# Patient Record
Sex: MALE | Race: WHITE | Employment: OTHER | URBAN - METROPOLITAN AREA
[De-identification: names, ages, dates, MRNs, and addresses within clinical notes are randomized per-mention and may not be internally consistent; named-entity substitution may affect disease eponyms.]

---

## 2017-01-03 DIAGNOSIS — Z79.4 TYPE 2 DIABETES MELLITUS WITH DIABETIC POLYNEUROPATHY, WITH LONG-TERM CURRENT USE OF INSULIN (H): Primary | ICD-10-CM

## 2017-01-03 DIAGNOSIS — E11.42 TYPE 2 DIABETES MELLITUS WITH DIABETIC POLYNEUROPATHY, WITH LONG-TERM CURRENT USE OF INSULIN (H): Primary | ICD-10-CM

## 2017-01-03 NOTE — TELEPHONE ENCOUNTER
Fax from pharmacy asking for clarification on the Glucose tablets.     Please clarify strength and how often daily/max.     Pended first option and also another option with 5 mg tablet. Pended Daily PRN.

## 2017-01-03 NOTE — TELEPHONE ENCOUNTER
Called pharmacy and clarified that per LS it is the 4 gram glucose tabs. Shari Schoenberger, CMA

## 2017-01-03 NOTE — TELEPHONE ENCOUNTER
There is no daily max on the glucose tabs, its just 4 tabs q 10-15 minutes as needed for low blood sugar.  Does that work?  Martha Sánchez NP  Endocrinology

## 2017-01-05 ENCOUNTER — CARE COORDINATION (OUTPATIENT)
Dept: CASE MANAGEMENT | Facility: CLINIC | Age: 77
End: 2017-01-05

## 2017-01-05 NOTE — PROGRESS NOTES
Clinic Care Coordination Contact  OUTREACH    Referral Information:  Referral Source: CTS  Reason for Contact: follow-up  Care Conference: No     Universal Utilization:   ED Visits in last year: 1  Hospital visits in last year: 1  Last PCP appointment: 16  Missed Appointments:  (none known)  Concerns: no utilization concerns  Multiple Providers or Specialists: endocrinologist    Clinical Concerns:  Current Medical Concerns: Received call back from patient's wife who verified patient's name and .  Wife reported that patient's FBS have been running 65-85 over the last week and patient has needed to eat a whole bagel at night in order to have those FBS in that range.  Diabetic monitor is reporting that average blood sugar is about 146 at this time.  Patient is getting glucose tabs at pharmacy to have option to use if blood sugars are too low.  Patient will have follow-up with endocrinologist on 17.     Current Behavioral Concerns: No acute concerns    Education Provided to patient: Encouraged patient and wife to continue to check blood sugars and report any highs or lows to endocrinologist.     Clinical Pathway Name: Diabetes  Clinical Pathway: Clinic Care Coordination - Diabetes Pathway    Patient's diabetes management related comments/concerns: Problem Solving.    Patient's emotional response to diabetes: expresses readiness to learn    ASSESSMENT:  Patient Problem List and Family Medical History reviewed for relevant medical history, current medical status, and diabetes risk factors.    Current Diabetes Management per Patient:  Taking diabetes medications? yes    Past Diabetes Education: Yes    Patient glucose self monitoring as follows: four times daily.     BG results: fasting glucose- 65-85     BG values are: In goal  Patient's most recent A1C      8.9   12/3/2016 is not meeting goal     Nutrition:  Patient eats 3 meals per day      Cultural/Taoist diet restrictions: No     Physical Activity:     Walking around the house, errands    Medication Management:  Patient taking medications as ordered     Functional Status:  Mobility Status: Independent  Equipment Currently Used at Home: glucometer  Transportation: Wife drives           Psychosocial:  Current living arrangement:: I live in a private home with spouse  Financial/Insurance: [facility], patient is a [place], but not currently using any VA benefits.   Adult son and adult dtr, but [place]    Supportive neighbors who will assist with transportation/errands and snow removal.      Resources and Interventions:  Current Resources: Other (Comments) (none); Other (see comment) (grocery delivery options, diabetes resources)     Informed wife of [facility] phone number to make eye appointments.       Advanced Care Plans/Directives on file:: No  Referrals Placed: Other  (none at this time)     Goals:   Goal 1 Statement: Patient will have stable diabetes status with blood sugars WNL through 4/1/17.   Goal 1 Progression Percent: 50%  Goal 1 Progression Date: 01/05/17     Barriers: Elderly, difficulty changing habits.   Strengths: supportive wife  Patient/Caregiver understanding: Patient and wife have good understanding of condition and are monitoring blood sugars consistently.    Frequency of Care Coordination: as needed  Upcoming appointment: 02/23/17 (with endocrinologist Dr. Damian)     Plan:   1. Patient will continue to monitor blood sugars and take insulin as ordered.    2. Patient will follow-up with endocrinologist on 2/23/17.   3. Will pend to check status in 4-5 weeks.     Esha Zimmerman RN   FPA [facility]   [phone]  January 5, 2017

## 2017-01-12 DIAGNOSIS — N20.0 CALCULUS OF KIDNEY: ICD-10-CM

## 2017-01-12 DIAGNOSIS — E78.5 HYPERLIPIDEMIA LDL GOAL <100: Primary | ICD-10-CM

## 2017-01-12 DIAGNOSIS — I10 HYPERTENSION GOAL BP (BLOOD PRESSURE) < 140/90: ICD-10-CM

## 2017-01-12 NOTE — TELEPHONE ENCOUNTER
Tamsulosin         Last Written Prescription Date: 10/11/16  Last Fill Quantity: 90, # refills: 0    Last Office Visit with G, P or [facility] prescribing provider:  12/29/16 Nati   Future Office Visit:    Next 5 appointments (look out 90 days)     Feb 23, 2017  2:00 PM   [facility] Visit with Lori Damian MD   [facility]    [address] [facility] [address]  [facility] [address] [phone]                  BP Readings from Last 3 Encounters:   12/29/16 122/62   12/14/16 122/64   12/05/16 138/74

## 2017-01-16 RX ORDER — TAMSULOSIN HYDROCHLORIDE 0.4 MG/1
0.4 CAPSULE ORAL DAILY
Qty: 90 CAPSULE | Refills: 1 | Status: SHIPPED | OUTPATIENT
Start: 2017-01-16 | End: 2017-07-10

## 2017-01-16 RX ORDER — AMLODIPINE BESYLATE 5 MG/1
5 TABLET ORAL DAILY
Qty: 90 TABLET | Refills: 1 | Status: SHIPPED | OUTPATIENT
Start: 2017-01-16 | End: 2017-07-14

## 2017-01-20 DIAGNOSIS — E78.5 HYPERLIPIDEMIA LDL GOAL <100: Primary | ICD-10-CM

## 2017-01-20 NOTE — TELEPHONE ENCOUNTER
Mevacor     Last Written Prescription Date: 1/22/16  Last Fill Quantity:, # refills: 90  Last Office Visit with G, P or [facility] prescribing provider: 12/14/16   Next 5 appointments (look out 90 days)     Feb 23, 2017  2:00 PM   Return Visit with Lori Damian MD   [facility] ([facility]    [address] [facility] [address]  [facility] [address]   [phone]                   CHOL      127   7/22/2016  HDL       31   7/22/2016  LDL       79   7/22/2016  TRIG       86   7/22/2016  CHOLHDLRATIO      4.3   9/25/2015    Labs showing if normal/abnormal  Lab Results   Component Value Date    CHOL 127 07/22/2016    TRIG 86 07/22/2016    HDL 31* 07/22/2016    LDL 79 07/22/2016    VLDL 23 09/25/2015    CHOLHDLRATIO 4.3 09/25/2015

## 2017-01-24 RX ORDER — LOVASTATIN 40 MG
40 TABLET ORAL AT BEDTIME
Qty: 90 TABLET | Refills: 0 | Status: SHIPPED | OUTPATIENT
Start: 2017-01-24 | End: 2017-04-14

## 2017-02-03 ENCOUNTER — CARE COORDINATION (OUTPATIENT)
Dept: CASE MANAGEMENT | Facility: CLINIC | Age: 77
End: 2017-02-03

## 2017-02-03 NOTE — PROGRESS NOTES
Clinic Care Coordination Contact  OUTREACH    Referral Information:  Referral Source: CTS  Reason for Contact: follow-up  Care Conference: No     Universal Utilization:   ED Visits in last year: 1  Hospital visits in last year: 1  Last PCP appointment: 16  Missed Appointments:  (none known)  Concerns: no utilization concerns  Multiple Providers or Specialists: endocrinologist    Clinical Concerns:  Current Medical Concerns: Contacted patient's home and spoke with wife who verified patient's name and  and consent to communicate form exists to speak with wife.  Wife reported that FBS was 64 and then blood sugars throughout day are ranging from 140's-170's.  Wife and patient will meet with endocrinologist on 17.      Current Behavioral Concerns: No acute concerns      Education Provided to patient: Discussed importance of continuing to check blood sugars and bring results to endocrinologist for review.  Discussed inquiring when next A1C should be obtained.     Clinical Pathway Name: Diabetes  Clinical Pathway: Clinic Care Coordination - Diabetes Pathway    Patient's diabetes management related comments/concerns: Monitoring    ASSESSMENT:  Patient Problem List and Family Medical History reviewed for relevant medical history, current medical status, and diabetes risk factors.    Current Diabetes Management per Patient:  Taking diabetes medications?   yes:     Diabetes Medication(s)     Biguanides Sig    metFORMIN (GLUCOPHAGE) 500 MG tablet Take 2 tablets (1,000 mg) by mouth 2 times daily (with meals)    Insulin Sig    insulin lispro (HUMALOG KWIKPEN) 100 UNIT/ML injection Inject 2 Units Subcutaneous 3 times daily (before meals) Take 2 unit before breakfast, 2 units before lunch and 2 units before dinner PLUS sliding scale    insulin glargine (LANTUS) 100 UNIT/ML injection Inject 35 Units Subcutaneous every morning (before breakfast)    insulin aspart (NOVOLOG PEN) 100 UNIT/ML injection Inject 1-7 Units  Subcutaneous 3 times daily (before meals) Correction Scale - HIGH INSULIN RESISTANCE DOSING     Do Not give Correction Insulin if BG < 140  For  - 164 give 1 unit.  For  - 189 give 2 units.  For  - 214 give 3 units.  For  - 239 give 4 units.  For  - 264 give 5 units.  For  - 289 give 6 units.  For  - 314 give 7 units.  For  - 339 give 8 units.  For BG = or > 340 give 9 units.  Check blood glucose before meals/bolus feedings (or q4h if NPO) and administer based on blood glucose.  Notify MD if glucose > or = 350 mg/dL after administration of correction dose.          Past Diabetes Education: Yes    Patient glucose self monitoring as follows: four times daily.     BG results: fasting glucose- 64 and bedtime- 169     Patient's most recent A1C      8.9   12/3/2016 is not meeting goal     Nutrition:  Patient eats 3 meals per day    Cultural/Worship diet restrictions: No     Physical Activity:    ADL's, grocery shopping, snow removal      Medication Management:  Wife reports that patient is taking medications as prescribed.      Functional Status:  Mobility Status: Independent  Equipment Currently Used at Home: glucometer  Transportation: wife drives           Psychosocial:  Current living arrangement:: I live in a private home with spouse  Financial/Insurance: [facility], patient is a [place], but not currently using any VA benefits.   Adult son and adult dtr, but [place] area.     Supportive neighbors who will assist with transportation/errands and snow removal.         Resources and Interventions:  Current Resources: Other (Comments) (none); Other (see comment) (grocery delivery options, diabetes resources)        Advanced Care Plans/Directives on file:: No  Referrals Placed: Other  (none at this time)     Goals:   Goal 1 Statement: Patient will have stable diabetes status with blood sugars WNL through 4/1/17.   Goal 1 Progression Percent: 60%  Goal 1  Progression Date: 02/03/17     Patient/Caregiver understanding: Patient/wife have good understanding of condition and are consistent in checking blood sugars and taking medications/insulin.  Reminded patient/wife regarding making eye physician appt.      Frequency of Care Coordination: as needed  Upcoming appointment: 02/23/17 (with endocrinologist Dr. Damian)     Plan:   1. Patient will see endocrinologist on 2/23/17  2. Patient will bring blood sugar results for review.   3. Will pend to check status in 4-5 weeks.     Esha Zimmerman RN   [facility]   [phone]  February 3, 2017

## 2017-02-10 DIAGNOSIS — Z79.4 TYPE 2 DIABETES MELLITUS WITH DIABETIC POLYNEUROPATHY, WITH LONG-TERM CURRENT USE OF INSULIN (H): ICD-10-CM

## 2017-02-10 DIAGNOSIS — E78.5 HYPERLIPIDEMIA LDL GOAL <100: ICD-10-CM

## 2017-02-10 DIAGNOSIS — E11.42 TYPE 2 DIABETES MELLITUS WITH DIABETIC POLYNEUROPATHY, WITH LONG-TERM CURRENT USE OF INSULIN (H): ICD-10-CM

## 2017-02-10 LAB
ALBUMIN SERPL-MCNC: 3.6 G/DL (ref 3.4–5)
ALP SERPL-CCNC: 76 U/L (ref 40–150)
ALT SERPL W P-5'-P-CCNC: 25 U/L (ref 0–70)
ANION GAP SERPL CALCULATED.3IONS-SCNC: 7 MMOL/L (ref 3–14)
AST SERPL W P-5'-P-CCNC: 20 U/L (ref 0–45)
BILIRUB SERPL-MCNC: 0.4 MG/DL (ref 0.2–1.3)
BUN SERPL-MCNC: 24 MG/DL (ref 7–30)
CALCIUM SERPL-MCNC: 9.5 MG/DL (ref 8.5–10.1)
CHLORIDE SERPL-SCNC: 102 MMOL/L (ref 94–109)
CHOLEST SERPL-MCNC: 136 MG/DL
CO2 SERPL-SCNC: 32 MMOL/L (ref 20–32)
CREAT SERPL-MCNC: 1.19 MG/DL (ref 0.66–1.25)
GFR SERPL CREATININE-BSD FRML MDRD: 59 ML/MIN/1.7M2
GLUCOSE SERPL-MCNC: 72 MG/DL (ref 70–99)
HBA1C MFR BLD: 7.9 % (ref 4.3–6)
HDLC SERPL-MCNC: 37 MG/DL
LDLC SERPL CALC-MCNC: 83 MG/DL
NONHDLC SERPL-MCNC: 99 MG/DL
POTASSIUM SERPL-SCNC: 4.4 MMOL/L (ref 3.4–5.3)
PROT SERPL-MCNC: 8.7 G/DL (ref 6.8–8.8)
SODIUM SERPL-SCNC: 141 MMOL/L (ref 133–144)
TRIGL SERPL-MCNC: 82 MG/DL

## 2017-02-10 PROCEDURE — 36415 COLL VENOUS BLD VENIPUNCTURE: CPT | Performed by: INTERNAL MEDICINE

## 2017-02-10 PROCEDURE — 83036 HEMOGLOBIN GLYCOSYLATED A1C: CPT | Performed by: INTERNAL MEDICINE

## 2017-02-10 PROCEDURE — 80061 LIPID PANEL: CPT | Performed by: INTERNAL MEDICINE

## 2017-02-10 PROCEDURE — 80053 COMPREHEN METABOLIC PANEL: CPT | Performed by: INTERNAL MEDICINE

## 2017-02-17 ENCOUNTER — OFFICE VISIT (OUTPATIENT)
Dept: INTERNAL MEDICINE | Facility: CLINIC | Age: 77
End: 2017-02-17
Payer: COMMERCIAL

## 2017-02-17 VITALS
DIASTOLIC BLOOD PRESSURE: 70 MMHG | SYSTOLIC BLOOD PRESSURE: 136 MMHG | WEIGHT: 158 LBS | TEMPERATURE: 98.3 F | OXYGEN SATURATION: 97 % | HEART RATE: 83 BPM | BODY MASS INDEX: 24.8 KG/M2 | HEIGHT: 67 IN

## 2017-02-17 DIAGNOSIS — R73.9 HYPERGLYCEMIA: ICD-10-CM

## 2017-02-17 DIAGNOSIS — E13.10 DIABETIC KETOACIDOSIS WITHOUT COMA ASSOCIATED WITH OTHER SPECIFIED DIABETES MELLITUS (H): ICD-10-CM

## 2017-02-17 DIAGNOSIS — I10 HYPERTENSION GOAL BP (BLOOD PRESSURE) < 140/90: ICD-10-CM

## 2017-02-17 DIAGNOSIS — E11.42 TYPE 2 DIABETES MELLITUS WITH DIABETIC POLYNEUROPATHY, WITH LONG-TERM CURRENT USE OF INSULIN (H): Primary | ICD-10-CM

## 2017-02-17 DIAGNOSIS — Z79.4 TYPE 2 DIABETES MELLITUS WITH DIABETIC POLYNEUROPATHY, WITH LONG-TERM CURRENT USE OF INSULIN (H): Primary | ICD-10-CM

## 2017-02-17 DIAGNOSIS — E78.5 HYPERLIPIDEMIA LDL GOAL <100: ICD-10-CM

## 2017-02-17 PROCEDURE — 99214 OFFICE O/P EST MOD 30 MIN: CPT | Performed by: INTERNAL MEDICINE

## 2017-02-17 NOTE — NURSING NOTE
"Chief Complaint   Patient presents with     Diabetes     Discuss lab results, insulin refills       Initial /70  Pulse 83  Temp 98.3  F (36.8  C) (Oral)  Ht 5' 7\" (1.702 m)  Wt 158 lb (71.7 kg)  SpO2 97%  BMI 24.75 kg/m2 Estimated body mass index is 24.75 kg/(m^2) as calculated from the following:    Height as of this encounter: 5' 7\" (1.702 m).    Weight as of this encounter: 158 lb (71.7 kg).  Medication Reconciliation: complete   Richelle Kelly MA      "

## 2017-02-17 NOTE — PROGRESS NOTES
SUBJECTIVE:                                                    Pete Sheldon is a 76 year old male who presents to clinic today for the following health issues:        Diabetes Follow-up    Patient is checking blood sugars: four times daily.    Blood sugar testing frequency justification: Uncontrolled diabetes  Results are as follows:        Numbers varies constantly in a range of 's through the day    Diabetic concerns: None     Symptoms of hypoglycemia (low blood sugar): shaky, sweaty at times     Paresthesias (numbness or burning in feet) or sores: Yes, Occasional Lt foot tingling     Date of last diabetic eye exam: Overdue   Has H/O DM. On diet , exercise and Insulin. Blood sugars are better controlled , but still fluctuating.  Has LE  parestesias. No hypoglycemias.    Hyperlipidemia Follow-Up      Rate your low fat/cholesterol diet?: good    Taking statin?  Yes, no muscle aches from statin    Other lipid medications/supplements?:  none   Has H/O hyperlipidemia. On medical treatment and diet. No side effects. No muscle weakness, myalgias or upset stomach.     Hypertension Follow-up      Outpatient blood pressures are not being checked.    Low Salt Diet: no added salt   Has h/o HTN. on medical treatment. BP has been controlled. No side effects from medications. No CP, HA, dizziness. good compliance with medications and low salt diet.    Discussed immunizations. Needs Prevnar 13      Amount of exercise or physical activity: None    Problems taking medications regularly: No    Medication side effects: none  Diet: low fat/cholesterol and tries diabetic diet        Problem list and histories reviewed & adjusted, as indicated.  Additional history: as documented    Problem list, Medication list, Allergies, and Medical/Social/Surgical histories reviewed in [facility] and updated as appropriate.    ROS:  Constitutional, HEENT, cardiovascular, pulmonary, GI, , musculoskeletal, neuro, skin, endocrine and psych  "systems are negative, except as otherwise noted.    OBJECTIVE:                                                    /70  Pulse 83  Temp 98.3  F (36.8  C) (Oral)  Ht 5' 7\" (1.702 m)  Wt 158 lb (71.7 kg)  SpO2 97%  BMI 24.75 kg/m2  Body mass index is 24.75 kg/(m^2).  GENERAL: healthy, alert and no distress  NECK: no adenopathy, no asymmetry, masses, or scars and thyroid normal to palpation  RESP: lungs clear to auscultation - no rales, rhonchi or wheezes  CV: regular rate and rhythm, normal S1 S2, no S3 or S4, no murmur, click or rub, no peripheral edema and peripheral pulses strong  ABDOMEN: soft, nontender, no hepatosplenomegaly, no masses and bowel sounds normal  MS: no gross musculoskeletal defects noted, no edema    Diagnostic Test Results:  Results for orders placed or performed in visit on 02/10/17   Comprehensive metabolic panel   Result Value Ref Range    Sodium 141 133 - 144 mmol/L    Potassium 4.4 3.4 - 5.3 mmol/L    Chloride 102 94 - 109 mmol/L    Carbon Dioxide 32 20 - 32 mmol/L    Anion Gap 7 3 - 14 mmol/L    Glucose 72 70 - 99 mg/dL    Urea Nitrogen 24 7 - 30 mg/dL    Creatinine 1.19 0.66 - 1.25 mg/dL    GFR Estimate 59 (L) >60 mL/min/1.7m2    GFR Estimate If Black 71 >60 mL/min/1.7m2    Calcium 9.5 8.5 - 10.1 mg/dL    Bilirubin Total 0.4 0.2 - 1.3 mg/dL    Albumin 3.6 3.4 - 5.0 g/dL    Protein Total 8.7 6.8 - 8.8 g/dL    Alkaline Phosphatase 76 40 - 150 U/L    ALT 25 0 - 70 U/L    AST 20 0 - 45 U/L   Lipid panel reflex to direct LDL   Result Value Ref Range    Cholesterol 136 <200 mg/dL    Triglycerides 82 <150 mg/dL    HDL Cholesterol 37 (L) >39 mg/dL    LDL Cholesterol Calculated 83 <100 mg/dL    Non HDL Cholesterol 99 <130 mg/dL   Hemoglobin A1c   Result Value Ref Range    Hemoglobin A1C 7.9 (H) 4.3 - 6.0 %        ASSESSMENT/PLAN:                                                      Problem List Items Addressed This Visit     HYPERLIPIDEMIA LDL GOAL <100    Hypertension goal BP (blood pressure) " < 140/90    Type 2 diabetes mellitus with diabetic polyneuropathy, with long-term current use of insulin (H) - Primary    Relevant Medications    insulin lispro (HUMALOG KWIKPEN) 100 UNIT/ML injection    insulin glargine (LANTUS) 100 UNIT/ML injection    DKA (diabetic ketoacidoses) (H)    Relevant Medications    insulin lispro (HUMALOG KWIKPEN) 100 UNIT/ML injection    insulin glargine (LANTUS) 100 UNIT/ML injection      Other Visit Diagnoses     Hyperglycemia        Relevant Medications    insulin lispro (HUMALOG KWIKPEN) 100 UNIT/ML injection           Cont treatment   Start exercise   Consider ACE  Prevnar 13 advised, he will check with his pharmacy     Follow-Up:in 3 months     Evgeny Dubon MD  [facility]

## 2017-02-17 NOTE — MR AVS SNAPSHOT
"              After Visit Summary   2/17/2017    Pete Sheldon    MRN: [id number]           Patient Information     Date Of Birth          1940        Visit Information        Provider Department      2/17/2017 10:00 AM Evgeny Dubon MD [facility]        Today's Diagnoses     Hyperglycemia        Diabetic ketoacidosis without coma associated with other specified diabetes mellitus (H)           Follow-ups after your visit        Follow-up notes from your care team     Return in about 3 months (around 5/17/2017).      Your next 10 appointments already scheduled     Feb 23, 2017  2:00 PM CST   Return Visit with Lori Damian MD   [facility] ([facility]    [address] [facility] [address]  [facility] [address] [phone]              Who to contact     If you have questions or need follow up information about today's clinic visit or your schedule please contact [facility] directly at [phone].  Normal or non-critical lab and imaging results will be communicated to you by Planbushart, letter or phone within 4 business days after the clinic has received the results. If you do not hear from us within 7 days, please contact the clinic through Planbushart or phone. If you have a critical or abnormal lab result, we will notify you by phone as soon as possible.  Submit refill requests through [employer] or call your pharmacy and they will forward the refill request to us. Please allow 3 business days for your refill to be completed.          Additional Information About Your Visit        MyChart Information     [employer] lets you send messages to your doctor, view your test results, renew your prescriptions, schedule appointments and more. To sign up, go to [email or web address] . Click on \"Log in\" on the left side of the screen, which will take you to the Welcome page. Then click on \"Sign up Now\" on the right side of the page.     You will " "be asked to enter the access code listed below, as well as some personal information. Please follow the directions to create your username and password.     Your access code is: [email or web address]  Expires: 2017 10:27 AM     Your access code will  in 90 days. If you need help or a new code, please call your [facility] clinic or [phone].        Care EveryWhere ID     This is your Care EveryWhere ID. This could be used by other organizations to access your [facility] medical records  [id number]        Your Vitals Were     Pulse Temperature Height Pulse Oximetry BMI (Body Mass Index)       83 98.3  F (36.8  C) (Oral) 5' 7\" (1.702 m) 97% 24.75 kg/m2        Blood Pressure from Last 3 Encounters:   17 136/70   16 122/62   16 122/64    Weight from Last 3 Encounters:   17 158 lb (71.7 kg)   16 152 lb 9.6 oz (69.2 kg)   16 152 lb (68.9 kg)              Today, you had the following     No orders found for display         Where to get your medicines      These medications were sent to [facility] [address] [place] [address]     Phone:  [phone]     insulin glargine 100 UNIT/ML injection    insulin lispro 100 UNIT/ML injection          Primary Care Provider Office Phone # Fax #    Rom Helm [phone]       [facility] [address]  [facility] [address]        Goals        Diet    Increase water intake     Notes - Note edited  2014 11:42 AM by Patricia Khan RN    Have 5-6 glasses (8oz) of fluid a day  Per f/u with spouse today, pt not doing well. Not eating and drinking. Called clinic and advised to take pt to clinic.         Increase water intake        General    Medication 1 (pt-stated)     Notes - Note created  5/15/2014  1:57 PM by Patricia Khan, JULIA    Pt will have an MTM consult         Thank you!     Thank you for choosing [facility] " [facility]  for your care. Our goal is always to provide you with excellent care. Hearing back from our patients is one way we can continue to improve our services. Please take a few minutes to complete the written survey that you may receive in the mail after your visit with us. Thank you!             Your Updated Medication List - Protect others around you: Learn how to safely use, store and throw away your medicines at www.disposemymeds.org.          This list is accurate as of: 2/17/17 10:27 AM.  Always use your most recent med list.                   Brand Name Dispense Instructions for use    ACE/ARB NOT PRESCRIBED (INTENTIONAL)      by Other route continuous prn (Hyperkalemia)       amLODIPine 5 MG tablet    NORVASC    90 tablet    Take 1 tablet (5 mg) by mouth daily       blood glucose monitoring test strip    STEFFI CONTOUR NEXT    400 each    Use to test blood sugar 4 times daily or as directed.       Glucosamine HCl 750 MG Tabs      Take 750 mg by mouth 2 times daily       * GLUCOSE MANAGEMENT Tabs     60 tablet    Take 2 tab in case on low blood glucose       * GLUCOSE MANAGEMENT Tabs     100 tablet    4 tabs po for low blood sugar below 70, may repeat q 10-15 minutes as needed       hydrochlorothiazide 12.5 MG capsule    MICROZIDE    90 capsule    Take 1 capsule (12.5 mg) by mouth every morning       insulin aspart 100 UNIT/ML injection    NovoLOG PEN     Inject 1-7 Units Subcutaneous 3 times daily (before meals) Correction Scale - HIGH INSULIN RESISTANCE DOSING    Do Not give Correction Insulin if BG < 140 For  - 164 give 1 unit. For  - 189 give 2 units. For  - 214 give 3 units. For  - 239 give 4 units. For  - 264 give 5 units. For  - 289 give 6 units. For  - 314 give 7 units. For  - 339 give 8 units. For BG = or > 340 give 9 units. Check blood glucose before meals/bolus feedings (or q4h if NPO) and administer based on blood glucose. Notify MD if glucose >  "or = 350 mg/dL after administration of correction dose.       insulin glargine 100 UNIT/ML injection    LANTUS    3 mL    Inject 35 Units Subcutaneous every morning (before breakfast)       insulin lispro 100 UNIT/ML injection    HumaLOG KWIKpen    3 mL    Inject 2 Units Subcutaneous 3 times daily (before meals) Take 2 unit before breakfast, 2 units before lunch and 2 units before dinner PLUS sliding scale       insulin pen needle 31G X 8 MM     300 each    B-D UF III short pen needles, use 4 times a day to inject insulin.       insulin syringes (disposable) U-100 0.3 ML Misc     100 each    1 each 2 times daily       ZapMe LANCETS Misc     200 strip    Use to test blood sugars 4 times daily or as directed.       loratadine 10 MG tablet    AF LORATADINE    14 tablet    Take 1 tablet (10 mg) by mouth daily       lovastatin 40 MG tablet    MEVACOR    90 tablet    Take 1 tablet (40 mg) by mouth At Bedtime       metFORMIN 500 MG tablet    GLUCOPHAGE    360 tablet    Take 2 tablets (1,000 mg) by mouth 2 times daily (with meals)       multivitamin Tabs tablet      Take 1 tablet by mouth daily       order for DME     3 Month    All DM testing supplies including test strips, lancets, solution, syringes, needles and/or pen needles for testing 4 times per day.  Brand \"Contour Next\"       tamsulosin 0.4 MG capsule    FLOMAX    90 capsule    Take 1 capsule (0.4 mg) by mouth daily       VITAMIN D PO      Take 400 Units by mouth daily       * Notice:  This list has 2 medication(s) that are the same as other medications prescribed for you. Read the directions carefully, and ask your doctor or other care provider to review them with you.      "

## 2017-02-23 ENCOUNTER — OFFICE VISIT (OUTPATIENT)
Dept: ENDOCRINOLOGY | Facility: CLINIC | Age: 77
End: 2017-02-23
Payer: COMMERCIAL

## 2017-02-23 VITALS
TEMPERATURE: 98.2 F | WEIGHT: 159.6 LBS | OXYGEN SATURATION: 97 % | HEIGHT: 67 IN | HEART RATE: 97 BPM | SYSTOLIC BLOOD PRESSURE: 124 MMHG | BODY MASS INDEX: 25.05 KG/M2 | DIASTOLIC BLOOD PRESSURE: 70 MMHG

## 2017-02-23 DIAGNOSIS — E78.5 HYPERLIPIDEMIA LDL GOAL <100: ICD-10-CM

## 2017-02-23 DIAGNOSIS — E11.42 TYPE 2 DIABETES MELLITUS WITH DIABETIC POLYNEUROPATHY, WITH LONG-TERM CURRENT USE OF INSULIN (H): Primary | ICD-10-CM

## 2017-02-23 DIAGNOSIS — Z79.4 TYPE 2 DIABETES MELLITUS WITH DIABETIC POLYNEUROPATHY, WITH LONG-TERM CURRENT USE OF INSULIN (H): Primary | ICD-10-CM

## 2017-02-23 DIAGNOSIS — E11.10 DIABETIC KETOACIDOSIS WITHOUT COMA ASSOCIATED WITH TYPE 2 DIABETES MELLITUS (H): ICD-10-CM

## 2017-02-23 DIAGNOSIS — I10 HYPERTENSION GOAL BP (BLOOD PRESSURE) < 140/90: ICD-10-CM

## 2017-02-23 DIAGNOSIS — E13.10 DIABETIC KETOACIDOSIS WITHOUT COMA ASSOCIATED WITH OTHER SPECIFIED DIABETES MELLITUS (H): ICD-10-CM

## 2017-02-23 DIAGNOSIS — E11.42 DIABETIC POLYNEUROPATHY ASSOCIATED WITH TYPE 2 DIABETES MELLITUS (H): ICD-10-CM

## 2017-02-23 PROCEDURE — 99207 C FOOT EXAM  NO CHARGE: CPT | Performed by: INTERNAL MEDICINE

## 2017-02-23 PROCEDURE — 99214 OFFICE O/P EST MOD 30 MIN: CPT | Performed by: INTERNAL MEDICINE

## 2017-02-23 NOTE — PROGRESS NOTES
ENDOCRINOLOGY CLINIC NOTE:  Name: Pete Sheldon  Seen for f/u of Diabetes.  He is accompanied by his wife.  HPI:  Pete Sheldon is a 75 year old male who presents for the evaluation/management of:  Was in the hospital 12/2/16-12/5/2016 for diabetic ketoacidosis and acute kidney injury.  Office note he is having bedtime snack almost every days.  Typical bedtime snack is whole bagel.  He reports that he feels does not eat a bagel blood sugar drops too at night.      1. Type 2 DM:  Orginally diagnosed in 1995.  Current Regimen: Metformin 1000 mg twice a day, Lantus 35 units in morning and Humalog 3/3/3 with breakfast/lunch/dinner respectively.  In addition to that he is on correctional scale 1 unit- 25 >140.  He and his wife are not sure about Humalog doses.  I asked him if they're taking sliding-scale she is not sure.  But reports that typically takes about 2-3 units with each meal based on blood sugar.  Does not feel comfortable with carbohydrate counting.  BS checks: Three times a day    Average Meter Download: Blood sugars are variable.  There are some episodes when the blood sugar is low in the morning for example 57, 48, 75.  On other days for #on the higher side for example 194-149, 107.  During the day the #again variable but mostly on the higher side.  Exercise: Minimal  Episodes of hypoglycemia (low blood sugar):  Especially in morning as noted above.  Fixed meal pattern: NO. Carb content varies  Patient counting carbs: Trying to count carbs but does not feel comfortable with it.    DM Complications:   Nephropathy: Yes: Microalbuminuria present  Retinopathy: Yes: History of laser treatment in past  Neuropathy: Yes.  Microalbuminuria: Yes: Microalbuminuria present.  Not on ACE secondary to history of hyperkalemia.  MICROL      208   7/22/2016  MICROALBUMIN   390.98   7/22/2016    CAD/PAD: No  Gastroparesis: No  Hypoglycemia unawareness: Yes: Previous notes mentioning history of hypoglycemia  unawareness.    2. Hypertension: Blood Pressure today:   BP Readings from Last 3 Encounters:   17 124/70   17 136/70   16 122/62     Blood pressure medications include hydrochlorothiazide 12.5 mg, amlodipine 5 mg.      3. Hyperlipidemia: Takes lovastatin 40 mg for lipid control.  Denies muscle aches of pains.        PMH/PSH:  Past Medical History   Diagnosis Date     Calculus of ureter      Hypertension      Microalbuminuria 2/15/2016     Other and unspecified hyperlipidemia      Type 2 diabetes, HbA1C goal < 8% (H) 1995     Past Surgical History   Procedure Laterality Date     Hc repair incisional hernia,reducible  2001,      Hernia Repair, Incisional, Unilateral (right)     Hc repair incisional hernia,reducible       Hernia Repair, Incisional, Unilateral (left, with mesh placement)     C nonspecific procedure       Nasal septoplasty     Hc flex sigmoidoscopy w/wo dontae spec by brush/wash  1999     Normal to 60 cm     Hc colonoscopy thru stoma, diagnostic  2007     Normal     Family Hx:  Family History   Problem Relation Age of Onset     CEREBROVASCULAR DISEASE Mother       age 95     HEART DISEASE Mother      age 89,  age 95     CEREBROVASCULAR DISEASE Father       age 91, stroke two years previous     Cancer - colorectal Brother      Half-brother     CANCER Sister      Half-sister (?type)     CANCER Sister      Half-sister (?type)     HEART DISEASE Brother      Neurologic Disorder Daughter      Multiple Sclerosis     Psychotic Disorder Son      Schizophrenia       Social Hx:  Social History     Social History     Marital status:      Spouse name: N/A     Number of children: 2     Years of education: N/A     Occupational History     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          MEDICATIONS:  has a current medication list which includes the following prescription(s): insulin glargine, insulin lispro, lovastatin, tamsulosin, amlodipine, glucose management, glucose management, insulin aspart, multivitamin, metformin, hydrochlorothiazide, blood glucose monitoring, insulin pen needle, order for dme, lifescan finepoint lancets, glucosamine hcl, loratadine, insulin syringes (disposable), cholecalciferol, and ACE/ARB NOT PRESCRIBED, INTENTIONAL,.    ROS     ROS: 10 point ROS neg other than the symptoms noted above in the HPI.    Physical Exam   VS: /70 (BP Location: Left arm, Patient Position: Chair, Cuff Size: Adult Large)  Pulse 97  Temp 98.2  F (36.8  C) (Oral)  Ht 5' 7\" (1.702 m)  Wt 159 lb 9.6 oz (72.4 kg)  SpO2 97%  BMI 25 kg/m2  GENERAL: AXOX3, NAD, well dressed, answering questions appropriately, appears stated age.  HEENT: OP clear, no LAD, no TM, non-tender, no exopthalmous, no proptosis, EOMI, no lig lag, no retraction  NECK: Thyroid normal in size, nontender. No nodules were palpated.  CV: RRR, no rubs, gallops, no murmurs  LUNGS: CTAB, no wheezes, rales, or ronchi  ABDOMEN: +BS  EXTREMITIES: no edema, +pulses, no rashes, no lesions  NEUROLOGY: CN grossly intact, + DTR upper and lower extremity, no tremors  MSK: grossly intact  SKIN: no rashes, no lesions    LABS:  Component      Latest Ref Rng 4/15/2016   C-Peptide      0.9 - 6.9 ng/mL <0.1 (L)   Glutamic Acid Decarboxylase Antibody       <5.0 . . .     A1c:  Lab Results   Component Value Date    A1C 7.9 02/10/2017    A1C 8.9 12/03/2016    A1C 8.3 10/28/2016    A1C 8.6 07/22/2016    A1C 8.4 04/15/2016         Creatinine:  Creatinine   Date Value Ref Range Status   02/10/2017 1.19 0.66 - 1.25 mg/dL Final     Urine Micro:  MICROL      208   " 7/22/2016  MICROALBUMIN   390.98   7/22/2016    LFTs/Lipids:  CHOL      139   1/15/2016  HDL       34   1/15/2016  LDL       91   1/15/2016  TRIG       70   1/15/2016  CHOLHDLRATIO      4.3   9/25/2015    TFTs:  TSH   Date Value Ref Range Status   07/22/2016 5.58 (H) 0.40 - 4.00 mU/L Final       All pertinent notes, labs, and images personally reviewed by me.     A/P  Mr.Victor ANURAG Sheldon is a 75 year old here for the evaluation/management of diabetes:    1. DM2 - (low C-peptide, negative FAVIOLA): Under Poor control.  A1c 7.9%.  Diabetes complicated by microalbuminuria, neuropathy and retinopathy.  Has been admitted for DKA two times 2016.  Does not feel comfortable with carbohydrate counting.  Blood sugars are variable. Eating and carbs are variable.  I am also concerned about his understanding of insulin regimen. When asked he was not able to answer the dosages correctly. I asked his wife if she could help with setting up insulin dosage and bedtime snack.  Few low BG in AM.   Continue Metformin 1000 mg twice a day  Lantus: decrease to 32 units in a.m. only   HUmalog: continue same scale  Get CGM- referral to CDE. He will check with insurance  Need to have consistent bedtime snack and consistent carbs with each meal.  Discussed hypoglycemia signs and symptoms as well as management in detail.    2. Hypertension - Under Fair control.  Continue hydrochlorothiazide 12.5 mg, amlodipine 5 mg.    3. Hyperlipidemia - Under Good control.  Continue lovastatin 40 mg. LDL 91, HDL 34.    4. Prevention  Flu Shot-September 2015  Pneumovax- March 2012  Opthalmology-Yes.  March 2015.  Due for eye examination.  ASA-Yes.  81 mg.  Smoking- No    5. Microalbuminuria:  MICROL      208   7/22/2016  MICROALBUMIN   390.98   7/22/2016  Not on ACE 2/2 to h/o hyperkalemia    All questions were answered.  The patient indicates understanding of the above issues and agrees with the plan set forth.     More than 50% of face to face time spent with  Mr. Sheldon on counseling / coordinating his care.  Total appointment times was 30 minutes.      Follow-up:  Follow up 3 months    Lori Damian M.D  [facility]    Disclaimer: This note consists of symbols derived from keyboarding, dictation and/or voice recognition software. As a result, there may be errors in the script that have gone undetected. Please consider this when interpreting information found in this chart.

## 2017-02-23 NOTE — PATIENT INSTRUCTIONS
[facility] & [place] locations   Dr Damian, Endocrinology Department      [facility]  [address] [address]  Appointment [phone]  Fax: [phone]  [facility]: Tuesday and Wednesday         [facility]   [address] [place] [address]  Appointment [phone]  Fax: [phone]  [place]: Monday and Thursday         To provide the best diabetic care, please bring your blood glucose meter to each and every visit with your Endocrinologist.  Your blood glucose meter/insulin pump will be downloaded at every appointment.      Please arrive 15 minutes before your scheduled appointment.  This will allow for your blood glucose meter/insulin pump to be downloaded and glycosylated hemoglobin (A1c) to be obtained prior to your appointment.    Decrease lantus to 32 units in AM only  Continue same dose of novolog  Your provider has referred you to Diabetes Education: For all [facility]:  Phone [phone]; Fax [phone]  Please call and make the appointment.-->continous glucose monitor  Please check cost with insurance before that    Labs in 3 months  Please make a lab appointment for blood work and follow up clinic appointment in 1 week after that to discuss results.

## 2017-02-23 NOTE — NURSING NOTE
"Chief Complaint   Patient presents with     Consult     dm2       Initial /70 (BP Location: Left arm, Patient Position: Chair, Cuff Size: Adult Large)  Pulse 97  Temp 98.2  F (36.8  C) (Oral)  Ht 1.702 m (5' 7\")  Wt 72.4 kg (159 lb 9.6 oz)  SpO2 97%  BMI 25 kg/m2 Estimated body mass index is 25 kg/(m^2) as calculated from the following:    Height as of this encounter: 1.702 m (5' 7\").    Weight as of this encounter: 72.4 kg (159 lb 9.6 oz).  Medication Reconciliation: complete     ENDOCRINOLOGY INTAKE FORM    Patient Name:  Pete Sheldon  :  1940    Is patient Diabetic?   Yes: dm2-   Does patient have non-diabetic or other endocrine issues?  No    Vitals: /70 (BP Location: Left arm, Patient Position: Chair, Cuff Size: Adult Large)  Pulse 97  Temp 98.2  F (36.8  C) (Oral)  Ht 1.702 m (5' 7\")  Wt 72.4 kg (159 lb 9.6 oz)  SpO2 97%  BMI 25 kg/m2  BMI= Body mass index is 25 kg/(m^2).    Flu vaccine:  completed  Pneumonia vaccine:  Due for Prevnar 13    Smoking and Alcohol use:  Social History   Substance Use Topics     Smoking status: Former Smoker     Quit date: 3/11/1953     Smokeless tobacco: Never Used     Alcohol use No       Foot Exam: Due   Eye Exam:  Due   Dental Exam:  Completed  Aspirin Use:  No    Lab Results   Component Value Date    A1C 7.9 02/10/2017    A1C 8.9 2016    A1C 8.3 10/28/2016    A1C 8.6 2016    A1C 8.4 04/15/2016       Lab Results   Component Value Date    MICROL 208 2016     No results found for: MICROALBUMIN    Staff Signature:  Annette Spence CMA        "

## 2017-02-23 NOTE — MR AVS SNAPSHOT
After Visit Summary   2017    Pete Sheldon    MRN: [id number]           Patient Information     Date Of Birth          1940        Visit Information        Provider Department      2017 2:00 PM Lori Damian MD [facility]        Today's Diagnoses     Type 2 diabetes mellitus with diabetic polyneuropathy, with long-term current use of insulin (H)    -  1    Diabetic ketoacidosis without coma associated with type 2 diabetes mellitus (H)        Hyperlipidemia LDL goal <100        Diabetic polyneuropathy associated with type 2 diabetes mellitus (H)        Hypertension goal BP (blood pressure) < 140/90        Diabetic ketoacidosis without coma associated with other specified diabetes mellitus (H)          Care Instructions    [facility] & [place] locations   Dr Damian, Endocrinology Department      [address] [address]  Appointment [phone]  Fax: [phone]  [facility]: Tuesday and Wednesday         [facility] [place] [facility] [address] [place] [address]  Appointment [phone]  Fax: [phone]  [place]: Monday and Thursday         To provide the best diabetic care, please bring your blood glucose meter to each and every visit with your Endocrinologist.  Your blood glucose meter/insulin pump will be downloaded at every appointment.      Please arrive 15 minutes before your scheduled appointment.  This will allow for your blood glucose meter/insulin pump to be downloaded and glycosylated hemoglobin (A1c) to be obtained prior to your appointment.    Decrease lantus to 32 units in AM only  Continue same dose of novolog  Your provider has referred you to Diabetes Education: For all [facility]:  Phone [phone]; Fax [phone]  Please call and make the appointment.-->continous glucose monitor  Please check cost with  "insurance before that    Labs in 3 months  Please make a lab appointment for blood work and follow up clinic appointment in 1 week after that to discuss results.                  Follow-ups after your visit        Who to contact     If you have questions or need follow up information about today's clinic visit or your schedule please contact [facility] directly at [phone].  Normal or non-critical lab and imaging results will be communicated to you by MyChart, letter or phone within 4 business days after the clinic has received the results. If you do not hear from us within 7 days, please contact the clinic through [employer] or phone. If you have a critical or abnormal lab result, we will notify you by phone as soon as possible.  Submit refill requests through [employer] or call your pharmacy and they will forward the refill request to us. Please allow 3 business days for your refill to be completed.          Additional Information About Your Visit        [employer] Information     [employer] lets you send messages to your doctor, view your test results, renew your prescriptions, schedule appointments and more. To sign up, go to [email or web address] . Click on \"Log in\" on the left side of the screen, which will take you to the Welcome page. Then click on \"Sign up Now\" on the right side of the page.     You will be asked to enter the access code listed below, as well as some personal information. Please follow the directions to create your username and password.     Your access code is: [email or web address]  Expires: 2017 10:27 AM     Your access code will  in 90 days. If you need help or a new code, please call your [facility] clinic or [phone].        Care EveryWhere ID     This is your Care EveryWhere ID. This could be used by other organizations to access your [facility] medical records  [id number]        Your Vitals Were     Pulse Temperature Height Pulse Oximetry BMI (Body Mass Index)       " "97 98.2  F (36.8  C) (Oral) 1.702 m (5' 7\") 97% 25 kg/m2        Blood Pressure from Last 3 Encounters:   02/23/17 124/70   02/17/17 136/70   12/29/16 122/62    Weight from Last 3 Encounters:   02/23/17 72.4 kg (159 lb 9.6 oz)   02/17/17 71.7 kg (158 lb)   12/29/16 69.2 kg (152 lb 9.6 oz)              We Performed the Following     FOOT EXAM          Today's Medication Changes          These changes are accurate as of: 2/23/17  2:32 PM.  If you have any questions, ask your nurse or doctor.               These medicines have changed or have updated prescriptions.        Dose/Directions    insulin glargine 100 UNIT/ML injection   Commonly known as:  LANTUS   This may have changed:  how much to take   Used for:  Diabetic ketoacidosis without coma associated with other specified diabetes mellitus (H)   Changed by:  Lori Damian MD        Dose:  32 Units   Inject 32 Units Subcutaneous every morning (before breakfast)   Quantity:  3 mL   Refills:  5            Where to get your medicines      Some of these will need a paper prescription and others can be bought over the counter.  Ask your nurse if you have questions.     You don't need a prescription for these medications     insulin glargine 100 UNIT/ML injection                Primary Care Provider Office Phone # Fax #    Rom Helm [phone]       [facility] [address] [place] [address]        Goals        Diet    Increase water intake     Notes - Note edited  5/14/2014 11:42 AM by Patricia Khan RN    Have 5-6 glasses (8oz) of fluid a day  Per f/u with spouse today, pt not doing well. Not eating and drinking. Called clinic and advised to take pt to clinic.         Increase water intake        General    Medication 1 (pt-stated)     Notes - Note created  5/15/2014  1:57 PM by Patricia Khan, JULIA    Pt will have an MTM consult         Thank you!     Thank you for choosing [facility] " [facility]  for your care. Our goal is always to provide you with excellent care. Hearing back from our patients is one way we can continue to improve our services. Please take a few minutes to complete the written survey that you may receive in the mail after your visit with us. Thank you!             Your Updated Medication List - Protect others around you: Learn how to safely use, store and throw away your medicines at www.disposemymeds.org.          This list is accurate as of: 2/23/17  2:32 PM.  Always use your most recent med list.                   Brand Name Dispense Instructions for use    ACE/ARB NOT PRESCRIBED (INTENTIONAL)      by Other route continuous prn (Hyperkalemia)       amLODIPine 5 MG tablet    NORVASC    90 tablet    Take 1 tablet (5 mg) by mouth daily       blood glucose monitoring test strip    STEFFI CONTOUR NEXT    400 each    Use to test blood sugar 4 times daily or as directed.       Glucosamine HCl 750 MG Tabs      Take 750 mg by mouth 2 times daily       * GLUCOSE MANAGEMENT Tabs     60 tablet    Take 2 tab in case on low blood glucose       * GLUCOSE MANAGEMENT Tabs     100 tablet    4 tabs po for low blood sugar below 70, may repeat q 10-15 minutes as needed       hydrochlorothiazide 12.5 MG capsule    MICROZIDE    90 capsule    Take 1 capsule (12.5 mg) by mouth every morning       insulin aspart 100 UNIT/ML injection    NovoLOG PEN     Inject 1-7 Units Subcutaneous 3 times daily (before meals) Correction Scale - HIGH INSULIN RESISTANCE DOSING    Do Not give Correction Insulin if BG < 140 For  - 164 give 1 unit. For  - 189 give 2 units. For  - 214 give 3 units. For  - 239 give 4 units. For  - 264 give 5 units. For  - 289 give 6 units. For  - 314 give 7 units. For  - 339 give 8 units. For BG = or > 340 give 9 units. Check blood glucose before meals/bolus feedings (or q4h if NPO) and administer based on blood glucose. Notify MD if glucose >  "or = 350 mg/dL after administration of correction dose.       insulin glargine 100 UNIT/ML injection    LANTUS    3 mL    Inject 32 Units Subcutaneous every morning (before breakfast)       insulin lispro 100 UNIT/ML injection    HumaLOG KWIKpen    3 mL    Inject 2 Units Subcutaneous 3 times daily (before meals) Take 2 unit before breakfast, 2 units before lunch and 2 units before dinner PLUS sliding scale       insulin pen needle 31G X 8 MM     300 each    B-D UF III short pen needles, use 4 times a day to inject insulin.       insulin syringes (disposable) U-100 0.3 ML Misc     100 each    1 each 2 times daily       6Sense LANCETS Misc     200 strip    Use to test blood sugars 4 times daily or as directed.       loratadine 10 MG tablet    AF LORATADINE    14 tablet    Take 1 tablet (10 mg) by mouth daily       lovastatin 40 MG tablet    MEVACOR    90 tablet    Take 1 tablet (40 mg) by mouth At Bedtime       metFORMIN 500 MG tablet    GLUCOPHAGE    360 tablet    Take 2 tablets (1,000 mg) by mouth 2 times daily (with meals)       multivitamin Tabs tablet      Take 1 tablet by mouth daily       order for DME     3 Month    All DM testing supplies including test strips, lancets, solution, syringes, needles and/or pen needles for testing 4 times per day.  Brand \"Contour Next\"       tamsulosin 0.4 MG capsule    FLOMAX    90 capsule    Take 1 capsule (0.4 mg) by mouth daily       VITAMIN D PO      Take 400 Units by mouth daily       * Notice:  This list has 2 medication(s) that are the same as other medications prescribed for you. Read the directions carefully, and ask your doctor or other care provider to review them with you.      "

## 2017-02-27 ENCOUNTER — TELEPHONE (OUTPATIENT)
Dept: INTERNAL MEDICINE | Facility: CLINIC | Age: 77
End: 2017-02-27

## 2017-02-27 DIAGNOSIS — R93.5 ABNORMAL FINDINGS ON DIAGNOSTIC IMAGING OF ABDOMEN: Primary | ICD-10-CM

## 2017-02-27 NOTE — TELEPHONE ENCOUNTER
Dr. Aceves calling from [facility] ([phone]) to remind PCP that pt should have a follow up US to evaluate bladder calcification seen on CT in December 2016. See excerpt from report below:      IMPRESSION:  1. 1.0 x 0.3 cm calcification within the urinary bladder near the  right ureterovesical junction. This is of uncertain etiology or  significance. Possibilities include a recently passed ureteral stone.  However, there is no hydronephrosis. Calcification within a urinary  bladder lesion cannot be excluded.  DENITA Gallardo R.N.

## 2017-02-28 ENCOUNTER — CARE COORDINATION (OUTPATIENT)
Dept: CASE MANAGEMENT | Facility: CLINIC | Age: 77
End: 2017-02-28

## 2017-02-28 NOTE — TELEPHONE ENCOUNTER
Pt's wife calls back (consent to communicate on file). Informed her that u/s was ordered, call transferred to scheduling to make an appt.

## 2017-02-28 NOTE — PROGRESS NOTES
Clinic Care Coordination Contact  [facility]    Referral Source: [facility]  Clinical Data: Care Coordinator Outreach. Reviewed endocrinology notes from clinic visit on 2/23/17 and lantus insulin was decreased to 32 u in am only, humalog was continuing on same scale.  Patient was not able to answer regarding insulin dosages correctly, patient encouraged to work with diabetic educator.  Wife was asked if she could help with setting up insulin dosage and bedtime snacks to be consistent and consistent carbs with each meal.  Per review of telephone encounter on 2/27/17, order for bladder U/S was placed and patient should be receiving call from hospital to schedule for follow-up regarding CT from Dec. 2016.   Outreach attempted x 1.  Left message on voicemail with call back information and requested return call.  Plan: Care Coordinator will await return call and pend for next week if no call back.   Esha Zimmerman, RN   FPA [facility]   [phone]  February 28, 2017

## 2017-03-02 ENCOUNTER — APPOINTMENT (OUTPATIENT)
Dept: CT IMAGING | Facility: CLINIC | Age: 77
End: 2017-03-02
Attending: EMERGENCY MEDICINE
Payer: COMMERCIAL

## 2017-03-02 ENCOUNTER — HOSPITAL ENCOUNTER (EMERGENCY)
Facility: CLINIC | Age: 77
Discharge: HOME | End: 2017-03-02
Attending: EMERGENCY MEDICINE | Admitting: EMERGENCY MEDICINE
Payer: COMMERCIAL

## 2017-03-02 VITALS
DIASTOLIC BLOOD PRESSURE: 93 MMHG | TEMPERATURE: 98.1 F | OXYGEN SATURATION: 97 % | HEART RATE: 75 BPM | RESPIRATION RATE: 16 BRPM | SYSTOLIC BLOOD PRESSURE: 151 MMHG

## 2017-03-02 DIAGNOSIS — N10 ACUTE PYELONEPHRITIS: ICD-10-CM

## 2017-03-02 LAB
ALBUMIN SERPL-MCNC: 3.7 G/DL (ref 3.4–5)
ALBUMIN UR-MCNC: 100 MG/DL
ALP SERPL-CCNC: 83 U/L (ref 40–150)
ALT SERPL W P-5'-P-CCNC: 35 U/L (ref 0–70)
ANION GAP SERPL CALCULATED.3IONS-SCNC: 9 MMOL/L (ref 3–14)
APPEARANCE UR: ABNORMAL
AST SERPL W P-5'-P-CCNC: 27 U/L (ref 0–45)
BACTERIA #/AREA URNS HPF: ABNORMAL /HPF
BASOPHILS # BLD AUTO: 0.1 10E9/L (ref 0–0.2)
BASOPHILS NFR BLD AUTO: 0.6 %
BILIRUB SERPL-MCNC: 0.5 MG/DL (ref 0.2–1.3)
BILIRUB UR QL STRIP: NEGATIVE
BUN SERPL-MCNC: 27 MG/DL (ref 7–30)
CALCIUM SERPL-MCNC: 9.5 MG/DL (ref 8.5–10.1)
CHLORIDE SERPL-SCNC: 101 MMOL/L (ref 94–109)
CO2 SERPL-SCNC: 30 MMOL/L (ref 20–32)
COLOR UR AUTO: ABNORMAL
CREAT SERPL-MCNC: 1.12 MG/DL (ref 0.66–1.25)
DIFFERENTIAL METHOD BLD: NORMAL
EOSINOPHIL # BLD AUTO: 0.6 10E9/L (ref 0–0.7)
EOSINOPHIL NFR BLD AUTO: 6.6 %
ERYTHROCYTE [DISTWIDTH] IN BLOOD BY AUTOMATED COUNT: 12.7 % (ref 10–15)
GFR SERPL CREATININE-BSD FRML MDRD: 63 ML/MIN/1.7M2
GLUCOSE SERPL-MCNC: 55 MG/DL (ref 70–99)
GLUCOSE UR STRIP-MCNC: >499 MG/DL
HCT VFR BLD AUTO: 44.3 % (ref 40–53)
HGB BLD-MCNC: 14.5 G/DL (ref 13.3–17.7)
HGB UR QL STRIP: NEGATIVE
IMM GRANULOCYTES # BLD: 0 10E9/L (ref 0–0.4)
IMM GRANULOCYTES NFR BLD: 0.5 %
KETONES UR STRIP-MCNC: 5 MG/DL
LEUKOCYTE ESTERASE UR QL STRIP: ABNORMAL
LIPASE SERPL-CCNC: 118 U/L (ref 73–393)
LYMPHOCYTES # BLD AUTO: 2.3 10E9/L (ref 0.8–5.3)
LYMPHOCYTES NFR BLD AUTO: 26.3 %
MCH RBC QN AUTO: 30.1 PG (ref 26.5–33)
MCHC RBC AUTO-ENTMCNC: 32.7 G/DL (ref 31.5–36.5)
MCV RBC AUTO: 92 FL (ref 78–100)
MONOCYTES # BLD AUTO: 1.1 10E9/L (ref 0–1.3)
MONOCYTES NFR BLD AUTO: 12.2 %
MUCOUS THREADS #/AREA URNS LPF: PRESENT /LPF
NEUTROPHILS # BLD AUTO: 4.7 10E9/L (ref 1.6–8.3)
NEUTROPHILS NFR BLD AUTO: 53.8 %
NITRATE UR QL: NEGATIVE
NRBC # BLD AUTO: 0 10*3/UL
NRBC BLD AUTO-RTO: 0 /100
PH UR STRIP: 5 PH (ref 5–7)
PLATELET # BLD AUTO: 318 10E9/L (ref 150–450)
POTASSIUM SERPL-SCNC: 4 MMOL/L (ref 3.4–5.3)
PROT SERPL-MCNC: 8.8 G/DL (ref 6.8–8.8)
RBC # BLD AUTO: 4.81 10E12/L (ref 4.4–5.9)
RBC #/AREA URNS AUTO: 28 /HPF (ref 0–2)
SODIUM SERPL-SCNC: 140 MMOL/L (ref 133–144)
SP GR UR STRIP: 1.02 (ref 1–1.03)
URN SPEC COLLECT METH UR: ABNORMAL
UROBILINOGEN UR STRIP-MCNC: 0 MG/DL (ref 0–2)
WBC # BLD AUTO: 8.8 10E9/L (ref 4–11)
WBC #/AREA URNS AUTO: >182 /HPF (ref 0–2)
WBC CLUMPS #/AREA URNS HPF: PRESENT /HPF

## 2017-03-02 PROCEDURE — 80053 COMPREHEN METABOLIC PANEL: CPT | Performed by: EMERGENCY MEDICINE

## 2017-03-02 PROCEDURE — 25000128 H RX IP 250 OP 636: Performed by: EMERGENCY MEDICINE

## 2017-03-02 PROCEDURE — 96365 THER/PROPH/DIAG IV INF INIT: CPT

## 2017-03-02 PROCEDURE — 96361 HYDRATE IV INFUSION ADD-ON: CPT

## 2017-03-02 PROCEDURE — 87186 SC STD MICRODIL/AGAR DIL: CPT | Performed by: EMERGENCY MEDICINE

## 2017-03-02 PROCEDURE — 83690 ASSAY OF LIPASE: CPT | Performed by: EMERGENCY MEDICINE

## 2017-03-02 PROCEDURE — 96375 TX/PRO/DX INJ NEW DRUG ADDON: CPT

## 2017-03-02 PROCEDURE — 87088 URINE BACTERIA CULTURE: CPT | Performed by: EMERGENCY MEDICINE

## 2017-03-02 PROCEDURE — 74176 CT ABD & PELVIS W/O CONTRAST: CPT

## 2017-03-02 PROCEDURE — 99285 EMERGENCY DEPT VISIT HI MDM: CPT | Mod: 25

## 2017-03-02 PROCEDURE — 87086 URINE CULTURE/COLONY COUNT: CPT | Performed by: EMERGENCY MEDICINE

## 2017-03-02 PROCEDURE — 85025 COMPLETE CBC W/AUTO DIFF WBC: CPT | Performed by: EMERGENCY MEDICINE

## 2017-03-02 PROCEDURE — 81001 URINALYSIS AUTO W/SCOPE: CPT | Performed by: EMERGENCY MEDICINE

## 2017-03-02 RX ORDER — CEFDINIR 300 MG/1
300 CAPSULE ORAL 2 TIMES DAILY
Qty: 20 CAPSULE | Refills: 0 | Status: SHIPPED | OUTPATIENT
Start: 2017-03-02 | End: 2017-03-16

## 2017-03-02 RX ORDER — LIDOCAINE 40 MG/G
CREAM TOPICAL
Status: DISCONTINUED | OUTPATIENT
Start: 2017-03-02 | End: 2017-03-02 | Stop reason: HOSPADM

## 2017-03-02 RX ORDER — ONDANSETRON 4 MG/1
4 TABLET, ORALLY DISINTEGRATING ORAL EVERY 8 HOURS PRN
Qty: 10 TABLET | Refills: 0 | Status: SHIPPED | OUTPATIENT
Start: 2017-03-02 | End: 2017-03-05

## 2017-03-02 RX ORDER — SODIUM CHLORIDE 9 MG/ML
1000 INJECTION, SOLUTION INTRAVENOUS CONTINUOUS
Status: DISCONTINUED | OUTPATIENT
Start: 2017-03-02 | End: 2017-03-02 | Stop reason: HOSPADM

## 2017-03-02 RX ORDER — HYDROCODONE BITARTRATE AND ACETAMINOPHEN 5; 325 MG/1; MG/1
1-2 TABLET ORAL EVERY 4 HOURS PRN
Qty: 15 TABLET | Refills: 0 | Status: SHIPPED | OUTPATIENT
Start: 2017-03-02 | End: 2017-03-16

## 2017-03-02 RX ORDER — ONDANSETRON 2 MG/ML
4 INJECTION INTRAMUSCULAR; INTRAVENOUS EVERY 30 MIN PRN
Status: DISCONTINUED | OUTPATIENT
Start: 2017-03-02 | End: 2017-03-02 | Stop reason: HOSPADM

## 2017-03-02 RX ORDER — CEFTRIAXONE 1 G/1
1 INJECTION, POWDER, FOR SOLUTION INTRAMUSCULAR; INTRAVENOUS ONCE
Status: COMPLETED | OUTPATIENT
Start: 2017-03-02 | End: 2017-03-02

## 2017-03-02 RX ORDER — HYDROMORPHONE HYDROCHLORIDE 1 MG/ML
0.5 INJECTION, SOLUTION INTRAMUSCULAR; INTRAVENOUS; SUBCUTANEOUS
Status: DISCONTINUED | OUTPATIENT
Start: 2017-03-02 | End: 2017-03-02 | Stop reason: HOSPADM

## 2017-03-02 RX ORDER — KETOROLAC TROMETHAMINE 15 MG/ML
15 INJECTION, SOLUTION INTRAMUSCULAR; INTRAVENOUS ONCE
Status: COMPLETED | OUTPATIENT
Start: 2017-03-02 | End: 2017-03-02

## 2017-03-02 RX ADMIN — CEFTRIAXONE 1 G: 1 INJECTION, POWDER, FOR SOLUTION INTRAMUSCULAR; INTRAVENOUS at 15:48

## 2017-03-02 RX ADMIN — KETOROLAC TROMETHAMINE 15 MG: 15 INJECTION, SOLUTION INTRAMUSCULAR; INTRAVENOUS at 13:38

## 2017-03-02 RX ADMIN — HYDROMORPHONE HYDROCHLORIDE 0.5 MG: 10 INJECTION, SOLUTION INTRAMUSCULAR; INTRAVENOUS; SUBCUTANEOUS at 13:38

## 2017-03-02 RX ADMIN — SODIUM CHLORIDE 1000 ML: 9 INJECTION, SOLUTION INTRAVENOUS at 13:38

## 2017-03-02 RX ADMIN — ONDANSETRON 4 MG: 2 INJECTION INTRAMUSCULAR; INTRAVENOUS at 13:39

## 2017-03-02 ASSESSMENT — ENCOUNTER SYMPTOMS
VOMITING: 0
FREQUENCY: 0
HEMATURIA: 0
FEVER: 0
NAUSEA: 0
DYSURIA: 0
FLANK PAIN: 1
APPETITE CHANGE: 0
ABDOMINAL PAIN: 1
DIARRHEA: 0
ARTHRALGIAS: 1

## 2017-03-02 NOTE — ED NOTES
In Triage: ABC's intact. Alert and oriented x 3.  Pt c/o right flank/right hip pain for last week. Denies known injury. Denies n/v/d. Able to walk ok.

## 2017-03-02 NOTE — ED AVS SNAPSHOT
[facility] Emergency Department    [address]    [facility] [address]    Phone:  [phone]    Fax:  [phone]                                       Pete Sheldon   MRN: [id number]    Department:  [facility] Emergency Department   Date of Visit:  3/2/2017           After Visit Summary Signature Page     I have received my discharge instructions, and my questions have been answered. I have discussed any challenges I see with this plan with the nurse or doctor.    ..........................................................................................................................................  Patient/Patient Representative Signature      ..........................................................................................................................................  Patient Representative Print Name and Relationship to Patient    ..................................................               ................................................  Date                                            Time    ..........................................................................................................................................  Reviewed by Signature/Title    ...................................................              ..............................................  Date                                                            Time

## 2017-03-02 NOTE — ED PROVIDER NOTES
History     Chief Complaint:  Hip Pain    HPI:    Pete Sheldon is a 76 year old male with a history of type 2 diabetes mellitus, diabetic polyneuropathy, amongst others who presents with right-sided hip, flank, and lower abdominal pain pain. The patient reports that he began experiencing moderate right hip, flank, and lower abdominal pain for the past five days; he presents to the ED for persistent pain. Additionally, he notes that his use of Acetaminophen has not reduced his pain, and he cant sleep secondary to his persistent pain. Of note, he states his pain is similar to a kidney stone he had in the past. He denies trauma to the area, fever, radiating pain, groin pain, urinary symptoms, nausea, vomiting, or diarrhea.     Allergies:  Losartan     Medications:    Lantus insulin  Humalog insulin  Lovastatin  Flomax  Amlodipine  Glucose Tablets  Multivitamin  Metformin  Hydrochlorothiazide  Glucosamine HCl  Loratidine     Past Medical History:    Type 2 diabetes mellitus  Hypertension  Hyperlipidemia  Erectile dysfunction  Kidney stones  Diabetic polyneuropathy    Past Surgical History:    Hernia repair, right  Hernia repair, left  Septoplasty    Family History:    Cerebrovascular Disease- Mother  Heart Disease- Mother, Brother  Cerebrovascular Disease- Father  Cancer- Brother, Sister x2  Multiple Sclerosis- Daughter  Schizophrenia- Son    Social History:  Smoking status: Former Smoker  Alcohol use: No   Marital Status:      Presents with wife at bedside.    Review of Systems   Constitutional: Negative for appetite change and fever.   Gastrointestinal: Positive for abdominal pain. Negative for diarrhea, nausea and vomiting.   Genitourinary: Positive for flank pain. Negative for dysuria, frequency, hematuria and urgency.   Musculoskeletal: Positive for arthralgias.        Right hip   All other systems reviewed and are negative.      Physical Exam     Patient Vitals for the past 24 hrs:   BP Temp Temp src  Pulse Resp SpO2   03/02/17 1530 141/82 - - - - 95 %   03/02/17 1500 155/85 - - - - 95 %   03/02/17 1445 154/85 - - - - 95 %   03/02/17 1415 141/79 - - - - -   03/02/17 1400 144/86 - - - - -   03/02/17 1345 (!) 153/95 - - - - 94 %   03/02/17 1330 - - - - - 98 %   03/02/17 1313 - - - - - 98 %   03/02/17 1312 - - - - - 99 %   03/02/17 1311 (!) 171/101 - - - - -   03/02/17 1049 (!) 136/101 98.1  F (36.7  C) Temporal 75 18 95 %      Physical Exam:  Constitutional:  Appears well-developed and well-nourished. Alert. Conversant. Non toxic.  HENT:   Head: Atraumatic.   Nose: Nose normal.  Mouth/Throat: Oral mucosa is clear and moist. no trismus. Pharynx normal. Tonsils symmetric. No tonsillar enlargement, erythema, or exudate.  Eyes: Conjunctivae normal. EOM normal. Pupils equal, round, and reactive to light. No scleral icterus.   Neck: Normal range of motion. Neck supple. No tracheal deviation present.   Cardiovascular: Normal rate, regular rhythm. No gallop. No friction rub. No murmur heard. Symmetric radial artery pulses   Pulmonary/Chest: Effort normal. No stridor. No respiratory distress. No wheezes. No rales. No rhonchi . No tenderness.   Abdominal: Soft. Bowel sounds normal. No distension. No mass. RLQ > suprapubic and RUQ tenderness. MIld right CVA tenderness. No rebound. No guarding.   Musculoskeletal: Normal range of motion. No edema. No tenderness. No deformity .  Right hip, greater trochanter, iliac crest, bony pelvis are nontender.  No tenderness below the inguinal ligament.  No inguinal masses.  Both work 70s are normal.  Thighs normal.  Knees normal.  Calves and normal .  And feet are normal.    Lymph: No cervical adenopathy.   Neurological: Alert and oriented to person, place, and time. Normal strength. CN II-VII intact. No sensory deficit. GCS eye subscore is 4. GCS verbal subscore is 5. GCS motor subscore is 6. Normal coordination   Skin: Skin is warm and dry. No rash noted. No pallor. Normal capillary  refill.  Psychiatric:  Normal mood. Normal affect.       Emergency Department Course     Imaging:  Radiographic findings were communicated with the patient and family who voiced understanding of the findings.    CT-scan Abdomen/Pelvis w/o contrast:  IMPRESSION:  1. Negative for ureter stone.  2. Renal and bladder calculi.  3. Suspected left femoral or inguinal hernia.  Preliminary result per radiology.      Laboratory:  CBC: WNL (WBC 8.8, HGB 14.5, )    CMP: Glucose 55 (L), o/w WNL (Creatinine 1.12)   Lipase: 118  UA Microscopic: GLC >449, Ketone 5, Protein Albumin 100, WBC >182, RBC 28, WBC Clumps Present, Bacteria Few, Mucous Present, o/w Negative    Interventions:  Medications   lidocaine 1 % 1 mL (not administered)   lidocaine (LMX4) kit (not administered)   sodium chloride (PF) 0.9% PF flush 3 mL (not administered)   sodium chloride (PF) 0.9% PF flush 3 mL (not administered)   0.9% sodium chloride BOLUS (1,000 mLs Intravenous New Bag 3/2/17 1338)     Followed by   0.9% sodium chloride infusion (not administered)   ondansetron (ZOFRAN) injection 4 mg (4 mg Intravenous Given 3/2/17 1339)   HYDROmorphone (PF) (DILAUDID) injection 0.5 mg (0.5 mg Intravenous Given 3/2/17 1338)   ketorolac (TORADOL) injection 15 mg (15 mg Intravenous Given 3/2/17 1338)      1338- NS 1L IV Bolus   1338- Toradol 15 mg IV  1338- Dilaudid 0.5 mg IV  1339- Zofran 4 mg IV  Recheck: Resting comfortably in bed.  He rapidly stable.  Recheck: Just finished Rocephin.  Still comfortable.  Drinking juice.    Emergency Department Course:  Past medical records, nursing notes, and vitals reviewed.  1249: I performed an exam of the patient and obtained history, as documented above.    IV inserted and blood drawn.     The above interventions were administered.    The patient was sent for a CT of the abdomen and pelvis while in the emergency department, findings above.     I discussed the CT and lab findings with the patient and his wife.  They  verbalized their understanding although I think she has more comprehension than he does.  They understand that were detecting an infection in his urine and will treat with antibiotics.  We discussed possible admission but they would like to go home.  They ask why no one has diagnosed this urinary infection since his hospitalization in December.  Unclear to me whether or not he had any symptoms in this area in December.  There is no evidence for UTI and this infection on his urinalysis from 12/04/2016.      Impression & Plan      Medical Decision Making:  Pete Sheldon is a 76 year old male with a history of type II diabetes now on insulin who came to the ER today with pain initially reported to be in his right hip, but actually on exam he is indicating pain in his right lower quadrant abdomen radiating to his right flank.  He doesn't have any hip or pelvic tenderness on exam.  No evidence for inguinal mass or hernia on that right side.    Evaluation for his right lower quadrant pain was undertaken. laboratory workup is reassuring.  Lipase LFTs are normal.  The left lites and kidney function are normal.  Glucose is actually mildly low but no evidence of recurrent DKA.  We supplemented with oral juice here in the ER.  CT scan is negative for any ureteral stones but he does have intrarenal and bladder stones.  No evidence for appendicitis, colitis, diverticulitis, abscess, obstruction or other acute surgical emergency.  Urinalysis shows evidence for infection and with the symptoms I suspect pyelonephritis.  Although the patient is diabetic and therefore immunocompromised and may benefit from admission, he adamantly wants to go home with his wife.  He is hemodynamic with stable. Urine culture was sent.  He was started on Rocephin 1st dose given here in the ER.  We'll send him home on a course of Omnicef.  Norco and Zofran provided for symptomatic relief.  Sedation precautions reviewed.  He will follow up tomorrow in  his urology clinic.  Return to ER if increasing fever, pain, vomiting, fatigue, weakness, or any concerns.      Diagnosis:    ICD-10-CM    1. Acute pyelonephritis N10    2. Hypoglycemia -asymptomatic    Disposition:  discharged to home    Discharge Medications:  New Prescriptions    CEFDINIR (OMNICEF) 300 MG CAPSULE    Take 1 capsule (300 mg) by mouth 2 times daily    HYDROCODONE-ACETAMINOPHEN (NORCO) 5-325 MG PER TABLET    Take 1-2 tablets by mouth every 4 hours as needed for moderate to severe pain    ONDANSETRON (ZOFRAN ODT) 4 MG ODT TAB    Take 1 tablet (4 mg) by mouth every 8 hours as needed for nausea         April Cortez  3/2/2017   [facility] EMERGENCY DEPARTMENT    IApril, ryland serving as a scribe at 12:49 PM on 3/2/2017 to document services personally performed by Amadou Joyce MD based on my observations and the provider's statements to me.       Amadou Joyce MD  03/02/17 3107

## 2017-03-02 NOTE — ED AVS SNAPSHOT
[facility] Emergency Department    [address]    [facility] [address]    Phone:  [phone]    Fax:  [phone]                                       Pete Sheldon   MRN: [id number]    Department:  [facility] Emergency Department   Date of Visit:  3/2/2017           Patient Information     Date Of Birth          1940        Your diagnoses for this visit were:     Acute pyelonephritis        You were seen by Amadou Joyce MD.      Follow-up Information     Please follow up.    Why:  Please see your urologist in the office tomorrow as planned.  If you have increasing pain, fever, vomiting, weakness, or any concerns please return to the ER right away      Future Appointments        Provider Department Dept Phone Center    3/3/2017 8:40 AM [facility] ULTRASOUND ROOM 2 [facility] [phone] [facility]    5/12/2017 9:15 AM [facility] [phone] [place]    5/19/2017 2:00 PM Rom Helm MD, MD [facility] [phone] [place]    5/24/2017 2:00 PM Lori Damian MD [facility] [phone] [place]      24 Hour Appointment Hotline       To make an appointment at any [facility], call [phone] ([phone]). If you don't have a family doctor or clinic, we will help you find one. [place] clinics are conveniently located to serve the needs of you and your family.             Review of your medicines      START taking        Dose / Directions Last dose taken    cefdinir 300 MG capsule   Commonly known as:  OMNICEF   Dose:  300 mg   Quantity:  20 capsule        Take 1 capsule (300 mg) by mouth 2 times daily   Refills:  0        HYDROcodone-acetaminophen 5-325 MG per tablet   Commonly known as:  NORCO   Dose:  1-2 tablet   Quantity:  15 tablet        Take 1-2 tablets by mouth every 4 hours as needed for moderate to severe pain   Refills:  0        ondansetron  4 MG ODT tab   Commonly known as:  ZOFRAN ODT   Dose:  4 mg   Quantity:  10 tablet        Take 1 tablet (4 mg) by mouth every 8 hours as needed for nausea   Refills:  0          Our records show that you are taking the medicines listed below. If these are incorrect, please call your family doctor or clinic.        Dose / Directions Last dose taken    ACE/ARB NOT PRESCRIBED (INTENTIONAL)        by Other route continuous prn (Hyperkalemia)   Refills:  0        amLODIPine 5 MG tablet   Commonly known as:  NORVASC   Dose:  5 mg   Quantity:  90 tablet        Take 1 tablet (5 mg) by mouth daily   Refills:  1        blood glucose monitoring test strip   Commonly known as:  [employer] CONTOUR NEXT   Quantity:  400 each        Use to test blood sugar 4 times daily or as directed.   Refills:  1        Glucosamine HCl 750 MG Tabs   Dose:  750 mg        Take 750 mg by mouth 2 times daily   Refills:  0        * GLUCOSE MANAGEMENT Tabs   Quantity:  60 tablet        Take 2 tab in case on low blood glucose   Refills:  0        * GLUCOSE MANAGEMENT Tabs   Quantity:  100 tablet        4 tabs po for low blood sugar below 70, may repeat q 10-15 minutes as needed   Refills:  prn        hydrochlorothiazide 12.5 MG capsule   Commonly known as:  MICROZIDE   Dose:  12.5 mg   Quantity:  90 capsule        Take 1 capsule (12.5 mg) by mouth every morning   Refills:  3        insulin aspart 100 UNIT/ML injection   Commonly known as:  NovoLOG PEN   Dose:  1-7 Units        Inject 1-7 Units Subcutaneous 3 times daily (before meals) Correction Scale - HIGH INSULIN RESISTANCE DOSING    Do Not give Correction Insulin if BG < 140 For  - 164 give 1 unit. For  - 189 give 2 units. For  - 214 give 3 units. For  - 239 give 4 units. For  - 264 give 5 units. For  - 289 give 6 units. For  - 314 give 7 units. For  - 339 give 8 units. For BG = or > 340 give 9 units. Check blood glucose before meals/bolus feedings (or  "q4h if NPO) and administer based on blood glucose. Notify MD if glucose > or = 350 mg/dL after administration of correction dose.   Refills:  0        insulin glargine 100 UNIT/ML injection   Commonly known as:  LANTUS   Dose:  32 Units   Quantity:  3 mL        Inject 32 Units Subcutaneous every morning (before breakfast)   Refills:  5        insulin lispro 100 UNIT/ML injection   Commonly known as:  Huma[employer] KWIKpen   Dose:  2 Units   Quantity:  3 mL        Inject 2 Units Subcutaneous 3 times daily (before meals) Take 2 unit before breakfast, 2 units before lunch and 2 units before dinner PLUS sliding scale   Refills:  5        insulin pen needle 31G X 8 MM   Quantity:  300 each        B-D UF III short pen needles, use 4 times a day to inject insulin.   Refills:  3        insulin syringes (disposable) U-100 0.3 ML Misc   Dose:  1 each   Quantity:  100 each        1 each 2 times daily   Refills:  6        [employer] FINEPOINT LANCETS Misc   Quantity:  200 strip        Use to test blood sugars 4 times daily or as directed.   Refills:  3        loratadine 10 MG tablet   Commonly known as:  AF LORATADINE   Dose:  10 mg   Quantity:  14 tablet        Take 1 tablet (10 mg) by mouth daily   Refills:  0        lovastatin 40 MG tablet   Commonly known as:  MEVACOR   Dose:  40 mg   Quantity:  90 tablet        Take 1 tablet (40 mg) by mouth At Bedtime   Refills:  0        metFORMIN 500 MG tablet   Commonly known as:  GLUCOPHAGE   Dose:  1000 mg   Quantity:  360 tablet        Take 2 tablets (1,000 mg) by mouth 2 times daily (with meals)   Refills:  1        multivitamin Tabs tablet   Dose:  1 tablet        Take 1 tablet by mouth daily   Refills:  0        order for DME   Quantity:  3 Month        All DM testing supplies including test strips, lancets, solution, syringes, needles and/or pen needles for testing 4 times per day.  Brand \"Contour Next\"   Refills:  1        tamsulosin 0.4 MG capsule   Commonly known as:  FLOMAX   Dose:  " 0.4 mg   Quantity:  90 capsule        Take 1 capsule (0.4 mg) by mouth daily   Refills:  1        VITAMIN D PO   Dose:  400 Units        Take 400 Units by mouth daily   Refills:  0        * Notice:  This list has 2 medication(s) that are the same as other medications prescribed for you. Read the directions carefully, and ask your doctor or other care provider to review them with you.            Prescriptions were sent or printed at these locations (3 Prescriptions)                   Other Prescriptions                Printed at Department/Unit printer (3 of 3)         cefdinir (OMNICEF) 300 MG capsule               ondansetron (ZOFRAN ODT) 4 MG ODT tab               HYDROcodone-acetaminophen (NORCO) 5-325 MG per tablet                Procedures and tests performed during your visit     CBC with platelets differential    CT Abdomen Pelvis w/o Contrast (stone protocol)    Comprehensive metabolic panel    Lipase    UA with Microscopic      Orders Needing Specimen Collection     None      Pending Results     Date and Time Order Name Status Description    3/2/2017 1331 CT Abdomen Pelvis w/o Contrast (stone protocol) Preliminary             Pending Culture Results     No orders found from 2/28/2017 to 3/3/2017.             Test Results from your hospital stay     3/2/2017  2:03 PM - Interface, Sinobpo Results      Component Results     Component Value Ref Range & Units Status    Color Urine Dhara  Final    Appearance Urine Cloudy  Final    Glucose Urine >499 (A) NEG mg/dL Final    Bilirubin Urine Negative NEG Final    Ketones Urine 5 (A) NEG mg/dL Final    Specific Gravity Urine 1.017 1.003 - 1.035 Final    Blood Urine Negative NEG Final    pH Urine 5.0 5.0 - 7.0 pH Final    Protein Albumin Urine 100 (A) NEG mg/dL Final    Urobilinogen mg/dL 0.0 0.0 - 2.0 mg/dL Final    Nitrite Urine Negative NEG Final    Leukocyte Esterase Urine Moderate (A) NEG Final    Source Midstream Urine  Final    WBC Urine >182 (H) 0 - 2 /HPF  Final    RBC Urine 28 (H) 0 - 2 /HPF Final    WBC Clumps Present (A) NEG /HPF Final    Bacteria Urine Few (A) NEG /HPF Final    Mucous Urine Present (A) NEG /LPF Final         3/2/2017  1:49 PM - Interface, Flexilab Results      Component Results     Component Value Ref Range & Units Status    WBC 8.8 4.0 - 11.0 10e9/L Final    RBC Count 4.81 4.4 - 5.9 10e12/L Final    Hemoglobin 14.5 13.3 - 17.7 g/dL Final    Hematocrit 44.3 40.0 - 53.0 % Final    MCV 92 78 - 100 fl Final    MCH 30.1 26.5 - 33.0 pg Final    MCHC 32.7 31.5 - 36.5 g/dL Final    RDW 12.7 10.0 - 15.0 % Final    Platelet Count 318 150 - 450 10e9/L Final    Diff Method Automated Method  Final    % Neutrophils 53.8 % Final    % Lymphocytes 26.3 % Final    % Monocytes 12.2 % Final    % Eosinophils 6.6 % Final    % Basophils 0.6 % Final    % Immature Granulocytes 0.5 % Final    Nucleated RBCs 0 0 /100 Final    Absolute Neutrophil 4.7 1.6 - 8.3 10e9/L Final    Absolute Lymphocytes 2.3 0.8 - 5.3 10e9/L Final    Absolute Monocytes 1.1 0.0 - 1.3 10e9/L Final    Absolute Eosinophils 0.6 0.0 - 0.7 10e9/L Final    Absolute Basophils 0.1 0.0 - 0.2 10e9/L Final    Abs Immature Granulocytes 0.0 0 - 0.4 10e9/L Final    Absolute Nucleated RBC 0.0  Final         3/2/2017  2:07 PM - Interface, Flexilab Results      Component Results     Component Value Ref Range & Units Status    Sodium 140 133 - 144 mmol/L Final    Potassium 4.0 3.4 - 5.3 mmol/L Final    Chloride 101 94 - 109 mmol/L Final    Carbon Dioxide 30 20 - 32 mmol/L Final    Anion Gap 9 3 - 14 mmol/L Final    Glucose 55 (L) 70 - 99 mg/dL Final    Urea Nitrogen 27 7 - 30 mg/dL Final    Creatinine 1.12 0.66 - 1.25 mg/dL Final    GFR Estimate 63 >60 mL/min/1.7m2 Final    Non  GFR Calc    GFR Estimate If Black 77 >60 mL/min/1.7m2 Final    African American GFR Calc    Calcium 9.5 8.5 - 10.1 mg/dL Final    Bilirubin Total 0.5 0.2 - 1.3 mg/dL Final    Albumin 3.7 3.4 - 5.0 g/dL Final    Protein Total  8.8 6.8 - 8.8 g/dL Final    Alkaline Phosphatase 83 40 - 150 U/L Final    ALT 35 0 - 70 U/L Final    AST 27 0 - 45 U/L Final         3/2/2017  2:07 PM - Interface, Flexilab Results      Component Results     Component Value Ref Range & Units Status    Lipase 118 73 - 393 U/L Final         3/2/2017  3:06 PM - Interface, Radiant Ib      Narrative     CT ABDOMEN/PELVIS WITHOUT CONTRAST March 2, 2017 2:33 PM     HISTORY: Abdominal or flank pain, possible stone.    TECHNIQUE: Axial images with reconstructions. No IV contrast.  Radiation dose for this scan was reduced using automated exposure  control, adjustment of the mA and/or kV according to patient size, or  iterative reconstruction technique.    COMPARISON: 12/2/2016.    FINDINGS: There are some scattered dependent small bladder calculi.  There is prostate enlargement. There is some right renal scarring.  There are a few small nonobstructing bilateral renal calculi. No  ureter stone or hydronephrosis identified. There appears to be a left  femoral or inguinal hernia containing a small amount of colon without  obstruction. Clinical correlation recommended.        Impression     IMPRESSION:  1. Negative for ureter stone.  2. Renal and bladder calculi.  3. Suspected left femoral or inguinal hernia.                  Clinical Quality Measure: Blood Pressure Screening     Your blood pressure was checked while you were in the emergency department today. The last reading we obtained was  BP: 155/89 . Please read the guidelines below about what these numbers mean and what you should do about them.  If your systolic blood pressure (the top number) is less than 120 and your diastolic blood pressure (the bottom number) is less than 80, then your blood pressure is normal. There is nothing more that you need to do about it.  If your systolic blood pressure (the top number) is 120-139 or your diastolic blood pressure (the bottom number) is 80-89, your blood pressure may be higher  "than it should be. You should have your blood pressure rechecked within a year by a primary care provider.  If your systolic blood pressure (the top number) is 140 or greater or your diastolic blood pressure (the bottom number) is 90 or greater, you may have high blood pressure. High blood pressure is treatable, but if left untreated over time it can put you at risk for heart attack, stroke, or kidney failure. You should have your blood pressure rechecked by a primary care provider within the next 4 weeks.  If your provider in the emergency department today gave you specific instructions to follow-up with your doctor or provider even sooner than that, you should follow that instruction and not wait for up to 4 weeks for your follow-up visit.        Thank you for choosing [facility]       Thank you for choosing [facility] for your care. Our goal is always to provide you with excellent care. Hearing back from our patients is one way we can continue to improve our services. Please take a few minutes to complete the written survey that you may receive in the mail after you visit with us. Thank you!        PushToTest Information     PushToTest lets you send messages to your doctor, view your test results, renew your prescriptions, schedule appointments and more. To sign up, go to [email or web address] . Click on \"Log in\" on the left side of the screen, which will take you to the Welcome page. Then click on \"Sign up Now\" on the right side of the page.     You will be asked to enter the access code listed below, as well as some personal information. Please follow the directions to create your username and password.     Your access code is: [email or web address]  Expires: 2017 10:27 AM     Your access code will  in 90 days. If you need help or a new code, please call your [facility] clinic or [phone].        Care EveryWhere ID     This is your Care EveryWhere ID. This could be used by other organizations to access your " [facility] medical records  [id number]        After Visit Summary       This is your record. Keep this with you and show to your community pharmacist(s) and doctor(s) at your next visit.

## 2017-03-03 ENCOUNTER — HOSPITAL ENCOUNTER (OUTPATIENT)
Dept: ULTRASOUND IMAGING | Facility: CLINIC | Age: 77
Discharge: HOME | End: 2017-03-03
Attending: INTERNAL MEDICINE | Admitting: INTERNAL MEDICINE
Payer: COMMERCIAL

## 2017-03-03 ENCOUNTER — CARE COORDINATION (OUTPATIENT)
Dept: CASE MANAGEMENT | Facility: CLINIC | Age: 77
End: 2017-03-03

## 2017-03-03 DIAGNOSIS — R93.5 ABNORMAL FINDINGS ON DIAGNOSTIC IMAGING OF ABDOMEN: ICD-10-CM

## 2017-03-03 PROCEDURE — 76705 ECHO EXAM OF ABDOMEN: CPT

## 2017-03-03 NOTE — PROGRESS NOTES
Clinic Care Coordination Contact  [facility]/Voicemail    Referral Source: ED discharge  Clinical Data: Care Coordinator Outreach. Patient diagnosed with UTI in ED on 3/2/17 and sent home with antibiotic.   Outreach attempted x 1.  Left message on voicemail with call back information and requested return call.  Plan: Care Coordinator will try to reach patient again in 1-2 business days.  Esha Zimmerman, RN   FPA [facility]   [phone]  March 3, 2017

## 2017-03-06 NOTE — PROGRESS NOTES
Clinic Care Coordination Contact  Care Team Conversations    Received call back from patient's wife (DEMOND on file) who reported that patient is taking his antibiotics for bladder infection, but patient is having abdominal discomfort due to no regular bowel movements.  Discussed with wife interventions to assist with constipation.  Wife reported that blood sugars are up and down, but very fixated regarding bowels at this time.  Encouraged follow-up at PCP office.  Will pend to check status in 1-2 weeks.     Esha Zimmerman RN   [facility]   [phone]  March 6, 2017

## 2017-03-06 NOTE — PROGRESS NOTES
Clinic Care Coordination Contact  [facility]/Voicemail    Referral Source: CTS  Clinical Data: Care Coordinator Outreach  Outreach attempted x 2.  Left message on voicemail with call back information and requested return call.  Plan: Care Coordinator will await return call and pend to attempt to check status next week if no call back.  Esha Zimmerman, RN   FPA [facility]   [phone]  March 6, 2017

## 2017-03-07 ENCOUNTER — TELEPHONE (OUTPATIENT)
Dept: EMERGENCY MEDICINE | Facility: CLINIC | Age: 77
End: 2017-03-07

## 2017-03-07 LAB
BACTERIA SPEC CULT: ABNORMAL
Lab: ABNORMAL
MICRO REPORT STATUS: ABNORMAL
MICROORGANISM SPEC CULT: ABNORMAL
SPECIMEN SOURCE: ABNORMAL

## 2017-03-07 NOTE — TELEPHONE ENCOUNTER
At 1627 no pain noted, he is improving.     Pt notified about results.     Mirtha Lozano, RN  [facility] RN  Lung Nodule and ED Lab Result F/u RN  Epic pool (ED late result f/u RN): [id number]  FV INCIDENTAL RADIOLOGY F/U NURSES: P [id number]  # [phone]

## 2017-03-07 NOTE — TELEPHONE ENCOUNTER
[facility] Emergency Department Lab result notification:    [place] ED lab result protocol used  Urine Culture Protcol    Reason for call  Notify of lab results, assess symptoms,  review ED providers recommendations/discharge instructions (if necessary) and advise per ED lab result f/u protocol    Lab Result  Final Urine Culture Report on 03/07/2017  [place] ED discharge antibiotic: Cefdinir (Omnicef) 300 mg capsule, Take 1 capsule (300 mg) by mouth 2 times daily for 10 days  #1. Bacteria, 50,000 to 100,000 colonies/mL Aerococcus urinae, is SUSCEPTIBLE to ED discharge antibiotic.    As per [place] ED Lab Result protocol, no change in antibiotic therapy.  Information table from ED Provider visit on 03/02/2017  Symptoms reported at ED visit (Chief complaint, HPI) a 76 year old male with a history of type 2 diabetes mellitus, diabetic polyneuropathy, amongst others who presents with right-sided hip, flank, and lower abdominal pain pain. The patient reports that he began experiencing moderate right hip, flank, and lower abdominal pain for the past five days; he presents to the ED for persistent pain. Additionally, he notes that his use of Acetaminophen has not reduced his pain, and he cant sleep secondary to his persistent pain. Of note, he states his pain is similar to a kidney stone he had in the past. He denies trauma to the area, fever, radiating pain, groin pain, urinary symptoms, nausea, vomiting, or diarrhea.   ED providers Impression and Plan (applicable information) Urinalysis shows evidence for infection and with the symptoms I suspect pyelonephritis. Although the patient is diabetic and therefore immunocompromised and may benefit from admission, he adamantly wants to go home with his wife. He is hemodynamic with stable. Urine culture was sent.  He was started on Rocephin 1st dose given here in the ER. We'll send him home on a course of Omnicef. Norco and Zofran provided for symptomatic relief.  Sedation precautions reviewed. He will follow up tomorrow in his urology clinic. Return to ER if increasing fever, pain, vomiting, fatigue, weakness, or any concerns     RN Assessment (Patient s current Symptoms), include time called.  [Insert Left message here if message left]  At 1624 Left voicemail message requesting a call back to [phone] between 10 a.m. and 6:30 p.m. for patient's ED/UC lab results.      Mirtha Lozano, RN  [employer] [facility] RN  Lung Nodule and ED Lab Result F/u RN  Epic pool (ED late result f/u RN): [id number]  FV INCIDENTAL RADIOLOGY F/U NURSES: P [id number]  Ph# [phone]      Copy of Lab result   Exam Information   Exam Date Exam Time Accession # Results    3/2/17  1:08 PM [id number]    Component Results   Component Collected Lab   Specimen Description 03/02/2017  1:08 PM [facility]   Midstream Urine   Special Requests 03/02/2017  1:08 PM 75   Specimen received in preservative   Culture Micro (Abnormal) 03/02/2017  1:08    50,000 to 100,000 colonies/mL Aerococcus urinae Identification obtained by    MALDI-TOF mass spectrometry research use only database. Test characteristics    determined and verified by the Infectious Diseases Diagnostic Laboratory ([facility])    [place], [place].   >100,000 colonies/mL urogenital yan Susceptibility testing not routinely done      Micro Report Status 03/02/2017  1:08    FINAL 03/07/2017   Organism: 03/02/2017  1:08    50,000 to 100,000 colonies/mL Aerococcus urinae Identification obtained by    MALDI-TOF mass spectrometry research use only database. Test characteristics    determined and verified by the Infectious Diseases Diagnostic Laboratory ([facility])    [place], [place].      Culture & Susceptibility   50,000 ,000 COLONIES/ML AEROCOCCUS URINAE IDENTIFICATION OBTAINED BY  MALDI-TOF MASS SPECTROMETRY RESEARCH USE ONLY DATABASE. TEST CHARACTERISTICS  DETERMINED AND VERIFIED BY THE INFECTIOUS DISE   Antibiotic Sensitivity Unit  Status   CEFOTAXIME 0.5 Susceptible ug/mL Final   CEFTRIAXONE <=0.25 Susceptible ug/mL Final   PENICILLIN <=0.03 Susceptible ug/mL Final   TETRACYCLINE <=0.5 Susceptible ug/mL Final   VANCOMYCIN 0.25 Susceptible ug/mL Final

## 2017-03-09 ENCOUNTER — TELEPHONE (OUTPATIENT)
Dept: INTERNAL MEDICINE | Facility: CLINIC | Age: 77
End: 2017-03-09

## 2017-03-09 ENCOUNTER — APPOINTMENT (OUTPATIENT)
Dept: GENERAL RADIOLOGY | Facility: CLINIC | Age: 77
End: 2017-03-09
Attending: EMERGENCY MEDICINE
Payer: COMMERCIAL

## 2017-03-09 ENCOUNTER — HOSPITAL ENCOUNTER (EMERGENCY)
Facility: CLINIC | Age: 77
Discharge: HOME | End: 2017-03-09
Attending: EMERGENCY MEDICINE | Admitting: EMERGENCY MEDICINE
Payer: COMMERCIAL

## 2017-03-09 VITALS
TEMPERATURE: 97.7 F | SYSTOLIC BLOOD PRESSURE: 154 MMHG | HEART RATE: 94 BPM | RESPIRATION RATE: 16 BRPM | OXYGEN SATURATION: 95 % | DIASTOLIC BLOOD PRESSURE: 123 MMHG

## 2017-03-09 DIAGNOSIS — K59.03 DRUG-INDUCED CONSTIPATION: ICD-10-CM

## 2017-03-09 PROCEDURE — 74020 XR ABDOMEN 2 VW: CPT

## 2017-03-09 PROCEDURE — 99283 EMERGENCY DEPT VISIT LOW MDM: CPT

## 2017-03-09 RX ORDER — ASPIRIN 81 MG
100 TABLET, DELAYED RELEASE (ENTERIC COATED) ORAL DAILY
Qty: 14 TABLET | Refills: 0 | Status: SHIPPED | OUTPATIENT
Start: 2017-03-09 | End: 2017-03-16

## 2017-03-09 ASSESSMENT — ENCOUNTER SYMPTOMS
BACK PAIN: 1
VOMITING: 0
CONSTIPATION: 1
NAUSEA: 0

## 2017-03-09 NOTE — ED AVS SNAPSHOT
[facility] Emergency Department    [address]    [facility] [address]    Phone:  [phone]    Fax:  [phone]                                       Pete Sheldon   MRN: [id number]    Department:  [facility] Emergency Department   Date of Visit:  3/9/2017           Patient Information     Date Of Birth          1940        Your diagnoses for this visit were:     Drug-induced constipation        You were seen by Lalito Damon MD.      Follow-up Information     Follow up with Rom Helm MD. Schedule an appointment as soon as possible for a visit in 4 days.    Specialty:  Internal Medicine    Contact information:    [facility]  [address] [facility] [address]  [facility] [address] [phone]          Follow up with [facility] Emergency Department.    Specialty:  EMERGENCY MEDICINE    Why:  As needed, If symptoms worsen    Contact information:    [address]  [facility] [address] [phone]      Discharge References/Attachments     CONSTIPATION (ADULT) (ENGLISH)      Future Appointments        Provider Department Dept Phone Center    5/12/2017 9:15 AM [facility] [phone] [place]    5/19/2017 2:00 PM Rom Helm MD, MD [facility] [phone] [place]    5/24/2017 2:00 PM Lori Damian MD [facility] [phone] [place]      24 Hour Appointment Hotline       To make an appointment at any [facility], call [phone] ([phone]). If you don't have a family doctor or clinic, we will help you find one. [facility] are conveniently located to serve the needs of you and your family.             Review of your medicines      START taking        Dose / Directions Last dose taken    docusate sodium 100 MG tablet   Commonly known as:  COLACE   Dose:  100 mg   Quantity:  14 tablet        Take 100 mg by mouth daily for 7 days   Refills:   0          Our records show that you are taking the medicines listed below. If these are incorrect, please call your family doctor or clinic.        Dose / Directions Last dose taken    ACE/ARB NOT PRESCRIBED (INTENTIONAL)        by Other route continuous prn (Hyperkalemia)   Refills:  0        amLODIPine 5 MG tablet   Commonly known as:  NORVASC   Dose:  5 mg   Quantity:  90 tablet        Take 1 tablet (5 mg) by mouth daily   Refills:  1        blood glucose monitoring test strip   Commonly known as:  Devicescape CONTOUR NEXT   Quantity:  400 each        Use to test blood sugar 4 times daily or as directed.   Refills:  1        cefdinir 300 MG capsule   Commonly known as:  OMNICEF   Dose:  300 mg   Quantity:  20 capsule        Take 1 capsule (300 mg) by mouth 2 times daily   Refills:  0        Glucosamine HCl 750 MG Tabs   Dose:  750 mg        Take 750 mg by mouth 2 times daily   Refills:  0        * GLUCOSE MANAGEMENT Tabs   Quantity:  60 tablet        Take 2 tab in case on low blood glucose   Refills:  0        * GLUCOSE MANAGEMENT Tabs   Quantity:  100 tablet        4 tabs po for low blood sugar below 70, may repeat q 10-15 minutes as needed   Refills:  prn        hydrochlorothiazide 12.5 MG capsule   Commonly known as:  MICROZIDE   Dose:  12.5 mg   Quantity:  90 capsule        Take 1 capsule (12.5 mg) by mouth every morning   Refills:  3        HYDROcodone-acetaminophen 5-325 MG per tablet   Commonly known as:  NORCO   Dose:  1-2 tablet   Quantity:  15 tablet        Take 1-2 tablets by mouth every 4 hours as needed for moderate to severe pain   Refills:  0        insulin aspart 100 UNIT/ML injection   Commonly known as:  NovoLOG PEN   Dose:  1-7 Units        Inject 1-7 Units Subcutaneous 3 times daily (before meals) Correction Scale - HIGH INSULIN RESISTANCE DOSING    Do Not give Correction Insulin if BG < 140 For  - 164 give 1 unit. For  - 189 give 2 units. For  - 214 give 3 units. For  -  239 give 4 units. For  - 264 give 5 units. For  - 289 give 6 units. For  - 314 give 7 units. For  - 339 give 8 units. For BG = or > 340 give 9 units. Check blood glucose before meals/bolus feedings (or q4h if NPO) and administer based on blood glucose. Notify MD if glucose > or = 350 mg/dL after administration of correction dose.   Refills:  0        insulin glargine 100 UNIT/ML injection   Commonly known as:  LANTUS   Dose:  32 Units   Quantity:  3 mL        Inject 32 Units Subcutaneous every morning (before breakfast)   Refills:  5        insulin lispro 100 UNIT/ML injection   Commonly known as:  HumaLOG KWIKpen   Dose:  2 Units   Quantity:  3 mL        Inject 2 Units Subcutaneous 3 times daily (before meals) Take 2 unit before breakfast, 2 units before lunch and 2 units before dinner PLUS sliding scale   Refills:  5        insulin pen needle 31G X 8 MM   Quantity:  300 each        B-D UF III short pen needles, use 4 times a day to inject insulin.   Refills:  3        insulin syringes (disposable) U-100 0.3 ML Misc   Dose:  1 each   Quantity:  100 each        1 each 2 times daily   Refills:  6        LIFESCAN FINEPOINT LANCETS Misc   Quantity:  200 strip        Use to test blood sugars 4 times daily or as directed.   Refills:  3        loratadine 10 MG tablet   Commonly known as:  AF LORATADINE   Dose:  10 mg   Quantity:  14 tablet        Take 1 tablet (10 mg) by mouth daily   Refills:  0        lovastatin 40 MG tablet   Commonly known as:  MEVACOR   Dose:  40 mg   Quantity:  90 tablet        Take 1 tablet (40 mg) by mouth At Bedtime   Refills:  0        metFORMIN 500 MG tablet   Commonly known as:  GLUCOPHAGE   Dose:  1000 mg   Quantity:  360 tablet        Take 2 tablets (1,000 mg) by mouth 2 times daily (with meals)   Refills:  1        multivitamin Tabs tablet   Dose:  1 tablet        Take 1 tablet by mouth daily   Refills:  0        order for DME   Quantity:  3 Month        All DM  "testing supplies including test strips, lancets, solution, syringes, needles and/or pen needles for testing 4 times per day.  Brand \"Contour Next\"   Refills:  1        tamsulosin 0.4 MG capsule   Commonly known as:  FLOMAX   Dose:  0.4 mg   Quantity:  90 capsule        Take 1 capsule (0.4 mg) by mouth daily   Refills:  1        VITAMIN D PO   Dose:  400 Units        Take 400 Units by mouth daily   Refills:  0        * Notice:  This list has 2 medication(s) that are the same as other medications prescribed for you. Read the directions carefully, and ask your doctor or other care provider to review them with you.            Prescriptions were sent or printed at these locations (1 Prescription)                   Other Prescriptions                Printed at Department/Unit printer (1 of 1)         docusate sodium (COLACE) 100 MG tablet                Procedures and tests performed during your visit     Abdomen XR, 2 vw, flat and upright      Orders Needing Specimen Collection     None      Pending Results     No orders found from 3/7/2017 to 3/10/2017.            Pending Culture Results     No orders found from 3/7/2017 to 3/10/2017.             Test Results from your hospital stay     3/9/2017  3:15 PM - Interface, Radiant Ib      Narrative     XR ABDOMEN 2 VW 3/9/2017 3:12 PM    COMPARISON: CT dated 3/2/2017    HISTORY: Abdominal cramping and constipation.        Impression     IMPRESSION: Large amount stool throughout the colon. No gas-filled  loops of distended large or small bowel. No free air. Lung bases are  clear.    KIARA HENAO                Clinical Quality Measure: Blood Pressure Screening     Your blood pressure was checked while you were in the emergency department today. The last reading we obtained was  BP: (!) 154/123 . Please read the guidelines below about what these numbers mean and what you should do about them.  If your systolic blood pressure (the top number) is less than 120 and your diastolic " "blood pressure (the bottom number) is less than 80, then your blood pressure is normal. There is nothing more that you need to do about it.  If your systolic blood pressure (the top number) is 120-139 or your diastolic blood pressure (the bottom number) is 80-89, your blood pressure may be higher than it should be. You should have your blood pressure rechecked within a year by a primary care provider.  If your systolic blood pressure (the top number) is 140 or greater or your diastolic blood pressure (the bottom number) is 90 or greater, you may have high blood pressure. High blood pressure is treatable, but if left untreated over time it can put you at risk for heart attack, stroke, or kidney failure. You should have your blood pressure rechecked by a primary care provider within the next 4 weeks.  If your provider in the emergency department today gave you specific instructions to follow-up with your doctor or provider even sooner than that, you should follow that instruction and not wait for up to 4 weeks for your follow-up visit.        Thank you for choosing [facility]       Thank you for choosing [facility] for your care. Our goal is always to provide you with excellent care. Hearing back from our patients is one way we can continue to improve our services. Please take a few minutes to complete the written survey that you may receive in the mail after you visit with us. Thank you!        [employer] Information     [employer] lets you send messages to your doctor, view your test results, renew your prescriptions, schedule appointments and more. To sign up, go to [email or web address] . Click on \"Log in\" on the left side of the screen, which will take you to the Welcome page. Then click on \"Sign up Now\" on the right side of the page.     You will be asked to enter the access code listed below, as well as some personal information. Please follow the directions to create your username and password.     Your access code " is: [email or web address]  Expires: 2017 10:27 AM     Your access code will  in 90 days. If you need help or a new code, please call your [facility] clinic or [phone].        Care EveryWhere ID     This is your Care EveryWhere ID. This could be used by other organizations to access your [facility] medical records  [id number]        After Visit Summary       This is your record. Keep this with you and show to your community pharmacist(s) and doctor(s) at your next visit.

## 2017-03-09 NOTE — ED NOTES
Pt c/o constipation, last BM was on Monday. Pt recently seen for urinary tract infection and given narcotics for pain. Pt alert, oriented x3. ABCs intact

## 2017-03-09 NOTE — TELEPHONE ENCOUNTER
Reason for Call:  Request for results:    Name of test or procedure: ultrasound    Date of test of procedure: 3/3    Location of the test or procedure: [facility]    OK to leave the result message on voice mail or with a family member? YES    Phone number Patient can be reached at:  Home number on file [phone] (home)    Additional comments: any    Call taken on 3/9/2017 at 9:31 AM by Thelma Saleem

## 2017-03-09 NOTE — LETTER
[facility]  [address] [place] [address]  [phone]    3/9/2017    Pete OSBORN Monalisa  [address] [place] [address]           Dear Mr. Sheldon,    The results of your bladder ultrasound are enclosed. Everything looks fine.     The test showed either debris or non-shadowing tiny stones in the bladder. Nothing to worry about.     If you have any further questions or problems, please contact our office.    Sincerely,        Rom Helm MD  Attachment: Bladder ultrasound results

## 2017-03-09 NOTE — ED AVS SNAPSHOT
[facility] Emergency Department    [address]    [facility] [address]    Phone:  [phone]    Fax:  [phone]                                       Pete Sheldon   MRN: [id number]    Department:  [facility] Emergency Department   Date of Visit:  3/9/2017           After Visit Summary Signature Page     I have received my discharge instructions, and my questions have been answered. I have discussed any challenges I see with this plan with the nurse or doctor.    ..........................................................................................................................................  Patient/Patient Representative Signature      ..........................................................................................................................................  Patient Representative Print Name and Relationship to Patient    ..................................................               ................................................  Date                                            Time    ..........................................................................................................................................  Reviewed by Signature/Title    ...................................................              ..............................................  Date                                                            Time

## 2017-03-09 NOTE — ED PROVIDER NOTES
History     Chief Complaint:  Constipation      HPI   Pete Sheldon is a 76 year old male who presents with constipation and abdominal cramps. The patient states that he has not had a bowel movement within the past three days, yielding constipation that the patient's wife believes could be secondary to his use of pain medications. The patient denies any nausea or vomiting, but confirms that he is still having back pain, possibly due to a Kidney infection he was seen for last week. He has not yet finished his antibiotics for this infection.     Allergies:  Losartan      Medications:    Cefdinir (omnicef) 300 mg capsule  Hydrocodone-acetaminophen (norco) 5-325 mg per tablet  Insulin glargine (lantus) 100 unit/ml injection  Insulin lispro (humalog kwikpen) 100 unit/ml injection  Lovastatin (mevacor) 40 mg tablet  Tamsulosin (flomax) 0.4 mg capsule  Amlodipine (norvasc) 5 mg tablet  Nutritional supplements (glucose management) tabs  Insulin aspart (novolog pen) 100 unit/ml injection  Multivitamin (ocuvite) tabs  Metformin (glucophage) 500 mg tablet  Hydrochlorothiazide (microzide) 12.5 mg capsule  Satispay finepoint lancets misc  Glucosamine hcl 750 mg tabs  Loratadine (af loratadine) 10 mg tablet  Cholecalciferol (vitamin d po)     Past Medical History:    Type 2 diabetes mellitus  Hypertension  Hyperlipidemia  Erectile dysfunction  Kidney stones  Diabetic polyneuropathy  DKA     Past Surgical History:   Hernia repair, right  Hernia repair, left  Septoplasty     Family History:   Cerebrovascular Disease- Mother  Heart Disease- Mother, Brother  Cerebrovascular Disease- Father  Cancer- Brother, Sister x2  Multiple Sclerosis- Daughter  Schizophrenia- Son     Social History:  Smoking status: Former Smoker  Alcohol use: No   Marital Status:    Presents with wife at bedside.     Review of Systems   Gastrointestinal: Positive for constipation. Negative for nausea and vomiting.        Positive for abdominal cramps.    Musculoskeletal: Positive for back pain.   All other systems reviewed and are negative.    Physical Exam   First Vitals:  BP: 154/123  Temp: 97.7  F (36.5  C)  Temp src: Oral  Pulse: 94  Resp: 16  SpO2: 95 % (03/09 1428)    Physical Exam  Nursing note and vitals reviewed.  Constitutional: Cooperative.   HENT:   Mouth/Throat: Moist mucous membranes.   Eyes: EOMI, nonicteric sclera  Cardiovascular: Normal rate, regular rhythm, no murmurs, rubs, or gallops  Pulmonary/Chest: Effort normal and breath sounds normal. No respiratory distress. No wheezes. No rales.   Abdominal: Soft. Nontender, nondistended, no guarding or rigidity. BS present.   Musculoskeletal: Normal range of motion.   Neurological: Alert. Moves all extremities spontaneously.   Skin: Skin is warm and dry. No rash noted.   Psychiatric: Normal mood and affect.       Emergency Department Course   Imaging:  Abdomen XR, per radiology:   Large amount stool throughout the colon. No gas-filled loops of distended large or small bowel. No free air. Lung bases are clear.    Radiographic findings were communicated with the patient who voiced understanding of the findings.    Emergency Department Course:  Nursing notes and vitals reviewed.  I performed an exam of the patient as documented above.  The above workup was undertaken.  1526: I rechecked the patient and discussed results.    Findings and plan explained to the Patient. Patient discharged home, status improved, with instructions regarding supportive care, medications, and reasons to return as well as the importance of close follow-up was reviewed.    Impression & Plan    Medical Decision Making:  Pete Sheldon is a 76 year old male who presents for evaluation for decreased stool output without signs of serious intraabdominal problems. Signs and symptoms are consistent with constipation. There are no signs at this point for a need for rectal disimpaction.  There are no signs of obstruction nor other  intraabdominal catastrophe. XR reassuring.  There are no indications for advanced imaging or admission. Enema here in ED was successful and reduced pt's pain. Will start pt on daily stool softener for next week, though expect problem will resolve when pt no longer taking narcotics. Patient is agreeable with this and questions are answered.       Diagnosis:    ICD-10-CM   1. Drug-induced constipation K59.03       Disposition:  discharged to home    Discharge Medication List as of 3/9/2017  3:56 PM      START taking these medications    Details   docusate sodium (COLACE) 100 MG tablet Take 100 mg by mouth daily for 7 days, Disp-14 tablet, R-0, Local Print             I, Gideon Lazar, am serving as a scribe on 3/9/2017 at 2:29 PM to personally document services performed by Lalito Damon MD, based on my observations and the provider's statements to me.    Gideon Lazar  3/9/2017   [facility] EMERGENCY DEPARTMENT       Lalito Damon MD  03/10/17 0446

## 2017-03-10 ENCOUNTER — CARE COORDINATION (OUTPATIENT)
Dept: CASE MANAGEMENT | Facility: CLINIC | Age: 77
End: 2017-03-10

## 2017-03-10 NOTE — PROGRESS NOTES
Clinic Care Coordination Contact  [facility]/Voicemail    Referral Source: [facility]  Clinical Data: Care Coordinator Outreach. ED visit on 3/9/17 for constipation and received enema with relief per review of notes.    Outreach attempted x 1.  Left message on voicemail with call back information and requested return call.  Plan: Patient has PCP follow-up on 3/16/17.  Care Coordinator will try to reach patient again in 1-2 business days.  Esha Zimmerman RN   [facility]   [phone]  March 10, 2017

## 2017-03-13 NOTE — PROGRESS NOTES
Clinic Care Coordination Contact  [facility]/Voicemail    Referral Source: CTS  Clinical Data: Care Coordinator Outreach  Outreach attempted x 2.  Left message on voicemail with call back information and requested return call.  Plan: Patient has PCP visit on 3/16/17. Care Coordinator will await return call and pend to check status after review of PCP visit notes.  Esha Zimmerman, RN   FPA [facility]   [phone]  March 13, 2017

## 2017-03-16 ENCOUNTER — OFFICE VISIT (OUTPATIENT)
Dept: INTERNAL MEDICINE | Facility: CLINIC | Age: 77
End: 2017-03-16
Payer: COMMERCIAL

## 2017-03-16 VITALS
HEIGHT: 67 IN | RESPIRATION RATE: 12 BRPM | SYSTOLIC BLOOD PRESSURE: 120 MMHG | WEIGHT: 157 LBS | BODY MASS INDEX: 24.64 KG/M2 | DIASTOLIC BLOOD PRESSURE: 62 MMHG | HEART RATE: 89 BPM | TEMPERATURE: 97.3 F | OXYGEN SATURATION: 97 %

## 2017-03-16 DIAGNOSIS — M89.8X8 ILIAC CREST BONE PAIN: ICD-10-CM

## 2017-03-16 DIAGNOSIS — N30.00 ACUTE CYSTITIS WITHOUT HEMATURIA: Primary | ICD-10-CM

## 2017-03-16 DIAGNOSIS — R14.0 ABDOMINAL BLOATING: ICD-10-CM

## 2017-03-16 DIAGNOSIS — K59.00 CONSTIPATION, UNSPECIFIED CONSTIPATION TYPE: ICD-10-CM

## 2017-03-16 LAB
ALBUMIN UR-MCNC: 100 MG/DL
APPEARANCE UR: CLEAR
BILIRUB UR QL STRIP: NEGATIVE
COLOR UR AUTO: YELLOW
GLUCOSE UR STRIP-MCNC: NEGATIVE MG/DL
HGB UR QL STRIP: NEGATIVE
KETONES UR STRIP-MCNC: NEGATIVE MG/DL
LEUKOCYTE ESTERASE UR QL STRIP: NEGATIVE
MUCOUS THREADS #/AREA URNS LPF: PRESENT /LPF
NITRATE UR QL: NEGATIVE
PH UR STRIP: 5.5 PH (ref 5–7)
RBC #/AREA URNS AUTO: ABNORMAL /HPF (ref 0–2)
SP GR UR STRIP: 1.02 (ref 1–1.03)
URN SPEC COLLECT METH UR: ABNORMAL
UROBILINOGEN UR STRIP-ACNC: 0.2 EU/DL (ref 0.2–1)
WBC #/AREA URNS AUTO: ABNORMAL /HPF (ref 0–2)

## 2017-03-16 PROCEDURE — 81001 URINALYSIS AUTO W/SCOPE: CPT | Performed by: INTERNAL MEDICINE

## 2017-03-16 PROCEDURE — 99214 OFFICE O/P EST MOD 30 MIN: CPT | Performed by: INTERNAL MEDICINE

## 2017-03-16 RX ORDER — SENNOSIDES 8.6 MG
2 TABLET ORAL 2 TIMES DAILY
Qty: 120 TABLET | Refills: 1 | Status: SHIPPED | OUTPATIENT
Start: 2017-03-16 | End: 2017-08-23

## 2017-03-16 RX ORDER — POLYETHYLENE GLYCOL 3350 17 G/17G
1 POWDER, FOR SOLUTION ORAL 2 TIMES DAILY PRN
Qty: 510 G | Refills: 1 | Status: SHIPPED | OUTPATIENT
Start: 2017-03-16 | End: 2017-08-23

## 2017-03-16 NOTE — MR AVS SNAPSHOT
"              After Visit Summary   3/16/2017    Pete Sheldon    MRN: [id number]           Patient Information     Date Of Birth          1940        Visit Information        Provider Department      3/16/2017 1:20 PM Rom Helm MD [facility]        Today's Diagnoses     Acute cystitis without hematuria    -  1    Constipation, unspecified constipation type        Abdominal bloating        Iliac crest bone pain          Care Instructions    One new pill sent to your pharmacy for constipation, called Senokot-S (contains docusate and senna). Take two pills twice a day.     Also, a new powder sent to your pharmacy called \"Miralax\" or \"polyethylene glycol\". Until your bowel habits normalize, recommend taking one capful in 8 oz of liquid twice a day. After bowels normalize, may use this once daily as needed.     Let's recheck your urine before you leave today.     Take Tylenol instead of the hydrocodone pain pills, due to the constipation and dizziness side effects.     See me back in May as scheduled, sooner if not getting better.         Follow-ups after your visit        Your next 10 appointments already scheduled     May 12, 2017  9:15 AM CDT   LAB with [facility]   [facility]    [address]  [facility] [id number] [phone]           Patient must bring picture ID.  Patient should be prepared to give a urine specimen  Please do not eat 10-12 hours before your appointment if you are coming in fasting for labs on lipids, cholesterol, or glucose (sugar).  Pregnant women should follow their Care Team instructions. Water with medications is okay. Do not drink coffee or other fluids.   If you have concerns about taking  your medications, please ask at office or if scheduling via [employer], send a message by clicking on Secure Messaging, Message Your Care Team.            May 19, 2017  2:00 PM CDT   [facility] with [facility]m MD " "  [facility]    [address]  [facility] [id number] [phone]            May 24, 2017  2:00 PM CDT   Return Visit with Lori Damian MD   [facility]    [address]  [facility] [id number] [phone]              Who to contact     If you have questions or need follow up information about today's clinic visit or your schedule please contact [facility] directly at [phone].  Normal or non-critical lab and imaging results will be communicated to you by DeliveryCheetahhart, letter or phone within 4 business days after the clinic has received the results. If you do not hear from us within 7 days, please contact the clinic through DeliveryCheetahhart or phone. If you have a critical or abnormal lab result, we will notify you by phone as soon as possible.  Submit refill requests through [employer]ggiFinogi or call your pharmacy and they will forward the refill request to us. Please allow 3 business days for your refill to be completed.          Additional Information About Your Visit        [employer] Information     [employer]ogi lets you send messages to your doctor, view your test results, renew your prescriptions, schedule appointments and more. To sign up, go to [email or web address] . Click on \"Log in\" on the left side of the screen, which will take you to the Welcome page. Then click on \"Sign up Now\" on the right side of the page.     You will be asked to enter the access code listed below, as well as some personal information. Please follow the directions to create your username and password.     Your access code is: [email or web address]  Expires: 2017 11:27 AM     Your access code will  in 90 days. If you need help or a new code, please call your [facility] or [phone].        Care EveryWhere ID     This is your Care EveryWhere ID. This could be used by other organizations to access " "your [place] medical records  [id number]        Your Vitals Were     Pulse Temperature Respirations Height Pulse Oximetry BMI (Body Mass Index)    89 97.3  F (36.3  C) (Oral) 12 5' 7\" (1.702 m) 97% 24.59 kg/m2       Blood Pressure from Last 3 Encounters:   03/16/17 120/62   03/09/17 (!) 154/123   03/02/17 (!) 151/93    Weight from Last 3 Encounters:   03/16/17 157 lb (71.2 kg)   02/23/17 159 lb 9.6 oz (72.4 kg)   02/17/17 158 lb (71.7 kg)              We Performed the Following     UA with Microscopic reflex to Culture          Today's Medication Changes          These changes are accurate as of: 3/16/17  2:04 PM.  If you have any questions, ask your nurse or doctor.               Start taking these medicines.        Dose/Directions    polyethylene glycol powder   Commonly known as:  MIRALAX   Used for:  Constipation, unspecified constipation type   Started by:  Rom Helm MD        Dose:  1 capful   Take 17 g (1 capful) by mouth 2 times daily as needed for constipation   Quantity:  510 g   Refills:  1       sennosides 8.6 MG tablet   Commonly known as:  SENOKOT   Used for:  Constipation, unspecified constipation type   Started by:  Rom Helm MD        Dose:  2 tablet   Take 2 tablets by mouth 2 times daily May reduce to one tablet once or twice a day once stools normalize.   Quantity:  120 tablet   Refills:  1            Where to get your medicines      These medications were sent to [facility] [address] [place] [id number]     Phone:  [phone]     polyethylene glycol powder    sennosides 8.6 MG tablet                Primary Care Provider Office Phone # Fax #    Rom Helm [phone]       [facility] [address]  [facility] [address]        Goals        Diet    Increase water intake     Notes - Note edited  5/14/2014 11:42 AM by Patricia Khan RN    Have 5-6 glasses (8oz) of " fluid a day  Per f/u with spouse today, pt not doing well. Not eating and drinking. Called clinic and advised to take pt to clinic.         Increase water intake        General    Medication 1 (pt-stated)     Notes - Note created  5/15/2014  1:57 PM by Patricia Khan, JULIA    Pt will have an MTM consult         Thank you!     Thank you for choosing [facility]  for your care. Our goal is always to provide you with excellent care. Hearing back from our patients is one way we can continue to improve our services. Please take a few minutes to complete the written survey that you may receive in the mail after your visit with us. Thank you!             Your Updated Medication List - Protect others around you: Learn how to safely use, store and throw away your medicines at www.disposemymeds.org.          This list is accurate as of: 3/16/17  2:04 PM.  Always use your most recent med list.                   Brand Name Dispense Instructions for use    ACE/ARB NOT PRESCRIBED (INTENTIONAL)      by Other route continuous prn (Hyperkalemia) Reported on 3/16/2017       amLODIPine 5 MG tablet    NORVASC    90 tablet    Take 1 tablet (5 mg) by mouth daily       blood glucose monitoring test strip    STEFFI CONTOUR NEXT    400 each    Use to test blood sugar 4 times daily or as directed.       docusate sodium 100 MG tablet    COLACE    14 tablet    Take 100 mg by mouth daily for 7 days       Glucosamine HCl 750 MG Tabs      Take 750 mg by mouth 2 times daily       * GLUCOSE MANAGEMENT Tabs     60 tablet    Take 2 tab in case on low blood glucose       * GLUCOSE MANAGEMENT Tabs     100 tablet    4 tabs po for low blood sugar below 70, may repeat q 10-15 minutes as needed       hydrochlorothiazide 12.5 MG capsule    MICROZIDE    90 capsule    Take 1 capsule (12.5 mg) by mouth every morning       insulin aspart 100 UNIT/ML injection    NovoLOG PEN     Inject 1-7 Units Subcutaneous 3 times daily (before meals)  "Correction Scale - HIGH INSULIN RESISTANCE DOSING    Do Not give Correction Insulin if BG < 140 For  - 164 give 1 unit. For  - 189 give 2 units. For  - 214 give 3 units. For  - 239 give 4 units. For  - 264 give 5 units. For  - 289 give 6 units. For  - 314 give 7 units. For  - 339 give 8 units. For BG = or > 340 give 9 units. Check blood glucose before meals/bolus feedings (or q4h if NPO) and administer based on blood glucose. Notify MD if glucose > or = 350 mg/dL after administration of correction dose.       insulin glargine 100 UNIT/ML injection    LANTUS    3 mL    Inject 32 Units Subcutaneous every morning (before breakfast)       insulin lispro 100 UNIT/ML injection    HumaLOG KWIKpen    3 mL    Inject 2 Units Subcutaneous 3 times daily (before meals) Take 2 unit before breakfast, 2 units before lunch and 2 units before dinner PLUS sliding scale       insulin pen needle 31G X 8 MM     300 each    B-D UF III short pen needles, use 4 times a day to inject insulin.       insulin syringes (disposable) U-100 0.3 ML Misc     100 each    1 each 2 times daily       Ludi FINEPOINT LANCETS Misc     200 strip    Use to test blood sugars 4 times daily or as directed.       loratadine 10 MG tablet    AF LORATADINE    14 tablet    Take 1 tablet (10 mg) by mouth daily       lovastatin 40 MG tablet    MEVACOR    90 tablet    Take 1 tablet (40 mg) by mouth At Bedtime       metFORMIN 500 MG tablet    GLUCOPHAGE    360 tablet    Take 2 tablets (1,000 mg) by mouth 2 times daily (with meals)       multivitamin Tabs tablet      Take 1 tablet by mouth daily       order for DME     3 Month    All DM testing supplies including test strips, lancets, solution, syringes, needles and/or pen needles for testing 4 times per day.  Brand \"Contour Next\"       polyethylene glycol powder    MIRALAX    510 g    Take 17 g (1 capful) by mouth 2 times daily as needed for constipation       " sennosides 8.6 MG tablet    SENOKOT    120 tablet    Take 2 tablets by mouth 2 times daily May reduce to one tablet once or twice a day once stools normalize.       tamsulosin 0.4 MG capsule    FLOMAX    90 capsule    Take 1 capsule (0.4 mg) by mouth daily       VITAMIN D PO      Take 400 Units by mouth daily       * Notice:  This list has 2 medication(s) that are the same as other medications prescribed for you. Read the directions carefully, and ask your doctor or other care provider to review them with you.

## 2017-03-16 NOTE — NURSING NOTE
"Chief Complaint   Patient presents with     ER F/U       Initial /62 (BP Location: Right arm, Patient Position: Chair, Cuff Size: Adult Large)  Pulse 89  Temp 97.3  F (36.3  C) (Oral)  Resp 12  Ht 5' 7\" (1.702 m)  Wt 157 lb (71.2 kg)  SpO2 97%  BMI 24.59 kg/m2 Estimated body mass index is 24.59 kg/(m^2) as calculated from the following:    Height as of this encounter: 5' 7\" (1.702 m).    Weight as of this encounter: 157 lb (71.2 kg).  Medication Reconciliation: complete   Leann BAKER      "

## 2017-03-16 NOTE — PROGRESS NOTES
SUBJECTIVE:                                                    Pete Sheldon is a 76 year old male who presents to clinic today with his wife for the following health issues:  Follow up for two recent ED visits, on 3/2/2017 for right lateral flank/hip pain, and on 3/9/2017 for constipation.     Patient has been seen in [facility] ED twice within past 2 weeks. The first ED visit on 3/2 was for right hip pain. A bladder infection was diagnosed at this visit. He was given cefdinir for the bladder infection and Norco for the pain. He never experienced any dysuria at this time. Wife notes patient was very confused and foggy while taking the Norco. He also developed significant constipation.     The second visit was on 3/9 for drug induced constipation. He was given an enema in the ED and his symptoms were relieved.   He has had two B.M.'s within the past week (one on Monday and one today). Patient still complains of right lateral iliac crest pain/tenderness.    He is no longer taking hydrocodone due to the adverse effects.     No fever or chills. No dysuria or hematuria. No CVA region pain. He notes abdominal bloating and constipation, some nausea. No melena, hematochezia or diarrhea.     ED/UC Followup:    Facility:  [facility] ED  Date of visit: 03/09/2017  Reason for visit: constipation  Current Status: patient describes pain on his right side/hip area       Problem list and histories reviewed & adjusted, as indicated.  Additional history: as documented    Reviewed and updated as needed this visit by clinical staff  Tobacco  Allergies  Med Hx  Surg Hx  Fam Hx  Soc Hx      Reviewed and updated as needed this visit by Provider    ROS:  C: NEGATIVE for fever, chills   R: NEGATIVE for significant cough or SOB   CV: NEGATIVE for chest pain, palpitations or peripheral edema   GI: POSITIVE for emesis(once on 3/9), bloating and nausea NEGATIVE for hematochezia, abdominal pain, heartburn, or change in bowel  "habits  : NEGATIVE for frequency, dysuria, or hematuria   M: POSITIVE for right iliac crest pain/tenderness     OBJECTIVE:                                                    /62 (BP Location: Right arm, Patient Position: Chair, Cuff Size: Adult Large)  Pulse 89  Temp 97.3  F (36.3  C) (Oral)  Resp 12  Ht 1.702 m (5' 7\")  Wt 71.2 kg (157 lb)  SpO2 97%  BMI 24.59 kg/m2  Body mass index is 24.59 kg/(m^2).  GENERAL: healthy, alert and no distress  EYES: Eyes grossly normal to inspection, PERRL and conjunctivae and sclerae normal  RESP: lungs clear to auscultation - no rales, rhonchi or wheezes  CV: regular rate and rhythm, normal S1 S2, no S3 or S4, no murmur, click or rub, no peripheral edema and peripheral pulses strong  ABDOMEN: soft, nontender, no hepatosplenomegaly, no masses and bowel sounds normal  Tenderness with palpation over the right lateral iliac crest region.     NEURO: Normal strength and tone, mentation intact and speech normal  PSYCH: mentation appears normal, affect normal/bright       ASSESSMENT/PLAN:                                                      (N30.00) Acute cystitis without hematuria  (primary encounter diagnosis)  Repeat UA to assure resolution.   Plan: UA with Microscopic reflex to Culture            (K59.00) Constipation, unspecified constipation type  Comment: See pt instructions and epic orders. Take medications as directed  Plan: sennosides (SENOKOT) 8.6 MG tablet,         polyethylene glycol (MIRALAX) powder          (R14.0) Abdominal bloating  Comment:  due to constipation. See pt instructions and epic orders.     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 No pathology noted on recent abdominal CT. Treat symptomatically, avoiding hydrocodone.   Plan: Tylenol Extra Strength     Patient Instructions   One new pill sent to your pharmacy for constipation, called Senokot-S (contains docusate and senna). Take two pills twice a day.     Also, a new powder " "sent to your pharmacy called \"Miralax\" or \"polyethylene glycol\". Until your bowel habits normalize, recommend taking one capful in 8 oz of liquid twice a day. After bowels normalize, may use this once daily as needed.     Let's recheck your urine before you leave today.     Take Tylenol instead of the hydrocodone pain pills, due to the constipation and dizziness side effects.     See me back in May as scheduled, sooner if not getting better.       Rom Helm MD  [facility]    This document serves as a record of the services and decisions personally performed and made by Rom Helm MD. It was created on their behalf by Alba Junior, a trained medical scribe. The creation of this document is based the provider's statements to the medical scribe.  Alba Junior March 16, 2017 1:48 PM          "

## 2017-03-16 NOTE — PATIENT INSTRUCTIONS
"One new pill sent to your pharmacy for constipation, called Senokot-S (contains docusate and senna). Take two pills twice a day.     Also, a new powder sent to your pharmacy called \"Miralax\" or \"polyethylene glycol\". Until your bowel habits normalize, recommend taking one capful in 8 oz of liquid twice a day. After bowels normalize, may use this once daily as needed.     Let's recheck your urine before you leave today.     Take Tylenol instead of the hydrocodone pain pills, due to the constipation and dizziness side effects.     See me back in May as scheduled, sooner if not getting better.   "

## 2017-03-20 ENCOUNTER — TELEPHONE (OUTPATIENT)
Dept: INTERNAL MEDICINE | Facility: CLINIC | Age: 77
End: 2017-03-20

## 2017-03-21 ENCOUNTER — CARE COORDINATION (OUTPATIENT)
Dept: CASE MANAGEMENT | Facility: CLINIC | Age: 77
End: 2017-03-21

## 2017-03-21 NOTE — TELEPHONE ENCOUNTER
Pt wife mohinder calling again for results of the UA from 3/16/17.     Provider please review and advise.

## 2017-03-21 NOTE — PROGRESS NOTES
Clinic Care Coordination Contact  [facility]/Voicemail    Referral Source: CTS  Clinical Data: Care Coordinator Outreach. Patient was seen by PCP on 3/16/17 and senna and Miralax ordered for bowel regime.    Outreach attempted x 1.  Left message on voicemail with call back information and requested return call.  Plan: Care Coordinator will await return call and pend to attempt to check status in 3-4 weeks.  Esha Zimmerman, RN   A [facility]   [phone]  March 21, 2017

## 2017-04-11 ENCOUNTER — CARE COORDINATION (OUTPATIENT)
Dept: CASE MANAGEMENT | Facility: CLINIC | Age: 77
End: 2017-04-11

## 2017-04-11 NOTE — PROGRESS NOTES
Clinic Care Coordination Contact  [facility]/Voicemail    Referral Source: CTS  Clinical Data: Care Coordinator Outreach  Outreach attempted x 3.  Left message on voicemail with call back information and requested return call.  Plan: Care Coordinator will do no further outreaches at this time.  Esha Zimmerman, RN   FPA [facility]   [phone]  April 11, 2017

## 2017-04-13 ENCOUNTER — TRANSFERRED RECORDS (OUTPATIENT)
Dept: HEALTH INFORMATION MANAGEMENT | Facility: CLINIC | Age: 77
End: 2017-04-13

## 2017-04-14 DIAGNOSIS — E78.5 HYPERLIPIDEMIA LDL GOAL <100: ICD-10-CM

## 2017-04-14 RX ORDER — LOVASTATIN 40 MG
40 TABLET ORAL AT BEDTIME
Qty: 90 TABLET | Refills: 3 | Status: ON HOLD | OUTPATIENT
Start: 2017-04-14 | End: 2018-01-11

## 2017-04-14 NOTE — TELEPHONE ENCOUNTER
Lovastatin     Last Written Prescription Date: 1/24/17  Last Fill Quantity: 90, # refills: 0  Last Office Visit with [facility] or [facility] prescribing provider: 3/16/17  Next 5 appointments (look out 90 days)     May 19, 2017  2:00 PM CDT   SHORT with Rom Helm MD   [facility]    [address]  [facility] [id number] [phone]            May 24, 2017  2:00 PM CDT   Return Visit with Lori Damian MD   [facility] ([facility]    [address]  [facility] [id number] [phone]                   Lab Results   Component Value Date    CHOL 136 02/10/2017     Lab Results   Component Value Date    HDL 37 02/10/2017     Lab Results   Component Value Date    LDL 83 02/10/2017     Lab Results   Component Value Date    TRIG 82 02/10/2017     Lab Results   Component Value Date    CHOLHDLRATIO 4.3 09/25/2015       Labs showing if normal/abnormal  Lab Results   Component Value Date    CHOL 136 02/10/2017    TRIG 82 02/10/2017    HDL 37 (L) 02/10/2017    LDL 83 02/10/2017    VLDL 23 09/25/2015    CHOLHDLRATIO 4.3 09/25/2015     Prescription approved per [facility] Refill Protocol.

## 2017-04-18 ENCOUNTER — TRANSFERRED RECORDS (OUTPATIENT)
Dept: HEALTH INFORMATION MANAGEMENT | Facility: CLINIC | Age: 77
End: 2017-04-18

## 2017-05-08 DIAGNOSIS — E11.42 TYPE 2 DIABETES MELLITUS WITH DIABETIC POLYNEUROPATHY (H): ICD-10-CM

## 2017-05-09 NOTE — TELEPHONE ENCOUNTER
Last OV-3/16/17      Lab Results   Component Value Date    A1C 7.9 02/10/2017    A1C 8.9 12/03/2016    A1C 8.3 10/28/2016    A1C 8.6 07/22/2016    A1C 8.4 04/15/2016

## 2017-05-12 DIAGNOSIS — E78.5 HYPERLIPIDEMIA LDL GOAL <100: ICD-10-CM

## 2017-05-12 DIAGNOSIS — E11.42 TYPE 2 DIABETES MELLITUS WITH DIABETIC POLYNEUROPATHY, WITH LONG-TERM CURRENT USE OF INSULIN (H): ICD-10-CM

## 2017-05-12 DIAGNOSIS — Z79.4 TYPE 2 DIABETES MELLITUS WITH DIABETIC POLYNEUROPATHY, WITH LONG-TERM CURRENT USE OF INSULIN (H): ICD-10-CM

## 2017-05-12 LAB — HBA1C MFR BLD: 8.3 % (ref 4.3–6)

## 2017-05-12 PROCEDURE — 80061 LIPID PANEL: CPT | Performed by: INTERNAL MEDICINE

## 2017-05-12 PROCEDURE — 83036 HEMOGLOBIN GLYCOSYLATED A1C: CPT | Performed by: INTERNAL MEDICINE

## 2017-05-12 PROCEDURE — 80053 COMPREHEN METABOLIC PANEL: CPT | Performed by: INTERNAL MEDICINE

## 2017-05-12 PROCEDURE — 36415 COLL VENOUS BLD VENIPUNCTURE: CPT | Performed by: INTERNAL MEDICINE

## 2017-05-13 LAB
ALBUMIN SERPL-MCNC: 3.5 G/DL (ref 3.4–5)
ALP SERPL-CCNC: 84 U/L (ref 40–150)
ALT SERPL W P-5'-P-CCNC: 25 U/L (ref 0–70)
ANION GAP SERPL CALCULATED.3IONS-SCNC: 10 MMOL/L (ref 3–14)
AST SERPL W P-5'-P-CCNC: 21 U/L (ref 0–45)
BILIRUB SERPL-MCNC: 0.5 MG/DL (ref 0.2–1.3)
BUN SERPL-MCNC: 28 MG/DL (ref 7–30)
CALCIUM SERPL-MCNC: 9.4 MG/DL (ref 8.5–10.1)
CHLORIDE SERPL-SCNC: 102 MMOL/L (ref 94–109)
CHOLEST SERPL-MCNC: 129 MG/DL
CO2 SERPL-SCNC: 27 MMOL/L (ref 20–32)
CREAT SERPL-MCNC: 1.38 MG/DL (ref 0.66–1.25)
GFR SERPL CREATININE-BSD FRML MDRD: 50 ML/MIN/1.7M2
GLUCOSE SERPL-MCNC: 183 MG/DL (ref 70–99)
HDLC SERPL-MCNC: 33 MG/DL
LDLC SERPL CALC-MCNC: 79 MG/DL
NONHDLC SERPL-MCNC: 96 MG/DL
POTASSIUM SERPL-SCNC: 4.9 MMOL/L (ref 3.4–5.3)
PROT SERPL-MCNC: 8 G/DL (ref 6.8–8.8)
SODIUM SERPL-SCNC: 139 MMOL/L (ref 133–144)
TRIGL SERPL-MCNC: 83 MG/DL

## 2017-05-19 ENCOUNTER — OFFICE VISIT (OUTPATIENT)
Dept: INTERNAL MEDICINE | Facility: CLINIC | Age: 77
End: 2017-05-19
Payer: COMMERCIAL

## 2017-05-19 VITALS
HEART RATE: 83 BPM | BODY MASS INDEX: 25.27 KG/M2 | TEMPERATURE: 97.7 F | RESPIRATION RATE: 12 BRPM | WEIGHT: 161 LBS | SYSTOLIC BLOOD PRESSURE: 104 MMHG | DIASTOLIC BLOOD PRESSURE: 60 MMHG | HEIGHT: 67 IN | OXYGEN SATURATION: 95 %

## 2017-05-19 DIAGNOSIS — Z79.4 TYPE 2 DIABETES MELLITUS WITH DIABETIC POLYNEUROPATHY, WITH LONG-TERM CURRENT USE OF INSULIN (H): Primary | ICD-10-CM

## 2017-05-19 DIAGNOSIS — Z12.11 SPECIAL SCREENING FOR MALIGNANT NEOPLASMS, COLON: ICD-10-CM

## 2017-05-19 DIAGNOSIS — E11.42 TYPE 2 DIABETES MELLITUS WITH DIABETIC POLYNEUROPATHY, WITH LONG-TERM CURRENT USE OF INSULIN (H): Primary | ICD-10-CM

## 2017-05-19 DIAGNOSIS — I10 HYPERTENSION GOAL BP (BLOOD PRESSURE) < 140/90: ICD-10-CM

## 2017-05-19 DIAGNOSIS — E78.5 HYPERLIPIDEMIA LDL GOAL <100: ICD-10-CM

## 2017-05-19 PROCEDURE — 99214 OFFICE O/P EST MOD 30 MIN: CPT | Performed by: INTERNAL MEDICINE

## 2017-05-19 NOTE — PATIENT INSTRUCTIONS
Keep working with Dr Damian to get your diabetes at the best control that we are able to do.     Blood pressure and LDL-Cholesterol look fine.     See me in 3 months, with labs in advance.

## 2017-05-19 NOTE — NURSING NOTE
"Chief Complaint   Patient presents with     Diabetes       Initial /64  Pulse 83  Temp 97.7  F (36.5  C) (Oral)  Resp 12  Ht 5' 7\" (1.702 m)  Wt 161 lb (73 kg)  SpO2 95%  BMI 25.22 kg/m2 Estimated body mass index is 25.22 kg/(m^2) as calculated from the following:    Height as of this encounter: 5' 7\" (1.702 m).    Weight as of this encounter: 161 lb (73 kg).  Medication Reconciliation: complete   Leann BAKER      "

## 2017-05-19 NOTE — PROGRESS NOTES
SUBJECTIVE:                                                    Pete Sheldon is a 76 year old male who presents to clinic today with his wife for the following health issues:  diabetes mellitus, hyperlipidemia, hypertension.     Diabetes Management: Patient has been following recently with Dr. Damian for Diabetes. Wife notes Dr. Damian would like Pete to have a sensor put on him to monitor glucoses overnight. She does not believe that he could sleep while wearing this. He takes 32 units of Lantus in the A.M. And 2+ sliding scale of humalog for breakfast, lunch and dinner. He has appt scheduled with Dr. Damian for 5/24/17.     Other: Patient notes having some recent falls. He feels weak and then he feels lightheaded before falling.     Diabetes Follow-up    Patient is checking blood sugars: four times daily.    Results are as follows:         am -          lunchtime -          suppertime -          bedtime -     Diabetic concerns: None     Symptoms of hypoglycemia (low blood sugar): dizzy, weak, blurred vision      Paresthesias (numbness or burning in feet) or sores: Yes      Date of last diabetic eye exam: April 2017       Amount of exercise or physical activity: daily activities    Problems taking medications regularly: No    Medication side effects: none    Diet: regular (no restrictions)      Problem list and histories reviewed & adjusted, as indicated.  Additional history: as documented    BP Readings from Last 3 Encounters:   05/19/17 104/60   03/16/17 120/62   03/09/17 (!) 154/123    Wt Readings from Last 3 Encounters:   05/19/17 73 kg (161 lb)   03/16/17 71.2 kg (157 lb)   02/23/17 72.4 kg (159 lb 9.6 oz)            Lab Results   Component Value Date    A1C 8.3 05/12/2017    A1C 7.9 02/10/2017    A1C 8.9 12/03/2016    A1C 8.3 10/28/2016    A1C 8.6 07/22/2016           Labs reviewed in EPIC    Reviewed and updated as needed this visit by clinical staff  Tobacco   "Allergies  Meds  Med Hx  Surg Hx  Fam Hx  Soc Hx      Reviewed and updated as needed this visit by Provider         ROS:  E: NEGATIVE for vision changes or irritation   R: NEGATIVE for significant cough or SOB   CV: NEGATIVE for chest pain, palpitations or peripheral edema   GI: NEGATIVE for nausea, abdominal pain, heartburn, or change in bowel habits  N: NEGATIVE for weakness, dizziness or paresthesias, abrupt changes in speech     OBJECTIVE:                                                    /60  Pulse 83  Temp 97.7  F (36.5  C) (Oral)  Resp 12  Ht 1.702 m (5' 7\")  Wt 73 kg (161 lb)  SpO2 95%  BMI 25.22 kg/m2  Body mass index is 25.22 kg/(m^2).  GENERAL: healthy, alert and no distress  EYES: Eyes grossly normal to inspection, PERRL and conjunctivae and sclerae normal  RESP: lungs clear to auscultation - no rales, rhonchi or wheezes  CV: regular rate and rhythm, normal S1 S2, no S3 or S4, no murmur, click or rub, no peripheral edema and peripheral pulses strong  NEURO: Normal strength and tone, mentation intact and speech normal  PSYCH: mentation appears normal, affect normal/bright         ASSESSMENT/PLAN:                                                      (E11.42,  Z79.4) Type 2 diabetes mellitus with diabetic polyneuropathy, with long-term current use of insulin (H)  (primary encounter diagnosis)  Comment: Recent A1C 8.3 See Dr. Damian as scheduled on 5/24. Continue current meds    (I10) Hypertension goal BP (blood pressure) < 140/90  Comment: BP at target. Continue current meds.  Plan: amlodipine (NORVASC) 5 MG tablet, hydrochlorothiazide (MICROZIDE) 12.5 MG capsule     (E78.5) Hyperlipidemia LDL goal <100  Comment: LDL at target. Continue current meds.  Plan: lovastatin (MEVACOR) 40 MG tablet     (Z12.11) Special screening for malignant neoplasms, colon  Plan: Fecal colorectal cancer screen (FIT)          Patient Instructions   Keep working with Dr Damian to get your diabetes at the " best control that we are able to do.     Blood pressure and LDL-Cholesterol look fine.     See me in 3 months, with labs in advance.     Rom Helm MD  [facility]    This document serves as a record of the services and decisions personally performed and made by Rom Helm MD. It was created on their behalf by Alba Junior, a trained medical scribe. The creation of this document is based the provider's statements to the medical scribe.  Alba Jnuior May 19, 2017 2:59 PM

## 2017-05-19 NOTE — MR AVS SNAPSHOT
After Visit Summary   5/19/2017    Pete Sheldon    MRN: [id number]           Patient Information     Date Of Birth          1940        Visit Information        Provider Department      5/19/2017 2:00 PM Rom Helm MD [facility]        Today's Diagnoses     Type 2 diabetes mellitus with diabetic polyneuropathy, with long-term current use of insulin (H)    -  1    Hypertension goal BP (blood pressure) < 140/90        Hyperlipidemia LDL goal <100        Special screening for malignant neoplasms, colon          Care Instructions    Keep working with Dr Damian to get your diabetes at the best control that we are able to do.     Blood pressure and LDL-Cholesterol look fine.     See me in 3 months, with labs in advance.         Follow-ups after your visit        Your next 10 appointments already scheduled     May 24, 2017  2:00 PM CDT   [facility] with L[facility]ian [facility]   [facility]    [address]  [facility] [id number] [phone]              Future tests that were ordered for you today     Open Future Orders        Priority Expected Expires Ordered    Fecal colorectal cancer screen (FIT) Routine 6/9/2017 8/11/2017 5/19/2017            Who to contact     If you have questions or need follow up information about today's clinic visit or your schedule please contact [facility] directly at [phone].  Normal or non-critical lab and imaging results will be communicated to you by MyChart, letter or phone within 4 business days after the clinic has received the results. If you do not hear from us within 7 days, please contact the clinic through MyChart or phone. If you have a critical or abnormal lab result, we will notify you by phone as soon as possible.  Submit refill requests through [employer] or call your pharmacy and they will forward the refill request to us. Please allow 3  "business days for your refill to be completed.          Additional Information About Your Visit        MyChart Information     [employer] lets you send messages to your doctor, view your test results, renew your prescriptions, schedule appointments and more. To sign up, go to [email or web address] . Click on \"Log in\" on the left side of the screen, which will take you to the Welcome page. Then click on \"Sign up Now\" on the right side of the page.     You will be asked to enter the access code listed below, as well as some personal information. Please follow the directions to create your username and password.     Your access code is: [email or web address]  Expires: 2017  3:01 PM     Your access code will  in 90 days. If you need help or a new code, please call your [facility] clinic or [phone].        Care EveryWhere ID     This is your Care EveryWhere ID. This could be used by other organizations to access your [facility] medical records  [id number]        Your Vitals Were     Pulse Temperature Respirations Height Pulse Oximetry BMI (Body Mass Index)    83 97.7  F (36.5  C) (Oral) 12 5' 7\" (1.702 m) 95% 25.22 kg/m2       Blood Pressure from Last 3 Encounters:   17 104/60   17 120/62   17 (!) 154/123    Weight from Last 3 Encounters:   17 161 lb (73 kg)   17 157 lb (71.2 kg)   17 159 lb 9.6 oz (72.4 kg)               Primary Care Provider Office Phone # Fax #    Rom Helm [phone]       [facility] [address] [place] [address]        Goals        Diet    Increase water intake     Notes - Note edited  2014 11:42 AM by Patricia Khan, RN    Have 5-6 glasses (8oz) of fluid a day  Per f/u with spouse today, pt not doing well. Not eating and drinking. Called clinic and advised to take pt to clinic.         Increase water intake        General    Medication 1 (pt-stated)     Notes - Note created  5/15/2014  1:57 PM by " Patricia Khan, JULIA    Pt will have an MTM consult         Thank you!     Thank you for choosing [facility]  for your care. Our goal is always to provide you with excellent care. Hearing back from our patients is one way we can continue to improve our services. Please take a few minutes to complete the written survey that you may receive in the mail after your visit with us. Thank you!             Your Updated Medication List - Protect others around you: Learn how to safely use, store and throw away your medicines at www.disposemymeds.org.          This list is accurate as of: 5/19/17  3:01 PM.  Always use your most recent med list.                   Brand Name Dispense Instructions for use    ACE/ARB NOT PRESCRIBED (INTENTIONAL)      by Other route continuous prn (Hyperkalemia) Reported on 5/19/2017       amLODIPine 5 MG tablet    NORVASC    90 tablet    Take 1 tablet (5 mg) by mouth daily       blood glucose monitoring test strip    STEFFI CONTOUR NEXT    400 each    Use to test blood sugar 4 times daily or as directed.       Glucosamine HCl 750 MG Tabs      Take 750 mg by mouth 2 times daily       * GLUCOSE MANAGEMENT Tabs     60 tablet    Take 2 tab in case on low blood glucose       * GLUCOSE MANAGEMENT Tabs     100 tablet    4 tabs po for low blood sugar below 70, may repeat q 10-15 minutes as needed       hydrochlorothiazide 12.5 MG capsule    MICROZIDE    90 capsule    Take 1 capsule (12.5 mg) by mouth every morning       insulin aspart 100 UNIT/ML injection    NovoLOG PEN     Inject 1-7 Units Subcutaneous 3 times daily (before meals) Correction Scale - HIGH INSULIN RESISTANCE DOSING    Do Not give Correction Insulin if BG < 140 For  - 164 give 1 unit. For  - 189 give 2 units. For  - 214 give 3 units. For  - 239 give 4 units. For  - 264 give 5 units. For  - 289 give 6 units. For  - 314 give 7 units. For  - 339 give 8 units. For BG = or >  "340 give 9 units. Check blood glucose before meals/bolus feedings (or q4h if NPO) and administer based on blood glucose. Notify MD if glucose > or = 350 mg/dL after administration of correction dose.       insulin glargine 100 UNIT/ML injection    LANTUS    3 mL    Inject 32 Units Subcutaneous every morning (before breakfast)       insulin lispro 100 UNIT/ML injection    HumaLOG KWIKpen    3 mL    Inject 2 Units Subcutaneous 3 times daily (before meals) Take 2 unit before breakfast, 2 units before lunch and 2 units before dinner PLUS sliding scale       insulin pen needle 31G X 8 MM     300 each    B-D UF III short pen needles, use 4 times a day to inject insulin.       insulin syringes (disposable) U-100 0.3 ML Misc     100 each    1 each 2 times daily       [email or web address] LANCETS Misc     200 strip    Use to test blood sugars 4 times daily or as directed.       loratadine 10 MG tablet    AF LORATADINE    14 tablet    Take 1 tablet (10 mg) by mouth daily       lovastatin 40 MG tablet    MEVACOR    90 tablet    Take 1 tablet (40 mg) by mouth At Bedtime       metFORMIN 500 MG tablet    GLUCOPHAGE    360 tablet    TAKE TWO TABLETS BY MOUTH TWICE DAILY WITH MEALS       multivitamin Tabs tablet      Take 1 tablet by mouth daily Reported on 5/19/2017       order for DME     3 Month    All DM testing supplies including test strips, lancets, solution, syringes, needles and/or pen needles for testing 4 times per day.  Brand \"Contour Next\"       polyethylene glycol powder    MIRALAX    510 g    Take 17 g (1 capful) by mouth 2 times daily as needed for constipation       sennosides 8.6 MG tablet    SENOKOT    120 tablet    Take 2 tablets by mouth 2 times daily May reduce to one tablet once or twice a day once stools normalize.       tamsulosin 0.4 MG capsule    FLOMAX    90 capsule    Take 1 capsule (0.4 mg) by mouth daily       VITAMIN D PO      Take 400 Units by mouth daily       * Notice:  This list has 2 medication(s) " that are the same as other medications prescribed for you. Read the directions carefully, and ask your doctor or other care provider to review them with you.

## 2017-05-19 NOTE — LETTER
"[facility]  [address]  [place] [address]  [phone]    5/19/2017    Pete OSBORN [address]  [address] [place] [address]           Dear Mr. Sheldon,    The results of your lab tests are enclosed. Everything looks fine. Unless noted otherwise below, any results that are outside the \"normal\" range are within acceptable limits and are of no concern.    Hemoglobin A1C measures control of diabetes. Your Hemoglobin A1C is shown. The ideal target is under 7.0, although below 8.0 may be acceptable for some patients.    LDL= Bad Cholesterol-- the target is below 100.     HDL= Good Cholesterol-- although this is determined mostly by heredity, exercise and/or medications may sometimes raise this number.    Triglycerides are another type of fat in the blood, and can sometimes be lowered by reducing intake of sweets or excess carbohydrates, alcohol, and by weight reduction if needed.  Sometimes medications are also used.    AST and ALT are liver tests, as are the bilirubin (total and direct), albumin, total protein, and alkaline phosphatase. Yours are all normal.     Urea Nitrogen and Creatinine are kidney tests--yours are normal. GFR stands for Glomerular Filtration Rate, a more complicated estimate of kidney function.    Sodium, Potassium, Chloride, Carbon Dioxide, and Calcium are all normal salts in the bloodstream. Yours all look normal. Your glucose (blood sugar) also looks fine. (You can ignore the anion gap result).     If you have any further questions or problems, please contact our office.    Sincerely,        Rom Helm MD  Attachment: Lab results     "

## 2017-05-24 ENCOUNTER — OFFICE VISIT (OUTPATIENT)
Dept: ENDOCRINOLOGY | Facility: CLINIC | Age: 77
End: 2017-05-24
Payer: COMMERCIAL

## 2017-05-24 VITALS
SYSTOLIC BLOOD PRESSURE: 98 MMHG | WEIGHT: 163 LBS | TEMPERATURE: 97.6 F | RESPIRATION RATE: 16 BRPM | BODY MASS INDEX: 25.58 KG/M2 | DIASTOLIC BLOOD PRESSURE: 62 MMHG | HEART RATE: 91 BPM | HEIGHT: 67 IN | OXYGEN SATURATION: 98 %

## 2017-05-24 DIAGNOSIS — Z79.4 TYPE 2 DIABETES MELLITUS WITH DIABETIC POLYNEUROPATHY, WITH LONG-TERM CURRENT USE OF INSULIN (H): Primary | ICD-10-CM

## 2017-05-24 DIAGNOSIS — E13.10 DIABETIC KETOACIDOSIS WITHOUT COMA ASSOCIATED WITH OTHER SPECIFIED DIABETES MELLITUS (H): ICD-10-CM

## 2017-05-24 DIAGNOSIS — E11.42 TYPE 2 DIABETES MELLITUS WITH DIABETIC POLYNEUROPATHY, WITH LONG-TERM CURRENT USE OF INSULIN (H): Primary | ICD-10-CM

## 2017-05-24 PROCEDURE — 99214 OFFICE O/P EST MOD 30 MIN: CPT | Performed by: INTERNAL MEDICINE

## 2017-05-24 NOTE — PROGRESS NOTES
ENDOCRINOLOGY CLINIC NOTE:  Name: Pete Sheldon  Seen for f/u of Diabetes.  He is accompanied by his wife.  HPI:  Pete Sheldon is a 75 year old male who presents for the evaluation/management of:  Was in the hospital 12/2/16-12/5/2016 for diabetic ketoacidosis and acute kidney injury.  Office note he is having bedtime snack almost every days.  Typical bedtime snack is whole bagel.  He reports that he feels does not eat a bagel blood sugar drops too at night.  Has variable blood sugar pattern.  I doubt if he has clear understanding of insulin dosing and the carbohydrates.  Also trouble with memory.  I discussed continuous glucose monitor with him in past but he does not want sensors.    1. Type 2 DM:  Orginally diagnosed in 1995.  Current Regimen: Metformin 1000 mg twice a day, Lantus 35 units in morning and Humalog 2/2/2 with breakfast/lunch/dinner respectively.  In addition to that he is on correctional scale 1 unit- 25 >140.  Insulin dosing varies widely placed on variable blood sugar numbers  Does not feel comfortable with carbohydrate counting.  BS checks: Three times a day    Average Meter Download: 174.  Again wide variation in blood sugars are noted.  Blood sugars are ranging from .  Sometimes he is checking sugar after meals.  Exercise: Minimal  Episodes of hypoglycemia (low blood sugar):  Yes. Multiple episodes, random times.  Fixed meal pattern: NO. Carb content varies. In general diet is high in carbs.  Patient counting carbs: No    DM Complications:   Nephropathy: Yes: Microalbuminuria present  Retinopathy: Yes: History of laser treatment in past  Neuropathy: Yes.  Microalbuminuria: Yes: Microalbuminuria present.  Not on ACE secondary to history of hyperkalemia.  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    2. Hypertension: Blood Pressure  today:   BP Readings from Last 3 Encounters:   17 98/62   17 104/60   17 120/62     Blood pressure medications include hydrochlorothiazide 12.5 mg, amlodipine 5 mg.      3. Hyperlipidemia: Takes lovastatin 40 mg for lipid control.  Denies muscle aches of pains.        PMH/PSH:  Past Medical History:   Diagnosis Date     Calculus of ureter      Hypertension      Microalbuminuria 2/15/2016     Other and unspecified hyperlipidemia      Type 2 diabetes, HbA1C goal < 8% (H) 1995     Past Surgical History:   Procedure Laterality Date     C NONSPECIFIC PROCEDURE      Nasal septoplasty     HC COLONOSCOPY THRU STOMA, DIAGNOSTIC  2007    Normal     HC FLEX SIGMOIDOSCOPY W/WO MIGUEL SPEC BY BRUSH/WASH  1999    Normal to 60 cm     HC REPAIR INCISIONAL HERNIA,REDUCIBLE  1999    Hernia Repair, Incisional, Unilateral (right)     HC REPAIR INCISIONAL HERNIA,REDUCIBLE      Hernia Repair, Incisional, Unilateral (left, with mesh placement)     Family Hx:  Family History   Problem Relation Age of Onset     CEREBROVASCULAR DISEASE Mother       age 95     HEART DISEASE Mother      age 89,  age 95     CEREBROVASCULAR DISEASE Father       age 91, stroke two years previous     Cancer - colorectal Brother      Half-brother     CANCER Sister      Half-sister (?type)     CANCER Sister      Half-sister (?type)     HEART DISEASE Brother      Neurologic Disorder Daughter      Multiple Sclerosis     Psychotic Disorder Son      Schizophrenia       Social Hx:  Social History     Social History     Marital status:      Spouse name: N/A     Number of children: 2     Years of education: N/A     Occupational History     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          MEDICATIONS:  has a current medication list which includes the following prescription(s): insulin lispro, metformin, lovastatin, sennosides, polyethylene glycol, insulin glargine, insulin lispro, tamsulosin, amlodipine, glucose management, glucose management, multivitamin, hydrochlorothiazide, blood glucose monitoring, insulin pen needle, order for dme, lifescan finepoint lancets, glucosamine hcl, loratadine, insulin syringes (disposable), cholecalciferol, and ACE/ARB NOT PRESCRIBED, INTENTIONAL,.    ROS     ROS: 10 point ROS neg other than the symptoms noted above in the HPI.    Physical Exam   VS: BP 98/62  Pulse 91  Temp 97.6  F (36.4  C) (Oral)  Resp 16  Ht 1.702 m (5' 7\")  Wt 73.9 kg (163 lb)  SpO2 98%  BMI 25.53 kg/m2  GENERAL: AXOX3, NAD, well dressed,appears stated age.  HEENT: No exopthalmous, no proptosis, EOMI, no lig lag, no retraction  CV: RRR  LUNGS: CTAB  ABDOMEN: +BS  NEUROLOGY: CN grossly intact, no tremors  PSYCH: normal affect and mood      LABS:  Component      Latest Ref Rng 4/15/2016   C-Peptide      0.9 - 6.9 ng/mL <0.1 (L)   Glutamic Acid Decarboxylase Antibody       <5.0 . . .     A1c:  Lab Results   Component Value Date    A1C 8.3 05/12/2017    A1C 7.9 02/10/2017    A1C 8.9 12/03/2016    A1C 8.3 10/28/2016    A1C 8.6 07/22/2016       Creatinine:  Creatinine   Date Value Ref Range Status   05/12/2017 1.38 (H) 0.66 - 1.25 mg/dL Final     Urine Micro:  MICROL      208   7/22/2016  MICROALBUMIN   390.98   7/22/2016    LFTs/Lipids:  CHOL      139   1/15/2016  HDL       34   1/15/2016  LDL       91   1/15/2016  TRIG       70   1/15/2016  CHOLHDLRATIO      4.3   9/25/2015    TFTs:  TSH   Date Value Ref Range Status   07/22/2016 5.58 (H) 0.40 - 4.00 mU/L Final       All pertinent notes, labs, and images personally reviewed by me.     A/P  Mr.Victor ANURAG Sheldon is a 75 " year old here for the evaluation/management of diabetes:    1. DM2 - (low C-peptide, negative FAVIOLA): Under Poor control.  A1c 8.3%.  Diabetes complicated by microalbuminuria, neuropathy and retinopathy.  Has been admitted for DKA two times 2016.  Concerns for hypoglycemia unawareness.  Does not feel comfortable with carbohydrate counting.  Blood sugars are widely variable.   Carbohydrate content is with meals and days.  In general diet is high in carbohydrates.  I am also concerned about his understanding of insulin regimen. When asked he was not able to answer the dosages correctly. I asked his wife if she could help with setting up insulin dosage and bedtime snack.  Continue Metformin 1000 mg twice a day  Lantus: decrease to 32 units in a.m. only   HUmalog: Continue 2 units as baseline with each meal plus correction scale.  Will decrease correction scale as below:  < 200 : no additional insulin  200-225: + 1  226-250: + 2  251-275: + 3  276-300: + 4  301-325: + 5  326- 350: +6 units  > 350: + 7 units  Notify MD if glucose > or = 350 mg/dL after administration of correction dose.    In last clinic visit preference was made to diabetes educator for continuous glucose monitor.  He will benefit from personal continuous glucose monitor but he does not want since that and is resistant to that idea.   Need to have consistent bedtime snack and consistent carbs with each meal.  Discussed hypoglycemia signs and symptoms as well as management in detail.    2. Hypertension - Under Fair control.  Continue hydrochlorothiazide 12.5 mg, amlodipine 5 mg.    3. Hyperlipidemia - Under Good control.  Continue lovastatin 40 mg. LDL 91, HDL 34.    4. Prevention  Flu Shot-September 2015  Pneumovax- March 2012  Opthalmology-Yes.  March 2015.  Due for eye examination.  ASA-Yes.  81 mg.  Smoking- No    5. Microalbuminuria:  MICROL      208   7/22/2016  MICROALBUMIN   390.98   7/22/2016  Not on ACE 2/2 to h/o hyperkalemia    All questions  were answered.  The patient indicates understanding of the above issues and agrees with the plan set forth.     More than 50% of face to face time spent with Mr. Sheldon on counseling / coordinating his care.  Total appointment times was 30 minutes.      Follow-up:  Follow up 3 months    Lori Damian M.D  Endocrinology  [facility]/[place]    Disclaimer: This note consists of symbols derived from keyboarding, dictation and/or voice recognition software. As a result, there may be errors in the script that have gone undetected. Please consider this when interpreting information found in this chart.

## 2017-05-24 NOTE — MR AVS SNAPSHOT
After Visit Summary   2017    Pete Sheldon    MRN: [id number]           Patient Information     Date Of Birth          1940        Visit Information        Provider Department      2017 2:00 PM Lori Damian MD [facility]        Today's Diagnoses     Type 2 diabetes mellitus with diabetic polyneuropathy, with long-term current use of insulin (H)    -  1    Diabetic ketoacidosis without coma associated with other specified diabetes mellitus (H)          Care Instructions    [facility] & [place] locations   Dr Damian, Endocrinology Department      [facility]   [address] [address]  Appointment [phone]  Fax: [phone]   Monday and Tuesday         [address] [place] [address]  Appointment [phone]  Fax: [phone]  Wednesday and Thursday           To provide the best diabetic care, please bring your blood glucose meter to each and every visit with your Endocrinologist.  Your blood glucose meter/insulin pump will be downloaded at every appointment.      Please arrive 15 minutes before your scheduled appointment.  This will allow for your blood glucose meter/insulin pump to be downloaded and glycosylated hemoglobin (A1c) to be obtained prior to your appointment.    Continue Lantus 32 units in AM  Continue Humalog 2 units at baseline  Decrease correctional scale as below:      < 200 : no additional insulin  200-225: + 1  226-250: + 2  251-275: + 3  276-300: + 4  301-325: + 5  326- 350: +6 units  > 350: + 7 units  Notify MD if glucose > or = 350 mg/dL after administration of correction dose.              Follow-ups after your visit        Who to contact     If you have questions or need follow up information about today's clinic visit or your schedule please contact [facility] directly at  "[phone].  Normal or non-critical lab and imaging results will be communicated to you by [employer], letter or phone within 4 business days after the clinic has received the results. If you do not hear from us within 7 days, please contact the clinic through [employer] or phone. If you have a critical or abnormal lab result, we will notify you by phone as soon as possible.  Submit refill requests through [employer] or call your pharmacy and they will forward the refill request to us. Please allow 3 business days for your refill to be completed.          Additional Information About Your Visit        [employer] Information     [employer] lets you send messages to your doctor, view your test results, renew your prescriptions, schedule appointments and more. To sign up, go to [email or web address] . Click on \"Log in\" on the left side of the screen, which will take you to the Welcome page. Then click on \"Sign up Now\" on the right side of the page.     You will be asked to enter the access code listed below, as well as some personal information. Please follow the directions to create your username and password.     Your access code is: [email or web address]  Expires: 2017  3:01 PM     Your access code will  in 90 days. If you need help or a new code, please call your [place] clinic or [phone].        Care EveryWhere ID     This is your Care EveryWhere ID. This could be used by other organizations to access your [place] medical records  [id number]        Your Vitals Were     Pulse Temperature Respirations Height Pulse Oximetry BMI (Body Mass Index)    91 97.6  F (36.4  C) (Oral) 16 5' 7\" (1.702 m) 98% 25.53 kg/m2       Blood Pressure from Last 3 Encounters:   17 98/62   17 104/60   17 120/62    Weight from Last 3 Encounters:   17 163 lb (73.9 kg)   17 161 lb (73 kg)   17 157 lb (71.2 kg)              Today, you had the following     No orders found for display         Today's " Medication Changes          These changes are accurate as of: 5/24/17  2:41 PM.  If you have any questions, ask your nurse or doctor.               Start taking these medicines.        Dose/Directions    insulin aspart 100 UNIT/ML injection   Commonly known as:  NovoLOG PEN   Used for:  Diabetic ketoacidosis without coma associated with other specified diabetes mellitus (H)   Started by:  Lori Damian MD        Dose:  1-7 Units   Inject 1-7 Units Subcutaneous 3 times daily (before meals) < 200 : no additional insulin 200-225: + 1 226-250: + 2 251-275: + 3 276-300: + 4 301-325: + 5 326- 350: +6 units > 350: + 7 units Notify MD if glucose > or = 350 mg/dL after administration of correction dose.   Quantity:  30 mL   Refills:  1            Where to get your medicines      Some of these will need a paper prescription and others can be bought over the counter.  Ask your nurse if you have questions.     Bring a paper prescription for each of these medications     insulin aspart 100 UNIT/ML injection                Primary Care Provider Office Phone # Fax #    Rom Helm [phone]       [facility] [address] [facility] [address]  [facility] [address]        Goals        Diet    Increase water intake     Notes - Note edited  5/14/2014 11:42 AM by Patricia Khan RN    Have 5-6 glasses (8oz) of fluid a day  Per f/u with spouse today, pt not doing well. Not eating and drinking. Called clinic and advised to take pt to clinic.         Increase water intake        General    Medication 1 (pt-stated)     Notes - Note created  5/15/2014  1:57 PM by Patricia Khan RN    Pt will have an MTM consult         Thank you!     Thank you for choosing [facility]  for your care. Our goal is always to provide you with excellent care. Hearing back from our patients is one way we can continue to improve our services. Please take a few minutes to complete the written survey that  you may receive in the mail after your visit with us. Thank you!             Your Updated Medication List - Protect others around you: Learn how to safely use, store and throw away your medicines at www.disposemymeds.org.          This list is accurate as of: 5/24/17  2:41 PM.  Always use your most recent med list.                   Brand Name Dispense Instructions for use    ACE/ARB NOT PRESCRIBED (INTENTIONAL)      by Other route continuous prn (Hyperkalemia) Reported on 5/19/2017       amLODIPine 5 MG tablet    NORVASC    90 tablet    Take 1 tablet (5 mg) by mouth daily       blood glucose monitoring test strip    STEFFI CONTOUR NEXT    400 each    Use to test blood sugar 4 times daily or as directed.       Glucosamine HCl 750 MG Tabs      Take 750 mg by mouth 2 times daily       * GLUCOSE MANAGEMENT Tabs     60 tablet    Take 2 tab in case on low blood glucose       * GLUCOSE MANAGEMENT Tabs     100 tablet    4 tabs po for low blood sugar below 70, may repeat q 10-15 minutes as needed       hydrochlorothiazide 12.5 MG capsule    MICROZIDE    90 capsule    Take 1 capsule (12.5 mg) by mouth every morning       insulin aspart 100 UNIT/ML injection    NovoLOG PEN    30 mL    Inject 1-7 Units Subcutaneous 3 times daily (before meals) < 200 : no additional insulin 200-225: + 1 226-250: + 2 251-275: + 3 276-300: + 4 301-325: + 5 326- 350: +6 units > 350: + 7 units Notify MD if glucose > or = 350 mg/dL after administration of correction dose.       insulin glargine 100 UNIT/ML injection    LANTUS    3 mL    Inject 32 Units Subcutaneous every morning (before breakfast)       insulin lispro 100 UNIT/ML injection    HumaLOG KWIKpen    3 mL    Inject 2 Units Subcutaneous 3 times daily (before meals) Take 2 unit before breakfast, 2 units before lunch and 2 units before dinner PLUS sliding scale       insulin pen needle 31G X 8 MM     300 each    B-D UF III short pen needles, use 4 times a day to inject insulin.       insulin  "syringes (disposable) U-100 0.3 ML Misc     100 each    1 each 2 times daily       [employer] FINEPOINT LANCETS Misc     200 strip    Use to test blood sugars 4 times daily or as directed.       loratadine 10 MG tablet    AF LORATADINE    14 tablet    Take 1 tablet (10 mg) by mouth daily       lovastatin 40 MG tablet    MEVACOR    90 tablet    Take 1 tablet (40 mg) by mouth At Bedtime       metFORMIN 500 MG tablet    GLUCOPHAGE    360 tablet    TAKE TWO TABLETS BY MOUTH TWICE DAILY WITH MEALS       multivitamin Tabs tablet      Take 1 tablet by mouth daily FOCUS SELECT PREMIUM WITH LUTEIN per eye doctor Reported on 5/19/2017       order for DME     3 Month    All DM testing supplies including test strips, lancets, solution, syringes, needles and/or pen needles for testing 4 times per day.  Brand \"Contour Next\"       polyethylene glycol powder    MIRALAX    510 g    Take 17 g (1 capful) by mouth 2 times daily as needed for constipation       sennosides 8.6 MG tablet    SENOKOT    120 tablet    Take 2 tablets by mouth 2 times daily May reduce to one tablet once or twice a day once stools normalize.       tamsulosin 0.4 MG capsule    FLOMAX    90 capsule    Take 1 capsule (0.4 mg) by mouth daily       VITAMIN D PO      Take 400 Units by mouth daily       * Notice:  This list has 2 medication(s) that are the same as other medications prescribed for you. Read the directions carefully, and ask your doctor or other care provider to review them with you.      "

## 2017-05-24 NOTE — Clinical Note
I saw Pete in clinic today. Very labile BG and wide fluctuations. I have doubt about his understanding of insulin regimen and carb content. Discussed again importance of fixed carbs and meals. May benefit from CGM.

## 2017-05-24 NOTE — PATIENT INSTRUCTIONS
[facility]   Dr Damian, Endocrinology Department      [facility]   [address] [address]  Appointment [phone]  Fax: [phone]   Monday and Tuesday         [facility]   [address] [place] [address]  Appointment [phone]  Fax: [phone]  Wednesday and Thursday           To provide the best diabetic care, please bring your blood glucose meter to each and every visit with your Endocrinologist.  Your blood glucose meter/insulin pump will be downloaded at every appointment.      Please arrive 15 minutes before your scheduled appointment.  This will allow for your blood glucose meter/insulin pump to be downloaded and glycosylated hemoglobin (A1c) to be obtained prior to your appointment.    Continue Lantus 32 units in AM  Continue Humalog 2 units at baseline  Decrease correctional scale as below:      < 200 : no additional insulin  200-225: + 1  226-250: + 2  251-275: + 3  276-300: + 4  301-325: + 5  326- 350: +6 units  > 350: + 7 units  Notify MD if glucose > or = 350 mg/dL after administration of correction dose.

## 2017-05-24 NOTE — NURSING NOTE
"Chief Complaint   Patient presents with     Diabetes       Initial BP 98/62  Pulse 91  Temp 97.6  F (36.4  C) (Oral)  Resp 16  Ht 5' 7\" (1.702 m)  Wt 163 lb (73.9 kg)  SpO2 98%  BMI 25.53 kg/m2 Estimated body mass index is 25.53 kg/(m^2) as calculated from the following:    Height as of this encounter: 5' 7\" (1.702 m).    Weight as of this encounter: 163 lb (73.9 kg).  Medication Reconciliation: complete      Lilibeth Holloway, OLIVIA      "

## 2017-06-13 ENCOUNTER — OFFICE VISIT (OUTPATIENT)
Dept: INTERNAL MEDICINE | Facility: CLINIC | Age: 77
End: 2017-06-13
Payer: COMMERCIAL

## 2017-06-13 VITALS
WEIGHT: 161 LBS | OXYGEN SATURATION: 94 % | HEART RATE: 80 BPM | SYSTOLIC BLOOD PRESSURE: 100 MMHG | TEMPERATURE: 97.2 F | DIASTOLIC BLOOD PRESSURE: 58 MMHG | BODY MASS INDEX: 25.22 KG/M2

## 2017-06-13 DIAGNOSIS — E11.42 TYPE 2 DIABETES MELLITUS WITH DIABETIC POLYNEUROPATHY, WITH LONG-TERM CURRENT USE OF INSULIN (H): ICD-10-CM

## 2017-06-13 DIAGNOSIS — Z79.4 TYPE 2 DIABETES MELLITUS WITH DIABETIC POLYNEUROPATHY, WITH LONG-TERM CURRENT USE OF INSULIN (H): ICD-10-CM

## 2017-06-13 DIAGNOSIS — H26.9 CATARACT: ICD-10-CM

## 2017-06-13 DIAGNOSIS — I10 HYPERTENSION GOAL BP (BLOOD PRESSURE) < 140/90: ICD-10-CM

## 2017-06-13 DIAGNOSIS — E78.5 HYPERLIPIDEMIA LDL GOAL <100: ICD-10-CM

## 2017-06-13 DIAGNOSIS — Z01.818 PREOP GENERAL PHYSICAL EXAM: Primary | ICD-10-CM

## 2017-06-13 PROCEDURE — 36415 COLL VENOUS BLD VENIPUNCTURE: CPT | Performed by: INTERNAL MEDICINE

## 2017-06-13 PROCEDURE — 80053 COMPREHEN METABOLIC PANEL: CPT | Performed by: INTERNAL MEDICINE

## 2017-06-13 PROCEDURE — 99214 OFFICE O/P EST MOD 30 MIN: CPT | Performed by: INTERNAL MEDICINE

## 2017-06-13 PROCEDURE — 93000 ELECTROCARDIOGRAM COMPLETE: CPT | Performed by: INTERNAL MEDICINE

## 2017-06-13 NOTE — NURSING NOTE
"Chief Complaint   Patient presents with     Pre-Op Exam     Lt cataract, Dr. Martell Jara, 6/20/17       Initial /58 (BP Location: Left arm, Patient Position: Chair, Cuff Size: Adult Regular)  Pulse 80  Temp 97.2  F (36.2  C) (Oral)  Wt 161 lb (73 kg)  SpO2 94%  BMI 25.22 kg/m2 Estimated body mass index is 25.22 kg/(m^2) as calculated from the following:    Height as of 5/24/17: 5' 7\" (1.702 m).    Weight as of this encounter: 161 lb (73 kg).  Medication Reconciliation: complete     Radha Leal CMA      "

## 2017-06-13 NOTE — PROGRESS NOTES
[place] [address]  [facility] [address]  [phone]  Dept: [phone]    PRE-OP EVALUATION:  Today's date: 2017    Pete Sheldon (: 1940) presents for pre-operative evaluation assessment as requested by Dr. Moura.  He requires evaluation and anesthesia risk assessment prior to undergoing surgery/procedure for treatment of Lt cataract .  Proposed procedure: Lt cataract    Date of Surgery/ Procedure: 2017  Time of Surgery/ Procedure: 9:30am  Hospital/Surgical Facility: [facility]  [phone]  Primary Physician: Rom Helm  Type of Anesthesia Anticipated: General    Patient has a Health Care Directive or Living Will:  YES     1. NO - Do you have a history of heart attack, stroke, stent, bypass or surgery on an artery in the head, neck, heart or legs?  2. NO - Do you ever have any pain or discomfort in your chest?  3. NO - Do you have a history of  Heart Failure?  4. NO - Are you troubled by shortness of breath when: walking on the level, up a slight hill or at night?  5. NO - Do you currently have a cold, bronchitis or other respiratory infection?  6. NO - Do you have a cough, shortness of breath or wheezing?  7. YES - Do you sometimes get pains in the calves of your legs when you walk?  8. NO - Do you or anyone in your family have previous history of blood clots?  9. NO - Do you or does anyone in your family have a serious bleeding problem such as prolonged bleeding following surgeries or cuts?  10. NO - Have you ever had problems with anemia or been told to take iron pills?  11. NO - Have you had any abnormal blood loss such as black, tarry or bloody stools, or abnormal vaginal bleeding?  12. NO - Have you ever had a blood transfusion?  13. NO - Have you or any of your relatives ever had problems with anesthesia?  14. YES - Do you have sleep apnea, excessive snoring or daytime drowsiness?  15. NO - Do you have any prosthetic heart valves?  16.  NO - Do you have prosthetic joints?  17. NO - Is there any chance that you may be pregnant?        HPI:                                                      Brief HPI related to upcoming procedure: scheduled for left eye cataract surgery.   No acute complaints, no medication change or new medical conditions.  Has h/o HTN. on medical treatment. BP has been controlled. No side effects from medications. No CP, HA, dizziness. good compliance with medications and low salt diet.  Has H/O DM. On diet , exercise and PO Metformin with Insulin sliding scale and Lantus. Blood sugars are fluctuating. Has h/o  parestesias. No hypoglycemias.  Has H/O hyperlipidemia. On medical treatment and diet. No side effects. No muscle weakness, myalgias or upset stomach.         See problem list for active medical problems.  Problems all longstanding and stable, except as noted/documented.  See ROS for pertinent symptoms related to these conditions.                                                                                                  .    MEDICAL HISTORY:                                                      Patient Active Problem List    Diagnosis Date Noted     DKA (diabetic ketoacidoses) (H) 12/02/2016     Priority: Medium     Type 2 diabetes mellitus with diabetic polyneuropathy, with long-term current use of insulin (H) 11/17/2016     Priority: Medium     Microalbuminuria 02/15/2016     Priority: Medium     ACP (advance care planning) 08/24/2015     Priority: Medium     Advance Care Planning 8/24/2015: ACP Review and Resources Provided:  Reviewed chart for advance care plan.  Pete Sheldon has no plan or FULL code status on file. Provided with information and resources by mail. Code status reflects current choices pending further ACP discussions. Documented legally designated decision maker(s). YUE Rivera  FPA Advance Care Planning   [phone]  August 24, 2015    Advance Care Planning  6/3/2011: Pt already has HCD. 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  FPA / [facility]      DX V65.8 REPLACED WITH 63352 HEALTH CARE HOME (04/08/2013)       Syncope 11/30/2012     Priority: Medium     HYPERLIPIDEMIA LDL GOAL <100 10/31/2010     Priority: Medium     ERECTILE DYSFUNCTION 10/06/2006     Priority: Medium     Calculus of kidney 06/30/2006     Priority: Medium     Diabetic polyneuropathy (H) 06/25/2003     Priority: Medium     Problem list name updated by automated process. Provider to review        Past Medical History:   Diagnosis Date     Calculus of ureter 1980's     Hypertension      Microalbuminuria 2/15/2016     Other and unspecified hyperlipidemia      Type 2 diabetes, HbA1C goal < 8% (H) 7/1995     Past Surgical History:   Procedure Laterality Date     C NONSPECIFIC PROCEDURE  1980's    Nasal septoplasty     HC COLONOSCOPY THRU STOMA, DIAGNOSTIC  6/12/2007    Normal     HC FLEX SIGMOIDOSCOPY W/WO MIGUEL SPEC BY BRUSH/WASH  12/30/1999    Normal to 60 cm     HC REPAIR INCISIONAL HERNIA,REDUCIBLE  1/2001, 1999    Hernia Repair, Incisional, Unilateral (right)     HC REPAIR INCISIONAL HERNIA,REDUCIBLE  1996    Hernia Repair, Incisional, Unilateral (left, with mesh placement)     Current Outpatient Prescriptions   Medication Sig Dispense Refill     insulin lispro (HUMALOG KWIKPEN) 100 UNIT/ML injection 2 Units + correctional scale  Do Not give Correction Insulin if BG < 200   < 200 : no additional insulin   200-225: + 1   226-250: +  2   251-275: + 3   276-300: + 4   301-325: + 5   326- 350: +6 units   > 350: + 7 units   Notify MD if glucose > or = 350 mg/dL after administration of correction dose 30 mL 1     metFORMIN (GLUCOPHAGE) 500 MG tablet TAKE TWO TABLETS BY MOUTH TWICE DAILY WITH MEALS 360 tablet 0     lovastatin (MEVACOR) 40 MG tablet Take 1 tablet (40 mg) by mouth At Bedtime 90 tablet 3     sennosides (SENOKOT) 8.6 MG tablet Take 2 tablets by mouth 2 times daily May reduce to one tablet once or twice a day once stools normalize. 120 tablet 1     polyethylene glycol (MIRALAX) powder Take 17 g (1 capful) by mouth 2 times daily as needed for constipation 510 g 1     insulin glargine (LANTUS) 100 UNIT/ML injection Inject 32 Units Subcutaneous every morning (before breakfast) 3 mL 5     insulin lispro (HUMALOG KWIKPEN) 100 UNIT/ML injection Inject 2 Units Subcutaneous 3 times daily (before meals) Take 2 unit before breakfast, 2 units before lunch and 2 units before dinner PLUS sliding scale 3 mL 5     tamsulosin (FLOMAX) 0.4 MG capsule Take 1 capsule (0.4 mg) by mouth daily 90 capsule 1     amLODIPine (NORVASC) 5 MG tablet Take 1 tablet (5 mg) by mouth daily 90 tablet 1     Nutritional Supplements (GLUCOSE MANAGEMENT) TABS 4 tabs po for low blood sugar below 70, may repeat q 10-15 minutes as needed 100 tablet prn     Nutritional Supplements (GLUCOSE MANAGEMENT) TABS Take 2 tab in case on low blood glucose 60 tablet 0     multivitamin (OCUVITE) TABS Take 1 tablet by mouth daily FOCUS SELECT PREMIUM WITH LUTEIN per eye doctor  Reported on 5/19/2017       hydrochlorothiazide (MICROZIDE) 12.5 MG capsule Take 1 capsule (12.5 mg) by mouth every morning 90 capsule 3     blood glucose monitoring (STEFFI CONTOUR NEXT) test strip Use to test blood sugar 4 times daily or as directed. 400 each 1     insulin pen needle 31G X 8 MM B-D UF III short pen needles, use 4 times a day to inject insulin. 300 each 3     order for DME All DM testing supplies  "including test strips, lancets, solution, syringes, needles and/or pen needles for testing 4 times per day.    Brand \"Contour Next\" 3 Month 1     LendMeYourLiteracyCAN FINEPOINT LANCETS MISC Use to test blood sugars 4 times daily or as directed. 200 strip 3     Glucosamine HCl 750 MG TABS Take 750 mg by mouth 2 times daily       loratadine (AF LORATADINE) 10 MG tablet Take 1 tablet (10 mg) by mouth daily 14 tablet 0     insulin syringes, disposable, U-100 0.3 ML MISC 1 each 2 times daily 100 each 6     Cholecalciferol (VITAMIN D PO) Take 400 Units by mouth daily        ACE/ARB NOT PRESCRIBED, INTENTIONAL, by Other route continuous prn (Hyperkalemia) Reported on 5/19/2017       OTC products: None, except as noted above    Allergies   Allergen Reactions     Losartan Other (See Comments)     Dizziness      Latex Allergy: NO    Social History   Substance Use Topics     Smoking status: Former Smoker     Quit date: 3/11/1953     Smokeless tobacco: Never Used     Alcohol use No     History   Drug Use No       REVIEW OF SYSTEMS:                                                    C: NEGATIVE for fever, chills, change in weight  I: NEGATIVE for worrisome rashes, moles or lesions  E: NEGATIVE for vision changes or irritation  E/M: NEGATIVE for ear, mouth and throat problems  R: NEGATIVE for significant cough or SOB  B: NEGATIVE for masses, tenderness or discharge  CV: NEGATIVE for chest pain, palpitations or peripheral edema  GI: NEGATIVE for nausea, abdominal pain, heartburn, or change in bowel habits  : NEGATIVE for frequency, dysuria, or hematuria  M: NEGATIVE for significant arthralgias or myalgia  N: NEGATIVE for weakness, dizziness or paresthesias  E: NEGATIVE for temperature intolerance, skin/hair changes  H: NEGATIVE for bleeding problems  P: NEGATIVE for changes in mood or affect    EXAM:                                                    /58 (BP Location: Left arm, Patient Position: Chair, Cuff Size: Adult Regular)  " Pulse 80  Temp 97.2  F (36.2  C) (Oral)  Wt 161 lb (73 kg)  SpO2 94%  BMI 25.22 kg/m2    GENERAL APPEARANCE: weak, alert and no distress     EYES: EOMI     HENT: ear canals and TM's normal and nose and mouth without ulcers or lesions     NECK: no adenopathy, no asymmetry, masses, or scars and thyroid normal to palpation     RESP: lungs clear to auscultation - no rales, rhonchi or wheezes     CV: regular rates and rhythm, normal S1 S2, no S3 or S4 and no murmur, click or rub     ABDOMEN:  soft, nontender, no HSM or masses and bowel sounds normal     MS: extremities normal- no gross deformities noted, no evidence of inflammation in joints, FROM in all extremities.     SKIN: no suspicious lesions or rashes     NEURO: Normal strength and tone, sensory exam grossly normal, mentation intact and speech normal     PSYCH: mentation appears normal. and affect normal/bright     LYMPHATICS: No axillary, cervical, or supraclavicular nodes    DIAGNOSTICS:                                                      EKG: appears normal, NSR, normal axis, normal intervals, no acute ST/T changes c/w ischemia, no LVH by voltage criteria, unchanged from previous tracings  Labs Drawn and in Process:   Unresulted Labs Ordered in the Past 30 Days of this Admission     No orders found from 4/14/2017 to 6/14/2017.          Recent Labs   Lab Test  05/12/17   0906  03/02/17   1303  02/10/17   0849  12/05/16   0705   HGB   --   14.5   --   12.9*   PLT   --   318   --   228   NA  139  140  141  137   POTASSIUM  4.9  4.0  4.4  3.9   CR  1.38*  1.12  1.19  0.96   A1C  8.3*   --   7.9*   --         IMPRESSION:                                                    Reason for surgery/procedure: left eye cataract   Diagnosis/reason for consult: preoperative evaluation/ clearance      The proposed surgical procedure is considered LOW risk.    REVISED CARDIAC RISK INDEX  The patient has the following serious cardiovascular risks for perioperative  complications such as (MI, PE, VFib and 3  AV Block):  No serious cardiac risks  INTERPRETATION: 0 risks: Class I (very low risk - 0.4% complication rate)    The patient has the following additional risks for perioperative complications:  No identified additional risks    No diagnosis found.    RECOMMENDATIONS:                                                          --Patient is to take all scheduled medications on the day of surgery EXCEPT for modifications listed below.  Hold medications on day of surgery, except Lantus as instructed by endocrinology     APPROVAL GIVEN to proceed with proposed procedure, without further diagnostic evaluation       Signed Electronically by: Evgeny Dubon MD    Copy of this evaluation report is provided to requesting physician.    [facility] Preop Guidelines

## 2017-06-13 NOTE — MR AVS SNAPSHOT
After Visit Summary   6/13/2017    Pete Sheldon    MRN: [id number]           Patient Information     Date Of Birth          1940        Visit Information        Provider Department      6/13/2017 2:20 PM Evgeny Dubon MD [facility]        Today's Diagnoses     Preop general physical exam    -  1      Care Instructions      Before Your Surgery      Call your surgeon if there is any change in your health. This includes signs of a cold or flu (such as a sore throat, runny nose, cough, rash or fever).    Do not smoke, drink alcohol or take over the counter medicine (unless your surgeon or primary care doctor tells you to) for the 24 hours before and after surgery.    If you take prescribed drugs: Follow your doctor s orders about which medicines to take and which to stop until after surgery.    Eating and drinking prior to surgery: follow the instructions from your surgeon    Take a shower or bath the night before surgery. Use the soap your surgeon gave you to gently clean your skin. If you do not have soap from your surgeon, use your regular soap. Do not shave or scrub the surgery site.  Wear clean pajamas and have clean sheets on your bed.           Follow-ups after your visit        Your next 10 appointments already scheduled     Aug 16, 2017  8:15 AM CDT   LAB with [facility]   [facility]    [address] [place]  [facility] [address] [phone]           Patient must bring picture ID.  Patient should be prepared to give a urine specimen  Please do not eat 10-12 hours before your appointment if you are coming in fasting for labs on lipids, cholesterol, or glucose (sugar).  Pregnant women should follow their Care Team instructions. Water with medications is okay. Do not drink coffee or other fluids.   If you have concerns about taking  your medications, please ask at office or if scheduling via [employer], send a message by  "clicking on Secure Messaging, Message Your Care Team.            Aug 23, 2017  2:00 PM CDT   [facility] with [facility]m [facility]   [facility]    [address]  [facility] [address] [phone]            Aug 24, 2017  2:00 PM CDT   Return Visit with Lori Damian MD   [facility]    [address]  [facility] [address] [phone]              Who to contact     If you have questions or need follow up information about today's clinic visit or your schedule please contact [facility] directly at [phone].  Normal or non-critical lab and imaging results will be communicated to you by MyChart, letter or phone within 4 business days after the clinic has received the results. If you do not hear from us within 7 days, please contact the clinic through MyChart or phone. If you have a critical or abnormal lab result, we will notify you by phone as soon as possible.  Submit refill requests through [employer]You[employer] or call your pharmacy and they will forward the refill request to us. Please allow 3 business days for your refill to be completed.          Additional Information About Your Visit        DoYouBuzz Information     [employer]You[employer] lets you send messages to your doctor, view your test results, renew your prescriptions, schedule appointments and more. To sign up, go to [email or web address] . Click on \"Log in\" on the left side of the screen, which will take you to the Welcome page. Then click on \"Sign up Now\" on the right side of the page.     You will be asked to enter the access code listed below, as well as some personal information. Please follow the directions to create your username and password.     Your access code is: [email or web address]  Expires: 2017  3:01 PM     Your access code will  in 90 days. If you need help or a new code, please call your [place] clinic or " [phone].        Care EveryWhere ID     This is your Care EveryWhere ID. This could be used by other organizations to access your [place] medical records  [id number]        Your Vitals Were     Pulse Temperature Pulse Oximetry BMI (Body Mass Index)          80 97.2  F (36.2  C) (Oral) 94% 25.22 kg/m2         Blood Pressure from Last 3 Encounters:   06/13/17 100/58   05/24/17 98/62   05/19/17 104/60    Weight from Last 3 Encounters:   06/13/17 161 lb (73 kg)   05/24/17 163 lb (73.9 kg)   05/19/17 161 lb (73 kg)              We Performed the Following     Comprehensive metabolic panel     EKG 12-lead complete w/read - Clinics        Primary Care Provider Office Phone # Fax #    Rom Helm [phone]       [facility] [address]  [facility] [address]        Goals        Diet    Increase water intake     Notes - Note edited  5/14/2014 11:42 AM by Patricia Khan RN    Have 5-6 glasses (8oz) of fluid a day  Per f/u with spouse today, pt not doing well. Not eating and drinking. Called clinic and advised to take pt to clinic.         Increase water intake        General    Medication 1 (pt-stated)     Notes - Note created  5/15/2014  1:57 PM by Patricia Khan RN    Pt will have an MTM consult         Thank you!     Thank you for choosing [facility]  for your care. Our goal is always to provide you with excellent care. Hearing back from our patients is one way we can continue to improve our services. Please take a few minutes to complete the written survey that you may receive in the mail after your visit with us. Thank you!             Your Updated Medication List - Protect others around you: Learn how to safely use, store and throw away your medicines at www.disposemymeds.org.          This list is accurate as of: 6/13/17  2:48 PM.  Always use your most recent med list.                   Brand Name Dispense Instructions for use    ACE/ARB  NOT PRESCRIBED (INTENTIONAL)      by Other route continuous prn (Hyperkalemia) Reported on 5/19/2017       amLODIPine 5 MG tablet    NORVASC    90 tablet    Take 1 tablet (5 mg) by mouth daily       blood glucose monitoring test strip    STEFFI CONTOUR NEXT    400 each    Use to test blood sugar 4 times daily or as directed.       Glucosamine HCl 750 MG Tabs      Take 750 mg by mouth 2 times daily       * GLUCOSE MANAGEMENT Tabs     60 tablet    Take 2 tab in case on low blood glucose       * GLUCOSE MANAGEMENT Tabs     100 tablet    4 tabs po for low blood sugar below 70, may repeat q 10-15 minutes as needed       hydrochlorothiazide 12.5 MG capsule    MICROZIDE    90 capsule    Take 1 capsule (12.5 mg) by mouth every morning       insulin glargine 100 UNIT/ML injection    LANTUS    3 mL    Inject 32 Units Subcutaneous every morning (before breakfast)       * insulin lispro 100 UNIT/ML injection    HumaLOG KWIKpen    3 mL    Inject 2 Units Subcutaneous 3 times daily (before meals) Take 2 unit before breakfast, 2 units before lunch and 2 units before dinner PLUS sliding scale       * insulin lispro 100 UNIT/ML injection    HumaLOG KWIKpen    30 mL    2 Units + correctional scale Do Not give Correction Insulin if BG < 200  < 200 : no additional insulin  200-225: + 1  226-250: + 2  251-275: + 3  276-300: + 4  301-325: + 5  326- 350: +6 units  > 350: + 7 units  Notify MD if glucose > or = 350 mg/dL after administration of correction dose       insulin pen needle 31G X 8 MM     300 each    B-D UF III short pen needles, use 4 times a day to inject insulin.       insulin syringes (disposable) U-100 0.3 ML Misc     100 each    1 each 2 times daily       BeamExpressCAN FINEPOINT LANCETS Misc     200 strip    Use to test blood sugars 4 times daily or as directed.       loratadine 10 MG tablet    AF LORATADINE    14 tablet    Take 1 tablet (10 mg) by mouth daily       lovastatin 40 MG tablet    MEVACOR    90 tablet    Take 1 tablet  "(40 mg) by mouth At Bedtime       metFORMIN 500 MG tablet    GLUCOPHAGE    360 tablet    TAKE TWO TABLETS BY MOUTH TWICE DAILY WITH MEALS       multivitamin Tabs tablet      Take 1 tablet by mouth daily FOCUS SELECT PREMIUM WITH LUTEIN per eye doctor Reported on 5/19/2017       order for DME     3 Month    All DM testing supplies including test strips, lancets, solution, syringes, needles and/or pen needles for testing 4 times per day.  Brand \"Contour Next\"       polyethylene glycol powder    MIRALAX    510 g    Take 17 g (1 capful) by mouth 2 times daily as needed for constipation       sennosides 8.6 MG tablet    SENOKOT    120 tablet    Take 2 tablets by mouth 2 times daily May reduce to one tablet once or twice a day once stools normalize.       tamsulosin 0.4 MG capsule    FLOMAX    90 capsule    Take 1 capsule (0.4 mg) by mouth daily       VITAMIN D PO      Take 400 Units by mouth daily       * Notice:  This list has 4 medication(s) that are the same as other medications prescribed for you. Read the directions carefully, and ask your doctor or other care provider to review them with you.      "

## 2017-06-14 LAB
ALBUMIN SERPL-MCNC: 3.4 G/DL (ref 3.4–5)
ALP SERPL-CCNC: 71 U/L (ref 40–150)
ALT SERPL W P-5'-P-CCNC: 21 U/L (ref 0–70)
ANION GAP SERPL CALCULATED.3IONS-SCNC: 9 MMOL/L (ref 3–14)
AST SERPL W P-5'-P-CCNC: 15 U/L (ref 0–45)
BILIRUB SERPL-MCNC: 0.3 MG/DL (ref 0.2–1.3)
BUN SERPL-MCNC: 32 MG/DL (ref 7–30)
CALCIUM SERPL-MCNC: 9.3 MG/DL (ref 8.5–10.1)
CHLORIDE SERPL-SCNC: 100 MMOL/L (ref 94–109)
CO2 SERPL-SCNC: 27 MMOL/L (ref 20–32)
CREAT SERPL-MCNC: 1.4 MG/DL (ref 0.66–1.25)
GFR SERPL CREATININE-BSD FRML MDRD: 49 ML/MIN/1.7M2
GLUCOSE SERPL-MCNC: 321 MG/DL (ref 70–99)
POTASSIUM SERPL-SCNC: 4.9 MMOL/L (ref 3.4–5.3)
PROT SERPL-MCNC: 7.8 G/DL (ref 6.8–8.8)
SODIUM SERPL-SCNC: 136 MMOL/L (ref 133–144)

## 2017-07-10 DIAGNOSIS — N20.0 CALCULUS OF KIDNEY: ICD-10-CM

## 2017-07-10 DIAGNOSIS — E78.5 HYPERLIPIDEMIA LDL GOAL <100: ICD-10-CM

## 2017-07-10 NOTE — TELEPHONE ENCOUNTER
Tamsulosin         Last Written Prescription Date: 1/16/17  Last Fill Quantity: 90, # refills: 1    Last Office Visit with [facility], [facility] or [facility] prescribing provider:  6/13/17   Future Office Visit:    Next 5 appointments (look out 90 days)     Aug 23, 2017  2:00 PM CDT   SHORT with Rom Helm MD   [facility]    [address]  [facility] [id number] [phone]            Aug 24, 2017  2:00 PM CDT   Return Visit with Lori Damian MD   [facility]    [address]  [facility] [id number] [phone]                  BP Readings from Last 3 Encounters:   06/13/17 100/58   05/24/17 98/62   05/19/17 104/60

## 2017-07-11 RX ORDER — TAMSULOSIN HYDROCHLORIDE 0.4 MG/1
CAPSULE ORAL
Qty: 90 CAPSULE | Refills: 1 | Status: ON HOLD | OUTPATIENT
Start: 2017-07-11 | End: 2018-01-11

## 2017-07-14 DIAGNOSIS — I10 HYPERTENSION GOAL BP (BLOOD PRESSURE) < 140/90: ICD-10-CM

## 2017-07-16 DIAGNOSIS — E11.42 TYPE 2 DIABETES MELLITUS WITH DIABETIC POLYNEUROPATHY (H): ICD-10-CM

## 2017-07-17 RX ORDER — AMLODIPINE BESYLATE 5 MG/1
TABLET ORAL
Qty: 90 TABLET | Refills: 0 | Status: SHIPPED | OUTPATIENT
Start: 2017-07-17 | End: 2017-10-16

## 2017-07-17 NOTE — TELEPHONE ENCOUNTER
Test strips       Last Written Prescription Date: 9/29/16  Last Fill Quantity: 400,  # refills: 0   Last Office Visit with G, P or [facility] prescribing provider: 6/13/17                                         Next 5 appointments (look out 90 days)     Aug 23, 2017  2:00 PM CDT   SHORT with Rom Helm MD   [facility]    [address]  [facility] [address] [phone]            Aug 24, 2017  2:00 PM CDT   Return Visit with Lori Damian MD   [facility] ([facility])    [address] [facility] [address]  [facility] [address] [phone]

## 2017-07-17 NOTE — TELEPHONE ENCOUNTER
Amlodipine     Last Written Prescription Date: 1/16/17  Last Fill Quantity: 90, # refills: 1    Last Office Visit with G, P or [facility] prescribing provider:  6/13/17   Future Office Visit:    Next 5 appointments (look out 90 days)     Aug 23, 2017  2:00 PM CDT   SHORT with Rom Helm MD   [facility] ([facility])    [address]  [facility] [id number] [phone]            Aug 24, 2017  2:00 PM CDT   Return Visit with Lori Dmaian MD   [facility] ([facility])    [address]  [facility] [id number] [phone]                    BP Readings from Last 3 Encounters:   06/13/17 100/58   05/24/17 98/62   05/19/17 104/60

## 2017-08-08 DIAGNOSIS — E11.42 TYPE 2 DIABETES MELLITUS WITH DIABETIC POLYNEUROPATHY (H): ICD-10-CM

## 2017-08-08 NOTE — TELEPHONE ENCOUNTER
Metformin         Last Written Prescription Date: 5/9/17  Last Fill Quantity: 360, # refills: 0  Last Office Visit with [facility], P or  [facility] prescribing provider:  6/13/17   Next 5 appointments (look out 90 days)     Aug 23, 2017  2:00 PM CDT   SHORT with Rom Helm MD   [facility] ([facility])    [address] [place]  [facility] [id number] [phone]            Aug 24, 2017  2:00 PM CDT   Return Visit with Lori Damian MD   [facility] ([facility])    [address]  [facility] [address] [phone]                   BP Readings from Last 3 Encounters:   06/13/17 100/58   05/24/17 98/62   05/19/17 104/60     Lab Results   Component Value Date    MICROL 208 07/22/2016     Lab Results   Component Value Date    UMALCR 390.98 07/22/2016     Creatinine   Date Value Ref Range Status   06/13/2017 1.40 (H) 0.66 - 1.25 mg/dL Final   ]  GFR Estimate   Date Value Ref Range Status   06/13/2017 49 (L) >60 mL/min/1.7m2 Final     Comment:     Non  GFR Calc   05/12/2017 50 (L) >60 mL/min/1.7m2 Final     Comment:     Non  GFR Calc   03/02/2017 63 >60 mL/min/1.7m2 Final     Comment:     Non  GFR Calc     GFR Estimate If Black   Date Value Ref Range Status   06/13/2017 59 (L) >60 mL/min/1.7m2 Final     Comment:      GFR Calc   05/12/2017 60 (L) >60 mL/min/1.7m2 Final     Comment:      GFR Calc   03/02/2017 77 >60 mL/min/1.7m2 Final     Comment:      GFR Calc     Lab Results   Component Value Date    CHOL 129 05/12/2017     Lab Results   Component Value Date    HDL 33 05/12/2017     Lab Results   Component Value Date    LDL 79 05/12/2017     Lab Results   Component Value Date    TRIG 83 05/12/2017     Lab Results   Component Value Date    CHOLHDLRATIO 4.3 09/25/2015     Lab Results   Component Value Date    AST 15 06/13/2017     Lab  Results   Component Value Date    ALT 21 06/13/2017     Lab Results   Component Value Date    A1C 8.3 05/12/2017    A1C 7.9 02/10/2017    A1C 8.9 12/03/2016    A1C 8.3 10/28/2016    A1C 8.6 07/22/2016     Potassium   Date Value Ref Range Status   06/13/2017 4.9 3.4 - 5.3 mmol/L Final       Labs showing if normal/abnormal  Lab Results   Component Value Date    UCRR 53 07/22/2016    MICROL 208 07/22/2016     Lab Results   Component Value Date    CHOL 129 05/12/2017    TRIG 83 05/12/2017    HDL 33 (L) 05/12/2017    LDL 79 05/12/2017    VLDL 23 09/25/2015    CHOLHDLRATIO 4.3 09/25/2015     Lab Results   Component Value Date    A1C 8.3 (H) 05/12/2017    A1C 7.9 (H) 02/10/2017    A1C 8.9 (H) 12/03/2016

## 2017-08-12 ENCOUNTER — HEALTH MAINTENANCE LETTER (OUTPATIENT)
Age: 77
End: 2017-08-12

## 2017-08-16 DIAGNOSIS — E11.42 TYPE 2 DIABETES MELLITUS WITH DIABETIC POLYNEUROPATHY, WITH LONG-TERM CURRENT USE OF INSULIN (H): ICD-10-CM

## 2017-08-16 DIAGNOSIS — Z79.4 TYPE 2 DIABETES MELLITUS WITH DIABETIC POLYNEUROPATHY, WITH LONG-TERM CURRENT USE OF INSULIN (H): ICD-10-CM

## 2017-08-16 DIAGNOSIS — E78.5 HYPERLIPIDEMIA LDL GOAL <100: ICD-10-CM

## 2017-08-16 LAB
ALBUMIN SERPL-MCNC: 3.4 G/DL (ref 3.4–5)
ALP SERPL-CCNC: 80 U/L (ref 40–150)
ALT SERPL W P-5'-P-CCNC: 27 U/L (ref 0–70)
ANION GAP SERPL CALCULATED.3IONS-SCNC: 9 MMOL/L (ref 3–14)
AST SERPL W P-5'-P-CCNC: 24 U/L (ref 0–45)
BILIRUB SERPL-MCNC: 0.4 MG/DL (ref 0.2–1.3)
BUN SERPL-MCNC: 30 MG/DL (ref 7–30)
CALCIUM SERPL-MCNC: 9.5 MG/DL (ref 8.5–10.1)
CHLORIDE SERPL-SCNC: 99 MMOL/L (ref 94–109)
CHOLEST SERPL-MCNC: 141 MG/DL
CO2 SERPL-SCNC: 30 MMOL/L (ref 20–32)
CREAT SERPL-MCNC: 1.27 MG/DL (ref 0.66–1.25)
GFR SERPL CREATININE-BSD FRML MDRD: 55 ML/MIN/1.7M2
GLUCOSE SERPL-MCNC: 225 MG/DL (ref 70–99)
HBA1C MFR BLD: 7.8 % (ref 4.3–6)
HDLC SERPL-MCNC: 37 MG/DL
LDLC SERPL CALC-MCNC: 85 MG/DL
NONHDLC SERPL-MCNC: 104 MG/DL
POTASSIUM SERPL-SCNC: 4 MMOL/L (ref 3.4–5.3)
PROT SERPL-MCNC: 8.1 G/DL (ref 6.8–8.8)
SODIUM SERPL-SCNC: 138 MMOL/L (ref 133–144)
TRIGL SERPL-MCNC: 97 MG/DL

## 2017-08-16 PROCEDURE — 36415 COLL VENOUS BLD VENIPUNCTURE: CPT | Performed by: INTERNAL MEDICINE

## 2017-08-16 PROCEDURE — 83036 HEMOGLOBIN GLYCOSYLATED A1C: CPT | Performed by: INTERNAL MEDICINE

## 2017-08-16 PROCEDURE — 80053 COMPREHEN METABOLIC PANEL: CPT | Performed by: INTERNAL MEDICINE

## 2017-08-16 PROCEDURE — 80061 LIPID PANEL: CPT | Performed by: INTERNAL MEDICINE

## 2017-08-23 ENCOUNTER — MEDICAL CORRESPONDENCE (OUTPATIENT)
Dept: HEALTH INFORMATION MANAGEMENT | Facility: CLINIC | Age: 77
End: 2017-08-23

## 2017-08-23 ENCOUNTER — OFFICE VISIT (OUTPATIENT)
Dept: INTERNAL MEDICINE | Facility: CLINIC | Age: 77
End: 2017-08-23
Payer: COMMERCIAL

## 2017-08-23 VITALS
TEMPERATURE: 98.3 F | HEART RATE: 74 BPM | OXYGEN SATURATION: 98 % | WEIGHT: 155.3 LBS | SYSTOLIC BLOOD PRESSURE: 108 MMHG | DIASTOLIC BLOOD PRESSURE: 62 MMHG | BODY MASS INDEX: 24.32 KG/M2

## 2017-08-23 DIAGNOSIS — Z79.4 TYPE 2 DIABETES MELLITUS WITH DIABETIC POLYNEUROPATHY, WITH LONG-TERM CURRENT USE OF INSULIN (H): Primary | ICD-10-CM

## 2017-08-23 DIAGNOSIS — E78.5 HYPERLIPIDEMIA LDL GOAL <100: ICD-10-CM

## 2017-08-23 DIAGNOSIS — I10 HYPERTENSION GOAL BP (BLOOD PRESSURE) < 140/90: ICD-10-CM

## 2017-08-23 DIAGNOSIS — E11.42 TYPE 2 DIABETES MELLITUS WITH DIABETIC POLYNEUROPATHY, WITH LONG-TERM CURRENT USE OF INSULIN (H): Primary | ICD-10-CM

## 2017-08-23 PROCEDURE — 99214 OFFICE O/P EST MOD 30 MIN: CPT | Performed by: INTERNAL MEDICINE

## 2017-08-23 PROCEDURE — 99207 C PAF COMPLETED  NO CHARGE: CPT | Performed by: INTERNAL MEDICINE

## 2017-08-23 NOTE — NURSING NOTE
"Chief Complaint   Patient presents with     Hypertension     Diabetes     Lipids       Initial /62  Pulse 74  Temp 98.3  F (36.8  C) (Oral)  Wt 155 lb 4.8 oz (70.4 kg)  SpO2 98%  BMI 24.32 kg/m2 Estimated body mass index is 24.32 kg/(m^2) as calculated from the following:    Height as of 5/24/17: 5' 7\" (1.702 m).    Weight as of this encounter: 155 lb 4.8 oz (70.4 kg).  Medication Reconciliation: complete     Radha Leal CMA      "

## 2017-08-23 NOTE — PATIENT INSTRUCTIONS
A1c looks good! Recommend no changes in insulin or other medications at this time.     Blood pressure and LDL-Cholesterol also look fine.     See me back in January 2018, with fasting labs a few days in advance.

## 2017-08-23 NOTE — PROGRESS NOTES
SUBJECTIVE:   Pete Sheldon is a 77 year old male who presents to clinic today for the following health issues:  diabetes mellitus, hyperlipidemia, hypertension.     Diabetes Follow-up    Patient is checking blood sugars: four times daily.    Blood sugar testing frequency justification: Hypoglycemic episodes.  Results are as follows:       am - 141       lunchtime - 111       suppertime - 93       bedtime - 223    Diabetic concerns: None     Symptoms of hypoglycemia (low blood sugar): none     Paresthesias (numbness or burning in feet) or sores: Yes      Date of last diabetic eye exam: 2017    Hyperlipidemia Follow-Up      Rate your low fat/cholesterol diet?: good    Taking statin?  No    Other lipid medications/supplements?:  none    Hypertension Follow-up      Outpatient blood pressures are not being checked.  Low Salt Diet: no added salt    Amount of exercise or physical activity: None    Problems taking medications regularly: No    Medication side effects: none  Diet: low salt    Diabetes  Patient has been taking and recording his at home blood glucose levels. He is no longer having frequent high blood glucose levels and are usually 250 mg/dL or lower. He now only occasionally has low blood glucose levels. Overall, glucose levels have been fine.  He is congratulated on the improvement in his recent A1c, to 7.8.   Patient had an eye exam performed 4/13/2017 with normal results.     Patient has an appointment with Dr. Damian tomorrow.     Past/recent records reviewed and discussed for:  -He had cataract surgery in 6/2017  -He will see Dr. Holly Macias from Ophthalmology 8/31/2017    Problem list and histories reviewed & adjusted, as indicated.  Additional history: as documented    Reviewed and updated as needed this visit by clinical staffTobacco  Allergies  Meds  Problems  Med Hx  Surg Hx  Fam Hx  Soc Hx        Reviewed and updated as needed this visit by Provider         ROS:  No dyspnea or cough.  No chest discomfort, dizziness or palpitations. No diarrhea, abdominal pain or rectal bleeding.   No acute problems with vision or speech, lateralizing weakness or paresthesias.    ROS: as above or negative for Respiratory, CV, GI, endocrine, neuro systems.    This document serves as a record of the services and decisions personally performed and made by Rom Helm MD. It was created on his behalf by Doris Barajas, a trained medical scribe. The creation of this document is based on the provider's statements to the medical scribe.  Doris Barajas August 23, 2017 2:21 PM     OBJECTIVE:     /62  Pulse 74  Temp 98.3  F (36.8  C) (Oral)  Wt 70.4 kg (155 lb 4.8 oz)  SpO2 98%  BMI 24.32 kg/m2  Body mass index is 24.32 kg/(m^2).    GENERAL: healthy, alert and no distress  RESP: lungs clear to auscultation - no rales, rhonchi or wheezes  CV: regular rate and rhythm, normal S1 S2, no S3 or S4, no murmur, click or rub, no peripheral edema and peripheral pulses strong  MS: no gross musculoskeletal defects noted, no edema  SKIN: no suspicious lesions or rashes  NEURO: Normal strength and tone, mentation intact and speech normal  PSYCH: mentation appears normal, affect normal/bright    ASSESSMENT/PLAN:   (E11.42,  Z79.4) Type 2 diabetes mellitus with diabetic polyneuropathy, with long-term current use of insulin (H)  (primary encounter diagnosis)  Comment: A1c at target. Continue current measures.    (E78.5) Hyperlipidemia LDL goal <100  Comment: LDL at target. Continue current meds.    (I10) Hypertension goal BP (blood pressure) < 140/90  Comment: BP at target. Continue current meds.    FUTURE APPOINTMENTS:       - Follow-up visit in January 2018    Patient Instructions   A1c looks good! Recommend no changes in insulin or other medications at this time.     Blood pressure and LDL-Cholesterol also look fine.     See me back in January 2018, with fasting labs a few days in advance.      The information in this  document, created by the medical scribe for me, accurately reflects the services I personally performed and the decisions made by me. I have reviewed and approved this document for accuracy prior to leaving the patient care area.  August 23, 2017 2:21 PM    Rom Helm MD  [facility]

## 2017-08-23 NOTE — MR AVS SNAPSHOT
After Visit Summary   8/23/2017    Pete Sheldon    MRN: [id number]           Patient Information     Date Of Birth          1940        Visit Information        Provider Department      8/23/2017 2:00 PM Rom Helm MD [facility]        Today's Diagnoses     Type 2 diabetes mellitus with diabetic polyneuropathy, with long-term current use of insulin (H)    -  1    Hyperlipidemia LDL goal <100        Hypertension goal BP (blood pressure) < 140/90          Care Instructions    A1c looks good! Recommend no changes in insulin or other medications at this time.     Blood pressure and LDL-Cholesterol also look fine.     See me back in January 2018, with fasting labs a few days in advance.           Follow-ups after your visit        Your next 10 appointments already scheduled     Aug 24, 2017  2:00 PM CDT   [facility] Visit with Lori Damian MD   [facility]    [address] [facility] [address]  [facility] [address] [phone]              Who to contact     If you have questions or need follow up information about today's clinic visit or your schedule please contact [facility] directly at [phone].  Normal or non-critical lab and imaging results will be communicated to you by MyChart, letter or phone within 4 business days after the clinic has received the results. If you do not hear from us within 7 days, please contact the clinic through [employer] or phone. If you have a critical or abnormal lab result, we will notify you by phone as soon as possible.  Submit refill requests through [employer] or call your pharmacy and they will forward the refill request to us. Please allow 3 business days for your refill to be completed.          Additional Information About Your Visit        MyChart Information     [employer] lets you send messages to your doctor, view your test results, renew your prescriptions, schedule  "appointments and more. To sign up, go to [email or web address] . Click on \"Log in\" on the left side of the screen, which will take you to the Welcome page. Then click on \"Sign up Now\" on the right side of the page.     You will be asked to enter the access code listed below, as well as some personal information. Please follow the directions to create your username and password.     Your access code is: [email or web address]  Expires: 2017  2:35 PM     Your access code will  in 90 days. If you need help or a new code, please call your [place] clinic or [phone].        Care EveryWhere ID     This is your Care EveryWhere ID. This could be used by other organizations to access your [place] medical records  [id number]        Your Vitals Were     Pulse Temperature Pulse Oximetry BMI (Body Mass Index)          74 98.3  F (36.8  C) (Oral) 98% 24.32 kg/m2         Blood Pressure from Last 3 Encounters:   17 108/62   17 100/58   17 98/62    Weight from Last 3 Encounters:   17 155 lb 4.8 oz (70.4 kg)   17 161 lb (73 kg)   17 163 lb (73.9 kg)              We Performed the Following     PAF COMPLETED          Today's Medication Changes          These changes are accurate as of: 17  2:35 PM.  If you have any questions, ask your nurse or doctor.               Stop taking these medicines if you haven't already. Please contact your care team if you have questions.     polyethylene glycol powder   Commonly known as:  MIRALAX   Stopped by:  Rom Helm MD           sennosides 8.6 MG tablet   Commonly known as:  SENOKOT   Stopped by:  Rom Helm MD                    Primary Care Provider Office Phone # Fax #    Rom Helm [phone]       [address]  [facility] [address]        Goals        Diet    Increase water intake     Notes - Note edited  2014 11:42 AM by Patricia Khan RN    Have 5-6 glasses (8oz) of fluid a day  Per " f/u with spouse today, pt not doing well. Not eating and drinking. Called clinic and advised to take pt to clinic.         Increase water intake        General    Medication 1 (pt-stated)     Notes - Note created  5/15/2014  1:57 PM by Patricia Khan, JULIA    Pt will have an MTM consult         Equal Access to Services     CAR SOLO : Hadii aad ku hadasho Soomaali, waaxda luqadaha, qaybta kaalmada adeegyada, waxcatalina laniermarlenimercedes medrano. So [facility] [phone].    ATENCIÓN: Si habla español, tiene a moise disposición servicios gratuitos de asistencia lingüística. Llame al [phone].    We comply with applicable federal civil rights laws and [place] laws. We do not discriminate on the basis of race, color, national origin, age, disability sex, sexual orientation or gender identity.            Thank you!     Thank you for choosing [facility]  for your care. Our goal is always to provide you with excellent care. Hearing back from our patients is one way we can continue to improve our services. Please take a few minutes to complete the written survey that you may receive in the mail after your visit with us. Thank you!             Your Updated Medication List - Protect others around you: Learn how to safely use, store and throw away your medicines at www.disposemymeds.org.          This list is accurate as of: 8/23/17  2:35 PM.  Always use your most recent med list.                   Brand Name Dispense Instructions for use Diagnosis    ACE/ARB NOT PRESCRIBED (INTENTIONAL)      by Other route continuous prn (Hyperkalemia) Reported on 5/19/2017    Type 2 diabetes, HbA1C goal < 8% (H)       amLODIPine 5 MG tablet    NORVASC    90 tablet    TAKE ONE TABLET BY MOUTH ONCE DAILY    Hypertension goal BP (blood pressure) < 140/90       * blood glucose monitoring test strip    STEFFI CONTOUR NEXT    400 each    Use to test blood sugar 4 times daily or as directed.    Type 2 diabetes mellitus  with diabetic polyneuropathy (H)       * STEFFI CONTOUR NEXT test strip   Generic drug:  blood glucose monitoring     200 strip    USE TO TEST BLOOD SUGAR 4 TIMES DAILY OR AS DIRECTED (E11.42)    Type 2 diabetes mellitus with diabetic polyneuropathy (H)       Glucosamine HCl 750 MG Tabs      Take 750 mg by mouth 2 times daily    Arthritis       * GLUCOSE MANAGEMENT Tabs     60 tablet    Take 2 tab in case on low blood glucose    Hyperglycemia       * GLUCOSE MANAGEMENT Tabs     100 tablet    4 tabs po for low blood sugar below 70, may repeat q 10-15 minutes as needed    Type 2 diabetes mellitus with diabetic polyneuropathy, with long-term current use of insulin (H)       hydrochlorothiazide 12.5 MG capsule    MICROZIDE    90 capsule    Take 1 capsule (12.5 mg) by mouth every morning    Essential hypertension with goal blood pressure less than 140/90       insulin glargine 100 UNIT/ML injection    LANTUS    3 mL    Inject 32 Units Subcutaneous every morning (before breakfast)    Diabetic ketoacidosis without coma associated with other specified diabetes mellitus (H)       * insulin lispro 100 UNIT/ML injection    HumaLOG KWIKpen    3 mL    Inject 2 Units Subcutaneous 3 times daily (before meals) Take 2 unit before breakfast, 2 units before lunch and 2 units before dinner PLUS sliding scale    Hyperglycemia       * insulin lispro 100 UNIT/ML injection    HumaLOG KWIKpen    30 mL    2 Units + correctional scale Do Not give Correction Insulin if BG < 200  < 200 : no additional insulin  200-225: + 1  226-250: + 2  251-275: + 3  276-300: + 4  301-325: + 5  326- 350: +6 units  > 350: + 7 units  Notify MD if glucose > or = 350 mg/dL after administration of correction dose    Type 2 diabetes mellitus with diabetic polyneuropathy, with long-term current use of insulin (H)       insulin pen needle 31G X 8 MM     300 each    B-D UF III short pen needles, use 4 times a day to inject insulin.    Type 2 diabetes, HbA1C goal < 8%  "(H)       insulin syringes (disposable) U-100 0.3 ML Misc     100 each    1 each 2 times daily    Type II or unspecified type diabetes mellitus without mention of complication, not stated as uncontrolled       LIFESCAN FINEPOINT LANCETS Misc     200 strip    Use to test blood sugars 4 times daily or as directed.    Type 2 diabetes, HbA1C goal < 8% (H)       loratadine 10 MG tablet    AF LORATADINE    14 tablet    Take 1 tablet (10 mg) by mouth daily    Syncope       lovastatin 40 MG tablet    MEVACOR    90 tablet    Take 1 tablet (40 mg) by mouth At Bedtime    Hyperlipidemia LDL goal <100       metFORMIN 500 MG tablet    GLUCOPHAGE    360 tablet    TAKE TWO TABLETS BY MOUTH TWICE DAILY WITH  MEALS    Type 2 diabetes mellitus with diabetic polyneuropathy (H)       multivitamin Tabs tablet      Take 1 tablet by mouth daily FOCUS SELECT PREMIUM WITH LUTEIN per eye doctor Reported on 5/19/2017        order for DME     3 Month    All DM testing supplies including test strips, lancets, solution, syringes, needles and/or pen needles for testing 4 times per day.  Brand \"Contour Next\"    Type 2 diabetes mellitus with diabetic polyneuropathy (H)       tamsulosin 0.4 MG capsule    FLOMAX    90 capsule    TAKE ONE CAPSULE BY MOUTH ONCE DAILY    Hyperlipidemia LDL goal <100, Calculus of kidney       VITAMIN D PO      Take 400 Units by mouth daily        * Notice:  This list has 6 medication(s) that are the same as other medications prescribed for you. Read the directions carefully, and ask your doctor or other care provider to review them with you.      "

## 2017-08-24 ENCOUNTER — OFFICE VISIT (OUTPATIENT)
Dept: ENDOCRINOLOGY | Facility: CLINIC | Age: 77
End: 2017-08-24
Payer: COMMERCIAL

## 2017-08-24 VITALS
HEART RATE: 92 BPM | TEMPERATURE: 97.9 F | OXYGEN SATURATION: 95 % | BODY MASS INDEX: 24.56 KG/M2 | WEIGHT: 156.5 LBS | SYSTOLIC BLOOD PRESSURE: 98 MMHG | DIASTOLIC BLOOD PRESSURE: 64 MMHG | HEIGHT: 67 IN

## 2017-08-24 DIAGNOSIS — Z79.4 TYPE 2 DIABETES MELLITUS WITH DIABETIC POLYNEUROPATHY, WITH LONG-TERM CURRENT USE OF INSULIN (H): Primary | ICD-10-CM

## 2017-08-24 DIAGNOSIS — E78.5 HYPERLIPIDEMIA LDL GOAL <100: ICD-10-CM

## 2017-08-24 DIAGNOSIS — E11.42 DIABETIC POLYNEUROPATHY ASSOCIATED WITH TYPE 2 DIABETES MELLITUS (H): ICD-10-CM

## 2017-08-24 DIAGNOSIS — I10 HYPERTENSION GOAL BP (BLOOD PRESSURE) < 140/90: ICD-10-CM

## 2017-08-24 DIAGNOSIS — E11.42 TYPE 2 DIABETES MELLITUS WITH DIABETIC POLYNEUROPATHY, WITH LONG-TERM CURRENT USE OF INSULIN (H): Primary | ICD-10-CM

## 2017-08-24 DIAGNOSIS — N52.9 IMPOTENCE OF ORGANIC ORIGIN: ICD-10-CM

## 2017-08-24 PROCEDURE — 99214 OFFICE O/P EST MOD 30 MIN: CPT | Performed by: INTERNAL MEDICINE

## 2017-08-24 NOTE — PROGRESS NOTES
ENDOCRINOLOGY CLINIC NOTE:  Name: Pete Sheldon  Seen for f/u of Diabetes.  He is accompanied by his wife.  HPI:  Pete Sheldon is a 75 year old male who presents for the evaluation/management of:  Was in the hospital 12/2/16-12/5/2016 for diabetic ketoacidosis and acute kidney injury.  Office note he is having bedtime snack almost every days.  Typical bedtime snack is whole bagel.  He reports that he feels does not eat a bagel blood sugar drops too at night.  Has variable blood sugar pattern.  I doubt if he has clear understanding of insulin dosing and the carbohydrates.  Also trouble with memory.  I discussed continuous glucose monitor with him in past but he does not want sensors.  Low C-peptide.    BG appears improved and stable as compared to last visit.    1. Type 2 DM:  Orginally diagnosed in 1995.  Current Regimen: Metformin 1000 mg twice a day, Lantus 32 units in morning and Humalog 2/2/2 with breakfast/lunch/dinner respectively.  In addition to that he is on correctional scale 1 unit- 25 >140 but he is not using it.    Does not feel comfortable with carbohydrate counting.    BS checks: Three times a day    Average Meter Download: 143.  In general bloods sugar R appearing improved and stable as compared to last clinic visit.  Still there is some variati in blood sugar but most of the time blood sugars are in acceptable range.     Exercise: Minimal  Episodes of hypoglycemia (low blood sugar):  Yes. improving  Fixed meal pattern: NO. Carb content varies. In general diet is high in carbs.  Patient counting carbs: No    DM Complications:   Nephropathy: Yes: Microalbuminuria present  Retinopathy: Yes: History of laser treatment in past  Neuropathy: Yes.  Microalbuminuria: Yes: Microalbuminuria present.  Not on ACE secondary to history of hyperkalemia.  MICROL      208   7/22/2016  MICROALBUMIN   390.98   7/22/2016    CAD/PAD: No  Gastroparesis: No  Hypoglycemia unawareness: Yes: Previous notes mentioning  history of hypoglycemia unawareness.    2. Hypertension: Blood Pressure today:   BP Readings from Last 3 Encounters:   17 98/64   17 108/62   17 100/58     Blood pressure medications include hydrochlorothiazide 12.5 mg, amlodipine 5 mg.      3. Hyperlipidemia: Takes lovastatin 40 mg for lipid control.  Denies muscle aches of pains.        PMH/PSH:  Past Medical History:   Diagnosis Date     Calculus of ureter      Hypertension      Microalbuminuria 2/15/2016     Other and unspecified hyperlipidemia      Type 2 diabetes, HbA1C goal < 8% (H) 1995     Past Surgical History:   Procedure Laterality Date     C NONSPECIFIC PROCEDURE      Nasal septoplasty     HC COLONOSCOPY THRU STOMA, DIAGNOSTIC  2007    Normal     HC FLEX SIGMOIDOSCOPY W/WO MIGUEL SPEC BY BRUSH/WASH  1999    Normal to 60 cm     HC REPAIR INCISIONAL HERNIA,REDUCIBLE  1999    Hernia Repair, Incisional, Unilateral (right)     HC REPAIR INCISIONAL HERNIA,REDUCIBLE      Hernia Repair, Incisional, Unilateral (left, with mesh placement)     Family Hx:  Family History   Problem Relation Age of Onset     CEREBROVASCULAR DISEASE Mother       age 95     HEART DISEASE Mother      age 89,  age 95     CEREBROVASCULAR DISEASE Father       age 91, stroke two years previous     Cancer - colorectal Brother      Half-brother     CANCER Sister      Half-sister (?type)     CANCER Sister      Half-sister (?type)     HEART DISEASE Brother      Neurologic Disorder Daughter      Multiple Sclerosis     Psychotic Disorder Son      Schizophrenia       Social Hx:  Social History     Social History     Marital status:      Spouse name: N/A     Number of children: 2     Years of education: N/A     Occupational History     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          MEDICATIONS:  has a current medication list which includes the following prescription(s): metformin, amlodipine, diogo contour next, tamsulosin, insulin lispro, lovastatin, insulin glargine, insulin lispro, glucose management, glucose management, multivitamin, hydrochlorothiazide, blood glucose monitoring, insulin pen needle, order for dme, lifescan finepoint lancets, glucosamine hcl, loratadine, insulin syringes (disposable), cholecalciferol, and ACE/ARB NOT PRESCRIBED, INTENTIONAL,.    ROS     ROS: 10 point ROS neg other than the symptoms noted above in the HPI.    Physical Exam   VS: BP 98/64 (BP Location: Right arm, Patient Position: Chair, Cuff Size: Adult Large)  Pulse 92  Temp 97.9  F (36.6  C) (Oral)  Ht 1.702 m (5' 7\")  Wt 71 kg (156 lb 8 oz)  SpO2 95%  BMI 24.51 kg/m2  GENERAL: AXOX3, NAD, well dressed,appears stated age.  HEENT: No exopthalmous, no proptosis, EOMI, no lig lag, no retraction  CV: RRR  LUNGS: CTAB  ABDOMEN: +BS  NEUROLOGY: CN grossly intact, no tremors  PSYCH: normal affect and mood      LABS:  Component      Latest Ref Rng 4/15/2016   C-Peptide      0.9 - 6.9 ng/mL <0.1 (L)   Glutamic Acid Decarboxylase Antibody       <5.0 . . .     A1c:  Lab Results   Component Value Date    A1C 7.8 08/16/2017    A1C 8.3 05/12/2017    A1C 7.9 02/10/2017    A1C 8.9 12/03/2016    A1C 8.3 10/28/2016       Creatinine:  Creatinine   Date Value Ref Range Status   08/16/2017 1.27 (H) 0.66 - 1.25 mg/dL Final     Urine Micro:  Lab Results   Component Value Date    UMALCR 390.98 07/22/2016        LFTs/Lipids:  Recent Labs   Lab Test  08/16/17   0800  05/12/17   0906   09/25/15   0844  06/19/15   0815   CHOL  141  129   < >  134  135   HDL  37*  33*   < >  31*  36*   LDL  85  79   < >  80  83   TRIG  97  83   < >  117  " 78   CHOLHDLRATIO   --    --    --   4.3  3.8    < > = values in this interval not displayed.     TFTs:  TSH   Date Value Ref Range Status   07/22/2016 5.58 (H) 0.40 - 4.00 mU/L Final       All pertinent notes, labs, and images personally reviewed by me.     A/P  Mr.Victor ANURAG Sheldon is a 75 year old here for the evaluation/management of diabetes:    1. DM2 - (low C-peptide, negative FAVIOLA): Under Poor control.  A1c 8.3%.  Diabetes complicated by microalbuminuria, neuropathy and retinopathy.   Concerns for hypoglycemia unawareness.  Does not feel comfortable with carbohydrate counting.  Blood sugars are improving and mostly in acceptable range. Hypoglycemic episodes are also less now and improving.   Carbohydrate content is with meals and days.  In general diet is high in carbohydrates.  Continue Metformin 1000 mg twice a day  Lantus: continue 32 units in a.m. only   HUmalog: Continue 2 units with each meal.  Do not use correctional scale ( he is not using it)  He will benefit from personal continuous glucose monitor but he does not want since that and is resistant to that idea.   Need to have consistent bedtime snack and consistent carbs with each meal.    Recommend checking blood sugars before meals and at bedtime.    If Blood glucose are low more often-> 2-3 times/week- give us a call.  The patient is advised to Make better food choices: reduce carbs, Reduce portion size, weight loss and exercise 3-4 times a week.  Discussed hypoglycemia signs and symptoms as well as management in detail.    2. Hypertension - Under Fair control.  Continue hydrochlorothiazide 12.5 mg, amlodipine 5 mg.    3. Hyperlipidemia - Under Good control.  Continue lovastatin 40 mg. LDL 91, HDL 34.    4. Prevention  Flu Shot-September 2015  Pneumovax- March 2012  Opthalmology-Yes. June 2017    ASA-Yes.  81 mg.  Smoking- No    5. Microalbuminuria:  MICROL      208   7/22/2016  MICROALBUMIN   390.98   7/22/2016  Not on ACE 2/2 to h/o  hyperkalemia    All questions were answered.  The patient indicates understanding of the above issues and agrees with the plan set forth.     More than 50% of face to face time spent with Mr. Sheldon on counseling / coordinating his care.  Total appointment times was 30 minutes.      Follow-up:  Follow up 3 months    Lori Damian M.D  Endocrinology  [facility]    Disclaimer: This note consists of symbols derived from keyboarding, dictation and/or voice recognition software. As a result, there may be errors in the script that have gone undetected. Please consider this when interpreting information found in this chart.

## 2017-08-24 NOTE — PATIENT INSTRUCTIONS
[facility] & [place] locations   Dr Damian, Endocrinology Department      [facility]   [address]  [place] [address]  Appointment [phone]  Fax: [phone]  [place]: Monday and Tuesday         [facility]   [address]  [place] [address]  Appointment [phone]  Fax: [phone]  [place]: Wednesday and Thursday            Please arrive 30 minutes before your scheduled appointment.   First go to the main floor lab suit  for previsit A1c lab appointment and then come upstairs and get checked in with  for clinic visit.  This will allow for your blood glucose meter/insulin pump to be downloaded and glycosylated hemoglobin (A1c) to be obtained prior to your appointment.    Continue current regimen  Labs and follow up in 3-4 months    Recommend checking blood sugars before meals and at bedtime.    If Blood glucose are low more often-> 2-3 times/week- give us a call.  The patient is advised to Make better food choices: reduce carbs, Reduce portion size, weight loss and exercise 3-4 times a week.  Discussed hypoglycemia signs and symptoms as well as management in detail.

## 2017-08-24 NOTE — NURSING NOTE
"Chief Complaint   Patient presents with     RECHECK     dm2 LOV 17       Initial BP 98/64 (BP Location: Right arm, Patient Position: Chair, Cuff Size: Adult Large)  Pulse 92  Temp 97.9  F (36.6  C) (Oral)  Ht 1.702 m (5' 7\")  Wt 71 kg (156 lb 8 oz)  SpO2 95%  BMI 24.51 kg/m2 Estimated body mass index is 24.51 kg/(m^2) as calculated from the following:    Height as of this encounter: 1.702 m (5' 7\").    Weight as of this encounter: 71 kg (156 lb 8 oz).  Medication Reconciliation: complete     ENDOCRINOLOGY INTAKE FORM    Patient Name:  Pete Sheldon  :  1940    Is patient Diabetic?   Yes: type 2   Does patient have non-diabetic or other endocrine issues?  No    Vitals: BP 98/64 (BP Location: Right arm, Patient Position: Chair, Cuff Size: Adult Large)  Pulse 92  Temp 97.9  F (36.6  C) (Oral)  Ht 1.702 m (5' 7\")  Wt 71 kg (156 lb 8 oz)  SpO2 95%  BMI 24.51 kg/m2  BMI= Body mass index is 24.51 kg/(m^2).    Flu vaccine:  No  Pneumonia vaccine:  Yes: 3/16/12    Smoking and Alcohol use:  Social History   Substance Use Topics     Smoking status: Former Smoker     Quit date: 3/11/1953     Smokeless tobacco: Never Used     Alcohol use No       Foot Exam: Yes: 17  Eye Exam:  Yes: 17  Dental Exam:  Completed   Aspirin Use:  No    Lab Results   Component Value Date    A1C 7.8 2017    A1C 8.3 2017    A1C 7.9 02/10/2017    A1C 8.9 2016    A1C 8.3 10/28/2016       Lab Results   Component Value Date    MICROL 208 2016     No results found for: MICROALBUMIN    Staff Signature:  Annette Spence CMA ([facility])        "

## 2017-08-24 NOTE — MR AVS SNAPSHOT
After Visit Summary   2017    Pete Sheldon    MRN: [id number]           Patient Information     Date Of Birth          1940        Visit Information        Provider Department      2017 2:00 PM Lori Damian MD [facility]        Today's Diagnoses     Type 2 diabetes mellitus with diabetic polyneuropathy, with long-term current use of insulin (H)    -  1    Diabetic ketoacidosis without coma associated with type 2 diabetes mellitus (H)          Care Instructions    [facility] & [place] locations   Dr Damian, Endocrinology Department      [facility]   [address]  [place] [address]  Appointment [phone]  Fax: [phone]  [place]: Monday and Tuesday         [facility]   [address]  [place] [address]  Appointment [phone]  Fax: [phone]  [place]: Wednesday and Thursday            Please arrive 30 minutes before your scheduled appointment.   First go to the main floor lab suit  for previsit A1c lab appointment and then come upstairs and get checked in with  for clinic visit.  This will allow for your blood glucose meter/insulin pump to be downloaded and glycosylated hemoglobin (A1c) to be obtained prior to your appointment.    Continue current regimen  Labs and follow up in 3-4 months    Recommend checking blood sugars before meals and at bedtime.    If Blood glucose are low more often-> 2-3 times/week- give us a call.  The patient is advised to Make better food choices: reduce carbs, Reduce portion size, weight loss and exercise 3-4 times a week.  Discussed hypoglycemia signs and symptoms as well as management in detail.                Follow-ups after your visit        Who to contact     If you have questions or need follow up information about today's clinic visit or your schedule please contact [facility]  "[facility] directly at [phone].  Normal or non-critical lab and imaging results will be communicated to you by [employer], letter or phone within 4 business days after the clinic has received the results. If you do not hear from us within 7 days, please contact the clinic through [employer] or phone. If you have a critical or abnormal lab result, we will notify you by phone as soon as possible.  Submit refill requests through [employer] or call your pharmacy and they will forward the refill request to us. Please allow 3 business days for your refill to be completed.          Additional Information About Your Visit        [employer] Information     PlayLab lets you send messages to your doctor, view your test results, renew your prescriptions, schedule appointments and more. To sign up, go to [email or web address] . Click on \"Log in\" on the left side of the screen, which will take you to the Welcome page. Then click on \"Sign up Now\" on the right side of the page.     You will be asked to enter the access code listed below, as well as some personal information. Please follow the directions to create your username and password.     Your access code is: [email or web address]  Expires: 2017  2:35 PM     Your access code will  in 90 days. If you need help or a new code, please call your [facility] clinic or [phone].        Care EveryWhere ID     This is your Care EveryWhere ID. This could be used by other organizations to access your [facility] medical records  [id number]        Your Vitals Were     Pulse Temperature Height Pulse Oximetry BMI (Body Mass Index)       92 97.9  F (36.6  C) (Oral) 1.702 m (5' 7\") 95% 24.51 kg/m2        Blood Pressure from Last 3 Encounters:   17 98/64   17 108/62   17 100/58    Weight from Last 3 Encounters:   17 71 kg (156 lb 8 oz)   17 70.4 kg (155 lb 4.8 oz)   17 73 kg (161 lb)              Today, you had the following     No orders found for display    "      Today's Medication Changes          These changes are accurate as of: 8/24/17  2:41 PM.  If you have any questions, ask your nurse or doctor.               These medicines have changed or have updated prescriptions.        Dose/Directions    * insulin lispro 100 UNIT/ML injection   Commonly known as:  HumaLOG KWIKpen   This may have changed:  additional instructions   Used for:  Hyperglycemia   Changed by:  Evgeny Dubon MD        Dose:  2 Units   Inject 2 Units Subcutaneous 3 times daily (before meals) Take 2 unit before breakfast, 2 units before lunch and 2 units before dinner PLUS sliding scale   Quantity:  3 mL   Refills:  5       * insulin lispro 100 UNIT/ML injection   Commonly known as:  HumaLOG KWIKpen   This may have changed:  Another medication with the same name was changed. Make sure you understand how and when to take each.   Used for:  Type 2 diabetes mellitus with diabetic polyneuropathy, with long-term current use of insulin (H)   Changed by:  Lori Damian MD        2 Units + correctional scale Do Not give Correction Insulin if BG < 200  < 200 : no additional insulin  200-225: + 1  226-250: + 2  251-275: + 3  276-300: + 4  301-325: + 5  326- 350: +6 units  > 350: + 7 units  Notify MD if glucose > or = 350 mg/dL after administration of correction dose   Quantity:  30 mL   Refills:  1       * Notice:  This list has 2 medication(s) that are the same as other medications prescribed for you. Read the directions carefully, and ask your doctor or other care provider to review them with you.             Primary Care Provider Office Phone # Fax #    Rom Helm [phone]       [facility] [address]  [facility] [address]        Goals        Diet    Increase water intake     Notes - Note edited  5/14/2014 11:42 AM by Patricia Khan RN    Have 5-6 glasses (8oz) of fluid a day  Per f/u with spouse today, pt not doing well. Not eating and drinking. Called clinic  and advised to take pt to clinic.         Increase water intake        General    Medication 1 (pt-stated)     Notes - Note created  5/15/2014  1:57 PM by Patricia Khan RN    Pt will have an MTM consult         Equal Access to Services     CAR BANDA : Irvin tasha jones joseo Sonohemiali, waaxda luqadaha, qaybta kaalmada adeegyada, kadeem rfaiin hayaatiffany worleymandeep chavarria criss medrano. So [facility] [phone].    ATENCIÓN: Si habla español, tiene a moise disposición servicios gratuitos de asistencia lingüística. Llame al [phone].    We comply with applicable federal civil rights laws and [place] laws. We do not discriminate on the basis of race, color, national origin, age, disability sex, sexual orientation or gender identity.            Thank you!     Thank you for choosing [facility]  for your care. Our goal is always to provide you with excellent care. Hearing back from our patients is one way we can continue to improve our services. Please take a few minutes to complete the written survey that you may receive in the mail after your visit with us. Thank you!             Your Updated Medication List - Protect others around you: Learn how to safely use, store and throw away your medicines at www.disposemymeds.org.          This list is accurate as of: 8/24/17  2:41 PM.  Always use your most recent med list.                   Brand Name Dispense Instructions for use Diagnosis    ACE/ARB NOT PRESCRIBED (INTENTIONAL)      by Other route continuous prn (Hyperkalemia) Reported on 5/19/2017    Type 2 diabetes, HbA1C goal < 8% (H)       amLODIPine 5 MG tablet    NORVASC    90 tablet    TAKE ONE TABLET BY MOUTH ONCE DAILY    Hypertension goal BP (blood pressure) < 140/90       * blood glucose monitoring test strip    STEFFI CONTOUR NEXT    400 each    Use to test blood sugar 4 times daily or as directed.    Type 2 diabetes mellitus with diabetic polyneuropathy (H)       * STEFFI CONTOUR NEXT test strip   Generic  drug:  blood glucose monitoring     200 strip    USE TO TEST BLOOD SUGAR 4 TIMES DAILY OR AS DIRECTED (E11.42)    Type 2 diabetes mellitus with diabetic polyneuropathy (H)       Glucosamine HCl 750 MG Tabs      Take 750 mg by mouth 2 times daily    Arthritis       * GLUCOSE MANAGEMENT Tabs     60 tablet    Take 2 tab in case on low blood glucose    Hyperglycemia       * GLUCOSE MANAGEMENT Tabs     100 tablet    4 tabs po for low blood sugar below 70, may repeat q 10-15 minutes as needed    Type 2 diabetes mellitus with diabetic polyneuropathy, with long-term current use of insulin (H)       hydrochlorothiazide 12.5 MG capsule    MICROZIDE    90 capsule    Take 1 capsule (12.5 mg) by mouth every morning    Essential hypertension with goal blood pressure less than 140/90       insulin glargine 100 UNIT/ML injection    LANTUS    3 mL    Inject 32 Units Subcutaneous every morning (before breakfast)    Diabetic ketoacidosis without coma associated with other specified diabetes mellitus (H)       * insulin lispro 100 UNIT/ML injection    HumaLOG KWIKpen    3 mL    Inject 2 Units Subcutaneous 3 times daily (before meals) Take 2 unit before breakfast, 2 units before lunch and 2 units before dinner PLUS sliding scale    Hyperglycemia       * insulin lispro 100 UNIT/ML injection    HumaLOG KWIKpen    30 mL    2 Units + correctional scale Do Not give Correction Insulin if BG < 200  < 200 : no additional insulin  200-225: + 1  226-250: + 2  251-275: + 3  276-300: + 4  301-325: + 5  326- 350: +6 units  > 350: + 7 units  Notify MD if glucose > or = 350 mg/dL after administration of correction dose    Type 2 diabetes mellitus with diabetic polyneuropathy, with long-term current use of insulin (H)       insulin pen needle 31G X 8 MM     300 each    B-D UF III short pen needles, use 4 times a day to inject insulin.    Type 2 diabetes, HbA1C goal < 8% (H)       insulin syringes (disposable) U-100 0.3 ML Misc     100 each    1 each 2  "times daily    Type II or unspecified type diabetes mellitus without mention of complication, not stated as uncontrolled       LIFESCAN FINEPOINT LANCETS Misc     200 strip    Use to test blood sugars 4 times daily or as directed.    Type 2 diabetes, HbA1C goal < 8% (H)       loratadine 10 MG tablet    AF LORATADINE    14 tablet    Take 1 tablet (10 mg) by mouth daily    Syncope       lovastatin 40 MG tablet    MEVACOR    90 tablet    Take 1 tablet (40 mg) by mouth At Bedtime    Hyperlipidemia LDL goal <100       metFORMIN 500 MG tablet    GLUCOPHAGE    360 tablet    TAKE TWO TABLETS BY MOUTH TWICE DAILY WITH  MEALS    Type 2 diabetes mellitus with diabetic polyneuropathy (H)       multivitamin Tabs tablet      Take 1 tablet by mouth daily FOCUS SELECT PREMIUM WITH LUTEIN per eye doctor Reported on 5/19/2017        order for DME     3 Month    All DM testing supplies including test strips, lancets, solution, syringes, needles and/or pen needles for testing 4 times per day.  Brand \"Contour Next\"    Type 2 diabetes mellitus with diabetic polyneuropathy (H)       tamsulosin 0.4 MG capsule    FLOMAX    90 capsule    TAKE ONE CAPSULE BY MOUTH ONCE DAILY    Hyperlipidemia LDL goal <100, Calculus of kidney       VITAMIN D PO      Take 400 Units by mouth daily        * Notice:  This list has 6 medication(s) that are the same as other medications prescribed for you. Read the directions carefully, and ask your doctor or other care provider to review them with you.      "

## 2017-08-31 ENCOUNTER — TRANSFERRED RECORDS (OUTPATIENT)
Dept: HEALTH INFORMATION MANAGEMENT | Facility: CLINIC | Age: 77
End: 2017-08-31

## 2017-09-02 DIAGNOSIS — E11.42 TYPE 2 DIABETES MELLITUS WITH DIABETIC POLYNEUROPATHY (H): ICD-10-CM

## 2017-09-05 NOTE — TELEPHONE ENCOUNTER
Test strips       Last Written Prescription Date: 7/17/17  Last Fill Quantity: 200,  # refills: 0   Last Office Visit with G, [facility] or [facility] prescribing provider: 8/23/17                                         Next 5 appointments (look out 90 days)     Nov 29, 2017  2:30 PM CST   Return Visit with Lori Damian MD   [facility]    [address]  [facility] [address] [phone]

## 2017-10-16 DIAGNOSIS — I10 HYPERTENSION GOAL BP (BLOOD PRESSURE) < 140/90: ICD-10-CM

## 2017-10-16 NOTE — TELEPHONE ENCOUNTER
Amlodipine      Last Written Prescription Date: 07/17/17  Last Fill Quantity: 90, # refills: 0    Last Office Visit with G, P or [facility] prescribing provider:  08/23/17   Future Office Visit:    Next 5 appointments (look out 90 days)     Nov 29, 2017  2:30 PM CST   Return Visit with Lori Damian MD   [facility] ([facility]    [address] [facility] [address]  [facility] [address] [phone]                    BP Readings from Last 3 Encounters:   08/24/17 98/64   08/23/17 108/62   06/13/17 100/58

## 2017-10-17 RX ORDER — AMLODIPINE BESYLATE 5 MG/1
TABLET ORAL
Qty: 90 TABLET | Refills: 0 | Status: ON HOLD | OUTPATIENT
Start: 2017-10-17 | End: 2018-01-11

## 2017-11-01 DIAGNOSIS — E11.42 TYPE 2 DIABETES MELLITUS WITH DIABETIC POLYNEUROPATHY (H): ICD-10-CM

## 2017-11-01 DIAGNOSIS — I10 ESSENTIAL HYPERTENSION WITH GOAL BLOOD PRESSURE LESS THAN 140/90: ICD-10-CM

## 2017-11-01 RX ORDER — HYDROCHLOROTHIAZIDE 12.5 MG/1
CAPSULE ORAL
Qty: 90 CAPSULE | Refills: 0 | Status: ON HOLD | OUTPATIENT
Start: 2017-11-01 | End: 2018-01-11

## 2017-11-29 ENCOUNTER — OFFICE VISIT (OUTPATIENT)
Dept: ENDOCRINOLOGY | Facility: CLINIC | Age: 77
End: 2017-11-29
Payer: COMMERCIAL

## 2017-11-29 VITALS
BODY MASS INDEX: 24.8 KG/M2 | HEART RATE: 76 BPM | TEMPERATURE: 98.1 F | DIASTOLIC BLOOD PRESSURE: 64 MMHG | OXYGEN SATURATION: 97 % | HEIGHT: 67 IN | WEIGHT: 158 LBS | SYSTOLIC BLOOD PRESSURE: 128 MMHG

## 2017-11-29 DIAGNOSIS — Z79.4 TYPE 2 DIABETES MELLITUS WITH DIABETIC POLYNEUROPATHY, WITH LONG-TERM CURRENT USE OF INSULIN (H): ICD-10-CM

## 2017-11-29 DIAGNOSIS — E11.10 DIABETIC KETOACIDOSIS WITHOUT COMA ASSOCIATED WITH TYPE 2 DIABETES MELLITUS (H): ICD-10-CM

## 2017-11-29 DIAGNOSIS — Z79.4 TYPE 2 DIABETES MELLITUS WITH DIABETIC POLYNEUROPATHY, WITH LONG-TERM CURRENT USE OF INSULIN (H): Primary | ICD-10-CM

## 2017-11-29 DIAGNOSIS — E11.42 TYPE 2 DIABETES MELLITUS WITH DIABETIC POLYNEUROPATHY, WITH LONG-TERM CURRENT USE OF INSULIN (H): ICD-10-CM

## 2017-11-29 DIAGNOSIS — E11.42 TYPE 2 DIABETES MELLITUS WITH DIABETIC POLYNEUROPATHY, WITH LONG-TERM CURRENT USE OF INSULIN (H): Primary | ICD-10-CM

## 2017-11-29 LAB — HBA1C MFR BLD: 7.6 % (ref 4.3–6)

## 2017-11-29 PROCEDURE — 36415 COLL VENOUS BLD VENIPUNCTURE: CPT | Performed by: INTERNAL MEDICINE

## 2017-11-29 PROCEDURE — 83036 HEMOGLOBIN GLYCOSYLATED A1C: CPT | Performed by: INTERNAL MEDICINE

## 2017-11-29 PROCEDURE — 99213 OFFICE O/P EST LOW 20 MIN: CPT | Performed by: INTERNAL MEDICINE

## 2017-11-29 NOTE — NURSING NOTE
"Chief Complaint   Patient presents with     RECHECK     follow up DM 2       Initial /64 (BP Location: Left arm, Patient Position: Chair, Cuff Size: Adult Large)  Pulse 76  Temp 98.1  F (36.7  C) (Oral)  Ht 1.702 m (5' 7\")  Wt 71.7 kg (158 lb)  SpO2 97%  BMI 24.75 kg/m2 Estimated body mass index is 24.75 kg/(m^2) as calculated from the following:    Height as of this encounter: 1.702 m (5' 7\").    Weight as of this encounter: 71.7 kg (158 lb).  Medication Reconciliation: complete     ENDOCRINOLOGY INTAKE FORM    Patient Name:  Pete Sheldon  :  1940    Is patient Diabetic?   Yes: type 2   Does patient have non-diabetic or other endocrine issues?  No    Vitals: /64 (BP Location: Left arm, Patient Position: Chair, Cuff Size: Adult Large)  Pulse 76  Temp 98.1  F (36.7  C) (Oral)  Ht 1.702 m (5' 7\")  Wt 71.7 kg (158 lb)  SpO2 97%  BMI 24.75 kg/m2  BMI= Body mass index is 24.75 kg/(m^2).    Flu vaccine:  Yes: 17  Pneumonia vaccine:  Yes: 3/16/12    Smoking and Alcohol use:  Social History   Substance Use Topics     Smoking status: Former Smoker     Quit date: 3/11/1953     Smokeless tobacco: Never Used     Alcohol use No       Foot Exam: Yes: 17  Eye Exam:  Yes: 17  Dental Exam:    Aspirin Use:  No    Lab Results   Component Value Date    A1C 7.6 2017    A1C 7.8 2017    A1C 8.3 2017    A1C 7.9 02/10/2017    A1C 8.9 2016       Lab Results   Component Value Date    MICROL 208 2016     No results found for: MICROALBUMIN      Staff Signature:  Annette Spence CMA (AAMA)        "

## 2017-11-29 NOTE — PATIENT INSTRUCTIONS
[facility]   Dr Damian, Endocrinology Department      [facility]   [address]  [place] [address]  Appointment [phone]  Fax: [phone]  [place]: Monday and Tuesday         [facility]   [address]  [place] [address]  Appointment [phone]  Fax: [phone]  [place]: Wednesday and Thursday          Please arrive 30 minutes before your scheduled appointment.   First go to the main floor lab suit  for previsit A1c lab appointment and then come upstairs and get checked in with  for clinic visit.  This will allow for your blood glucose meter/insulin pump to be downloaded and glycosylated hemoglobin (A1c) to be obtained prior to your appointment.    Continue current regimen for now  Check with insurance if tresiba (long acting insulin) is covered.  If yes- let me know  Follow up in 3 months.    Recommend checking blood sugars before meals and at bedtime.    If Blood glucose are low more often-> 2-3 times/week- give us a call.  The patient is advised to Make better food choices: reduce carbs, Reduce portion size, weight loss and exercise 3-4 times a week.  Discussed hypoglycemia signs and symptoms as well as management in detail.

## 2017-11-29 NOTE — MR AVS SNAPSHOT
After Visit Summary   2017    Pete Sheldon    MRN: [id number]           Patient Information     Date Of Birth          1940        Visit Information        Provider Department      2017 2:30 PM Lori Damian MD [facility]        Today's Diagnoses     Type 2 diabetes mellitus with diabetic polyneuropathy, with long-term current use of insulin (H)    -  1    Diabetic ketoacidosis without coma associated with type 2 diabetes mellitus (H)          Care Instructions    [facility] & [place] locations   Dr Damian, Endocrinology Department      [facility]   [address]  [place] [address]  Appointment [phone]  Fax: [phone]  [place]: Monday and Tuesday         [facility]   [address]  [place] [address]  Appointment [phone]  Fax: [phone]  [place]: Wednesday and Thursday          Please arrive 30 minutes before your scheduled appointment.   First go to the main floor lab suit  for previsit A1c lab appointment and then come upstairs and get checked in with  for clinic visit.  This will allow for your blood glucose meter/insulin pump to be downloaded and glycosylated hemoglobin (A1c) to be obtained prior to your appointment.    Continue current regimen for now  Check with insurance if tresiba (long acting insulin) is covered.  If yes- let me know  Follow up in 3 months.    Recommend checking blood sugars before meals and at bedtime.    If Blood glucose are low more often-> 2-3 times/week- give us a call.  The patient is advised to Make better food choices: reduce carbs, Reduce portion size, weight loss and exercise 3-4 times a week.  Discussed hypoglycemia signs and symptoms as well as management in detail.                Follow-ups after your visit        Who to contact     If you have questions or need follow up  "information about today's clinic visit or your schedule please contact [facility] directly at [phone].  Normal or non-critical lab and imaging results will be communicated to you by MyChart, letter or phone within 4 business days after the clinic has received the results. If you do not hear from us within 7 days, please contact the clinic through [employer] or phone. If you have a critical or abnormal lab result, we will notify you by phone as soon as possible.  Submit refill requests through [employer] or call your pharmacy and they will forward the refill request to us. Please allow 3 business days for your refill to be completed.          Additional Information About Your Visit        MyChart Information     [employer] lets you send messages to your doctor, view your test results, renew your prescriptions, schedule appointments and more. To sign up, go to [email or web address] . Click on \"Log in\" on the left side of the screen, which will take you to the Welcome page. Then click on \"Sign up Now\" on the right side of the page.     You will be asked to enter the access code listed below, as well as some personal information. Please follow the directions to create your username and password.     Your access code is: [email or web address]  Expires: 2018  2:58 PM     Your access code will  in 90 days. If you need help or a new code, please call your [facility] clinic or [phone].        Care EveryWhere ID     This is your Care EveryWhere ID. This could be used by other organizations to access your [facility] medical records  [id number]        Your Vitals Were     Pulse Temperature Height Pulse Oximetry BMI (Body Mass Index)       76 98.1  F (36.7  C) (Oral) 1.702 m (5' 7\") 97% 24.75 kg/m2        Blood Pressure from Last 3 Encounters:   17 128/64   17 98/64   17 108/62    Weight from Last 3 Encounters:   17 71.7 kg (158 lb)   17 71 kg (156 lb 8 oz)   17 70.4 kg " (155 lb 4.8 oz)              Today, you had the following     No orders found for display         Today's Medication Changes          These changes are accurate as of: 11/29/17  2:58 PM.  If you have any questions, ask your nurse or doctor.               These medicines have changed or have updated prescriptions.        Dose/Directions    * insulin lispro 100 UNIT/ML injection   Commonly known as:  HumaLOG KWIKpen   This may have changed:  additional instructions   Used for:  Hyperglycemia   Changed by:  Evgeny Dubon MD        Dose:  2 Units   Inject 2 Units Subcutaneous 3 times daily (before meals) Take 2 unit before breakfast, 2 units before lunch and 2 units before dinner PLUS sliding scale   Quantity:  3 mL   Refills:  5       * insulin lispro 100 UNIT/ML injection   Commonly known as:  HumaLOG KWIKpen   This may have changed:  Another medication with the same name was changed. Make sure you understand how and when to take each.   Used for:  Type 2 diabetes mellitus with diabetic polyneuropathy, with long-term current use of insulin (H)   Changed by:  Lori Damian MD        2 Units + correctional scale Do Not give Correction Insulin if BG < 200  < 200 : no additional insulin  200-225: + 1  226-250: + 2  251-275: + 3  276-300: + 4  301-325: + 5  326- 350: +6 units  > 350: + 7 units  Notify MD if glucose > or = 350 mg/dL after administration of correction dose   Quantity:  30 mL   Refills:  1       * Notice:  This list has 2 medication(s) that are the same as other medications prescribed for you. Read the directions carefully, and ask your doctor or other care provider to review them with you.             Primary Care Provider Office Phone # Fax #    Rom Helm [phone]       [address] [facility] [address]  [facility] [address]        Goals        Diet    Increase water intake     Notes - Note edited  5/14/2014 11:42 AM by Patricia Khan RN    Have 5-6 glasses (8oz) of  fluid a day  Per f/u with spouse today, pt not doing well. Not eating and drinking. Called clinic and advised to take pt to clinic.         Increase water intake        General    Medication 1 (pt-stated)     Notes - Note created  5/15/2014  1:57 PM by Patricia Khan, JULIA    Pt will have an MTM consult         Equal Access to Services     CAR BANDA : Hadii aad ku hadasho Soomaali, waaxda luqadaha, qaybta kaalmada adeegyada, kadeem roman hayelma laniermarlenimercedes kahn . So [facility] [phone].    ATENCIÓN: Si habla español, tiene a moise disposición servicios gratuitos de asistencia lingüística. Llame al [phone].    We comply with applicable federal civil rights laws and [place] laws. We do not discriminate on the basis of race, color, national origin, age, disability, sex, sexual orientation, or gender identity.            Thank you!     Thank you for choosing [facility]  for your care. Our goal is always to provide you with excellent care. Hearing back from our patients is one way we can continue to improve our services. Please take a few minutes to complete the written survey that you may receive in the mail after your visit with us. Thank you!             Your Updated Medication List - Protect others around you: Learn how to safely use, store and throw away your medicines at www.disposemymeds.org.          This list is accurate as of: 11/29/17  2:58 PM.  Always use your most recent med list.                   Brand Name Dispense Instructions for use Diagnosis    ACE/ARB/ARNI NOT PRESCRIBED (INTENTIONAL)      by Other route continuous prn (Hyperkalemia) Reported on 5/19/2017    Type 2 diabetes, HbA1C goal < 8% (H)       amLODIPine 5 MG tablet    NORVASC    90 tablet    TAKE ONE TABLET BY MOUTH ONCE DAILY    Hypertension goal BP (blood pressure) < 140/90       * blood glucose monitoring test strip    STEFFI CONTOUR NEXT    400 each    Use to test blood sugar 4 times daily or as directed.     Type 2 diabetes mellitus with diabetic polyneuropathy (H)       * STEFFI CONTOUR NEXT test strip   Generic drug:  blood glucose monitoring     400 strip    USE TO TEST BLOOD SUGAR 4 TIMES DAILY OR AS DIRECTED (E11.42)    Type 2 diabetes mellitus with diabetic polyneuropathy (H)       Glucosamine HCl 750 MG Tabs      Take 750 mg by mouth 2 times daily    Arthritis       * GLUCOSE MANAGEMENT Tabs     60 tablet    Take 2 tab in case on low blood glucose    Hyperglycemia       * GLUCOSE MANAGEMENT Tabs     100 tablet    4 tabs po for low blood sugar below 70, may repeat q 10-15 minutes as needed    Type 2 diabetes mellitus with diabetic polyneuropathy, with long-term current use of insulin (H)       hydrochlorothiazide 12.5 MG capsule    MICROZIDE    90 capsule    TAKE ONE CAPSULE BY MOUTH IN THE MORNING    Essential hypertension with goal blood pressure less than 140/90       insulin glargine 100 UNIT/ML injection    LANTUS    3 mL    Inject 32 Units Subcutaneous every morning (before breakfast)    Diabetic ketoacidosis without coma associated with other specified diabetes mellitus (H)       * insulin lispro 100 UNIT/ML injection    HumaLOG KWIKpen    3 mL    Inject 2 Units Subcutaneous 3 times daily (before meals) Take 2 unit before breakfast, 2 units before lunch and 2 units before dinner PLUS sliding scale    Hyperglycemia       * insulin lispro 100 UNIT/ML injection    HumaLOG KWIKpen    30 mL    2 Units + correctional scale Do Not give Correction Insulin if BG < 200  < 200 : no additional insulin  200-225: + 1  226-250: + 2  251-275: + 3  276-300: + 4  301-325: + 5  326- 350: +6 units  > 350: + 7 units  Notify MD if glucose > or = 350 mg/dL after administration of correction dose    Type 2 diabetes mellitus with diabetic polyneuropathy, with long-term current use of insulin (H)       insulin pen needle 31G X 8 MM     300 each    B-D UF III short pen needles, use 4 times a day to inject insulin.    Type 2 diabetes,  "HbA1C goal < 8% (H)       insulin syringes (disposable) U-100 0.3 ML Misc     100 each    1 each 2 times daily    Type II or unspecified type diabetes mellitus without mention of complication, not stated as uncontrolled       LIFESCAN FINEPOINT LANCETS Misc     200 strip    Use to test blood sugars 4 times daily or as directed.    Type 2 diabetes, HbA1C goal < 8% (H)       loratadine 10 MG tablet    AF LORATADINE    14 tablet    Take 1 tablet (10 mg) by mouth daily    Syncope       lovastatin 40 MG tablet    MEVACOR    90 tablet    Take 1 tablet (40 mg) by mouth At Bedtime    Hyperlipidemia LDL goal <100       metFORMIN 500 MG tablet    GLUCOPHAGE    360 tablet    TAKE TWO TABLETS BY MOUTH TWICE DAILY WITH  MEAL    Type 2 diabetes mellitus with diabetic polyneuropathy (H)       multivitamin Tabs tablet      Take 1 tablet by mouth daily FOCUS SELECT PREMIUM WITH LUTEIN per eye doctor Reported on 5/19/2017        order for DME     3 Month    All DM testing supplies including test strips, lancets, solution, syringes, needles and/or pen needles for testing 4 times per day.  Brand \"Contour Next\"    Type 2 diabetes mellitus with diabetic polyneuropathy (H)       tamsulosin 0.4 MG capsule    FLOMAX    90 capsule    TAKE ONE CAPSULE BY MOUTH ONCE DAILY    Hyperlipidemia LDL goal <100, Calculus of kidney       VITAMIN D PO      Take 400 Units by mouth daily        * Notice:  This list has 6 medication(s) that are the same as other medications prescribed for you. Read the directions carefully, and ask your doctor or other care provider to review them with you.      "

## 2017-11-29 NOTE — PROGRESS NOTES
ENDOCRINOLOGY CLINIC NOTE:  Name: Pete Sheldon  Seen for f/u of Diabetes.  He is accompanied by his wife.  HPI:  Pete Sheldon is a 77 year old male who presents for the evaluation/management of:  Was in the hospital 12/2/16-12/5/2016 for diabetic ketoacidosis and acute kidney injury.  Of note he is having bedtime snack almost every days.  Typical bedtime snack is whole bagel.  He reports that he feels does not eat a bagel blood sugar drops too low at night.  Has variable blood sugar pattern.  I doubt if he has clear understanding of insulin dosing and the carbohydrates.  Also trouble with memory.  I discussed continuous glucose monitor with him in past but he does not want sensors.  Low C-peptide.     1. Type 2 DM:  Orginally diagnosed in 1995.  Current Regimen: Metformin 1000 mg twice a day, Lantus 32 units in morning and Humalog 2/2/2 with breakfast/lunch/dinner respectively.  In addition to that he is on correctional scale 1 unit- 25 >140 but he is not using it.    Does not feel comfortable with carbohydrate counting.    BS checks: Three times a day    Average Meter Download: 164.  In general bloods sugar are variable especially morning BG. During day tend to run on higher side.    Exercise: Minimal  Episodes of hypoglycemia (low blood sugar):  Yes. improving  Fixed meal pattern: NO. Carb content varies. In general diet is high in carbs.  Patient counting carbs: No    DM Complications:   Nephropathy: Yes: Microalbuminuria present  Retinopathy: Yes: History of laser treatment in past  Neuropathy: Yes.  Microalbuminuria: Yes: Microalbuminuria present.  Not on ACE secondary to history of hyperkalemia.  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    2. Hypertension: Blood Pressure today:   BP Readings from Last 3 Encounters:   11/29/17 128/64   08/24/17 98/64    17 108/62     Blood pressure medications include hydrochlorothiazide 12.5 mg, amlodipine 5 mg.      3. Hyperlipidemia: Takes lovastatin 40 mg for lipid control.  Denies muscle aches of pains.        PMH/PSH:  Past Medical History:   Diagnosis Date     Calculus of ureter      Hypertension      Microalbuminuria 2/15/2016     Other and unspecified hyperlipidemia      Type 2 diabetes, HbA1C goal < 8% (H) 1995     Past Surgical History:   Procedure Laterality Date     C NONSPECIFIC PROCEDURE      Nasal septoplasty     HC COLONOSCOPY THRU STOMA, DIAGNOSTIC  2007    Normal     HC FLEX SIGMOIDOSCOPY W/WO MIGUEL SPEC BY BRUSH/WASH  1999    Normal to 60 cm     HC REPAIR INCISIONAL HERNIA,REDUCIBLE  1999    Hernia Repair, Incisional, Unilateral (right)     HC REPAIR INCISIONAL HERNIA,REDUCIBLE      Hernia Repair, Incisional, Unilateral (left, with mesh placement)     Family Hx:  Family History   Problem Relation Age of Onset     CEREBROVASCULAR DISEASE Mother       age 95     HEART DISEASE Mother      age 89,  age 95     CEREBROVASCULAR DISEASE Father       age 91, stroke two years previous     Cancer - colorectal Brother      Half-brother     CANCER Sister      Half-sister (?type)     CANCER Sister      Half-sister (?type)     HEART DISEASE Brother      Neurologic Disorder Daughter      Multiple Sclerosis     Psychotic Disorder Son      Schizophrenia       Social Hx:  Social History     Social History     Marital status:      Spouse name: N/A     Number of children: 2     Years of education: N/A     Occupational History     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          MEDICATIONS:  has a current medication list which includes the following prescription(s): hydrochlorothiazide, metformin, amlodipine, diogo contour next, tamsulosin, insulin lispro, lovastatin, insulin glargine, insulin lispro, glucose management, glucose management, multivitamin, blood glucose monitoring, insulin pen needle, order for dme, lifescan finepoint lancets, glucosamine hcl, loratadine, insulin syringes (disposable), cholecalciferol, and ACE/ARB NOT PRESCRIBED, INTENTIONAL,.    ROS     ROS: 10 point ROS neg other than the symptoms noted above in the HPI.    Physical Exam   VS: /64 (BP Location: Left arm, Patient Position: Chair, Cuff Size: Adult Large)  Pulse 76  Temp 98.1  F (36.7  C) (Oral)  Ht 1.702 m (5' 7\")  Wt 71.7 kg (158 lb)  SpO2 97%  BMI 24.75 kg/m2  GENERAL: AXOX3, NAD, well dressed,appears stated age.  HEENT: No exopthalmous, no proptosis, EOMI, no lig lag, no retraction  CV: RRR  LUNGS: CTAB  ABDOMEN: +BS  NEUROLOGY: CN grossly intact, no tremors  PSYCH: normal affect and mood      LABS:  Component      Latest Ref Rng 4/15/2016   C-Peptide      0.9 - 6.9 ng/mL <0.1 (L)   Glutamic Acid Decarboxylase Antibody       <5.0 . . .     A1c:  Lab Results   Component Value Date    A1C 7.6 11/29/2017    A1C 7.8 08/16/2017    A1C 8.3 05/12/2017    A1C 7.9 02/10/2017    A1C 8.9 12/03/2016       Creatinine:  Creatinine   Date Value Ref Range Status   08/16/2017 1.27 (H) 0.66 - 1.25 mg/dL Final     Urine Micro:  Lab Results   Component Value Date    UMALCR 390.98 07/22/2016        LFTs/Lipids:  Recent Labs   Lab Test  08/16/17   0800  05/12/17   0906   09/25/15   0844  06/19/15   0815   CHOL  141  129   < >  134  135   HDL  37*  33*   < >  31*  36*   LDL  85  79   < >  80  83   TRIG  97  83   < >  117  78   CHOLHDLRATIO   --    --    --   4.3  3.8    < > = values in this interval not displayed.     TFTs:  TSH   Date Value Ref Range Status   07/22/2016 " 5.58 (H) 0.40 - 4.00 mU/L Final       All pertinent notes, labs, and images personally reviewed by me.     A/P  Mr.Victor ANURAG Sheldon is a 75 year old here for the evaluation/management of diabetes:    1. DM2 - (low C-peptide, negative FAVIOLA): Under Poor control.  A1c 7.6%.  Diabetes complicated by microalbuminuria, neuropathy and retinopathy.   Concerns for hypoglycemia unawareness. Variable BG.  Does not feel comfortable with carbohydrate counting.  Blood sugars are improving and mostly in acceptable range. Hypoglycemic episodes are also less now and improving.   Carbohydrate content is with meals and days.  In general diet is high in carbohydrates.  Plan to continue current regimen for now.  Continue Metformin 1000 mg twice a day  Lantus: continue 32 units in a.m. only   HUmalog: Continue 2 units with each meal.  Do not use correctional scale ( he is not using it)    He is eating a bagel every night at bedtime and is consistent with that. He does not take insulin with it.  He will benefit from personal continuous glucose monitor but he does not want since that and is resistant to that idea.   Need to have consistent bedtime snack and consistent carbs with each meal.    Recommend checking blood sugars before meals and at bedtime.    If Blood glucose are low more often-> 2-3 times/week- give us a call.  The patient is advised to Make better food choices: reduce carbs, Reduce portion size, weight loss and exercise 3-4 times a week.  Discussed hypoglycemia signs and symptoms as well as management in detail.    2. Hypertension - Under Fair control.  Continue hydrochlorothiazide 12.5 mg, amlodipine 5 mg.    3. Hyperlipidemia - Under Good control.  Continue lovastatin 40 mg. LDL 91, HDL 34.    4. Prevention  Flu Shot-September 2015  Pneumovax- March 2012  Opthalmology-Yes. June 2017    ASA-no. 2/2 to h/o nosebleeds  Smoking- No    5. Microalbuminuria:  MICROL      208   7/22/2016  MICROALBUMIN   390.98   7/22/2016  Not on  ACE 2/2 to h/o hyperkalemia    All questions were answered.  The patient indicates understanding of the above issues and agrees with the plan set forth.     More than 50% of face to face time spent with Mr. Sheldon on counseling / coordinating his care.  Total appointment times was 30 minutes.      Follow-up:  Follow up 3 months    Lori Damian M.D  Endocrinology  [facility]/[place]    Disclaimer: This note consists of symbols derived from keyboarding, dictation and/or voice recognition software. As a result, there may be errors in the script that have gone undetected. Please consider this when interpreting information found in this chart.

## 2017-12-06 DIAGNOSIS — E11.42 TYPE 2 DIABETES MELLITUS WITH DIABETIC POLYNEUROPATHY (H): ICD-10-CM

## 2017-12-06 NOTE — TELEPHONE ENCOUNTER
Pt's called-stated pt needs refill on test strips. Pt's insurance will be changing next year, so just needs 1 bottle of strips      Lab Results   Component Value Date    A1C 7.6 11/29/2017    A1C 7.8 08/16/2017    A1C 8.3 05/12/2017    A1C 7.9 02/10/2017    A1C 8.9 12/03/2016

## 2017-12-26 DIAGNOSIS — E13.10 DIABETIC KETOACIDOSIS WITHOUT COMA ASSOCIATED WITH OTHER SPECIFIED DIABETES MELLITUS (H): ICD-10-CM

## 2017-12-29 DIAGNOSIS — E11.42 TYPE 2 DIABETES MELLITUS WITH DIABETIC POLYNEUROPATHY (H): ICD-10-CM

## 2017-12-29 RX ORDER — INSULIN GLARGINE 100 [IU]/ML
INJECTION, SOLUTION SUBCUTANEOUS
Qty: 30 ML | Refills: 0 | Status: SHIPPED | OUTPATIENT
Start: 2017-12-29 | End: 2018-01-08

## 2018-01-02 DIAGNOSIS — Z79.4 TYPE 2 DIABETES MELLITUS WITH DIABETIC POLYNEUROPATHY, WITH LONG-TERM CURRENT USE OF INSULIN (H): Primary | ICD-10-CM

## 2018-01-02 DIAGNOSIS — E11.42 TYPE 2 DIABETES MELLITUS WITH DIABETIC POLYNEUROPATHY, WITH LONG-TERM CURRENT USE OF INSULIN (H): Primary | ICD-10-CM

## 2018-01-02 NOTE — TELEPHONE ENCOUNTER
Requested Prescriptions   Signed Prescriptions Disp Refills     blood glucose monitoring (ONETOUCH VERIO IQ) test strip 200 strip 3     Sig: One Touch Verio Flex--Use to test blood sugars 4 times daily or as directed.    There is no refill protocol information for this order        Prescription approved per [facility] Refill Protocol. DENITA Gallardo R.N.

## 2018-01-02 NOTE — TELEPHONE ENCOUNTER
Requested Prescriptions   Pending Prescriptions Disp Refills     metFORMIN (GLUCOPHAGE) 500 MG tablet [Pharmacy Med Name: METFORMIN 500MG TAB] 360 tablet 0     Sig: TAKE TWO TABLETS BY MOUTH TWICE DAILY WITH MEALS    Biguanide Agents Failed    12/29/2017 10:41 AM       Failed - Patient has had a Microalbumin in the past 12 mos.    Recent Labs   Lab Test  07/22/16   0835   MICROL  208   UMALCR  390.98*            Passed - Patient's BP is less than 140/90    BP Readings from Last 3 Encounters:   11/29/17 128/64   08/24/17 98/64   08/23/17 108/62                Passed - Patient has documented LDL within the past 12 mos.    Recent Labs   Lab Test  08/16/17   0800   LDL  85            Passed - Patient is age 10 or older       Passed - Patient has documented A1c within the specified period of time.    Recent Labs   Lab Test  11/29/17   1346   A1C  7.6*            Passed - Patient's CR is NOT>1.4 OR Patient's EGFR is NOT<45 within past 12 mos.    Recent Labs   Lab Test  08/16/17   0800   GFRESTIMATED  55*   GFRESTBLACK  66       Recent Labs   Lab Test  08/16/17   0800   CR  1.27*            Passed - Patient does NOT have a diagnosis of CHF.       Passed - Recent (6 mos) or future visit with authorizing provider's specialty    Patient had office visit in the last 6 months or has a visit in the next 30 days with authorizing provider.  See chart review.             Prescription approved per [facility]. DENITA Gallardo R.N.

## 2018-01-07 DIAGNOSIS — N20.0 CALCULUS OF KIDNEY: ICD-10-CM

## 2018-01-07 DIAGNOSIS — E78.5 HYPERLIPIDEMIA LDL GOAL <100: ICD-10-CM

## 2018-01-07 RX ORDER — TAMSULOSIN HYDROCHLORIDE 0.4 MG/1
CAPSULE ORAL
Qty: 90 CAPSULE | Refills: 1 | Status: CANCELLED | OUTPATIENT
Start: 2018-01-07

## 2018-01-08 ENCOUNTER — APPOINTMENT (OUTPATIENT)
Dept: MRI IMAGING | Facility: CLINIC | Age: 78
End: 2018-01-08
Attending: EMERGENCY MEDICINE
Payer: COMMERCIAL

## 2018-01-08 ENCOUNTER — TELEPHONE (OUTPATIENT)
Dept: INTERNAL MEDICINE | Facility: CLINIC | Age: 78
End: 2018-01-08

## 2018-01-08 ENCOUNTER — HOSPITAL ENCOUNTER (EMERGENCY)
Facility: CLINIC | Age: 78
Discharge: HOME | End: 2018-01-08
Attending: EMERGENCY MEDICINE | Admitting: EMERGENCY MEDICINE
Payer: COMMERCIAL

## 2018-01-08 ENCOUNTER — APPOINTMENT (OUTPATIENT)
Dept: CT IMAGING | Facility: CLINIC | Age: 78
End: 2018-01-08
Attending: EMERGENCY MEDICINE
Payer: COMMERCIAL

## 2018-01-08 ENCOUNTER — HOSPITAL ENCOUNTER (EMERGENCY)
Facility: CLINIC | Age: 78
Discharge: OTHER INSTITUTION | End: 2018-01-08
Attending: EMERGENCY MEDICINE | Admitting: EMERGENCY MEDICINE
Payer: COMMERCIAL

## 2018-01-08 VITALS
OXYGEN SATURATION: 94 % | WEIGHT: 158.07 LBS | SYSTOLIC BLOOD PRESSURE: 121 MMHG | TEMPERATURE: 97.7 F | RESPIRATION RATE: 13 BRPM | BODY MASS INDEX: 24.76 KG/M2 | DIASTOLIC BLOOD PRESSURE: 83 MMHG

## 2018-01-08 VITALS
TEMPERATURE: 98 F | OXYGEN SATURATION: 97 % | RESPIRATION RATE: 18 BRPM | HEART RATE: 66 BPM | DIASTOLIC BLOOD PRESSURE: 84 MMHG | SYSTOLIC BLOOD PRESSURE: 116 MMHG

## 2018-01-08 DIAGNOSIS — M62.81 GENERALIZED MUSCLE WEAKNESS: ICD-10-CM

## 2018-01-08 DIAGNOSIS — R20.2 NUMBNESS AND TINGLING OF RIGHT ARM AND LEG: ICD-10-CM

## 2018-01-08 DIAGNOSIS — I77.74 VERTEBRAL ARTERY DISSECTION (H): ICD-10-CM

## 2018-01-08 DIAGNOSIS — I63.9 CEREBROVASCULAR ACCIDENT (CVA), UNSPECIFIED MECHANISM (H): ICD-10-CM

## 2018-01-08 DIAGNOSIS — R20.0 NUMBNESS AND TINGLING OF RIGHT ARM AND LEG: ICD-10-CM

## 2018-01-08 DIAGNOSIS — N39.0 URINARY TRACT INFECTION WITHOUT HEMATURIA, SITE UNSPECIFIED: ICD-10-CM

## 2018-01-08 DIAGNOSIS — N30.00 ACUTE CYSTITIS WITHOUT HEMATURIA: ICD-10-CM

## 2018-01-08 LAB
ALBUMIN UR-MCNC: 100 MG/DL
AMORPH CRY #/AREA URNS HPF: ABNORMAL /HPF
ANION GAP SERPL CALCULATED.3IONS-SCNC: 6 MMOL/L (ref 3–14)
ANION GAP SERPL CALCULATED.3IONS-SCNC: 6 MMOL/L (ref 3–14)
APPEARANCE UR: ABNORMAL
APTT PPP: 36 SEC (ref 22–37)
BACTERIA #/AREA URNS HPF: ABNORMAL /HPF
BASOPHILS # BLD AUTO: 0 10E9/L (ref 0–0.2)
BASOPHILS NFR BLD AUTO: 0.4 %
BILIRUB UR QL STRIP: NEGATIVE
BUN SERPL-MCNC: 21 MG/DL (ref 7–30)
BUN SERPL-MCNC: 22 MG/DL (ref 7–30)
CALCIUM SERPL-MCNC: 9.3 MG/DL (ref 8.5–10.1)
CALCIUM SERPL-MCNC: 9.4 MG/DL (ref 8.5–10.1)
CHLORIDE SERPL-SCNC: 101 MMOL/L (ref 94–109)
CHLORIDE SERPL-SCNC: 98 MMOL/L (ref 94–109)
CO2 BLDCOV-SCNC: 30 MMOL/L (ref 21–28)
CO2 SERPL-SCNC: 29 MMOL/L (ref 20–32)
CO2 SERPL-SCNC: 30 MMOL/L (ref 20–32)
COLOR UR AUTO: YELLOW
CREAT BLD-MCNC: 1.2 MG/DL (ref 0.66–1.25)
CREAT SERPL-MCNC: 1.1 MG/DL (ref 0.66–1.25)
CREAT SERPL-MCNC: 1.15 MG/DL (ref 0.66–1.25)
DIFFERENTIAL METHOD BLD: ABNORMAL
EOSINOPHIL # BLD AUTO: 0.3 10E9/L (ref 0–0.7)
EOSINOPHIL NFR BLD AUTO: 2.6 %
ERYTHROCYTE [DISTWIDTH] IN BLOOD BY AUTOMATED COUNT: 12.5 % (ref 10–15)
ERYTHROCYTE [DISTWIDTH] IN BLOOD BY AUTOMATED COUNT: 12.6 % (ref 10–15)
GFR SERPL CREATININE-BSD FRML MDRD: 58 ML/MIN/1.7M2
GFR SERPL CREATININE-BSD FRML MDRD: 61 ML/MIN/1.7M2
GFR SERPL CREATININE-BSD FRML MDRD: 64 ML/MIN/1.7M2
GLUCOSE BLDC GLUCOMTR-MCNC: 182 MG/DL (ref 70–99)
GLUCOSE BLDC GLUCOMTR-MCNC: 197 MG/DL (ref 70–99)
GLUCOSE BLDC GLUCOMTR-MCNC: 96 MG/DL (ref 70–99)
GLUCOSE SERPL-MCNC: 188 MG/DL (ref 70–99)
GLUCOSE SERPL-MCNC: 204 MG/DL (ref 70–99)
GLUCOSE UR STRIP-MCNC: >499 MG/DL
HCT VFR BLD AUTO: 44.5 % (ref 40–53)
HCT VFR BLD AUTO: 46.8 % (ref 40–53)
HGB BLD-MCNC: 14.9 G/DL (ref 13.3–17.7)
HGB BLD-MCNC: 15.6 G/DL (ref 13.3–17.7)
HGB UR QL STRIP: NEGATIVE
IMM GRANULOCYTES # BLD: 0 10E9/L (ref 0–0.4)
IMM GRANULOCYTES NFR BLD: 0.4 %
INR PPP: 1.05 (ref 0.86–1.14)
INTERPRETATION ECG - MUSE: NORMAL
KETONES UR STRIP-MCNC: NEGATIVE MG/DL
LACTATE BLD-SCNC: 1.8 MMOL/L (ref 0.7–2.1)
LEUKOCYTE ESTERASE UR QL STRIP: ABNORMAL
LYMPHOCYTES # BLD AUTO: 2.1 10E9/L (ref 0.8–5.3)
LYMPHOCYTES NFR BLD AUTO: 18.9 %
MCH RBC QN AUTO: 30.8 PG (ref 26.5–33)
MCH RBC QN AUTO: 31 PG (ref 26.5–33)
MCHC RBC AUTO-ENTMCNC: 33.3 G/DL (ref 31.5–36.5)
MCHC RBC AUTO-ENTMCNC: 33.5 G/DL (ref 31.5–36.5)
MCV RBC AUTO: 92 FL (ref 78–100)
MCV RBC AUTO: 93 FL (ref 78–100)
MONOCYTES # BLD AUTO: 1 10E9/L (ref 0–1.3)
MONOCYTES NFR BLD AUTO: 9.4 %
NEUTROPHILS # BLD AUTO: 7.6 10E9/L (ref 1.6–8.3)
NEUTROPHILS NFR BLD AUTO: 68.3 %
NITRATE UR QL: NEGATIVE
NRBC # BLD AUTO: 0 10*3/UL
NRBC BLD AUTO-RTO: 0 /100
PCO2 BLDV: 52 MM HG (ref 40–50)
PH BLDV: 7.37 PH (ref 7.32–7.43)
PH UR STRIP: 5 PH (ref 5–7)
PLATELET # BLD AUTO: 269 10E9/L (ref 150–450)
PLATELET # BLD AUTO: 313 10E9/L (ref 150–450)
PO2 BLDV: 24 MM HG (ref 25–47)
POTASSIUM SERPL-SCNC: 3.6 MMOL/L (ref 3.4–5.3)
POTASSIUM SERPL-SCNC: 3.8 MMOL/L (ref 3.4–5.3)
RBC # BLD AUTO: 4.8 10E12/L (ref 4.4–5.9)
RBC # BLD AUTO: 5.07 10E12/L (ref 4.4–5.9)
RBC #/AREA URNS AUTO: 1 /HPF (ref 0–2)
SAO2 % BLDV FROM PO2: 39 %
SODIUM SERPL-SCNC: 134 MMOL/L (ref 133–144)
SODIUM SERPL-SCNC: 136 MMOL/L (ref 133–144)
SOURCE: ABNORMAL
SP GR UR STRIP: 1.01 (ref 1–1.03)
TROPONIN I SERPL-MCNC: <0.015 UG/L (ref 0–0.04)
UROBILINOGEN UR STRIP-MCNC: 0 MG/DL (ref 0–2)
WBC # BLD AUTO: 11.1 10E9/L (ref 4–11)
WBC # BLD AUTO: 9.6 10E9/L (ref 4–11)
WBC #/AREA URNS AUTO: 12 /HPF (ref 0–2)

## 2018-01-08 PROCEDURE — 80048 BASIC METABOLIC PNL TOTAL CA: CPT | Performed by: EMERGENCY MEDICINE

## 2018-01-08 PROCEDURE — 85730 THROMBOPLASTIN TIME PARTIAL: CPT | Performed by: EMERGENCY MEDICINE

## 2018-01-08 PROCEDURE — 70553 MRI BRAIN STEM W/O & W/DYE: CPT

## 2018-01-08 PROCEDURE — 85610 PROTHROMBIN TIME: CPT | Performed by: EMERGENCY MEDICINE

## 2018-01-08 PROCEDURE — 82565 ASSAY OF CREATININE: CPT | Mod: 91

## 2018-01-08 PROCEDURE — 93005 ELECTROCARDIOGRAM TRACING: CPT

## 2018-01-08 PROCEDURE — 25000132 ZZH RX MED GY IP 250 OP 250 PS 637: Performed by: EMERGENCY MEDICINE

## 2018-01-08 PROCEDURE — 85025 COMPLETE CBC W/AUTO DIFF WBC: CPT | Performed by: EMERGENCY MEDICINE

## 2018-01-08 PROCEDURE — 25000128 H RX IP 250 OP 636: Performed by: RADIOLOGY

## 2018-01-08 PROCEDURE — 70496 CT ANGIOGRAPHY HEAD: CPT

## 2018-01-08 PROCEDURE — 25000128 H RX IP 250 OP 636: Performed by: EMERGENCY MEDICINE

## 2018-01-08 PROCEDURE — 00000146 ZZHCL STATISTIC GLUCOSE BY METER IP

## 2018-01-08 PROCEDURE — 70460 CT HEAD/BRAIN W/DYE: CPT

## 2018-01-08 PROCEDURE — 70544 MR ANGIOGRAPHY HEAD W/O DYE: CPT

## 2018-01-08 PROCEDURE — 70450 CT HEAD/BRAIN W/O DYE: CPT

## 2018-01-08 PROCEDURE — 84484 ASSAY OF TROPONIN QUANT: CPT | Performed by: EMERGENCY MEDICINE

## 2018-01-08 PROCEDURE — 99285 EMERGENCY DEPT VISIT HI MDM: CPT | Mod: 25

## 2018-01-08 PROCEDURE — A9585 GADOBUTROL INJECTION: HCPCS | Performed by: RADIOLOGY

## 2018-01-08 RX ORDER — ASPIRIN 325 MG
325 TABLET ORAL ONCE
Status: COMPLETED | OUTPATIENT
Start: 2018-01-08 | End: 2018-01-08

## 2018-01-08 RX ORDER — GADOBUTROL 604.72 MG/ML
7.5 INJECTION INTRAVENOUS ONCE
Status: COMPLETED | OUTPATIENT
Start: 2018-01-08 | End: 2018-01-08

## 2018-01-08 RX ORDER — CEPHALEXIN 500 MG/1
500 CAPSULE ORAL 3 TIMES DAILY
Qty: 21 CAPSULE | Refills: 0 | Status: ON HOLD | OUTPATIENT
Start: 2018-01-08 | End: 2018-01-12

## 2018-01-08 RX ORDER — IOPAMIDOL 755 MG/ML
500 INJECTION, SOLUTION INTRAVASCULAR ONCE
Status: COMPLETED | OUTPATIENT
Start: 2018-01-08 | End: 2018-01-08

## 2018-01-08 RX ADMIN — SODIUM CHLORIDE 80 ML: 9 INJECTION, SOLUTION INTRAVENOUS at 14:41

## 2018-01-08 RX ADMIN — SODIUM CHLORIDE 80 ML: 9 INJECTION, SOLUTION INTRAVENOUS at 14:38

## 2018-01-08 RX ADMIN — IOPAMIDOL 50 ML: 755 INJECTION, SOLUTION INTRAVENOUS at 14:41

## 2018-01-08 RX ADMIN — GADOBUTROL 7.5 ML: 604.72 INJECTION INTRAVENOUS at 17:31

## 2018-01-08 RX ADMIN — ASPIRIN 325 MG ORAL TABLET 325 MG: 325 PILL ORAL at 16:12

## 2018-01-08 RX ADMIN — IOPAMIDOL 70 ML: 755 INJECTION, SOLUTION INTRAVENOUS at 14:38

## 2018-01-08 ASSESSMENT — ENCOUNTER SYMPTOMS
WEAKNESS: 1
FEVER: 0
FEVER: 0
NECK PAIN: 0
DIARRHEA: 0
VOMITING: 0
NAUSEA: 0
COUGH: 0
BACK PAIN: 0
WEAKNESS: 1
NUMBNESS: 1
COUGH: 0

## 2018-01-08 NOTE — ED NOTES
Patient arrives here for evaluation of right hand and numbness of right arm that started around 11am this morning. Patient was evaluated in the ER prior to this for fall and was found to have UTI and started keflex. Reports feeling dizzy and lightheaded. Appears unsteady on his feet. AOX4, ABC's intact

## 2018-01-08 NOTE — ED AVS SNAPSHOT
[facility]    [address]    [facility] [address]    Phone:  [phone]    Fax:  [phone]                                       Pete Sheldon   MRN: [id number]    Department:  [facility]   Date of Visit:  1/8/2018           After Visit Summary Signature Page     I have received my discharge instructions, and my questions have been answered. I have discussed any challenges I see with this plan with the nurse or doctor.    ..........................................................................................................................................  Patient/Patient Representative Signature      ..........................................................................................................................................  Patient Representative Print Name and Relationship to Patient    ..................................................               ................................................  Date                                            Time    ..........................................................................................................................................  Reviewed by Signature/Title    ...................................................              ..............................................  Date                                                            Time

## 2018-01-08 NOTE — DISCHARGE INSTRUCTIONS
Discharge Instructions  Urinary Tract Infection  You or your child have been diagnosed with a urinary tract infection, or UTI. The urinary tract includes the kidneys (which make urine/pee), ureters (the tubes that carry urine/pee from the kidneys to the bladder), the bladder (which stores urine/pee), and urethra (the tube that carries urine/pee out of the bladder). Urinary tract infections occur when bacteria travel up the urethra into the bladder (bladder infection) and, in some cases, from there into the kidneys (kidney infection).  Generally, every Emergency Department visit should have a follow-up clinic visit with either a primary or a specialty clinic/provider. Please follow-up as instructed by your emergency provider today.  Return to the Emergency Department if:    You or your child have severe back pain.    You or your child are vomiting (throwing up) so that you cannot take your medicine.    You or your child have a new fever (had not previously had a fever) over 101 F.    You or your child have confusion or are very weak, or feel very ill.    Your child seems much more ill, will not wake up, will not respond right, or is crying for a long time and will not calm down.    You or your child are showing signs of dehydration. These signs may include decreased urination (pee), dry mouth/gums/tongue, or decreased activity.    Follow-up with your provider:     Children under 24 months need to be seen by their regular provider within one week after a diagnosis of a UTI. It may be necessary to do some more tests to look at the child s kidney or bladder.    You should begin to feel better within 24 - 48 hours of starting your antibiotic; follow-up with your regular clinic/doctor/provider if this is not the case.    Treatment:     You will be treated with an antibiotic to kill the bacteria. We have to make an educated guess, based on what we know about common bacteria and antibiotics, as to which antibiotic will work  "for your infection. We will be correct most times but there will be some cases where the antibiotic chosen is not correct (see urine cultures below).    Take a pain medication such as acetaminophen (Tylenol ) or ibuprofen (Advil , Motrin , Nuprin ).    Phenazopyridine (Pyridium , Uristat ) is a prescription medication that numbs the bladder to reduce the burning pain of some UTIs.  The same medication is available in a non-prescription version (Azo-Standard , Urodol ). This medication will change the color of the urine and tears (usually blue or orange). If you wear contacts, do not wear them while taking this medication as they may be stained by the medication.    Urine Cultures:    If indicated, a urine culture may have been performed today. This test generally takes 24-48 hours to complete so the results are not known at this time. The results can confirm that an infection is present but also determine which antibiotic is effective for the specific bacteria that is causing the infection. If your urine culture shows that the antibiotic you were given today will not work to treat your infection, we will attempt to contact you to make arrangements to change the antibiotic. If the culture confirms that the antibiotic is effective for your infection, you will not be contacted. We often recommend follow-up with your regular physician/provider on the culture results regardless of this process.    Antibiotic Warning:     If you have been placed on antibiotics - watch for signs of allergic reaction.  These include rash, lip swelling, difficulty breathing, wheezing, and dizziness.  If you develop any of these symptoms, stop the antibiotic immediately and go to an emergency room or urgent care for evaluation.    Probiotics: If you have been given an antibiotic, you may want to also take a probiotic pill or eat yogurt with live cultures. Probiotics have \"good bacteria\" to help your intestines stay healthy. Studies have shown " that probiotics help prevent diarrhea and other intestine problems (including C. diff infection) when you take antibiotics. You can buy these without a prescription in the pharmacy section of the store.   If you were given a prescription for medicine here today, be sure to read all of the information (including the package insert) that comes with your prescription.  This will include important information about the medicine, its side effects, and any warnings that you need to know about.  The pharmacist who fills the prescription can provide more information and answer questions you may have about the medicine.  If you have questions or concerns that the pharmacist cannot address, please call or return to the Emergency Department.   Remember that you can always come back to the Emergency Department if you are not able to see your regular provider in the amount of time listed above, if you get any new symptoms, or if there is anything that worries you.

## 2018-01-08 NOTE — ED PROVIDER NOTES
History     Chief Complaint:  Fall      HPI   Pete Sheldon is a 77 year old male with a history of insulin dependent type II diabetes mellitus, hypertension, and hyperlipidemia who presents via EMS accompanied by his wife for evaluation following a fall. This morning shortly prior to arrival, the patient got out of bed to use the bathroom when he lost his balance and fell to the ground landing on his buttocks. He did not strike his head or lose consciousness in the fall, and he does not believe that he was injured in the fall. Following the fall, the patient was unable to get up due to generalized weakness, so EMS was called to bring him into the ED for evaluation. He had been incontinent of urine while on the floor, and EMS noted that his urine was particularly malodorous and was concerned that it may be infected. On EMS' evaluation they also felt that he was somewhat slow to answer questions. Currently in the ED, the patient reports that he is feeling well. He denies any neck pain or back pain following the fall. Otherwise, he has not had any fever, cough, congestion, nausea, vomiting, or diarrhea recently. He has not had any recent medication changes.     Allergies:  Losartan       Medications:    metFORMIN (GLUCOPHAGE) 500 MG tablet  LANTUS SOLOSTAR 100 UNIT/ML soln  hydrochlorothiazide (MICROZIDE) 12.5 MG capsule  amLODIPine (NORVASC) 5 MG tablet  tamsulosin (FLOMAX) 0.4 MG capsule  insulin lispro (HUMALOG KWIKPEN) 100 UNIT/ML injection  lovastatin (MEVACOR) 40 MG tablet  insulin glargine (LANTUS) 100 UNIT/ML injection  insulin lispro (HUMALOG KWIKPEN) 100 UNIT/ML injection  Nutritional Supplements (GLUCOSE MANAGEMENT) TABS  multivitamin (OCUVITE) TABS  Glucosamine HCl 750 MG TABS  loratadine (AF LORATADINE) 10 MG tablet  Cholecalciferol (VITAMIN D PO)    Past Medical History:    Diabetes mellitus, type II   Hypertension  Hyperlipidemia  Microalbuminuria  Calculus of ureter     Past Surgical History:     Nasal septoplasty  Colonoscopy   Flex sigmoidoscopy w/wo dontae spec by brush/wash   Repair incisional hernia, right  Repair incisional hernia, left     Family History:    Cerebrovascular disease - Mother and father   Heart disease - Mother and brother  Cancer, colorectal - Brother  Cancer - Sister   Multiple sclerosis - Daughter  Schizophrenia - Son     Social History:  Tobacco use:    Former smoker, quit 1953  Alcohol use:    Negative  Marital status:       Accompanied to ED by:  EMS and wife      Review of Systems   Constitutional: Negative for fever.   HENT: Negative for congestion.    Respiratory: Negative for cough.    Gastrointestinal: Negative for diarrhea, nausea and vomiting.   Musculoskeletal: Negative for back pain and neck pain.   Neurological: Positive for weakness. Negative for syncope.   All other systems reviewed and are negative.    Physical Exam   First Vitals:  BP: (!) 165/96  Pulse: 66  Temp: 98  F (36.7  C)  Resp: 18  SpO2: 97 %      Physical Exam    GENERAL:  Pleasant, age appropriate male.    Mild disheveled appearance with odor or urine.   HEENT:   No scalp hematoma or defect to the bony calvarium.      Clay's and Racoon's sign negative.      Oropharynx is moist, without lesions or trismus.  EYES:  Conjunctiva normal, PERRL    EOMs intact  NECK:   C-spine non-tender.      No bony step-off to cervical spine.   CV:    Regular rate and rhythm.     No murmurs, rubs or gallops.    PULM:  Clear to auscultation bilateral.      No respiratory distress.      No subcutaneous emphysema or crepitus.  ABD:   Soft, non-tender, non-distended.      No pulsatile masses.  No rebound or guarding.  MSK:    No focal bony tenderness to the extremities.      Upper and lower extremities taken through full ROM without significant pain or limited ROM.  LYMPH:  No cervical lymphadenopathy.  NEURO:  Alert and oriented x 3. GCS 15.      CN II-XII intact, speech is clear with no aphasia.      Strength is 5/5 in  all 4 extremities.  Sensation is intact.      Normal muscular tone, no tremor.  SKIN:   Warm, dry and intact.    PSYCH:   Mood is good and affect is appropriate.    Emergency Department Course   ECG (7:22:05):  Indication: Screening for cardiovascular disease.   Rate 73 bpm. NM interval 160 ms. QRS duration 100 ms. QT/QTc 410/451 ms. P-R-T axes 59 -10 78.   Interpretation: Sinus rhythm with PACs, Otherwise normal ECG  No significant change compared to EKG dated 2017.   Interpreted at 0726 by Dr. Mina.      Laboratory:  CBC: WNL (WBC 9.6, HGB 14.9, )    BMP: Glucose 188 high, o/w WNL (Creatinine 1.10)  ISTAT gases lactate sarah POCT: pH 7.37, pCO2 52 high, pO2 24 low, Bicarbonate 30 high, O2 sat 39, Lactic acid 1.8   Glucose by meter: 182 high   UA with Microscopic: GLC >499, Protein albumin 100, Trace leukocyte esterase, WBC 12 high, Few bacteria, Many amorphous crystals, o/w Negative   Urine Culture: Pending     Emergency Department Course:  Patient was brought to the ED via EMS.     Nursing notes and vitals reviewed.  0729: I performed an exam of the patient as documented above.     0911: I updated and reassessed the patient.     I personally reviewed the laboratory results with the patient and wife and answered all related questions prior to discharge.     Findings and plan explained to the Patient and spouse. Patient discharged home with instructions regarding supportive care, medications, and reasons to return. The importance of close follow-up was reviewed. The patient was prescribed Keflex.      Impression & Plan      Medical Decision Makin-year-old male presented to the ED with a fall and subsequent generalized weakness.  Regarding the fall, there was no loss of consciousness or syncope.  She was able to recall all the events surrounding the fall. He denies any traumatic head injury and has no external signs of trauma.  No indication for advanced imaging the brain at this time.  There is  no reproducible musculoskeletal pain to require advanced imaging.  Workup primarily focused on the cause of the generalized weakness.  He has no evidence of dysrhythmia, electrolyte disturbance, anemia or ACS.  Urinalysis is equivocal with concerning features for infection although not definitive.  I will favor treatment with cephalexin and add on urine culture.  Patient clinically feels well and was able to ambulate in the emergency department without issue.  Patient safe for discharge home and close follow-up with primary care physician.  Return to the ED for any worsening symptoms.    Diagnosis:    ICD-10-CM   1. Generalized muscle weakness M62.81   2. Acute cystitis without hematuria N30.00       Disposition:  Discharged to home with Keflex.     Discharge Medications:  New Prescriptions    CEPHALEXIN (KEFLEX) 500 MG CAPSULE    Take 1 capsule (500 mg) by mouth 3 times daily for 7 days       Temo CALIXTO, am serving as a scribe at 7:28 AM on 1/8/2018 to document services personally performed by Dr. Mina, based on my observations and the provider's statements to me.     [facility] EMERGENCY DEPARTMENT       Rigoberto Mina MD  01/08/18 [id number]

## 2018-01-08 NOTE — TELEPHONE ENCOUNTER
Pete Sheldon is a 77 year old male who's wife calls with numbness in his right arm below the elbow.    NURSING ASSESSMENT:  Description:  numbness in his right arm below the elbow  Onset/duration:  Within past 45 minutes  Precip. factors:  Pt fell this morning and she had to call 911 as he could not get off the floor. Pt was taken to the ER for further evaluation and released home about 45 minute ago; they are treating him for a UTI. He fell on his butt, did not hit his head or lose consciousness.  Associated symptoms:  Weakness  Denies: swelling, skin color changes or speech difficulty  Improves/worsens symptoms:  nothing  Pain scale (0-10)   0/10  Allergies:   Allergies   Allergen Reactions     Losartan Other (See Comments)     Dizziness       NURSING PLAN: Nursing advice to patient monitor and go to ER if continues past 30 minutes    RECOMMENDED DISPOSITION:  Home care advice - monitor numbness and if it doesn't go away in 30 minutes, take pt back to the ER.  Will comply with recommendation: Yes  If further questions/concerns or if symptoms do not improve, worsen or new symptoms develop, call your PCP or [facility] as soon as possible.      Guideline used:  Telephone Triage Protocols for Nurses, Fourth Edition, Destiny Weiss RN

## 2018-01-08 NOTE — ED NOTES
Patient presents to the ED following a fall this morning. Patient reports he lost his balance and fell over. Denies dizziness or injury from fall. Was unable to get up so EMS was called. Patient was incontinent of urine, EMS noted urine had a strong odor.

## 2018-01-08 NOTE — ED AVS SNAPSHOT
[facility] Emergency Department    [address]    [facility] [address]    Phone:  [phone]    Fax:  [phone]                                       Pete Sheldon   MRN: [id number]    Department:  [facility] Emergency Department   Date of Visit:  1/8/2018           Patient Information     Date Of Birth          1940        Your diagnoses for this visit were:     Generalized muscle weakness     Acute cystitis without hematuria        You were seen by Rigoberto Mina MD.      Follow-up Information     Schedule an appointment as soon as possible for a visit with Rom Helm MD.    Specialty:  Internal Medicine    Contact information:    [address]  [facility] [address]  [phone]          Discharge Instructions       Discharge Instructions  Urinary Tract Infection  You or your child have been diagnosed with a urinary tract infection, or UTI. The urinary tract includes the kidneys (which make urine/pee), ureters (the tubes that carry urine/pee from the kidneys to the bladder), the bladder (which stores urine/pee), and urethra (the tube that carries urine/pee out of the bladder). Urinary tract infections occur when bacteria travel up the urethra into the bladder (bladder infection) and, in some cases, from there into the kidneys (kidney infection).  Generally, every Emergency Department visit should have a follow-up clinic visit with either a primary or a specialty clinic/provider. Please follow-up as instructed by your emergency provider today.  Return to the Emergency Department if:    You or your child have severe back pain.    You or your child are vomiting (throwing up) so that you cannot take your medicine.    You or your child have a new fever (had not previously had a fever) over 101 F.    You or your child have confusion or are very weak, or feel very ill.    Your child seems much more ill, will not wake up, will not respond right,  or is crying for a long time and will not calm down.    You or your child are showing signs of dehydration. These signs may include decreased urination (pee), dry mouth/gums/tongue, or decreased activity.    Follow-up with your provider:     Children under 24 months need to be seen by their regular provider within one week after a diagnosis of a UTI. It may be necessary to do some more tests to look at the child s kidney or bladder.    You should begin to feel better within 24 - 48 hours of starting your antibiotic; follow-up with your regular clinic/doctor/provider if this is not the case.    Treatment:     You will be treated with an antibiotic to kill the bacteria. We have to make an educated guess, based on what we know about common bacteria and antibiotics, as to which antibiotic will work for your infection. We will be correct most times but there will be some cases where the antibiotic chosen is not correct (see urine cultures below).    Take a pain medication such as acetaminophen (Tylenol ) or ibuprofen (Advil , Motrin , Nuprin ).    Phenazopyridine (Pyridium , Uristat ) is a prescription medication that numbs the bladder to reduce the burning pain of some UTIs.  The same medication is available in a non-prescription version (Azo-Standard , Urodol ). This medication will change the color of the urine and tears (usually blue or orange). If you wear contacts, do not wear them while taking this medication as they may be stained by the medication.    Urine Cultures:    If indicated, a urine culture may have been performed today. This test generally takes 24-48 hours to complete so the results are not known at this time. The results can confirm that an infection is present but also determine which antibiotic is effective for the specific bacteria that is causing the infection. If your urine culture shows that the antibiotic you were given today will not work to treat your infection, we will attempt to contact  "you to make arrangements to change the antibiotic. If the culture confirms that the antibiotic is effective for your infection, you will not be contacted. We often recommend follow-up with your regular physician/provider on the culture results regardless of this process.    Antibiotic Warning:     If you have been placed on antibiotics - watch for signs of allergic reaction.  These include rash, lip swelling, difficulty breathing, wheezing, and dizziness.  If you develop any of these symptoms, stop the antibiotic immediately and go to an emergency room or urgent care for evaluation.    Probiotics: If you have been given an antibiotic, you may want to also take a probiotic pill or eat yogurt with live cultures. Probiotics have \"good bacteria\" to help your intestines stay healthy. Studies have shown that probiotics help prevent diarrhea and other intestine problems (including C. diff infection) when you take antibiotics. You can buy these without a prescription in the pharmacy section of the store.   If you were given a prescription for medicine here today, be sure to read all of the information (including the package insert) that comes with your prescription.  This will include important information about the medicine, its side effects, and any warnings that you need to know about.  The pharmacist who fills the prescription can provide more information and answer questions you may have about the medicine.  If you have questions or concerns that the pharmacist cannot address, please call or return to the Emergency Department.   Remember that you can always come back to the Emergency Department if you are not able to see your regular provider in the amount of time listed above, if you get any new symptoms, or if there is anything that worries you.        Discharge References/Attachments     WEAKNESS (UNCERTAIN CAUSE) (ENGLISH)      Future Appointments        Provider Department Dept Phone Center    3/7/2018 2:00 PM " [facility] [phone] [place]    3/7/2018 2:30 PM Lori Damian MD [facility] [phone] [place]      24 Hour Appointment Hotline       To make an appointment at any [facility], call [phone] ([phone]). If you don't have a family doctor or clinic, we will help you find one. [facility] are conveniently located to serve the needs of you and your family.             Review of your medicines      START taking        Dose / Directions Last dose taken    cephALEXin 500 MG capsule   Commonly known as:  KEFLEX   Dose:  500 mg   Quantity:  21 capsule        Take 1 capsule (500 mg) by mouth 3 times daily for 7 days   Refills:  0          Our records show that you are taking the medicines listed below. If these are incorrect, please call your family doctor or clinic.        Dose / Directions Last dose taken    ACE/ARB/ARNI NOT PRESCRIBED (INTENTIONAL)        by Other route continuous prn (Hyperkalemia) Reported on 5/19/2017   Refills:  0        amLODIPine 5 MG tablet   Commonly known as:  NORVASC   Quantity:  90 tablet        TAKE ONE TABLET BY MOUTH ONCE DAILY   Refills:  0        * STEFFI CONTOUR NEXT test strip   Quantity:  400 strip   Generic drug:  blood glucose monitoring        USE TO TEST BLOOD SUGAR 4 TIMES DAILY OR AS DIRECTED (E11.42)   Refills:  0        * blood glucose monitoring test strip   Commonly known as:  STEFFI CONTOUR NEXT   Quantity:  50 each        Use to test blood sugar 4 times daily or as directed.   Refills:  0        * blood glucose monitoring test strip   Commonly known as:  ONETOUCH VERIO IQ   Quantity:  200 strip        One Touch Verio Flex--Use to test blood sugars 4 times daily or as directed.   Refills:  3        Glucosamine HCl 750 MG Tabs   Dose:  750 mg        Take 750 mg by mouth 2 times daily   Refills:  0        * GLUCOSE MANAGEMENT Tabs   Quantity:  60 tablet        Take 2 tab in case on low blood glucose    Refills:  0        * GLUCOSE MANAGEMENT Tabs   Quantity:  100 tablet        4 tabs po for low blood sugar below 70, may repeat q 10-15 minutes as needed   Refills:  prn        hydrochlorothiazide 12.5 MG capsule   Commonly known as:  MICROZIDE   Quantity:  90 capsule        TAKE ONE CAPSULE BY MOUTH IN THE MORNING   Refills:  0        * insulin glargine 100 UNIT/ML injection   Commonly known as:  LANTUS   Dose:  32 Units   Quantity:  3 mL        Inject 32 Units Subcutaneous every morning (before breakfast)   Refills:  5        * LANTUS SOLOSTAR 100 UNIT/ML injection   Quantity:  30 mL   Generic drug:  insulin glargine        INJECT 35 UNITS SUBCUTANEOUSLY IN THE MORNING BEFORE BREAKFAST   Refills:  0        * insulin lispro 100 UNIT/ML injection   Commonly known as:  HumaLOG KWIKpen   Dose:  2 Units   Quantity:  3 mL        Inject 2 Units Subcutaneous 3 times daily (before meals) Take 2 unit before breakfast, 2 units before lunch and 2 units before dinner PLUS sliding scale   Refills:  5        * insulin lispro 100 UNIT/ML injection   Commonly known as:  HumaLOG KWIKpen   Quantity:  30 mL        2 Units + correctional scale Do Not give Correction Insulin if BG < 200  < 200 : no additional insulin  200-225: + 1  226-250: + 2  251-275: + 3  276-300: + 4  301-325: + 5  326- 350: +6 units  > 350: + 7 units  Notify MD if glucose > or = 350 mg/dL after administration of correction dose   Refills:  1        insulin pen needle 31G X 8 MM   Quantity:  300 each        B-D UF III short pen needles, use 4 times a day to inject insulin.   Refills:  3        insulin syringes (disposable) U-100 0.3 ML Misc   Dose:  1 each   Quantity:  100 each        1 each 2 times daily   Refills:  6        LIFESCAN FINEPOINT LANCETS Misc   Quantity:  200 strip        Use to test blood sugars 4 times daily or as directed.   Refills:  3        loratadine 10 MG tablet   Commonly known as:  AF LORATADINE   Dose:  10 mg   Quantity:  14 tablet      "   Take 1 tablet (10 mg) by mouth daily   Refills:  0        lovastatin 40 MG tablet   Commonly known as:  MEVACOR   Dose:  40 mg   Quantity:  90 tablet        Take 1 tablet (40 mg) by mouth At Bedtime   Refills:  3        metFORMIN 500 MG tablet   Commonly known as:  GLUCOPHAGE   Quantity:  360 tablet        TAKE TWO TABLETS BY MOUTH TWICE DAILY WITH MEALS   Refills:  0        multivitamin Tabs tablet   Dose:  1 tablet        Take 1 tablet by mouth daily FOCUS SELECT PREMIUM WITH LUTEIN per eye doctor Reported on 5/19/2017   Refills:  0        order for DME   Quantity:  3 Month        All DM testing supplies including test strips, lancets, solution, syringes, needles and/or pen needles for testing 4 times per day.  Brand \"Contour Next\"   Refills:  1        tamsulosin 0.4 MG capsule   Commonly known as:  FLOMAX   Quantity:  90 capsule        TAKE ONE CAPSULE BY MOUTH ONCE DAILY   Refills:  1        VITAMIN D PO   Dose:  400 Units        Take 400 Units by mouth daily   Refills:  0        * Notice:  This list has 9 medication(s) that are the same as other medications prescribed for you. Read the directions carefully, and ask your doctor or other care provider to review them with you.            Prescriptions were sent or printed at these locations (1 Prescription)                   Other Prescriptions                Printed at Department/Unit printer (1 of 1)         cephALEXin (KEFLEX) 500 MG capsule                Procedures and tests performed during your visit     Basic metabolic panel (BMP)    CBC (platelets, no diff)    EKG 12 lead    Glucose by meter    ISTAT gases lactate sarah POCT    UA with Microscopic      Orders Needing Specimen Collection     None      Pending Results     No orders found from 1/6/2018 to 1/9/2018.            Pending Culture Results     No orders found from 1/6/2018 to 1/9/2018.            Pending Results Instructions     If you had any lab results that were not finalized at the time of " your Discharge, you can call the ED Lab Result RN at [phone]. You will be contacted by this team for any positive Lab results or changes in treatment. The nurses are available 7 days a week from 10A to 6:30P.  You can leave a message 24 hours per day and they will return your call.        Test Results From Your Hospital Stay        1/8/2018  7:31 AM      Component Results     Component Value Ref Range & Units Status    Glucose 182 (H) 70 - 99 mg/dL Final               1/8/2018  7:36 AM      Component Results     Component Value Ref Range & Units Status    Ph Venous 7.37 7.32 - 7.43 pH Final    PCO2 Venous 52 (H) 40 - 50 mm Hg Final    PO2 Venous 24 (L) 25 - 47 mm Hg Final    Bicarbonate Venous 30 (H) 21 - 28 mmol/L Final    O2 Sat Venous 39 % Final    Lactic Acid 1.8 0.7 - 2.1 mmol/L Final         1/8/2018  8:56 AM      Component Results     Component Value Ref Range & Units Status    Color Urine Yellow  Final    Appearance Urine Slightly Cloudy  Final    Glucose Urine >499 (A) NEG^Negative mg/dL Final    Bilirubin Urine Negative NEG^Negative Final    Ketones Urine Negative NEG^Negative mg/dL Final    Specific Gravity Urine 1.013 1.003 - 1.035 Final    Blood Urine Negative NEG^Negative Final    pH Urine 5.0 5.0 - 7.0 pH Final    Protein Albumin Urine 100 (A) NEG^Negative mg/dL Final    Urobilinogen mg/dL 0.0 0.0 - 2.0 mg/dL Final    Nitrite Urine Negative NEG^Negative Final    Leukocyte Esterase Urine Trace (A) NEG^Negative Final    Source Midstream Urine  Final    WBC Urine 12 (H) 0 - 2 /HPF Final    RBC Urine 1 0 - 2 /HPF Final    Bacteria Urine Few (A) NEG^Negative /HPF Final    Amorphous Crystals Many (A) NEG^Negative /HPF Final               1/8/2018  8:16 AM      Component Results     Component Value Ref Range & Units Status    Sodium 136 133 - 144 mmol/L Final    Potassium 3.6 3.4 - 5.3 mmol/L Final    Chloride 101 94 - 109 mmol/L Final    Carbon Dioxide 29 20 - 32 mmol/L Final    Anion Gap 6 3 - 14  mmol/L Final    Glucose 188 (H) 70 - 99 mg/dL Final    Urea Nitrogen 22 7 - 30 mg/dL Final    Creatinine 1.10 0.66 - 1.25 mg/dL Final    GFR Estimate 64 >60 mL/min/1.7m2 Final    Non  GFR Calc    GFR Estimate If Black 78 >60 mL/min/1.7m2 Final    African American GFR Calc    Calcium 9.3 8.5 - 10.1 mg/dL Final         1/8/2018  7:59 AM      Component Results     Component Value Ref Range & Units Status    WBC 9.6 4.0 - 11.0 10e9/L Final    RBC Count 4.80 4.4 - 5.9 10e12/L Final    Hemoglobin 14.9 13.3 - 17.7 g/dL Final    Hematocrit 44.5 40.0 - 53.0 % Final    MCV 93 78 - 100 fl Final    MCH 31.0 26.5 - 33.0 pg Final    MCHC 33.5 31.5 - 36.5 g/dL Final    RDW 12.5 10.0 - 15.0 % Final    Platelet Count 269 150 - 450 10e9/L Final                Clinical Quality Measure: Blood Pressure Screening     Your blood pressure was checked while you were in the emergency department today. The last reading we obtained was  BP: 131/83 . Please read the guidelines below about what these numbers mean and what you should do about them.  If your systolic blood pressure (the top number) is less than 120 and your diastolic blood pressure (the bottom number) is less than 80, then your blood pressure is normal. There is nothing more that you need to do about it.  If your systolic blood pressure (the top number) is 120-139 or your diastolic blood pressure (the bottom number) is 80-89, your blood pressure may be higher than it should be. You should have your blood pressure rechecked within a year by a primary care provider.  If your systolic blood pressure (the top number) is 140 or greater or your diastolic blood pressure (the bottom number) is 90 or greater, you may have high blood pressure. High blood pressure is treatable, but if left untreated over time it can put you at risk for heart attack, stroke, or kidney failure. You should have your blood pressure rechecked by a primary care provider within the next 4 weeks.  If  "your provider in the emergency department today gave you specific instructions to follow-up with your doctor or provider even sooner than that, you should follow that instruction and not wait for up to 4 weeks for your follow-up visit.        Thank you for choosing [facility]       Thank you for choosing [facility] for your care. Our goal is always to provide you with excellent care. Hearing back from our patients is one way we can continue to improve our services. Please take a few minutes to complete the written survey that you may receive in the mail after you visit with us. Thank you!        [employer] Information     [employer] lets you send messages to your doctor, view your test results, renew your prescriptions, schedule appointments and more. To sign up, go to [email or web address] . Click on \"Log in\" on the left side of the screen, which will take you to the Welcome page. Then click on \"Sign up Now\" on the right side of the page.     You will be asked to enter the access code listed below, as well as some personal information. Please follow the directions to create your username and password.     Your access code is: [email or web address]  Expires: 2018  2:58 PM     Your access code will  in 90 days. If you need help or a new code, please call your [facility] clinic or [phone].        Care EveryWhere ID     This is your Care EveryWhere ID. This could be used by other organizations to access your [facility] medical records  [id number]        Equal Access to Services     CAR BANDA : Hadii tasha garciao Sojoy, waaxda luqadaha, qaybta kaalmada ademandeepyada, kadeem kahn . So [facility] [phone].    ATENCIÓN: Si habla español, tiene a moise disposición servicios gratuitos de asistencia lingüística. Ryne al [phone].    We comply with applicable federal civil rights laws and [place] laws. We do not discriminate on the basis of race, color, national origin, age, disability, sex, " sexual orientation, or gender identity.            After Visit Summary       This is your record. Keep this with you and show to your community pharmacist(s) and doctor(s) at your next visit.

## 2018-01-08 NOTE — ED PROVIDER NOTES
History     Chief Complaint:  Numbness    HPI   Pete Sheldon is a 77 year old male, with a history of diabetes, hypertension, and hyperlipidemia, who presents with numbness. The patient states that he was seen this morning for evaluation of injuries following a fall. He had fallen after he had gotten out of bed to use the bathroom when he had lost his balance and landed on his buttocks. At the time, he did not hit his head or loss of consciousness. He was noted to have an episode of incontinence. His work up previously this morning in the ED by Dr. Mina demonstrated a UTI, but no brain imaging since the patient denied a head injury from his fall. He was prescribed Keflex for the UTI. When he had arrived at home, the patient had felt right sided numbness beginning at 1100 and his wife had noticed altered ambulation. The wife notes that the patient had normally ambulation in the ED prior to his discharge this morning. The patient denies fever, cough, chest pain, palpitations as well as a history of cancer, blood clots, or atrial fibrillation. Prior to arrival, the patient's blood sugar was 128.      Allergies:  Losartan      Medications:    Keflex  Glucophage  Lantus   Microzide  Norvasc  Flomax  Humalog  Mevacor  Loratadine    Past Medical History:    Diabetes mellitus, type II   Hypertension  Hyperlipidemia  Microalbuminuria  Calculus of ureter      Past Surgical History:    Nasal septoplasty  Colonoscopy   Flex sigmoidoscopy w/wo dontae spec by brush/wash   Repair incisional hernia, right  Repair incisional hernia, left      Family History:    Cerebrovascular disease - Mother and father   Heart disease - Mother and brother  Cancer, colorectal - Brother  Cancer - Sister   Multiple sclerosis - Daughter  Schizophrenia - Son     Social History:  Presents with his wife.   Former Smoker - quit 1953  Negative for alcohol use.   Marital Status:   [2]     Review of Systems   Constitutional: Negative for  fever.   Respiratory: Negative for cough.    Cardiovascular: Negative for chest pain.   Neurological: Positive for weakness and numbness.   All other systems reviewed and are negative.      Physical Exam   First Vitals:  BP: 150/90  Heart Rate: 82  Temp: 97.7  F (36.5  C)  Resp: 18  SpO2: 93 %    Physical Exam  Constitutional:  Appears well-developed and well-nourished. Alert. Conversant. Non toxic.  HENT:   Head: Atraumatic.   Nose: Nose normal.  Mouth/Throat: Oral mucosa is clear and moist. no trismus. Pharynx normal. Tonsils symmetric. No tonsillar enlargement, erythema, or exudate.  Eyes: Conjunctivae normal. EOM normal. Pupils equal, round, and reactive to light. No scleral icterus.   Neck: Normal range of motion. Neck supple. No tracheal deviation present.   Cardiovascular: Normal rate, regular rhythm. No gallop. No friction rub. No murmur heard. Symmetric radial artery pulses   Pulmonary/Chest: Effort normal. No stridor. No respiratory distress. No wheezes. No rales. No rhonchi . No tenderness.   Abdominal: Soft. Bowel sounds normal. No distension. No mass. No tenderness. No rebound. No guarding.   Musculoskeletal:   RUE: Normal range of motion. No tenderness. No deformity  LUE: Normal range of motion. No tenderness. No deformity  RLE: Normal range of motion. No edema. No tenderness. No deformity  LLE: Normal range of motion. No edema. No tenderness. No deformity  Lymph: No cervical adenopathy.   Neurological: Alert and oriented to person, place, and time. Cranial Nerves intact II-XII except I did not formally test gag or visual acuity.  Perhaps a subtle right facial droop, but his wife thinks this may be his normal smile.  To me the right corner of his mouth looks a little lower than the left.  Tongue protrudes midline and palate elevates symmetrically.  EOMI. patient has poor vision at baseline due to cataracts.  Visual field testing though appears full bilaterally .  strength 5/5 bilaterally in the  trapezius, deltoid, biceps, triceps, , thumb opposition, finger abduction, psoas, quadriceps, hamstring, gastrocnemius, tibialis anterior. Sensation intact to light touch\on the left (C4-T1 and L4-S1), but subjective decreased palpation on the right C6, C7, C8, as well as the right L5 and S1 dermatomes. Finger to nose and coordination normal.  Very mild pronator drift on the right with curling of his pinky.  Mental status normal. Attention normal. GCS 15. Memory normal. Speech fluent. Cognition normal.  It is unsteady.  He is able to stand at the bedside with assist, Bebout continuous support by me he falls backwards onto the bed.  Skin: Skin is warm and dry. No rash noted. No pallor. Normal capillary refill.  Psychiatric:  Normal mood. Normal affect.     Emergency Department Course   ECG:  Indication: 1348  Time: 1348  Vent. Rate 84 bpm. GA interval 156. QRS duration 90. QT/QTc 384/453. P-R-T axis 36 -2 71. Sinus rhythm with premature supraventricular complexes. Otherwise normal ECG. Read time: 1351     Imaging:  Radiographic findings were communicated with the patient who voiced understanding of the findings.  CT Head without contrast:   1. No evidence of acute intracranial hemorrhage, mass, or herniation.  2. Diffuse parenchymal volume loss and white matter changes likely due  to chronic microvascular ischemic disease. Superimposed area of  ischemia would be difficult to exclude. As per radiology.    CT Head Neck Angio with and without contrast:   1. Abrupt nonopacification of the right V4 segment which could be due  to dissection or embolism. This is age indeterminate although the T2  flow void in this region appeared to be present on MR 5/1/2014. The  left vertebral and basilar artery appear patent. The right PICA  appears to have an extradural origin and appears to be patent arising  from the V3 segment of the right vertebral artery.  2. Suggestion of delayed blood flow to the right  posteroinferior  cerebellum and questionable decreased blood flow although CT perfusion  of the posterior fossa is limited. Recommend further evaluation with  brain MRI if the patient is able.  3. Multifocal moderate to severe stenosis of the intracranial arteries  as described above. This is likely due to intracranial atherosclerotic  disease.  4. Patent arteries in the neck. Mild atherosclerotic disease at the  left carotid bifurcation without significant stenosis As per radiology.    MR Brain w/o and w/ contrast:   1. Small acute infarct in the lateral left thalamus.  2. Brain atrophy and white matter changes consistent with sequelae of  small vessel ischemic disease. No change since 2014.  3. Old microhemorrhages inferiorly in the cerebellar hemispheres.  These are new since 2014.  4. Paranasal sinus opacification. This is new since 2014. As per radiology    MRI Angiogram Head w/o contrast angiogram:   1. Distal right vertebral artery is occluded, but age is  indeterminate, new since 2014.  2. Mild intracranial atherosclerosis with no arterial occlusion. As per radiology    Laboratory:  CBC: WBC: 11.1 (H), HGB: 156, PLT: 313  BMP: Glucose 204 (H), o/w WNL (Creatinine: 1.15)    Glucose by meter: 197 (H)  Creatinine POCT: GFR: 58 (L)  INR: 1.05  Partial thromboplastin time: 36  Troponin: <0.015    Interventions:  1439 0.9% Sodium Chloride Bolus, 1L, IV    1612 Aspirin, 325 mg, PO    Emergency Department Course:  Nursing notes and vitals reviewed. EKG was obtained, findings above.      1349  I performed an exam of the patient as documented above.     1352 Code stroke was called.     The patient was sent for a head CT/CTA while in the emergency department, findings above. IV inserted. Blood drawn. This was sent to the lab for further testing, results above.    1410 I consulted with neurology regarding the patient's history and presentation here in the emergency department.     1440 I consulted with radiology  regarding the patient's scans.    1541 I consulted with Dr. Armenta, neurology, regarding the patient's history and presentation here in the emergency department. They requested the patient receive a brain MRI.    1758 I consulted with Dr. Armenta, neurology, regarding the patient's MRI results. He has accepted the patient at the [facility].    Findings and plan explained to the Patient and wife who consents to transfer.    Impression & Plan    National [employer] Stroke Scale  Exam Interval: Baseline   Score    Level of consciousness: (0)   Alert, keenly responsive    LOC questions: (0)   Answers both questions correctly    LOC commands: (0)   Performs both tasks correctly    Best gaze: (0)   Normal    Visual: (0)   No visual loss    Facial palsy: (1)   Minor paralysis (flat nasolabial fold, smile asymmetry)    Motor arm (left): (0)   No drift    Motor arm (right): (1)   Drift    Motor leg (left): (0)   No drift    Motor leg (right): (0)   No drift    Limb ataxia: (1)   Present in one limb    Sensory: (1)   Mild to moderate sensory loss    Best language: (0)   Normal- no aphasia    Dysarthria: (0)   Normal    Extinction and inattention: (0)   No abnormality        Total Score:  4       Medical Decision Making:  Pete Sheldon is a 77 year old male with complex presentation.  He had been seen here earlier today for evaluation after a fall and was found to have a urinary tract infection.  It was thought that it was contributing to some gait instability.  However he was doing better, stable E ambulatory in the ER earlier and was sent home.  His wife said he was again unstable at home later this morning however around 11 AM he developed new symptoms including numbness and tingling on his right arm and right leg.  He may also have a right facial droop, although his wife is not sure if this is his chronic normal smile or may be slightly droopy on the right.  It was a little bit unclear about  exactly when his neurologic symptoms started, and whether or not that unsteady gait at home was a stroke or not.  We did make him an acute stroke activation.  In consultation with the stroke neurologist, we decided that without a clear definitive time of onset the risk of bleeding from TPA would be too high.  Therefore we did not administer it.  Initial noncontrast CT was negative for bleed.  CTA did show a probable right vertebral artery dissection, although unclear initially whether this was acute or chronic.  Also possibly some delayed perfusion filling in the right cerebellum.  I discussed this again with stroke neurology.  They requested an MRI.  MRI shows no sevens for stroke in the cerebellum but does show a small left thalamic infarct which likely explains his right-sided numbness.  Given the vertebral abnormality and the potential for posterior fossa strokes to occur, stroke neurology requested we transfer him to the [place] [facility] service rather than keeping her [facility].  I discussed this plan of care with the patient and his wife who are in agreement.  He will transfer there by [facility].    Other labs are reassuring.  EKG and troponin are nonischemic.  The patient was diagnosed with a UTI this morning and we continue to monitor his Keflex with a second dose of that given here in the ER.    Critical Care time:  none    Diagnosis:    ICD-10-CM   1. Numbness and tingling of right arm and leg R20.0    R20.2   2. Cerebrovascular accident (CVA), unspecified mechanism (H) I63.9   3. Urinary tract infection without hematuria, site unspecified N39.0   4.  Right vertebral artery dissection    Disposition:  Admitted to Dr. Armenta at [facility]    I, Sushila Denney, am serving as a scribe on 1/8/2018 at 2:56 PM to personally document services performed by Amadou Joyce MD based on my observations and the provider's statements to me.      Sushila Denney  1/8/2018   [facility]  EMERGENCY DEPARTMENT       Amadou Joyce MD  01/08/18 [id number]

## 2018-01-08 NOTE — ED NOTES
"Patient ambulated with a fairly steady gait, but did stick close to the siding/wall. States \"I feel comfortable walking on my own at home.\"  "

## 2018-01-08 NOTE — PHARMACY-ADMISSION MEDICATION HISTORY
Admission medication history interview status for this patient is complete. See [facility] admission navigator for allergy information, prior to admission medications and immunization status.     Medication history interview source(s):Patient and Family  Medication history resources (including written lists, pill bottles, clinic record):None  Primary pharmacy:[facility] [place]    Changes made to PTA medication list:  Added: None  Deleted: None  Changed: None    Actions taken by pharmacist (provider contacted, etc):None     Additional medication history information:None    Medication reconciliation/reorder completed by provider prior to medication history? No    Do you take OTC medications (eg tylenol, ibuprofen, fish oil, eye/ear drops, etc)? Y(Y/N)    For patients on insulin therapy: Y (Y/N)  Lantus/levemir/NPH/Mix 70/30 dose:  Lantus 32 units qam  (see Med list for doses)   Sliding scale Novolog Y  If Yes, do you have a baseline novolog pre-meal dose:  units with meals  Patients eat three meals a day:   Y  How many episodes of hypoglycemia do you have per week: __1___  How many missed doses do you have per week: __0____  How many times do you check your blood glucose per day: ___4____   Any Barriers to therapy - Be specific :  cost of medications, comfortable with giving injections (if applicable), comfortable and confident with current diabetes regimen: N    Prior to Admission medications    Medication Sig Last Dose Taking? Auth Provider   cephALEXin (KEFLEX) 500 MG capsule Take 1 capsule (500 mg) by mouth 3 times daily for 7 days 1/8/2018 at Unknown time Yes Rigoberto Mina MD   metFORMIN (GLUCOPHAGE) 500 MG tablet TAKE TWO TABLETS BY MOUTH TWICE DAILY WITH MEALS 1/8/2018 at Unknown time Yes Rom Helm MD   hydrochlorothiazide (MICROZIDE) 12.5 MG capsule TAKE ONE CAPSULE BY MOUTH IN THE MORNING 1/8/2018 at Unknown time Yes Rom Helm MD   amLODIPine (NORVASC) 5 MG tablet TAKE ONE TABLET BY MOUTH ONCE DAILY  1/8/2018 at Unknown time Yes Rom Helm MD   tamsulosin (FLOMAX) 0.4 MG capsule TAKE ONE CAPSULE BY MOUTH ONCE DAILY 1/8/2018 at Unknown time Yes Rom Helm MD   lovastatin (MEVACOR) 40 MG tablet Take 1 tablet (40 mg) by mouth At Bedtime 1/7/2018 at Unknown time Yes Rom Helm MD   insulin glargine (LANTUS) 100 UNIT/ML injection Inject 32 Units Subcutaneous every morning (before breakfast) 1/8/2018 at am Yes Lori Damian MD   multivitamin (OCUVITE) TABS Take 1 tablet by mouth daily FOCUS SELECT PREMIUM WITH LUTEIN per eye doctor  Reported on 5/19/2017 1/8/2018 at Unknown time Yes Reported, Patient   Glucosamine HCl 750 MG TABS Take 750 mg by mouth 2 times daily 1/7/2018 at Unknown time Yes Rom Helm MD   loratadine (AF LORATADINE) 10 MG tablet Take 1 tablet (10 mg) by mouth daily 1/7/2018 at Unknown time Yes Daly Workman PA-C   Cholecalciferol (VITAMIN D PO) Take 400 Units by mouth daily  1/7/2018 at Unknown time Yes Reported, Patient   blood glucose monitoring (ONETOUCH VERIO IQ) test strip One Touch Verio Flex--Use to test blood sugars 4 times daily or as directed.   Lori Damian MD   blood glucose monitoring (STEFFI CONTOUR NEXT) test strip Use to test blood sugar 4 times daily or as directed.   Rom Helm MD   STEFFI CONTOUR NEXT test strip USE TO TEST BLOOD SUGAR 4 TIMES DAILY OR AS DIRECTED (E11.42)   Rom Helm MD   insulin lispro (HUMALOG KWIKPEN) 100 UNIT/ML injection 2 Units + correctional scale  Do Not give Correction Insulin if BG < 200   < 200 : no additional insulin   200-225: + 1   226-250: + 2   251-275: + 3   276-300: + 4   301-325: + 5   326- 350: +6 units   > 350: + 7 units   Notify MD if glucose > or = 350 mg/dL after administration of correction dose   Lori Damian MD   insulin lispro (HUMALOG KWIKPEN) 100 UNIT/ML injection Inject 2 Units Subcutaneous 3 times daily (before meals) Take 2 unit before breakfast, 2 units before  "lunch and 2 units before dinner PLUS sliding scale  Patient taking differently: Inject 2 Units Subcutaneous 3 times daily (before meals) Take 2 unit before breakfast, 2 units before lunch and 2 units before dinner PLUS sliding scale  TDD around 12 units   Evgeny Dubon MD   Nutritional Supplements (GLUCOSE MANAGEMENT) TABS 4 tabs po for low blood sugar below 70, may repeat q 10-15 minutes as needed   Martha Sánchez APRN CNP   Nutritional Supplements (GLUCOSE MANAGEMENT) TABS Take 2 tab in case on low blood glucose   Lori Damian MD   insulin pen needle 31G X 8 MM B-D UF III short pen needles, use 4 times a day to inject insulin.   Rom Helm MD   order for DME All DM testing supplies including test strips, lancets, solution, syringes, needles and/or pen needles for testing 4 times per day.    Brand \"Contour Next\"   Lori Damian MD   LIFESCAN FINEPOINT LANCETS MISC Use to test blood sugars 4 times daily or as directed.   Rom Helm MD   insulin syringes, disposable, U-100 0.3 ML MISC 1 each 2 times daily   Rom Helm MD   ACE/ARB NOT PRESCRIBED, INTENTIONAL, by Other route continuous prn (Hyperkalemia) Reported on 5/19/2017   Rom Helm MD           "

## 2018-01-09 ENCOUNTER — APPOINTMENT (OUTPATIENT)
Dept: PHYSICAL THERAPY | Facility: CLINIC | Age: 78
DRG: 064 | End: 2018-01-09
Attending: STUDENT IN AN ORGANIZED HEALTH CARE EDUCATION/TRAINING PROGRAM
Payer: COMMERCIAL

## 2018-01-09 ENCOUNTER — HOSPITAL ENCOUNTER (INPATIENT)
Facility: CLINIC | Age: 78
LOS: 3 days | Discharge: NURSING, REHAB OR LONG-TERM CARE | DRG: 064 | End: 2018-01-12
Attending: PSYCHIATRY & NEUROLOGY | Admitting: PSYCHIATRY & NEUROLOGY
Payer: COMMERCIAL

## 2018-01-09 ENCOUNTER — APPOINTMENT (OUTPATIENT)
Dept: CARDIOLOGY | Facility: CLINIC | Age: 78
DRG: 064 | End: 2018-01-09
Attending: STUDENT IN AN ORGANIZED HEALTH CARE EDUCATION/TRAINING PROGRAM
Payer: COMMERCIAL

## 2018-01-09 ENCOUNTER — APPOINTMENT (OUTPATIENT)
Dept: OCCUPATIONAL THERAPY | Facility: CLINIC | Age: 78
DRG: 064 | End: 2018-01-09
Attending: STUDENT IN AN ORGANIZED HEALTH CARE EDUCATION/TRAINING PROGRAM
Payer: COMMERCIAL

## 2018-01-09 ENCOUNTER — APPOINTMENT (OUTPATIENT)
Dept: SPEECH THERAPY | Facility: CLINIC | Age: 78
DRG: 064 | End: 2018-01-09
Attending: STUDENT IN AN ORGANIZED HEALTH CARE EDUCATION/TRAINING PROGRAM
Payer: COMMERCIAL

## 2018-01-09 DIAGNOSIS — I63.212 CEREBROVASCULAR ACCIDENT (CVA) DUE TO OCCLUSION OF LEFT VERTEBRAL ARTERY (H): ICD-10-CM

## 2018-01-09 DIAGNOSIS — E78.5 HYPERLIPIDEMIA LDL GOAL <100: ICD-10-CM

## 2018-01-09 DIAGNOSIS — E11.29 TYPE 2 DIABETES MELLITUS WITH OTHER DIABETIC KIDNEY COMPLICATION, WITH LONG-TERM CURRENT USE OF INSULIN (H): ICD-10-CM

## 2018-01-09 DIAGNOSIS — R55 SYNCOPE: ICD-10-CM

## 2018-01-09 DIAGNOSIS — R30.0 DYSURIA: ICD-10-CM

## 2018-01-09 DIAGNOSIS — I63.212 CEREBROVASCULAR ACCIDENT (CVA) DUE TO STENOSIS OF LEFT VERTEBRAL ARTERY (H): ICD-10-CM

## 2018-01-09 DIAGNOSIS — I10 HYPERTENSION GOAL BP (BLOOD PRESSURE) < 140/90: ICD-10-CM

## 2018-01-09 DIAGNOSIS — E11.9 TYPE 2 DIABETES, HBA1C GOAL < 8% (H): ICD-10-CM

## 2018-01-09 DIAGNOSIS — E11.42 TYPE 2 DIABETES MELLITUS WITH DIABETIC POLYNEUROPATHY, WITH LONG-TERM CURRENT USE OF INSULIN (H): ICD-10-CM

## 2018-01-09 DIAGNOSIS — Z79.4 TYPE 2 DIABETES MELLITUS WITH DIABETIC POLYNEUROPATHY, WITH LONG-TERM CURRENT USE OF INSULIN (H): ICD-10-CM

## 2018-01-09 DIAGNOSIS — Z79.4 TYPE 2 DIABETES MELLITUS WITH OTHER DIABETIC KIDNEY COMPLICATION, WITH LONG-TERM CURRENT USE OF INSULIN (H): ICD-10-CM

## 2018-01-09 DIAGNOSIS — I63.112 CEREBROVASCULAR ACCIDENT (CVA) DUE TO EMBOLISM OF LEFT VERTEBRAL ARTERY (H): Primary | ICD-10-CM

## 2018-01-09 DIAGNOSIS — E13.10 DIABETIC KETOACIDOSIS WITHOUT COMA ASSOCIATED WITH OTHER SPECIFIED DIABETES MELLITUS (H): ICD-10-CM

## 2018-01-09 DIAGNOSIS — E11.42 TYPE 2 DIABETES MELLITUS WITH DIABETIC POLYNEUROPATHY (H): ICD-10-CM

## 2018-01-09 DIAGNOSIS — R73.9 HYPERGLYCEMIA: ICD-10-CM

## 2018-01-09 DIAGNOSIS — N20.0 CALCULUS OF KIDNEY: ICD-10-CM

## 2018-01-09 DIAGNOSIS — M19.90 ARTHRITIS: ICD-10-CM

## 2018-01-09 PROBLEM — I63.9 STROKE (CEREBRUM) (H): Status: ACTIVE | Noted: 2018-01-09

## 2018-01-09 LAB
CREAT SERPL-MCNC: 1.15 MG/DL (ref 0.66–1.25)
GFR SERPL CREATININE-BSD FRML MDRD: 61 ML/MIN/1.7M2
GLUCOSE BLDC GLUCOMTR-MCNC: 110 MG/DL (ref 70–99)
GLUCOSE BLDC GLUCOMTR-MCNC: 125 MG/DL (ref 70–99)
GLUCOSE BLDC GLUCOMTR-MCNC: 141 MG/DL (ref 70–99)
GLUCOSE BLDC GLUCOMTR-MCNC: 144 MG/DL (ref 70–99)
GLUCOSE BLDC GLUCOMTR-MCNC: 151 MG/DL (ref 70–99)
GLUCOSE BLDC GLUCOMTR-MCNC: 225 MG/DL (ref 70–99)
GLUCOSE BLDC GLUCOMTR-MCNC: 47 MG/DL (ref 70–99)
GLUCOSE BLDC GLUCOMTR-MCNC: 475 MG/DL (ref 70–99)
GLUCOSE BLDC GLUCOMTR-MCNC: 48 MG/DL (ref 70–99)
GLUCOSE BLDC GLUCOMTR-MCNC: 521 MG/DL (ref 70–99)
GLUCOSE BLDC GLUCOMTR-MCNC: 526 MG/DL (ref 70–99)
GLUCOSE BLDC GLUCOMTR-MCNC: 53 MG/DL (ref 70–99)
GLUCOSE BLDC GLUCOMTR-MCNC: 582 MG/DL (ref 70–99)
GLUCOSE BLDC GLUCOMTR-MCNC: 88 MG/DL (ref 70–99)
GLUCOSE BLDC GLUCOMTR-MCNC: 97 MG/DL (ref 70–99)
HBA1C MFR BLD: 7.9 % (ref 4.3–6)
INTERPRETATION ECG - MUSE: NORMAL
KETONES BLD-SCNC: 0.2 MMOL/L (ref 0–0.6)
PLATELET # BLD AUTO: 273 10E9/L (ref 150–450)

## 2018-01-09 PROCEDURE — 97530 THERAPEUTIC ACTIVITIES: CPT | Mod: GP

## 2018-01-09 PROCEDURE — 97116 GAIT TRAINING THERAPY: CPT | Mod: GP

## 2018-01-09 PROCEDURE — 97165 OT EVAL LOW COMPLEX 30 MIN: CPT | Mod: GO | Performed by: OCCUPATIONAL THERAPIST

## 2018-01-09 PROCEDURE — 00000146 ZZHCL STATISTIC GLUCOSE BY METER IP

## 2018-01-09 PROCEDURE — 36415 COLL VENOUS BLD VENIPUNCTURE: CPT | Performed by: PSYCHIATRY & NEUROLOGY

## 2018-01-09 PROCEDURE — 25500064 ZZH RX 255 OP 636: Performed by: PSYCHIATRY & NEUROLOGY

## 2018-01-09 PROCEDURE — 25000128 H RX IP 250 OP 636: Performed by: STUDENT IN AN ORGANIZED HEALTH CARE EDUCATION/TRAINING PROGRAM

## 2018-01-09 PROCEDURE — 97535 SELF CARE MNGMENT TRAINING: CPT | Mod: GO | Performed by: OCCUPATIONAL THERAPIST

## 2018-01-09 PROCEDURE — 82565 ASSAY OF CREATININE: CPT | Performed by: PSYCHIATRY & NEUROLOGY

## 2018-01-09 PROCEDURE — 25000131 ZZH RX MED GY IP 250 OP 636 PS 637: Performed by: STUDENT IN AN ORGANIZED HEALTH CARE EDUCATION/TRAINING PROGRAM

## 2018-01-09 PROCEDURE — 36415 COLL VENOUS BLD VENIPUNCTURE: CPT | Performed by: STUDENT IN AN ORGANIZED HEALTH CARE EDUCATION/TRAINING PROGRAM

## 2018-01-09 PROCEDURE — 97161 PT EVAL LOW COMPLEX 20 MIN: CPT | Mod: GP

## 2018-01-09 PROCEDURE — 25000132 ZZH RX MED GY IP 250 OP 250 PS 637

## 2018-01-09 PROCEDURE — 92610 EVALUATE SWALLOWING FUNCTION: CPT | Mod: GN | Performed by: SPEECH-LANGUAGE PATHOLOGIST

## 2018-01-09 PROCEDURE — 93306 TTE W/DOPPLER COMPLETE: CPT | Mod: 26 | Performed by: INTERNAL MEDICINE

## 2018-01-09 PROCEDURE — 12000003 ZZH R&B CRITICAL UMMC

## 2018-01-09 PROCEDURE — 40000193 ZZH STATISTIC PT WARD VISIT

## 2018-01-09 PROCEDURE — 93306 TTE W/DOPPLER COMPLETE: CPT

## 2018-01-09 PROCEDURE — 82010 KETONE BODYS QUAN: CPT | Performed by: STUDENT IN AN ORGANIZED HEALTH CARE EDUCATION/TRAINING PROGRAM

## 2018-01-09 PROCEDURE — 92526 ORAL FUNCTION THERAPY: CPT | Mod: GN | Performed by: SPEECH-LANGUAGE PATHOLOGIST

## 2018-01-09 PROCEDURE — 25000125 ZZHC RX 250: Performed by: STUDENT IN AN ORGANIZED HEALTH CARE EDUCATION/TRAINING PROGRAM

## 2018-01-09 PROCEDURE — 25000132 ZZH RX MED GY IP 250 OP 250 PS 637: Performed by: STUDENT IN AN ORGANIZED HEALTH CARE EDUCATION/TRAINING PROGRAM

## 2018-01-09 PROCEDURE — 25800025 ZZH RX 258

## 2018-01-09 PROCEDURE — 85049 AUTOMATED PLATELET COUNT: CPT | Performed by: PSYCHIATRY & NEUROLOGY

## 2018-01-09 PROCEDURE — 40000225 ZZH STATISTIC SLP WARD VISIT: Performed by: SPEECH-LANGUAGE PATHOLOGIST

## 2018-01-09 PROCEDURE — 83036 HEMOGLOBIN GLYCOSYLATED A1C: CPT | Performed by: PSYCHIATRY & NEUROLOGY

## 2018-01-09 PROCEDURE — 40000133 ZZH STATISTIC OT WARD VISIT: Performed by: OCCUPATIONAL THERAPIST

## 2018-01-09 RX ORDER — ONDANSETRON 2 MG/ML
4 INJECTION INTRAMUSCULAR; INTRAVENOUS EVERY 6 HOURS PRN
Status: DISCONTINUED | OUTPATIENT
Start: 2018-01-09 | End: 2018-01-12 | Stop reason: HOSPADM

## 2018-01-09 RX ORDER — ASPIRIN 81 MG/1
324 TABLET, CHEWABLE ORAL DAILY
Status: DISCONTINUED | OUTPATIENT
Start: 2018-01-10 | End: 2018-01-12 | Stop reason: HOSPADM

## 2018-01-09 RX ORDER — BISACODYL 10 MG
10 SUPPOSITORY, RECTAL RECTAL DAILY PRN
Status: DISCONTINUED | OUTPATIENT
Start: 2018-01-09 | End: 2018-01-12 | Stop reason: HOSPADM

## 2018-01-09 RX ORDER — NICOTINE POLACRILEX 4 MG
15-30 LOZENGE BUCCAL
Status: DISCONTINUED | OUTPATIENT
Start: 2018-01-09 | End: 2018-01-09

## 2018-01-09 RX ORDER — AMOXICILLIN 250 MG
2 CAPSULE ORAL 2 TIMES DAILY PRN
Status: DISCONTINUED | OUTPATIENT
Start: 2018-01-09 | End: 2018-01-09

## 2018-01-09 RX ORDER — ONDANSETRON 4 MG/1
4 TABLET, ORALLY DISINTEGRATING ORAL EVERY 6 HOURS PRN
Status: DISCONTINUED | OUTPATIENT
Start: 2018-01-09 | End: 2018-01-12 | Stop reason: HOSPADM

## 2018-01-09 RX ORDER — NICOTINE POLACRILEX 4 MG
15-30 LOZENGE BUCCAL
Status: DISCONTINUED | OUTPATIENT
Start: 2018-01-09 | End: 2018-01-12 | Stop reason: HOSPADM

## 2018-01-09 RX ORDER — AMOXICILLIN 250 MG
1 CAPSULE ORAL 2 TIMES DAILY PRN
Status: DISCONTINUED | OUTPATIENT
Start: 2018-01-09 | End: 2018-01-09

## 2018-01-09 RX ORDER — PROCHLORPERAZINE 25 MG
12.5 SUPPOSITORY, RECTAL RECTAL EVERY 12 HOURS PRN
Status: DISCONTINUED | OUTPATIENT
Start: 2018-01-09 | End: 2018-01-12 | Stop reason: HOSPADM

## 2018-01-09 RX ORDER — DEXTROSE MONOHYDRATE 25 G/50ML
25-50 INJECTION, SOLUTION INTRAVENOUS
Status: DISCONTINUED | OUTPATIENT
Start: 2018-01-09 | End: 2018-01-09

## 2018-01-09 RX ORDER — AMOXICILLIN 250 MG
1 CAPSULE ORAL 2 TIMES DAILY PRN
Status: DISCONTINUED | OUTPATIENT
Start: 2018-01-09 | End: 2018-01-12 | Stop reason: HOSPADM

## 2018-01-09 RX ORDER — ATORVASTATIN CALCIUM 20 MG/1
20 TABLET, FILM COATED ORAL DAILY
Status: DISCONTINUED | OUTPATIENT
Start: 2018-01-09 | End: 2018-01-12 | Stop reason: HOSPADM

## 2018-01-09 RX ORDER — AMOXICILLIN 250 MG
2 CAPSULE ORAL 2 TIMES DAILY PRN
Status: DISCONTINUED | OUTPATIENT
Start: 2018-01-09 | End: 2018-01-12 | Stop reason: HOSPADM

## 2018-01-09 RX ORDER — LABETALOL HYDROCHLORIDE 5 MG/ML
10-20 INJECTION, SOLUTION INTRAVENOUS EVERY 10 MIN PRN
Status: DISCONTINUED | OUTPATIENT
Start: 2018-01-09 | End: 2018-01-12 | Stop reason: HOSPADM

## 2018-01-09 RX ORDER — ASPIRIN 81 MG/1
TABLET, CHEWABLE ORAL
Status: COMPLETED
Start: 2018-01-09 | End: 2018-01-09

## 2018-01-09 RX ORDER — PROCHLORPERAZINE MALEATE 5 MG
5 TABLET ORAL EVERY 6 HOURS PRN
Status: DISCONTINUED | OUTPATIENT
Start: 2018-01-09 | End: 2018-01-12 | Stop reason: HOSPADM

## 2018-01-09 RX ORDER — ASPIRIN 81 MG/1
81 TABLET, CHEWABLE ORAL DAILY
Status: DISCONTINUED | OUTPATIENT
Start: 2018-01-09 | End: 2018-01-09

## 2018-01-09 RX ORDER — DEXTROSE MONOHYDRATE 25 G/50ML
25-50 INJECTION, SOLUTION INTRAVENOUS
Status: DISCONTINUED | OUTPATIENT
Start: 2018-01-09 | End: 2018-01-12 | Stop reason: HOSPADM

## 2018-01-09 RX ADMIN — ENOXAPARIN SODIUM 40 MG: 40 INJECTION SUBCUTANEOUS at 09:24

## 2018-01-09 RX ADMIN — DEXTROSE MONOHYDRATE 25 ML: 500 INJECTION PARENTERAL at 04:03

## 2018-01-09 RX ADMIN — INSULIN ASPART 7 UNITS: 100 INJECTION, SOLUTION INTRAVENOUS; SUBCUTANEOUS at 17:39

## 2018-01-09 RX ADMIN — CEPHALEXIN 500 MG: 250 CAPSULE ORAL at 20:49

## 2018-01-09 RX ADMIN — ASPIRIN 81 MG CHEWABLE TABLET 81 MG: 81 TABLET CHEWABLE at 17:00

## 2018-01-09 RX ADMIN — CEPHALEXIN 500 MG: 250 CAPSULE ORAL at 09:23

## 2018-01-09 RX ADMIN — HUMAN ALBUMIN MICROSPHERES AND PERFLUTREN 6 ML: 10; .22 INJECTION, SOLUTION INTRAVENOUS at 15:30

## 2018-01-09 RX ADMIN — ATORVASTATIN CALCIUM 20 MG: 20 TABLET, FILM COATED ORAL at 11:52

## 2018-01-09 RX ADMIN — HUMAN INSULIN 5.5 UNITS/HR: 100 INJECTION, SOLUTION SUBCUTANEOUS at 18:54

## 2018-01-09 RX ADMIN — DEXTROSE MONOHYDRATE 25 ML: 500 INJECTION PARENTERAL at 08:12

## 2018-01-09 RX ADMIN — CEPHALEXIN 500 MG: 250 CAPSULE ORAL at 14:34

## 2018-01-09 ASSESSMENT — VISUAL ACUITY
OU: BASELINE;GLASSES

## 2018-01-09 ASSESSMENT — ACTIVITIES OF DAILY LIVING (ADL): PREVIOUS_RESPONSIBILITIES: MEAL PREP;HOUSEKEEPING;LAUNDRY;SHOPPING;YARDWORK;DRIVING

## 2018-01-09 NOTE — LETTER
Transition Communication Hand-off for Care Transitions to Next Level of Care Provider    Name: Pete Sheldon  MRN #: [id number]  Primary Care Provider: Rom Helm MD     [facility] [address]  [facility] [address]     Reason for Hospitalization:  Acute Stroke  Stroke (cerebrum) (H)  Admit Date/Time: 1/9/2018 12:11 AM  Discharge Date: 1/12/18  Payor Source: Payor: UCARE / Plan: [employer] FOR [employer] / Product Type: HMO /       Reason for Communication Hand-off Referral:     Discharge Plan:     Social Work Services Discharge Note     Patient Name:  Pete Sheldon     Anticipated Discharge Date:  1/12/18     Discharge Disposition:   [facility] ([phone])     Following MD:  Facility Assignment     Pre-Admission Screening (PAS) online form has been completed.  The Level of Care (LOC) is:  Determined  Confirmation Code is:  Not required as pt is being admitted to ARU.        Additional Services/Equipment Arranged:  Dr. Paul has confirmed readiness for discharge.  Admissions (Goldie) at [facility] has confirmed acceptance for admit.   has arranged for [facility]  (amber [phone]) to provide w/c transport at 2:30pm.       Patient / Family response to discharge plan:  Pt and wife voice understanding of the discharge plan and agreement with the discharge plan.     Persons notified of above discharge plan:  Pt, wife, 6A nursing and Dr. Paul.     Staff Discharge Instructions:  Please fax discharge orders and signed hard scripts for any controlled substances.  Please print a packet and send with patient.      CTS Handoff completed:  YES     Medicare Notice of Rights provided to the patient/family:  Yes.     YUE Lopez  Social Work, 6A  Phone:  [phone]  Pager:  [phone]  1/12/2018

## 2018-01-09 NOTE — PROGRESS NOTES
" 01/09/18 1500   Quick Adds   Type of Visit Initial Occupational Therapy Evaluation   Living Environment   Lives With spouse   Living Arrangements house   Home Accessibility stairs to enter home;stairs within home;tub/shower is not walk in   Number of Stairs to Enter Home 2   Number of Stairs Within Home 12  (to basement; pt does not have to use)   Stair Railings at Home (yes)   Transportation Available car;family or friend will provide   Living Environment Comment Pt lives with wife in single story home with basement.  PLOF includes IND with all mobility (no AD) and ADL's.  Pt responsible for driving wife and self.  Wife and  \"always together\".  Unlikely wife able to help   Self-Care   Dominant Hand right   Usual Activity Tolerance good   Current Activity Tolerance moderate   Regular Exercise no   Equipment Currently Used at Home none   Activity/Exercise/Self-Care Comment Pt was previously independent with all ADL/IADLs   Functional Level Prior   Ambulation 0-->independent   Transferring 0-->independent   Toileting 0-->independent   Bathing 0-->independent   Dressing 0-->independent   Eating 0-->independent   Communication 0-->understands/communicates without difficulty   Cognition 0 - no cognition issues reported   Fall history within last six months yes   Number of times patient has fallen within last six months 1   Which of the above functional risks had a recent onset or change? ambulation;transferring;toileting;bathing;cognition;fall history       Present no   Language english   General Information   Onset of Illness/Injury or Date of Surgery - Date 01/09/18   Referring Physician Amadou Fritz MD   Patient/Family Goals Statement return to home safely   Additional Occupational Profile Info/Pertinent History of Current Problem Pete Sheldon is a 77 year old M who presents with acute ischemic infarct, etiology likely due to emboli originating from occluded R vertebral a. Initial " head CT/CTA shows no bleed. MRI small acute infact in L lateral thalamus   Precautions/Limitations fall precautions   General Observations Pt pleasant and agreeable   General Info Comments Activity: up with assist   Cognitive Status Examination   Orientation person  (month; not date, year or specific hospital)   Level of Consciousness alert   Able to Follow Commands WNL/WFL   Personal Safety (Cognitive) mild impairment   Memory impaired   Cognitive Comment Pt pleasant, oriented to person, hospital and situation though acknowledges difficulty organizing thoughts.  States he thought he saw his wife and daughter in the hallway earlier this day; however, when he spoke to his wife on the phone learned she did not visit   Visual Perception   Visual Perception Wears glasses   Visual Acuity Pt with R retina came detatched approx 2 years ago - has limited vision in R eye   Visual Perception Comments no acute visual changes noted   Sensory Examination   Sensory Comments Pt with numbness to dorsal aspect of R hand/forearm   Pain Assessment   Patient Currently in Pain No   Integumentary/Edema   Integumentary/Edema no deficits were identifed   Range of Motion (ROM)   ROM Comment BUE WFL   Strength   Strength Comments generalized weakness present though largely symmetrical bilaterally;  demonstrates appropriate functional strength in all extremities   Coordination   Coordination Comments impaired due to changes in sensation   Transfer Skill: Bed to Chair/Chair to Bed   Level of Elizabeth: Bed to Chair contact guard   Assistive Device - Transfer Skill Bed to Chair Chair to Bed Rehab Eval standard walker   Transfer Skill: Sit to Stand   Level of Elizabeth: Sit/Stand contact guard   Assistive Device for Transfer: Sit/Stand standard walker   Transfer Skill: Toilet Transfer   Level of Elizabeth: Toilet contact guard   Assistive Device grab bars   Toilet Transfer Skill Comments min vc for safe transfer techniques   Balance  "  Balance Comments impaired; significant LOB x 2 requiring min-mod A to correct; otherwise mildly unsteady,  Pt reports \"I feel like i'm standing on a ball\"   Toileting   Level of Edgar: Toilet minimum assist (75% patients effort)   Grooming   Level of Edgar: Grooming contact guard   Instrumental Activities of Daily Living (IADL)   Previous Responsibilities meal prep;housekeeping;laundry;shopping;yardwork;driving   Activities of Daily Living Analysis   Impairments Contributing to Impaired Activities of Daily Living balance impaired;cognition impaired;coordination impaired;sensation decreased;strength decreased   General Therapy Interventions   Planned Therapy Interventions ADL retraining;IADL retraining;bed mobility training;cognition;fine motor coordination training;strengthening;transfer training   Clinical Impression   Criteria for Skilled Therapeutic Interventions Met yes, treatment indicated   OT Diagnosis decreased activity tolerance and independence with ADL/IADLs   Influenced by the following impairments impaired cognition, impaired balance, decreased sensation, decreased strength   Assessment of Occupational Performance 5 or more Performance Deficits   Identified Performance Deficits transfers, toileting, bathing, dressing, home management, driving   Clinical Decision Making (Complexity) Low complexity   Therapy Frequency daily   Predicted Duration of Therapy Intervention (days/wks) 1/15/18   Anticipated Discharge Disposition Transitional Care Facility   Risks and Benefits of Treatment have been explained. Yes   Patient, Family & other staff in agreement with plan of care Yes   Total Evaluation Time   Total Evaluation Time (Minutes) 5     "

## 2018-01-09 NOTE — PLAN OF CARE
Problem: Patient Care Overview  Goal: Plan of Care/Patient Progress Review  OT 6A: Evaluation complete and treatment initiated.  Pt CGA-Min A for in room mobility, toileting and g/h while standing at sink.  Pt with significant LOB during dynamic standing activity requiring mod A to correct.  Otherwise ambulating with CGA and FWW.      Discharge Planner OT   Patient plan for discharge: TCU  Current status: see above  Barriers to return to prior living situation: impaired balance, impaired cognition, decreased sensation in R hand  Recommendations for discharge: TCU  Rationale for recommendations: Pt is currently below baseline with regards to mobility and independence with self cares and will benefit from continued skilled therapy intervention to address deficits.         Entered by: Alexandrea Virgen 01/09/2018 4:15 PM

## 2018-01-09 NOTE — PROGRESS NOTES
01/09/18 0905   General Information   Onset Date 01/08/18   Start of Care Date 01/09/18   Referring Physician Amadou Fritz MD   Patient/Family Goals Statement to eat a regular diet   Swallowing Evaluation Bedside swallow evaluation   Behaviorial Observations WFL (within functional limits)   Mode of current nutrition NPO   Respiratory Status Room air   Comments Pt is a 77 y.o with hx of DM 2 and HTN who is s/p fall at home on 1/8/18 and then developed right sided facial droop and right UE weakness/numbness. Pt was transferred from [facility] to this site. Swallow eval ordered to r/o swallow difficulty   Clinical Swallow Evaluation   Oral Musculature anomalies present  (slight right sided facial droop)   Structural Abnormalities none present   Dentition upper dentures   Mucosal Quality dry   Mandibular Strength and Mobility intact   Oral Labial Strength and Mobility impaired retraction   Lingual Strength and Mobility WFL   Velar Elevation intact   Buccal Strength and Mobility intact   Laryngeal Function Cough;Throat clear;Swallow;Voicing initiated;Dry swallow palpated   Additional Documentation Yes   Additional evaluation(s) completed today No   Swallow Eval   Feeding Assistance no assistance needed   Clinical Swallow Eval: Thin Liquid Texture Trial   Mode of Presentation, Thin Liquids cup;spoon;straw;self-fed;fed by clinician   Volume of Liquid or Food Presented 4 oz   Oral Phase of Swallow WFL   Pharyngeal Phase of Swallow intact   Diagnostic Statement No s/s of aspiration on multiple trials of thin liquids by spoon, cup rim, straw- swallow initiation is generally timely   Clinical Swallow Eval: Puree Solid Texture Trial   Mode of Presentation, Puree spoon;fed by clinician   Volume of Puree Presented small bites of puree- less than 2 oz   Oral Phase, Puree WFL   Pharyngeal Phase, Puree intact   Successful Strategies Trialed During Procedure, Puree hard swallow   Diagnostic Statement No s/s of  aspiration with small bites of puree; oral prep  WNL, no oral residue or sensation of pharygneal retention   Clinical Swallow Eval: Solid Food Texture Trial   Mode of Presentation, Solid self-fed;fed by clinician   Volume of Solid Food Presented small bites of DD3 and regular solids   Oral Phase, Solid other (see comments)  (mildly slower oral prep)   Pharyngeal Phase, Solid intact   Successful Strategies Trialed During Procedure, Solid hard swallow;other (see comments)  (alternate solids and liquids)   Diagnostic Statement No s/s of aspiration, no sensation of pharyngeal retention; mildly incraed time for oral prep with regualr solids vs DD3 but swallow sequence is complete; minimal lingual stasis initially but clears with 2nd swallow no sensation of pharygneal retention   VFSS Evaluation   VFSS Additional Documentation No   FEES Evaluation   Additional Documentation No   Swallow Compensations   Swallow Compensations Alternate viscosity of consistencies;Effortful swallow;Pacing;Reduce amounts   Results Oral difficulties only   Esophageal Phase of Swallow   Patient reports or presents with symptoms of esophageal dysphagia No   General Therapy Interventions   Planned Therapy Interventions Dysphagia Treatment   Swallow Eval: Clinical Impressions   Skilled Criteria for Therapy Intervention Skilled criteria met.  Treatment indicated.   Functional Assessment Scale (FAS) 6   Dysphagia Outcome Severity Scale (JESSIE) Level 6 - JESSIE   Treatment Diagnosis Minimal dyaphagia   Diet texture recommendations Regular diet;Thin liquids   Recommended Feeding/Eating Techniques alternate between small bites and sips of food/liquid;hard swallow w/ each bite or sip;maintain upright posture during/after eating for 30 mins;small sips/bites   Demonstrates Need for Referral to Another Service occupational therapy;physical therapy   Therapy Frequency 3 times/wk   Predicted Duration of Therapy Intervention (days/wks) 1-2 days   Anticipated  Discharge Disposition home w/ assist   Risks and Benefits of Treatment have been explained. Yes   Patient, family and/or staff in agreement with Plan of Care Yes   Clinical Impression Comments Completed clinical swallow eval per MD orders. Pt presents with  a functional swallow with only minimal oral difficulties as a result of some very mild right sided oral motor weakness. Swallow initiation is prompt but very mild slower oral prep with reuglar solids vs DD3 solids- however swallow sequence is complete without oral residue ( initially minimal lingual stasis that clears with 2nd swallow) and also no sensation of pharygneal retention. No s/s of aspiration on multiple trials of thin liquids ( by spoon, cup rim, straw) and no s/s of aspiration with trials of puree, DD3 solids or regular solids. Recommending that Pt initiate a regular diet with thin liquids. Pt should follow safe swallow precautions: upright for all po, small bites/sips, alternate solids and liquids, pace self- eat slowly.; double swallow prn. SLP to f/u for diet tolerance; assessment of follow though of safe swallow strategies and education. Anticipate likely just 1-2 f/u sessions for swallowing and that goals will be met at that time. Pt was able to follow commands and converse- there was no obvious difficulty with speech/language ( no dysarthriat) and pt was oriented to time, place, date and is not reporting any changes in memory/cognition- so likely once swallow goals met then not anticipating further sptx needs.    Total Evaluation Time   Total Evaluation Time (Minutes) 10

## 2018-01-09 NOTE — ED NOTES
Pt's wife requesting for pt to have something to eat, MD advised who stated as long as he could swallow liquids appropriately he was allowed to eat. Pt able to tolerate liquids appropriately without choking or dribbling. Boxed lunch provided

## 2018-01-09 NOTE — H&P
[facility], [place]      Neurology Stroke Admission    Patient Name: Pete Sheldon  : 1940 MRN#: [id number]    STROKE DATA    Stroke Code:  Stroke code not activated.  Time patient seen:  2018 0100  Last known normal (pt's baseline):  18 1100       TPA treatment:  Not given due to unclear time of onset.           [employer] Stroke Scale  Patient seen at 0100 2018   Score    Level of consciousness: (0)   Alert, keenly responsive    LOC questions: (1)   Answers one question correctly    LOC commands: (0)   Performs both tasks correctly    Best gaze: (0)   Normal    Visual: (0)   No visual loss    Facial palsy: (1)   Minor paralysis (flat nasolabial fold, smile asymmetry)    Motor arm (left): (0)   No drift    Motor arm (right): (0)   No drift    Motor leg (left): (0)   No drift    Motor leg (right): (0)   No drift    Limb ataxia: (2)   Present in two limbs    Sensory: (1)   Mild to moderate sensory loss    Best language: (0)   Normal- no aphasia    Dysarthria: (0)   Normal    Extinction and inattention: (0)   No abnormality        Total Score:  5           Dysphagia Screen  Time of screenin2018 0230  Screening results: Passed screening, no dysarthria - Regular Diet with thin liquids     ASSESSMENT & PLAN BY PROBLEM     Work-up for Ischemic Stroke (no TPA)     #Acute Ischemic Stroke (without tPA) Plan  #Right Vertebral Artery dissection  NIHSS 5.  Not a tPA candidate.  No longer a shine trial candidate.  He was administered aspirin 325 at OSH.  We will plan for permissive hypertension initially.  No PMR consult for now.  He has completed all his necessary brain and vessel imaging at OSH.  - Admit to Neurology  - Neurochecks  - Permissive HTN; labetalol PRN for SBP > 220  - Euthermia, Euglycemia  - Daily aspirin for secondary stroke prevention  - Statin  - TTE with Bubble Study  - Telemetry, EKG  - Bedside Glucose Monitoring  - A1c, Lipid  Panel, Troponin x 3  - PT/OT/SLP  - Stroke Education  - Depression Screen  - Apnea Screen    During initial physical assessment, the plan of care was discussed and developed with patient.  Plan of care includes: stroke workup..    Patient was admitted via direct admit.    The patient will be admitted to the Neuro Critical Care/Stroke team..     Other Medical Problems:  #Urinary Tract Infection   -Continue Keflex    #HTN  Holding home antihypertensives HCTZ and amlodipine    #DMII  -Reduced home glargine to 20  -Holding metformin  -Sliding scale    #BPH  -Holding flomax    Fluids/Electrolytes/Nutrition  Regular diet  Avoid hypotonic fluids.    Nutrition: None      Prophylaxis            For VTE Prevention:  - enoxaparin    For Acid Suppression:  - GI prophylaxis is not indicated    Code Status  Full Code    HPI  Pete Sheldon is a 77 year old male with h/o DMII and HTN who was transferred from outside hospital for stroke.  He fell at about 7 am 1/8 trying to use the bathroom.  He did not strike his head.  His wife was unable to get him up so she called paramedics.  On his first presentation to the ED he was found to have a UTI and was sent home on Keflex.  At approximately 11 am it was noted that he had right arm numbness and difficulty ambulating and he returned to the emergency department.  His NIH on presentation was 4 for facial palsy, right arm weakness, limb ataxia, and sensory loss.  Stroke code was activated at OSH, and it was decided that since time of onset was unclear, it would not be appropriate to administer TPA.  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  He met criteria for shine trial, but transfer was delayed causing him to be outside the time window on [facility] to [facility].    Pertinent Past Medical/Surgical History  Past Medical History:   Diagnosis Date     Calculus of ureter 1980's     Hypertension      Microalbuminuria 2/15/2016     Other and unspecified  hyperlipidemia      Type 2 diabetes, HbA1C goal < 8% (H) 7/1995       Past Surgical History:   Procedure Laterality Date     C NONSPECIFIC PROCEDURE  1980's    Nasal septoplasty     HC COLONOSCOPY THRU STOMA, DIAGNOSTIC  6/12/2007    Normal     HC FLEX SIGMOIDOSCOPY W/WO MIGUEL SPEC BY BRUSH/WASH  12/30/1999    Normal to 60 cm     HC REPAIR INCISIONAL HERNIA,REDUCIBLE  1/2001, 1999    Hernia Repair, Incisional, Unilateral (right)     HC REPAIR INCISIONAL HERNIA,REDUCIBLE  1996    Hernia Repair, Incisional, Unilateral (left, with mesh placement)       Medications:   Prescriptions Prior to Admission   Medication Sig Dispense Refill Last Dose     cephALEXin (KEFLEX) 500 MG capsule Take 1 capsule (500 mg) by mouth 3 times daily for 7 days 21 capsule 0 1/8/2018 at Unknown time     metFORMIN (GLUCOPHAGE) 500 MG tablet TAKE TWO TABLETS BY MOUTH TWICE DAILY WITH MEALS 360 tablet 0 1/8/2018 at Unknown time     blood glucose monitoring (ONETOUCH VERIO IQ) test strip One Touch Verio Flex--Use to test blood sugars 4 times daily or as directed. 200 strip 3      blood glucose monitoring (STEFFI CONTOUR NEXT) test strip Use to test blood sugar 4 times daily or as directed. 50 each 0      hydrochlorothiazide (MICROZIDE) 12.5 MG capsule TAKE ONE CAPSULE BY MOUTH IN THE MORNING 90 capsule 0 1/8/2018 at Unknown time     amLODIPine (NORVASC) 5 MG tablet TAKE ONE TABLET BY MOUTH ONCE DAILY (Patient taking differently: TAKE ONE TABLET BY MOUTH ONCE DAILY q PM) 90 tablet 0 1/8/2018 at Unknown time     STEFFI CONTOUR NEXT test strip USE TO TEST BLOOD SUGAR 4 TIMES DAILY OR AS DIRECTED (E11.42) 400 strip 0 Taking     tamsulosin (FLOMAX) 0.4 MG capsule TAKE ONE CAPSULE BY MOUTH ONCE DAILY 90 capsule 1 1/8/2018 at Unknown time     insulin lispro (HUMALOG KWIKPEN) 100 UNIT/ML injection 2 Units + correctional scale  Do Not give Correction Insulin if BG < 200   < 200 : no additional insulin   200-225: + 1   226-250: + 2   251-275: + 3   276-300: + 4  "  301-325: + 5   326- 350: +6 units   > 350: + 7 units   Notify MD if glucose > or = 350 mg/dL after administration of correction dose 30 mL 1 Taking     lovastatin (MEVACOR) 40 MG tablet Take 1 tablet (40 mg) by mouth At Bedtime 90 tablet 3 1/7/2018 at Unknown time     insulin glargine (LANTUS) 100 UNIT/ML injection Inject 32 Units Subcutaneous every morning (before breakfast) 3 mL 5 1/8/2018 at am     insulin lispro (HUMALOG KWIKPEN) 100 UNIT/ML injection Inject 2 Units Subcutaneous 3 times daily (before meals) Take 2 unit before breakfast, 2 units before lunch and 2 units before dinner PLUS sliding scale (Patient taking differently: Inject 2 Units Subcutaneous 3 times daily (before meals) Take 2 unit before breakfast, 2 units before lunch and 2 units before dinner PLUS sliding scale  TDD around 12 units) 3 mL 5 Taking     Nutritional Supplements (GLUCOSE MANAGEMENT) TABS 4 tabs po for low blood sugar below 70, may repeat q 10-15 minutes as needed 100 tablet prn Taking     Nutritional Supplements (GLUCOSE MANAGEMENT) TABS Take 2 tab in case on low blood glucose 60 tablet 0 Taking     multivitamin (OCUVITE) TABS Take 1 tablet by mouth daily FOCUS SELECT PREMIUM WITH LUTEIN per eye doctor  Reported on 5/19/2017 1/8/2018 at Unknown time     insulin pen needle 31G X 8 MM B-D UF III short pen needles, use 4 times a day to inject insulin. 300 each 3 Taking     order for DME All DM testing supplies including test strips, lancets, solution, syringes, needles and/or pen needles for testing 4 times per day.    Brand \"Contour Next\" 3 Month 1 Taking     LIFESCAN FINEPOINT LANCETS MISC Use to test blood sugars 4 times daily or as directed. 200 strip 3 Taking     Glucosamine HCl 750 MG TABS Take 750 mg by mouth 2 times daily   1/7/2018 at Unknown time     loratadine (AF LORATADINE) 10 MG tablet Take 1 tablet (10 mg) by mouth daily 14 tablet 0 1/7/2018 at Unknown time     insulin syringes, disposable, U-100 0.3 ML MISC 1 each " 2 times daily 100 each 6 Taking     Cholecalciferol (VITAMIN D PO) Take 400 Units by mouth daily    2018 at Unknown time     ACE/ARB NOT PRESCRIBED, INTENTIONAL, by Other route continuous prn (Hyperkalemia) Reported on 2017   Taking   .    Allergies:   Allergies   Allergen Reactions     Losartan Other (See Comments)     Dizziness   .    Family History:   Family History   Problem Relation Age of Onset     CEREBROVASCULAR DISEASE Mother       age 95     HEART DISEASE Mother      age 89,  age 95     CEREBROVASCULAR DISEASE Father       age 91, stroke two years previous     Cancer - colorectal Brother      Half-brother     CANCER Sister      Half-sister (?type)     CANCER Sister      Half-sister (?type)     HEART DISEASE Brother      Neurologic Disorder Daughter      Multiple Sclerosis     Psychotic Disorder Son      Schizophrenia   .    Social History:  Quit smoking   No alcohol or drugs    ROS:  The 10 point Review of Systems is negative other than noted in the HPI or here.     PHYSICAL EXAMINATION  Vital Signs:  B/P: 163/83,  T: 97.8,  P: 76,  R: 18    General:  patient lying in bed without any acute distress    HEENT:  normocephalic/atraumatic  Cardio:  RRR  Pulmonary:  no respiratory distress  Abdomen:  non-distended  Extremities:  no edema  Skin:  intact     Neurologic  Mental Status:  fully alert, attentive and oriented, follows commands, speech clear and fluent  Cranial Nerves:  visual fields intact, PERRL, EOMI with normal smooth pursuit, facial sensation intact and symmetric, hearing not formally tested but intact to conversation, palate elevation symmetric and uvula midline, no dysarthria, shoulder shrug strong bilaterally, tongue protrusion midline, Slight right sided facial droop.  Motor:  strength 5/5 throughout upper and lower extremities  Reflexes:  1+ symmetric in biceps, brachioradialis, and patellar.  Unable to elicit achilles bilaterally.  Toes upgoing on right, downgoing on  left.  Sensory:  Symmetric to light touch througout.  Some decreased sensation to pinprick over right upper and lower extremity.    Coordination:  FNF intact bilaterally.  Some difficulty with HS bilaterally.  Station/Gait:  Deferred    Labs  Labs and Imaging reviewed and used in developing the plan; pertinent results included.     Lab Results   Component Value Date     (H) 01/08/2018       The patient was discussed with the fellow, Dr. Guerra.  The staff is Dr. Armenta.    Amadou Fritz MD  Pager: [phone]

## 2018-01-09 NOTE — PLAN OF CARE
Problem: Patient Care Overview  Goal: Plan of Care/Patient Progress Review  Outcome: No Change  Pt admitted at midnight via litter from [facility] for stroke workup. Pt went to the ED after having a fall at home, diagnosed with UTI started on Keflex and was d/c'd. Wife called 911 after being discharged, pt started having R sided deficits, gait changes and right facial droop. VSS ex HTN on arrival. A&Ox4. Neuros include numbness to RUE, numbness/tingling to fingertips and palms, BLE 4/5, slight R pronator drift, slight R facial droop and forgetful at times. MRI showed a small acute infarct. NPO until speech evaluation today. BG on arrival 144. BG at 0400 was 48, D50 IV given BG increased to 125. PIV SL. Incontinent at times d/t urgency. Up with assist of 1-2 GB. Continuous cardiac monitoring on. Bed alarm on, pt forgets to use call light at times. Continue to monitor and follow POC.

## 2018-01-09 NOTE — PLAN OF CARE
Problem: Patient Care Overview  Goal: Plan of Care/Patient Progress Review  Outcome: No Change  VSS. Disoriented to date, knows hospital but not which one. Neuros intact ex generalized weakness with R<L and numbness and tingling in RUE from elbow to hand per pt, and slight R pronator drift. On continuous cardiac monitoring, NSR. PIV SL. No complaints of pain. Pt was cleared to be on regular diet by speech, tolerating well, on carb counting. Worked with PT this AM, plans to work with OT this afternoon. Pt is going to have ECHO completed this afternoon as well. MRI checklist sent down. Up with A1,GB, and walker. On SS insulin, BG at 0800 was 54, 25ml of 50% dextrose IV given, BG recheck was 151. Voiding spontaneously via bathroom,on Keflex for UTI. Used call light appropriately. Will continue to monitor and follow POC.

## 2018-01-09 NOTE — PLAN OF CARE
Problem: Patient Care Overview  Goal: Plan of Care/Patient Progress Review  Discharge Planner SLP   Patient plan for discharge: home  Current status:Completed clinical swallow eval per MD orders. Pt presents with  a functional swallow with only minimal oral difficulties as a result of some very mild right sided oral motor weakness. Swallow initiation is prompt but very mild slower oral prep with reuglar solids vs DD3 solids- however swallow sequence is complete without oral residue ( initially minimal lingual stasis that clears with 2nd swallow) and also no sensation of pharygneal retention. No s/s of aspiration on multiple trials of thin liquids ( by spoon, cup rim, straw) and no s/s of aspiration with trials of puree, DD3 solids or regular solids. Recommending that Pt initiate a regular diet with thin liquids. Pt should follow safe swallow precautions: upright for all po, small bites/sips, alternate solids and liquids, pace self- eat slowly.; double swallow prn. SLP to f/u for diet tolerance; assessment of follow though of safe swallow strategies and education. Anticipate likely just 1-2 f/u sessions for swallowing and that goals will be met at that time. Pt was able to follow commands and converse- there was no obvious difficulty with speech/language ( no dysarthriat) and pt was oriented to time, place, date and is not reporting any changes in memory/cognition- so likely once swallow goals met then not anticipating further sptx needs.   Barriers to return to prior living situation: non from a sptx standpoint- defer to OT. PT  Recommendations for discharge: defer to OT, PT  Rationale for recommendations: anticipate pt will meet swallow goals in 1- 2 more sessions- so no further sptx for swallowing following d/c       Entered by: Liana Hernandez 01/09/2018 9:22 AM

## 2018-01-09 NOTE — PLAN OF CARE
Problem: Patient Care Overview  Goal: Plan of Care/Patient Progress Review  Discharge Planner PT   Patient plan for discharge: unknown  Current status: Ambulates 60ft with FWW and CGA-min Ax1 due to occasional LOB with turning. STS with CGA-min Ax1.  During initial stand, pt demonstrates increase LOB.  As mobility continues, LOB decreases.  Supine<> sit with SBAx1.  Demonstrates ~30 mmHG drop in systolic BP from sit to stand.  Reports minor lightheadedness but able to amb.  Standing /40's.    Barriers to return to prior living situation: impaired balance, level of A with mobility, medical status  Recommendations for discharge: TCU   Rationale for recommendations: PT recommends TCU in order to increase functional balance, decrease level of A with mobility.         Entered by: Rodriguez Sanford 01/09/2018 2:32 PM

## 2018-01-09 NOTE — PLAN OF CARE
Problem: PT General Care Plan  Goal: PT Goal 1  PT Goal 1  Demonstrate low falls risk with a balance assessment score of < 13.5 seconds on TUG,  > 19/24 on Dynamic Gait Index, > 9/12 on 4-Item Dynamic Gait Index, or > 45/56 on Ann Balance Assessment.

## 2018-01-09 NOTE — PROGRESS NOTES
[facility]  Neurology Progress Note  01/09/18    Patient Name: Pete Sheldon    Interval events:  No acute events overnight. Decreased sensation of right arm on dorsal aspect extending from distal fingers to elbow, which is unchanged from last night. Some generalized weakness. Eating and drinking. No problems urinating or with bowel movements.      Objective:  B/P: 139/93, T: 98, P: 78, R: 17    GENERAL: NAD, pleasant, cooperative, lying on bed.  HEENT: NC/AT. Sclera anicteric. Mucous membranes moist.  RESPIRATORY: Good effort. No accessory muscle use.  ABDOMINAL: Soft, non tender, non distended.  EXTREMITIES: Warm, dry.     NEUROLOGIC:  MS: Alert. Oriented to place, self, and date. Normal concentration. Speech slightly aphasic with difficulty finding words. Normal naming and repetition. Comprehension normal to multi-step commands.  CN:    II - visual fields intact  III, IV, VI - EOMI, PERRL, no nystagmus noted. Denies diplopia.  V - facial sensation intact and equal   VII - Mild right facial droop when asked to smile.   VIII - hearing intact to conversation  IX, X - uvula midline; swallow mechanism intact; no hoarseness  XI - Sternocleidomastoid and trapezius strength intact  XII - tongue midline with full ROM  MOTOR: normal tone throughout, no abnormal movements, strength 4/5 Left ankle dorsiflexion, slight right arm pronator drift  SENSORY: Minimally decreased touched sensation over C6-C8 distribution on dorsal aspect of right hand  REFLEXES: 1+ and symmetric in patellar and biceps  COORDINATION: Slow and deliberate finger to nose; rapid alternating hand movements appropriate  GAIT: Did not assess.     National Institutes of Health Stroke Scale  Exam Interval: 12 hours following NIH 2 obtained at [place] [id number] on 1/8/18   Score    Level of consciousness: (0)   Alert, keenly responsive    LOC questions: (0)   Answers both questions correctly    LOC commands: (0)   Performs  both tasks correctly    Best gaze: (0)   Normal    Visual: (0)   No visual loss    Facial palsy: (1)   Minor paralysis (flat nasolabial fold, smile asymmetry)    Motor arm (left): (0)   No drift    Motor arm (right): (1)   Drift    Motor leg (left): (0)   No drift    Motor leg (right): (0)   No drift    Limb ataxia: (0)   Absent    Sensory: (1)   Mild to moderate sensory loss    Best language: (1)   Mild to moderate aphasia    Dysarthria: (0)   Normal    Extinction and inattention: (0)   No abnormality        Total Score:  4     Imaging  No new images since admission.    Images obtained at [facility]:  MRA Head w/o contrast:   IMPRESSION:    1. Distal right vertebral artery is occluded, but age is  indeterminate, new since 2014.  2. Mild intracranial atherosclerosis with no arterial occlusion.    MRI Brain w & w/o contrast:  IMPRESSION:  1. Small acute infarct in the lateral left thalamus.  2. Brain atrophy and white matter changes consistent with sequelae of  small vessel ischemic disease. No change since 2014.  3. Old microhemorrhages inferiorly in the cerebellar hemispheres.  These are new since 2014.  4. Paranasal sinus opacification. This is new since 2014.    CT Head w/ contrast:  IMPRESSION:   1. Abrupt nonopacification of the right V4 segment which could be due  to dissection or embolism. This is age indeterminate although the T2  flow void in this region appeared to be present on MR 5/1/2014. The  left vertebral and basilar artery appear patent. The right PICA  appears to have an extradural origin and appears to be patent arising  from the V3 segment of the right vertebral artery.  2. Suggestion of delayed blood flow to the right posteroinferior  cerebellum and questionable decreased blood flow although CT perfusion  of the posterior fossa is limited. Recommend further evaluation with  brain MRI if the patient is able.  3. Multifocal moderate to severe stenosis of the intracranial arteries  as  described above. This is likely due to intracranial atherosclerotic  disease.  4. Patent arteries in the neck. Mild atherosclerotic disease at the  left carotid bifurcation without significant stenosis.    CT Head w/o contrast:  IMPRESSION:  1. No evidence of acute intracranial hemorrhage, mass, or herniation.  2. Diffuse parenchymal volume loss and white matter changes likely due  to chronic microvascular ischemic disease. Superimposed area of  ischemia would be difficult to exclude.     Pertinent Studies  LDL 85 (0800 on 8/16/17)  A1C 7.9   Echocardiogram pending    Assessment & Plan:  Pete Sheldon is a 77 year old M who presents with acute ischemic infarct, etiology likely due to emboli originating from occluded R vertebral a. Initial head CT/CTA shows no bleed. MRI small acute infact in L lateral thalamus.     -Switch to atorvastatin 20 mg PO qD  -Aspirin 324 mg PO qD  -ECHO w/ bubble study  -MRI w/ fat saturation  -PT/OT/SLP    Other Medical Problems:  -Continue Keflex 500 mg PO TID for UTI symptoms  -Insulin aspart and glargine for DM      Activity: Up with assist  Diet: Regular diet  IVF: None  DVT ppx: PCD  CODE STATUS: Full code  Disposition: DC tomorrow pending MRI, ECHO      Pedro Baumann, MS3

## 2018-01-09 NOTE — ED NOTES
Attempted to call report to 6A, was notified that were unable to take report at this time but would contact us as soon as they could.

## 2018-01-09 NOTE — PROGRESS NOTES
" 01/09/18 1300   Quick Adds   Type of Visit Initial PT Evaluation   Living Environment   Lives With spouse   Living Arrangements house   Home Accessibility stairs within home;stairs to enter home   Number of Stairs to Enter Home 2   Number of Stairs Within Home 12   Stair Railings at Home inside, present on right side   Transportation Available family or friend will provide   Living Environment Comment Pt lives with wife in single story home with basement.  PLOF includes IND with all mobility (no AD) and ADL's.  Pt responsible for driving wife and self.  Wife and  \"always together\".  Unlikely wife able to help at baseline.    Self-Care   Usual Activity Tolerance good   Current Activity Tolerance moderate   Regular Exercise no   Equipment Currently Used at Home none   Activity/Exercise/Self-Care Comment perfoms most outdoor chores.  States no hobbies.    Functional Level Prior   Ambulation 0-->independent   Transferring 0-->independent   Toileting 0-->independent   Bathing 0-->independent   Eating 0-->independent   Communication 0-->understands/communicates without difficulty   Swallowing 0-->swallows foods/liquids without difficulty   Cognition 0 - no cognition issues reported   Fall history within last six months no   Which of the above functional risks had a recent onset or change? ambulation;transferring   General Information   Onset of Illness/Injury or Date of Surgery - Date 01/09/18   Referring Physician Amadou Fritz MD   Patient/Family Goals Statement return to PLOF   Pertinent History of Current Problem (include personal factors and/or comorbidities that impact the POC) 77 year old male with h/o DMII and HTN who was transferred from outside hospital for stroke.  He fell at about 7 am 1/8 trying to use the bathroom.  He did not strike his head.  His wife was unable to get him up so she called paramedics.  On his first presentation to the ED he was found to have a UTI and was sent home on " "Keflex.  At approximately 11 am it was noted that he had right arm numbness and difficulty ambulating and he returned to the emergency department.  His NIH on presentation was 4 for facial palsy, right arm weakness, limb ataxia, and sensory loss.  Stroke code was activated at OSH, and it was decided that since time of onset was unclear, it would not be appropriate to administer TPA.  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  He met criteria for shine trial, but transfer was delayed causing him to be outside the time window on arrival to [facility].   Precautions/Limitations fall precautions   Heart Disease Risk Factors Lack of physical activity;Medical history;Gender;Age   General Observations Pleasant and agreeable to PT   Cognitive Status Examination   Orientation person;time  (states year as \"2019\".  Easily reoriented )   Level of Consciousness alert   Follows Commands and Answers Questions 100% of the time   Personal Safety and Judgment impaired   Memory impaired   Pain Assessment   Patient Currently in Pain No   Posture    Posture Kyphosis;Protracted shoulders;Forward head position   Range of Motion (ROM)   ROM Comment WFL   Strength   Strength Comments BLE 5/5 throughout= Hip flex/ext, Knee flex ext, ankle DF/PF, hip ABD/ADD   Bed Mobility   Bed Mobility Comments Supine <>sit with SBAx1   Transfer Skills   Transfer Comments Scoots forward on bed with SBAx1.  STS with minAx1 to correct posterior lean.     Gait   Gait Comments Ambulates 80ft with FWW and min Ax1 to correct balance   Balance   Balance Comments frequent posterior lean in standing, which decreases with activity   Sensory Examination   Sensory Perception Comments questionable reliability.  Demonstrates difficulty with accuratley identifying light touch.  Appear to be diminished throughout RLE.  Lateral LLE 75% intact with decreased sensation distally.     General Therapy Interventions   Planned Therapy Interventions balance " "training;cognition;bed mobility training;gait training;neuromuscular re-education;strengthening;transfer training;risk factor education;home program guidelines;progressive activity/exercise   Clinical Impression   Criteria for Skilled Therapeutic Intervention yes, treatment indicated   PT Diagnosis impaired functional mobility   Influenced by the following impairments impaired balance, lightheadedness,    Functional limitations due to impairments IND mobility and gait   Clinical Presentation Evolving/Changing   Clinical Presentation Rationale new CVA   Clinical Decision Making (Complexity) Moderate complexity   Therapy Frequency` daily   Predicted Duration of Therapy Intervention (days/wks) 1/16/2018   Anticipated Discharge Disposition Transitional Care Facility   Risk & Benefits of therapy have been explained Yes   Patient, Family & other staff in agreement with plan of care Yes   [facility] [employer] TM \"6 Clicks\"   2016, Trustees of [facility], under license to [employer].  All rights reserved.   6 Clicks Short Forms Basic Mobility Inpatient Short Form   [facility] AM-PAC  \"6 Clicks\" V.2 Basic Mobility Inpatient Short Form   1. Turning from your back to your side while in a flat bed without using bedrails? 3 - A Little   2. Moving from lying on your back to sitting on the side of a flat bed without using bedrails? 3 - A Little   3. Moving to and from a bed to a chair (including a wheelchair)? 3 - A Little   4. Standing up from a chair using your arms (e.g., wheelchair, or bedside chair)? 3 - A Little   5. To walk in hospital room? 3 - A Little   6. Climbing 3-5 steps with a railing? 2 - A Lot   Basic Mobility Raw Score (Score out of 24.Lower scores equate to lower levels of function) 17   Total Evaluation Time   Total Evaluation Time (Minutes) 10     "

## 2018-01-10 ENCOUNTER — APPOINTMENT (OUTPATIENT)
Dept: MRI IMAGING | Facility: CLINIC | Age: 78
DRG: 064 | End: 2018-01-10
Attending: STUDENT IN AN ORGANIZED HEALTH CARE EDUCATION/TRAINING PROGRAM
Payer: COMMERCIAL

## 2018-01-10 ENCOUNTER — APPOINTMENT (OUTPATIENT)
Dept: OCCUPATIONAL THERAPY | Facility: CLINIC | Age: 78
DRG: 064 | End: 2018-01-10
Attending: PSYCHIATRY & NEUROLOGY
Payer: COMMERCIAL

## 2018-01-10 ENCOUNTER — APPOINTMENT (OUTPATIENT)
Dept: SPEECH THERAPY | Facility: CLINIC | Age: 78
DRG: 064 | End: 2018-01-10
Attending: PSYCHIATRY & NEUROLOGY
Payer: COMMERCIAL

## 2018-01-10 LAB
ALBUMIN SERPL-MCNC: 3.1 G/DL (ref 3.4–5)
ALP SERPL-CCNC: 83 U/L (ref 40–150)
ALT SERPL W P-5'-P-CCNC: 35 U/L (ref 0–70)
ANION GAP SERPL CALCULATED.3IONS-SCNC: 9 MMOL/L (ref 3–14)
APTT PPP: 34 SEC (ref 22–37)
AST SERPL W P-5'-P-CCNC: 23 U/L (ref 0–45)
BILIRUB SERPL-MCNC: 0.6 MG/DL (ref 0.2–1.3)
BUN SERPL-MCNC: 24 MG/DL (ref 7–30)
CALCIUM SERPL-MCNC: 9.3 MG/DL (ref 8.5–10.1)
CHLORIDE SERPL-SCNC: 102 MMOL/L (ref 94–109)
CHOLEST SERPL-MCNC: 134 MG/DL
CO2 SERPL-SCNC: 28 MMOL/L (ref 20–32)
CREAT SERPL-MCNC: 1.16 MG/DL (ref 0.66–1.25)
ERYTHROCYTE [DISTWIDTH] IN BLOOD BY AUTOMATED COUNT: 13 % (ref 10–15)
GFR SERPL CREATININE-BSD FRML MDRD: 61 ML/MIN/1.7M2
GLUCOSE BLDC GLUCOMTR-MCNC: 108 MG/DL (ref 70–99)
GLUCOSE BLDC GLUCOMTR-MCNC: 117 MG/DL (ref 70–99)
GLUCOSE BLDC GLUCOMTR-MCNC: 125 MG/DL (ref 70–99)
GLUCOSE BLDC GLUCOMTR-MCNC: 128 MG/DL (ref 70–99)
GLUCOSE BLDC GLUCOMTR-MCNC: 142 MG/DL (ref 70–99)
GLUCOSE BLDC GLUCOMTR-MCNC: 151 MG/DL (ref 70–99)
GLUCOSE BLDC GLUCOMTR-MCNC: 164 MG/DL (ref 70–99)
GLUCOSE BLDC GLUCOMTR-MCNC: 180 MG/DL (ref 70–99)
GLUCOSE BLDC GLUCOMTR-MCNC: 186 MG/DL (ref 70–99)
GLUCOSE BLDC GLUCOMTR-MCNC: 241 MG/DL (ref 70–99)
GLUCOSE BLDC GLUCOMTR-MCNC: 269 MG/DL (ref 70–99)
GLUCOSE BLDC GLUCOMTR-MCNC: 300 MG/DL (ref 70–99)
GLUCOSE BLDC GLUCOMTR-MCNC: 310 MG/DL (ref 70–99)
GLUCOSE BLDC GLUCOMTR-MCNC: 331 MG/DL (ref 70–99)
GLUCOSE BLDC GLUCOMTR-MCNC: 338 MG/DL (ref 70–99)
GLUCOSE BLDC GLUCOMTR-MCNC: 386 MG/DL (ref 70–99)
GLUCOSE BLDC GLUCOMTR-MCNC: 410 MG/DL (ref 70–99)
GLUCOSE BLDC GLUCOMTR-MCNC: 89 MG/DL (ref 70–99)
GLUCOSE BLDC GLUCOMTR-MCNC: 93 MG/DL (ref 70–99)
GLUCOSE BLDC GLUCOMTR-MCNC: 94 MG/DL (ref 70–99)
GLUCOSE SERPL-MCNC: 106 MG/DL (ref 70–99)
HCT VFR BLD AUTO: 43.4 % (ref 40–53)
HDLC SERPL-MCNC: 41 MG/DL
HGB BLD-MCNC: 14.3 G/DL (ref 13.3–17.7)
INR PPP: 1.07 (ref 0.86–1.14)
LDLC SERPL CALC-MCNC: 74 MG/DL
MCH RBC QN AUTO: 30.8 PG (ref 26.5–33)
MCHC RBC AUTO-ENTMCNC: 32.9 G/DL (ref 31.5–36.5)
MCV RBC AUTO: 93 FL (ref 78–100)
NONHDLC SERPL-MCNC: 94 MG/DL
PLATELET # BLD AUTO: 292 10E9/L (ref 150–450)
POTASSIUM SERPL-SCNC: 3.9 MMOL/L (ref 3.4–5.3)
PROT SERPL-MCNC: 7.9 G/DL (ref 6.8–8.8)
RBC # BLD AUTO: 4.65 10E12/L (ref 4.4–5.9)
SODIUM SERPL-SCNC: 138 MMOL/L (ref 133–144)
TRIGL SERPL-MCNC: 95 MG/DL
WBC # BLD AUTO: 8.7 10E9/L (ref 4–11)

## 2018-01-10 PROCEDURE — 12000003 ZZH R&B CRITICAL UMMC

## 2018-01-10 PROCEDURE — 92526 ORAL FUNCTION THERAPY: CPT | Mod: GN | Performed by: SPEECH-LANGUAGE PATHOLOGIST

## 2018-01-10 PROCEDURE — 25000132 ZZH RX MED GY IP 250 OP 250 PS 637: Performed by: STUDENT IN AN ORGANIZED HEALTH CARE EDUCATION/TRAINING PROGRAM

## 2018-01-10 PROCEDURE — 25000128 H RX IP 250 OP 636: Performed by: PSYCHIATRY & NEUROLOGY

## 2018-01-10 PROCEDURE — 85730 THROMBOPLASTIN TIME PARTIAL: CPT | Performed by: PSYCHIATRY & NEUROLOGY

## 2018-01-10 PROCEDURE — 40000225 ZZH STATISTIC SLP WARD VISIT: Performed by: SPEECH-LANGUAGE PATHOLOGIST

## 2018-01-10 PROCEDURE — 00000146 ZZHCL STATISTIC GLUCOSE BY METER IP

## 2018-01-10 PROCEDURE — 36415 COLL VENOUS BLD VENIPUNCTURE: CPT | Performed by: PSYCHIATRY & NEUROLOGY

## 2018-01-10 PROCEDURE — 85027 COMPLETE CBC AUTOMATED: CPT | Performed by: PSYCHIATRY & NEUROLOGY

## 2018-01-10 PROCEDURE — 80053 COMPREHEN METABOLIC PANEL: CPT | Performed by: PSYCHIATRY & NEUROLOGY

## 2018-01-10 PROCEDURE — 97535 SELF CARE MNGMENT TRAINING: CPT | Mod: GO | Performed by: OCCUPATIONAL THERAPIST

## 2018-01-10 PROCEDURE — A9585 GADOBUTROL INJECTION: HCPCS | Performed by: PSYCHIATRY & NEUROLOGY

## 2018-01-10 PROCEDURE — 40000133 ZZH STATISTIC OT WARD VISIT: Performed by: OCCUPATIONAL THERAPIST

## 2018-01-10 PROCEDURE — 70544 MR ANGIOGRAPHY HEAD W/O DYE: CPT | Mod: XS

## 2018-01-10 PROCEDURE — 25000125 ZZHC RX 250: Performed by: STUDENT IN AN ORGANIZED HEALTH CARE EDUCATION/TRAINING PROGRAM

## 2018-01-10 PROCEDURE — 80061 LIPID PANEL: CPT | Performed by: PSYCHIATRY & NEUROLOGY

## 2018-01-10 PROCEDURE — 25000128 H RX IP 250 OP 636: Performed by: STUDENT IN AN ORGANIZED HEALTH CARE EDUCATION/TRAINING PROGRAM

## 2018-01-10 PROCEDURE — 85610 PROTHROMBIN TIME: CPT | Performed by: PSYCHIATRY & NEUROLOGY

## 2018-01-10 PROCEDURE — 25000132 ZZH RX MED GY IP 250 OP 250 PS 637: Performed by: PSYCHIATRY & NEUROLOGY

## 2018-01-10 RX ORDER — GADOBUTROL 604.72 MG/ML
7.5 INJECTION INTRAVENOUS ONCE
Status: COMPLETED | OUTPATIENT
Start: 2018-01-10 | End: 2018-01-10

## 2018-01-10 RX ORDER — AMLODIPINE BESYLATE 5 MG/1
5 TABLET ORAL DAILY
Status: DISCONTINUED | OUTPATIENT
Start: 2018-01-10 | End: 2018-01-11

## 2018-01-10 RX ORDER — HYDROCHLOROTHIAZIDE 12.5 MG/1
12.5 CAPSULE ORAL DAILY
Status: DISCONTINUED | OUTPATIENT
Start: 2018-01-10 | End: 2018-01-12 | Stop reason: HOSPADM

## 2018-01-10 RX ADMIN — AMLODIPINE BESYLATE 5 MG: 5 TABLET ORAL at 11:26

## 2018-01-10 RX ADMIN — ENOXAPARIN SODIUM 40 MG: 40 INJECTION SUBCUTANEOUS at 09:07

## 2018-01-10 RX ADMIN — ATORVASTATIN CALCIUM 20 MG: 20 TABLET, FILM COATED ORAL at 09:07

## 2018-01-10 RX ADMIN — CEPHALEXIN 500 MG: 250 CAPSULE ORAL at 08:52

## 2018-01-10 RX ADMIN — GADOBUTROL 7.5 ML: 604.72 INJECTION INTRAVENOUS at 10:38

## 2018-01-10 RX ADMIN — HYDROCHLOROTHIAZIDE 12.5 MG: 12.5 CAPSULE ORAL at 11:26

## 2018-01-10 RX ADMIN — HUMAN INSULIN 5 UNITS/HR: 100 INJECTION, SOLUTION SUBCUTANEOUS at 04:11

## 2018-01-10 RX ADMIN — ASPIRIN 81 MG CHEWABLE TABLET 324 MG: 81 TABLET CHEWABLE at 09:07

## 2018-01-10 RX ADMIN — CEPHALEXIN 500 MG: 250 CAPSULE ORAL at 20:23

## 2018-01-10 RX ADMIN — HUMAN INSULIN 4 UNITS/HR: 100 INJECTION, SOLUTION SUBCUTANEOUS at 19:39

## 2018-01-10 RX ADMIN — CEPHALEXIN 500 MG: 250 CAPSULE ORAL at 14:16

## 2018-01-10 RX ADMIN — HUMAN INSULIN 24 UNITS/HR: 100 INJECTION, SOLUTION SUBCUTANEOUS at 15:56

## 2018-01-10 ASSESSMENT — VISUAL ACUITY
OU: BASELINE;GLASSES

## 2018-01-10 NOTE — CONSULTS
"Diabetes/Hyperglycemia Management Consult    Chief Complaint uncontrolled blood sugars  Consult requested by: Dr. Paul  History of Present Illness Pete Sheldon is a 77 year old male with history of type 2 diabetes, hypertension, dyslipidemia transferred from [facility] for left thalamic stroke 0n (1/9/2018).    Diabetes Team was consulted for uncontrolled blood sugars on IV insulin  Blood sugars have been variable on IV insulin and have ranged from . IV insulin rates at 0-24 units.    Mr. Sheldon reports his PTA medications as listed below. Tests glucose 4 times per day, morning, before meals and at HS. Does not remember the blood sugars values. Reports he likes to have a snack at night for fear of going low. Will have either a plain bagel or peanut butter sandwich. Mr. Sheldon reports his wife \"dials\" the dose of his insulin and he gives himself the injection.    Per review of Medical records, last diabetic clinic appointment ( 11/29/2017). Blood sugars are variable and there is a concern that he does not have a clear understanding of insulin dosing and carbohydrates.Per documentation has a bedtime snack almost every day and a typical snack is whole bagel. Tests glucose 3 times per day and average meter download was 164, with higher blood sugars during the day.    Currently, denies fever, chills, chest pain, shortness of breath, abdominal pain, nausea and or vomiting, bowel or bladder issues. Endorses a good appetite and is having numbness in right had, \" I will drop things\" after the stroke.    Recent Labs  Lab 01/10/18  1143 01/10/18  0944 01/10/18  0926 01/10/18  0903 01/10/18  0821 01/10/18  0700 01/10/18  0602  01/08/18  1345  01/08/18  0720   GLC  --   --  106*  --   --   --   --   --  204*  --  188*   * 108*  --  89 125* 142* 186*  < >  --   < >  --    < > = values in this interval not displayed.      Diabetes Type:   Type 2 Diabetes   LABS:  Component      Latest Ref Rng " 4/15/2016   C-Peptide      0.9 - 6.9 ng/mL <0.1 (L)   Glutamic Acid Decarboxylase Antibody              Diabetes Duration:   Usual Diabetes Regimen:   Metformin 1000 mg twice a day, Lantus 32 units in morning and Humalog  with breakfast/lunch/dinner respectively.  In addition to that he is on correctional scale 1 unit- 25 >140 but he is not using it.  Ability to [place] Prescribed Regimen: yes with help from his wife  Diabetes Control:   Lab Results   Component Value Date    A1C 7.9 2018    A1C 7.6 2017    A1C 7.8 2017    A1C 8.3 2017    A1C 7.9 02/10/2017     Diabetes Complications: diabetic polyneuropathy  No hx of gastroparesis  History of DKA: yes,   Able to Detect Hypoglycemia: hx of hypoglycemia unawareness  Usual Diabetes Care Provider: Dr. Damian  Factors Impacting Glucose Control: fear of hypoglycemia, diet      Review of Systems  10 point ROS completed with pertinent positives and negatives noted in the HPI    Past medical, family and social histories are reviewed and updated.    Past Medical History, reviewed and updated as indicated  Past Medical History:   Diagnosis Date     Calculus of ureter      Hypertension      Microalbuminuria 2/15/2016     Other and unspecified hyperlipidemia      Type 2 diabetes, HbA1C goal < 8% (H) 1995       Family History, reviewed and updated as indicated  Family History   Problem Relation Age of Onset     CEREBROVASCULAR DISEASE Mother       age 95     HEART DISEASE Mother      age 89,  age 95     CEREBROVASCULAR DISEASE Father       age 91, stroke two years previous     Cancer - colorectal Brother      Half-brother     CANCER Sister      Half-sister (?type)     CANCER Sister      Half-sister (?type)     HEART DISEASE Brother      Neurologic Disorder Daughter      Multiple Sclerosis     Psychotic Disorder Son      Schizophrenia       Social History  Social History     Social History     Marital status:       "Spouse name: N/A     Number of children: 2     Years of education: N/A     Occupational History     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         Physical Exam  BP (!) 122/96  Pulse 73  Temp 98.1  F (36.7  C) (Oral)  Resp 16  Ht 1.702 m (5' 7\")  Wt 70.6 kg (155 lb 9.6 oz)  SpO2 94%  BMI 24.37 kg/m2    General:  pleasant  resting in bed, in no distress.   HEENT:  PER and anicteric, non-injected, oral mucous membranes moist.   Lungs: unlabored   ABD: soft  Skin: warm and dry, no obvious lesions to exposed areas  MSK: moving all extremiteis  Mental status: alert, oriented x3, communicating clearly  Psych:  calm, even mood    Laboratory  Recent Labs   Lab Test  01/10/18   0926  01/09/18   0750  01/08/18   1345   NA  138   --   134   POTASSIUM  3.9   --   3.8   CHLORIDE  102   --   98   CO2  28   --   30   ANIONGAP  9   --   6   GLC  106*   --   204*   BUN  24   --   21   CR  1.16  1.15  1.15   MANAV  9.3   --   9.4     CBC RESULTS:   Recent Labs   Lab Test  01/10/18   0926   WBC  8.7   RBC  4.65   HGB  14.3   HCT  43.4   MCV  93   MCH  30.8   MCHC  32.9   RDW  13.0   PLT  292       Liver Function Studies -   Recent Labs   Lab Test  01/10/18   0926   PROTTOTAL  7.9   ALBUMIN  3.1*   BILITOTAL  0.6   ALKPHOS  83   AST  23   ALT  35       Active Medications  Current Facility-Administered Medications   Medication     amLODIPine (NORVASC) tablet 5 mg     hydrochlorothiazide (MICROZIDE) capsule 12.5 mg     cephalexin (KEFLEX) capsule 500 mg     enoxaparin (LOVENOX) injection 40 mg     senna-docusate (SENOKOT-S;PERICOLACE) 8.6-50 MG per tablet 1 tablet    Or     senna-docusate " (SENOKOT-S;PERICOLACE) 8.6-50 MG per tablet 2 tablet     magnesium hydroxide (MILK OF MAGNESIA) suspension 30 mL     bisacodyl (DULCOLAX) Suppository 10 mg     ondansetron (ZOFRAN-ODT) ODT tab 4 mg    Or     ondansetron (ZOFRAN) injection 4 mg     prochlorperazine (COMPAZINE) injection 5 mg    Or     prochlorperazine (COMPAZINE) tablet 5 mg    Or     prochlorperazine (COMPAZINE) Suppository 12.5 mg     labetalol (NORMODYNE/TRANDATE) injection 10-20 mg     glucose 40 % gel 15-30 g    Or     dextrose 50 % injection 25-50 mL    Or     glucagon injection 1 mg     aspirin chewable tablet 324 mg     atorvastatin (LIPITOR) tablet 20 mg     dextrose 10 % 1,000 mL infusion     insulin 1 unit/mL in saline (NovoLIN, HumuLIN Regular) drip - ADULT IV Infusion     No current outpatient prescriptions on file.       Current Diet    Active Diet Order      Consistent Carbohydrate Diet 6586-6648 Calories: Low Consistent CHO (3-5 CHO units/meal)      Assessment: Pete Sheldon is a 77 year old male with history of type 2 diabetes, hypertension, dyslipidemia transferred from [facility] for left thalamic stroke 0n (1/9/2018).      Type 2 diabetic:    Plan  -continue on IV insulin for today ( due to variable and high rates)  -added meal prandial insulin 1 unit per 15 grams of CHO with meals and snacks ( per weight based)  -if IV rates decrease, will plan to transition to sub-q insulin in the morning  - will need to be evaluated by Diabetic educator for use of right hand ( his wife dials the pen to the correct dose, per patient) he tests his glucose and gives injection.    Will continue to nicolas Espinoza, [phone]    Diabetes Management Team job code: [phone]

## 2018-01-10 NOTE — PLAN OF CARE
Problem: Patient Care Overview  Goal: Plan of Care/Patient Progress Review  OT 6A: Discharge Planner OT   Patient plan for discharge: TCU  Current status: Pt CGA-Min A with FWW for mobility and dynamic standing activity due to impaired balance.  Min A for LE dressing 2/2 impaired sensation in R hand and difficulty coordinating RLE.  Pt scored 6/28 on the Short Blessed Cognitive screen indicating mild impairment (0-4/28 = normal) primarily in area of delayed recall  Barriers to return to prior living situation: impaired balance, fall risk, decreased sensation in R hand impacting coordination and functional use, below baseline cognition  Recommendations for discharge: TCU  Rationale for recommendations: Pt is currently below baseline with regards to mobility and independence with self cares and will benefit from continued skilled therapy intervention to address deficits.         Entered by: Alexandrea Virgen 01/10/2018 9:13 AM

## 2018-01-10 NOTE — PROGRESS NOTES
Interval events:  No acute events overnight. Decreased sensation of right arm on dorsal aspect extending from distal fingers to elbow, which is unchanged from last night. Some generalized weakness. Eating and drinking. No problems urinating or with bowel movements.        Objective:  B/P: 139/93, T: 98, P: 78, R: 17     GENERAL: NAD, pleasant, cooperative, lying on bed.  HEENT: NC/AT. Sclera anicteric. Mucous membranes moist.  RESPIRATORY: Good effort. No accessory muscle use.  ABDOMINAL: Soft, non tender, non distended.  EXTREMITIES: Warm, dry.      NEUROLOGIC:  MS: Alert. Oriented to place, self, and date. Normal concentration. Speech slightly aphasic with difficulty finding words. Normal naming and repetition. Comprehension normal to multi-step commands.  CN:    II - visual fields intact  III, IV, VI - EOMI, PERRL, no nystagmus noted. Denies diplopia.  V - facial sensation intact and equal   VII - Mild right facial droop when asked to smile.   VIII - hearing intact to conversation  IX, X - uvula midline; swallow mechanism intact; no hoarseness  XI - Sternocleidomastoid and trapezius strength intact  XII - tongue midline with full ROM  MOTOR: normal tone throughout, no abnormal movements, strength 4/5 Left ankle dorsiflexion, slight right arm pronator drift  SENSORY: Minimally decreased touched sensation over C6-C8 distribution on dorsal aspect of right hand  REFLEXES: 1+ and symmetric in patellar and biceps  COORDINATION: Slow and deliberate finger to nose; rapid alternating hand movements appropriate  GAIT: Did not assess.      National Institutes of Health Stroke Scale  Exam Interval: 12 hours following NIH 2 obtained at [place] [address] on 1/8/18    Score    Level of consciousness: (0)   Alert, keenly responsive    LOC questions: (0)   Answers both questions correctly    LOC commands: (0)   Performs both tasks correctly    Best gaze: (0)   Normal    Visual: (0)   No visual loss    Facial palsy: (1)   Minor  paralysis (flat nasolabial fold, smile asymmetry)    Motor arm (left): (0)   No drift    Motor arm (right): (1)   Drift    Motor leg (left): (0)   No drift    Motor leg (right): (0)   No drift    Limb ataxia: (0)   Absent    Sensory: (1)   Mild to moderate sensory loss    Best language: (1)   Mild to moderate aphasia    Dysarthria: (0)   Normal    Extinction and inattention: (0)   No abnormality          Total Score:  4      Imaging  No new images since admission.     Images obtained at [facility]:  MRA Head w/o contrast:   IMPRESSION:    1. Distal right vertebral artery is occluded, but age is  indeterminate, new since 2014.  2. Mild intracranial atherosclerosis with no arterial occlusion.     MRI Brain w & w/o contrast:  IMPRESSION:  1. Small acute infarct in the lateral left thalamus.  2. Brain atrophy and white matter changes consistent with sequelae of  small vessel ischemic disease. No change since 2014.  3. Old microhemorrhages inferiorly in the cerebellar hemispheres.  These are new since 2014.  4. Paranasal sinus opacification. This is new since 2014.     CT Head w/ contrast:  IMPRESSION:   1. Abrupt nonopacification of the right V4 segment which could be due  to dissection or embolism. This is age indeterminate although the T2  flow void in this region appeared to be present on MR 5/1/2014. The  left vertebral and basilar artery appear patent. The right PICA  appears to have an extradural origin and appears to be patent arising  from the V3 segment of the right vertebral artery.  2. Suggestion of delayed blood flow to the right posteroinferior  cerebellum and questionable decreased blood flow although CT perfusion  of the posterior fossa is limited. Recommend further evaluation with  brain MRI if the patient is able.  3. Multifocal moderate to severe stenosis of the intracranial arteries  as described above. This is likely due to intracranial atherosclerotic  disease.  4. Patent arteries in the neck.  Mild atherosclerotic disease at the  left carotid bifurcation without significant stenosis.     CT Head w/o contrast:  IMPRESSION:  1. No evidence of acute intracranial hemorrhage, mass, or herniation.  2. Diffuse parenchymal volume loss and white matter changes likely due  to chronic microvascular ischemic disease. Superimposed area of  ischemia would be difficult to exclude.     Pertinent Studies  LDL 85 (0800 on 8/16/17)  A1C 7.9   Echocardiogram pending     Assessment & Plan:  Pete Sheldon is a 77 year old M who presents with acute ischemic infarct, etiology likely due to emboli originating from occluded R vertebral a. Initial head CT/CTA shows no bleed. MRI small acute infact in L lateral thalamus.      Left thalamic infarct   MRA showed distal right vert a is occluded and it could be dissection is going on and that could the be the source.  -Neurochecks  -Permissive HTN; labetalol PRN for SBP > 220  -Euthermia, Euglycemia  -Switch to atorvastatin 20 mg PO qD  -Aspirin 324 mg PO qD  -ECHO w/ bubble study  -MRI w/ fat saturation  -PT/OT/SLP  -Stroke Education  -Depression Screen  -Apnea Screen     Other Medical Problems:  -Continue Keflex 500 mg PO TID for UTI symptoms  -Insulin aspart and glargine for DM  HTN:Holding home antihypertensives HCTZ and amlodipine     Activity: Up with assist  Diet: Regular diet  IVF: None  DVT ppx: PCD  CODE STATUS: Full code  Disposition: DC tomorrow pending MRI, ECHO

## 2018-01-10 NOTE — PLAN OF CARE
Problem: Patient Care Overview  Goal: Plan of Care/Patient Progress Review  Outcome: No Change  AVSS. Denied pain. A+O x3. BLE weakness. R arm pronator drift. PERRLA. Bed alarm active. Intermittent call light use. Void spontaneous. Intermittent incontinence. No bm overnight. BG check 0300: 269 mg/dL- MD notified. Insulin drip restarted. alg 1 maintained. Rested btwn cares. Continue POC.

## 2018-01-10 NOTE — PLAN OF CARE
Problem: Patient Care Overview  Goal: Plan of Care/Patient Progress Review  Outcome: No Change  VSS. Disoriented situation . Neuros intact ex generalized weakness with R<L, some word finding difficulties, numbness and tingling in RUE from elbow to hand per pt, and slight R pronator drift. On continuous cardiac monitoring, NSR. PIV currently infusing insulin gtt, on algorithm #3 at 14.5 u/hr, also carb coverage. No complaints of pain. On consistent carb diet, tolerating well. MRI completed this AM. Up with A1,GB, and walker. Voiding spontaneously via bathroom,on Keflex for UTI, incontinent x1. Used call light appropriately. Will continue to monitor and follow POC.      At 1500: Pts , switched to algorithm 4 at 24 units/hr, MDs notified

## 2018-01-10 NOTE — PLAN OF CARE
Problem: Patient Care Overview  Goal: Plan of Care/Patient Progress Review  Patient admitted for left thalamic ischemic stroke. VSS, HTN within parameters. Patient is disoriented to situation, but easily reoriented. Can be forgetful at times. Neuros include generalized weakness (R<L) and right pronator drift. Denied numbness & tingling this shift. On continuous cardiac monitoring, NSR. Regular diet. Patient had two low blood sugars this morning where he received 25mL of D50 twice. Patient s dinner blood sugar check = 582; insulin gtt was initiated shortly after. Patient is on q1hr checks, currently on algorithm 2; turned off right now, last BG=97 @ 2200. PIV @ TKO with insulin gtt. Up with assist of 1, GB, and walker. Voiding spontaneously, although has frequency and urge incontinence   has known UTI, on Keflex. No BM this shift. Denies pain. MRI ordered, checklist has been sent. Will continue to monitor.    Patient had a low blood sugar at 2257. BG=47. Patient was given several apple juices. RN stayed with patient until blood sugar was over 100. Rechecked several times, most recent @ 2337 BG = 110. Patient denied any symptoms of hypoglycemia throughout.

## 2018-01-10 NOTE — PLAN OF CARE
Problem: Patient Care Overview  Goal: Plan of Care/Patient Progress Review  Discharge Planner SLP   Patient plan for discharge: home  Current status: Pt seen to f/u for swallowing. Pt tolerating regular diet w/o outward s/sx of aspiration.  Dysphagia has resolved. No further swallow needs warranted. ST will plan to sign off.  Rec. Continue with regular diet and thin liquids  Barriers to return to prior living situation:none from SLP standpoint  Recommendations for discharge: Per PT and OT recs  Rationale for recommendations: Pt has met swallow goals and does not warrant speech/lang tx.  ST to sign off.           Speech Language Therapy Discharge Summary    Reason for therapy discharge:    All goals and outcomes met, no further needs identified.    Progress towards therapy goal(s). See goals on Care Plan in Epic electronic health record for goal details.  Goals met    Therapy recommendation(s):    No further therapy is recommended.Pt tolerating regular diet at time ST signed off. No speech/language needs identified.            Entered by: Marcia Joyce 01/10/2018 11:46 AM

## 2018-01-11 ENCOUNTER — APPOINTMENT (OUTPATIENT)
Dept: OCCUPATIONAL THERAPY | Facility: CLINIC | Age: 78
DRG: 064 | End: 2018-01-11
Attending: PSYCHIATRY & NEUROLOGY
Payer: COMMERCIAL

## 2018-01-11 ENCOUNTER — APPOINTMENT (OUTPATIENT)
Dept: PHYSICAL THERAPY | Facility: CLINIC | Age: 78
DRG: 064 | End: 2018-01-11
Attending: PSYCHIATRY & NEUROLOGY
Payer: COMMERCIAL

## 2018-01-11 LAB
ANION GAP SERPL CALCULATED.3IONS-SCNC: 9 MMOL/L (ref 3–14)
BUN SERPL-MCNC: 25 MG/DL (ref 7–30)
CALCIUM SERPL-MCNC: 9.1 MG/DL (ref 8.5–10.1)
CHLORIDE SERPL-SCNC: 99 MMOL/L (ref 94–109)
CO2 SERPL-SCNC: 24 MMOL/L (ref 20–32)
CREAT SERPL-MCNC: 1.18 MG/DL (ref 0.66–1.25)
ERYTHROCYTE [DISTWIDTH] IN BLOOD BY AUTOMATED COUNT: 13.3 % (ref 10–15)
GFR SERPL CREATININE-BSD FRML MDRD: 59 ML/MIN/1.7M2
GLUCOSE BLDC GLUCOMTR-MCNC: 106 MG/DL (ref 70–99)
GLUCOSE BLDC GLUCOMTR-MCNC: 128 MG/DL (ref 70–99)
GLUCOSE BLDC GLUCOMTR-MCNC: 138 MG/DL (ref 70–99)
GLUCOSE BLDC GLUCOMTR-MCNC: 176 MG/DL (ref 70–99)
GLUCOSE BLDC GLUCOMTR-MCNC: 197 MG/DL (ref 70–99)
GLUCOSE BLDC GLUCOMTR-MCNC: 204 MG/DL (ref 70–99)
GLUCOSE BLDC GLUCOMTR-MCNC: 214 MG/DL (ref 70–99)
GLUCOSE BLDC GLUCOMTR-MCNC: 235 MG/DL (ref 70–99)
GLUCOSE BLDC GLUCOMTR-MCNC: 266 MG/DL (ref 70–99)
GLUCOSE BLDC GLUCOMTR-MCNC: 275 MG/DL (ref 70–99)
GLUCOSE BLDC GLUCOMTR-MCNC: 298 MG/DL (ref 70–99)
GLUCOSE BLDC GLUCOMTR-MCNC: 326 MG/DL (ref 70–99)
GLUCOSE BLDC GLUCOMTR-MCNC: 340 MG/DL (ref 70–99)
GLUCOSE BLDC GLUCOMTR-MCNC: 71 MG/DL (ref 70–99)
GLUCOSE BLDC GLUCOMTR-MCNC: 90 MG/DL (ref 70–99)
GLUCOSE BLDC GLUCOMTR-MCNC: 97 MG/DL (ref 70–99)
GLUCOSE SERPL-MCNC: 330 MG/DL (ref 70–99)
HCT VFR BLD AUTO: 44.7 % (ref 40–53)
HGB BLD-MCNC: 14.5 G/DL (ref 13.3–17.7)
MCH RBC QN AUTO: 31.1 PG (ref 26.5–33)
MCHC RBC AUTO-ENTMCNC: 32.4 G/DL (ref 31.5–36.5)
MCV RBC AUTO: 96 FL (ref 78–100)
PLATELET # BLD AUTO: 248 10E9/L (ref 150–450)
POTASSIUM SERPL-SCNC: 4 MMOL/L (ref 3.4–5.3)
RBC # BLD AUTO: 4.66 10E12/L (ref 4.4–5.9)
SODIUM SERPL-SCNC: 132 MMOL/L (ref 133–144)
WBC # BLD AUTO: 11.2 10E9/L (ref 4–11)

## 2018-01-11 PROCEDURE — 25000132 ZZH RX MED GY IP 250 OP 250 PS 637: Performed by: STUDENT IN AN ORGANIZED HEALTH CARE EDUCATION/TRAINING PROGRAM

## 2018-01-11 PROCEDURE — 85027 COMPLETE CBC AUTOMATED: CPT | Performed by: PSYCHIATRY & NEUROLOGY

## 2018-01-11 PROCEDURE — 80048 BASIC METABOLIC PNL TOTAL CA: CPT | Performed by: PSYCHIATRY & NEUROLOGY

## 2018-01-11 PROCEDURE — 25000128 H RX IP 250 OP 636: Performed by: STUDENT IN AN ORGANIZED HEALTH CARE EDUCATION/TRAINING PROGRAM

## 2018-01-11 PROCEDURE — 40000556 ZZH STATISTIC PERIPHERAL IV START W US GUIDANCE

## 2018-01-11 PROCEDURE — 25000132 ZZH RX MED GY IP 250 OP 250 PS 637: Performed by: PSYCHIATRY & NEUROLOGY

## 2018-01-11 PROCEDURE — 12000003 ZZH R&B CRITICAL UMMC

## 2018-01-11 PROCEDURE — 97116 GAIT TRAINING THERAPY: CPT | Mod: GP

## 2018-01-11 PROCEDURE — 40000133 ZZH STATISTIC OT WARD VISIT

## 2018-01-11 PROCEDURE — 40000193 ZZH STATISTIC PT WARD VISIT

## 2018-01-11 PROCEDURE — 25000125 ZZHC RX 250: Performed by: STUDENT IN AN ORGANIZED HEALTH CARE EDUCATION/TRAINING PROGRAM

## 2018-01-11 PROCEDURE — 97535 SELF CARE MNGMENT TRAINING: CPT | Mod: GO

## 2018-01-11 PROCEDURE — 97110 THERAPEUTIC EXERCISES: CPT | Mod: GP

## 2018-01-11 PROCEDURE — 25000131 ZZH RX MED GY IP 250 OP 636 PS 637: Performed by: NURSE PRACTITIONER

## 2018-01-11 PROCEDURE — 36415 COLL VENOUS BLD VENIPUNCTURE: CPT | Performed by: PSYCHIATRY & NEUROLOGY

## 2018-01-11 PROCEDURE — 00000146 ZZHCL STATISTIC GLUCOSE BY METER IP

## 2018-01-11 PROCEDURE — 97530 THERAPEUTIC ACTIVITIES: CPT | Mod: GO

## 2018-01-11 PROCEDURE — 97530 THERAPEUTIC ACTIVITIES: CPT | Mod: GP

## 2018-01-11 RX ORDER — LORATADINE 10 MG/1
10 TABLET ORAL DAILY
Qty: 14 TABLET | Refills: 0 | DISCHARGE
Start: 2018-01-11 | End: 2019-09-04

## 2018-01-11 RX ORDER — ASPIRIN 81 MG/1
324 TABLET, CHEWABLE ORAL DAILY
Qty: 36 TABLET | Refills: 0 | Status: ON HOLD | DISCHARGE
Start: 2018-01-12 | End: 2020-01-01

## 2018-01-11 RX ORDER — ATORVASTATIN CALCIUM 20 MG/1
20 TABLET, FILM COATED ORAL DAILY
Qty: 30 TABLET | Refills: 0 | Status: ON HOLD | DISCHARGE
Start: 2018-01-12 | End: 2018-01-25

## 2018-01-11 RX ORDER — HYDROCHLOROTHIAZIDE 12.5 MG/1
12.5 CAPSULE ORAL DAILY
Qty: 30 CAPSULE | Status: ON HOLD | DISCHARGE
Start: 2018-01-12 | End: 2018-01-25

## 2018-01-11 RX ORDER — TAMSULOSIN HYDROCHLORIDE 0.4 MG/1
0.4 CAPSULE ORAL DAILY
Qty: 90 CAPSULE | Refills: 1 | Status: ON HOLD | DISCHARGE
Start: 2018-01-11 | End: 2018-01-25

## 2018-01-11 RX ORDER — AMLODIPINE BESYLATE 5 MG/1
5 TABLET ORAL DAILY
Qty: 30 TABLET | DISCHARGE
Start: 2018-01-12 | End: 2018-01-12

## 2018-01-11 RX ORDER — AMLODIPINE BESYLATE 10 MG/1
10 TABLET ORAL DAILY
Status: DISCONTINUED | OUTPATIENT
Start: 2018-01-12 | End: 2018-01-12 | Stop reason: HOSPADM

## 2018-01-11 RX ORDER — ACETAMINOPHEN 325 MG/1
650 TABLET ORAL EVERY 6 HOURS PRN
Status: DISCONTINUED | OUTPATIENT
Start: 2018-01-11 | End: 2018-01-12 | Stop reason: HOSPADM

## 2018-01-11 RX ADMIN — CEPHALEXIN 500 MG: 250 CAPSULE ORAL at 20:04

## 2018-01-11 RX ADMIN — AMLODIPINE BESYLATE 5 MG: 5 TABLET ORAL at 08:06

## 2018-01-11 RX ADMIN — ATORVASTATIN CALCIUM 20 MG: 20 TABLET, FILM COATED ORAL at 08:06

## 2018-01-11 RX ADMIN — HUMAN INSULIN 5.5 UNITS/HR: 100 INJECTION, SOLUTION SUBCUTANEOUS at 05:01

## 2018-01-11 RX ADMIN — Medication: at 11:08

## 2018-01-11 RX ADMIN — HYDROCHLOROTHIAZIDE 12.5 MG: 12.5 CAPSULE ORAL at 08:06

## 2018-01-11 RX ADMIN — CEPHALEXIN 500 MG: 250 CAPSULE ORAL at 08:06

## 2018-01-11 RX ADMIN — ASPIRIN 81 MG CHEWABLE TABLET 324 MG: 81 TABLET CHEWABLE at 08:06

## 2018-01-11 RX ADMIN — ENOXAPARIN SODIUM 40 MG: 40 INJECTION SUBCUTANEOUS at 08:07

## 2018-01-11 RX ADMIN — HUMAN INSULIN 11.5 UNITS/HR: 100 INJECTION, SOLUTION SUBCUTANEOUS at 11:05

## 2018-01-11 RX ADMIN — INSULIN GLARGINE 50 UNITS: 100 INJECTION, SOLUTION SUBCUTANEOUS at 11:08

## 2018-01-11 RX ADMIN — ACETAMINOPHEN 650 MG: 325 TABLET ORAL at 10:25

## 2018-01-11 ASSESSMENT — VISUAL ACUITY
OU: BASELINE;GLASSES

## 2018-01-11 NOTE — PLAN OF CARE
Problem: Patient Care Overview  Goal: Plan of Care/Patient Progress Review  Discharge Planner OT   Patient plan for discharge: Not stated  Current status: Patient seen for standing ADLs, education provided on visual compensation for decreased sensation in R (dominant) hand. Patient ambulates and stands at sink close SBA, demos lateral sway with both narrow and wide REGINALD. Patient requires increased time for motor planning simple grooming tasks, demos some cognition deficits as well. Upon return to sitting in room, OT providing increased cues for in-hand manipulation of ADL items and encouraging visual compensation.  Barriers to return to prior living situation: R hand sensory deficits, motor planning, cognition, altered balance  Recommendations for discharge: ARU  Rationale for recommendations: Patient would benefit from intensive rehab to address neurologic deficits impacting performance in ADLs and IADLs.       Entered by: Carla Mcginnis 01/11/2018 10:44 AM

## 2018-01-11 NOTE — PROGRESS NOTES
Social Work: Assessment with Discharge Plan    Patient Name:  Pete Sheldon  :  1940  Age:  77 year old  MRN:  [id number]  Risk/Complexity Score:  Filed Complexity Screen Score: 8  Completed assessment with:  Patient    Presenting Information   Reason for Referral:  Assessment and Discharge Planning  Date of Intake:  2018  Referral Source: Case Finding  Decision Maker:  Patient  Alternate Decision Maker:  Wife  Health Care Directive:  Provided education  Living Situation:  Pt lives with his wife in a rambler style home.  There are 2 steps to enter the home (with a handrail).  Pt's bed and bath (tub/shower combo) are on main floor.  Laundry and a step in shower are in the basement of the home. There are 12 steps (with handrail) to the basement.  Previous Functional Status:  Prior to hospitalization, pt was indep with all mobility (no AD, estimated 2 falls in the past year), adl's and with medication administration.  Pt drives.  Pt's wife completes the household financial mgnt, laundry and cooking tasks.  Pt and wife go grocery shopping together.  Pt and wife both participate in completion of housekeeping tasks.  Pt has a w/c, walker, cane, RTS, bath bench, and a hh shower nozzle.  Pt was not utilizing any community resources.    Patient and family understanding of hospitalization:  Pt states that he had a stroke  Cultural/Language/Spiritual Considerations:  Denominational  Adjustment to Illness:  Appropriate for situation    Physical Health  Reason for Admission:  No diagnosis found.  Services Needed/Recommended:  OT treated pt today and recommended ARU.  Phy Therapy plans to treat pt today (their last recommendation made on 18 was TCU) and provide updated recommendation.  Initially, Neurology anticipated readiness for discharge tomorrow.  However, pt's insulin drip was discontinued at 1pm today and Dr. Paul states that pt is medically ready for discharge today.      Mental Health/Chemical  "Dependency  Diagnosis:  None  Support/Services in Place:  Not applicable  Services Needed/Recommended:  Not applicable    Support System  Significant relationship at present time:  Wife, who is retired and per pt, able bodied  Family of origin is available for support:  Wife  Other support available:  Pt has 2 adult children:  1.  [place]ia resides in [place] and [place]  Gaps in support system:  None identified  Patient is caregiver to:  None     Provider Information   Primary Care Physician:  Rom Helm   [phone]   Clinic:  [address] / [facility] [address]      :  None    Financial   Income Source:  Social Security and pension  Financial Concerns:  None identified  Insurance:    Payor/Plan Subscriber Name Rel Member # Group #   [employer] - [employer] FOR S[employer]* HORACIO HUYNH  [id number]       [id number]       Discharge Plan   Patient and family discharge goal:  Rehab placement followed by return to home  Provided education on discharge plan:  YES  Patient agreeable to discharge plan:  YES  A list of Medicare Certified Facilities was provided to the patient and/or family to encourage patient choice. Patient's choices for facility are:  If Physical Therapy supports ARU recommendation, pt would like placement at [facility] stating \"it makes sense, I'm already here.\"    Will NH provide Skilled rehabilitation or complex medical:  YES  General information regarding anticipated insurance coverage and possible out of pocket cost was discussed. Patient and patient's family are aware patient may incur the cost of transportation to the facility, pending insurance payment: YES  Barriers to discharge:  Await 1/11/18 Physical  Therapy recommendation.    Discharge Recommendations   Anticipated Disposition:  Rehab placement      Additional comments   Phoned [facility] Admissions and spoke with Goldie.  Referred pt (informed that pt is medically ready for discharge). "  Will need to await current Physical Therapy note to determine appropriateness for ARU placement.    Updated pt's wife who voices understanding of discharge recommendations.    YUE Lopez  Social Work, 6A  Phone:  [phone]  Pager:  [phone]  1/11/2018

## 2018-01-11 NOTE — PLAN OF CARE
Problem: Patient Care Overview  Goal: Plan of Care/Patient Progress Review  Left thalamic stroke secondary to Rt vert occlusion/dissection. Uncontrolled DM2.  VSS/A. Sats 95% RA.    Neuro: A&Ox4.  Speech slightly aphasic with mild difficulty with word finding. Pauses before replying to questions.  Forgetful at times. Generalized weakness.  RUE numbness present in hand and extending to right elbow.  LLE weakness is better per patient report.  Insulin drip stopped at 1300.  Next FSBG before dinner.  Up with A1, GB, walker.  C/o backache this am.  MD was notified, and Tylenol was ordered and given, with some relief.    Ambulated in huertas and ambulates to bathroom. Voiding spontaneously, frequency.  UTI being treated with Keflex.  BM x1 this shift, passing gas.  Education: Stroke booklet at bedside. [facility] Stroke appointment for 1/12 @ 1100.  Patient's wife, Radha, and his grandson are planning on coming for stroke teaching.  Diet: Les CHO diet well, needs assistance with meal ordering and tray set-up.  Good appetite.  Bed alarm, chair alarm on at all times.  Continuous Cardiac monitoring, NSR with PAC and PVC. MRI done yesterday. Using call light.  Continue with current POC.

## 2018-01-11 NOTE — PLAN OF CARE
Problem: Patient Care Overview  Goal: Plan of Care/Patient Progress Review  Outcome: No Change  Status: On 6A s/p L thalamic ischemic stroke  VS: VSS on RA; slight HTN w/in parameters. CCM, NSR w/PVCs and PACs  Neuros: D/o to situation and specific date. Forgetful. Slight word finding difficulties. Generalized weakness, R<L. Slight R drift. N/t from R hand-elbow, unchaged  GI: Mod CHO diet. No BM this shift. Passing gas.  : Voiding into urinal w/out difficulty, often, in small amts. Intermittently incontinent. Some urge incontinence  IV: PIV TKO w/insulin gtt infusion.  Pts PIV found torn out at 0300. Unknown how long it was out of pt. New PIV inserted. Insulin gtt restarted at 0340. Q1hr BS checks  Activity: Ax1 w/gb/walker. Standing up at bedside to use urinal  Pain: C/o generalized discomfort and mild HA d/t not getting much sleep the last two days. On list to add Tylenol to prns.  Labs/Tests: MRI completed yesterday. PLC ordered, not yet completed. PT planning to evaluate pt today  Plan of care: Pt was unable to sleep most of the night. Continue w/insulin gtt monitoring. Will continue to monitor and follow plan of care

## 2018-01-11 NOTE — PROGRESS NOTES
"                     Diabetes Consult Daily  Progress Note          Assessment/Plan:                          Pete Sheldon is a 77 year old male with history of type 2 diabetes, hypertension, dyslipidemia transferred from [facility] for left thalamic stroke 0n (1/9/2018).       Type 2 diabetic: Metformin 1000 mg twice a day, Lantus 32 units in morning and Humalog 2/2/2 with breakfast/lunch/dinner respectively. He does not use a sliding scale   Plan  -Lantus 50 units  -d/c IV insulin 2 hours after sub-q insulin given  -increase meal prandial insulin from 1 unit per 15 grams of CHO to 1 unit per 5 grams of CHOwith meals and snacks   - Novolog High correction scale before meals and at HS  -Monitor glucose before meals, HS and 0200  - discussed with primary team, will hold metformin while in the hospital and resume once discharging to TCU      - will need to be evaluated by Diabetic educator for use of right hand ( his wife dials the pen to the correct dose, per patient) he tests his glucose and gives injection.          Plan discussed with patient, bedside RN, and primary team.  Per primary team, plan for TCU on discharge. If TCU is on [facility], diabetes team will continue to follow.            Interval History:   The last 24 hours progress and nursing notes reviewed.  Calculated average IV insulin rates to be ~ 2.6 units per hour x 24 hours = 63 units with 20% reduction = 50 units of basal insulin  Estimated GFR by CKD-EPI creatinine is 60 with Cr of 1.16  Appetite is great, denies nausea and or vomiting. Is concerned about the \"numbness\" in his right hand.        Recent Labs  Lab 01/11/18  0703 01/11/18  0558 01/11/18  0458 01/11/18  0326 01/11/18  0201 01/11/18  0059  01/10/18  0926  01/08/18  1345  01/08/18  0720   GLC  --   --   --   --   --   --   --  106*  --  204*  --  188*   * 138* 197* 214* 176* 128*  < >  --   < >  --   < >  --    < > = values in this interval not displayed.           " " Review of Systems:   See interval hx          Medications:       Active Diet Order      Consistent Carbohydrate Diet 3989-5603 Calories: Low Consistent CHO (3-5 CHO units/meal)     Physical Exam:  Gen: Alert, sitting in chair,NAD   HEENT: mucous membranes are moist  Resp: Unlabored  Ext: moving all extremities   Neuro:oriented x3, communicating clearly  /85 (BP Location: Right arm)  Pulse 73  Temp 98  F (36.7  C) (Oral)  Resp 16  Ht 1.702 m (5' 7\")  Wt 70.6 kg (155 lb 9.6 oz)  SpO2 96%  BMI 24.37 kg/m2           Data:     Lab Results   Component Value Date    A1C 7.9 01/09/2018    A1C 7.6 11/29/2017    A1C 7.8 08/16/2017    A1C 8.3 05/12/2017    A1C 7.9 02/10/2017              CBC RESULTS:   Recent Labs   Lab Test  01/10/18   0926   WBC  8.7   RBC  4.65   HGB  14.3   HCT  43.4   MCV  93   MCH  30.8   MCHC  32.9   RDW  13.0   PLT  292     Recent Labs   Lab Test  01/10/18   0926  01/09/18   0750  01/08/18   1345   NA  138   --   134   POTASSIUM  3.9   --   3.8   CHLORIDE  102   --   98   CO2  28   --   30   ANIONGAP  9   --   6   GLC  106*   --   204*   BUN  24   --   21   CR  1.16  1.15  1.15   MANAV  9.3   --   9.4     Liver Function Studies -   Recent Labs   Lab Test  01/10/18   0926   PROTTOTAL  7.9   ALBUMIN  3.1*   BILITOTAL  0.6   ALKPHOS  83   AST  23   ALT  35     Lab Results   Component Value Date    INR 1.07 01/10/2018    INR 1.05 01/08/2018    INR 1.03 12/16/2008    INR 1.03 12/12/2008         I spent a total of 35 minutes bedside and on the inpatient unit managing the glycemic care of Pete Sheldon. Over 50% of my time on the unit was spent counseling the patient  and/or coordinating care      Dipti Espinoza -[phone]  Diabetes Management job code [id number]            "

## 2018-01-11 NOTE — PLAN OF CARE
Problem: Patient Care Overview  Goal: Plan of Care/Patient Progress Review  Patient admitted for left thalamic ischemic stroke. VSS, HTN within parameters. Patient is disoriented to situation, but easily reoriented. Can be forgetful at times. Neuros include generalized weakness (R<L), slight right pronator drift, and numbness & tingling in right hand up to elbow. On continuous cardiac monitoring, NSR. Regular diet. PIV @ TKO with insulin gtt. On algorithm 4 & q1hr checks. Up with assist of 1, GB, and walker. Voiding spontaneously, although has frequency and urge incontinence   has known UTI, on Keflex. No BM this shift. Denies pain. MRI done earlier today. Will continue to monitor.

## 2018-01-11 NOTE — PLAN OF CARE
Problem: Patient Care Overview  Goal: Plan of Care/Patient Progress Review  Discharge Planner PT   Patient plan for discharge: not stated   Current status: Pt performed toilet transfer with grab bars and tactile A at hips. He ambulated 2 x 250' with FWW and SBA-CGA. Some staggering with FWW. PT ambulated ~30' without AD and CGA-min A. He performed 3 x 3 steps with cornelio UE support. To promote progression of activity tolerance, pt completed 8' on Nu-step at level 2, maintaining >60 SPM. AVSS.   Barriers to return to prior living situation: medical needs, level of A, current mobility   Recommendations for discharge: ARU   Rationale for recommendations: Pt would benefit from intensive interdisciplinary therapy to progress indep functional mobility. He is motivated to work with therapies and anticipate he could tolerate extended therapy times, has interdisciplinary needs.        Entered by: Dacia Luna 01/11/2018 4:09 PM

## 2018-01-11 NOTE — PROGRESS NOTES
"CLINICAL NUTRITION SERVICES - ASSESSMENT NOTE     Nutrition Prescription    RECOMMENDATIONS FOR MDs/PROVIDERS TO ORDER:  - Recommend OP diabetes education referral with a DM educator/RD if patient interested in revisiting DM education.   - Consider liberalizing low consistent carb diet to moderate considering pt estimated kcal needs.    Malnutrition Status:    -Patient does not meet two of the above criteria necessary for diagnosing malnutrition    Recommendations already ordered by Registered Dietitian (RD):  - none currently     Future/Additional Recommendations:  - Monitor BG/potential diet liberalization      REASON FOR ASSESSMENT  Pete Sheldon is a/an 77 year old male assessed by the dietitian for Admission Nutrition Risk Screen for new/uncontrolled diabetes    NUTRITION HISTORY  Pt describes intake PTA as good as he eats three meals/day with a nightly snack. He reports eating his full plate. His typical intake is as follows:   B: Oatmeal and an orange   L: Hot dish with bread (largest meal of the day)   D: Hot dish   S: Bagel or sandwich  Pt states he typically does not use sugar-free products at home.  Pt was diagnosed with T2DM in 1995 and reports receiving diabetes diet education at the time of diagnosis but not since. Noted RD note in 12/2016 with an outpatient diabetes diet education referral.    CURRENT NUTRITION ORDERS  Diet: Low Consistent Carbohydrate  Intake/Tolerance:   Pt states he has been able to eat 100% of 3 meals/day since admission.   He reports his breakfast this morning was cream of wheat, Indonesian toast, and regular syrup    LABS  Labs reviewed  A1C 7.9  BGM:      MEDICATIONS  Medications reviewed  Insulin gtt  50 U Lantus  Novolog before meals and bed  Lipitor    ANTHROPOMETRICS  Height: 170.2 cm (5' 7\")  Most Recent Weight: 69.9 kg (154 lb 3.2 oz)    IBW: 67.3 kg  BMI: Normal BMI  Weight History: Pt has lost 7 lbs or 4% in the last 7 months with a 4 lb or 2.5% weight loss in the " last month. His weight has remained stable this admission. Pt states his usual body weight is 150 - 155 lbs.  Wt Readings from Last 10 Encounters:   01/11/18 69.9 kg (154 lb 3.2 oz)   01/08/18 71.7 kg (158 lb 1.1 oz)   11/29/17 71.7 kg (158 lb)   08/24/17 71 kg (156 lb 8 oz)   08/23/17 70.4 kg (155 lb 4.8 oz)   06/13/17 73 kg (161 lb)   05/24/17 73.9 kg (163 lb)   05/19/17 73 kg (161 lb)   03/16/17 71.2 kg (157 lb)   02/23/17 72.4 kg (159 lb 9.6 oz)     Dosing Weight: 70 kg (actual) - based on lowest documented wt so far this adm (69.9 kg on 1/11) and IBW of 67.3    ASSESSED NUTRITION NEEDS  Estimated Energy Needs: 1750 - 2100 kcals/day (25 - 30 kcals/kg)  Justification: Maintenance  Estimated Protein Needs: 84 - 105 grams protein/day (1.2 - 1.5 grams of pro/kg)  Justification: Hypercatabolism with acute illness  Estimated Fluid Needs: 1750 - 2100 mL/day (1 mL/kcal)   Justification: Maintenance and Per provider pending fluid status    PHYSICAL FINDINGS  See malnutrition section below.     MALNUTRITION  % Intake: No decreased intake noted  % Weight Loss: Up to 5% in 1 month (non-severe)  Subcutaneous Fat Loss: None observed  Muscle Loss: None observed  Fluid Accumulation/Edema: None noted  Malnutrition Diagnosis: Patient does not meet two of the above criteria necessary for diagnosing malnutrition    NUTRITION DIAGNOSIS  Food- and nutrition-related knowledge deficit r/t limited previous knowledge of carbohydrate counting/ diabetic diet AEB pt report of no formal diabetic diet education in 22 years.    INTERVENTIONS  Implementation  Nutrition Education  1.  Provided verbal instruction on identifying carbohydrates in the diet with emphasis on CHO counting.  2.  Provided the following handouts: Carbohydrate Counting and label reading.  3.  Made goals to replacing CHO sides (rice/bread) with non-starchy veg and limiting sweets to fewer times/week  4.  Patient to ask any further nutrition-related questions before  discharge.  In addition, pt may request outpatient RD appointment.    Goals  Pt will verbalize at least five high-CHO foods and the importance of avoiding CHO in the diet.  - goal not met at initial visit       Monitoring/Evaluation  Progress toward goals will be monitored and evaluated per protocol.    Carley Mcmahan MNT Dietetic Intern    Beltran Chaves RD, LD, [facility]  Neuro ICU  Pager: [phone]

## 2018-01-11 NOTE — PROGRESS NOTES
[facility]  Neurology Progress Note  01/11/18    Patient Name: Pete Sheldon    Interval events:  No acute events overnight. Blood glucose decreased from (~410 to 106 following insulin drip). Hypertensive at times. Has difficulty falling asleep, which he attributes to noise and frequent awakenings. Has localized right lower back pain. Began yesterday shortly after obtaining MRI. Hurts when he moves his legs. Does not radiate. 8/10 pain. No subjective fevers or difficulty with urination. RUE decreased sensation has improved, and now localized mainly over hand instead of extending to elbow. Still has some LLE weakness with transfers. Eating and drinking with no difficulties. Voiding appropriately. No change in bowel movements.      Objective:  B/P: 148/78, T: 97.9, P: 73, R: 16    GENERAL: NAD, sitting upright in chair following breakfast.  HEENT: NC/AT. Sclera anicteric. Wears glasses. Mucous membranes moist.  CARDIAC: RRR without murmur. No carotid bruits.  RESPIRATORY: Good effort. CTAB. No w/r/r appreciated.  MSK: Tender to palpation over R lower back. No CVA tenderness. Pain reproduced with movement of contralateral lower extremity.  ABDOMINAL: Soft, non tender, non distended.  EXTREMITIES: Warm, dry.     NEUROLOGIC:  MS: Alert. Oriented to place, self, person, and date. Memory intact - can recall recent events and provide detailed history. Normal concentration. Speech remains mild to moderately aphasic. Still has difficulty with naming complex pictures (i.e. [place]) and describing pictures. Still pauses before replying to questions, similar to yesterday. Clear, fluid speech once initiated. Normal repetition. Reads appropriately. Normal comprehension to multi-step  Commands.   CN:    II - visual fields intact. Requires glasses - no changes in eyesight.   III, IV, VI - EOMI, PERRL, no nystagmus noted  V - facial sensation intact and equal   VII - facial movement symmetrical  VIII -  "hearing intact to conversation  IX, X - uvula midline; swallow mechanism intact; no hoarseness  XI - sternocleidomastoid and trapezius strength intact  XII - tongue midline with full ROM  MOTOR: normal tone throughout, no abnormal movements, no fatigability, strength L ankle dorsiflexion 4/5 but improved compared to yesterday.   SENSORY: Decreased sensation of dorsal aspect of R hand over C6-C8 distribution  REFLEXES: 1+ and symmetric in biceps; 2+ and symmetric in patellar and brachioradialis  COORDINATION: Rapid alternating hand movements improved compared to yesterday. Dexterity with finger taps in R digits is improved, but still has slow speed in both hands.  GAIT: Not assessed. Patient still reports LLE weakness during transfers.    [employer] Stroke Scale  Exam Interval: approx. 72 hours since presentation for symptoms   Score    Level of consciousness: (0)   Alert, keenly responsive    LOC questions: (0)   Answers both questions correctly    LOC commands: (0)   Performs both tasks correctly    Best gaze: (0)   Normal    Visual: (0)   No visual loss    Facial palsy: (0)   Normal symmetrical movements    Motor arm (left): (0)   No drift    Motor arm (right): (0)   No drift    Motor leg (left): (0)   No drift    Motor leg (right): (0)   No drift    Limb ataxia: (0)   Absent    Sensory: (1)   Mild to moderate sensory loss    Best language: (1)   Mild to moderate aphasia    Dysarthria: (0)   Normal    Extinction and inattention: (0)   No abnormality        Total Score:  2     Imaging  MR Brain obtained 1/10 - Redemonstration of subacute L thalamic infarct. Stable cerebral volume loss. Atherosclerotic irregularities in L carotid a. Bifurcation w/o sig stenosis or evidence of dissection. \"On head MRA, the right vertebral artery is seen to end in the posterior inferior cerebellar artery, this is a normal variant. No definitie stenosis or aneurysm formation identified. Patent posterior communicating " "artery on the right.\"    Pertinent Studies  LDL 74  A1C 7.9  Echocardiogram (1/9/18) - Difficult study. Global and regional LV function normal (EF 60-65%). No R to L shunt at the atrial septal level. No pericardial effusion.   Glucose 106  Urine Culture obtained at outside hospital +aerococcus urinae    Assessment & Plan:  Pete Sheldon is a 77 year old M who presented with acute ischemic infarct, etiology likely small vessel disease. Initial head CT/CTA shows no bleed. Initial MRI reveals small acute infarct in L lateral thalamus.     #Acute infarct of L lateral thalamus  MR Brain from yesterday (1/10/11) demonstrated a R. Vertebral a., that ended in PICA, which radiologist reports as a normal variant. ECHO normal. No apparent LVO. Less likely cardiac, will not do continuous outpatient monitoring.   -HCTZ 12.5 mg PO qD  -Increase amlodipine to 10 mg PO qD  -Aspirin 324 mg PO qD  -Atorvastatin 20 mg PO qD  -Continue with PT/OT/SLP while inpatient, will perform at TCU as well    Other Medical Problems:  #UTI:   Urine culture +aerococcus urinae. Keflex should cover and will continue course for 7 days.  -Continue Keflex 500 mg TID (Day#4 of 7)    #DM  Endocrine following and appreciate recommendations. Per endocrine note 1/11,  -Lantus 50 units  -D/c IV insulin 2 hr after sub-q insulin given  -Increase prandial insulin from 1 unit per 15 grams of CHO to 1 unit per 5 grams  -Novolog high correction scale before meals and at HS  -Glucose monitoring  -Continue to hold metformin while in hospital and resume upon discharge    #Right lower back pain  Most likely MSK etiology, aggravated with movement. Point tenderness. Less likely pyelonephritis. Currently being treated for UTI with Keflex. UCx +aerococcus urinae. No CVA tenderness. Afebrile. No dysuria. Will prescribe pain med.   -Continue to monitor for fever.  -Acetaminophen 650 mg PO q6hr PRN    Activity: Up with assist  Diet: Regular diet  IVF: None  DVT ppx: PCD, " Lovenox 40 mg SC qD  CODE STATUS: Full  Disposition: Discharge to TCU pending acceptance    Pedro Baumann, MS3    Physical therapy saw the pt and recommended ARU  The note was scribed by Pedro Baumann and I am The resident who reviewed the note with him. The plan was discussed with Dr. Armenta who agreed on the above mentioned plan.      Jasper Paul  Neurology resident  P [phone]

## 2018-01-11 NOTE — PROGRESS NOTES
Care Coordinator Progress Note     Admission Date/Time:  1/9/2018  Attending MD:  Dr Rupert Bean     Data  Received Care Coordinator consult for stroke--discharge planning. Chart reviewed, discussed with interdisciplinary team. In 6A Discharge Rounds Dr Paul reported pt is on an insulin drip; Endocrine consulting. Once off insulin drip pt should be ready for discharge; possibly tomorrow. TCU is recommended.     Patient was admitted for:  Left thalamic stroke likely secondary to right vertebral occlusion.   On 1-08: pt presented to [facility] ER after a fall at home and unable to get up due to generalized weakness; incontinent. Possible UTI. Discharged home on oral antibiotic. Later on that day he had right-sided numbness & gait imbalance. Transferred to [facility] with a stroke.     Past history includes:  Type 2 diabetes; HTN; hyperlipidemia; microalbuminuria.       Concerns with insurance coverage for discharge needs: None. Pt's insurance is [employer].   Current Living Situation: Patient lives with spouse.  Support System: Supportive  Services Involved: TBD  Transportation: TBD  Barriers to Discharge: None    Coordination of Care and Referrals    Chart reviewed. Discussed in 6A Discharge Rounds.      Assessment  Pt s/p stroke. Requiring diabetes management. Will need a rehab stay.      Plan  Anticipated Discharge Date:  1-12 if off insulin drip and medically stable.   Anticipated Discharge Plan:   for TCU placement.           Alma Patel RN Care Coordinator  Unit [address], [facility]

## 2018-01-11 NOTE — TELEPHONE ENCOUNTER
"Requested Prescriptions   Pending Prescriptions Disp Refills     tamsulosin (FLOMAX) 0.4 MG capsule [Pharmacy Med Name: TAMSULOSIN 0.4MG    CAP] 90 capsule 1     Sig: TAKE ONE CAPSULE BY MOUTH ONCE DAILY    Alpha Blockers Passed    1/7/2018 11:45 AM       Passed - BP is less than 140/90    BP Readings from Last 3 Encounters:   01/11/18 148/78   01/08/18 121/83   01/08/18 116/84                Passed - Recent or future visit with authorizing provider's specialty    Patient had office visit in the last year or has a visit in the next 30 days with authorizing provider.  See \"Patient Info\" tab in inbasket, or \"Choose Columns\" in Meds & Orders section of the refill encounter.              Passed - Patient does not have Tadalafil, Vardenafil, or Sildenafil on their medication list       Passed - Patient is 18 years of age or older      Routing refill request to provider for review/approval because:  Pt is currently in the hospital.  Unsure if this medication should be renewed at discharge.  Paty Brooks RN          "

## 2018-01-11 NOTE — DISCHARGE SUMMARY
[facility], [place]    Neurology Stroke Discharge Summary    Date of Admission: 1/9/2018  Date of Discharge: 1/12/2018  Disposition: Discharged to rehabilitation facility  Primary Care Physician: Rom Helm      Admission Diagnosis:   Falling down and right hand numbness.     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  He fell at about 7 am 1/8 trying to use the bathroom.  He did not strike his head.  His wife was unable to get him up so she called paramedics.  On his first presentation to the ED he was found to have a UTI and was sent home on Keflex.  At approximately 11 am it was noted that he had right arm numbness and difficulty ambulating and he returned to the emergency department.  His NIH on presentation was 4 for facial palsy, right arm weakness, limb ataxia, and sensory loss.  Stroke code was activated at OSH, and it was decided that since time of onset was unclear, it would not be appropriate to administer TPA.  He was also found to have a right vertebral artery dissection vs dissecting plaque, MR Brain from yesterday (1/10/11) demonstrated a R. Vertebral a., that ended in PICA, which radiologist reports as a normal variant.     Please see H&P dated 1/9/2018 for further details about presentation.    Brief Hospital Course:   Patient presented  following a fall and subsequent encephalopathy. Head CT and CTA was negative for any acute pathology, but he was noted to have an occluded right vertebral artery. He was not a candidate for TPA due to being out of the time window. On MRI he was found to have an acute infarct in the left lateral thalamus, most likely etiology secondary to right vertebral artery occlusion. Although, he also has numerous small vessel risk  factors such as hypertension and diabetes. Of note, he had very poorly controlled blood sugars while inpatient. He required an insulin drip and endocrinology consulted. He was discharged and 50 units of Lantus as well as an increase in his prandial insulin to 1 unit per 5g CHO with meals and snacks, as well as high correction scale before meals and before bed. His glucose should be checked before meals, before bed, and at 0200. Metformin was resumed on discharge. We need the dnocrinology team to continue following his insuline while he is in the ARU.     Rehab evaluation: OT, PT and SLP.     Smoking Cessation: patient is not a smoker    BP Long-term Goal: 140/90 or less    Antithrombotic/Anticoagulant Agent: aspirin 325 mg    Statins: Prior to admission statin agent changed to atorvastatin 20 mg       Hgb A1C Goal: < 7.0    Complications: UTI.     Other problems addressed during this hospitalization:  UTI: Keflex 500 mg BID x 7 days (stop date 1/16). Kidney us was ordered before d/c in order to rule out obstructive stone. Urology follow up in 2 months for the history of kidney stone.     HTN: continued HCTZ 12.5 mg qd, amlodipine 10 mg po qd     DM2:  -increased Lantus from  50 units to 60 units in the morning  -Novolog prandial insulin 1 unit per 5 grams of CHOwith meals and snacks   -Novolog High correction scale before meals and at HS  -Monitor glucose before meals, HS and 0200  -will hold metformin while in the hospital and resume once discharging to ARU-Have endocrinology during stay in ARU.  -endocrinology will follow upon discharge to ARU.    PERTINENT INVESTIGATIONS    Labs  Lipid Panel:   Recent Labs   Lab Test  01/10/18   0926   09/25/15   0844   CHOL  134   < >  134   HDL  41   < >  31*   LDL  74   < >  80   TRIG  95   < >  117   CHOLHDLRATIO   --    --   4.3    < > = values in this interval not displayed.     A1C:   Lab Results   Component Value Date    A1C 7.9 01/09/2018     INR:   Recent Labs  Lab  01/10/18  0926 01/08/18  1345   INR 1.07 1.05      Coag Panel / Hypercoag Workup: Not indicated  Pending test results: None    Echo:   Technically difficult study. Extremely difficult acoustic windows despite the  use of contrast for endcardial border definition.  Global and regional left ventricular function is normal with an EF of 60-65%.  Global right ventricular function is normal.  There is no right to left shunt at the atrial septal level by agitated saline  contrast study at rest and with Valsalva maneuver.  No pericardial effusion is present.    Imaging:   Head CT/A on arrival  1. Abrupt nonopacification of the right V4 segment which could be due  to dissection or embolism. This is age indeterminate although the T2  flow void in this region appeared to be present on MR 5/1/2014. The  left vertebral and basilar artery appear patent. The right PICA  appears to have an extradural origin and appears to be patent arising  from the V3 segment of the right vertebral artery.  2. Suggestion of delayed blood flow to the right posteroinferior  cerebellum and questionable decreased blood flow although CT perfusion  of the posterior fossa is limited. Recommend further evaluation with  brain MRI if the patient is able.  3. Multifocal moderate to severe stenosis of the intracranial arteries  as described above. This is likely due to intracranial atherosclerotic  disease.  4. Patent arteries in the neck. Mild atherosclerotic disease at the  left carotid bifurcation without significant stenosis.    MRI Brain    1. Small acute infarct in the lateral left thalamus.  2. Brain atrophy and white matter changes consistent with sequelae of  small vessel ischemic disease. No change since 2014.  3. Old microhemorrhages inferiorly in the cerebellar hemispheres.  These are new since 2014.  4. Paranasal sinus opacification. This is new since 2014.    Endovascular procedure: None     Cardiac Monitoring: Patient had > 24 hrs of cardiac  "monitor while in hospital.    Findings: No atrial fibrillation was found.    Sleep Apnea Screen:   Questions/Answers      1. Prior to your stroke, have you been told that you snore? No.    2. Prior to your stroke, have you been told that you struggle to breath while you are sleeping? No.    3. Prior to your stroke, do you feel tired and sleepy even after getting a normal night of sleep? No.      Sleep Apnea Screen Findings:  Sleep Apnea Screen   If at least two \"yes\" responses below, then a sleep consult/referral is recommended.     1. Prior to your stroke, have you been told that you snore? yes  2. Prior to your stroke, have you been told that you struggle to breathe while you are sleeping? no  3. Prior to your stroke, do you feel tired and sleepy even after getting a normal night of sleep?yes       Patient Health Questionnaire-9 [PHQ-9]    Over the last two weeks, how often have you been bothered by any the following symptoms?  Please use the following scale;  0 = Not at all  1 = Several days but less than half  2 = More than half of the days  3 = Nearly every day    1) Little interest or pleasure in doing things [ 1 ]  2) Feeling down, depressed, or hopeless.[  1 ]  3) Trouble falling or staying asleep, or sleeping too much [  1 ]  4) Feeling tired or having little energy.[ 1 ]  5) Poor appetite or overeating [ 0  ]  6) Feeling bad about yourself - or that you are a failure or have let yourself or your family down [  0 ]  7) Trouble concentrating on things, such as reading the newspaper or watching television [  1 ]  8) Moving or speaking so slowly that other people could have noticed. Or the opposite-being fidgety or restless that you have been moving around a lot more than usual [   0 ]  9) Thoughts that you would be better off dead, or of hurting yourself in some way [  0  ]    Total Score: __5_    If rated any questions above >0, how difficult have these problems made it for you to do your work, take care of " things at home, or get along with other people?    very difficult.      Depression score ranges:   5 to 9: mild     10 to 14: moderate   15 to 19: moderately   severe ?20: severe        Education discussed with: patient on medical management.    During daily rounds, the plan of care was discussed and developed with patient.  Plan of care includes: to have him on the ARU. .    PHYSICAL EXAMINATION  Vital Signs:  B/P: 148/78, T: 97.9, P: 73, R: 16    General:  patient lying in bed without any acute distress     HEENT:  normocephalic/atraumatic  Cardio:  RRR  Pulmonary:  no respiratory distress  Abdomen:  soft  Extremities:  no edema  Skin:  intact     Neurologic  Mental Status:  fully alert  Cranial Nerves:  visual fields intact, PERRL, EOMI with normal smooth pursuit, facial sensation intact and symmetric, facial movements symmetric, hearing not formally tested but intact to conversation, palate elevation symmetric and uvula midline  Motor:  no abnormal movements  Reflexes:  2+ and symmetric throughout  Sensory:  intact/symmetric to light touch and pin prick throughout upper and lower extremities  Coordination:  FNF and HS intact without dysmetria  Station/Gait:  unable to test    National Institutes of Health Stroke Scale (on day of discharge)  NIHSS Total Score: 1    [place] Scale (on day of discharge): 1-No significant disability despite symptoms; able to carry out all usual duties and activities    Medications  Current Discharge Medication List      START taking these medications    Details   aspirin 81 MG chewable tablet Take 4 tablets (324 mg) by mouth daily  Qty: 36 tablet, Refills: 0    Associated Diagnoses: 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)      atorvastatin (LIPITOR) 20 MG tablet Take 1 tablet (20 mg) by mouth daily  Qty: 30 tablet, Refills: 0    Associated Diagnoses: 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)          CONTINUE these medications which have CHANGED    Details   Glucosamine HCl 750 MG TABS Take 750 mg by mouth 2 times daily    Associated Diagnoses: Arthritis      insulin glargine (LANTUS) 100 UNIT/ML injection Inject 50 Units Subcutaneous every 24 hours    Associated Diagnoses: Diabetic ketoacidosis without coma associated with other specified diabetes mellitus (H)      metFORMIN (GLUCOPHAGE) 500 MG tablet Take 1 tablet (500 mg) by mouth daily (with breakfast)  Qty: 360 tablet, Refills: 0    Associated Diagnoses: Diabetic ketoacidosis without coma associated with other specified diabetes mellitus (H)      loratadine (AF LORATADINE) 10 MG tablet Take 1 tablet (10 mg) by mouth daily  Qty: 14 tablet, Refills: 0    Associated Diagnoses: Hypertension goal BP (blood pressure) < 140/90      amLODIPine (NORVASC) 5 MG tablet Take 1 tablet (5 mg) by mouth daily  Qty: 30 tablet    Associated Diagnoses: Hypertension goal BP (blood pressure) < 140/90      !! Nutritional Supplements (GLUCOSE MANAGEMENT) TABS Take 2 tab in case on low blood glucose  Qty: 60 tablet, Refills: 0    Associated Diagnoses: Hyperglycemia      !! Nutritional Supplements (GLUCOSE MANAGEMENT) TABS 4 tabs po for low blood sugar below 70, may repeat q 10-15 minutes as needed  Qty: 100 tablet, Refills: prn    Associated Diagnoses: Type 2 diabetes mellitus with diabetic polyneuropathy, with long-term current use of insulin (H)      hydrochlorothiazide (MICROZIDE) 12.5 MG capsule Take 1 capsule (12.5 mg) by mouth daily  Qty: 30 capsule    Associated Diagnoses: Hypertension goal BP (blood pressure) < 140/90      tamsulosin (FLOMAX) 0.4 MG capsule Take 1 capsule (0.4 mg) by mouth daily  Qty: 90 capsule, Refills: 1    Associated Diagnoses: Hyperlipidemia LDL goal <100; Calculus of kidney       !! - Potential duplicate medications found. Please discuss with provider.      CONTINUE these medications which have NOT CHANGED    Details   cephALEXin (KEFLEX) 500 MG  capsule Take 1 capsule (500 mg) by mouth 3 times daily for 7 days  Qty: 21 capsule, Refills: 0      !! blood glucose monitoring (ONETOUCH VERIO IQ) test strip One Touch Verio Flex--Use to test blood sugars 4 times daily or as directed.  Qty: 200 strip, Refills: 3    Associated Diagnoses: Type 2 diabetes mellitus with diabetic polyneuropathy, with long-term current use of insulin (H)      !! blood glucose monitoring (STEFFI CONTOUR NEXT) test strip Use to test blood sugar 4 times daily or as directed.  Qty: 50 each, Refills: 0    Associated Diagnoses: Type 2 diabetes mellitus with diabetic polyneuropathy (H)      !! STEFFI CONTOUR NEXT test strip USE TO TEST BLOOD SUGAR 4 TIMES DAILY OR AS DIRECTED (E11.42)  Qty: 400 strip, Refills: 0    Associated Diagnoses: Type 2 diabetes mellitus with diabetic polyneuropathy (H)      !! insulin lispro (HUMALOG KWIKPEN) 100 UNIT/ML injection 2 Units + correctional scale  Do Not give Correction Insulin if BG < 200   < 200 : no additional insulin   200-225: + 1   226-250: + 2   251-275: + 3   276-300: + 4   301-325: + 5   326- 350: +6 units   > 350: + 7 units   Notify MD if glucose > or = 350 mg/dL after administration of correction dose  Qty: 30 mL, Refills: 1    Associated Diagnoses: Type 2 diabetes mellitus with diabetic polyneuropathy, with long-term current use of insulin (H)      !! insulin lispro (HUMALOG KWIKPEN) 100 UNIT/ML injection Inject 2 Units Subcutaneous 3 times daily (before meals) Take 2 unit before breakfast, 2 units before lunch and 2 units before dinner PLUS sliding scale  Qty: 3 mL, Refills: 5    Associated Diagnoses: Hyperglycemia      multivitamin (OCUVITE) TABS Take 1 tablet by mouth daily FOCUS SELECT PREMIUM WITH LUTEIN per eye doctor  Reported on 5/19/2017      insulin pen needle 31G X 8 MM B-D UF III short pen needles, use 4 times a day to inject insulin.  Qty: 300 each, Refills: 3    Associated Diagnoses: Type 2 diabetes, HbA1C goal < 8% (H)      order  "for DME All DM testing supplies including test strips, lancets, solution, syringes, needles and/or pen needles for testing 4 times per day.    Brand \"Contour Next\"  Qty: 3 Month, Refills: 1    Associated Diagnoses: Type 2 diabetes mellitus with diabetic polyneuropathy (H)      LIFESCAN FINEPOINT LANCETS MISC Use to test blood sugars 4 times daily or as directed.  Qty: 200 strip, Refills: 3    Associated Diagnoses: Type 2 diabetes, HbA1C goal < 8% (H)      insulin syringes, disposable, U-100 0.3 ML MISC 1 each 2 times daily  Qty: 100 each, Refills: 6    Associated Diagnoses: Type II or unspecified type diabetes mellitus without mention of complication, not stated as uncontrolled      ACE/ARB NOT PRESCRIBED, INTENTIONAL, by Other route continuous prn (Hyperkalemia) Reported on 5/19/2017    Comments: Adverse reaction (severe dizziness) to low-dose losartan.  Associated Diagnoses: Type 2 diabetes, HbA1C goal < 8% (H)       !! - Potential duplicate medications found. Please discuss with provider.      STOP taking these medications       lovastatin (MEVACOR) 40 MG tablet Comments:   Reason for Stopping:         Cholecalciferol (VITAMIN D PO) Comments:   Reason for Stopping:               Additional recommendations and follow up:      MED THERAPY MANAGE REFERRAL         Patient was seen and discussed with the Attending, Dr. Leone.    Jasper Paul  Neurology resident   P: [phone]    "

## 2018-01-11 NOTE — PLAN OF CARE
Problem: Patient Care Overview  Goal: Plan of Care/Patient Progress Review  PT 6A: Upon chart review and RN, pt BG is currently 410.  Due to this value, PT canceled.  Will reschedule tomorrow.

## 2018-01-12 ENCOUNTER — HOSPITAL ENCOUNTER (INPATIENT)
Facility: CLINIC | Age: 78
LOS: 15 days | Discharge: HOME HEALTH | DRG: 057 | End: 2018-01-27
Attending: PHYSICAL MEDICINE & REHABILITATION | Admitting: PHYSICAL MEDICINE & REHABILITATION
Payer: COMMERCIAL

## 2018-01-12 ENCOUNTER — APPOINTMENT (OUTPATIENT)
Dept: ULTRASOUND IMAGING | Facility: CLINIC | Age: 78
DRG: 064 | End: 2018-01-12
Attending: PSYCHIATRY & NEUROLOGY
Payer: COMMERCIAL

## 2018-01-12 ENCOUNTER — APPOINTMENT (OUTPATIENT)
Dept: PHYSICAL THERAPY | Facility: CLINIC | Age: 78
DRG: 064 | End: 2018-01-12
Attending: PSYCHIATRY & NEUROLOGY
Payer: COMMERCIAL

## 2018-01-12 ENCOUNTER — APPOINTMENT (OUTPATIENT)
Dept: OCCUPATIONAL THERAPY | Facility: CLINIC | Age: 78
DRG: 064 | End: 2018-01-12
Attending: PSYCHIATRY & NEUROLOGY
Payer: COMMERCIAL

## 2018-01-12 VITALS
SYSTOLIC BLOOD PRESSURE: 148 MMHG | DIASTOLIC BLOOD PRESSURE: 83 MMHG | HEART RATE: 82 BPM | HEIGHT: 67 IN | BODY MASS INDEX: 24.2 KG/M2 | OXYGEN SATURATION: 96 % | TEMPERATURE: 98.2 F | WEIGHT: 154.2 LBS | RESPIRATION RATE: 16 BRPM

## 2018-01-12 DIAGNOSIS — N39.0 URINARY TRACT INFECTION WITHOUT HEMATURIA, SITE UNSPECIFIED: ICD-10-CM

## 2018-01-12 DIAGNOSIS — E13.10 DIABETIC KETOACIDOSIS WITHOUT COMA ASSOCIATED WITH OTHER SPECIFIED DIABETES MELLITUS (H): ICD-10-CM

## 2018-01-12 DIAGNOSIS — N20.0 CALCULUS OF KIDNEY: ICD-10-CM

## 2018-01-12 DIAGNOSIS — E11.42 TYPE 2 DIABETES MELLITUS WITH DIABETIC POLYNEUROPATHY, WITH LONG-TERM CURRENT USE OF INSULIN (H): ICD-10-CM

## 2018-01-12 DIAGNOSIS — I63.112 CEREBROVASCULAR ACCIDENT (CVA) DUE TO EMBOLISM OF LEFT VERTEBRAL ARTERY (H): ICD-10-CM

## 2018-01-12 DIAGNOSIS — Z79.4 TYPE 2 DIABETES MELLITUS WITH DIABETIC POLYNEUROPATHY, WITH LONG-TERM CURRENT USE OF INSULIN (H): ICD-10-CM

## 2018-01-12 DIAGNOSIS — I63.212 CEREBROVASCULAR ACCIDENT (CVA) DUE TO OCCLUSION OF LEFT VERTEBRAL ARTERY (H): ICD-10-CM

## 2018-01-12 DIAGNOSIS — I63.212 CEREBROVASCULAR ACCIDENT (CVA) DUE TO STENOSIS OF LEFT VERTEBRAL ARTERY (H): Primary | ICD-10-CM

## 2018-01-12 DIAGNOSIS — I10 HYPERTENSION GOAL BP (BLOOD PRESSURE) < 140/90: ICD-10-CM

## 2018-01-12 DIAGNOSIS — E78.5 HYPERLIPIDEMIA LDL GOAL <100: ICD-10-CM

## 2018-01-12 PROBLEM — I63.9 STROKE (H): Status: ACTIVE | Noted: 2018-01-12

## 2018-01-12 LAB
ANION GAP SERPL CALCULATED.3IONS-SCNC: 9 MMOL/L (ref 3–14)
BACTERIA SPEC CULT: ABNORMAL
BACTERIA SPEC CULT: ABNORMAL
BUN SERPL-MCNC: 23 MG/DL (ref 7–30)
CALCIUM SERPL-MCNC: 9 MG/DL (ref 8.5–10.1)
CHLORIDE SERPL-SCNC: 97 MMOL/L (ref 94–109)
CO2 SERPL-SCNC: 25 MMOL/L (ref 20–32)
CREAT SERPL-MCNC: 1.11 MG/DL (ref 0.66–1.25)
GFR SERPL CREATININE-BSD FRML MDRD: 64 ML/MIN/1.7M2
GLUCOSE BLDC GLUCOMTR-MCNC: 231 MG/DL (ref 70–99)
GLUCOSE BLDC GLUCOMTR-MCNC: 291 MG/DL (ref 70–99)
GLUCOSE BLDC GLUCOMTR-MCNC: 358 MG/DL (ref 70–99)
GLUCOSE BLDC GLUCOMTR-MCNC: 416 MG/DL (ref 70–99)
GLUCOSE BLDC GLUCOMTR-MCNC: 489 MG/DL (ref 70–99)
GLUCOSE SERPL-MCNC: 426 MG/DL (ref 70–99)
Lab: ABNORMAL
PLATELET # BLD AUTO: 256 10E9/L (ref 150–450)
POTASSIUM SERPL-SCNC: 4.1 MMOL/L (ref 3.4–5.3)
SODIUM SERPL-SCNC: 132 MMOL/L (ref 133–144)
SPECIMEN SOURCE: ABNORMAL

## 2018-01-12 PROCEDURE — 25000132 ZZH RX MED GY IP 250 OP 250 PS 637: Performed by: STUDENT IN AN ORGANIZED HEALTH CARE EDUCATION/TRAINING PROGRAM

## 2018-01-12 PROCEDURE — 97530 THERAPEUTIC ACTIVITIES: CPT | Mod: GP

## 2018-01-12 PROCEDURE — 97110 THERAPEUTIC EXERCISES: CPT | Mod: GP

## 2018-01-12 PROCEDURE — 00000146 ZZHCL STATISTIC GLUCOSE BY METER IP

## 2018-01-12 PROCEDURE — 25000128 H RX IP 250 OP 636: Performed by: STUDENT IN AN ORGANIZED HEALTH CARE EDUCATION/TRAINING PROGRAM

## 2018-01-12 PROCEDURE — 76770 US EXAM ABDO BACK WALL COMP: CPT

## 2018-01-12 PROCEDURE — 25000132 ZZH RX MED GY IP 250 OP 250 PS 637: Performed by: PSYCHIATRY & NEUROLOGY

## 2018-01-12 PROCEDURE — 12800006 ZZH R&B REHAB

## 2018-01-12 PROCEDURE — 40000133 ZZH STATISTIC OT WARD VISIT

## 2018-01-12 PROCEDURE — 36415 COLL VENOUS BLD VENIPUNCTURE: CPT | Performed by: PSYCHIATRY & NEUROLOGY

## 2018-01-12 PROCEDURE — 97535 SELF CARE MNGMENT TRAINING: CPT | Mod: GO

## 2018-01-12 PROCEDURE — 25000132 ZZH RX MED GY IP 250 OP 250 PS 637: Performed by: PHYSICAL MEDICINE & REHABILITATION

## 2018-01-12 PROCEDURE — 80048 BASIC METABOLIC PNL TOTAL CA: CPT | Performed by: PSYCHIATRY & NEUROLOGY

## 2018-01-12 PROCEDURE — 85049 AUTOMATED PLATELET COUNT: CPT | Performed by: PSYCHIATRY & NEUROLOGY

## 2018-01-12 PROCEDURE — 25000131 ZZH RX MED GY IP 250 OP 636 PS 637: Performed by: NURSE PRACTITIONER

## 2018-01-12 PROCEDURE — 40000193 ZZH STATISTIC PT WARD VISIT

## 2018-01-12 PROCEDURE — 82565 ASSAY OF CREATININE: CPT | Performed by: PSYCHIATRY & NEUROLOGY

## 2018-01-12 PROCEDURE — 97116 GAIT TRAINING THERAPY: CPT | Mod: GP

## 2018-01-12 RX ORDER — AMLODIPINE BESYLATE 10 MG/1
10 TABLET ORAL DAILY
Status: DISCONTINUED | OUTPATIENT
Start: 2018-01-13 | End: 2018-01-27 | Stop reason: HOSPADM

## 2018-01-12 RX ORDER — HYDROCHLOROTHIAZIDE 12.5 MG/1
12.5 CAPSULE ORAL DAILY
Status: DISCONTINUED | OUTPATIENT
Start: 2018-01-13 | End: 2018-01-14

## 2018-01-12 RX ORDER — NICOTINE POLACRILEX 4 MG
15-30 LOZENGE BUCCAL
Status: DISCONTINUED | OUTPATIENT
Start: 2018-01-12 | End: 2018-01-27 | Stop reason: HOSPADM

## 2018-01-12 RX ORDER — CEPHALEXIN 500 MG/1
500 CAPSULE ORAL 3 TIMES DAILY
Status: COMPLETED | OUTPATIENT
Start: 2018-01-12 | End: 2018-01-16

## 2018-01-12 RX ORDER — TAMSULOSIN HYDROCHLORIDE 0.4 MG/1
0.4 CAPSULE ORAL EVERY EVENING
Status: DISCONTINUED | OUTPATIENT
Start: 2018-01-12 | End: 2018-01-19

## 2018-01-12 RX ORDER — ATORVASTATIN CALCIUM 20 MG/1
20 TABLET, FILM COATED ORAL DAILY
Status: DISCONTINUED | OUTPATIENT
Start: 2018-01-13 | End: 2018-01-27 | Stop reason: HOSPADM

## 2018-01-12 RX ORDER — AMLODIPINE BESYLATE 10 MG/1
10 TABLET ORAL DAILY
Qty: 30 TABLET | Refills: 1 | Status: SHIPPED | OUTPATIENT
Start: 2018-01-13 | End: 2018-02-02

## 2018-01-12 RX ORDER — DEXTROSE MONOHYDRATE 25 G/50ML
25-50 INJECTION, SOLUTION INTRAVENOUS
Status: DISCONTINUED | OUTPATIENT
Start: 2018-01-12 | End: 2018-01-12

## 2018-01-12 RX ORDER — ASPIRIN 81 MG/1
324 TABLET, CHEWABLE ORAL DAILY
Status: DISCONTINUED | OUTPATIENT
Start: 2018-01-13 | End: 2018-01-13

## 2018-01-12 RX ORDER — NICOTINE POLACRILEX 4 MG
15-30 LOZENGE BUCCAL
Status: DISCONTINUED | OUTPATIENT
Start: 2018-01-12 | End: 2018-01-12

## 2018-01-12 RX ORDER — LORATADINE 10 MG/1
10 TABLET ORAL DAILY
Status: DISCONTINUED | OUTPATIENT
Start: 2018-01-12 | End: 2018-01-12

## 2018-01-12 RX ORDER — I-VITE, TAB 1000-60-2MG (60/BT) 300MCG-200
1 TAB ORAL DAILY
Status: DISCONTINUED | OUTPATIENT
Start: 2018-01-12 | End: 2018-01-27 | Stop reason: HOSPADM

## 2018-01-12 RX ORDER — DEXTROSE MONOHYDRATE 25 G/50ML
25-50 INJECTION, SOLUTION INTRAVENOUS
Status: DISCONTINUED | OUTPATIENT
Start: 2018-01-12 | End: 2018-01-27 | Stop reason: HOSPADM

## 2018-01-12 RX ORDER — CEPHALEXIN 500 MG/1
500 CAPSULE ORAL 3 TIMES DAILY
Qty: 9 CAPSULE | Refills: 0 | Status: ON HOLD | OUTPATIENT
Start: 2018-01-12 | End: 2018-01-25

## 2018-01-12 RX ADMIN — CEPHALEXIN 500 MG: 250 CAPSULE ORAL at 13:01

## 2018-01-12 RX ADMIN — CEPHALEXIN 500 MG: 500 CAPSULE ORAL at 21:32

## 2018-01-12 RX ADMIN — CEPHALEXIN 500 MG: 500 CAPSULE ORAL at 18:40

## 2018-01-12 RX ADMIN — ATORVASTATIN CALCIUM 20 MG: 20 TABLET, FILM COATED ORAL at 08:12

## 2018-01-12 RX ADMIN — CEPHALEXIN 500 MG: 250 CAPSULE ORAL at 08:12

## 2018-01-12 RX ADMIN — ASPIRIN 81 MG CHEWABLE TABLET 324 MG: 81 TABLET CHEWABLE at 08:11

## 2018-01-12 RX ADMIN — AMLODIPINE BESYLATE 10 MG: 10 TABLET ORAL at 08:12

## 2018-01-12 RX ADMIN — TAMSULOSIN HYDROCHLORIDE 0.4 MG: 0.4 CAPSULE ORAL at 21:32

## 2018-01-12 RX ADMIN — INSULIN ASPART 18 UNITS: 100 INJECTION, SOLUTION INTRAVENOUS; SUBCUTANEOUS at 19:15

## 2018-01-12 RX ADMIN — ENOXAPARIN SODIUM 40 MG: 40 INJECTION SUBCUTANEOUS at 08:13

## 2018-01-12 RX ADMIN — ACETAMINOPHEN 650 MG: 325 TABLET ORAL at 08:19

## 2018-01-12 RX ADMIN — HYDROCHLOROTHIAZIDE 12.5 MG: 12.5 CAPSULE ORAL at 08:12

## 2018-01-12 RX ADMIN — I-VITE, TAB 1000-60-2MG (60/BT) 1 TABLET: TAB at 18:37

## 2018-01-12 ASSESSMENT — ACTIVITIES OF DAILY LIVING (ADL)
SWALLOWING: 0-->SWALLOWS FOODS/LIQUIDS WITHOUT DIFFICULTY
COGNITION: 0 - NO COGNITION ISSUES REPORTED
FALL_HISTORY_WITHIN_LAST_SIX_MONTHS: NO
BATHING: 0-->INDEPENDENT
RETIRED_EATING: 0-->INDEPENDENT
TRANSFERRING: 0-->INDEPENDENT
DRESS: 0-->INDEPENDENT
TOILETING: 0-->INDEPENDENT
AMBULATION: 0-->INDEPENDENT
RETIRED_COMMUNICATION: 0-->UNDERSTANDS/COMMUNICATES WITHOUT DIFFICULTY

## 2018-01-12 ASSESSMENT — VISUAL ACUITY
OU: BASELINE;GLASSES

## 2018-01-12 NOTE — H&P
[facility]   Acute Rehabilitation Unit  Admission History and Physical    chief complaint   Ischemic left thalamic infarct     History of Present illness  Pete Sheldon is a 77-year-old right hand dominant male with a history of DM II and HTN who was admitted to [facility] from an outside hospital on 1/9/18 for stroke. The patient fell in his home on 1/8/18 while using the bathroom and ended up being taken by paramedics to an outside hospital ED where he was found to have a UTI and was sent home on Keflex. Later that day, the patient was noted to have right arm numbness and difficulty with ambulation so he was brought back to the ED where his NIH was 4 for facial palsy, right arm weakness, sensory loss and limb ataxia. A stroke code was called and since time on onset was unclear, decision was made to refrain from administration of tPA. His imaging was concerning for a right vertebral artery dissection vs dissecting plaque. The patient was then transferred to [facility] where imaging was most consistent with an ischemic left thalamic infarct due to vertebral artery occlusion.     The patient's hospitalization was complicated by difficult to control blood sugars requiring an insulin drip and endocrinology consult. His hemoglobin A1c of 7.9 was reflective of suboptimally controlled DM. At this time, his blood glucose remains elevated as high as 400s. Adjustments to his DM medication regimen have been made accordingly.      During his acute hospitalization, patient was seen and evaluated by PT, OT and SLP.  All specialties collectively recommended ongoing therapies in the acute inpatient rehabilitation setting.     Review of the therapy notes:  PT: Pt donned shorts, utilized urinal and completed sit<>stand transfer(s) with SBA-supervision. He ambulated 2 x 250' with FWW and SBA - some stagger and directional cues provided to avoid obstacles. To promote improvement in activity tolerance,  pt completed 10' on Nu-step at level 2, maintaining >50 SPMs. AVSS.  OT: Patient ambulates with close CGA and FWW, CGA for all standing activities with instruction on unilateral UE support on GBs for increased safety. Patient performs lower body dressing min A for tying 2/2 R hand numbness. Patient performs buttons max A, decreased sensation and vision impacting performance with small buttons on shirt.  SLP: Patient met swallow goals - SLP signed off.     Currently, the patient is medically appropriate and is assessed to have needs and will benefit from an inpatient acute rehabilitation comprehensive program working with PT, OT and SLP, and will also benefit from supervision and management of Rehab Nursing and Rehab MD.       PAST Medical History  Past Medical History:   Diagnosis Date     Calculus of ureter 1980's     Hypertension      Microalbuminuria 2/15/2016     Other and unspecified hyperlipidemia      Type 2 diabetes, HbA1C goal < 8% (H) 7/1995       Surgical History  Past Surgical History:   Procedure Laterality Date     C NONSPECIFIC PROCEDURE  1980's    Nasal septoplasty     HC COLONOSCOPY THRU STOMA, DIAGNOSTIC  6/12/2007    Normal     HC FLEX SIGMOIDOSCOPY W/WO MIGUEL SPEC BY BRUSH/WASH  12/30/1999    Normal to 60 cm     HC REPAIR INCISIONAL HERNIA,REDUCIBLE  1/2001, 1999    Hernia Repair, Incisional, Unilateral (right)     HC REPAIR INCISIONAL HERNIA,REDUCIBLE  1996    Hernia Repair, Incisional, Unilateral (left, with mesh placement)       SOCIAL HISTORY  Marital Status:   Living situation: Lives in a rambler with his wife in [place]. There are 2 steps to enter from garage.   Family support: Lives with wife, although she does not drive. Has a grandson, Ted, who is available to help as needed who lives in [place].   Vocational History: Retired high   Tobacco use: Quit smoking in 1953  Alcohol use: None.   Illicit drug use: None.       FAMILY HISTORY  Family History    Problem Relation Age of Onset     CEREBROVASCULAR DISEASE Mother       age 95     HEART DISEASE Mother      age 89,  age 95     CEREBROVASCULAR DISEASE Father       age 91, stroke two years previous     Cancer - colorectal Brother      Half-brother     CANCER Sister      Half-sister (?type)     CANCER Sister      Half-sister (?type)     HEART DISEASE Brother      Neurologic Disorder Daughter      Multiple Sclerosis     Psychotic Disorder Son      Schizophrenia         Prior Functional history   Patient was previously independent with all ADLs/IADLs, transfers, mobility and gait, without an assistive device, including driving. His wife does not drive.     Medications  Prescriptions Prior to Admission   Medication Sig Dispense Refill Last Dose     blood glucose monitoring (ONETOUCH VERIO IQ) test strip One Touch Verio Flex--Use to test blood sugars 4 times daily or as directed. 200 strip 3      blood glucose monitoring (STEFFI CONTOUR NEXT) test strip Use to test blood sugar 4 times daily or as directed. 50 each 0      [DISCONTINUED] hydrochlorothiazide (MICROZIDE) 12.5 MG capsule TAKE ONE CAPSULE BY MOUTH IN THE MORNING 90 capsule 0 2018 at Unknown time     [DISCONTINUED] amLODIPine (NORVASC) 5 MG tablet TAKE ONE TABLET BY MOUTH ONCE DAILY (Patient taking differently: TAKE ONE TABLET BY MOUTH ONCE DAILY q PM) 90 tablet 0 2018 at Unknown time     STEFFI CONTOUR NEXT test strip USE TO TEST BLOOD SUGAR 4 TIMES DAILY OR AS DIRECTED (E11.42) 400 strip 0 Taking     insulin lispro (HUMALOG KWIKPEN) 100 UNIT/ML injection 2 Units + correctional scale  Do Not give Correction Insulin if BG < 200   < 200 : no additional insulin   200-225: + 1   226-250: + 2   251-275: + 3   276-300: + 4   301-325: + 5   326- 350: +6 units   > 350: + 7 units   Notify MD if glucose > or = 350 mg/dL after administration of correction dose 30 mL 1 Taking     [DISCONTINUED] lovastatin (MEVACOR) 40 MG tablet Take 1 tablet (40 mg)  "by mouth At Bedtime 90 tablet 3 1/7/2018 at Unknown time     [DISCONTINUED] insulin glargine (LANTUS) 100 UNIT/ML injection Inject 32 Units Subcutaneous every morning (before breakfast) 3 mL 5 1/8/2018 at am     insulin lispro (HUMALOG KWIKPEN) 100 UNIT/ML injection Inject 2 Units Subcutaneous 3 times daily (before meals) Take 2 unit before breakfast, 2 units before lunch and 2 units before dinner PLUS sliding scale (Patient taking differently: Inject 2 Units Subcutaneous 3 times daily (before meals) Take 2 unit before breakfast, 2 units before lunch and 2 units before dinner PLUS sliding scale  TDD around 12 units) 3 mL 5 Taking     multivitamin (OCUVITE) TABS Take 1 tablet by mouth daily FOCUS SELECT PREMIUM WITH LUTEIN per eye doctor  Reported on 5/19/2017 1/8/2018 at Unknown time     insulin pen needle 31G X 8 MM B-D UF III short pen needles, use 4 times a day to inject insulin. 300 each 3 Taking     order for DME All DM testing supplies including test strips, lancets, solution, syringes, needles and/or pen needles for testing 4 times per day.    Brand \"Contour Next\" 3 Month 1 Taking     [employer] FINEPOINT LANCETS MISC Use to test blood sugars 4 times daily or as directed. 200 strip 3 Taking     insulin syringes, disposable, U-100 0.3 ML MISC 1 each 2 times daily 100 each 6 Taking     [DISCONTINUED] Cholecalciferol (VITAMIN D PO) Take 400 Units by mouth daily    1/7/2018 at Unknown time     ACE/ARB NOT PRESCRIBED, INTENTIONAL, by Other route continuous prn (Hyperkalemia) Reported on 5/19/2017   Taking       ALLERGIES     Allergies   Allergen Reactions     Losartan Other (See Comments)     Dizziness       Review of Systems  A 10 point ROS was performed and negative unless otherwise noted in HPI.     Constitutional: denies any fevers, chills.   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    Ears, Nose, Throat: " "denies any difficulty swallowing   Cardiovascular: denies any exertional chest pain or palpitation   Respiratory: denies dyspnea  Gastrointestinal: denies any nausea, vomiting, abdominal pain, diarrhea or constipation  Genitourinary: denies any dysuria, hematuria, frequency or urgency   Musculoskeletal: denies any muscle pain, joint pain, neck pain or back pain  Neurologic: Has numbness in his right hand. Also reports word-finding difficulties and dysarthria.   Psychiatric: denies mood changes; sleeps ok        Physical Exam  VITAL SIGNS:  There were no vitals taken for this visit.  BMI:  Estimated body mass index is 24.15 kg/(m^2) as calculated from the following:    Height as of 1/9/18: 1.702 m (5' 7\").    Weight as of 1/11/18: 69.9 kg (154 lb 3.2 oz).     General: NAD, pleasant and cooperative   HEENT: Mild right facial droop  Pulmonary: breathing unlabored on room air  Cardiovascular: RRR  Abdominal: soft, non-tender, non-distended   Extremities: warm, well perfused, no edema in bilateral lower extremities, no tenderness in calves  MSK/neuro:   Mental Status:  alert and oriented x3    Cranial Nerves: grossly normal apart from R facial droop   Sensory: Normal to light touch in bilateral upper and lower extremities - does report numbness in his right hand   Strength:    SF  EF  EE  WE  G  HF  KE  DF  PF   R  5/5 5/5 5/5 4+/5 4+/5 4/5 5/5 5/5 5/5  L  5/5  5/5 5/5 5/5 5/5 4/5 5/5 5/5 5/5    Tone per modified Chasidy Scale: 0/4   Abnormal movements: None    Coordination: No dysmetria on finger to nose b/l    Speech: Slurred speech, mild dysarthria and word-finding difficulties   Cognition: Able to recall details of hospitalization, although does report some difficulty with memory and overall cognition       Labs  Pertinent labs    Lab Results   Component Value Date    WBC 11.2 (H) 01/11/2018    HGB 14.5 01/11/2018    HCT 44.7 01/11/2018    MCV 96 01/11/2018     01/12/2018     Lab Results   Component Value " "Date     (L) 01/12/2018    POTASSIUM 4.1 01/12/2018    CHLORIDE 97 01/12/2018    CO2 25 01/12/2018     (H) 01/12/2018     Lab Results   Component Value Date    GFRESTIMATED 64 01/12/2018    GFRESTBLACK 77 01/12/2018     Lab Results   Component Value Date    AST 23 01/10/2018    ALT 35 01/10/2018    ALKPHOS 83 01/10/2018    BILITOTAL 0.6 01/10/2018    BILICONJ 0.0 05/01/2014     Lab Results   Component Value Date    INR 1.07 01/10/2018     Lab Results   Component Value Date    BUN 23 01/12/2018    CR 1.11 01/12/2018         ASSESSMENT/PLAN:  Pete Sheldon is a 77-year-old male who had an ischemic left thalamic stroke resulting in right hemiparesis, decreased balance, decreased visual acuity, and decreased independence with mobility and ADLs. Admission to acute inpatient rehab 1/12/18.        1. PT, OT and SLP 60 minutes of each on a daily basis, in addition to rehab nursing and close management of physiatrist.    2. Impairment of ADL's: OT for 60 minutes daily to work on ADL re-training such as grooming, self cares and bathing.    3. Impairment of mobility:  PT for 60 minutes daily to work on neuromuscular re-education focusing on strength, balance, coordination, and endurance.    4. Impairment of cognition/language/swallow: SLP for 60 minutes daily to work on cognitive and linguistic deficits.  5. Rehab RN for med administration, bowel regimen, glucose monitoring and wound care.    6. Medical Conditions  1. Ischemic L thalamic stroke:  mg daily for secondary stroke ppx.   2. DM II: Goal Hgb A1c < 7. BG have been difficult to control - up to the 400s currently. Endocrinology consulted to assist with management of BG. Endocrine plan: \"-increased Lantus from  50 units to 60 units in the morning. Novolog prandial insulin 1 unit per 5 grams of CHOwith meals and snacks. Novolog High correction scale before meals and at HS. Monitor glucose before meals, HS and 0200. Will hold metformin while in the " "hospital and resume once discharging to ARU.\"  3. HTN: Long term goal < 140/90. Continued on HCTZ 12.5 mg qd, amlodipine 10 mg po qd   4. HLD: Prior to admission statin agent changed to atorvastatin 20 mg     5. UTI: Keflex 500 mg BID x 7 days (stop date 1/16).  7. Adjustment to disability:  Clinical psychology available to eval and treat if indicated  8. FEN: Regular diet with thins  9. Bowel: Voluntary and continent  10. Bladder: On Keflex for UTI. Has hx of kidney stones - kidney ultrasound from 1/9/18 does not show any stones. Will restart Flomax, as he was on this at home.   11. DVT Prophylaxis: Mechanical ppx.   12. GI Prophylaxis: Po diet.   13. Code: Full code  14. Disposition: Home  15. ELOS: 7-10 days.   16. Rehab prognosis:  Good.  17. Follow up Appointments on Discharge: Will need outpatient follow up with Urology for hx of kidney stones.       ------------------    Post Admission Physician Evaluation:    I have compared Pete Sheldon's condition on admission to acute rehabilitation to that outlined in the preadmission screen. History and physical exam performed by me. No significant differences are identified and the patient remains appropriate for an inpatient rehabilitation facility level of care to manage medical issues and address functional impairments due to ischemic left thalamic stroke.    Comorbid medical conditions being managed: HTN, HLD, UTI, DM II (poorly-controlled)    Prior functional level: Previously independent with mobility and ADLs.     Present function:  PT: Pt donned shorts, utilized urinal and completed sit<>stand transfer(s) with SBA-supervision. He ambulated 2 x 250' with FWW and SBA - some stagger and directional cues provided to avoid obstacles. To promote improvement in activity tolerance, pt completed 10' on Nu-step at level 2, maintaining >50 SPMs. AVSS.  OT: Patient ambulates with close CGA and FWW, CGA for all standing activities with instruction on unilateral UE support " on GBs for increased safety. Patient performs lower body dressing min A for tying 2/2 R hand numbness. Patient performs buttons max A, decreased sensation and vision impacting performance with small buttons on shirt.  SLP: Patient met swallow goals - SLP signed off.     Anticipated rehabilitation course: Anticipate that he will have more PT/OT needs and may not require SLP throughout entire rehab course.      Will benefit from intensive rehabilitation includin minutes each of PT, OT and SLP  Rehabilitation nursing  Close management by physiatry    Prognosis: Good - significant physical improvement, continues to have cognitive deficits. Has strong family support.     Estimated length of stay: 7-10 days        Nirmala Price MD  Physical Medicine & Rehabilitation    120 minutes spent with admission, more than 50% in direct patient interaction and education, the remainder in coordination of care.

## 2018-01-12 NOTE — CONSULTS
Diabetes Education  Received consult request to see this 77 year old male for assessment of ability to inject insulin.  Noted plan for patient to discharge to [facility] today.  Will see patient on acute rehab unit for assessment.  Thelma Chaparro MS RN CDE CD[facility]  [phone]

## 2018-01-12 NOTE — PROGRESS NOTES
Social Work Services Discharge Note      Patient Name:  Pete Sheldon     Anticipated Discharge Date:  1/12/18    Discharge Disposition:   [facility] ([phone])    Following MD:  Facility Assignment     Pre-Admission Screening (PAS) online form has been completed.  The Level of Care (LOC) is:  Determined  Confirmation Code is:  Not required as pt is being admitted to ARU.       Additional Services/Equipment Arranged:  Dr. Paul has confirmed readiness for discharge.  Admissions ([facility]) at [facility] has confirmed acceptance for admit.   has arranged for [employer]  ([facility] [phone]) to provide w/c transport at 2:30pm.       Patient / Family response to discharge plan:  Pt and wife voice understanding of the discharge plan and agreement with the discharge plan.     Persons notified of above discharge plan:  Pt, wife, 6A nursing and Dr. Paul.    Staff Discharge Instructions:  Please fax discharge orders and signed hard scripts for any controlled substances.  Please print a packet and send with patient.     CTS Handoff completed:  YES    Medicare Notice of Rights provided to the patient/family:  Yes.    YUE Lopez  Social Work, 6A  Phone:  [phone]  Pager:  [phone]  1/12/2018

## 2018-01-12 NOTE — IP AVS SNAPSHOT
MRN:[id number]                      After Visit Summary   1/12/2018    Pete Sheldon    MRN: [id number]           Thank you!     Thank you for choosing [facility] for your care. Our goal is always to provide you with excellent care. Hearing back from our patients is one way we can continue to improve our services. Please take a few minutes to complete the written survey that you may receive in the mail after you visit with us. Thank you!        Patient Information     Date Of Birth          1940        About your hospital stay     You were admitted on:  January 12, 2018 You last received care in the:   [facility]    You were discharged on:  January 27, 2018        Reason for your hospital stay       Stroke                  Who to Call     For medical emergencies, please call 911.  For non-urgent questions about your medical care, please call your primary care provider or clinic, [phone]          Attending Provider     Provider Specialty    Ubaldo Dunne MD Physical Medicine and Rehab    Jerri Hernandez MD Physical Medicine and Rehab       Primary Care Provider Office Phone # Fax #    Rom Helm [phone]      After Care Instructions     Activity       Your activity upon discharge:  Use your walker at all times   No driving (car, motorcycle, riding lawnmower, tractor, any motorized vehicle) until cleared by OT driving evaluation and by your PM&R physician. No power tools.   A responsible adult should be present with you when cooking in the kitchen and should supervise/double check medications and finances.            Diet       Follow this diet upon discharge:  High Consistent CHO Diet            Monitor and record       blood glucose 4 times a day, before meals and at bedtime  blood pressure daily  make diary of readings to bring to the appointment with your PCP                  Your next 10 appointments already scheduled     Feb 02, 2018 11:00 AM CST    SHORT with Rom Helm MD   [facility] ([facility])    [address]  [facility] [address]   [phone]            Feb 23, 2018  2:30 PM CST   (Arrive by 2:15 PM)   New Patient Visit with Damaris Mcknight MD   [facility] ([facility])    [address]  [address]  [facility] [address] [phone]            Mar 01, 2018  2:25 PM CST   (Arrive by 2:10 PM)   New Patient Visit with Pedro Doherty MD   [facility] ([facility])    [address]  3rd Floor  [facility] [address] [phone]            Mar 07, 2018  2:00 PM CST   LAB with RI LAB   [facility] ([facility])    [address]  [facility] [address] [phone]           Please do not eat 10-12 hours before your appointment if you are coming in fasting for labs on lipids, cholesterol, or glucose (sugar). This does not apply to pregnant women. Water, hot tea and black coffee (with nothing added) are okay. Do not drink other fluids, diet soda or chew gum.            Mar 07, 2018  2:30 PM CST   Return Visit with Lori Damian MD   [facility] ([facility])    [address]  [facility] [address] [phone]            Mar 23, 2018  3:00 PM CDT   (Arrive by 2:45 PM)   New Renal Calculi with Jasmyne Elliott MD   [facility] ([facility] [place])    [address]  4th Floor  [facility] [address] [phone]            Apr 10, 2018  2:40 PM CDT   (Arrive by 2:25 PM)   New Patient Visit with Nataliya Schneider MD   [facility] ([facility])    [address]  3rd Floor  [facility] [id number] [phone]            Jun 26,  2018 10:00 AM CDT   (Arrive by 9:45 AM)   New Stroke with Claudio Castanon MD   [facility] ([facility] [place])    [address]  [address]rd [facility] [address] [phone]              Additional Services     Home Care OT Referral for Hospital Discharge       OT to eval and treat    Your provider has ordered home care - occupational therapy. If you have not been contacted within 2 days of your discharge please call the department phone number listed on the top of this document.            Home Care PT Referral for Hospital Discharge       PT to eval and treat    Your provider has ordered home care - physical therapy. If you have not been contacted within 2 days of your discharge please call the department phone number listed on the top of this document.            Home Care SLP Referral for Hospital Discharge       SLP to eval and treat    Your provider has ordered home care - speech therapy. If you have not been contacted within 2 days of your discharge please call the department phone number listed on the top of this document.            Home care nursing referral       RN skilled nursing visit. RN to assess vital signs and weight, patients ability to take and record daily blood pressure, temp and weight, hydration, nutrition and bowel status and home safety.  Home health aide to help with showers     Your provider has ordered home care nursing services. If you have not been contacted within 2 days of your discharge please call the inpatient department phone number at [phone] .            INFECTIOUS DISEASE REFERRAL       Your provider has referred you to: follow up with Dr. Borrero in 2 weeks after discharge per his recommendations, in order to discuss the plan for urinary tract infection and treatment     Please be aware that coverage of these services is subject to the terms and limitations of your health insurance plan.  Call member services at [facility]  plan with any benefit or coverage questions.      Please bring the following with you to your appointment:    (1) Any X-Rays, CTs or MRIs which have been performed.  Contact the facility where they were done to arrange for  prior to your scheduled appointment.    (2) List of current medications   (3) This referral request   (4) Any documents/labs given to you for this referral                  Further instructions from your care team       Follow up appointments:    -- Primary provier in 7-10 days regarding hospitalization and to get refills for the medications.   You are scheduled to see Dr. Helm on February 2 at 11:00 am.    -- Neurology clinic (stroke clinic) in 2-4 weeks regarding stroke.  You are scheduled to see Dr. Pedro Doherty  on March 1st  at 2:25 pm.    -- Urology team in 2-3 months regarding history of kidney stones.   You are scheduled to see Dr. Jasmyne Elliott on  Friday, March 23rd on at 3 pm.    -- Infectious disease clinic in 2 weeks regarding urinary tract infection.   You are scheduled to see Dr. Mcknight on Friday, February 23rd at 2:30    -- PM&R clinic, Dr. Schneider, in 6-8 weeks regarding rehab needs.   You are scheduled to see Dr. Schneider on April 10th at 2:40.     -------------------------------------------  To reduce the risk of subsequent stroke there are several important factors including optimal management of anticoagulants, blood pressure, cholesterol, diabetes and smoking abstinence.    Anticoagulation:  You are on Aspirin    Blood Pressure:  Keeping your blood pressures less than 130/80 has been shown to reduce risk of recurrent stroke. Recording your blood pressure and heart rate once daily in a log book can help you and your providers make decisions on optimal management. You are encouraged to bring your log book with you to your primary physician and/or cardiology doctor visits.    You are currently on amlodipine and lisinopril to help control your blood  "pressure. Several lifestyle modifications have been associated with blood pressure reduction and are an important part of a comprehensive plan. These include: weight loss (if over-weight); a diet low in salt and cholesterol and rich in fruits and vegetables; regular aerobic physical activity and limited alcohol consumption.    Diabetes:  Optimal management of diabetes not only reduces risk of another stroke, it can help with healing process from your recent stroke. Blood glucose levels measure how well you're controlling your diabetes. An additional test \"hemoglobin A1C\" reflects how you have been overall with glucose control in the prior few months. This should be less than 7 though even lower below 6 is considered normal. Your recent Hgb A1C was [insert A1C]. This should be followed up with your primary provider and/or endocrinologist.    Your present plan is to check blood glucose consistently Before Meals and at Bedtime. These should be recorded along with date/time and any relevant notes such as food consumed, insulin/medication taken etc. This helps you and your providers make decisions on optimal management. You're encouraged to bring you log book with to your primary and/or endocrinology doctor visits.  Your current medications include Insulin Glargine (Lantus) and Aspart (Novolog). Specific doses and frequencies listed elsewhere.     Diet:  Diet and exercise are very important for diabetes regardless of being on medication or not. Be aware of and moderate your carbohydrate intake as instructed by doctor, dietitian or nurse.  Regular physical activity may be more difficult since your stroke though doing what you can on a daily basis is helpful.     Cholesterol:  Traditional target levels for LDL cholesterol or \"bad cholesterol\" is less than 130 however once you have had a stroke, your target LDL level is now less than 70. Additional recommendations such as increasing your HDL or \"good\" cholesterol and " "lowering your triglyceride level can also be important.    Your most recent lipid panel was   Lab Results   Component Value Date    CHOL 134 01/10/2018     Lab Results   Component Value Date    HDL 41 01/10/2018     Lab Results   Component Value Date    LDL 74 01/10/2018     Lab Results   Component Value Date    TRIG 95 01/10/2018     Lab Results   Component Value Date    CHOLHDLRATIO 4.3 09/25/2015       This should be followed up in 2-3 months with your primary provider.    Smoking:  Finally one of the most important modifiable risk factors is to not smoke. Continue to remain tobacco free!      HOME CARE  [facility] [phone] will provide RN, PT, OT, Speech and Home Health Aide. They will call you after you discharge to schedule the first visit.         Pending Results     No orders found from 1/10/2018 to 1/13/2018.            Statement of Approval     Ordered          01/27/18 0929  I have reviewed and agree with all the recommendations and orders detailed in this document.  EFFECTIVE NOW     Approved and electronically signed by:  Pilo Chavez MD             Admission Information     Date & Time Provider Department Dept. Phone    1/12/2018 Jerri Hernandez MD  ACUTE [facility]HAB [phone]      Your Vitals Were     Blood Pressure Pulse Temperature Respirations Height Weight    150/77 (BP Location: Left arm) 90 97.9  F (36.6  C) (Oral) 18 1.702 m (5' 7\") 73.2 kg (161 lb 6.4 oz)    Pulse Oximetry BMI (Body Mass Index)                96% 25.28 kg/m2          MyChart Information     [facility] lets you send messages to your doctor, view your test results, renew your prescriptions, schedule appointments and more. To sign up, go to [email or web address] . Click on \"Log in\" on the left side of the screen, which will take you to the Welcome page. Then click on \"Sign up Now\" on the right side of the page.     You will be asked to enter the access code listed below, as well as some personal " information. Please follow the directions to create your username and password.     Your access code is: [email or web address]  Expires: 2018  2:58 PM     Your access code will  in 90 days. If you need help or a new code, please call your [facility] clinic or [phone].        Care EveryWhere ID     This is your Care EveryWhere ID. This could be used by other organizations to access your [facility] medical records  [id number]        Equal Access to Services     [facility] Center[facility]ILDA : Hadii aad ku hadasho Soomaali, waaxda luqadaha, qaybta kaalmada adeegyada, waxay abel medrano. So [facility] [phone].    ATENCIÓN: Si habla español, tiene a moise disposición servicios gratuitos de asistencia lingüística. Llame al [phone].    We comply with applicable federal civil rights laws and [place] laws. We do not discriminate on the basis of race, color, national origin, age, disability, sex, sexual orientation, or gender identity.               Review of your medicines      START taking        Dose / Directions    insulin aspart 100 UNIT/ML injection   Commonly known as:  NovoLOG PEN   Used for:  Type 2 diabetes mellitus with diabetic polyneuropathy, with long-term current use of insulin (H)        Inject 7 units w/ breakfast, 5 units w/ lunch, 7 units w/ dinner. Hold if not eating meal.   Quantity:  3 mL   Refills:  0       lisinopril 2.5 MG tablet   Commonly known as:  PRINIVIL/Zestril   Used for:  Hypertension goal BP (blood pressure) < 140/90        Dose:  2.5 mg   Take 1 tablet (2.5 mg) by mouth daily   Quantity:  30 tablet   Refills:  0       penicillin V potassium 500 MG tablet   Commonly known as:  VEETID   Indication:  Urinary Tract Infection   Used for:  Urinary tract infection without hematuria, site unspecified        Dose:  500 mg   Take 1 tablet (500 mg) by mouth 3 times daily   Quantity:  90 tablet   Refills:  0         CONTINUE these medicines which may have CHANGED, or have new  prescriptions. If we are uncertain of the size of tablets/capsules you have at home, strength may be listed as something that might have changed.        Dose / Directions    GLUCOSE MANAGEMENT Tabs   This may have changed:  Another medication with the same name was removed. Continue taking this medication, and follow the directions you see here.   Used for:  Type 2 diabetes mellitus with diabetic polyneuropathy, with long-term current use of insulin (H)        4 tabs po for low blood sugar below 70, may repeat q 10-15 minutes as needed   Quantity:  100 tablet   Refills:  prn       insulin glargine 100 UNIT/ML injection   Commonly known as:  LANTUS   This may have changed:    - how much to take  - when to take this  - Another medication with the same name was removed. Continue taking this medication, and follow the directions you see here.   Used for:  Type 2 diabetes mellitus with diabetic polyneuropathy, with long-term current use of insulin (H)        Dose:  30 Units   Inject 30 Units Subcutaneous every morning   Quantity:  9 mL   Refills:  0       tamsulosin 0.4 MG capsule   Commonly known as:  FLOMAX   This may have changed:  when to take this   Used for:  Calculus of kidney        Dose:  0.4 mg   Take 1 capsule (0.4 mg) by mouth every evening   Quantity:  30 capsule   Refills:  0         CONTINUE these medicines which have NOT CHANGED        Dose / Directions    ACE/ARB/ARNI NOT PRESCRIBED (INTENTIONAL)   Used for:  Type 2 diabetes, HbA1C goal < 8% (H)        by Other route continuous prn (Hyperkalemia) Reported on 5/19/2017   Refills:  0       amLODIPine 10 MG tablet   Commonly known as:  NORVASC   Used for:  Hypertension goal BP (blood pressure) < 140/90, Cerebrovascular accident (CVA) due to occlusion of left vertebral artery (H), Cerebrovascular accident (CVA) due to stenosis of left vertebral artery (H)        Dose:  10 mg   Take 1 tablet (10 mg) by mouth daily   Quantity:  30 tablet   Refills:  1        aspirin 81 MG chewable tablet   Used for:  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)        Dose:  324 mg   Take 4 tablets (324 mg) by mouth daily   Quantity:  36 tablet   Refills:  0       atorvastatin 20 MG tablet   Commonly known as:  LIPITOR   Used for:  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)        Dose:  20 mg   Take 1 tablet (20 mg) by mouth daily   Quantity:  30 tablet   Refills:  0       * STEFFI CONTOUR NEXT test strip   Used for:  Type 2 diabetes mellitus with diabetic polyneuropathy (H)   Generic drug:  blood glucose monitoring        USE TO TEST BLOOD SUGAR 4 TIMES DAILY OR AS DIRECTED (E11.42)   Quantity:  400 strip   Refills:  0       * blood glucose monitoring test strip   Commonly known as:  STEFFI CONTOUR NEXT   Used for:  Type 2 diabetes mellitus with diabetic polyneuropathy (H)        Use to test blood sugar 4 times daily or as directed.   Quantity:  50 each   Refills:  0       * blood glucose monitoring test strip   Commonly known as:  ONETOUCH VERIO IQ   Used for:  Type 2 diabetes mellitus with diabetic polyneuropathy, with long-term current use of insulin (H)        One Touch Verio Flex--Use to test blood sugars 4 times daily or as directed.   Quantity:  200 strip   Refills:  3       Glucosamine HCl 750 MG Tabs   Used for:  Arthritis        Dose:  750 mg   Take 750 mg by mouth 2 times daily   Refills:  0       insulin pen needle 31G X 8 MM   Used for:  Type 2 diabetes, HbA1C goal < 8% (H)        B-D UF III short pen needles, use 4 times a day to inject insulin.   Quantity:  300 each   Refills:  3       insulin syringes (disposable) U-100 0.3 ML Misc   Used for:  Type II or unspecified type diabetes mellitus without mention of complication, not stated as uncontrolled        Dose:  1 each   1 each 2 times daily   Quantity:  100 each   Refills:  6       LIFESCAN FINEPOINT LANCETS Misc   Used for:  Type 2 diabetes, HbA1C goal <  "8% (H)        Use to test blood sugars 4 times daily or as directed.   Quantity:  200 strip   Refills:  3       loratadine 10 MG tablet   Commonly known as:  AF LORATADINE   Used for:  Hypertension goal BP (blood pressure) < 140/90        Dose:  10 mg   Take 1 tablet (10 mg) by mouth daily   Quantity:  14 tablet   Refills:  0       metFORMIN 500 MG tablet   Commonly known as:  GLUCOPHAGE   Used for:  Type 2 diabetes mellitus with diabetic polyneuropathy, with long-term current use of insulin (H)        Dose:  500 mg   Take 1 tablet (500 mg) by mouth daily (with breakfast)   Quantity:  30 tablet   Refills:  0       multivitamin Tabs tablet        Dose:  1 tablet   Take 1 tablet by mouth daily FOCUS SELECT PREMIUM WITH LUTEIN per eye doctor Reported on 5/19/2017   Refills:  0       order for DME   Used for:  Type 2 diabetes mellitus with diabetic polyneuropathy (H)        All DM testing supplies including test strips, lancets, solution, syringes, needles and/or pen needles for testing 4 times per day.  Brand \"Contour Next\"   Quantity:  3 Month   Refills:  1       * Notice:  This list has 3 medication(s) that are the same as other medications prescribed for you. Read the directions carefully, and ask your doctor or other care provider to review them with you.      STOP taking     cephALEXin 500 MG capsule   Commonly known as:  KEFLEX           hydrochlorothiazide 12.5 MG capsule   Commonly known as:  MICROZIDE           insulin lispro 100 UNIT/ML injection   Commonly known as:  HumaLOG KWIKpen                Where to get your medicines      These medications were sent to [facility] [place], [place] - [address]  [address] [address]     Phone:  [phone]     atorvastatin 20 MG tablet    insulin aspart 100 UNIT/ML injection    insulin glargine 100 UNIT/ML injection    lisinopril 2.5 MG tablet    metFORMIN 500 MG tablet    penicillin V potassium 500 MG tablet    tamsulosin " 0.4 MG capsule                Protect others around you: Learn how to safely use, store and throw away your medicines at www.disposemymeds.org.        ANTIBIOTIC INSTRUCTION     You've Been Prescribed an Antibiotic - Now What?  Your healthcare team thinks that you or your loved one might have an infection. Some infections can be treated with antibiotics, which are powerful, life-saving drugs. Like all medications, antibiotics have side effects and should only be used when necessary. There are some important things you should know about your antibiotic treatment.      Your healthcare team may run tests before you start taking an antibiotic.    Your team may take samples (e.g., from your blood, urine or other areas) to run tests to look for bacteria. These test can be important to determine if you need an antibiotic at all and, if you do, which antibiotic will work best.      Within a few days, your healthcare team might change or even stop your antibiotic.    Your team may start you on an antibiotic while they are working to find out what is making you sick.    Your team might change your antibiotic because test results show that a different antibiotic would be better to treat your infection.    In some cases, once your team has more information, they learn that you do not need an antibiotic at all. They may find out that you don't have an infection, or that the antibiotic you're taking won't work against your infection. For example, an infection caused by a virus can't be treated with antibiotics. Staying on an antibiotic when you don't need it is more likely to be harmful than helpful.      You may experience side effects from your antibiotic.    Like all medications, antibiotics have side effects. Some of these can be serious.    Let you healthcare team know if you have any known allergies when you are admitted to the hospital.    One significant side effect of nearly all antibiotics is the risk of severe and  sometimes deadly diarrhea caused by Clostridium difficile (C. Difficile). This occurs when a person takes antibiotics because some good germs are destroyed. Antibiotic use allows C. diificile to take over, putting patients at high risk for this serious infection.    As a patient or caregiver, it is important to understand your or your loved one's antibiotic treatment. It is especially important for caregivers to speak up when patients can't speak for themselves. Here are some important questions to ask your healthcare team.    What infection is this antibiotic treating and how do you know I have that infection?    What side effects might occur from this antibiotic?    How long will I need to take this antibiotic?    Is it safe to take this antibiotic with other medications or supplements (e.g., vitamins) that I am taking?     Are there any special directions I need to know about taking this antibiotic? For example, should I take it with food?    How will I be monitored to know whether my infection is responding to the antibiotic?    What tests may help to make sure the right antibiotic is prescribed for me?      Information provided by:  www.cdc.gov/getsmart  U.S. Department of Health and Human Services  Centers for disease Control and Prevention  National Center for Emerging and Zoonotic Infectious Diseases  Division of Healthcare Quality Promotion             Medication List: This is a list of all your medications and when to take them. Check marks below indicate your daily home schedule. Keep this list as a reference.      Medications           Morning Afternoon Evening Bedtime As Needed    ACE/ARB/ARNI NOT PRESCRIBED (INTENTIONAL)   by Other route continuous prn (Hyperkalemia) Reported on 5/19/2017                                amLODIPine 10 MG tablet   Commonly known as:  NORVASC   Take 1 tablet (10 mg) by mouth daily   Last time this was given:  10 mg on 1/27/2018  7:56 AM                                    aspirin 81 MG chewable tablet   Take 4 tablets (324 mg) by mouth daily   Last time this was given:  324 mg on 1/13/2018  8:33 AM                                   atorvastatin 20 MG tablet   Commonly known as:  LIPITOR   Take 1 tablet (20 mg) by mouth daily   Last time this was given:  20 mg on 1/27/2018  7:56 AM                                   * STEFFI CONTOUR NEXT test strip   USE TO TEST BLOOD SUGAR 4 TIMES DAILY OR AS DIRECTED (E11.42)   Generic drug:  blood glucose monitoring                                * blood glucose monitoring test strip   Commonly known as:  STEFFI CONTOUR NEXT   Use to test blood sugar 4 times daily or as directed.                                * blood glucose monitoring test strip   Commonly known as:  ONETOUCH VERIO IQ   One Touch Verio Flex--Use to test blood sugars 4 times daily or as directed.                                Glucosamine HCl 750 MG Tabs   Take 750 mg by mouth 2 times daily                                      GLUCOSE MANAGEMENT Tabs   4 tabs po for low blood sugar below 70, may repeat q 10-15 minutes as needed                                   insulin aspart 100 UNIT/ML injection   Commonly known as:  NovoLOG PEN   Inject 7 units w/ breakfast, 5 units w/ lunch, 7 units w/ dinner. Hold if not eating meal.   Last time this was given:  7 Units on 1/27/2018  9:00 AM                                         insulin glargine 100 UNIT/ML injection   Commonly known as:  LANTUS   Inject 30 Units Subcutaneous every morning   Last time this was given:  30 Units on 1/27/2018  8:59 AM                                   insulin pen needle 31G X 8 MM   B-D UF III short pen needles, use 4 times a day to inject insulin.                                insulin syringes (disposable) U-100 0.3 ML Misc   1 each 2 times daily                                WUTCAN FINEPOINT LANCETS Misc   Use to test blood sugars 4 times daily or as directed.                                lisinopril 2.5  "MG tablet   Commonly known as:  PRINIVIL/Zestril   Take 1 tablet (2.5 mg) by mouth daily   Last time this was given:  2.5 mg on 1/27/2018  7:55 AM                                   loratadine 10 MG tablet   Commonly known as:  AF LORATADINE   Take 1 tablet (10 mg) by mouth daily                                   metFORMIN 500 MG tablet   Commonly known as:  GLUCOPHAGE   Take 1 tablet (500 mg) by mouth daily (with breakfast)   Last time this was given:  500 mg on 1/27/2018  7:56 AM                                   multivitamin Tabs tablet   Take 1 tablet by mouth daily FOCUS SELECT PREMIUM WITH LUTEIN per eye doctor Reported on 5/19/2017   Last time this was given:  1 tablet on 1/27/2018  7:55 AM                                   order for DME   All DM testing supplies including test strips, lancets, solution, syringes, needles and/or pen needles for testing 4 times per day.  Brand \"Contour Next\"                                penicillin V potassium 500 MG tablet   Commonly known as:  VEETID   Take 1 tablet (500 mg) by mouth 3 times daily   Last time this was given:  500 mg on 1/27/2018  7:56 AM                   2:00 PM                      tamsulosin 0.4 MG capsule   Commonly known as:  FLOMAX   Take 1 capsule (0.4 mg) by mouth every evening   Last time this was given:  0.8 mg on 1/26/2018  9:30 PM                                   * Notice:  This list has 3 medication(s) that are the same as other medications prescribed for you. Read the directions carefully, and ask your doctor or other care provider to review them with you.      "

## 2018-01-12 NOTE — IP AVS SNAPSHOT
[facility]    [address] [place] [id number]    Phone:  [phone]                                       After Visit Summary   1/12/2018    Pete Sheldon    MRN: [id number]           After Visit Summary Signature Page     I have received my discharge instructions, and my questions have been answered. I have discussed any challenges I see with this plan with the nurse or doctor.    ..........................................................................................................................................  Patient/Patient Representative Signature      ..........................................................................................................................................  Patient Representative Print Name and Relationship to Patient    ..................................................               ................................................  Date                                            Time    ..........................................................................................................................................  Reviewed by Signature/Title    ...................................................              ..............................................  Date                                                            Time

## 2018-01-12 NOTE — PROGRESS NOTES
Diabetes Consult Daily  Progress Note          Assessment/Plan:                          Pete Sheldon is a 77 year old male with history of type 2 diabetes, hypertension, dyslipidemia transferred from [facility] for left thalamic stroke 0n (1/9/2018).         Type 2 diabetic: Metformin 1000 mg twice a day, Lantus 32 units in morning and Humalog 2/2/2 with breakfast/lunch/dinner respectively. He does not use a sliding scale   Plan  - increased Lantus from  50 units to 60 units in the morning  - Novolog prandial insulin 1 unit per 5 grams of CHOwith meals and snacks   - Novolog High correction scale before meals and at HS  -Monitor glucose before meals, HS and 0200  - discussed with primary team, will hold metformin while in the hospital and resume once discharging to ARU        - will need to be evaluated by Diabetic educator for use of right hand ( his wife dials the pen to the correct dose, per patient) he tests his glucose and gives injection. This can be evaluated in ARU            Plan discussed with RN and  primary team.        Patient being d/c to ARU, Diabetes management team will continue to follow while in ARU, no need for new consult.           Interval History:   The last 24 hours progress and nursing notes reviewed.  Transitioned off IV insulin yesterday, glucose above target  Increased basal insulin by 20% this am to Lantus 60 units ( glucose at ~ 0200, 291) fasting 426  Is currently being treated for UTI with Cephalexin ( started 1/11) this may be causing blood sugars to be elevated as well.  Appetite is good, denies nausea and or vomiting.   Seems to become overwhelmed with too many questions.          Recent Labs  Lab 01/12/18  0752 01/12/18  0707 01/12/18  0200 01/11/18  2109 01/11/18  1641 01/11/18  1255 01/11/18  1153  01/11/18  0948  01/10/18  0926  01/08/18  1345  01/08/18  0720   GLC  --  426*  --   --   --   --   --   --  330*  --  106*  --  204*  --  188*  "  [facility] 416*  --  291* 340* 266* 235* 275*  < >  --   < >  --   < >  --   < >  --    < > = values in this interval not displayed.            Review of Systems:   See interval hx          Medications:       Active Diet Order      Consistent Carbohydrate Diet 5729-4088 Calories: Low Consistent CHO (3-5 CHO units/meal)      Advance Diet as Tolerated     Physical Exam:  Gen: Alert,  NAD   HEENT:  mucous membranes are moist  Resp: Unlabored  Ext: moving all extremities   Neuro:oriented x3, communicating clearly ( responses are slow)  BP (!) 156/103 (BP Location: Left arm)  Pulse 82  Temp 97.7  F (36.5  C) (Oral)  Resp 16  Ht 1.702 m (5' 7\")  Wt 69.9 kg (154 lb 3.2 oz)  SpO2 97%  BMI 24.15 kg/m2           Data:     Lab Results   Component Value Date    A1C 7.9 01/09/2018    A1C 7.6 11/29/2017    A1C 7.8 08/16/2017    A1C 8.3 05/12/2017    A1C 7.9 02/10/2017              CBC RESULTS:   Recent Labs   Lab Test  01/12/18   0707  01/11/18   0948   WBC   --   11.2*   RBC   --   4.66   HGB   --   14.5   HCT   --   44.7   MCV   --   96   MCH   --   31.1   MCHC   --   32.4   RDW   --   13.3   PLT  256  248     Recent Labs   Lab Test  01/12/18   0707  01/11/18   0948   NA  132*  132*   POTASSIUM  4.1  4.0   CHLORIDE  97  99   CO2  25  24   ANIONGAP  9  9   GLC  426*  330*   BUN  23  25   CR  1.11  1.18   MANAV  9.0  9.1     Liver Function Studies -   Recent Labs   Lab Test  01/10/18   0926   PROTTOTAL  7.9   ALBUMIN  3.1*   BILITOTAL  0.6   ALKPHOS  83   AST  23   ALT  35     Lab Results   Component Value Date    INR 1.07 01/10/2018    INR 1.05 01/08/2018    INR 1.03 12/16/2008    INR 1.03 12/12/2008           Dipti Espinoza -[phone]  Diabetes Management job code [id number]            "

## 2018-01-12 NOTE — PLAN OF CARE
Problem: Patient Care Overview  Goal: Plan of Care/Patient Progress Review  Discharge Planner PT   Patient plan for discharge: ARU  Current status: Pt donned shorts, utilized urinal and completed sit<>stand transfer(s) with SBA-supervision. He ambulated 2 x 250' with FWW and SBA - some stagger and directional cues provided to avoid obstacles. To promote improvement in activity tolerance, pt completed 10' on Nu-step at level 2, maintaining >50 SPMs. AVSS.   Barriers to return to prior living situation: functional mobility, medical needs   Recommendations for discharge: ARU   Rationale for recommendations: Anticipate pt could tolerate extended therapy sessions, has interdisciplinary needs, continues to make progress with therapies, and is motivated to work with therapy.        Entered by: Dacia Luna 01/12/2018 9:29 AM

## 2018-01-12 NOTE — PLAN OF CARE
Problem: Patient Care Overview  Goal: Plan of Care/Patient Progress Review  Left thalamic stroke secondary to Rt vert occlusion/dissection. Uncontrolled DM2.  VSS/A. Sats 95% RA.  Neuro: A&Ox4.  Speech slightly aphasic with mild difficulty with word finding. Pauses before replying to questions.  Forgetful at times. Generalized weakness.  RUE numbness present in hand and extending to right elbow.  LLE weakness is better per patient report. FSBG 416, and 489 premeal this shift.  Dr. Jasper Paul was notified with both. Up with A1, GB, walker.  Back pain, relieved with Tylenl.  Ambulated in huertas and ambulates to bathroom. Voiding spontaneously, frequency, urgency, incontinence, wears pull-ups.  UTI being treated with Keflex. No BM this shift, passing gas.  Education: Stroke booklet at bedside. PLC Stroke appointment done with patient, patient's wife, Radha, and grandson. Diet: Les CHO diet well, needs assistance with meal ordering and tray set-up.  Good appetite.  Bed alarm, chair alarm on at all times. US Renal done this shift. Using call light.  Continue with current POC.  Plan: Discharge to [facility] at 1400.

## 2018-01-12 NOTE — PLAN OF CARE
"Problem: Stroke (Ischemic) (Adult)  Goal: Signs and Symptoms of Listed Potential Problems Will be Absent, Minimized or Managed (Stroke)  Signs and symptoms of listed potential problems will be absent, minimized or managed by discharge/transition of care (reference Stroke (Ischemic) (Adult) CPG).   Outcome: No Change  /81 (BP Location: Left arm)  Pulse 82  Temp 99.1  F (37.3  C) (Oral)  Resp 16  Ht 1.702 m (5' 7\")  Wt 69.9 kg (154 lb 3.2 oz)  SpO2 98%  BMI 24.15 kg/m2  Assumed care for pt [id number].   A&Ox4. Forgetful at times. Neuros unchanged. VSS on RA. Denies pain or nausea. PIV saline locked. Up with SBA, GB+Walker. HS , sliding scale insulin administered. Bed alarm on for safety. Will continue to monitor and follow POC.       "

## 2018-01-12 NOTE — PLAN OF CARE
1/12/18 Stroke Education Note     The following information has been reviewed with the family:     1. Warning signs of stroke  2. Calling 911 if having warning signs of stroke  3. All modifiable risk factors: Hypertension, CAD, atrial fib, diabetes, hypercholesterolemia, smoking, substance abuse, diet, physical inactivity, obesity, sleep apnea.  4. Patient's risk factors for stroke which include: HTN,Diabetes,HDL,Diet  5. Follow-up plan for after discharge  6. Medications which include:Only medications very briefly covered today were ASA,Lipitor     In addition, the [facility] Stroke Class Handout and Understanding Stroke have been given to the patient and family.     Learner's response to risk factors / lifestyle modification education: Committment to change     HTN;Wife states the patient takes his medications as ordered.They will get a BP machine to check BP at home,record,and bring to all HCP appointments.Diet very briefly per [facility] handout.     Diabetes:Diet briefly per [facility] handout.Wife states the patient checks his blood sugars three times a day and takes his own insulin.Wife states they were having some difficulty with the new BGM.Wife will bring the BGM to the hospital.Diabetic consult.     HDL:diet,Lipitor      Patient was very quiet and did not participate much in today's appointment.Patient then needed to go for an ultra sound.Very basic stroke information covered today with the patient's wife and grandson:Signs/Symptoms of as stroke,calling 911 and pt's risk factors and how to address those risk factors.Pt's family is aware they can ask for another [facility] stroke education appointment if needed when they are on Acute Rehab.Continue to reinforce information.Wife very motivated to read the Stroke book.

## 2018-01-12 NOTE — PLAN OF CARE
Problem: Patient Care Overview  Goal: Plan of Care/Patient Progress Review  Discharge Planner OT   Patient plan for discharge: ARU  Current status: Patient seen for dressing and toileting. Patient ambulates with close CGA and FWW, CGA for all standing activities with instruction on unilateral UE support on GBs for increased safety. Patient performs lower body dressing min A for tying 2/2 R hand numbness. Patient performs buttons max A, decreased sensation and vision impacting performance with small buttons on shirt.  Barriers to return to prior living situation: R hand deficits, altered balance, decreased visual acuity  Recommendations for discharge: ARU  Rationale for recommendations: Intensive rehab to address neurologic deficits.       Entered by: Carla Mcginnis 01/12/2018 11:12 AM         Occupational Therapy Discharge Summary    Reason for therapy discharge:    Discharged to acute rehabilitation facility.    Progress towards therapy goal(s). [facility] for goal details.  Goals partially met.  Barriers to achieving goals:   discharge from facility.    Therapy recommendation(s):    Continued therapy is recommended.  Rationale/Recommendations:  See above.

## 2018-01-12 NOTE — PLAN OF CARE
Problem: Patient Care Overview  Goal: Plan of Care/Patient Progress Review  Outcome: No Change  Status: On 6A s/p L thalamic ischemic stroke  VS: VSS on RA; slight HTN w/in parameters. CCM d/c (NSR for 48 hrs)  Neuros: D/o to situation and specific date. Forgetful. Slight word finding difficulties. Mild aphasia. Generalized weakness, R<L. Slight R drift. N/t from R hand-elbow, unchaged  GI: Mod CHO diet. No BM this shift. Passing gas.  : Voiding into urinal and toilet w/out difficulty, often, in small amts. Intermittently incontinent. Some urge incontinence  IV: PIV SL. Insulin gtt d/c 1/11.  Activity: Ax1 w/gb+walker.   Pain: Denies  Labs/Tests: [facility] today at 11. Wife Radha and grandsuresh planning to attend  Plan of care: Pt slept comfortably in-between cares. Per SW note, pt medically ready for d/c. Possible d/c to [facility], pending PT eval to determine appropriateness. Will continue to monitor and follow plan of care.

## 2018-01-12 NOTE — PLAN OF CARE
Problem: Patient Care Overview  Goal: Plan of Care/Patient Progress Review  [id number]:  Left thalamic stroke secondary to Rt vert occlusion/dissection. Uncontrolled DM2.  VSS/A. Sats 95% RA.    Neuro: A&Ox4.  Speech slightly aphasic with mild difficulty with word finding. Pauses before replying to questions.  Forgetful at times. Generalized weakness.  RUE numbness present in hand and extending to right elbow.  LLE weakness is better per patient report.  Insulin drip stopped at 1300. FSBG before dinner, 266.  Up with A1, GB, walker.  Denies pain this shift.  Ambulated in huertas and ambulates to bathroom. Voiding spontaneously, frequency.  UTI being treated with Keflex.  BM x1 today, passing gas.  Education: Stroke booklet at bedside. [facility] Stroke appointment for 1/12 @ 1100.  Patient's wife, Radha, and grandson are planning on coming for stroke teaching.  Diet: Les CHO diet well, needs assistance with meal ordering and tray set-up.  Good appetite.  Bed alarm, chair alarm on at all times. Continuous Cardiac monitoring, NSR with PAC and PVC.  MRI done yesterday. Using call light.  Continue with current POC.

## 2018-01-13 LAB
GLUCOSE BLDC GLUCOMTR-MCNC: 188 MG/DL (ref 70–99)
GLUCOSE BLDC GLUCOMTR-MCNC: 205 MG/DL (ref 70–99)
GLUCOSE BLDC GLUCOMTR-MCNC: 343 MG/DL (ref 70–99)
GLUCOSE BLDC GLUCOMTR-MCNC: 415 MG/DL (ref 70–99)
GLUCOSE BLDC GLUCOMTR-MCNC: 68 MG/DL (ref 70–99)
GLUCOSE BLDC GLUCOMTR-MCNC: 74 MG/DL (ref 70–99)
GLUCOSE BLDC GLUCOMTR-MCNC: 75 MG/DL (ref 70–99)
GLUCOSE BLDC GLUCOMTR-MCNC: 80 MG/DL (ref 70–99)
GLUCOSE BLDC GLUCOMTR-MCNC: 83 MG/DL (ref 70–99)

## 2018-01-13 PROCEDURE — 12800006 ZZH R&B REHAB

## 2018-01-13 PROCEDURE — 97530 THERAPEUTIC ACTIVITIES: CPT | Mod: GP

## 2018-01-13 PROCEDURE — 97110 THERAPEUTIC EXERCISES: CPT | Mod: GP

## 2018-01-13 PROCEDURE — 97116 GAIT TRAINING THERAPY: CPT | Mod: GP

## 2018-01-13 PROCEDURE — 25000132 ZZH RX MED GY IP 250 OP 250 PS 637: Performed by: PHYSICIAN ASSISTANT

## 2018-01-13 PROCEDURE — 97535 SELF CARE MNGMENT TRAINING: CPT | Mod: GO

## 2018-01-13 PROCEDURE — 97166 OT EVAL MOD COMPLEX 45 MIN: CPT | Mod: GO

## 2018-01-13 PROCEDURE — 92523 SPEECH SOUND LANG COMPREHEN: CPT | Mod: GN | Performed by: SPEECH-LANGUAGE PATHOLOGIST

## 2018-01-13 PROCEDURE — 97530 THERAPEUTIC ACTIVITIES: CPT | Mod: GO

## 2018-01-13 PROCEDURE — 40000133 ZZH STATISTIC OT WARD VISIT

## 2018-01-13 PROCEDURE — 25000132 ZZH RX MED GY IP 250 OP 250 PS 637: Performed by: PHYSICAL MEDICINE & REHABILITATION

## 2018-01-13 PROCEDURE — 97162 PT EVAL MOD COMPLEX 30 MIN: CPT | Mod: GP

## 2018-01-13 PROCEDURE — 97127 ZZHC SP THERAPEUTIC INTERVENTION W/FOCUS ON COGNITIVE FUNCTION: CPT | Mod: GN | Performed by: SPEECH-LANGUAGE PATHOLOGIST

## 2018-01-13 PROCEDURE — 40000193 ZZH STATISTIC PT WARD VISIT

## 2018-01-13 PROCEDURE — 25000131 ZZH RX MED GY IP 250 OP 636 PS 637: Performed by: NURSE PRACTITIONER

## 2018-01-13 PROCEDURE — 00000146 ZZHCL STATISTIC GLUCOSE BY METER IP

## 2018-01-13 PROCEDURE — 40000225 ZZH STATISTIC SLP WARD VISIT: Performed by: SPEECH-LANGUAGE PATHOLOGIST

## 2018-01-13 RX ORDER — BISACODYL 10 MG
10 SUPPOSITORY, RECTAL RECTAL DAILY PRN
Status: DISCONTINUED | OUTPATIENT
Start: 2018-01-13 | End: 2018-01-27 | Stop reason: HOSPADM

## 2018-01-13 RX ORDER — AMOXICILLIN 250 MG
1-2 CAPSULE ORAL 2 TIMES DAILY PRN
Status: DISCONTINUED | OUTPATIENT
Start: 2018-01-13 | End: 2018-01-27 | Stop reason: HOSPADM

## 2018-01-13 RX ADMIN — ATORVASTATIN CALCIUM 20 MG: 20 TABLET, FILM COATED ORAL at 08:33

## 2018-01-13 RX ADMIN — INSULIN GLARGINE 60 UNITS: 100 INJECTION, SOLUTION SUBCUTANEOUS at 08:37

## 2018-01-13 RX ADMIN — ASPIRIN 81 MG CHEWABLE TABLET 324 MG: 81 TABLET CHEWABLE at 08:33

## 2018-01-13 RX ADMIN — METFORMIN HYDROCHLORIDE 1000 MG: 500 TABLET ORAL at 12:05

## 2018-01-13 RX ADMIN — CEPHALEXIN 500 MG: 500 CAPSULE ORAL at 14:41

## 2018-01-13 RX ADMIN — I-VITE, TAB 1000-60-2MG (60/BT) 1 TABLET: TAB at 08:33

## 2018-01-13 RX ADMIN — INSULIN ASPART 32 UNITS: 100 INJECTION, SOLUTION INTRAVENOUS; SUBCUTANEOUS at 12:07

## 2018-01-13 RX ADMIN — TAMSULOSIN HYDROCHLORIDE 0.4 MG: 0.4 CAPSULE ORAL at 21:07

## 2018-01-13 RX ADMIN — AMLODIPINE BESYLATE 10 MG: 10 TABLET ORAL at 08:33

## 2018-01-13 RX ADMIN — HYDROCHLOROTHIAZIDE 12.5 MG: 12.5 CAPSULE ORAL at 08:33

## 2018-01-13 RX ADMIN — CEPHALEXIN 500 MG: 500 CAPSULE ORAL at 21:07

## 2018-01-13 RX ADMIN — INSULIN ASPART 25 UNITS: 100 INJECTION, SOLUTION INTRAVENOUS; SUBCUTANEOUS at 08:41

## 2018-01-13 RX ADMIN — CEPHALEXIN 500 MG: 500 CAPSULE ORAL at 08:34

## 2018-01-13 RX ADMIN — INSULIN ASPART 21 UNITS: 100 INJECTION, SOLUTION INTRAVENOUS; SUBCUTANEOUS at 18:05

## 2018-01-13 RX ADMIN — METFORMIN HYDROCHLORIDE 1000 MG: 500 TABLET ORAL at 18:05

## 2018-01-13 NOTE — PLAN OF CARE
Problem: Individualization  Goal: Patient Individualization  Pt has mild right side weakness, slow reaction time and occasional word finding difficulty re to diagnosis. He also has been running rubina blood sugar, see flow sheet. Pt claimed he he voided while in therapy and also was incontinent of urine in the pad, He voided only 50 cc and scanned for 0. We need to keep monitoring blood sugar, bladder status while assisting with activity of daily livings as needed.

## 2018-01-13 NOTE — PROGRESS NOTES
"   01/13/18 0817   Quick Adds   Type of Visit Initial Occupational Therapy Evaluation   Living Environment   Lives With spouse   Living Arrangements house   Home Accessibility stairs to enter home   Number of Stairs to Enter Home 2   Number of Stairs Within Home 12  (12 steps to basement)   Transportation Available car  (Pt was driving at baseline, wife does not drive. )   Living Environment Comment Pt lives with wife in single story home with 2 ARTEMIO. Pt also has basement with 12 steps to go down and uses shower in basement with shower chair. Pt reported there is tub/shower combo on main floor that pt could use as well. Pt reported independent with all ADL's in addition to medication management and driving. Pt reported friends from [employer] would be able to provide transportation.   Self-Care   Dominant Hand right   Usual Activity Tolerance good   Current Activity Tolerance fair   Regular Exercise no   Equipment Currently Used at Home raised toilet;shower chair   Functional Level Prior   Ambulation 0-->independent   Transferring 0-->independent   Toileting 0-->independent   Bathing 0-->independent   Dressing 0-->independent   Eating 0-->independent   Communication 0-->understands/communicates without difficulty   Swallowing 0-->swallows foods/liquids without difficulty   Cognition 0 - no cognition issues reported   Fall history within last six months yes   Number of times patient has fallen within last six months 1   Which of the above functional risks had a recent onset or change? ambulation;transferring;toileting;bathing;dressing;cognition   Prior Functional Level Comment Independent with ADL's, mobilit with no AE, driving and medication management   General Information   Onset of Illness/Injury or Date of Surgery - Date 01/09/18   Referring Physician Jerri Hernandez MD   Patient/Family Goals Statement \"get back to normal\"   Additional Occupational Profile Info/Pertinent History of Current Problem Per chart \"Pt is " "77-year-old right hand dominant male with a history of DM II and HTN who was admitted to [facility] from an outside hospital on 1/9/18 for stroke. The patient fell in his home on 1/8/18 while using the bathroom and ended up being taken by paramedics to an outside hospital ED where he was found to have a UTI and was sent home on Keflex. Later that day, the patient was noted to have right arm numbness and difficulty with ambulation so he was brought back to the ED where his NIH was 4 for facial palsy, right arm weakness, sensory loss and limb ataxia. A stroke code was called and since time on onset was unclear, decision was made to refrain from administration of tPA. His imaging was concerning for a right vertebral artery dissection vs dissecting plaque. The patient was then transferred to [facility] where imaging was most consistent with an ischemic left thalamic infarct due to vertebral artery occlusion.\"   Precautions/Limitations fall precautions   Cognitive Status Examination   Orientation orientation to person, place and time   Cognitive Comment Pt demonstrates difficulty with word-finding. Pt reported new issues with memory as well. Will continue to assess with functional activities.    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 Pt reported no new problems regarding vision.    Sensory Examination   Sensory Comments Pt reported numbness in R hand. Pt demonstrates diminished sensations on R hand with difficulty with light touch and maintaining grasp on silverware during self-feeding.    Range of Motion (ROM)   ROM Comment BUE WFL   Hand Strength   Left hand  (pounds) 59 pounds   Right hand  (pounds) 52 pounds   Coordination   Upper Extremity Coordination Right UE impaired   Left hand, nine hole peg test (seconds) 57   Right hand, nine hole peg test (seconds) 119   ARC Assessment Only   Acute Rehab FIM See FIM scores for Mobility/ADL " Assessment   Instrumental Activities of Daily Living (IADL)   Previous Responsibilities driving;yardwork;medication management   General Therapy Interventions   Planned Therapy Interventions ADL retraining;IADL retraining;cognition;fine motor coordination training;motor coordination training;strengthening;transfer training;home program guidelines;progressive activity/exercise;neuromuscular re-education;balance training   Clinical Impression   Criteria for Skilled Therapeutic Interventions Met yes, treatment indicated   OT Diagnosis Decreased safety and independence with ADL's.    Influenced by the following impairments Impairments in balance, strength, coordination and cognition   Assessment of Occupational Performance 3-5 Performance Deficits   Identified Performance Deficits Bathing, dressing, toileting, grooming/hygiene, mobility   Clinical Decision Making (Complexity) Moderate complexity   Therapy Frequency daily   Predicted Duration of Therapy Intervention (days/wks) 10 days   Anticipated Equipment Needs at Discharge (TBD)   Anticipated Discharge Disposition Home with Assist;Home with Outpatient Therapy   Risks and Benefits of Treatment have been explained. Yes   Patient, Family & other staff in agreement with plan of care Yes   Total Evaluation Time   Total Evaluation Time (Minutes) 25

## 2018-01-13 NOTE — PLAN OF CARE
Problem: Patient Care Overview  Goal: Plan of Care/Patient Progress Review  Pt A/O X 4, mild aphasia, slurred speech. Bed alarm on for safety, has been calling appropriately. VSS on room air. Sleep: awake to void and awake due to back pain, easily back to sleep. Physical assessment at baseline- right arm weakness with numbness in hand and elbow left leg weakness, skin intact. Denies nausea, shortness of breath, and chest pain. Has pain in the mid/lower back and bilateral hips, given hot packs and assisted to reposition onto side, patient reports no change in pain. Ambulates to bathroom to void, some incontinence and urgency, and elevated PVRs. Is on a regular diet, with thin liquids. Blood glucose at 0200 was 343. Last BM patient unable to recall, is passing gas. Pt up assist of 1 with walker and gait belt. Pt is able to make needs known and the call light is within the pt's reach. Continue to monitor.     Temp: 98  F (36.7  C) Temp src: Oral BP: 148/73 Pulse: 76   Resp: 16 SpO2: 91 % O2 Device: None (Room air)       0200: Incontinent of urine in brief, additional void in toilet. PVR was 293 mL  0545: Incontinent of urine in brief, additional void in toilet. PVR was 252 mL

## 2018-01-13 NOTE — PROGRESS NOTES
Diabetes Consult Daily  Progress Note          Assessment/Plan:                          Pete Sheldon is a 77 year old male with history of type 2 diabetes, hypertension, dyslipidemia transferred from [facility] for left thalamic stroke 0n (1/9/2018).    Glucoses running high- high 400s midday, 200s-300s since that time.  Appears he has been getting accurate doses of aspart.     Plan  -Metformin 1000mg BID to restart at lunch today (after this will dose with supper and bfast)  - Glargine 60 units + 12 units (total 72 units) given this morning, for tomorrow will plan to resume 60 units qAM (anticipate lower insulin needs with metformin restarting)  - Novolog prandial insulin increased from 1 unit per 5 grams to 1unit/4g of CHOwith meals and snacks   - Novolog High correction scale- increased to 2unit/30 for BG >140 ac, >200 HS  -Monitor glucose before meals, HS and 0200        - will need to be evaluated by Diabetic educator for use of right hand ( his wife dials the pen to the correct dose, per patient) he tests his glucose and gives injection. This can be evaluated in ARU            Plan discussed with pt, bedside RN and Dr. Dunne.                Interval History:   The last 24 hours progress and nursing notes reviewed.  Transferred to ARU yesterday.  Continues treatment for UTI (Cephalexin started 1/11).  Glucoses have been running high- peaking at 489 midday- appears that he got accurate carb coverage.  BG came down to 205 pre supper, but then back up to the 300s at HS and 0200, down to 205 this morning.  Al reports that appetite is good.  He has some pain in his back, but otherwise no complaints.  Creatinine has been within normal limits.  Al got MRI with and without contrast of brain on 1/10 at 1120- so now past 48h window for restarting metformin.  Discussed with Dr. Dunne-ok to restart metformin.    PTA:   Metformin 1000 mg twice a day, Lantus 32 units in morning and  "Humalog 2/2/2 with breakfast/lunch/dinner respectively. He does not use a sliding scale         Recent Labs  Lab 01/13/18  0746 01/13/18  0204 01/12/18  2118 01/12/18  1713 01/12/18  1225 01/12/18  0752 01/12/18  0707  01/11/18  0948  01/10/18  0926  01/08/18  1345  01/08/18  0720   GLC  --   --   --   --   --   --  426*  --  330*  --  106*  --  204*  --  188*   * 343* 358* 231* 489* 416*  --   < >  --   < >  --   < >  --   < >  --    < > = values in this interval not displayed.            Review of Systems:   See interval hx          Medications:       Active Diet Order      Combination Diet Regular Diet Adult; Thin Liquids (water, ice chips, juice, milk, gelatin, ice cream, etc)     Physical Exam:  Gen: Alert, sitting up in chair NAD   HEENT:  mucous membranes are moist  Resp: Unlabored  Neuro:oriented x3, communicating clearly ( responses are slow)  /84 (BP Location: Left arm)  Pulse 89  Temp 97.9  F (36.6  C) (Oral)  Resp 16  Ht 1.702 m (5' 7\")  Wt 67.6 kg (149 lb)  SpO2 92%  BMI 23.34 kg/m2           Data:     Lab Results   Component Value Date    A1C 7.9 01/09/2018    A1C 7.6 11/29/2017    A1C 7.8 08/16/2017    A1C 8.3 05/12/2017    A1C 7.9 02/10/2017            Recent Labs   Lab Test  01/12/18   0707  01/11/18   0948   NA  132*  132*   POTASSIUM  4.1  4.0   CHLORIDE  97  99   CO2  25  24   ANIONGAP  9  9   GLC  426*  330*   BUN  23  25   CR  1.11  1.18   MANAV  9.0  9.1     CBC RESULTS:   Recent Labs   Lab Test  01/12/18   0707  01/11/18   0948   WBC   --   11.2*   RBC   --   4.66   HGB   --   14.5   HCT   --   44.7   MCV   --   96   MCH   --   31.1   MCHC   --   32.4   RDW   --   13.3   PLT  256  248     Ernestina Macias PA-C [phone]  Diabetes Management job code [id number]            "

## 2018-01-13 NOTE — PLAN OF CARE
Problem: Patient Care Overview  Goal: Plan of Care/Patient Progress Review  FOCUS/GOAL  Bladder management, Nutrition/Feeding/Swallowing precautions, Medication management, Medical management, Cognition/Memory/Judgment/Problem solving and Reinforcement of self-care/ADL    ASSESSMENT, INTERVENTIONS AND CONTINUING PLAN FOR GOAL:  Patient is alert/oriented to self and situation, somewhat disoriented to place/time and needs oversight for care needs.  Patient did not demonstrate using the call light on this shift, alarms were on but patient did not demonstrate and impulsive behavior on this shift, continue with alarms and frequent check ins.  Patient reports some chronic back pain at baseline but denies need for intervention on this shift.  Patient is diabetic, BG before brien meal was 231, carb coverage given per orders and bedtime glucose was 358- s.s coverage given at bedtime.  Patient has good appetite and needed set up help to eat, no swallowing issues noted.  Patient has been voiding using the urinal with some prompting and has had some residual with each void- last PVR at 10:45 pm was 311, continue to monitor closely.  Patient is able to ambulate with CGA o1 with walker and gait belt. Reports some numbness and tingling to the right hand and intermittently the elbow, may have some receptive aphasia as takes some time to respond occasionally but was able to answer all of admission questions appropriately.

## 2018-01-13 NOTE — PLAN OF CARE
Problem: Patient Care Overview  Goal: Plan of Care/Patient Progress Review  Completed formal speech-language eval and informal cognitive assessment per MD orders. Pt presents with mild moderate deficits in verbal expression/ verbal fluency-- note that pt is slwoer to initiate and respond at times and observing word searching both on structured eval tasks and covnersationals tasks. Minimal impairments with auditory comprehension at the complex level and apt also demonstrates mild- moderate impairments in reading comprehension- however this appears to be more 2/2 reduced overall visual acuitoy even with glasses on- will plan to further assess this using enlargee print but during this portion of eval- clinician was pointing to the words to aid in visual scanning. With cognition: pt demosntrates mild to moderate deficits in alternating and flexible attention, more moderate deficits in recent memory and problem solving/reasoning/flexible thinking. Pt current level of function appears to be below baseline and pt would benefit from skilled SLP intervention to improve functional cognition a nd language for imporoved independence and safety in ADL's    Pt is tolerating a regular diet with thin liquids- pt was evaluated while in the hospital for swallowing and sptx signed off- swallow goals met

## 2018-01-13 NOTE — PROGRESS NOTES
01/13/18 0831   Quick Adds   Type of Visit Initial PT Evaluation   Living Environment   Lives With spouse   Living Arrangements house   Home Accessibility stairs to enter home   Number of Stairs to Enter Home 2   Number of Stairs Within Home 12  (To basement)   Stair Railings at Home inside, present on right side   Transportation Available car   Living Environment Comment Pt lives in single story home w/ wife, 2 [address]EM[address]; 12 steps to basement, does not need to go downstairs. IND w/ ADLs and mobility prior. Pt has normal flat bed, house is mostly carpeted   Self-Care   Dominant Hand right   Usual Activity Tolerance good   Current Activity Tolerance fair   Regular Exercise no   Equipment Currently Used at Home none   Activity/Exercise/Self-Care Comment Previously IND   Functional Level Prior   Ambulation 0-->independent   Transferring 0-->independent   Toileting 0-->independent   Bathing 0-->independent   Dressing 0-->independent   Eating 0-->independent   Communication 0-->understands/communicates without difficulty   Swallowing 0-->swallows foods/liquids without difficulty   Cognition 0 - no cognition issues reported   Fall history within last six months yes   Number of times patient has fallen within last six months 1   Which of the above functional risks had a recent onset or change? ambulation;transferring;toileting;bathing;dressing;cognition   Prior Functional Level Comment Pt previously IND w/ all mobility and driving. Pt had fall 1/8 due to weakness; found to have stroke   General Information   Onset of Illness/Injury or Date of Surgery - Date 01/12/18   Referring Physician Nirmala Price MD   Pertinent History of Current Problem (include personal factors and/or comorbidities that impact the POC) Pt is 78 y/o male, PMH HTN and DMII; Fell in house when ambulating to BR 1/8; found to have L thalamic stroke, R arm sensory changes, weakness, ataxia. Pt reports word finding difficulties since stroke. 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 Mild dysarthria and slurred speech   Precautions/Limitations fall precautions  (Bed alarm)   General Observations Pt is retired high    Cognitive Status Examination   Orientation orientation to person, place and time   Level of Consciousness alert  (Increased time to respond; word finding)   Follows Commands and Answers Questions 100% of the time   Personal Safety and Judgment intact  (No impulsivity noted)   Memory impaired   Cognitive Comment Pt demonstrates difficulty w/ word finding, seems to understand questions being asked just has difficulty w/ responding, some STM impairments noted. Required assist to path find to room.    Pain Assessment   Patient Currently in Pain No   Posture    Posture Forward head position;Protracted shoulders   Strength   Strength Comments BLE grossly 5/5, hip flexion ~4/5 bilaterally.    ARC Assessment Only   Acute Rehab FIM See FIM scores for Mobility/ADL Assessment   Balance   Balance Comments Good static and dynamic sitting, no LOB w/ reaching outside REGINALD. Postive Rhomberg EC   Sensory Examination   Sensory Perception Comments Intact to light touch BLE, reports numbness in RUE, intact to light touch in RUE; Same side to side; Impaired proprioception noted   Coordination   Coordination Comments Negative finger to nose   General Therapy Interventions   Planned Therapy Interventions balance training;bed mobility training;gait training;neuromuscular re-education;strengthening;transfer training;progressive activity/exercise   Clinical Impression   Criteria for Skilled Therapeutic Intervention yes, treatment indicated   PT Diagnosis Decreased functional mobility from baseline, impaired sensory and proprioception, impaired endurance, balance deficits   Influenced by the following impairments balance, endurance, cognition, endurance   Functional limitations due to impairments Decreased functional mobility   Clinical  Presentation Evolving/Changing   Clinical Presentation Rationale PMH, lives in 2 story rambler, wife unable to physically assist, wife does not drive   Clinical Decision Making (Complexity) Moderate complexity   Therapy Frequency` daily   Predicted Duration of Therapy Intervention (days/wks) 10 days   Anticipated Discharge Disposition Home with Assist;Home with Home Therapy;Home with Outpatient Therapy   Risk & Benefits of therapy have been explained Yes   Patient, Family & other staff in agreement with plan of care Yes   Total Evaluation Time   Total Evaluation Time (Minutes) 18

## 2018-01-13 NOTE — PROGRESS NOTES
01/13/18 1400   General Information, SLP   Type of Evaluation Speech and Language;Cognitive-Linguistic   Type of Visit Initial   Start of Care Date 01/13/18   Onset of Illness/Injury or Date of Surgery - Date 01/09/18   Referring Physician Nirmala Price MD   Patient/Family Goals Statement Not stated   Pertinent History of Current Problem Pete Sheldon is a 77-year-old right hand dominant male with a history of DM II and HTN who was admitted to [facility] from an outside hospital on 1/9/18 for stroke. The patient fell in his home on 1/8/18 while using the bathroom and ended up being taken by paramedics to an outside hospital ED where he was found to have a UTI and was sent home on Keflex. Later that day, the patient was noted to have right arm numbness and difficulty with ambulation so he was brought back to the ED where his NIH was 4 for facial palsy, right arm weakness, sensory loss and limb ataxia. A stroke code was called and since time on onset was unclear, decision was made to refrain from administration of tPA. His imaging was concerning for a right vertebral artery dissection vs dissecting plaque. 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    Precautions/Limitations fall precautions   General Observations Pleasant and cooperative but slow to initiate at times   Oral Motor Sensory Function   Completed on Swallow Evaluation Completed Clinical Bedside Swallow Evaluation  (completed while still in hospital- swallow goals met)   Language: Auditory Comprehension (understanding of spoken language)   Tests were administered at the following levels Complex (vocation/community/social activities)   Paragraph; Discourse Comprehension Test (out of 8 total; less than 7 is below mean) 6   Functional Assessment Scale (Auditory Comprehension) Minimal Impairment   Comments (Auditory Comprehension) slower processing at  times; benefits from repetition   Language: Verbal Expression (use of spoken language to express information)   Tests were administered at the following levels Complex (vocation/community/social activities)   Define Words; [place] Test for Differential Diagnosis Of Aphasia (out of 10 total) 6   Conversation; [place] Diagnostic Aphasia Exam rating (out of 5 total) 3   Functional Assessment Scale (Verbal Expression) Mild to Moderate Impairment   Comments (Verbal Expression) word fidning difficulty noted in conversation and on structured tasks- slow to initiate answers at times - noted to be searching for words at times   Reading Comprehension (understanding of written language)   Tests were administered at the following levels Complex (vocation/community/social activities)   Sentences and Paragraphs; [place] Diagnostic Aphasia Exam (out of 10 total) 7   Functional Assessment Scale (Reading Comprehension) Mild to Moderate Impairment   Comments (Reading Comprehension) Pt having difficulty with reading print even with galsses on- will further assess during tx with enlarged print   Written Expression (use of writing to express information)   Functional Assessment Scale (Written Expression) Not Tested (see Comment)   Cognitive Status Examination   Attention impaired  (not able to do flexible; -4 on alternating)   Behavioral Observations alert   Orientation gnerally oriented   Visual Impairments Include visual acuity   Short Term Memory impaired  (2/3 delayed recall; 3/15 paragraph recall)   Long Term Memory intact   Reasoning impaired  (2/4 verbal prob solv; 4/6 verbal reasoning;1/2 numerics)   Executive Function Deficits Noted initiation;insight/awareness;mental flexibility;self-correction   Additional cognitive-linguistic evaluation indicated  recommend   Standardized cognitive-linguistic assessment completed to be completed during future session   Cognitive Status Exam Comments Mild- moderate deficits overall in  alternating and flexible attention, short term recall and problem solving/reasoning/ mental flexibility   General Therapy Interventions   Planned Therapy Interventions Cognitive Treatment;Language   Cognitive treatment Internal memory strategy training;External memory strategy training;Progressive attention training   Language Verbal expression   Clinical Impression, SLP Eval   Criteria for Skilled Therapeutic Interventions Met Yes;Treatment indicated   SLP Diagnosis mild- moderate cognitive- inguistic impairments   Influenced by the following factors/impairments premorbid level of functioning- ? of some memory decline prior to admit   Functional limitations due to impairments mild right sided weakness   Rehab Potential Good, to achieve stated therapy goals   Rehab potential affected by memory, insight   Therapy Frequency Daily   Predicted Duration of Therapy Intervention (days/wks) 2 weeks   Risks and Benefits of Treatment have been explained. Yes   Patient, Family & other staff in agreement with plan of care Yes   Clinical Impression Comments Completed formal speech-language eval and informal cognitive assessment per MD orders. Pt presents with mild moderate deficits in verbal expression/ verbal fluency-- note that pt is slwoer to initiate and respond at times and observing word searching both on structured eval tasks and covnersationals tasks. Minimal impairments with auditory comprehension at the complex level and apt also demonstrates mild- moderate impairments in reading comprehension- however this appears to be more 2/2 reduced overall visual acuitoy even with glasses on- will plan to further assess this using enlargee print but during this portion of eval- clinician was pointing to the words to aid in visual scanning. With cognition: pt demosntrates mild to moderate deficits in alternating and flexible attention, more moderate deficits in recent memory and problem solving/reasoning/flexible thinking. Pt  current level of function appears to be below baseline and pt would benefit from skilled SLP intervention to improve functional cognition a nd language for imporoved independence and safety in ADL's   Total Evaluation Time      Total Evaluation Time (Minutes) 60  (30 speech-lang eval; 30 informal cognitive assess as tx)

## 2018-01-13 NOTE — PLAN OF CARE
Problem: Patient Care Overview  Goal: Plan of Care/Patient Progress Review  Discharge Planner PT   Patient plan for discharge: Home w/ wife   Current status: Chart reviewed, initial evaluation complete, treatment initiated. PLOF IND w/ mobility. Currently SBA for bed mobility and STS transfers w/ FWW. Up/down 12 steps w/ BHR and reciprocal pattern CGA. Pt ambulated ~200-250' x 2 w/ FWW tends to veer to R and demonstrates difficulty avoiding objects on R side of hallway, cues required. Pt demonstrates shuffling pattern w/ NBOS. No overt LOB. Progressed to SBA w/ ambulation and FWW. Difficulty w/ word finding noted. A & O x 3. Assist to pathfind back to room. Positive Rhomberg EC. Pt endorses visual changes from basline stating occasionally has double vision, seems vision is more blurry. Pt was driving prior. Wife does not drive.     ELOS ~ 7-10 days to meet goals    Bed alarm/Fall risk      Barriers to return to prior living situation: endurance, balance, visual changes  Recommendations for discharge: Home w/ assist from wife and family, OP PT vs HHPT for home safety eval  Rationale for recommendations: Pt will require continued therapy to progress mobility as pt below baseline levels at this time         Entered by: Lacy Cruz 01/13/2018 10:56 AM

## 2018-01-13 NOTE — PLAN OF CARE
Problem: Patient Care Overview  Goal: Plan of Care/Patient Progress Review  OT evaluation completed and treatment initiated. At baseline, pt was independent with ADL's, medication management and driving. Today, pt requires CGA for mobility to/from bathroom with FWW, LB dressing and toileting. Pt demonstrates impairments in RUE coordination, R hand sensory deficits, balance, and endurance impacting ADL completion.     ELOS ~10 days then home with assist and OP OT for cognition, RUE sensory/coordination re-ed and driving assessment.

## 2018-01-14 ENCOUNTER — APPOINTMENT (OUTPATIENT)
Dept: GENERAL RADIOLOGY | Facility: CLINIC | Age: 78
DRG: 057 | End: 2018-01-14
Attending: PHYSICAL MEDICINE & REHABILITATION
Payer: COMMERCIAL

## 2018-01-14 LAB
ANION GAP SERPL CALCULATED.3IONS-SCNC: 7 MMOL/L (ref 3–14)
BASOPHILS # BLD AUTO: 0 10E9/L (ref 0–0.2)
BASOPHILS NFR BLD AUTO: 0.3 %
BUN SERPL-MCNC: 32 MG/DL (ref 7–30)
CALCIUM SERPL-MCNC: 8.8 MG/DL (ref 8.5–10.1)
CHLORIDE SERPL-SCNC: 101 MMOL/L (ref 94–109)
CO2 SERPL-SCNC: 29 MMOL/L (ref 20–32)
CREAT SERPL-MCNC: 1.51 MG/DL (ref 0.66–1.25)
DIFFERENTIAL METHOD BLD: NORMAL
EOSINOPHIL # BLD AUTO: 0.7 10E9/L (ref 0–0.7)
EOSINOPHIL NFR BLD AUTO: 6.9 %
ERYTHROCYTE [DISTWIDTH] IN BLOOD BY AUTOMATED COUNT: 12.9 % (ref 10–15)
GFR SERPL CREATININE-BSD FRML MDRD: 45 ML/MIN/1.7M2
GLUCOSE BLDC GLUCOMTR-MCNC: 103 MG/DL (ref 70–99)
GLUCOSE BLDC GLUCOMTR-MCNC: 105 MG/DL (ref 70–99)
GLUCOSE BLDC GLUCOMTR-MCNC: 116 MG/DL (ref 70–99)
GLUCOSE BLDC GLUCOMTR-MCNC: 144 MG/DL (ref 70–99)
GLUCOSE BLDC GLUCOMTR-MCNC: 161 MG/DL (ref 70–99)
GLUCOSE BLDC GLUCOMTR-MCNC: 304 MG/DL (ref 70–99)
GLUCOSE BLDC GLUCOMTR-MCNC: 50 MG/DL (ref 70–99)
GLUCOSE BLDC GLUCOMTR-MCNC: 63 MG/DL (ref 70–99)
GLUCOSE SERPL-MCNC: 148 MG/DL (ref 70–99)
HCT VFR BLD AUTO: 42.6 % (ref 40–53)
HGB BLD-MCNC: 14.2 G/DL (ref 13.3–17.7)
IMM GRANULOCYTES # BLD: 0 10E9/L (ref 0–0.4)
IMM GRANULOCYTES NFR BLD: 0.4 %
LYMPHOCYTES # BLD AUTO: 1.8 10E9/L (ref 0.8–5.3)
LYMPHOCYTES NFR BLD AUTO: 19 %
MCH RBC QN AUTO: 31.1 PG (ref 26.5–33)
MCHC RBC AUTO-ENTMCNC: 33.3 G/DL (ref 31.5–36.5)
MCV RBC AUTO: 93 FL (ref 78–100)
MONOCYTES # BLD AUTO: 1.1 10E9/L (ref 0–1.3)
MONOCYTES NFR BLD AUTO: 11.5 %
NEUTROPHILS # BLD AUTO: 5.9 10E9/L (ref 1.6–8.3)
NEUTROPHILS NFR BLD AUTO: 61.9 %
NRBC # BLD AUTO: 0 10*3/UL
NRBC BLD AUTO-RTO: 0 /100
PLATELET # BLD AUTO: 280 10E9/L (ref 150–450)
POTASSIUM SERPL-SCNC: 4.2 MMOL/L (ref 3.4–5.3)
RBC # BLD AUTO: 4.57 10E12/L (ref 4.4–5.9)
SODIUM SERPL-SCNC: 137 MMOL/L (ref 133–144)
WBC # BLD AUTO: 9.6 10E9/L (ref 4–11)

## 2018-01-14 PROCEDURE — 85025 COMPLETE CBC W/AUTO DIFF WBC: CPT | Performed by: INTERNAL MEDICINE

## 2018-01-14 PROCEDURE — 97535 SELF CARE MNGMENT TRAINING: CPT | Mod: GO

## 2018-01-14 PROCEDURE — 25000132 ZZH RX MED GY IP 250 OP 250 PS 637: Performed by: PHYSICIAN ASSISTANT

## 2018-01-14 PROCEDURE — 99221 1ST HOSP IP/OBS SF/LOW 40: CPT | Performed by: INTERNAL MEDICINE

## 2018-01-14 PROCEDURE — 99207 ZZC CONSULT E&M CHANGED TO INITIAL LEVEL: CPT | Performed by: INTERNAL MEDICINE

## 2018-01-14 PROCEDURE — 40000225 ZZH STATISTIC SLP WARD VISIT: Performed by: SPEECH-LANGUAGE PATHOLOGIST

## 2018-01-14 PROCEDURE — 36415 COLL VENOUS BLD VENIPUNCTURE: CPT | Performed by: INTERNAL MEDICINE

## 2018-01-14 PROCEDURE — 97112 NEUROMUSCULAR REEDUCATION: CPT | Mod: GP

## 2018-01-14 PROCEDURE — 40000133 ZZH STATISTIC OT WARD VISIT

## 2018-01-14 PROCEDURE — 97750 PHYSICAL PERFORMANCE TEST: CPT | Mod: GP

## 2018-01-14 PROCEDURE — 40000193 ZZH STATISTIC PT WARD VISIT

## 2018-01-14 PROCEDURE — 12800006 ZZH R&B REHAB

## 2018-01-14 PROCEDURE — 71046 X-RAY EXAM CHEST 2 VIEWS: CPT

## 2018-01-14 PROCEDURE — 00000146 ZZHCL STATISTIC GLUCOSE BY METER IP

## 2018-01-14 PROCEDURE — 25000132 ZZH RX MED GY IP 250 OP 250 PS 637: Performed by: PHYSICAL MEDICINE & REHABILITATION

## 2018-01-14 PROCEDURE — 25000128 H RX IP 250 OP 636: Performed by: INTERNAL MEDICINE

## 2018-01-14 PROCEDURE — 93005 ELECTROCARDIOGRAM TRACING: CPT

## 2018-01-14 PROCEDURE — 97116 GAIT TRAINING THERAPY: CPT | Mod: GP

## 2018-01-14 PROCEDURE — 97127 ZZHC SP THERAPEUTIC INTERVENTION W/FOCUS ON COGNITIVE FUNCTION: CPT | Mod: GN | Performed by: SPEECH-LANGUAGE PATHOLOGIST

## 2018-01-14 PROCEDURE — 97530 THERAPEUTIC ACTIVITIES: CPT | Mod: GP

## 2018-01-14 PROCEDURE — 80048 BASIC METABOLIC PNL TOTAL CA: CPT | Performed by: INTERNAL MEDICINE

## 2018-01-14 RX ADMIN — ATORVASTATIN CALCIUM 20 MG: 20 TABLET, FILM COATED ORAL at 07:49

## 2018-01-14 RX ADMIN — AMLODIPINE BESYLATE 10 MG: 10 TABLET ORAL at 07:49

## 2018-01-14 RX ADMIN — I-VITE, TAB 1000-60-2MG (60/BT) 1 TABLET: TAB at 07:49

## 2018-01-14 RX ADMIN — CEPHALEXIN 500 MG: 500 CAPSULE ORAL at 20:06

## 2018-01-14 RX ADMIN — TAMSULOSIN HYDROCHLORIDE 0.4 MG: 0.4 CAPSULE ORAL at 20:06

## 2018-01-14 RX ADMIN — ASPIRIN 325 MG: 325 TABLET, DELAYED RELEASE ORAL at 07:49

## 2018-01-14 RX ADMIN — METFORMIN HYDROCHLORIDE 1000 MG: 500 TABLET ORAL at 07:49

## 2018-01-14 RX ADMIN — CEPHALEXIN 500 MG: 500 CAPSULE ORAL at 07:48

## 2018-01-14 RX ADMIN — CEPHALEXIN 500 MG: 500 CAPSULE ORAL at 13:48

## 2018-01-14 RX ADMIN — SODIUM CHLORIDE 1000 ML: 9 INJECTION, SOLUTION INTRAVENOUS at 14:17

## 2018-01-14 RX ADMIN — INSULIN GLARGINE 60 UNITS: 100 INJECTION, SOLUTION SUBCUTANEOUS at 08:00

## 2018-01-14 RX ADMIN — HYDROCHLOROTHIAZIDE 12.5 MG: 12.5 CAPSULE ORAL at 07:48

## 2018-01-14 NOTE — CONSULTS
HOSPITALIST INITIAL CONSULT NOTE    Referring Provider:  Nirmala Price MD      Reason for Consult     Management of Tachycardia and shortness of breath    History of Present Illness     Pete Sheldon is 77 year old year old male with hx of DM II, HTN, Renal Calculi is admitted to  ARU on 01/12 for rehabilitation for stroke. 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 tPA was not given due to out of window. Also, he was recently treated for UTI.   He had issues with uncontrolled blood sugar during hospital stay. He was followed by Endocrinology team.     Patient reports that he felt short of breath after coming out of shower. He denied feeling of palpitation but was told that his heart was beating faster. He never had chest pain. He denies feeling lightheaded or dizzy through out this period. He does had mild headache. He does not have any cough. His breathing has improved since the event this morning but still feels short of breath.           Past Medical History     Past Medical History:   Diagnosis Date     Calculus of ureter 1980's     Hypertension      Microalbuminuria 2/15/2016     Other and unspecified hyperlipidemia      Type 2 diabetes, HbA1C goal < 8% (H) 7/1995          Past Surgical History     Past Surgical History:   Procedure Laterality Date     C NONSPECIFIC PROCEDURE  1980's    Nasal septoplasty     HC COLONOSCOPY THRU STOMA, DIAGNOSTIC  6/12/2007    Normal     HC FLEX SIGMOIDOSCOPY W/WO MIGUEL SPEC BY BRUSH/WASH  12/30/1999    Normal to 60 cm     HC REPAIR INCISIONAL HERNIA,REDUCIBLE  1/2001, 1999    Hernia Repair, Incisional, Unilateral (right)     HC REPAIR INCISIONAL HERNIA,REDUCIBLE  1996    Hernia Repair, Incisional, Unilateral (left, with mesh placement)          Medications     All his current medications are reviewed in current medication section of [employer].  Home medications are reviewed.  Current  Facility-Administered Medications   Medication     insulin aspart (NovoLOG) inj (RAPID ACTING)     insulin aspart (NovoLOG) inj (RAPID ACTING)     insulin aspart (NovoLOG) inj (RAPID ACTING)     senna-docusate (SENOKOT-S;PERICOLACE) 8.6-50 MG per tablet 1-2 tablet     bisacodyl (DULCOLAX) Suppository 10 mg     insulin aspart (NovoLOG) inj (RAPID ACTING)     insulin aspart (NovoLOG) inj (RAPID ACTING)     metFORMIN (GLUCOPHAGE) tablet 1,000 mg     aspirin EC EC tablet 325 mg     amLODIPine (NORVASC) tablet 10 mg     atorvastatin (LIPITOR) tablet 20 mg     cephALEXin (KEFLEX) capsule 500 mg     hydrochlorothiazide (MICROZIDE) capsule 12.5 mg     tamsulosin (FLOMAX) capsule 0.4 mg     multivitamin (I-VIPUL) per tablet 1 tablet     insulin glargine (LANTUS) injection 60 Units     insulin aspart (NovoLOG) inj (RAPID ACTING)     glucose 40 % gel 15-30 g    Or     dextrose 50 % injection 25-50 mL    Or     glucagon injection 1 mg        Allergies        Allergies   Allergen Reactions     Losartan Other (See Comments)     Dizziness        Family History     Family History   Problem Relation Age of Onset     CEREBROVASCULAR DISEASE Mother       age 95     HEART DISEASE Mother      age 89,  age 95     CEREBROVASCULAR DISEASE Father       age 91, stroke two years previous     Cancer - colorectal Brother      Half-brother     CANCER Sister      Half-sister (?type)     CANCER Sister      Half-sister (?type)     HEART DISEASE Brother      Neurologic Disorder Daughter      Multiple Sclerosis     Psychotic Disorder Son      Schizophrenia      Mother:   Father:       Social History     Social History     Social History     Marital status:      Spouse name: N/A     Number of children: 2     Years of education: N/A     Occupational History     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        Review of Systems   A 10 point review of systems was taken and largely negative except for that which was stated above.      Physical Exam   General:   Vital signs:    Blood pressure 104/68, pulse 120, temperature 97.2  F (36.2  C), temperature source Oral, resp. rate 15, height 1.702 m (5' 7\"), weight 67.6 kg (149 lb), SpO2 98 %.  Estimated body mass index is 23.34 kg/(m^2) as calculated from the following:    Height as of this encounter: 1.702 m (5' 7\").    Weight as of this encounter: 67.6 kg (149 lb).    HEENT: NCAT,  no icterus/ pallor  Cardiovascular: S1 and S2 normal.    Respiratory: B/L CTA. Non laborious breathing.   GI: BS+ . Soft, NT.  Neurology:  Alert, awake, and communicating.   Extremities: Mild pre tibial edema      Laboratory and Imaging Studies     Laboratory and Imaging studies reviewed in the results review section of Epic. Pertinent studies are as below:    CMP  Recent Labs  Lab 01/12/18  0707 01/11/18  0948 01/10/18  0926 01/09/18  0750  01/08/18  1345   * 132* 138  --   --  134   POTASSIUM 4.1 4.0 3.9  --   --  3.8   CHLORIDE 97 99 102  --   --  98   CO2 25 24 28  --   --  30   ANIONGAP 9 9 9  --   --  6   * 330* 106*  --   --  204*   BUN 23 25 24  --   --  21   CR 1.11 1.18 1.16 1.15  --  1.15   GFRESTIMATED 64 59* 61 61  < > 61   GFRESTBLACK 77 72 73 74  < > 74   MANAV 9.0 9.1 9.3  --   --  9.4   PROTTOTAL  --   --  7.9  --   --   --    ALBUMIN  --   --  3.1*  --   --   --    BILITOTAL  --   --  0.6  --   --   --    ALKPHOS  --   --  83  --   --   --    AST  --   --  23  --   --   --    ALT  --   --  35  --   --   --    < > = values in this interval not displayed.  CBC  Recent Labs  Lab 01/12/18  0707 01/11/18  0948 01/10/18  0926 01/09/18  0750 01/08/18  1345 01/08/18  0720 "   WBC  --  11.2* 8.7  --  11.1* 9.6   RBC  --  4.66 4.65  --  5.07 4.80   HGB  --  14.5 14.3  --  15.6 14.9   HCT  --  44.7 43.4  --  46.8 44.5   MCV  --  96 93  --  92 93   MCH  --  31.1 30.8  --  30.8 31.0   MCHC  --  32.4 32.9  --  33.3 33.5   RDW  --  13.3 13.0  --  12.6 12.5    248 292 273 313 269     INR  Recent Labs  Lab 01/10/18  0926 01/08/18  1345   INR 1.07 1.05     Arterial Blood GasNo lab results found in last 7 days.       Impression/Recommendations     77 year old year old male with hx of DM II, HTN, Renal Calculi is admitted to  ARU on 01/12 for rehabilitation for stroke    # Shortness of breath, Tachycardia:  Differential broad. Symptoms already resolving without intervention. Could just have been one time episode secondary to activity. EKG showed normal sinus rhythm with sinus arrhythmia with no ST-T wave changes. He denies any chest pain, cough, palpitation, lightheadedness or dizziness.   - Check CBC, BMP  - XR chest  - Orthostatic vital signs    Addendum:   Creatinine 1.51. Will give IV fluid. Awaiting Orthostatic changes  Hold Metformin. Endocrinology unified  Recheck BMP in AM    Addendum: Orthostatic Vitals. 1 liter bolus in 4 hours  Encouraged PO fluids.     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             Jason Farmer  Internal Medicine/Hospitalist  [facility]  [phone]

## 2018-01-14 NOTE — PROGRESS NOTES
"   18 1600   Living Arrangements   Lives With spouse   Living Arrangements house   Able to Return to Prior Living Arrangements yes   Home Safety   Patient Feels Safe Living in Home? yes   Discharge Needs Assessment   Equipment Currently Used at Home raised toilet;shower chair   Transportation Available car;family or friend will provide   Values Beliefs and Spiritual Care   F: Gracie: Do you have a connection with a gracie community, Judaism, or Druze group? yes   The name of the gracie community, Judaism, or Druze group is: ([place])     Social Work: Initial Assessment with Discharge Plan    Patient Name: Pete BARGER \"Al\" Monalisa  : 1940  Age: 77 year old  MRN: [id number]  Completed assessment with: patient  Admitted to ARU: 18    Presenting Information   Date of SW assessment: 18  Health Care Directive: none  Primary Health Care Agent: next-of-kin would be surrogate decision-maker if necessary  Secondary Health Care Agent: n/a  Living Situation: lives with wife in house with 2 steps  Previous Functional Status: independent  DME available: see above  Patient and family understanding of hospitalization: stroke  Cultural/Language/Spiritual Considerations: speaks English    Physical Health  Reason for admission: left thalamic stroke    Provider Information   Primary Care Physician: Rom Helm [phone]    Mental Health/Chemical Dependency:   Diagnosis: none  Alcohol/Tobacco/Narcotics: none  Support/Services in Place: n/a  Services Needed/Recommended: n/a  Sexuality/Intimacy: heterosexual    Support System  Marital Status: ; wife-Radha  Family support: grandson-Ted  Other support available: daughter lives up [facility]  Current in home services: none  Previous services: none    Financial/Employment/Education  Employment Status: retired; previously worked as   Income Source: Social Security jail  Education: high " school  Financial Concerns: none  Insurance: [facility]    Discharge Plan   Patient and family discharge goal: home with wife and recommended services  Provided education on discharge plan: home/outpatient therapy services  Patient agreeable to discharge plan: to be determined  Provided education and attained signature for Medicare IM and IRF Patient Rights and Privacy Information provided to patient : yes  Provided patient with [place] [employer] resources: declined  Barriers to discharge: none identified    Discharge Recommendations   Disposition: home with wife and recommended services  Transportation Needs: family can provide  Name of Transportation Company and Phone: n/a    Additional comments   D:  See H&P for full medical background.  I:  Met with patient to complete assessment and social work consult.  A:  Patient is doing okay.  Supportive family who is involved.    P:  SW will follow and assist as needed.    Please invite to Care Conference:  Radha (wife) [phone]      YUE Cash   Phone: [phone]  Pager: [phone]

## 2018-01-14 NOTE — PROGRESS NOTES
01/14/18 1000   Signing Clinician's Name / Credentials   Signing clinician's name / credentials Lacy Cruz DPT  (Used FWW)   Dynamic Gait Index (Osmar and De La Cruz Osiel, 1995)   Gait Level Surface 2   Change in Gait Speed 3   Gait and Horizontal Head Turns 2   Gait with Vertical Head Turns 2   Gait and Pivot Turns 2   Step Over Obstacle 1   Step Around Obstacles 2   Steps 2   Total Dynamic Gait Index Score  (A score of 19 or less has been correlated to an increased risk of falls in community dwelling older adults, patients with vestibular disorders, and patients with MS.)   Total Score (out of 24) 16     Dynamic Gait Index (DGI):The DGI is a measure of balance during gait that is reliable and valid for the elderly and individuals with Parkinson's disease, MS, vestibular disorders, or s/p stroke. Gait assistive device used: FWW     Patient score: 16/24  Scores ?19/24 indicate an increased risk for falls according to Estelita et al 2000  Minimal Detectable Change = 2.9 in community dwelling elderly according to Phillip et al 2011    Assessment (rationale for performing, application to patient s function & care plan): Gait instability, stroke  Minutes billed as physical performance test: 10    PT: Pt completed DGI, indicates pt at increased fall risk. Most difficulty w/ stepping over obstacles and pivot turns. Pt w/ more fatigue during session, HR tachy at rest 108; 120 w/ ambulation see flowsheet for details. Had shower prior to session. Focus on dynamic balance and ambualtion w/o AD. More rest breaks taken this day.

## 2018-01-14 NOTE — PLAN OF CARE
Problem: Patient Care Overview  Goal: Plan of Care/Patient Progress Review  Pt A/O X 4, mild aphasia, slurred speech. Bed alarm on for safety, has been calling appropriately. VSS on room air. Sleep: awake to void and awake due to back pain, easily back to sleep. Physical assessment at baseline- right arm weakness with numbness in hand and elbow left leg weakness, skin intact. Denies nausea, shortness of breath, and chest pain. Has pain in the mid/lower back and bilateral hips, given hot packs and assisted to reposition onto side, patient reports some relief in pain. Ambulates to bathroom to void, some incontinence and urgency, monitoring PVRs. Is on a regular diet, with thin liquids. Blood glucose at 0200 was 116. Continent of bowel, last BM 1/13, is passing gas. Pt up assist of 1 with walker and gait belt. Pt is able to make needs known and the call light is within the pt's reach. Continue to monitor.        0115: Incontinent of urine, voided more in toilet. Incontinence care provided. PVR was 196 mL.   0530: Incontinent of urine, voided more in toilet. Incontinence care provided. PVR was 239 mL.  0600: Voided in toilet. PVR was 137 mL.

## 2018-01-14 NOTE — PROGRESS NOTES
PM&R Daily Note:    Concern today with shortness of breath following shower after which my exam had considerable tachycardia and some irregularity I felt, roughly 120 bpm. EKG shortly after had normal sinus rhythm. Consultation with hospitalist appreciated reviewed details with Dr. Farmer. If this is tachycardia related to some relative dehydration and recent stroke, that more easily managed. If this chance represents atrial fibrillation and turned that may modify his anticoagulation recommendations for ischemic stroke. For now continues with aspirin 325 mg daily. We'll follow exam and clinical symptoms closely. Consideration for holter monitor.    During the above is also having some shortness of breath that seems to still been participating in therapies before and since. Did have a shower this morning.    Endocrinology also following with diabetes he has had considerable variability in his blood sugars ranging from 415 down to 68. Appreciate their insights. He does report having considerable volatility in his blood sugars even prior to this hospitalization. I suspect we'll be able to titrate that up some that may not have great control.    Vitals:    01/14/18 0746 01/14/18 1001 01/14/18 1049 01/14/18 1200   BP: 143/63 113/66 104/68 135/69   BP Location: Left arm Left arm  Left arm   Pulse: 76 110 120 84   Resp: 15      Temp: 97.2  F (36.2  C)      TempSrc: Oral      SpO2: 94% 94% 98%    Weight:       Height:         Alert pleasant, no diaphoresis  Heart tachycardic and bit irregular with earlier exam  Lungs clear to auscultation  Abdomen nontender      insulin aspart   Subcutaneous QAM AC     insulin aspart   Subcutaneous Daily with lunch     insulin aspart   Subcutaneous Daily with supper     sodium chloride 0.9%  1,000 mL Intravenous Once     insulin aspart  2-16 Units Subcutaneous TID AC     insulin aspart  2-12 Units Subcutaneous At Bedtime     aspirin EC  325 mg Oral Daily     amLODIPine  10 mg Oral Daily      atorvastatin  20 mg Oral Daily     cephALEXin  500 mg Oral TID     tamsulosin  0.4 mg Oral QPM     multivitamin  1 tablet Oral Daily     insulin glargine  60 Units Subcutaneous Q24H     senna-docusate, bisacodyl, insulin aspart, glucose **OR** dextrose **OR** glucagon    35 minutes spent today, 15 and rectal patient interaction, remainder in coordination of care as above.

## 2018-01-14 NOTE — PLAN OF CARE
Problem: Goal/Outcome  Goal: Goal Outcome Summary  Pt has mild right side weakness, slow reaction time and occasional word finding difficulty re to diagnosis. Blood sugar has been more stable compared to the previous day, see result flow sheet for details. Pt was found to have irregular heart beat, EKG and CXR was done, will follow up with result. We also checked  orthostatic blood pressure which showed significant drop from 135 sys to 96, his diastolic bp was more stable. A new iv line was started in the left lower arm for iv hydration. NaCl 0.9 % is running at 250 cc/ hr at this time, it is one time bolus for 1000 cc. That needs to be stopped when it is done and follow up with orthostatic blood pressure to see the effect of the treatment. We need to keep monitoring blood sugar, iv line,  bladder status while assisting with activity of daily livings as needed.

## 2018-01-14 NOTE — PROGRESS NOTES
PM&R Daily Note:    Al is participating in therapies, showing some improvements.  Still bit of a poor historian makes tracking his symptoms history more limited.  Urinary retention had restarted tamsulosin yesterday evening.  , 293, 252.  Finishing Keflex for urinary tract infection 1/17.    Blood pressure is reasonably controlled, continue amlodipine, hydrochlorothiazide.  Tamsulosin may impact this some as well.  Endocrinology consultation appreciated, reviewed with BRITTANY Do earlier today outlined adjustments to insulin and starting metformin.  Considerable variation still, blood glucose 400s then down to 70s.  Warrants ongoing close management    Vitals:    01/13/18 0006 01/13/18 0802 01/13/18 1314 01/13/18 1546   BP: 148/73 136/84 140/72 116/64   BP Location: Left arm Left arm Left arm Left arm   Pulse: 76 89  81   Resp: 16 16  16   Temp: 98  F (36.7  C) 97.9  F (36.6  C)  98.1  F (36.7  C)   TempSrc: Oral Oral  Oral   SpO2: 91% 92% 93% 93%   Weight:       Height:         Alert pleasant, some mild confusions with different parts of history but does get many the basic concepts..  Heart irregular though rate controlled  Lungs clear to auscultation  Abdomen in suprapubic area without tenderness to palpation.      insulin aspart  2-16 Units Subcutaneous TID AC     insulin aspart  2-12 Units Subcutaneous At Bedtime     metFORMIN  1,000 mg Oral BID w/meals     [START ON 1/14/2018] aspirin EC  325 mg Oral Daily     amLODIPine  10 mg Oral Daily     atorvastatin  20 mg Oral Daily     cephALEXin  500 mg Oral TID     hydrochlorothiazide  12.5 mg Oral Daily     tamsulosin  0.4 mg Oral QPM     multivitamin  1 tablet Oral Daily     insulin glargine  60 Units Subcutaneous Q24H     insulin aspart   Subcutaneous TID w/meals

## 2018-01-14 NOTE — PLAN OF CARE
Problem: Patient Care Overview  Goal: Plan of Care/Patient Progress Review  OT: Shower assessment completed today. Pt able to wash/dry 10/10 body parts with SBA with both sitting and standing. Pt required close SBA and cues to use grab bar when standing in shower. Pt completing ADL's including grooming/hygiene, toileting and FB dressing with close SBA for safety due to minimal difficulty with problem solving and slowed movements.

## 2018-01-14 NOTE — PLAN OF CARE
Problem: Patient Care Overview  Goal: Plan of Care/Patient Progress Review  Mildly increased processing time. re-tested reading comprehension with enlarged print- pt did much better- still increased time to locate info but was at 9/10 accuracy. Completed wrod finding task- iding category and then adding to the category-- pt at 5/5 accuracy for both. With a more complex taks of multiple definition words- pt could often id 1 definitioin but reuced fleixble thinking to determine a 2nd- pt at 8/12 accuracy. Recall of written jossy- pt at 6/6 with reading several times thorugh; with 2nd recall task- recall of and advertisement- pt at 3/7 with reaing several times through.

## 2018-01-14 NOTE — PLAN OF CARE
Problem: Patient Care Overview  Goal: Plan of Care/Patient Progress Review  FOCUS/GOAL  Bowel management, Bladder management and Medication management    ASSESSMENT, INTERVENTIONS AND CONTINUING PLAN FOR GOAL:  Pt alert and oriented, VSS. Pt calm and pleasant. Some word finding difficulty. Pt's BG before dinner was 75, Pt given orange juice and rechecked before dinner for 80 and only carb coverage given. Pt was low bg again at bedtime at 74. Pt given apple juice and crackers and was back up to 80 at recheck. Pt still retaining urine. Obtained 2 PVR's this shift of 141mL and 106 respectively. Pt also had med size bm on toilet.

## 2018-01-14 NOTE — PROGRESS NOTES
Diabetes Consult Daily  Progress Note          Assessment/Plan:                          Pete Sheldon is a 77 year old male with history of type 2 diabetes, hypertension, dyslipidemia transferred from [facility] for left thalamic stroke 0n (1/9/2018).    Glucose up to 400s midday yesterday (taken while eating), down to 70s presupper (after getting correction for prandial glucose), then down to 60s after supper.  In better range overnight and this morning (110s-160s).  Pt reports labile glucoses at baseline at home.     Plan  -Metformin 1000mg BID with bfast and supper.  - Glargine decreased from 72 to 60 units qAM this morning with metformin now on board.  - Novolog prandial insulin decreased from 1unit/4g to 1unit/5g CHO with bfast, 1unit/6g CHO with lunch, supper and snack.   - Novolog High correction scale- continue 2unit/30 for BG >140 ac, >200 HS-- but will decrease back to high intensity if BG trending down today.  -Monitor glucose before meals, HS and 0200        - will need to be evaluated by Diabetic educator for use of right hand ( his wife dials the pen to the correct dose, per patient) he tests his glucose and gives injection.            Plan discussed with pt, bedside RN and Dr. Dunne.                Interval History:   The last 24 hours progress and nursing notes reviewed.  Al reports feeling tired today.  Per Dr. Dunne he is in afib with RVR currently.  Glucoses have been quite labile.  Yesterday midday BG up to the 400s- but this was checked after he had consumed part of his meal.  He got correction for this prandial glucose.  Requested that 1500 BG be checked to make sure BG not dropping low-it was 183.  But by suppertime had dropped to 75.  He got some juice and then had supper and got 1unit/6g CHO of aspart.  BG then later dropped to the 60s. Al reports that glucoses are usually labile at home, fluctuating as much as 38-400s.  Metformin was restarted with  "lunch yesterday.  Overnight glucose 116 and now 160s before bfast.  Al reports that his activity level is increasing, noted that he has already taken a shower with OT this morning.    PTA:   Metformin 1000 mg twice a day, Lantus 32 units in morning and Humalog 2/2/2 with breakfast/lunch/dinner respectively. He does not use a sliding scale         Recent Labs  Lab 01/14/18  0743 01/14/18  0208 01/13/18  2200 01/13/18  2128 01/13/18  2057 01/13/18  1746  01/12/18  0707  01/11/18  0948  01/10/18  0926  01/08/18  1345  01/08/18  0720   GLC  --   --   --   --   --   --   --  426*  --  330*  --  106*  --  204*  --  188*   * 116* 80 68* 74 83  < >  --   < >  --   < >  --   < >  --   < >  --    < > = values in this interval not displayed.            Review of Systems:   See interval hx          Medications:       Active Diet Order      High Consistent CHO Diet     Physical Exam:  Gen: Alert, sitting up in chair NAD   HEENT:  mucous membranes are moist  Resp: Unlabored  Neuro:oriented x3, communicating clearly ( responses are slow)  /66 (BP Location: Left arm)  Pulse 110  Temp 97.2  F (36.2  C) (Oral)  Resp 15  Ht 1.702 m (5' 7\")  Wt 67.6 kg (149 lb)  SpO2 94%  BMI 23.34 kg/m2           Data:     Lab Results   Component Value Date    A1C 7.9 01/09/2018    A1C 7.6 11/29/2017    A1C 7.8 08/16/2017    A1C 8.3 05/12/2017    A1C 7.9 02/10/2017            Recent Labs   Lab Test  01/12/18   0707  01/11/18   0948   NA  132*  132*   POTASSIUM  4.1  4.0   CHLORIDE  97  99   CO2  25  24   ANIONGAP  9  9   GLC  426*  330*   BUN  23  25   CR  1.11  1.18   MANAV  9.0  9.1     CBC RESULTS:   Recent Labs   Lab Test  01/12/18   0707  01/11/18   0948   WBC   --   11.2*   RBC   --   4.66   HGB   --   14.5   HCT   --   44.7   MCV   --   96   MCH   --   31.1   MCHC   --   32.4   RDW   --   13.3   PLT  256  248       Ernestina Macias PA-C [phone]  Diabetes Management job code [id number]            "

## 2018-01-15 ENCOUNTER — CARE COORDINATION (OUTPATIENT)
Dept: CARE COORDINATION | Facility: CLINIC | Age: 78
End: 2018-01-15

## 2018-01-15 LAB
ANION GAP SERPL CALCULATED.3IONS-SCNC: 4 MMOL/L (ref 3–14)
BUN SERPL-MCNC: 27 MG/DL (ref 7–30)
CALCIUM SERPL-MCNC: 8.8 MG/DL (ref 8.5–10.1)
CHLORIDE SERPL-SCNC: 100 MMOL/L (ref 94–109)
CO2 SERPL-SCNC: 32 MMOL/L (ref 20–32)
CREAT SERPL-MCNC: 1.38 MG/DL (ref 0.66–1.25)
GFR SERPL CREATININE-BSD FRML MDRD: 50 ML/MIN/1.7M2
GLUCOSE BLDC GLUCOMTR-MCNC: 109 MG/DL (ref 70–99)
GLUCOSE BLDC GLUCOMTR-MCNC: 110 MG/DL (ref 70–99)
GLUCOSE BLDC GLUCOMTR-MCNC: 155 MG/DL (ref 70–99)
GLUCOSE BLDC GLUCOMTR-MCNC: 192 MG/DL (ref 70–99)
GLUCOSE BLDC GLUCOMTR-MCNC: 257 MG/DL (ref 70–99)
GLUCOSE BLDC GLUCOMTR-MCNC: 80 MG/DL (ref 70–99)
GLUCOSE SERPL-MCNC: 198 MG/DL (ref 70–99)
INTERPRETATION ECG - MUSE: NORMAL
POTASSIUM SERPL-SCNC: 4.7 MMOL/L (ref 3.4–5.3)
SODIUM SERPL-SCNC: 136 MMOL/L (ref 133–144)

## 2018-01-15 PROCEDURE — 80048 BASIC METABOLIC PNL TOTAL CA: CPT | Performed by: INTERNAL MEDICINE

## 2018-01-15 PROCEDURE — 25000128 H RX IP 250 OP 636: Performed by: INTERNAL MEDICINE

## 2018-01-15 PROCEDURE — 25000132 ZZH RX MED GY IP 250 OP 250 PS 637: Performed by: PHYSICAL MEDICINE & REHABILITATION

## 2018-01-15 PROCEDURE — 97530 THERAPEUTIC ACTIVITIES: CPT | Mod: GO

## 2018-01-15 PROCEDURE — 97116 GAIT TRAINING THERAPY: CPT | Mod: GP

## 2018-01-15 PROCEDURE — 99232 SBSQ HOSP IP/OBS MODERATE 35: CPT | Performed by: INTERNAL MEDICINE

## 2018-01-15 PROCEDURE — 97112 NEUROMUSCULAR REEDUCATION: CPT | Mod: GP

## 2018-01-15 PROCEDURE — 40000193 ZZH STATISTIC PT WARD VISIT

## 2018-01-15 PROCEDURE — 40000133 ZZH STATISTIC OT WARD VISIT

## 2018-01-15 PROCEDURE — 25000132 ZZH RX MED GY IP 250 OP 250 PS 637: Performed by: INTERNAL MEDICINE

## 2018-01-15 PROCEDURE — 36415 COLL VENOUS BLD VENIPUNCTURE: CPT | Performed by: INTERNAL MEDICINE

## 2018-01-15 PROCEDURE — 40000225 ZZH STATISTIC SLP WARD VISIT: Performed by: SPEECH-LANGUAGE PATHOLOGIST

## 2018-01-15 PROCEDURE — 97127 ZZHC SP THERAPEUTIC INTERVENTION W/FOCUS ON COGNITIVE FUNCTION: CPT | Mod: GN | Performed by: SPEECH-LANGUAGE PATHOLOGIST

## 2018-01-15 PROCEDURE — 97530 THERAPEUTIC ACTIVITIES: CPT | Mod: GP

## 2018-01-15 PROCEDURE — 00000146 ZZHCL STATISTIC GLUCOSE BY METER IP

## 2018-01-15 PROCEDURE — 12800006 ZZH R&B REHAB

## 2018-01-15 PROCEDURE — 97535 SELF CARE MNGMENT TRAINING: CPT | Mod: GO

## 2018-01-15 PROCEDURE — 97110 THERAPEUTIC EXERCISES: CPT | Mod: GP

## 2018-01-15 RX ORDER — SODIUM CHLORIDE, SODIUM LACTATE, POTASSIUM CHLORIDE, CALCIUM CHLORIDE 600; 310; 30; 20 MG/100ML; MG/100ML; MG/100ML; MG/100ML
INJECTION, SOLUTION INTRAVENOUS CONTINUOUS
Status: DISPENSED | OUTPATIENT
Start: 2018-01-15 | End: 2018-01-15

## 2018-01-15 RX ADMIN — INSULIN GLARGINE 60 UNITS: 100 INJECTION, SOLUTION SUBCUTANEOUS at 08:11

## 2018-01-15 RX ADMIN — AMLODIPINE BESYLATE 10 MG: 10 TABLET ORAL at 08:18

## 2018-01-15 RX ADMIN — SODIUM CHLORIDE, POTASSIUM CHLORIDE, SODIUM LACTATE AND CALCIUM CHLORIDE: 600; 310; 30; 20 INJECTION, SOLUTION INTRAVENOUS at 12:14

## 2018-01-15 RX ADMIN — I-VITE, TAB 1000-60-2MG (60/BT) 1 TABLET: TAB at 08:19

## 2018-01-15 RX ADMIN — ATORVASTATIN CALCIUM 20 MG: 20 TABLET, FILM COATED ORAL at 08:18

## 2018-01-15 RX ADMIN — ASPIRIN 325 MG: 325 TABLET, DELAYED RELEASE ORAL at 08:18

## 2018-01-15 RX ADMIN — CEPHALEXIN 500 MG: 500 CAPSULE ORAL at 08:18

## 2018-01-15 RX ADMIN — TAMSULOSIN HYDROCHLORIDE 0.4 MG: 0.4 CAPSULE ORAL at 21:32

## 2018-01-15 RX ADMIN — INSULIN ASPART 7 UNITS: 100 INJECTION, SOLUTION INTRAVENOUS; SUBCUTANEOUS at 18:54

## 2018-01-15 RX ADMIN — CEPHALEXIN 500 MG: 500 CAPSULE ORAL at 21:32

## 2018-01-15 RX ADMIN — CEPHALEXIN 500 MG: 500 CAPSULE ORAL at 15:38

## 2018-01-15 NOTE — PROGRESS NOTES
Patient was discharged to [facility] so they will handle post discharge follow up cares and coordination    Transition to Facility:              Facility Name: [facility] Acute Rehab Unit              Contact name and phone number/fax: [phone]

## 2018-01-15 NOTE — PROGRESS NOTES
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 Admission to acute inpatient rehab 1/12/18.      Endocrinology following for difficult to control DM. BG was initially in 300-400s on admission, and is now down into the 100s, as low as 80 with adjustments made.     Hospitalist started following patient over weekend for what appears to be a more transient episode of SOB and tachycardia - has since resolved. His EKG shows normal sinus rhythm w/ sinus arrhythmia and no ST-T changes. CXR clear. Continue to monitor.     He is ambulating without an AD in therapies, completing 12 stairs. Has some difficulty with dynamic balance. Having more issues with complex word finding, thought organization, alternating attention, mod to complex level reasoning and recent recall.           Interval history:   Patient seen and examined at bedside. Denies fever, chills, CP, SOB, N/V, abdominal pain, new pain or weakness/numbness/tingling.             Physical Exam:   VS:   Vitals:    01/14/18 1610 01/15/18 0600 01/15/18 0700 01/15/18 0900   BP: 125/60 136/68 140/82 127/65   BP Location: Left arm Left arm Left arm Left arm   Pulse: 71 88 93 91   Resp: 16 16     Temp: 96.9  F (36.1  C) 98.1  F (36.7  C)     TempSrc: Oral Oral     SpO2: 91% 93% 94%    Weight:       Height:         Gen: NAD, resting comfortably in bedside chair  Heart: RRR  Lungs: breathing unlabored on room air  Abd: soft and non-tender  Ext: no edema in BLE, no calf tenderness  MSK/neuro: Appears sleepy, mild difficulty with FFM on right         Data:   Scheduled meds    insulin aspart   Subcutaneous QAM AC     insulin aspart   Subcutaneous Daily with lunch     insulin aspart    Subcutaneous Daily with supper     insulin aspart  2-16 Units Subcutaneous TID AC     insulin aspart  2-12 Units Subcutaneous At Bedtime     aspirin EC  325 mg Oral Daily     amLODIPine  10 mg Oral Daily     atorvastatin  20 mg Oral Daily     cephALEXin  500 mg Oral TID     tamsulosin  0.4 mg Oral QPM     multivitamin  1 tablet Oral Daily     insulin glargine  60 Units Subcutaneous Q24H       PRN meds:  senna-docusate, bisacodyl, insulin aspart, glucose **OR** dextrose **OR** glucagon      Nirmala Price  Physical Medicine and Rehabilitation     I spent a total of 25 minutes face-to-face and managing the care of Pete Sehldon. Over 50% of my time on the unit was spent counseling the patient and coordinating care. Please see note for details.

## 2018-01-15 NOTE — PLAN OF CARE
Problem: Goal/Outcome  Goal: Goal Outcome Summary  Outcome: No Change  Pt has mild right side weakness, slow reaction time and occasional word finding difficulty re to stroke. Blood sugar has been stable, see result flow sheet for details.PIV line is clean, dry, and intact, he has RL running at 50 cc/ hr  NaCl 0.9 % is running at 250 cc/ hr at this time, it is continuous order. We need to keep monitoring blood sugar, iv line,  bladder status while assisting with activity of daily livings as needed.

## 2018-01-15 NOTE — PLAN OF CARE
Problem: Patient Care Overview  Goal: Plan of Care/Patient Progress Review  FOCUS/GOAL  Bladder management, Mobility and Cognition/Memory/Judgment/Problem solving    ASSESSMENT, INTERVENTIONS AND CONTINUING PLAN FOR GOAL:  Pt alert and oriented, VSS, co complaints of pain this shift. Pt IV bolus completed this shift. IV pole and tubing left in room. Pt had low blood sugars this shift. Pre meal BG was 50. Pt was asymptomatic. Flyer RN on floor approached for assistance and nurse and flyer went to retrieve PRN oral glucose but it was not avail in pyxis. Instead pt was given 2 orange juices with 2 sugar packets in one of them. Recheck 15 minutes later the pt's bg was 63. Pt then given apple juice, orange juice, and 2 packages of crackers with peanut butter and jelly on each and an apple sauce. Kitchen was called and meal tray was requested asap. Recheck bg 103 at 1732. No insulin given per medication orders. Pt's HS blood sugar was 304, pt covered according to scale less 1 unit because of previous hypoglycemia. PRN dose of oral glucose requested from pharm and placed in pt's med box. Pt still retaining urine. 2 PVR's obtained of 231 and 345.

## 2018-01-15 NOTE — PROGRESS NOTES
Clinic Care Coordination Contact  Care Coordination Transition Communication    Referral Source: IP/TCU Report    Clinical Data: Patient was hospitalized at [facility] from 1/9/18 to 1/12/18 with diagnosis of Falling down and right hand numbness.     Transition to Facility:              Facility Name: [facility] Acute Rehab Unit              Contact name and phone number/fax: [phone]    Plan: RN/SW Care Coordinator will await notification from facility staff informing RN/SW Care Coordinator of patient's discharge plans/needs. RN/SW Care Coordinator will review chart and outreach to facility staff every 4 weeks and as needed.     NEO Scott  Care Navigator  [facility]  [phone]

## 2018-01-15 NOTE — PLAN OF CARE
Problem: Patient Care Overview  Goal: Plan of Care/Patient Progress Review  Patient progressing well with PT; able to progress to amb without use of AD with close SBA. Completes 12 stairs with 2 railings and reciprocal pattern. Continues to present with dynamic balance deficits affecting indep with functional mobility.

## 2018-01-15 NOTE — PROGRESS NOTES
01/15/18 1713   Signing Clinician's Name / Credentials   Signing clinician's name / credentials Liana Hernandez Ms/CCC-SLP   Quick Adds   Rehab Discipline SLP   Additional Documentation   SLP Plan SLP: Continue with complex word finding, memory, divided attention and mod to complex level reasoning   Total Session Time   Total Session Time (minutes) 60 minutes   SLP - Acute Rehab Center Time   Individual Time (minutes) - enter zero if not applicable - SLP 60  (60 cognitive skills)   Group Time (minutes) - enter zero if not applicable  - SLP 0   Concurrent Time (minutes) - enter zero if not applicable  - SLP 0   Co-Treatment Time (minutes) - enter zero if not applicable  - SLP 0   ARC Total Session Time (minutes) - SLP 60   Memory (FIM)   Functional Performance Maximal prompting-recognizes and remembers 25-49% of the time   Memory Comment Completed Rbans test of cognition: pt scoring an index score of 61 on the immediate memory portion of the eval whic is a percentile rank of .5- very low score; with the language portion of the test- pt scored an index score of 57 which is a percentile rank of .2- very low score. Brennen test could not be given in it's entirety 2/2 pt could not write to complete the figure copy portion of the eval. With the individual eval sections pt scored the following: List learning- immediate - scaled score of 2, .4 percentile, Story memory immediate- scaled score of 5, 5th percentile. Visuospatial /Constructional knowledge- line orientation- percentile ranko f greater than 75. Language- picture naming- percentilerank of 26-50. Semantic fluency- percentile rank of .4. With delaye dmemory- list recall- percentile rank of less than 2 and list recognition- percentile group of 26-50. Performance overall is variable but both immediate memory and delayed memory are mod to severely impaired and also verbal fluency is impaired.     FIM Score 2- Maximal assistance: patient expends between 25 and 49%  of effort

## 2018-01-15 NOTE — PROGRESS NOTES
"[facility], [place]   Internal Medicine Daily Note           Interval History/Events     Overnight events reviewed  Reports doing well  Denies any chest pain, shortness of breath  No palpitation. No lightheadedness or dizziness  No fever, chills  Trying to eat well, appetite okay  No nausea, vomiting.        Review of Systems        4 point ROS including Respiratory, CV, GI and , other than that noted above is negative      Medications   I have reviewed current medications  in the \"current medication\" section of Epic.  Relevant changes include:     Physical Exam   General:       Vital signs:    Blood pressure 127/65, pulse 91, temperature 98.1  F (36.7  C), temperature source Oral, resp. rate 16, height 1.702 m (5' 7\"), weight 67.6 kg (149 lb), SpO2 94 %.  Estimated body mass index is 23.34 kg/(m^2) as calculated from the following:    Height as of this encounter: 1.702 m (5' 7\").    Weight as of this encounter: 67.6 kg (149 lb).      Intake/Output Summary (Last 24 hours) at 01/15/18 1009  Last data filed at 01/14/18 1240   Gross per 24 hour   Intake              480 ml   Output                0 ml   Net              480 ml      HEENT: NCAT,  no icterus/ pallor  Cardiovascular: S1 and S2 normal.    Respiratory: B/L CTA. Non laborious breathing.   GI: BS+ . Soft, NT.  Neurology:  Alert, awake, and communicating.   Extremities: Mild pre tibial edema     Laboratory and Imaging Studies     I have reviewed  laboratory and imaging studies in the Epic. Pertinent findings are as below:    BMP  Recent Labs  Lab 01/15/18  0603 01/14/18  1158 01/12/18  0707 01/11/18  0948    137 132* 132*   POTASSIUM 4.7 4.2 4.1 4.0   CHLORIDE 100 101 97 99   MANAV 8.8 8.8 9.0 9.1   CO2 32 29 25 24   BUN 27 32* 23 25   CR 1.38* 1.51* 1.11 1.18   * 148* 426* 330*     CBC  Recent Labs  Lab 01/14/18  1158 01/12/18  0707 01/11/18  0948 01/10/18  0926  01/08/18  1345   WBC 9.6  --  11.2* 8.7  --  11.1* "   RBC 4.57  --  4.66 4.65  --  5.07   HGB 14.2  --  14.5 14.3  --  15.6   HCT 42.6  --  44.7 43.4  --  46.8   MCV 93  --  96 93  --  92   MCH 31.1  --  31.1 30.8  --  30.8   MCHC 33.3  --  32.4 32.9  --  33.3   RDW 12.9  --  13.3 13.0  --  12.6    256 248 292  < > 313   < > = values in this interval not displayed.  INR  Recent Labs  Lab 01/10/18  0926 01/08/18  1345   INR 1.07 1.05     LFTs  Recent Labs  Lab 01/10/18  0926   ALKPHOS 83   AST 23   ALT 35   BILITOTAL 0.6   PROTTOTAL 7.9   ALBUMIN 3.1*      PANCNo lab results found in last 7 days.        Impression/Plan        77 year old year old male with hx of DM II, HTN, Renal Calculi is admitted to  ARU on 01/12 for rehabilitation for stroke     # Shortness of breath, Tachycardia:  Differential broad. Symptoms already resolving without intervention. Could just have been one time episode secondary to activity. EKG showed normal sinus rhythm with sinus arrhythmia with no ST-T wave changes. He denies any chest pain, cough, palpitation, lightheadedness or dizziness.   XR chest on 01/14 with no acute opacities. He was found to be Orthostatic. Resolved with IV fluid support  - Orthostatic vital daily  - Continue to monitor    # Acute kidney injury: Most likely pre renal disease due to hypovolemia (poor po intake, HCTZ use). Other differential could be ATN. Found to be orthostatic on 01/14. Creatinine improved with IV fluids.  Creatinine 1.38<<1.51  - Will give LR IV support 500 ml today slowly. 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            Jason Farmer  Internal Medicine/Hospitalist  [facility]  [phone]      Pt's care was discussed with bedside RN, patient and  during Care Team Rounds.               Jason Farmer MD  Hospitalist ( Internal medicine)  Pager: [phone]

## 2018-01-15 NOTE — PROGRESS NOTES
Diabetes Consult Daily  Progress Note          Assessment/Plan:                          Pete Sheldon is a 77 year old male with history of type 2 diabetes, hypertension, dyslipidemia transferred from [facility] for left thalamic stroke 0n (1/9/2018).    Glucoses improved midday yesterday with starting metformin, then dropped low in the evening possibly related to increased daytime activity on ARU.  Metformin now discontinued due to impaired renal function.    Plan  -Metformin 1000mg BID with bfast and supper<--discontinue for now- will restart at lower dose if creatinine continues to improve.  - Glargine 60 units qAM - likely will need to adjust with changes in metformin dosing.  - Novolog prandial insulin 1unit/5g CHO with bfast, decreased to 1unit/7g CHO with lunch, 1unit/6g CHO with supper and snack.   - Novolog correction: decreased to high intensity ac and hs starting at supper today.  -Monitor glucose before meals, HS and 0200        - will need to be evaluated by Diabetic educator for use of right hand ( his wife dials the pen to the correct dose, per patient) he tests his glucose and gives injection.            Plan discussed with pt, bedside RN.   ADDNEDUM:Glucose at 1500 down to 80- request pt drink a juice (anticipate bg will otherwise keep dropping given pattern of past couple days), meal aspart decreased to 1unit/10g CHO with lunch, supper and snack starting with supper tonight.  Will request that evening RN hold correction aspart at supper (will be post prandial given juice now).             Interval History:   The last 24 hours progress and nursing notes reviewed.  Al was tired when I visited this morning- he had just finished a session with PT.  He reports that activity level is increasing every day, which is likely contributing to the dips in his glucose in the afternoon.  Yesterday creatinine was checked midday and was up to 1.5, so metformin was discontinued.  " He got IVF and creatinine is a little improved today (1.38).  Per medicine team, if continues to improve tomorrow could consider restarting low dose metformin.  Al told me that he often limits his fluid intake to prevent incontinence of urine.  Discussed with bedside RN- they will try using brief with side tabs to make it easier for him to use his urinal and we all encouraged him to drink more fluids.  The dehydration likely contributed to the big spikes in glucose the past few days.  He is also still having drops in glucose- mainly in the late afternoon/evening likely related to increased activity level.  Low yesterday evening was over-corrected (meal aspart skipped at supper) resulting his hyperglycemia to the 300s at HS.  Al has a big appetite and is finishing all of his meals-- he is on high consistent carb diet and carb totals per meal ~110g CHO.    PTA:   Metformin 1000 mg twice a day, Lantus 32 units in morning and Humalog 2/2/2 with breakfast/lunch/dinner respectively. He does not use a sliding scale         Recent Labs  Lab 01/15/18  1142 01/15/18  0745 01/15/18  0603 01/15/18  0209 01/14/18  2113 01/14/18  1743 01/14/18  1731  01/14/18  1158  01/12/18  0707  01/11/18  0948  01/10/18  0926   GLC  --   --  198*  --   --   --   --   --  148*  --  426*  --  330*  --  106*   * 192*  --  257* 304* 105* 103*  < >  --   < >  --   < >  --   < >  --    < > = values in this interval not displayed.            Review of Systems:   See interval hx          Medications:       Active Diet Order      High Consistent CHO Diet     Physical Exam:  Gen: Alert, resting in bed, NAD   HEENT:  mucous membranes are moist  Resp: Unlabored  Neuro:oriented x3, communicating clearly ( responses are slow)  /65 (BP Location: Left arm)  Pulse 91  Temp 98.1  F (36.7  C) (Oral)  Resp 16  Ht 1.702 m (5' 7\")  Wt 67.6 kg (149 lb)  SpO2 94%  BMI 23.34 kg/m2           Data:     Lab Results   Component Value Date    A1C 7.9 " 01/09/2018    A1C 7.6 11/29/2017    A1C 7.8 08/16/2017    A1C 8.3 05/12/2017    A1C 7.9 02/10/2017            Recent Labs   Lab Test  01/15/18   0603  01/14/18   1158   NA  136  137   POTASSIUM  4.7  4.2   CHLORIDE  100  101   CO2  32  29   ANIONGAP  4  7   GLC  198*  148*   BUN  27  32*   CR  1.38*  1.51*   MANAV  8.8  8.8     CBC RESULTS:   Recent Labs   Lab Test  01/14/18   1158   WBC  9.6   RBC  4.57   HGB  14.2   HCT  42.6   MCV  93   MCH  31.1   MCHC  33.3   RDW  12.9   PLT  280                              I spent a total of 35 minutes bedside and on the inpatient unit managing the glycemic care of Pete Sheldon. Over 50% of my time on the unit was spent counseling the patient  and/or coordinating care regarding glucose management with labile glucoses.  See note for details.    Ernestina Macias PA-C [phone]  Diabetes Management job code [id number]

## 2018-01-15 NOTE — PROGRESS NOTES
01/14/18 0815   Signing Clinician's Name / Credentials   Signing clinician's name / credentials Beryl Wilhelm OTR/L   Quick Adds   Rehab Discipline OT   ADL Training   Minutes of Treatment 60   Symptoms Noted During/After Treatment fatigue   Treatment Energy conservation/work simplification;Environment structured   Treatment Detail OT: Shower assessment and morning ADL routine completed (see FIM). Pt fatigued during ADL's this morning requiring frequent rest breaks throughout.    Patient Response Able to follow techniques as instructed   Additional Documentation   Rehab Comments Monitor BP and HR   OT Plan OT: Sensory re-ed and RUE strengthening, assess more of functional cognition with memory, problem solving, sequencing with functional/familiar tasks, MOCA    OT - Acute Rehab Center Time   Individual Time (minutes) - enter zero if not applicable - OT 60  (60 ADL)   Group Time (minutes) - enter zero if not applicable  - OT 0   Concurrent Time (minutes) - enter zero if not applicable  - OT 0   Co-Treatment Time (minutes) - enter zero if not applicable  - OT 0   ARC Total Session Time (minutes) - OT 60   Bladder (FIM)   Bladder Comment OT: Pt voided on toilet. Pt also incontinent in brief   Oral Hygiene   Complete independence for oral hygiene tasks no   Describe performance OT: SBA standing at sink with increased time due to pt difficulty with manipulating items due to RUE coordination deficits   Environmental assistance for oral hygiene tasks Set-up assistance prior to the activity   Grooming (except oral cares) (FIM)   Grooming Task Performed Hair grooming   Grooming Comment OT: Pt able to comb through hair standing at sink with SBA   Upper Body Dressing (FIM)   Completely independent with Upper Body Dressing no   Describe performance OT: Set-up to don shirt overhead   Clothing Utilized Shirt   Lower Body Dressing (FIM) (Pants/Undergarments)   Complete independence with Lower Body Dressing   Pants/Undergarments no   Describe performance OT: SBA to thread BLE then SBA to pull pants over hips   Clothing Utilized Pants   Physical assistance to don/doff clothing below the waist, excluding shoes Steadying assist for donning/doffing pants/undergarments   Lower Body Dressing putting on/taking off footwear (FIM)   Complete independence with Lower Body Dressing Footwear no   Describe performance OT: SBA to don socks seated on chair    Shower/Bathe self (FIM)   Completely independent with Shower/Bath no   Describe performance OT: SBA for pt to wash/dry 10/10 body parts. Pt required SBA for safety during standing and cues to use grab bar to steady self during standing   Tub / Shower Transfer (FIM)   Assistive Devices Grab bar   Tub/Shower Transfer Comment OT: CGA for wet shower transfer   Toilet Hygiene (FIM)   Complete independence Toileting Task no   Describe performance OT: SBA with use of grab bar for safety during hygiene   Toilet Transfer (FIM)   Complete independence with getting on and off a toilet or commode no   Describe performance OT: SBA with use of grab bar   Sit to Stand   Assistance Needed Supervision   Comment OT: STS with use of FWW with SBA   Walk 150 Feet   Comment OT: Pt walked to shower room with CGA with cues for safety as pt had difficulty manuevering around objects on R side of hallway

## 2018-01-15 NOTE — PROGRESS NOTES
01/14/18 1526   Signing Clinician's Name / Credentials   Signing clinician's name / credentials Liana Hernandez MS/CCC-SLP   Quick Adds   Rehab Discipline SLP   Additional Documentation   SLP Plan SLP: continue with goals for complex word finding, thought organziation, alternating attention, mod to complex level reasoning and recent recall. Pt benefits from enlarged print 2/2 vision- had assessed reading comprehension further with enlarged print and WFL   Total Session Time   Total Session Time (minutes) 30 minutes   ARC or TCU Only   What unit is patient on? Acute Rehab   SLP - Acute Rehab Center Time   Individual Time (minutes) - enter zero if not applicable - SLP 30  (30 cognitive skills)   Group Time (minutes) - enter zero if not applicable  - SLP 0   Concurrent Time (minutes) - enter zero if not applicable  - SLP 0   Co-Treatment Time (minutes) - enter zero if not applicable  - SLP 0   ARC Total Session Time (minutes) - SLP 30   Comprehension (FIM)   Functional Performance Requires repetition (see comments);Mild difficulty comprehending complex/abstract ideas   FIM Score 5- Supervision or Setup   Expression (FIM)   Functional Performance Standby prompting-expresses basic ideas more than 90% of the time   Expression Comment Completed a complex word finding task that also targeted reasoning/flexible thinking- pt scoring 8/12 on multiple definitiion words- pt having difficulty flexing his thinking to id a 2nd definiition   FIM Score 5- Supervision or Setup   Problem Solving (FIM)   Functional Performance Moderate direction-needs help to solve routine problems 26-50% of the time   Problem Solving Comment Completed a complex word finding task that also targeted reasoning/flexible thinking- pt scoring 8/12 on multiple definitiion words- pt having difficulty flexing his thinking to id a 2nd definiition   FIM Score 3- Moderate assistance: patient expends between 50 and 74% of effort   Memory (FIM)   Functional  Performance Moderate prompting-recognizes and remembers 50-74% of the time   Memory Comment Pt able to recall 5/5 on paragraph level memory task but with recall of 4 words that he heard read 1x through and then answering a question that relates to 1 or more of the words - pt at 11/16 accuracy.    FIM Score 3- Moderate assistance: patient expends between 50 and 74% of effort

## 2018-01-15 NOTE — PROGRESS NOTES
01/13/18 0006   Signing Clinician's Name / Credentials   Signing clinician's name / credentials Lacy Cruz DPT   Quick Adds   Rehab Discipline PT   Therapeutic Activity   Treatment Detail PT: Initial evaluation complete, treatment initiated. Pt    Additional Documentation   PT Plan PT:  object, gait, dynamic balance,    ARC or TCU Only   What unit is patient on? Acute Rehab   PT - Acute Rehab Center Time   Individual Time (minutes) - enter zero if not applicable - PT 45  (Eval 18; GT 12; TA 15)   Group Time (minutes) - enter zero if not applicable  - PT 0   Concurrent Time (minutes) - enter zero if not applicable  - PT 0   Co-Treatment Time (minutes) - enter zero if not applicable  - PT 0   ARC Total Session Time (minutes) - PT 45   Comprehension (FIM)   Functional Performance Requires extra time   Problem Solving (FIM)   Functional Performance Needs increased time to make decisions and solve complex problems   Lower Body Dressing putting on/taking off footwear (FIM)   Describe performance PT: Max A x 1 to germán shoes while sititn gEOB. Pt able to safely flex at trunk to assist w/ getiting heel into shoe   Chair/bed-to-chair Transfer (FIM)   Environmental assistance for getting from chair-bed-to-chair Supervision for safety concerns   Describe performance PT: SBA w/ use of FWW, good advancement of BLE noted   Roll Left and Right   Comment PT: INcreased time and use of bed rail   Lying to Sitting on Side of Bed   Physical Assistance Level No physical assistance   Comment PT: SBA w/ HOB flat, use of bed rail noted    Sit to Stand   Assistance Needed Supervision   Physical Assistance Level Contact guard assist   Comment PT: STS several times during session w/ use of FWW, CGA > SBA. Pt requires cues for proper hand placement as pt tries to pull up w/ BUE on FWW, poor carryover noted   Locomotion (FIM)   Locomotion Comment PT: Pt engaged in functional giat training 200-250' x 2 w/ FWW, CGA > SBA, cues for  "improved heel toe pattern, upright posture, improved REGINALD and step length as pt demo fwd flexion and shuffling gait pattern. No overt LOB. Difficulty w/ avoiding objects on R side of hallway, tneds to veer to the R    Walk 10 Feet   Comment PT: Pt engaged in functional giat training 200-250' x 2 w/ FWW, CGA > SBA, cues for improved heel toe pattern, upright posture, improved REGINALD and step length as pt demo fwd flexion and shuffling gait pattern. No overt LOB. Difficulty w/ avoiding objects on R side of hallway, tneds to veer to the R    Walk 50 Feet with Two Turns   Comment PT: W/ FWW and CGA when turning, stays inside FWW, no LOB   Walk 150 Feet   Comment PT: Pt engaged in functional giat training 200-250' x 2 w/ FWW, CGA > SBA, cues for improved heel toe pattern, upright posture, improved REGINALD and step length as pt demo fwd flexion and shuffling gait pattern. No overt LOB. Difficulty w/ avoiding objects on R side of hallway, tneds to veer to the R    Walking 10 Feet on Uneven Surfaces   Comment PT: CGA w/ FWW no LOB    Wheel 50 Feet with Two Turns   Reason if not Attempted Activity not applicable   Wheel 150 Feet   Reason if not Attempted Activity not applicable   Stairs (FIM)   Stairs Comment PT: Up/down 12 6\" steps, reciprocal pattern, use of BHR, CGA, no LOB when turning at top of stairs. Good ft placement noted   1 Step (Curb)   Assistance Needed Supervision   Physical Assistance Level Contact guard assist   Comment PT: Visual demo provided. CGA w/ FWW, no LOB   4 Steps   Comment PT: Up/down 12 6\" steps, reciprocal pattern, use of BHR, CGA, no LOB when turning at top of stairs. Good ft placement noted   12 Steps   Comment PT: Up/down 12 6\" steps, reciprocal pattern, use of BHR, CGA, no LOB when turning at top of stairs. Good ft placement noted   Car Transfer   Assistance Needed Adaptive equipment   Physical Assistance Level Contact guard assist   Comment PT: Visual demo; CGA w/ FWW, fair eccentric control when " sitting, BUE pull up on walker to successfullly stand . NO overrt LOB

## 2018-01-16 ENCOUNTER — APPOINTMENT (OUTPATIENT)
Dept: CT IMAGING | Facility: CLINIC | Age: 78
DRG: 057 | End: 2018-01-16
Attending: INTERNAL MEDICINE
Payer: COMMERCIAL

## 2018-01-16 LAB
ANION GAP SERPL CALCULATED.3IONS-SCNC: 5 MMOL/L (ref 3–14)
BUN SERPL-MCNC: 25 MG/DL (ref 7–30)
CALCIUM SERPL-MCNC: 8.6 MG/DL (ref 8.5–10.1)
CHLORIDE SERPL-SCNC: 102 MMOL/L (ref 94–109)
CO2 SERPL-SCNC: 31 MMOL/L (ref 20–32)
CREAT SERPL-MCNC: 1.14 MG/DL (ref 0.66–1.25)
GFR SERPL CREATININE-BSD FRML MDRD: 62 ML/MIN/1.7M2
GLUCOSE BLDC GLUCOMTR-MCNC: 116 MG/DL (ref 70–99)
GLUCOSE BLDC GLUCOMTR-MCNC: 125 MG/DL (ref 70–99)
GLUCOSE BLDC GLUCOMTR-MCNC: 175 MG/DL (ref 70–99)
GLUCOSE BLDC GLUCOMTR-MCNC: 215 MG/DL (ref 70–99)
GLUCOSE BLDC GLUCOMTR-MCNC: 79 MG/DL (ref 70–99)
GLUCOSE SERPL-MCNC: 115 MG/DL (ref 70–99)
POTASSIUM SERPL-SCNC: 4 MMOL/L (ref 3.4–5.3)
SODIUM SERPL-SCNC: 138 MMOL/L (ref 133–144)

## 2018-01-16 PROCEDURE — 97750 PHYSICAL PERFORMANCE TEST: CPT | Mod: GP | Performed by: PHYSICAL THERAPIST

## 2018-01-16 PROCEDURE — 40000225 ZZH STATISTIC SLP WARD VISIT

## 2018-01-16 PROCEDURE — 97530 THERAPEUTIC ACTIVITIES: CPT | Mod: GP | Performed by: PHYSICAL THERAPIST

## 2018-01-16 PROCEDURE — 40000193 ZZH STATISTIC PT WARD VISIT: Performed by: PHYSICAL THERAPIST

## 2018-01-16 PROCEDURE — 97127 ZZHC SP THERAPEUTIC INTERVENTION W/FOCUS ON COGNITIVE FUNCTION: CPT | Mod: GN | Performed by: SPEECH-LANGUAGE PATHOLOGIST

## 2018-01-16 PROCEDURE — 97530 THERAPEUTIC ACTIVITIES: CPT | Mod: GO

## 2018-01-16 PROCEDURE — 97116 GAIT TRAINING THERAPY: CPT | Mod: GP | Performed by: PHYSICAL THERAPIST

## 2018-01-16 PROCEDURE — 00000146 ZZHCL STATISTIC GLUCOSE BY METER IP

## 2018-01-16 PROCEDURE — 72125 CT NECK SPINE W/O DYE: CPT

## 2018-01-16 PROCEDURE — 25000132 ZZH RX MED GY IP 250 OP 250 PS 637: Performed by: NURSE PRACTITIONER

## 2018-01-16 PROCEDURE — 97110 THERAPEUTIC EXERCISES: CPT | Mod: GP | Performed by: PHYSICAL THERAPIST

## 2018-01-16 PROCEDURE — 12800006 ZZH R&B REHAB

## 2018-01-16 PROCEDURE — 40000225 ZZH STATISTIC SLP WARD VISIT: Performed by: SPEECH-LANGUAGE PATHOLOGIST

## 2018-01-16 PROCEDURE — 36415 COLL VENOUS BLD VENIPUNCTURE: CPT | Performed by: INTERNAL MEDICINE

## 2018-01-16 PROCEDURE — 99232 SBSQ HOSP IP/OBS MODERATE 35: CPT | Performed by: INTERNAL MEDICINE

## 2018-01-16 PROCEDURE — 25000132 ZZH RX MED GY IP 250 OP 250 PS 637: Performed by: INTERNAL MEDICINE

## 2018-01-16 PROCEDURE — 97127 ZZHC SP THERAPEUTIC INTERVENTION W/FOCUS ON COGNITIVE FUNCTION: CPT | Mod: GN

## 2018-01-16 PROCEDURE — 25000132 ZZH RX MED GY IP 250 OP 250 PS 637: Performed by: PHYSICAL MEDICINE & REHABILITATION

## 2018-01-16 PROCEDURE — 70450 CT HEAD/BRAIN W/O DYE: CPT

## 2018-01-16 PROCEDURE — 80048 BASIC METABOLIC PNL TOTAL CA: CPT | Performed by: INTERNAL MEDICINE

## 2018-01-16 PROCEDURE — 97535 SELF CARE MNGMENT TRAINING: CPT | Mod: GO

## 2018-01-16 PROCEDURE — 40000133 ZZH STATISTIC OT WARD VISIT

## 2018-01-16 RX ORDER — ACETAMINOPHEN 325 MG/1
650 TABLET ORAL EVERY 4 HOURS PRN
Status: DISCONTINUED | OUTPATIENT
Start: 2018-01-16 | End: 2018-01-16

## 2018-01-16 RX ORDER — ACETAMINOPHEN 325 MG/1
650-975 TABLET ORAL EVERY 4 HOURS PRN
Status: DISCONTINUED | OUTPATIENT
Start: 2018-01-16 | End: 2018-01-27 | Stop reason: HOSPADM

## 2018-01-16 RX ADMIN — TAMSULOSIN HYDROCHLORIDE 0.4 MG: 0.4 CAPSULE ORAL at 21:30

## 2018-01-16 RX ADMIN — AMLODIPINE BESYLATE 10 MG: 10 TABLET ORAL at 08:26

## 2018-01-16 RX ADMIN — DEXTROSE 15 G: 15 GEL ORAL at 02:23

## 2018-01-16 RX ADMIN — I-VITE, TAB 1000-60-2MG (60/BT) 1 TABLET: TAB at 08:28

## 2018-01-16 RX ADMIN — CEPHALEXIN 500 MG: 500 CAPSULE ORAL at 21:30

## 2018-01-16 RX ADMIN — INSULIN ASPART 6 UNITS: 100 INJECTION, SOLUTION INTRAVENOUS; SUBCUTANEOUS at 17:56

## 2018-01-16 RX ADMIN — CEPHALEXIN 500 MG: 500 CAPSULE ORAL at 08:26

## 2018-01-16 RX ADMIN — ASPIRIN 325 MG: 325 TABLET, DELAYED RELEASE ORAL at 08:26

## 2018-01-16 RX ADMIN — CEPHALEXIN 500 MG: 500 CAPSULE ORAL at 14:22

## 2018-01-16 RX ADMIN — ACETAMINOPHEN 650 MG: 325 TABLET, FILM COATED ORAL at 06:42

## 2018-01-16 RX ADMIN — ATORVASTATIN CALCIUM 20 MG: 20 TABLET, FILM COATED ORAL at 08:26

## 2018-01-16 RX ADMIN — ACETAMINOPHEN 650 MG: 325 TABLET, FILM COATED ORAL at 11:25

## 2018-01-16 NOTE — PLAN OF CARE
Problem: Patient Care Overview  Goal: Plan of Care/Patient Progress Review  Worked on complex word finding- multiple meaning words- 12/14- improved today; Compelted task with pt stating the ending to a story- needing increased cueing to retreive words for novel possibilities. Completed a mod level written reasoing- needing increased time to process written info and to completle- standby assist with verbal cues to place info on grid correctly. Pt had not slept last night 2/2 pain in the back his neck and headache--nursing aware. Pt having more difficulty with ememory recall this a.m- 2/5 on paragraph level info recall

## 2018-01-16 NOTE — TELEPHONE ENCOUNTER
Second refill request from [facility]. Pt currently admitted to rehab following a stroke. Fax sent back stating this will be addressed following discharge home.

## 2018-01-16 NOTE — PLAN OF CARE
Problem: Patient Care Overview  Goal: Plan of Care/Patient Progress Review  Scored 39/56 on Ann, hope to progress gait to SEC.

## 2018-01-16 NOTE — PROGRESS NOTES
Postural Assessment Scale for Stroke    Maintaining a posture  1. Sitting without support: 3  2. Standing with support: 3  3. Standing without support: 2  4. Standing on nonparetic le  5. Standing on paretic le    Changing posture  6. Supine to affected side lateral: 3  7. Supine to nonaffected side lateral: 3  8. Supine to sitting up on the edge of the table: 3  9. Sitting on the edge of the table to supine: 3  10. Sitting to standing up: 2  11. Standing up to sitting down: 3  12. Standing, picking up a pencil from the floor: 2    Total score:   29/36

## 2018-01-16 NOTE — PLAN OF CARE
Problem: Individualization  Goal: Patient Individualization  Outcome: Improving  FOCUS/GOAL  Bladder management and Medical management    ASSESSMENT, INTERVENTIONS AND CONTINUING PLAN FOR GOAL:  Alert and oriented, but forgetful. Needs assist of one with gait belt and walker for transfers. Incontinent of bladder x4, also using urinal after incontinence episode, PVR ranges from 190-224 cc. Pt c/o neck pain, warm pack applied with effective results. Left had PIV patent, IVF infusing at 50 cc/hr. Blood glucose 79 @ 0216, pt declined apple juice and beni crackers, got upset and stated that staff was keeping him awake most of the night. Pt agreed to take glucose gel, blood glucose 111 @ 0238. Fall precautions in place at all times.

## 2018-01-16 NOTE — PROGRESS NOTES
"[facility], [place]   Internal Medicine Daily Note           Interval History/Events     Overnight events reviewed  Overall reports doing well  Reports some occipital headache and back of neck pain.   Unclear timing of onset  Pt says its new  No visual complaint  Denies any new focal neuro s/s  No cp or sob  No fever, chills  No nausea, vomiting.          Review of Systems        4 point ROS including Respiratory, CV, GI and , other than that noted above is negative      Medications   I have reviewed current medications  in the \"current medication\" section of Epic.  Relevant changes include:     Physical Exam         Vital signs:    Blood pressure 137/71, pulse 73, temperature 96.9  F (36.1  C), temperature source Oral, resp. rate 16, height 1.702 m (5' 7\"), weight 67.6 kg (149 lb), SpO2 91 %.  Estimated body mass index is 23.34 kg/(m^2) as calculated from the following:    Height as of this encounter: 1.702 m (5' 7\").    Weight as of this encounter: 67.6 kg (149 lb).        Intake/Output Summary (Last 24 hours) at 01/16/18 1506  Last data filed at 01/16/18 1151   Gross per 24 hour   Intake              480 ml   Output              700 ml   Net             -220 ml     Gen: NAD  HEENT: NCAT,  no icterus/ pallor EOMI. JULIO CESAR  Cardiovascular: S1 and S2 normal.    Respiratory: B/L CTA. Non laborious breathing.   GI: BS+ . Soft, NT.  Neurology:  Alert, awake, and communicating. No new focal s/s     Laboratory and Imaging Studies     I have reviewed  laboratory and imaging studies in the Epic. Pertinent findings are as below:    BMP    Recent Labs  Lab 01/16/18  0622 01/15/18  0603 01/14/18  1158 01/12/18  0707    136 137 132*   POTASSIUM 4.0 4.7 4.2 4.1   CHLORIDE 102 100 101 97   MANAV 8.6 8.8 8.8 9.0   CO2 31 32 29 25   BUN 25 27 32* 23   CR 1.14 1.38* 1.51* 1.11   * 198* 148* 426*     CBC    Recent Labs  Lab 01/14/18  1158 01/12/18  0707 01/11/18  0948 01/10/18  0926   WBC 9.6  " --  11.2* 8.7   RBC 4.57  --  4.66 4.65   HGB 14.2  --  14.5 14.3   HCT 42.6  --  44.7 43.4   MCV 93  --  96 93   MCH 31.1  --  31.1 30.8   MCHC 33.3  --  32.4 32.9   RDW 12.9  --  13.3 13.0    256 248 292     INR    Recent Labs  Lab 01/10/18  0926   INR 1.07     LFTs    Recent Labs  Lab 01/10/18  0926   ALKPHOS 83   AST 23   ALT 35   BILITOTAL 0.6   PROTTOTAL 7.9   ALBUMIN 3.1*      PANCNo lab results found in last 7 days.        Impression/Plan        77 year old year old male with hx of DM II, HTN, Renal Calculi is admitted to  [facility] on 01/12 for rehabilitation for stroke     # Shortness of breath, Tachycardia:  Differential broad. Symptoms already resolving without intervention. Could just have been one time episode secondary to activity. EKG showed normal sinus rhythm with sinus arrhythmia with no ST-T wave changes. He denies any chest pain, cough, palpitation, lightheadedness or dizziness.   XR chest on 01/14 with no acute opacities. He was found to be Orthostatic. Resolved with IV fluid support  - Orthostatic vital daily  - Continue to monitor    1/16/2018  Better.   Denies any cp or sob.     # Acute kidney injury: Most likely pre renal disease due to hypovolemia (poor po intake, HCTZ use). Other differential could be ATN. Found to be orthostatic on 01/14. Creatinine improved with IV fluids.  Creatinine 1.38<<1.51 s/p LR bolus 01/15  - Encourage patient to increase PO intake  - Avoid Nephrotoxins, Renal dose medicatios  - Strict I and O    1/16/2018  Improving.      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 1/16/2018  Etiology unclear.   CT head, neck  done, reviewed. No acute abnormality.   Ct to monitor.   jkeep well hydrated  Control BP     Thank you for your consult.   ricky RN, PMR          Richard Quiñonez MD   Hospitalist (Internal Medicine)  [facility] [employer]  Pager: [phone].

## 2018-01-16 NOTE — PLAN OF CARE
Problem: Goal/Outcome  Goal: Goal Outcome Summary  FOCUS/GOAL  Bowel management, Bladder management, Medication management and Pain management    ASSESSMENT, INTERVENTIONS AND CONTINUING PLAN FOR GOAL:  Pt is A&Ox4, able to make needs known but is forgetful. Occassionally incontinent of bladder (contained in pad) otherwise continent using bathroom. Bladder scans remain high but did not require ISC, pt able to void every couple of hours. Had a BM tonight. Tolerating high consistent carb diet with thin liquids, able to take pills whole. Lactated ringers liter bag still running per left hand PIV, passed on to next shift. Transfers with assist of one, walker and gait belt.  before dinner and 155 at hs (insulin adjusted today). C/o neck pain but refused medications, tolerated with prn ice. Bed alarm armed for safety.

## 2018-01-16 NOTE — PROGRESS NOTES
01/16/18 1536   Signing Clinician's Name / Credentials   Signing clinician's name / credentials Farzaneh Weiner, PT   Sanchez Balance Scale (SANCHEZ K, TASIA S, BRYANT GREEN, TULIO HENNESSY: MEASURING BALANCE IN THE ELDERLY: VALIDATION OF AN INSTRUMENT. CAN. J. PUB. HEALTH, JULY/AUGUST SUPPLEMENT 2:S7-11, 1992.)   Sit To Stand 3   Standing Unsupported 4   Sitting Unsupported 4   Stand to Sit 4   Transfers 3   Standing with Eyes Closed 3   Standing Unsupported, Feet Together 3   Reach Forward With Outstretched Arm 3   Retrieve Object From Floor 3   Turning to Look Behind 2   Turn 360 Degrees 1   Placing Alternate Foot on Stool (4-6 inches) 1   Unsupported Tandem Stand (Demonstrate to Subject) 2   One Leg Stand 1   Total Score (A score of 45 or less has been correlated with an increased risk of falls)   Total Score (out of 56) 37     Sanchez Balance Scale (BBS) Cutoff Scores for CVA Population:  Patient Score: 37/56    The BBS is a measure of static and dynamic standing balance that has been validated in community dwelling elderly individuals and individuals who have Parkinson's Disease, MS, and those who are s/p CVA and TBI. The test is administered without an assistive device. Scores from the Sanchez are used to determine the probability of falling based on the patient's previous history of falls and their test performance.     0-20 High risk for falling- Corresponded with w/c bound status  21-40 Medium risk for falling- Able to walk with assistance  41-56 Low risk for falling- Able to walk independently  According to [facility].  Available at http://www.strokecenter.org/.  Accessibility verified April 10, 2013.  Minimal Detectable Change = 6.5 according to Robin & Seney 2008    Assessment (rationale for performing, application to patient s function & care plan): Continue w/ staff assist in room, call light within reach  Minutes billed as physical performance test: 15

## 2018-01-16 NOTE — PROGRESS NOTES
Diabetes Consult Daily  Progress Note          Assessment/Plan:    Pete Sheldon is a 77 year old male with history of type 2 diabetes, hypertension, dyslipidemia transferred from [facility] for left thalamic stroke 0n (1/9/2018).                     Plan  -Metformin 1000mg BID with bfast and supper<--discontinue for now- will restart at lower dose if creatinine continues to improve  -  Decrease Glargine  from 60 units to 50 units every morning.  - Novolog prandial insulin   Breakfast: 1 unit per 5 grams CHO  Lunch: 1 unit per 10 grams of CHO  Dinner: 1 unit per 10 grams of CHO  Snack.: 1 unit per 10 grams of CHO   - Novolog correction: decreased to high intensity ac   -Monitor glucose before meals, HS and 0200         Plan for Discharge: will determine fixed meal dosing     Plan discussed with patient and primary team           Interval History:   The last 24 hours progress and nursing notes reviewed.  Blood sugars to 79 at ~ 0200 nd fasting 115 ( decreased basal insuiln by 20%)  Blood sugarbefore lunch, 215 ( will monitor)  Appetite is reported as good, complaining of headache today back of head, denies nausea and or vomiting ( notified )      Recent Labs  Lab 01/16/18  0746 01/16/18  0622 01/16/18  0216 01/15/18  2107 01/15/18  1657 01/15/18  1456 01/15/18  1142  01/15/18  0603  01/14/18  1158  01/12/18  0707  01/11/18  0948  01/10/18  0926   GLC  --  115*  --   --   --   --   --   --  198*  --  148*  --  426*  --  330*  --  106*   *  --  79 155* 109* 80 110*  < >  --   < >  --   < >  --   < >  --   < >  --    < > = values in this interval not displayed.            Review of Systems:   See interval hx          Medications:       Active Diet Order      High Consistent CHO Diet     Physical Exam:  Gen: Alert, resting in bed, in NAD   HEENT: NC/AT, mucous membranes are moist  Resp: Unlabored  Neuro:oriented x3, communicating clearly  /71 (BP Location: Left  "arm)  Pulse 73  Temp 96.9  F (36.1  C) (Oral)  Resp 16  Ht 1.702 m (5' 7\")  Wt 67.6 kg (149 lb)  SpO2 91%  BMI 23.34 kg/m2           Data:     Lab Results   Component Value Date    A1C 7.9 01/09/2018    A1C 7.6 11/29/2017    A1C 7.8 08/16/2017    A1C 8.3 05/12/2017    A1C 7.9 02/10/2017              CBC RESULTS:   Recent Labs   Lab Test  01/14/18   1158   WBC  9.6   RBC  4.57   HGB  14.2   HCT  42.6   MCV  93   MCH  31.1   MCHC  33.3   RDW  12.9   PLT  280     Recent Labs   Lab Test  01/16/18   0622  01/15/18   0603   NA  138  136   POTASSIUM  4.0  4.7   CHLORIDE  102  100   CO2  31  32   ANIONGAP  5  4   GLC  115*  198*   BUN  25  27   CR  1.14  1.38*   MANAV  8.6  8.8     Liver Function Studies -   Recent Labs   Lab Test  01/10/18   0926   PROTTOTAL  7.9   ALBUMIN  3.1*   BILITOTAL  0.6   ALKPHOS  83   AST  23   ALT  35     Lab Results   Component Value Date    INR 1.07 01/10/2018    INR 1.05 01/08/2018    INR 1.03 12/16/2008    INR 1.03 12/12/2008             Dipti Espinoza -[phone]  Diabetes Management job code [id number]            "

## 2018-01-16 NOTE — PLAN OF CARE
Problem: Goal/Outcome  Goal: Goal Outcome Summary  Pt has mild right side weakness, slow reaction time and occasional word finding difficulty re to stroke. He was having pain in the back of his head and neck, CT is ordered for that. His blood sugar has been stable, see result flow sheet for details.PIV line is clean, dry, and intact, the RL was discontinued this morning. See I and O flow sheet re bladder scan. We need to keep monitoring headache and neck ache, CT result, blood sugar and iv line while assisting with activity of daily livings as needed.

## 2018-01-16 NOTE — PROGRESS NOTES
"CLINICAL NUTRITION SERVICES - ASSESSMENT NOTE     Nutrition Prescription    RECOMMENDATIONS FOR MDs/PROVIDERS TO ORDER:  -Consider OP diabetes education referral with a DM educator/RD if patient/wife interested in further education after discharge.      Malnutrition Status:    -Pt does not meet 2 criteria for dx malnutrition.      Recommendations already ordered by Registered Dietitian (RD):  -Discussed plan for diet education with pt and RN.      Future/Additional Recommendations:  -Provide diet education with pt and wife before discharge.     REASON FOR ASSESSMENT  Pete Sheldon is a/an 77 year old male assessed by the dietitian for pt/family request consult:  \"Please provide carb diet information to follow at discharge with wife Radha and pt. [phone].\"    NUTRITION HISTORY  Per chart review, pt was assessed by the hospital dietitian for Admission Nutrition Risk Screen for new/uncontrolled diabetes.  Per RD note (1/11), \"pt describes intake PTA as good as he eats three meals/day with a nightly snack. He reports eating his full plate. His typical intake is as follows:                        B: Oatmeal and an orange                        L: Hot dish with bread (largest meal of the day)                        D: Hot dish                        S: Bagel or sandwich  Pt states he typically does not use sugar-free products at home.  Pt was diagnosed with T2DM in 1995 and reports receiving diabetes diet education at the time of diagnosis but not since. Noted RD note in 12/2016 with an outpatient diabetes diet education referral.\"    Nutrition Education (1/11):  1.  Provided verbal instruction on identifying carbohydrates in the diet with emphasis on CHO counting.  2.  Provided the following handouts: Carbohydrate Counting and label reading.  3.  Made goals to replacing CHO sides (rice/bread) with non-starchy veg and limiting sweets to fewer times/week    Pt was on a Low Consistent CHO diet and po intakes were " "100% of recorded meals.      CURRENT NUTRITION ORDERS  Diet: High Consistent Carbohydrate (0045-0566 kcals per day)  Room Service with Assist    Intake/Tolerance: PO intakes have been 100% of recorded meals.  Per review of selections in [facility], pt is ordering well for tid meals.  Per Endocrine note, pt has a big appetite and finishing all of meals (carb totals per meal ~110 g CHO).  Per Endocrine, pt reported \"he often limits his fluid intake to prevent incontinence of urine.\"  Required IVF due to elevated Cr--nursing encouraging fluids.  Per pt his appetite is good.    LABS  Labs reviewed  (1/15)  BG   (1/9/18)  Hgb A1C:  7.9% (H)    MEDICATIONS  Medications reviewed  Novolog 1 Unit per 10 g CHO for lunch, dinner and snacks  Novolog 1 Unit per 5 g CHO at breakfast  Novolog Sliding Scale (High Resistance Dosing)  Lantus  MVI  **Per Endocrine note, may restart low dose Metformin if Cr continues to improve.  Plan for fixed insulin dosing at discharge.      ANTHROPOMETRICS  Height: 170.2 cm (5' 7\")  Most Recent Weight: 67.6 kg (149 lb)-bed scale  IBW: 142 lbs  BMI: Normal BMI  Weight History:   Per chart review, weight has been trending down.  Per previous RD assessment, pt lost 7 lbs or 4% x 7 months and 4 lbs or 2.5% in last month.  Hospital admission weight (1/9) 155 lbs 9.6 oz.  ?  Accuracy of ARU admission weight (bed scale) which is not consistent with last hospital weight taken prior to ARU transfer.    Wt Readings from Last 10 Encounters:   01/12/18 67.6 kg (149 lb)   01/11/18 69.9 kg (154 lb 3.2 oz)   01/08/18 71.7 kg (158 lb 1.1 oz)   11/29/17 71.7 kg (158 lb)   08/24/17 71 kg (156 lb 8 oz)   08/23/17 70.4 kg (155 lb 4.8 oz)   06/13/17 73 kg (161 lb)   05/24/17 73.9 kg (163 lb)   05/19/17 73 kg (161 lb)   03/16/17 71.2 kg (157 lb)       Dosing Weight: 70 kg (as at hospital-may need to revise pending weight trends)    ASSESSED NUTRITION NEEDS  Estimated Energy Needs: 8681-0196 kcals/day (25 - 30 " kcals/kg)  Justification: Maintenance  Estimated Protein Needs: 70-84 grams protein/day (1 - 1.2 grams of pro/kg)  Justification: Maintenance  Estimated Fluid Needs:  (1 mL/kcal)   Justification: Per provider pending fluid status    PHYSICAL FINDINGS  See malnutrition section below.  Cognitive deficits     MALNUTRITION  % Intake: No decreased intake noted  % Weight Loss: Up to 5% in 1 month (non-severe)  Subcutaneous Fat Loss: None observed  Muscle Loss: None observed  Fluid Accumulation/Edema: None noted  Malnutrition Diagnosis: Patient does not meet two of the above criteria necessary for diagnosing malnutrition    NUTRITION DIAGNOSIS  Food and nutrition-related knowledge deficit related to limited recent diet education/retention as evidenced by chart review and nutrition consult for diet education before discharge.        INTERVENTIONS  Implementation  Nutrition Education:  Per discussion with RN today, pt will need diet review with wife present due to cognitive deficits.  Pt's wife does not drive and plan for her to come in Friday for PLC teaching, etc.  Will plan to follow up with pt and wife after PLC appointment.  RN to let wife know of plan.      Goals  Pt and wife will be able to identify CHO containing foods and plan Consistent CHO meals.       Monitoring/Evaluation  Progress toward goals will be monitored and evaluated per protocol.    Alise Newman RD, LD

## 2018-01-17 LAB
ANION GAP SERPL CALCULATED.3IONS-SCNC: 4 MMOL/L (ref 3–14)
BUN SERPL-MCNC: 22 MG/DL (ref 7–30)
CALCIUM SERPL-MCNC: 8.1 MG/DL (ref 8.5–10.1)
CHLORIDE SERPL-SCNC: 103 MMOL/L (ref 94–109)
CO2 SERPL-SCNC: 32 MMOL/L (ref 20–32)
CREAT SERPL-MCNC: 1.2 MG/DL (ref 0.66–1.25)
GFR SERPL CREATININE-BSD FRML MDRD: 58 ML/MIN/1.7M2
GLUCOSE BLDC GLUCOMTR-MCNC: 104 MG/DL (ref 70–99)
GLUCOSE BLDC GLUCOMTR-MCNC: 111 MG/DL (ref 70–99)
GLUCOSE BLDC GLUCOMTR-MCNC: 139 MG/DL (ref 70–99)
GLUCOSE BLDC GLUCOMTR-MCNC: 67 MG/DL (ref 70–99)
GLUCOSE BLDC GLUCOMTR-MCNC: 72 MG/DL (ref 70–99)
GLUCOSE BLDC GLUCOMTR-MCNC: 95 MG/DL (ref 70–99)
GLUCOSE SERPL-MCNC: 95 MG/DL (ref 70–99)
POTASSIUM SERPL-SCNC: 4.1 MMOL/L (ref 3.4–5.3)
SODIUM SERPL-SCNC: 139 MMOL/L (ref 133–144)

## 2018-01-17 PROCEDURE — 97116 GAIT TRAINING THERAPY: CPT | Mod: GP | Performed by: PHYSICAL THERAPIST

## 2018-01-17 PROCEDURE — 97110 THERAPEUTIC EXERCISES: CPT | Mod: GP | Performed by: PHYSICAL THERAPIST

## 2018-01-17 PROCEDURE — 36415 COLL VENOUS BLD VENIPUNCTURE: CPT | Performed by: INTERNAL MEDICINE

## 2018-01-17 PROCEDURE — 97535 SELF CARE MNGMENT TRAINING: CPT | Mod: GO

## 2018-01-17 PROCEDURE — 25000131 ZZH RX MED GY IP 250 OP 636 PS 637: Performed by: PHYSICIAN ASSISTANT

## 2018-01-17 PROCEDURE — 25000132 ZZH RX MED GY IP 250 OP 250 PS 637: Performed by: PHYSICAL MEDICINE & REHABILITATION

## 2018-01-17 PROCEDURE — 12800006 ZZH R&B REHAB

## 2018-01-17 PROCEDURE — 40000133 ZZH STATISTIC OT WARD VISIT

## 2018-01-17 PROCEDURE — 97112 NEUROMUSCULAR REEDUCATION: CPT | Mod: GP | Performed by: PHYSICAL THERAPIST

## 2018-01-17 PROCEDURE — 80048 BASIC METABOLIC PNL TOTAL CA: CPT | Performed by: INTERNAL MEDICINE

## 2018-01-17 PROCEDURE — 40000193 ZZH STATISTIC PT WARD VISIT: Performed by: PHYSICAL THERAPIST

## 2018-01-17 PROCEDURE — 99232 SBSQ HOSP IP/OBS MODERATE 35: CPT | Performed by: INTERNAL MEDICINE

## 2018-01-17 PROCEDURE — 25000132 ZZH RX MED GY IP 250 OP 250 PS 637: Performed by: INTERNAL MEDICINE

## 2018-01-17 PROCEDURE — 40000225 ZZH STATISTIC SLP WARD VISIT: Performed by: SPEECH-LANGUAGE PATHOLOGIST

## 2018-01-17 PROCEDURE — 97127 ZZHC SP THERAPEUTIC INTERVENTION W/FOCUS ON COGNITIVE FUNCTION: CPT | Mod: GN | Performed by: SPEECH-LANGUAGE PATHOLOGIST

## 2018-01-17 PROCEDURE — 00000146 ZZHCL STATISTIC GLUCOSE BY METER IP

## 2018-01-17 PROCEDURE — 92507 TX SP LANG VOICE COMM INDIV: CPT | Mod: GN | Performed by: SPEECH-LANGUAGE PATHOLOGIST

## 2018-01-17 RX ADMIN — INSULIN ASPART 10 UNITS: 100 INJECTION, SOLUTION INTRAVENOUS; SUBCUTANEOUS at 17:26

## 2018-01-17 RX ADMIN — MENTHOL 1 PATCH: 205.5 PATCH TOPICAL at 09:02

## 2018-01-17 RX ADMIN — ACETAMINOPHEN 975 MG: 325 TABLET, FILM COATED ORAL at 20:04

## 2018-01-17 RX ADMIN — ACETAMINOPHEN 975 MG: 325 TABLET, FILM COATED ORAL at 08:12

## 2018-01-17 RX ADMIN — TAMSULOSIN HYDROCHLORIDE 0.4 MG: 0.4 CAPSULE ORAL at 20:04

## 2018-01-17 RX ADMIN — ATORVASTATIN CALCIUM 20 MG: 20 TABLET, FILM COATED ORAL at 08:01

## 2018-01-17 RX ADMIN — AMLODIPINE BESYLATE 10 MG: 10 TABLET ORAL at 08:02

## 2018-01-17 RX ADMIN — ASPIRIN 325 MG: 325 TABLET, DELAYED RELEASE ORAL at 08:01

## 2018-01-17 RX ADMIN — I-VITE, TAB 1000-60-2MG (60/BT) 1 TABLET: TAB at 08:01

## 2018-01-17 NOTE — PLAN OF CARE
Problem: Patient Care Overview  Goal: Plan of Care/Patient Progress Review  Outcome: Therapy, progress toward functional goals as expected  SLP: pt continues to have visual deficits with reading, but improved with slightly enlarged type, but needing assist to keep on line.  Moderate assist with flexible attention and written reasoning tasks needed.  Pt also noted to have difficulty with written spelling, but he thinks this may be near baseline.

## 2018-01-17 NOTE — PLAN OF CARE
Problem: Goal/Outcome  Goal: Goal Outcome Summary  Patient is alert and oriented. CGA for transfers and ambulation with gait belt and walker. Up to the toilet, continent of bladder, but also was incontinent once. pvrs in the 300 's  CG for perineal cares. Piv left arm is intact. Appetite is excellent.Offers no care concerns at this time.

## 2018-01-17 NOTE — PROGRESS NOTES
"  [facility]   Acute Rehabilitation Unit  Daily progress note    interval history  Pete Sheldon was seen and examined at bedside. Reported neck pain located at posterior aspect R>>L; no similar pain in the past. No radiation to bilateral upper extremities; no new weakness or other neurological symptoms. He also had a headache this morning; most likely cervicogenic. Spoke with hospitalist team who recommended to obtain head/neck CT.     PVRs remain elevated; not requiring SIC. Continue bladder program.    PT: Scored 39/56 on Ann, hope to progress gait to SEC.         medications  Scheduled meds    insulin glargine  50 Units Subcutaneous Q24H     menthol   Transdermal Q8H     insulin aspart  1-10 Units Subcutaneous TID AC     insulin aspart  1-7 Units Subcutaneous At Bedtime     insulin aspart   Subcutaneous QAM AC     insulin aspart   Subcutaneous Daily with lunch     insulin aspart   Subcutaneous Daily with supper     aspirin EC  325 mg Oral Daily     amLODIPine  10 mg Oral Daily     atorvastatin  20 mg Oral Daily     cephALEXin  500 mg Oral TID     tamsulosin  0.4 mg Oral QPM     multivitamin  1 tablet Oral Daily       PRN meds:  acetaminophen, menthol **AND** menthol **AND** menthol, senna-docusate, bisacodyl, insulin aspart, glucose **OR** dextrose **OR** glucagon      physical exam  /76 (BP Location: Right arm)  Pulse 81  Temp 99.1  F (37.3  C) (Oral)  Resp 16  Ht 1.702 m (5' 7\")  Wt 67.6 kg (149 lb)  SpO2 94%  BMI 23.34 kg/m2  Gen: NAD, sitting on bed   HEENT: tenderness to palpation at right > left posterior neck.   Cardio: RRR  Pulm: clear breath sounds b/l   Abd: soft and non-tender   Ext: wwp, no edema in bilateral lower extremities, no tenderness at calves   Neuro/MSK: alert and oriented, CN grossly intact, EOMI, JOJO, mild right hemiparesis, sensation intact to LT    labs  Reviewed     assessment and plan    Pete Sheldon is a 77-year-old male who " had an ischemic left thalamic stroke on 1/9 resulting in right hemiparesis, decreased balance, decreased visual acuity, and decreased independence with mobility and ADLs. Admission to acute inpatient rehab 1/12/18.     Rehabilitation - continue comprehensive acute inpatient rehabilitation program with multidisciplinary approach including therapies, rehab nursing, and physiatry following. See interval history for updates.       Medical Conditions  # Left thalamic ischemic stroke: deficits as above.   --continue , HTN, HLD and DM control for secondary stroke prevention  --completed stroke class/education on [place]     # DM II: HgbA1c 7.9 1/9/18  Endo team following; appreciate assistance.   01/16/18   -Metformin 1000mg BID with bfast and supper<--discontinue for now- will restart at lower dose if creatinine continues to improve  -  Decrease Glargine  from 60 units to 50 units every morning.  - Novolog prandial insulin   Breakfast: 1 unit per 5 grams CHO  Lunch: 1 unit per 10 grams of CHO  Dinner: 1 unit per 10 grams of CHO  Snack.: 1 unit per 10 grams of CHO   - Novolog correction: decreased to high intensity ac   -Monitor glucose before meals, HS and 0200    # UTI: > 100k Aerococcus urinae on 1/8. continue keflex, complete 7 day course 01/16/18.       # Acute transient SOB and tachycardia: one episode on 1/14. Hospitalist team was consulted. His EKG shows normal sinus rhythm w/ sinus arrhythmia and no ST-T changes. CXR clear. + Orthostasis. Improved with IVF. Continue to monitor.     # Acute kidney injury: on 1/14. Most likely pre renal disease due to hypovolemia (poor po intake, HCTZ use). Other differential could be ATN. + orthostatic on 01/14. Creatinine improved with IV fluids.  --repeat BMP in am   --avoid Nephrotoxins, Renal dose medicatios  --strict I and O    # HTN: on amlodipine 10/day and HCTZ upon admission to ARU. HCTZ was held on 1/15 due to low BP and orthostasis.    # HLD: LDL 74 1/10; on  lipitor.     # Headache and neck pain: started 1/15 and has been persistent since then. Improved with tylenol. 01/16/18 head and neck CT per hospitalist team recs; no acute pathologies. Most likely MSK related due to weakness and poor posture. Started icy hot patches to help with pain.       1. FEN: regular diet; high consistent CHO.   2. Bowel: on prn bowel meds. Continent.   3. Bladder: elevated PVRs since admission to ARU. UTI as above. On flomax.   4. DVT Prophylaxis: mechanical   5. GI Prophylaxis: none; oral intake   6. Code: full   7. Disposition: home   8. ELOS:  7-10 days.          Jerri Hernandez MD  Physical Medicine & Rehabilitation    I spent a total of 40 minutes face-to-face or managing the care of Pete ANURAG Sheldon. Over 50% of my time on the unit was spent counseling the patient and coordinating care. See note for details.

## 2018-01-17 NOTE — PROGRESS NOTES
Diabetes Consult Daily  Progress Note          Assessment/Plan:    Pete Sheldon is a 77 year old male with history of type 2 diabetes, hypertension, dyslipidemia transferred from [facility] for left thalamic stroke 0n (1/9/2018).                     Plan  -Metformin 1000mg BID with bfast and supper<--discontinue for now- will restart at lower dose if creatinine continues to improve  - Decrease lantus 50 units to 40  every morning.( starting 1/18)  - Novolog prandial insulin   Breakfast: 1 unit per 5 grams CHO  Lunch: 1 unit per 10 grams of CHO  Dinner: 1 unit per 10 grams of CHO  D/c Snack.: 1 unit per 10 grams of CHO   - Novolog correction: high correction before meals and HS ( 1 unit per 20 > 140 before meals and > 200 HS)  -Monitor glucose before meals, HS and 0200          Plan for Discharge: will determine fixed meal dosing                        Plan discussed with patient and bedside RN           Interval History:   The last 24 hours progress and nursing notes reviewed.  glucose at   Blood sugars dropped overnight, 67 at ~ 0200. Was given treatment with improvement in glucose.  Fasting glucose 95/139  Received higher lantus dose this am ( 50 units), will d/c snack insulin coverage and notified nursing to provide a snack tonight before HS ( likes peanut butter sandwiches).  Is working with therapies      Recent Labs  Lab 01/17/18  0759 01/17/18  0703 01/17/18  0231 01/17/18  0213 01/16/18  2135 01/16/18  1651 01/16/18  1149  01/16/18  0622  01/15/18  0603  01/14/18  1158  01/12/18  0707  01/11/18  0948   GLC  --  95  --   --   --   --   --   --  115*  --  198*  --  148*  --  426*  --  330*   *  --  95 67* 175* 125* 215*  < >  --   < >  --   < >  --   < >  --   < >  --    < > = values in this interval not displayed.            Review of Systems:   See interval hx          Medications:       Active Diet Order      High Consistent CHO Diet     Physical Exam:  Gen:  "Alert, sitting in chair,NAD   HEENT: NC/AT, mucous membranes are moist  Resp: Unlabored  Ext: moving all extremities   Neuro:oriented person ( place and time not assessed) communicating clearly  /74 (BP Location: Right arm)  Pulse 79  Temp 97.5  F (36.4  C) (Oral)  Resp 16  Ht 1.702 m (5' 7\")  Wt 67.6 kg (149 lb)  SpO2 93%  BMI 23.34 kg/m2           Data:     Lab Results   Component Value Date    A1C 7.9 01/09/2018    A1C 7.6 11/29/2017    A1C 7.8 08/16/2017    A1C 8.3 05/12/2017    A1C 7.9 02/10/2017              CBC RESULTS:   Recent Labs   Lab Test  01/14/18   1158   WBC  9.6   RBC  4.57   HGB  14.2   HCT  42.6   MCV  93   MCH  31.1   MCHC  33.3   RDW  12.9   PLT  280     Recent Labs   Lab Test  01/17/18   0703  01/16/18   0622   NA  139  138   POTASSIUM  4.1  4.0   CHLORIDE  103  102   CO2  32  31   ANIONGAP  4  5   GLC  95  115*   BUN  22  25   CR  1.20  1.14   MANAV  8.1*  8.6     Liver Function Studies -   Recent Labs   Lab Test  01/10/18   0926   PROTTOTAL  7.9   ALBUMIN  3.1*   BILITOTAL  0.6   ALKPHOS  83   AST  23   ALT  35     Lab Results   Component Value Date    INR 1.07 01/10/2018    INR 1.05 01/08/2018    INR 1.03 12/16/2008    INR 1.03 12/12/2008       Dipti Espinoza [phone]  Diabetes Management job code [id number]            "

## 2018-01-17 NOTE — PLAN OF CARE
Problem: Goal/Outcome  Goal: Goal Outcome Summary  Outcome: No Change  Pt has mild right side weakness, slow reaction time and occasional word finding difficulty re to stroke. He has some pain in the back of his head and neck. Tylenol 975 mg was administered along with applicaion of icy hot patch to affected area. The patch needs to be removed at about 1700 this evening. PIV line in the left forearm  is clean, dry, and intact. NP from endocrinology recommended HS snacks but without insulin coverage in relation to the snack, please refer to her note from this morning. Now pt is on a toileting schedule every 2 hrs, see I and O flow sheet re this. We need to keep monitoring headache and neck ache, blood sugar and iv line while assisting with activity of daily livings as needed.

## 2018-01-17 NOTE — PROGRESS NOTES
"[facility], [place]   Internal Medicine Daily Note           Interval History/Events     Overnight events reviewed  Overall reports doing well  No headache or focal neuro s/s today.    No cp or sob  No fever, chills  No nausea, vomiting.          Review of Systems        4 point ROS including Respiratory, CV, GI and , other than that noted above is negative      Medications   I have reviewed current medications  in the \"current medication\" section of Epic.  Relevant changes include:     Physical Exam         Vital signs:    Blood pressure 125/58, pulse 75, temperature 97.5  F (36.4  C), temperature source Oral, resp. rate 16, height 1.702 m (5' 7\"), weight 67.6 kg (149 lb), SpO2 93 %.  Estimated body mass index is 23.34 kg/(m^2) as calculated from the following:    Height as of this encounter: 1.702 m (5' 7\").    Weight as of this encounter: 67.6 kg (149 lb).        Gen: NAD  HEENT: NCAT, Moist Mm  Cardiovascular: RRR    Respiratory: B/L CTA. Non laborious breathing.   GI: BS+ . Soft, NT.  Neurology:  Alert, awake, and communicating. No new focal s/s     Laboratory and Imaging Studies     I have reviewed  laboratory and imaging studies in the Epic. Pertinent findings are as below:    BMP    Recent Labs  Lab 01/17/18  0703 01/16/18  0622 01/15/18  0603 01/14/18  1158    138 136 137   POTASSIUM 4.1 4.0 4.7 4.2   CHLORIDE 103 102 100 101   MANAV 8.1* 8.6 8.8 8.8   CO2 32 31 32 29   BUN 22 25 27 32*   CR 1.20 1.14 1.38* 1.51*   GLC 95 115* 198* 148*     CBC    Recent Labs  Lab 01/14/18  1158 01/12/18  0707 01/11/18  0948   WBC 9.6  --  11.2*   RBC 4.57  --  4.66   HGB 14.2  --  14.5   HCT 42.6  --  44.7   MCV 93  --  96   MCH 31.1  --  31.1   MCHC 33.3  --  32.4   RDW 12.9  --  13.3    256 248     INR  No lab results found in last 7 days.  LFTs  No lab results found in last 7 days.   PANCNo lab results found in last 7 days.        Impression/Plan        77 year old year old male " with hx of DM II, HTN, Renal Calculi is admitted to  ARU on 01/12 for rehabilitation for stroke     # Tachycardia, SOB: resolved.   EKG showed normal sinus rhythm with sinus arrhythmia with no ST-T wave changes. He denies any chest pain, cough, palpitation, lightheadedness or dizziness.   XR chest on 01/14 with no acute opacities.   He was found to be Orthostatic. Resolved with IV fluid support  - monitor.   -keep hydrated.     # Acute kidney injury: Most likely pre renal disease due to hypovolemia (poor po intake, HCTZ use). Other differential could be ATN. Found to be orthostatic on 01/14. Creatinine improved with IV fluids.    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 1/16/2018  Resolved.   Etiology unclear.   CT head, neck done, reviewed. No acute abnormality.   Ct to monitor.   jkeep well hydrated  Control BP     Thank you for your consult.          Richard Quiñonez MD   Hospitalist (Internal Medicine)  [facility]  Pager: [phone].

## 2018-01-17 NOTE — PLAN OF CARE
Problem: Goal/Outcome  Goal: Goal Outcome Summary  FOCUS/GOAL  Bowel management, Bladder management, Pain management, Medical management, Mobility, Cognition/Memory/Judgment/Problem solving and Safety management    ASSESSMENT, INTERVENTIONS AND CONTINUING PLAN FOR GOAL:  Pt is alert and oriented, but forgetful. Primarily incontinent of bladder this shift, required total linen change x 1, multiple brief changes. PVRs continue to be elevated. No BM. Denied pain. Blood glucose at 0200 was 67, pt had juice and crackers; recheck glucose was 95. Pt encouraged to have an HS snack every night. Left PIV is patent and intact. Pt up with assist of 1. Bed alarm on for safety. Inconsistent with call light use. Will continue with POC.

## 2018-01-17 NOTE — PLAN OF CARE
Problem: Goal/Outcome  Goal: Goal Outcome Summary  PT: Pt participates well with therapy.  Pt needing extra time to scan to R to find way in hallways, cga with amb without AD.  Cont toward goals.

## 2018-01-18 LAB
GLUCOSE BLDC GLUCOMTR-MCNC: 103 MG/DL (ref 70–99)
GLUCOSE BLDC GLUCOMTR-MCNC: 140 MG/DL (ref 70–99)
GLUCOSE BLDC GLUCOMTR-MCNC: 151 MG/DL (ref 70–99)
GLUCOSE BLDC GLUCOMTR-MCNC: 64 MG/DL (ref 70–99)
GLUCOSE BLDC GLUCOMTR-MCNC: 74 MG/DL (ref 70–99)
GLUCOSE BLDC GLUCOMTR-MCNC: 86 MG/DL (ref 70–99)
GLUCOSE BLDC GLUCOMTR-MCNC: 92 MG/DL (ref 70–99)
GLUCOSE BLDC GLUCOMTR-MCNC: 92 MG/DL (ref 70–99)

## 2018-01-18 PROCEDURE — 97530 THERAPEUTIC ACTIVITIES: CPT | Mod: GP

## 2018-01-18 PROCEDURE — 00000146 ZZHCL STATISTIC GLUCOSE BY METER IP

## 2018-01-18 PROCEDURE — 97530 THERAPEUTIC ACTIVITIES: CPT | Mod: GO | Performed by: OCCUPATIONAL THERAPIST

## 2018-01-18 PROCEDURE — 99232 SBSQ HOSP IP/OBS MODERATE 35: CPT | Performed by: INTERNAL MEDICINE

## 2018-01-18 PROCEDURE — 97116 GAIT TRAINING THERAPY: CPT | Mod: GP

## 2018-01-18 PROCEDURE — 40000193 ZZH STATISTIC PT WARD VISIT

## 2018-01-18 PROCEDURE — 40000133 ZZH STATISTIC OT WARD VISIT: Performed by: OCCUPATIONAL THERAPIST

## 2018-01-18 PROCEDURE — 97150 GROUP THERAPEUTIC PROCEDURES: CPT | Mod: GO | Performed by: OCCUPATIONAL THERAPIST

## 2018-01-18 PROCEDURE — 12800006 ZZH R&B REHAB

## 2018-01-18 PROCEDURE — 25000132 ZZH RX MED GY IP 250 OP 250 PS 637: Performed by: NURSE PRACTITIONER

## 2018-01-18 PROCEDURE — 25000132 ZZH RX MED GY IP 250 OP 250 PS 637: Performed by: PHYSICAL MEDICINE & REHABILITATION

## 2018-01-18 PROCEDURE — 40000225 ZZH STATISTIC SLP WARD VISIT: Performed by: SPEECH-LANGUAGE PATHOLOGIST

## 2018-01-18 PROCEDURE — 97127 ZZHC SP THERAPEUTIC INTERVENTION W/FOCUS ON COGNITIVE FUNCTION: CPT | Mod: GN | Performed by: SPEECH-LANGUAGE PATHOLOGIST

## 2018-01-18 PROCEDURE — 97535 SELF CARE MNGMENT TRAINING: CPT | Mod: GO | Performed by: OCCUPATIONAL THERAPIST

## 2018-01-18 PROCEDURE — 25000132 ZZH RX MED GY IP 250 OP 250 PS 637: Performed by: INTERNAL MEDICINE

## 2018-01-18 PROCEDURE — 25000131 ZZH RX MED GY IP 250 OP 636 PS 637: Performed by: NURSE PRACTITIONER

## 2018-01-18 RX ADMIN — I-VITE, TAB 1000-60-2MG (60/BT) 1 TABLET: TAB at 08:15

## 2018-01-18 RX ADMIN — TAMSULOSIN HYDROCHLORIDE 0.4 MG: 0.4 CAPSULE ORAL at 19:15

## 2018-01-18 RX ADMIN — ACETAMINOPHEN 975 MG: 325 TABLET, FILM COATED ORAL at 19:15

## 2018-01-18 RX ADMIN — DEXTROSE 15 G: 15 GEL ORAL at 03:11

## 2018-01-18 RX ADMIN — AMLODIPINE BESYLATE 10 MG: 10 TABLET ORAL at 08:16

## 2018-01-18 RX ADMIN — ASPIRIN 325 MG: 325 TABLET, DELAYED RELEASE ORAL at 08:15

## 2018-01-18 RX ADMIN — INSULIN GLARGINE 35 UNITS: 100 INJECTION, SOLUTION SUBCUTANEOUS at 08:48

## 2018-01-18 RX ADMIN — ATORVASTATIN CALCIUM 20 MG: 20 TABLET, FILM COATED ORAL at 08:16

## 2018-01-18 RX ADMIN — INSULIN ASPART 10 UNITS: 100 INJECTION, SOLUTION INTRAVENOUS; SUBCUTANEOUS at 17:23

## 2018-01-18 NOTE — PLAN OF CARE
Problem: Goal/Outcome  Goal: Goal Outcome Summary  FOCUS/GOAL  Bowel management, Bladder management, Medication management, Pain management, Medical management, Mobility, Cognition/Memory/Judgment/Problem solving and Safety management    ASSESSMENT, INTERVENTIONS AND CONTINUING PLAN FOR GOAL:  Pt is alert and oriented. Condom catheter intact and draining well overnight. No BM this shift. Medications whole with water. Pt up with assist of 1. Pt's blood glucose at 0230 was 64 see flow sheet for additional results. Pt needs to have an HS snack every night to aid in preventing hypoglycemic episodes. Pt denies pain overnight. Will continue with POC.

## 2018-01-18 NOTE — PLAN OF CARE
Acute Rehab Care Conference/Team Rounds      Type: Team Rounds    Present: Dr Jerri Hernandez, Marjorie Holman PA, Sushila Gordon LICSW, Jonathan Edgar, Verena Smith OT, Mariya Santos SLP,  Selin Asif , Trav Beverly, Lakisha Guerrier Dietician      Discharge Barriers/Treatment/Education    Rehab Diagnosis: stroke     Active Medical Co-morbidities/Prognosis: DM II, HTN, JAYA, HLD    Safety: Bed alarm on for safety. Overnight blood glucose has been low (64-67). Needs to have HS snack.    Pain: Denies    Medications, Skin, Tubes/Lines: Medications taken whole with water. Skin intact. Left PIV patent and intact.    Swallowing/Nutrition: Pt reportedly tolerating regular diet, thin fluids.    Bowel/Bladder: Incontinent and continent of bladder, condom catheter on overnight. Continent of bowel, last BM 1/16. Senna available    Psychosocial: Pt resides with his wife. Goal to return home with continued support from family. Team working towards a safe d/c plan.     ADLs/IADLs: Pt requiring SBA ADLs using fww. Pt requiring step by step verbal cueing for initiation, sequencing, problem solving, and memory during BADLs. Working towards IND with BADLs, anticipate pt will require assist for all IADLs. Recommend ongoing IP OT for functional cognition. Recommend home OT at time of discharge.     Mobility: Up in room w/ assistive device but w/ staff assist.  PASS score 29/36, Ann 37/56.  Gait improving, working toward ind w/o device but he likes the walker and it does slow down his rather fast gait speed.  Ongoing IP care at this time w/ HC f/u at time of discharge.     Cognition/Language:SLP; noting mild difficulty with word finding, but does appear functional.  Moderate impairments for cognitive communication - reasoning/problem solving, new learning/memory.  Pt also experiencing difficulty with vision for written material -  He states he needed new glasses before  hospitalization - may be 2/2 this, but he also states he has difficulty staying on line.  Enlarged type does help. Anticipate pt will need help with complex IADLS (medications, finances)    Community Re-Entry: ambulatory, limited distances, SBA    Transportation: Not a  due to (R) environment inattention, car transfer not a barrier    Decision maker: self    Plan of Care and goals reviewed and updated.    Discharge Plan/Recommendations    Fall Precautions: continue    Overall plan for the patient: making progress with therapies; still not safe for mod I in his room. Anticipate improvement with therapies over the next few days. Care conference is scheduled for tomorrow; will require A with meds and iADLs. Also needs education on DM management and carb counting. Dc home possibly on 1/23 pending progress with home therapies.       Utilization Review and Continued Stay Justification    Medical Necessity Criteria:    For any criteria that is not met, please document reason and plan for discharge, transfer, or modification of plan of care to address.    Requires intensive rehabilitation program to treat functional deficits?: Yes    Requires 3x per week or greater involvement of rehabilitation physician to oversee rehabilitation program?: Yes    Requires rehabilitation nursing interventions?: Yes    Patient is making functional progress?: Yes    There is a potential for additional functional progress? Yes    Patient is participating in therapy 3 hours per day a minimum of 5 days per week or 15 hours per week in 7 day period?:Yes    Has discharge needs that require coordinated discharge planning approach?:Yes    Final Physician Sign off    Statement of Approval: I agree with all the recommendations detailed in this document.      Patient Goals         1. Pt will d/c to home/community setting upon completion of therapy/RN goals  2. Pt and family will be involved in d/c planning and made aware of services available to  meet pts needs.     OT target date for goal attainment: 01/23/18  OT Frequency: daily  OT goal: hygiene/grooming: independent, modified independent, while standing  OT goal: upper body dressing: Independent, including set-up/clothing retrieval  OT goal: lower body dressing: Modified independent, including set-up/clothing retrieval  OT goal: upper body bathing: Modified independent, Independent  OT goal: lower body bathing: Modified independent  OT goal: toilet transfer/toileting: Modified independent  OT goal: cognitive: Patient/caregiver will verbalize understanding of cognitive assessment results/recommendations as needed for safe discharge planning  OT goal 1: Pt will demonstrate independent with UE HEP with focus on RUE strength and coordination for ADL completion.     PT target date for goal attainment: 01/23/18  PT Frequency: daily 60 minutes  PT goal: bed mobility: Independent, Supine to/from sit (flat bed)  PT goal: transfers: Bed to/from chair, Sit to/from stand, Independent (LRAD; IND baseline)  PT goal: gait: Modified independent, Greater than 200 feet, Independent, 50 feet, Rolling walker (FWW community; IND household)  PT goal: stairs: Greater than 10 stairs, Rail on both sides, Modified independent, 2 stairs (12 stairs to basement where walk in shower)  PT goal: perform aerobic activity with stable cardiovascular response: continuous activity, 10 minutes, NuStep  PT goal 1: Pt will successfully demonstrate fall recovery/floor transfer w/ furniture assist  PT Goal 2: car transfer ind  PT Goal 3: ind w/ HEP 4-6 exercises for gait and balance  PT Goal 4: will participate in falls prevention education     SLP target date for goal attainment: 01/31/18  SLP Frequency: daily   SLP goal 1: Pt will utilize word retrieval strategies to increase verbal fluency and word finding during structured tasks and during conversation to 90% accuracy  SLP goal 2: Pt will utilize compensatory attention and memory strategies  to complete mod to complex  level tasks involving alternating attention and recent recall/new learning to 90% accuracy  SLP goal 3: Pt will completed mod to complex level reasoning and problems solving taks with 90% accuracy     Nursing goals    Goal: Medical Management: Pt was on Insulin before but he might need help with BG checks and insulin at home   Patient/Family Goal: Bladder: so far, he is maintaining it   Goal: Cognition/Memory/Judgement/Problem Solving: on process, so far has been calling for help  Patient/Family Goal: Medication Management: pt is not assessed yet but might needs supervision at home as well

## 2018-01-18 NOTE — PLAN OF CARE
Problem: Goal/Outcome  Goal: Goal Outcome Summary  Patient is alert and oriented. Needed CGA for transfers and ambulation with gait belt and walker. Continues with urgency frequency. Continent of bladder as well as incontinent. SBA for perineal cares. pvrs continue to be elevated. Condom catheter applied at Hs. independent with eating. Consumed 100% of dinner. Will continue with poc.

## 2018-01-18 NOTE — PLAN OF CARE
Problem: Patient Care Overview  Goal: Plan of Care/Patient Progress Review  PT: -25 minutes for nursing cares/catheter removal; MD team; toileting. Pt w/ LE soreness this AM, inc. Difficulty w/ negotiating stairs in stairwell, CGA provided, gross instability. Focus on gait training w/ and w/o FWW, cues for improved mechanics. Cont to require some assist w/ problem solving and path finding. Assessed for Mod I in room however pt not safe for this yet based on morning assessment

## 2018-01-18 NOTE — PLAN OF CARE
"Problem: Goal/Outcome  Goal: Goal Outcome Summary  OT: Pt participated in the established \"Transitions Group\" for stroke pts. Highlighting topics such as return to meaningful activities, health/wellness, fatigue, depression/anxiety, bowel/bladder considerations w/ community re-integration and caregiver wellness/support. Pt very receptive to class. Pt reports personal goals after discharge are to spend time with family and improve memory.      "

## 2018-01-18 NOTE — PLAN OF CARE
Problem: Goal/Outcome  Goal: Goal Outcome Summary  Outcome: Improving  FOCUS/GOAL  Medical management    ASSESSMENT, INTERVENTIONS AND CONTINUING PLAN FOR GOAL:  Pt denied any pain, had good BG reading this shift., ate good meals, coverage given per order, Lantus decreased to 35 units this AM. Pt calls to make his needs known, and encourage to use the toilet every 2-3hrs. Pt needs to practice on Insulin administration and his wife will assist him with meds at home.  Nursing will cont POC

## 2018-01-18 NOTE — PLAN OF CARE
Problem: Patient Care Overview  Goal: Plan of Care/Patient Progress Review  Outcome: Therapy, progress toward functional goals as expected  SLP: see rounds note - pt had moderate difficulty with task for sequencing, flexible attention and noting/correcting errors.  Also difficulty with basic matching, immediate recall. Noting word finding difficulty when pt trying to explain his recent history and goals (also probable affected by insight/recall)

## 2018-01-18 NOTE — PROGRESS NOTES
"[facility], [place]   Internal Medicine Daily Note           Interval History/Events     Overnight events reviewed  Overall reports doing well  No headache or focal neuro s/s today.    No cp or sob  No fever, chills  No nausea, vomiting.          Review of Systems        4 point ROS including Respiratory, CV, GI and , other than that noted above is negative      Medications   I have reviewed current medications  in the \"current medication\" section of Epic.  Relevant changes include:     Physical Exam         Vital signs:    Blood pressure 147/79, pulse 90, temperature 98  F (36.7  C), temperature source Oral, resp. rate 18, height 1.702 m (5' 7\"), weight 67.6 kg (149 lb), SpO2 94 %.  Estimated body mass index is 23.34 kg/(m^2) as calculated from the following:    Height as of this encounter: 1.702 m (5' 7\").    Weight as of this encounter: 67.6 kg (149 lb).        Gen: NAD  HEENT: NCAT, Moist Mm  Cardiovascular: RRR s1s2   Respiratory: B/L CTA. Non laborious breathing.   GI: BS+ . Soft, NT.  Neurology:  Alert, awake, and communicating. No new focal s/s     Laboratory and Imaging Studies     I have reviewed  laboratory and imaging studies in the Epic. Pertinent findings are as below:    BMP    Recent Labs  Lab 01/17/18  0703 01/16/18  0622 01/15/18  0603 01/14/18  1158    138 136 137   POTASSIUM 4.1 4.0 4.7 4.2   CHLORIDE 103 102 100 101   MANAV 8.1* 8.6 8.8 8.8   CO2 32 31 32 29   BUN 22 25 27 32*   CR 1.20 1.14 1.38* 1.51*   GLC 95 115* 198* 148*     CBC    Recent Labs  Lab 01/14/18  1158 01/12/18  0707 01/11/18  0948   WBC 9.6  --  11.2*   RBC 4.57  --  4.66   HGB 14.2  --  14.5   HCT 42.6  --  44.7   MCV 93  --  96   MCH 31.1  --  31.1   MCHC 33.3  --  32.4   RDW 12.9  --  13.3    256 248     INR  No lab results found in last 7 days.  LFTs  No lab results found in last 7 days.   PANCNo lab results found in last 7 days.        Impression/Plan        77 year old year old " male with hx of DM II, HTN, Renal Calculi is admitted to  [facility] on 01/12 for rehabilitation for stroke     # Tachycardia, SOB: resolved.   EKG showed normal sinus rhythm with sinus arrhythmia with no ST-T wave changes. He denies any chest pain, cough, palpitation, lightheadedness or dizziness.   XR chest on 01/14 with no acute opacities.   He was found to be Orthostatic. Resolved with IV fluid support  - monitor.   -keep hydrated.     # Acute kidney injury: Most likely pre renal disease due to hypovolemia (poor po intake, HCTZ use). Other differential could be ATN. Found to be orthostatic on 01/14. Creatinine improved with IV fluids.    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 1/16/2018  Resolved.   Etiology unclear.   CT head, neck done, reviewed. No acute abnormality.   Ct to monitor.   jkeep well hydrated  Control BP     Thank you for your consult.          Richard Quiñonez MD   Hospitalist (Internal Medicine)  [facility]  Pager: [phone].

## 2018-01-18 NOTE — PROGRESS NOTES
Diabetes Consult Daily  Progress Note          Assessment/Plan:                          Pete Sheldon is a 77 year old male with history of type 2 diabetes, hypertension, dyslipidemia transferred from [facility] for left thalamic stroke 0n (1/9/2018).                     Plan  -Metformin 1000mg BID with bfast and supper<--discontinue for now- will restart at lower dose if creatinine continues to improve  - Decrease lantus to 35 units every morning.( starting 1/18)  - Novolog prandial insulin   Breakfast: 1 unit per 5 grams CHO  Lunch: 1 unit per 10 grams of CHO  Dinner: 1 unit per 10 grams of CHO  Snack.: no snack coverage  - Novolog correction: high correction before meals and HS ( 1 unit per 20 > 140 before meals and > 200 HS)  -Monitor glucose before meals, HS and 0200          Plan for Discharge: will determine fixed meal dosing                        Plan discussed with patient and bedside RN                Interval History:   The last 24 hours progress and nursing notes reviewed.  Blood sugars dropped overnight, was on the higher dose of Lantus. Mr. Sheldon was to have a snack last evening, unknown why he did not receive the snack.  glucose to 64 at ~ 0200, was given treatment with improvement in glucose.  Am glucose 151 and pre-meal glucose 140 ( before lunch)  Have asked RN to work with Mr. Sheldon on injections, since his stroke, he is complaining of right hand numbness.  Is working with therapies      Recent Labs  Lab 01/18/18  0326 01/18/18  0306 01/18/18  0246 01/18/18  0226 01/17/18  2039 01/17/18  1642  01/17/18  0703  01/16/18  0622  01/15/18  0603  01/14/18  1158  01/12/18  0707  01/11/18  0948   GLC  --   --   --   --   --   --   --  95  --  115*  --  198*  --  148*  --  426*  --  330*   * 86 74 64* 104* 72  < >  --   < >  --   < >  --   < >  --   < >  --   < >  --    < > = values in this interval not displayed.            Review of Systems:   See interval  "hx          Medications:       Active Diet Order      High Consistent CHO Diet     Physical Exam:  Gen: Alert, resting in bed, in NAD   HEENT: NC/AT, mucous membranes are moist  Resp: Unlabored  Ext: moving all extremities  Neuro:oriented x3, communicating clearly  /79 (BP Location: Left arm)  Pulse 90  Temp 98  F (36.7  C) (Oral)  Resp 18  Ht 1.702 m (5' 7\")  Wt 67.6 kg (149 lb)  SpO2 94%  BMI 23.34 kg/m2           Data:     Lab Results   Component Value Date    A1C 7.9 01/09/2018    A1C 7.6 11/29/2017    A1C 7.8 08/16/2017    A1C 8.3 05/12/2017    A1C 7.9 02/10/2017              CBC RESULTS:   Recent Labs   Lab Test  01/14/18   1158   WBC  9.6   RBC  4.57   HGB  14.2   HCT  42.6   MCV  93   MCH  31.1   MCHC  33.3   RDW  12.9   PLT  280     Recent Labs   Lab Test  01/17/18   0703  01/16/18   0622   NA  139  138   POTASSIUM  4.1  4.0   CHLORIDE  103  102   CO2  32  31   ANIONGAP  4  5   GLC  95  115*   BUN  22  25   CR  1.20  1.14   MANAV  8.1*  8.6     Liver Function Studies -   Recent Labs   Lab Test  01/10/18   0926   PROTTOTAL  7.9   ALBUMIN  3.1*   BILITOTAL  0.6   ALKPHOS  83   AST  23   ALT  35     Lab Results   Component Value Date    INR 1.07 01/10/2018    INR 1.05 01/08/2018    INR 1.03 12/16/2008    INR 1.03 12/12/2008         Dipti Espinoza -[phone]  Diabetes Management job code [id number]            "

## 2018-01-19 LAB
GLUCOSE BLDC GLUCOMTR-MCNC: 122 MG/DL (ref 70–99)
GLUCOSE BLDC GLUCOMTR-MCNC: 194 MG/DL (ref 70–99)
GLUCOSE BLDC GLUCOMTR-MCNC: 210 MG/DL (ref 70–99)
GLUCOSE BLDC GLUCOMTR-MCNC: 254 MG/DL (ref 70–99)
GLUCOSE BLDC GLUCOMTR-MCNC: 264 MG/DL (ref 70–99)

## 2018-01-19 PROCEDURE — 97530 THERAPEUTIC ACTIVITIES: CPT | Mod: GO | Performed by: OCCUPATIONAL THERAPIST

## 2018-01-19 PROCEDURE — 97535 SELF CARE MNGMENT TRAINING: CPT | Mod: GO | Performed by: OCCUPATIONAL THERAPIST

## 2018-01-19 PROCEDURE — 97530 THERAPEUTIC ACTIVITIES: CPT | Mod: GP | Performed by: REHABILITATION PRACTITIONER

## 2018-01-19 PROCEDURE — 25000132 ZZH RX MED GY IP 250 OP 250 PS 637: Performed by: PHYSICAL MEDICINE & REHABILITATION

## 2018-01-19 PROCEDURE — 99366 TEAM CONF W/PAT BY HC PROF: CPT | Performed by: SPEECH-LANGUAGE PATHOLOGIST

## 2018-01-19 PROCEDURE — 97110 THERAPEUTIC EXERCISES: CPT | Mod: GO | Performed by: OCCUPATIONAL THERAPIST

## 2018-01-19 PROCEDURE — 40000193 ZZH STATISTIC PT WARD VISIT: Performed by: REHABILITATION PRACTITIONER

## 2018-01-19 PROCEDURE — 97116 GAIT TRAINING THERAPY: CPT | Mod: GP | Performed by: REHABILITATION PRACTITIONER

## 2018-01-19 PROCEDURE — 97127 ZZHC SP THERAPEUTIC INTERVENTION W/FOCUS ON COGNITIVE FUNCTION: CPT | Mod: GN | Performed by: SPEECH-LANGUAGE PATHOLOGIST

## 2018-01-19 PROCEDURE — 97112 NEUROMUSCULAR REEDUCATION: CPT | Mod: GP | Performed by: PHYSICAL THERAPIST

## 2018-01-19 PROCEDURE — 25000132 ZZH RX MED GY IP 250 OP 250 PS 637: Performed by: NURSE PRACTITIONER

## 2018-01-19 PROCEDURE — 40000133 ZZH STATISTIC OT WARD VISIT: Performed by: OCCUPATIONAL THERAPIST

## 2018-01-19 PROCEDURE — 25000132 ZZH RX MED GY IP 250 OP 250 PS 637: Performed by: INTERNAL MEDICINE

## 2018-01-19 PROCEDURE — 40000225 ZZH STATISTIC SLP WARD VISIT: Performed by: SPEECH-LANGUAGE PATHOLOGIST

## 2018-01-19 PROCEDURE — 00000146 ZZHCL STATISTIC GLUCOSE BY METER IP

## 2018-01-19 PROCEDURE — 99366 TEAM CONF W/PAT BY HC PROF: CPT | Performed by: PHYSICAL THERAPIST

## 2018-01-19 PROCEDURE — 40000193 ZZH STATISTIC PT WARD VISIT: Performed by: PHYSICAL THERAPIST

## 2018-01-19 PROCEDURE — 12800006 ZZH R&B REHAB

## 2018-01-19 RX ORDER — TAMSULOSIN HYDROCHLORIDE 0.4 MG/1
0.8 CAPSULE ORAL EVERY EVENING
Status: DISCONTINUED | OUTPATIENT
Start: 2018-01-19 | End: 2018-01-27 | Stop reason: HOSPADM

## 2018-01-19 RX ADMIN — METFORMIN HYDROCHLORIDE 500 MG: 500 TABLET ORAL at 09:48

## 2018-01-19 RX ADMIN — TAMSULOSIN HYDROCHLORIDE 0.8 MG: 0.4 CAPSULE ORAL at 19:54

## 2018-01-19 RX ADMIN — ACETAMINOPHEN 975 MG: 325 TABLET, FILM COATED ORAL at 03:02

## 2018-01-19 RX ADMIN — ATORVASTATIN CALCIUM 20 MG: 20 TABLET, FILM COATED ORAL at 08:21

## 2018-01-19 RX ADMIN — I-VITE, TAB 1000-60-2MG (60/BT) 1 TABLET: TAB at 08:21

## 2018-01-19 RX ADMIN — METFORMIN HYDROCHLORIDE 500 MG: 500 TABLET ORAL at 18:09

## 2018-01-19 RX ADMIN — AMLODIPINE BESYLATE 10 MG: 10 TABLET ORAL at 08:21

## 2018-01-19 RX ADMIN — ASPIRIN 325 MG: 325 TABLET, DELAYED RELEASE ORAL at 08:21

## 2018-01-19 NOTE — PLAN OF CARE
1/19/18 Stroke Education Note     The following information has been reviewed with the patient and family:     1. Warning signs of stroke  2. Calling 911 if having warning signs of stroke  3. All modifiable risk factors: Hypertension, CAD, atrial fib, diabetes, hypercholesterolemia, smoking, substance abuse, diet, physical inactivity, obesity, sleep apnea.  4. Patient's risk factors for stroke which include: HTN,diabetes,HDL,diet  5. Follow-up plan for after discharge  6. Medications which include:Lipitor,ASA,Norvasc,Insulin     In addition, the [facility] Stroke Class Handout and Understanding Stroke book have been given to the patient and family.     Learner's response to risk factors / lifestyle modification education: Activation     HTN:continue with BP meds,check BP at home with a machine,record and bring to all HCP appointments,diet per PLC handout     HDL:Lipitor and diet per PLC handout     Diabetes:Insulin,BGM,diet per PLC handout.Diabetes and dietician consult is ordered.Wife will bring the pt's BGM to 5 AR.Pt.has always done his own BGM and insulin.Will need to be assessed after the stroke.      Wife and granddaughter(RN) able to answer teach back.Pt.was not able to answer any teach back.

## 2018-01-19 NOTE — CONSULTS
"Diabetes Education  Received consult request to see this 77 year old male and his spouse for diabetes education assessment and review.  Patient with history of Type 2 diabetes, admitted 1/9/18 due to CVA.  Patient currently on acute rehab unit.  Patient's hemoglobin A1c on admission was 7.9%.  Prior to admission, patient was taking insulin:  Lantus 32 units in the morning, Humalog 2 units three times daily with meals,  a correction scale (which wife says starts with a glucose of 200 mg/dL), and 1000 mg metformin twice daily.    Met with patient and wife.  Their granddaughter, who states she is a nurse, was also present.  Wife states that prior to his stroke, she would \"supervise\" his diabetes cares.  He would check his glucose and she would tell him how much insulin he needed to take.  Patient would dial up and administer the insulin.    We discussed that we want to ensure he can continue to be independent with insulin administration.  Observed patient attach pen needle, dial up dose and inject into an injection pad.  He did well, but did not he withdrew needle immediately rather than waiting for 10 counts.  Discussed rationale for this and wife states she has noted wetness on his injection site.  Reviewed site rotation; patient uses abdomen and rotates sites.  Reviewed proposed discharge insulin plan.  Wife states she likes the simplified plan without a correction scale.  Wife is relieved that she will not need to administer the insulin, but states she could if she really needed to.    Discussed above with RN and Mariah Espinoza NP, diabetes management team.  Patient will follow-up with his endocrinologist 3/7/18.    Thelma Chaparro MS RN CDE CD  [phone]    "

## 2018-01-19 NOTE — PLAN OF CARE
Problem: Goal/Outcome  Goal: Goal Outcome Summary  Patient is alert and oriented x 4. CGA for transfers and ambulation in room with walker and gaitbelt.  Continues to have frequency and urgency . Mostly continent of bladder, incontinent once.  CGA  for perineal cares PVRs still high. Condom catheter applied at bedtime. Consumed 100% of dinner  Blood sugar this shift was 92 x 2. Given peanut butter toast at hS, of which he consumed 100% prn tylenol administered for neck pain with good effective. Per pt, not utilizing a pillow, for that makes pain worse. Will continue with poc.

## 2018-01-19 NOTE — CARE CONFERENCE
Acute Rehab Care Conference/Team Rounds      Type: Patient Conference    Present: Dr Jerri Hernandez, Sushila Gordon [facility] Hospital[facility], Pete Diyablue patient, Farzaneh Weiner PT, Mariya Sánchez SLP, Beata Ni RN.       Discharge Barriers/Treatment/Education    Rehab Diagnosis: stroke      Active Medical Co-morbidities/Prognosis: DM II, HTN, JAYA, HLD    Safety:Bed alar activated at night, compliant with using the call light    Pain:Usually c/o neck or back pain daily, tylenol and repositioning effective    Medications, Skin, Tubes/Lines:Takes meds whole with oversight, have not participated on MAP.     Swallowing/Nutrition:tolerating regular diet, thin fluids.    Bowel/Bladder: Had bladder incontinence and urinary retention, condom cath intact at night, LBM 1/16    Psychosocial: Pt resides with his wife. Goal to return home with her continued support. Team working towards a safe d/c plan.     ADLs/IADLs: Pt requiring SBA ADLs using fww. Pt requiring step by step verbal cueing for initiation, sequencing, problem solving, and memory during BADLs. Working towards IND with BADLs, anticipate pt will require assist for all IADLs. Recommend ongoing IP OT for functional cognition. Recommend home care OT at time of discharge.     Mobility: Up in room w/ walker and assist, needs oversight for (R) environmental management.  Ann score 37/56, moderate falls risk, hope to progress to ind w/ assistive device.  Recommend home care f/u due to recommendation for 24/7 supervision.     Cognition/Language: SLP: working on deficits for word finding, reading comprehension (also affected by visual deficits) moderate impairment for cognitive communication skills (working/recent memory, reasoning, insight, thought organization). At this time would anticipate pt needing assist with complex IADLS (bills, medications), with further SLP tx home care. Probable 24 hour supervision, at least initially.    Community Re-Entry: Ambulatory w/ SBA    Transportation:  Not a  due to inattention, car transfer not a barrier    Decision maker: self    Plan of Care and goals reviewed and updated.    Discharge Plan/Recommendations    Fall Precautions: continue    Overall plan for the patient: Al has made some progress since his admission; still has significant difficulty with sequencing, motor planning, initiation and problem solving. Not safe to be mod I in his room. Will require 24/7 supervision for safety at discharge; also recommended A with meds, finances and other iADLs. Wife and granddaughter present at care conference; agreeable to provide 24/7 care. Will continue intensive rehab for another week and plan for dc home on 1/26 with home care. Goal is to maximize his independence and minimize burden of care. From medical perspective need to clarify the plan for DM regimen at home (BGs have been very variable) and also bladder management (continues to retain urine) before discharge.       Utilization Review and Continued Stay Justification    Medical Necessity Criteria:    For any criteria that is not met, please document reason and plan for discharge, transfer, or modification of plan of care to address.    Requires intensive rehabilitation program to treat functional deficits?: Yes    Requires 3x per week or greater involvement of rehabilitation physician to oversee rehabilitation program?: Yes    Requires rehabilitation nursing interventions?: Yes    Patient is making functional progress?: Yes    There is a potential for additional functional progress? Yes    Patient is participating in therapy 3 hours per day a minimum of 5 days per week or 15 hours per week in 7 day period?:Yes    Has discharge needs that require coordinated discharge planning approach?:Yes      Final Physician Sign off    Statement of Approval: I agree with all the recommendations detailed in this document.      Patient Goals    OT target date for goal attainment: 01/22/18  OT Frequency: daily  OT  goal: hygiene/grooming: independent, modified independent, while standing  OT goal: upper body dressing: Independent, including set-up/clothing retrieval  OT goal: lower body dressing: Modified independent, including set-up/clothing retrieval  OT goal: upper body bathing: Modified independent, Independent  OT goal: lower body bathing: Modified independent        OT goal: toilet transfer/toileting: Modified independent        OT goal: cognitive: Patient/caregiver will verbalize understanding of cognitive assessment results/recommendations as needed for safe discharge planning        OT goal 1: Pt will demonstrate independent with UE HEP with focus on RUE strength and coordination for ADL completion.         PT target date for goal attainment: 01/23/18  PT Frequency: daily 60 minutes  PT goal: bed mobility: Independent, Supine to/from sit (flat bed)  PT goal: transfers: Bed to/from chair, Sit to/from stand, Independent (LRAD; IND baseline)  PT goal: gait: Modified independent, Greater than 200 feet, Independent, 50 feet, Rolling walker (FWW community; IND household)  PT goal: stairs: Greater than 10 stairs, Rail on both sides, Modified independent, 2 stairs (12 stairs to basement where walk in shower)  PT goal: perform aerobic activity with stable cardiovascular response: continuous activity, 10 minutes, NuStep     PT goal 1: Pt will successfully demonstrate fall recovery/floor transfer w/ furniture assist  PT Goal 2: car transfer ind  PT Goal 3: ind w/ HEP 4-6 exercises for gait and balance  PT Goal 4: will participate in falls prevention education       SLP target date for goal attainment: 01/31/18  SLP Frequency: daily   SLP goal 1: Pt will utilize word retrieval strategies to increase verbal fluency and word finding during structured tasks and during conversation to 90% accuracy  SLP goal 2: Pt will utilize compensatory attention and memory strategies to complete mod to complex  level tasks involving alternating  attention and recent recall/new learning to 90% accuracy  SLP goal 3: Pt will completed mod to complex level reasoning and problems solving taks with 90% accuracy     SLP goal 5: SLP: pt to complete moderate complex reading tasks, (enlarged type PRN), 90% accuracy for related questions for increased ability to read functional material     Nursing goals   Goal: Medical Management: Needs guidance at home   Patient/Family Goal: Bladder: There has been occasional incontinence, he needs assit to manage that.  Goal: Cognition/Memory/Judgement/Problem Solving: on process, so far has been calling for help  Patient/Family Goal: Medication Management: Pt needs assist with medication management post DC

## 2018-01-19 NOTE — PLAN OF CARE
Problem: Patient Care: Nursing Focus  Goal: Bladder  Outcome: Improving  See rounds and care conference note from today.

## 2018-01-19 NOTE — PROGRESS NOTES
Diabetes Consult Daily  Progress Note          Assessment/Plan:    Pete Sheldon is a 77 year old male with history of type 2 diabetes, hypertension, dyslipidemia transferred from [facility] for left thalamic stroke 0n (1/9/2018).     Type 2 diabetic: PTA medications, Metformin 1000 mg twice a day, Lantus 32 units in morning and Humalog 2/2/2 with breakfast/lunch/dinner respectively.  In addition to that he is on correctional scale 1 unit- 25 >140 but he is not using it.                Plan  Resumed metformin starting at 500 mg twice daily ( 1/19), may increase  - Decrease lantus to 32 units every morning.( starting 1/19)  - d/c Novolog prandial insulin   Breakfast: 1 unit per 10 grams CHO, will decrease to 1 unit per 15  Lunch: decreased from 1 unit per 10 grams of CHO, to 1 unit per 15 grams of CHO  Dinner: 1 unit per 10 grams of CHO, 1 unit per 15 grams of CHO  Snack. 1 unit per 20 grams of CHO  Novolog fixed meal dose starting with lunch today (1/19)  Breakfast: 5   Lunch: 5  Dinner: 7  Snack: will not determine a dose for now, this may need to be added  - Novolog correction: high correction before meals and HS ( 1 unit per 20 > 140 before meals and > 200 HS)( will not d/c on sliding scale)  -Monitor glucose before meals, HS and 0200                          Plan discussed with patient, bedside RN, and Thelma Chaparro RN diabetic Educator           Interval History:   The last 24 hours progress and nursing notes reviewed.  Blood sugars are moderately controlled and no hypoglycemia  Had a snack at HS, no coverage, glucose at ~ 0200, 264 and am glucose continued to be in the 200 range.  Will start a plan to prepare for discharge ( d/c 1/23)  Started back on Metformin today with lower dose  and lantus is PTA dose  Spoke with RN, will try a fix meal dose starting at lunch ( will continue worthy sliding scale while in ARU)   Per RN, has a good appetite  Is averaging ~ 80 grams of CHO  "for breakfast and lunch, 100 grams for dinner ( Mr. Sheldon states he is eating like he does at home)  Is working with therapies        Recent Labs  Lab 01/19/18  0201 01/18/18  2107 01/18/18  1647 01/18/18  1222 01/18/18  0812 01/18/18  0326  01/17/18  0703  01/16/18  0622  01/15/18  0603  01/14/18  1158   GLC  --   --   --   --   --   --   --  95  --  115*  --  198*  --  148*   * 92 92 140* 151* 103*  < >  --   < >  --   < >  --   < >  --    < > = values in this interval not displayed.            Review of Systems:   See interval hx          Medications:       Active Diet Order      High Consistent CHO Diet     Physical Exam:  Gen: Alert, NAD   HEENT: , mucous membranes are moist  Resp: Unlabored  Ext: moving all extremities   Neuro:oriented x3, communicating clearly  /79 (BP Location: Left arm)  Pulse 79  Temp 97.7  F (36.5  C) (Oral)  Resp 18  Ht 1.702 m (5' 7\")  Wt 67.6 kg (149 lb)  SpO2 96%  BMI 23.34 kg/m2           Data:     Lab Results   Component Value Date    A1C 7.9 01/09/2018    A1C 7.6 11/29/2017    A1C 7.8 08/16/2017    A1C 8.3 05/12/2017    A1C 7.9 02/10/2017              CBC RESULTS:   Recent Labs   Lab Test  01/14/18   1158   WBC  9.6   RBC  4.57   HGB  14.2   HCT  42.6   MCV  93   MCH  31.1   MCHC  33.3   RDW  12.9   PLT  280     Recent Labs   Lab Test  01/17/18   0703  01/16/18   0622   NA  139  138   POTASSIUM  4.1  4.0   CHLORIDE  103  102   CO2  32  31   ANIONGAP  4  5   GLC  95  115*   BUN  22  25   CR  1.20  1.14   MANAV  8.1*  8.6     Liver Function Studies -   Recent Labs   Lab Test  01/10/18   0926   PROTTOTAL  7.9   ALBUMIN  3.1*   BILITOTAL  0.6   ALKPHOS  83   AST  23   ALT  35     Lab Results   Component Value Date    INR 1.07 01/10/2018    INR 1.05 01/08/2018    INR 1.03 12/16/2008    INR 1.03 12/12/2008       Dipti Espinoza -[id number]  Diabetes Management job code [id number]            "

## 2018-01-19 NOTE — PROGRESS NOTES
EMR reviewed.   Vitals stable.   No new concern reported.  Endocrine managing his blood sugar.   Ordered cbc, bmp for tomorrow am  Page with Q  Patient not seen today.     Richard Quiñonez MD   Hospitalist (Internal Medicine)  [facility]  Pager: [phone].

## 2018-01-19 NOTE — PLAN OF CARE
Problem: Patient Care Overview  Goal: Plan of Care/Patient Progress Review  OT: Intervention focused on activity tolerance with endurance exercise in standing, Shower transfers simulated to home set up with CGA needed for safety and education provided on safety strategies to be put in place to maximize Indep and safety.  Dynamic balance and visual motor scanning which patient demonstrated improvement from previous session with OT.

## 2018-01-20 LAB
ALBUMIN UR-MCNC: 30 MG/DL
ANION GAP SERPL CALCULATED.3IONS-SCNC: 8 MMOL/L (ref 3–14)
APPEARANCE UR: ABNORMAL
BACTERIA #/AREA URNS HPF: ABNORMAL /HPF
BILIRUB UR QL STRIP: NEGATIVE
BUN SERPL-MCNC: 31 MG/DL (ref 7–30)
CALCIUM SERPL-MCNC: 8 MG/DL (ref 8.5–10.1)
CHLORIDE SERPL-SCNC: 102 MMOL/L (ref 94–109)
CO2 SERPL-SCNC: 26 MMOL/L (ref 20–32)
COLOR UR AUTO: YELLOW
CREAT SERPL-MCNC: 1.17 MG/DL (ref 0.66–1.25)
ERYTHROCYTE [DISTWIDTH] IN BLOOD BY AUTOMATED COUNT: 12.7 % (ref 10–15)
GFR SERPL CREATININE-BSD FRML MDRD: 60 ML/MIN/1.7M2
GLUCOSE BLDC GLUCOMTR-MCNC: 193 MG/DL (ref 70–99)
GLUCOSE BLDC GLUCOMTR-MCNC: 331 MG/DL (ref 70–99)
GLUCOSE SERPL-MCNC: 266 MG/DL (ref 70–99)
GLUCOSE UR STRIP-MCNC: 300 MG/DL
HCT VFR BLD AUTO: 40.3 % (ref 40–53)
HGB BLD-MCNC: 13.2 G/DL (ref 13.3–17.7)
HGB UR QL STRIP: NEGATIVE
KETONES UR STRIP-MCNC: 10 MG/DL
LEUKOCYTE ESTERASE UR QL STRIP: ABNORMAL
MCH RBC QN AUTO: 31.1 PG (ref 26.5–33)
MCHC RBC AUTO-ENTMCNC: 32.8 G/DL (ref 31.5–36.5)
MCV RBC AUTO: 95 FL (ref 78–100)
MUCOUS THREADS #/AREA URNS LPF: PRESENT /LPF
NITRATE UR QL: NEGATIVE
PH UR STRIP: 5.5 PH (ref 5–7)
PLATELET # BLD AUTO: 299 10E9/L (ref 150–450)
POTASSIUM SERPL-SCNC: 4.3 MMOL/L (ref 3.4–5.3)
RBC # BLD AUTO: 4.25 10E12/L (ref 4.4–5.9)
RBC #/AREA URNS AUTO: 1 /HPF (ref 0–2)
SODIUM SERPL-SCNC: 136 MMOL/L (ref 133–144)
SOURCE: ABNORMAL
SP GR UR STRIP: 1.01 (ref 1–1.03)
UROBILINOGEN UR STRIP-MCNC: 2 MG/DL (ref 0–2)
WBC # BLD AUTO: 8.6 10E9/L (ref 4–11)
WBC #/AREA URNS AUTO: 29 /HPF (ref 0–2)

## 2018-01-20 PROCEDURE — 40000225 ZZH STATISTIC SLP WARD VISIT: Performed by: SPEECH-LANGUAGE PATHOLOGIST

## 2018-01-20 PROCEDURE — 97535 SELF CARE MNGMENT TRAINING: CPT | Mod: GO

## 2018-01-20 PROCEDURE — 97112 NEUROMUSCULAR REEDUCATION: CPT | Mod: GP

## 2018-01-20 PROCEDURE — 87186 SC STD MICRODIL/AGAR DIL: CPT | Performed by: PHYSICAL MEDICINE & REHABILITATION

## 2018-01-20 PROCEDURE — 85027 COMPLETE CBC AUTOMATED: CPT | Performed by: INTERNAL MEDICINE

## 2018-01-20 PROCEDURE — 40000133 ZZH STATISTIC OT WARD VISIT

## 2018-01-20 PROCEDURE — 25000132 ZZH RX MED GY IP 250 OP 250 PS 637: Performed by: INTERNAL MEDICINE

## 2018-01-20 PROCEDURE — 25000131 ZZH RX MED GY IP 250 OP 636 PS 637: Performed by: NURSE PRACTITIONER

## 2018-01-20 PROCEDURE — 25000132 ZZH RX MED GY IP 250 OP 250 PS 637: Performed by: PHYSICAL MEDICINE & REHABILITATION

## 2018-01-20 PROCEDURE — 97530 THERAPEUTIC ACTIVITIES: CPT | Mod: GP

## 2018-01-20 PROCEDURE — 97110 THERAPEUTIC EXERCISES: CPT | Mod: GP

## 2018-01-20 PROCEDURE — 97530 THERAPEUTIC ACTIVITIES: CPT | Mod: GO

## 2018-01-20 PROCEDURE — 97127 ZZHC SP THERAPEUTIC INTERVENTION W/FOCUS ON COGNITIVE FUNCTION: CPT | Mod: GN | Performed by: SPEECH-LANGUAGE PATHOLOGIST

## 2018-01-20 PROCEDURE — 36415 COLL VENOUS BLD VENIPUNCTURE: CPT | Performed by: INTERNAL MEDICINE

## 2018-01-20 PROCEDURE — 87088 URINE BACTERIA CULTURE: CPT | Performed by: PHYSICAL MEDICINE & REHABILITATION

## 2018-01-20 PROCEDURE — 40000193 ZZH STATISTIC PT WARD VISIT

## 2018-01-20 PROCEDURE — 81001 URINALYSIS AUTO W/SCOPE: CPT | Performed by: PHYSICAL MEDICINE & REHABILITATION

## 2018-01-20 PROCEDURE — 00000146 ZZHCL STATISTIC GLUCOSE BY METER IP

## 2018-01-20 PROCEDURE — 12800006 ZZH R&B REHAB

## 2018-01-20 PROCEDURE — 80048 BASIC METABOLIC PNL TOTAL CA: CPT | Performed by: INTERNAL MEDICINE

## 2018-01-20 PROCEDURE — 25000132 ZZH RX MED GY IP 250 OP 250 PS 637: Performed by: NURSE PRACTITIONER

## 2018-01-20 PROCEDURE — 87086 URINE CULTURE/COLONY COUNT: CPT | Performed by: PHYSICAL MEDICINE & REHABILITATION

## 2018-01-20 RX ADMIN — METFORMIN HYDROCHLORIDE 500 MG: 500 TABLET ORAL at 17:55

## 2018-01-20 RX ADMIN — INSULIN ASPART 5 UNITS: 100 INJECTION, SOLUTION INTRAVENOUS; SUBCUTANEOUS at 08:40

## 2018-01-20 RX ADMIN — I-VITE, TAB 1000-60-2MG (60/BT) 1 TABLET: TAB at 08:36

## 2018-01-20 RX ADMIN — TAMSULOSIN HYDROCHLORIDE 0.8 MG: 0.4 CAPSULE ORAL at 20:16

## 2018-01-20 RX ADMIN — METFORMIN HYDROCHLORIDE 500 MG: 500 TABLET ORAL at 08:37

## 2018-01-20 RX ADMIN — ATORVASTATIN CALCIUM 20 MG: 20 TABLET, FILM COATED ORAL at 08:36

## 2018-01-20 RX ADMIN — ASPIRIN 325 MG: 325 TABLET, DELAYED RELEASE ORAL at 08:47

## 2018-01-20 RX ADMIN — ACETAMINOPHEN 975 MG: 325 TABLET, FILM COATED ORAL at 01:46

## 2018-01-20 RX ADMIN — AMLODIPINE BESYLATE 10 MG: 10 TABLET ORAL at 08:37

## 2018-01-20 NOTE — PROGRESS NOTES
PM&R brief note    Chart reviewed.  No issues per nursing.  I did not personally see this patient today.  Continue current plan of care. Monitor PVRs; elevated but slightly in better range. Discussed with hospitalist team.    Jerri Hernandez MD  Physical Medicine & Rehabilitation

## 2018-01-20 NOTE — PLAN OF CARE
Problem: Patient Care Overview  Goal: Plan of Care/Patient Progress Review  SLP: -30 am session nursing cares/toileting.

## 2018-01-20 NOTE — PLAN OF CARE
Problem: Patient Care Overview  Goal: Plan of Care/Patient Progress Review  Outcome: Therapy, progress toward functional goals as expected  SLP: pt reporting feeling tired today, did not sleep much night prior.  Noting significant difficulty with basic numerical reasoning/word problem tasks - pt acknowledging increased difficulty vs baseline.  In working on familiar task for planning, problem solving (IPAD), pt does show retention of simple strategies, and ability to identify errors and correct with min/mod assist.  Pt benefits from information (ie scheduled, [employer] time/channel, etc) written on paper, placed on his table, as he cannot see board across room.

## 2018-01-20 NOTE — PROGRESS NOTES
Diabetes Consult Daily  Progress Note          Assessment/Plan:    Pete Sheldon is a 77 year old male with history of type 2 diabetes, hypertension, dyslipidemia transferred from [facility] for left thalamic stroke n (1/9/2018).     Type 2 diabetic: PTA medications, Metformin 1000 mg twice a day, Lantus 32 units in morning and Humalog 2/2/2 with breakfast/lunch/dinner respectively.  In addition to that he is on correctional scale 1 unit- 25 >140 but he is not using it.                Yesterday, the patient was started on 500 mg metformin twice a day, in anticipation of being discharged next week.  The dose of Lantus was decreased yesterday from 35 to 32 units in the morning.  The prandial NovoLog insulin was adjusted to a fixed dose of 5 units for breakfast, 5 units for lunch and 7 units with dinner.  With this insulin regimen, blood glucose control was decent during daytime.  During the night, the blood glucose spontaneously increased, in the absence of food intake.    Plan  Continue metformin at 500 mg twice daily  Continue 32 units Lantus in the morning and the same NovoLog high-dose correction scale (1 unit per 20 > 140 before meals and > 200 HS)  Monitor blood glucose before meals, at bedtime and at 2 AM  Add 5 units Lantus insulin at bedtime in order to address the morning, fasting hyperglycemia   Change the fixed dose of NovoLog before meals to 7 units for breakfast, 5 units for lunch and 7 units for dinner.    I discussed with the nurse the importance of documenting in the chart whether and when the patient is having snacks, as this will help us determine his insulin requirements.             Interval History:   The last 24 hours progress and nursing notes reviewed.  The patient denies any specific complaints.  Food intake is great.    Is averaging ~ 80 grams of CHO for breakfast and lunch, 100 grams for dinner (per prior notes).  Is working with therapies        Recent  "Labs  Lab 01/20/18  0835 01/20/18  0548 01/20/18  0255 01/19/18  2111 01/19/18  1657 01/19/18  1235 01/19/18  0754  01/17/18  0703  01/16/18  0622  01/15/18  0603  01/14/18  1158   GLC  --  266*  --   --   --   --   --   --  95  --  115*  --  198*  --  148*   *  --  193* 210* 194* 122* 254*  < >  --   < >  --   < >  --   < >  --    < > = values in this interval not displayed.            Review of Systems:   See interval hx          Medications:       Active Diet Order      High Consistent CHO Diet     Physical Exam:  Gen: Alert, NAD   HEENT: , mucous membranes are moist  Resp: Unlabored  Ext: moving all extremities   Neuro:oriented x3, communicating clearly  /72 (BP Location: Left arm)  Pulse 86  Temp 96.4  F (35.8  C) (Oral)  Resp 16  Ht 1.702 m (5' 7\")  Wt 70.4 kg (155 lb 4.8 oz)  SpO2 96%  BMI 24.32 kg/m2           Data:     Lab Results   Component Value Date    A1C 7.9 01/09/2018    A1C 7.6 11/29/2017    A1C 7.8 08/16/2017    A1C 8.3 05/12/2017    A1C 7.9 02/10/2017              "

## 2018-01-20 NOTE — PROGRESS NOTES
[facility]   Acute Rehabilitation Unit  Daily progress note    interval history  Pete Sheldon was seen during his care conference. Al has made some progress since his admission; still has significant difficulty with sequencing, motor planning, initiation and problem solving. Not safe to be mod I in his room. Will require 24/7 supervision for safety at discharge; also recommended A with meds, finances and other iADLs. Wife and granddaughter present at care conference; agreeable to provide 24/7 care. Will continue intensive rehab for another week and plan for dc home on 1/26 with home care. Goal is to maximize his independence and minimize burden of care. From medical perspective need to clarify the plan for DM regimen at home (BGs have been very variable) and also bladder management (continues to retain urine) before discharge.     Completed stroke class and DM education today. Also had more education by dietitian regarding DM.     Discussed the results of previous UA/UC and current symptoms with ID team. Urinary retention is multifactorial due to stroke, likely BPH and immobility. He was treated for UTI but no susceptibility was available. Recommended to repeat UA and compared the results. Goal is to rule out residual symptoms due to UTI as possible contributing factor to his retention.         medications  Scheduled meds    tamsulosin  0.8 mg Oral QPM     insulin glargine  32 Units Subcutaneous Q24H     metFORMIN  500 mg Oral BID w/meals     insulin aspart  5 Units Subcutaneous Daily with lunch     insulin aspart  7 Units Subcutaneous Daily with supper     [START ON 1/20/2018] insulin aspart  5 Units Subcutaneous Daily with breakfast     menthol   Transdermal Q8H     insulin aspart  1-10 Units Subcutaneous TID AC     insulin aspart  1-7 Units Subcutaneous At Bedtime     aspirin EC  325 mg Oral Daily     amLODIPine  10 mg Oral Daily     atorvastatin  20 mg Oral Daily      "multivitamin  1 tablet Oral Daily       PRN meds:  insulin aspart, acetaminophen, menthol **AND** menthol **AND** menthol, senna-docusate, bisacodyl, glucose **OR** dextrose **OR** glucagon      physical exam  /65 (BP Location: Left arm)  Pulse 95  Temp 96.6  F (35.9  C) (Oral)  Resp 16  Ht 1.702 m (5' 7\")  Wt 67.6 kg (149 lb)  SpO2 94%  BMI 23.34 kg/m2     Gen: NAD, sitting in chair   Pulm: non-labored in room air   Abd: soft and non-tender   Ext: wwp, no edema in bilateral lower extremities  Neuro/MSK: difficulty with word findings, ambulating with FWW     labs  Reviewed     assessment and plan    Pete Sheldon is a 77-year-old male who had an ischemic left thalamic stroke on 1/9 resulting in right hemiparesis, decreased balance, decreased visual acuity, and decreased independence with mobility and ADLs. Admission to acute inpatient rehab 1/12/18.     Rehabilitation - continue comprehensive acute inpatient rehabilitation program with multidisciplinary approach including therapies, rehab nursing, and physiatry following. See interval history for updates.       Medical Conditions  # Left thalamic ischemic stroke: deficits as above.   --continue , HTN, HLD and DM control for secondary stroke prevention  --completed stroke class/education on Macrotherapy     # DM II: HgbA1c 7.9 1/9/18  Endo team following; appreciate assistance.   01/19/18   Resumed metformin starting at 500 mg twice daily ( 1/19), may increase  - Decrease lantus to 32 units every morning.( starting 1/19)  - d/c Novolog prandial insulin   Breakfast: 1 unit per 10 grams CHO, will decrease to 1 unit per 15  Lunch: decreased from 1 unit per 10 grams of CHO, to 1 unit per 15 grams of CHO  Dinner: 1 unit per 10 grams of CHO, 1 unit per 15 grams of CHO  Snack. 1 unit per 20 grams of CHO  Novolog fixed meal dose starting with lunch today (1/19)  Breakfast: 5   Lunch: 5  Dinner: 7  Snack: will not determine a dose for now, this may need to " be added  - Novolog correction: high correction before meals and HS ( 1 unit per 20 > 140 before meals and > 200 HS)( will not d/c on sliding scale)  -Monitor glucose before meals, HS and 0200    # UTI: > 100k Aerococcus urinae on 1/8. continue keflex, complete 7 day course 01/16/18. 01/19/18 discussed with ID team; will repeat his UA.       # Acute transient SOB and tachycardia: one episode on 1/14. Hospitalist team was consulted. His EKG shows normal sinus rhythm w/ sinus arrhythmia and no ST-T changes. CXR clear. + Orthostasis. Improved with IVF. Continue to monitor.     # Acute kidney injury: on 1/14. Most likely pre renal disease due to hypovolemia (poor po intake, HCTZ use). Other differential could be ATN. + orthostatic on 01/14. Creatinine improved with IV fluids.  --repeat BMP in am   --avoid Nephrotoxins, Renal dose medicatios  --strict I and O    # HTN: on amlodipine 10/day and HCTZ upon admission to ARU. HCTZ was held on 1/15 due to low BP and orthostasis.    # HLD: LDL 74 1/10; on lipitor.     # Headache and neck pain: started 1/15 and has been persistent since then. Improved with tylenol. 01/16/18 head and neck CT per hospitalist team recs; no acute pathologies. Most likely MSK related due to weakness and poor posture. Started icy hot patches to help with pain.       1. FEN: regular diet; high consistent CHO.   2. Bowel: on prn bowel meds. Continent.   3. Bladder: elevated PVRs since admission to ARU. UTI as above. 01/17/18 Reports nocturia at home; no frequency or urgency prior to admission. No dysuria or hematuria; + urgency and urge incontinence. PVRs remain elevated at 200-300 ml range. On flomax (can potentially increase the dose but he has had orthostasis). Will start timed toileting and encourage double voiding. Consider to get another UA if becomes symptomatic.  1/18 increased flomax to 0.8 now that BP is in better range to help with urinary retention.   4. DVT Prophylaxis: mechanical   5. GI  Prophylaxis: none; oral intake   6. Code: full   7. Disposition: home   8. ELOS:  7-10 days.          Jerri Hernandez MD  Physical Medicine & Rehabilitation    I spent a total of 40 minutes face-to-face or managing the care of Pete Sheldon. Over 50% of my time on the unit was spent counseling the patient and coordinating care. See note for details.

## 2018-01-20 NOTE — PLAN OF CARE
Problem: Patient Care Overview  Goal: Plan of Care/Patient Progress Review  PT: Pt SBA for all mobility without use of walker, continue to encourage independence, alarms on due to cognition.  Pt able to complete bed mobility and transfers IND, SBA with ambulation and stairs. Continue per POC.

## 2018-01-20 NOTE — PROGRESS NOTES
EMR reviewed.   B/P: 149/72, T: 96.4, P: 86, R: 16  Temp (24hrs), Av.5  F (35.8  C), Min:96.4  F (35.8  C), Max:96.6  F (35.9  C)    Labs reviewed.   Labile blood sugar: Endocrine on board.     No other acute or new medical concern reported.   Page with any Q  Patient not seen today.     Richard Quiñonez MD   Hospitalist (Internal Medicine)  [facility]  Pager: [phone].

## 2018-01-20 NOTE — PLAN OF CARE
Problem: Goal/Outcome  Goal: Goal Outcome Summary  FOCUS/GOAL  Bladder management and Medical management    ASSESSMENT, INTERVENTIONS AND CONTINUING PLAN FOR GOAL:  Patient is alert and oriented, but forgetful. Requires check-ins/cues for needs. Patient should be offered toileting every 2 hours to prevent incontinence. Patient was incontinent of urine once this shift and did have a continent bowel movement. PVR 278cc this shift. Writer collected urine sample for UA/UC. UA results available and UC pending at this time. Patient would like to wear a condom catheter at night for sleep promotion. Patient ambulates with a walker and stand by assist. Blood glucose levels this shift were 331 and 239. Endo called writer and inquired if patient ate anything in the middle of the night. She would like all snacks to be recorded. Writer passed onto oncoming shift. New orders noted for 5u of Lantus in the evening and an increase in Novolog prior to meals. Patient denied any difficulty with pain this shift. Staff will continue with plan of care.

## 2018-01-20 NOTE — PLAN OF CARE
FOCUS/GOAL  Bladder management, Medication management, Mobility and Cognition/Memory/Judgment/Problem solving    ASSESSMENT, INTERVENTIONS AND CONTINUING PLAN FOR GOAL:  Patient is alert and oriented x 4 but does require some cueing and demonstrates forgetfulness. Up to toilet with SBA + walker/gait belt. Prompted toileting q2-3 hours, no urinary incontinence this shift. Attempted to talk to patient about diabetic meds, but patient got confused and said he didn't really understand, said his granddaughter would help him upon discharge. Denies pain.

## 2018-01-21 LAB
GLUCOSE BLDC GLUCOMTR-MCNC: 105 MG/DL (ref 70–99)
GLUCOSE BLDC GLUCOMTR-MCNC: 110 MG/DL (ref 70–99)
GLUCOSE BLDC GLUCOMTR-MCNC: 147 MG/DL (ref 70–99)
GLUCOSE BLDC GLUCOMTR-MCNC: 172 MG/DL (ref 70–99)
GLUCOSE BLDC GLUCOMTR-MCNC: 239 MG/DL (ref 70–99)
GLUCOSE BLDC GLUCOMTR-MCNC: 76 MG/DL (ref 70–99)
GLUCOSE BLDC GLUCOMTR-MCNC: 88 MG/DL (ref 70–99)
GLUCOSE BLDC GLUCOMTR-MCNC: 95 MG/DL (ref 70–99)
GLUCOSE BLDC GLUCOMTR-MCNC: 96 MG/DL (ref 70–99)

## 2018-01-21 PROCEDURE — 25000132 ZZH RX MED GY IP 250 OP 250 PS 637: Performed by: PHYSICAL MEDICINE & REHABILITATION

## 2018-01-21 PROCEDURE — 25000132 ZZH RX MED GY IP 250 OP 250 PS 637: Performed by: NURSE PRACTITIONER

## 2018-01-21 PROCEDURE — 97127 ZZHC SP THERAPEUTIC INTERVENTION W/FOCUS ON COGNITIVE FUNCTION: CPT | Mod: GN | Performed by: SPEECH-LANGUAGE PATHOLOGIST

## 2018-01-21 PROCEDURE — 40000225 ZZH STATISTIC SLP WARD VISIT: Performed by: SPEECH-LANGUAGE PATHOLOGIST

## 2018-01-21 PROCEDURE — 40000133 ZZH STATISTIC OT WARD VISIT

## 2018-01-21 PROCEDURE — 00000146 ZZHCL STATISTIC GLUCOSE BY METER IP

## 2018-01-21 PROCEDURE — 97112 NEUROMUSCULAR REEDUCATION: CPT | Mod: GP

## 2018-01-21 PROCEDURE — 97530 THERAPEUTIC ACTIVITIES: CPT | Mod: GO

## 2018-01-21 PROCEDURE — 25000132 ZZH RX MED GY IP 250 OP 250 PS 637: Performed by: INTERNAL MEDICINE

## 2018-01-21 PROCEDURE — 12800006 ZZH R&B REHAB

## 2018-01-21 PROCEDURE — 97535 SELF CARE MNGMENT TRAINING: CPT | Mod: GO

## 2018-01-21 PROCEDURE — 97110 THERAPEUTIC EXERCISES: CPT | Mod: GP

## 2018-01-21 PROCEDURE — 97530 THERAPEUTIC ACTIVITIES: CPT | Mod: GP

## 2018-01-21 PROCEDURE — 40000193 ZZH STATISTIC PT WARD VISIT

## 2018-01-21 RX ADMIN — I-VITE, TAB 1000-60-2MG (60/BT) 1 TABLET: TAB at 07:55

## 2018-01-21 RX ADMIN — METFORMIN HYDROCHLORIDE 500 MG: 500 TABLET ORAL at 07:55

## 2018-01-21 RX ADMIN — METFORMIN HYDROCHLORIDE 500 MG: 500 TABLET ORAL at 17:18

## 2018-01-21 RX ADMIN — ATORVASTATIN CALCIUM 20 MG: 20 TABLET, FILM COATED ORAL at 07:55

## 2018-01-21 RX ADMIN — INSULIN GLARGINE 30 UNITS: 100 INJECTION, SOLUTION SUBCUTANEOUS at 09:43

## 2018-01-21 RX ADMIN — ACETAMINOPHEN 975 MG: 325 TABLET, FILM COATED ORAL at 14:49

## 2018-01-21 RX ADMIN — ACETAMINOPHEN 975 MG: 325 TABLET, FILM COATED ORAL at 20:49

## 2018-01-21 RX ADMIN — ASPIRIN 325 MG: 325 TABLET, DELAYED RELEASE ORAL at 07:55

## 2018-01-21 RX ADMIN — AMLODIPINE BESYLATE 10 MG: 10 TABLET ORAL at 07:55

## 2018-01-21 RX ADMIN — TAMSULOSIN HYDROCHLORIDE 0.8 MG: 0.4 CAPSULE ORAL at 19:18

## 2018-01-21 NOTE — PROGRESS NOTES
[facility], [place]   Physical Medicine and Rehabilitation Daily Note           Assessment and Plan of Care:   Pete Sheldon is a 77-year-old male who had an ischemic left thalamic stroke on 1/9 resulting in right hemiparesis, decreased balance, decreased visual acuity, and decreased independence with mobility and ADLs. Admission to acute inpatient rehab 1/12/18.     --Vitals stable; BP slightly elevated. No lab today. UA results reviewed; culture pending   --Continue ongoing medical management. Neck pain has resolved. PVRs still variable but in better range. Will not treat for now and f/u UC.   --Continue therapies and plan of care.    Pt continues to demonstrate instability with balance challenges and with quick turns/movements.  Also working on increasing tolerance to activity to decrease fatigue.     PT: Pt SBA for all mobility without use of walker, continue to encourage independence, alarms on due to cognition.  Pt able to complete bed mobility and transfers IND, SBA with ambulation and stairs. Continue per POC.          Interval history:   Doing well. Denies fever, chills, CP, SOB, N/V, abdominal pain, new pain or weakness/numbness/tingling. Waiting to watch game tonight.             Physical Exam:     Vitals:    01/20/18 0836 01/21/18 0628 01/21/18 0740 01/21/18 1022   BP: 149/72  168/71 134/73   BP Location: Left arm  Left arm Left arm   Pulse:   92    Resp:       Temp:   96.6  F (35.9  C)    TempSrc:   Oral    SpO2:   93%    Weight:  73.2 kg (161 lb 6.4 oz)     Height:         Gen: NAD, sitting in chair   Heart: RRR  Lungs: clear breath sounds b/l  Abd: soft and non-tender  Ext: wwp, no edema in BLE, no tenderness in calves  MSK/neuro: alert and oriented. speech dysarthric. Strength seems to be symmetric. Unable to recall details and not fully aware of the situation.          Data:   Scheduled meds    insulin glargine  30 Units Subcutaneous Q24H     insulin aspart  7 Units  Subcutaneous Daily with breakfast     tamsulosin  0.8 mg Oral QPM     metFORMIN  500 mg Oral BID w/meals     insulin aspart  5 Units Subcutaneous Daily with lunch     insulin aspart  7 Units Subcutaneous Daily with supper     menthol   Transdermal Q8H     insulin aspart  1-10 Units Subcutaneous TID AC     insulin aspart  1-7 Units Subcutaneous At Bedtime     aspirin EC  325 mg Oral Daily     amLODIPine  10 mg Oral Daily     atorvastatin  20 mg Oral Daily     multivitamin  1 tablet Oral Daily       PRN meds:  insulin aspart, acetaminophen, menthol **AND** menthol **AND** menthol, senna-docusate, bisacodyl, glucose **OR** dextrose **OR** glucagon      Jerri Hernandez MD  Physical Medicine and Rehabilitation     I spent a total of 20 minutes face-to-face and managing the care of Pete Sheldon. Over 50% of my time on the unit was spent counseling the patient and coordinating care. See note for details.

## 2018-01-21 NOTE — PROGRESS NOTES
Diabetes Consult Daily  Progress Note          Assessment/Plan:    Pete Sheldon is a 77 year old male with history of type 2 diabetes, hypertension, dyslipidemia, transferred from [facility] for left thalamic stroke (1/9/2018).     Type 2 diabetic: PTA medications, Metformin 1000 mg twice a day, Lantus 32 units in morning and Humalog 2/2/2 with breakfast/lunch/dinner respectively.  In addition to that he is on correctional scale 1 unit- 25 >140 but he is not using it.               Plan  Continue metformin at 500 mg twice daily  Decrease the dose of Lantus to 30 units in the morning and D/C evening Lantus   NovoLog 7 units for breakfast, 5 units for lunch and 7 units for dinner.  Continue same NovoLog high-dose correction scale (1 unit per 20 > 140 before meals and > 200 HS)  Monitor blood glucose before meals, at bedtime and at 2 AM  Yesterday, I discussed with the nurse the importance of documenting in the chart whether and when the patient is having snacks, as this will help us determine his insulin requirements.             Interval History:   The last 24 hours progress and nursing notes reviewed.  The patient denies any specific complaints.      Metformin was started 2 days ago, in preparation for discharge next week.  Yesterday, the patient received 5 units of Lantus, in an effort to try to improve his morning blood sugar.  She developed a mild hypoglycemic episode of 76 at 2:30 AM, which was corrected by the nurse.  Of note that the patient was asymptomatic.    I suspect the morning hypoglycemic episodes are due to snacking in the evening/night.  The patient has impaired memory and poor recollection of recent events.      Recent Labs  Lab 01/21/18  1205 01/21/18  0736 01/21/18  0331 01/21/18  0238 01/20/18  2133 01/20/18  1615  01/20/18  0548  01/17/18  0703  01/16/18  0622  01/15/18  0603   GLC  --   --   --   --   --   --   --  266*  --  95  --  115*  --  198*   *  "147* 95 76 96 88  < >  --   < >  --   < >  --   < >  --    < > = values in this interval not displayed.            Review of Systems:   See interval hx          Medications:       Active Diet Order      High Consistent CHO Diet     Physical Exam:  Gen: Alert, NAD   HEENT: , mucous membranes are moist  Resp: Unlabored  Ext: moving all extremities   Neuro:oriented x3, communicating clearly  /73 (BP Location: Left arm)  Pulse 92  Temp 96.6  F (35.9  C) (Oral)  Resp 16  Ht 1.702 m (5' 7\")  Wt 73.2 kg (161 lb 6.4 oz)  SpO2 93%  BMI 25.28 kg/m2           Data:     Lab Results   Component Value Date    A1C 7.9 01/09/2018    A1C 7.6 11/29/2017    A1C 7.8 08/16/2017    A1C 8.3 05/12/2017    A1C 7.9 02/10/2017              "

## 2018-01-21 NOTE — PLAN OF CARE
Problem: Patient Care Overview  Goal: Plan of Care/Patient Progress Review  pt c/o headache while supine in bed upon therapist approach. Nursing present for medication mgmt. pt seems motivated and agreeable to PM OT session at bedside. Pt completes simple money mgmt task with min vc for problem solving. Requires additional physical assist for problem solving multi-step tasks. Pt tolerates well with simple money mgmt task.

## 2018-01-21 NOTE — PLAN OF CARE
FOCUS/GOAL  Bladder management, Nutrition/Feeding/Swallowing precautions, Mobility and Cognition/Memory/Judgment/Problem solving    ASSESSMENT, INTERVENTIONS AND CONTINUING PLAN FOR GOAL:  Patient is alert/oriented x 4, but is somewhat forgetful. Some difficulty initiating tasks and solving problems, requires minimal cueing. Ambulates with SBA w/o assistive device. Some instability noted, close SBA provided for safety. Patient remarked that he feels a little uncomfortable without his walker. Prompted toileting q2h, no urinary incontinence this shift. Condom cath applied at night. Some inappropriate call light usage after hs, pressing button frequently, but patient unsure of why he called. Patient was still hungry after eating his dinner of pizza, apple juice, and pudding, 90g CHO. Called dietary to order more food, but had nearly reached carb limit. Spoke a little bit about food choices, identification of carbohydrates, getting enough protein. Provided HS snack, two pieces of toast with PB&J. No sliding scale coverage required this shift. Denies pain.

## 2018-01-21 NOTE — PLAN OF CARE
Problem: Individualization  Goal: Patient Individualization  Alert and oriented X3. Slept well. BG 76 at 0200. Was given 1 cap of apple juice and 1 packet of gram-crackers. BG at 0330 was 95. Denied having pain or discomfort. Patient is MOD I in room. Will continue to monitor.

## 2018-01-21 NOTE — PLAN OF CARE
Problem: Patient Care Overview  Goal: Plan of Care/Patient Progress Review  Discharge Planner PT   Patient plan for discharge: Home with family  Current status: Pt continues to demonstrate instability with balance challenges and with quick turns/movements.  Also working on increasing tolerance to activity to decrease fatigue.   Barriers to return to prior living situation: Need for supervision for safety with mobility  Recommendations for discharge: Home with assist from family and OP PT  Rationale for recommendations: Would benefit from OP PT to progress balance and IND with functional mobility       Entered by: Ainsley [place] 01/21/2018 12:09 PM

## 2018-01-21 NOTE — PLAN OF CARE
Problem: Goal/Outcome  Goal: Goal Outcome Summary  FOCUS/GOAL  Bladder management and Medical management    ASSESSMENT, INTERVENTIONS AND CONTINUING PLAN FOR GOAL:  Patient is alert and oriented, but very forgetful. Needs cues for sequencing and memory. Patient toileted every two hours throughout the day; no incontinence noted. PVR 18cc and 310cc. Urine culture continues to be pending. Patient did have a small, continent bowel movement today as well. Continues to wear condom catheter at night; writer removed this morning. Skin intact, no irritation. BP this morning was 168/71 and then 134/73 upon reassessment. Blood glucose levels today were 147 and 105. HS Lantus dose discontinued due to 0200 BG level. Patient's spouse requested update, so writer did call her back and provide most recent blood glucose results and current insulin regimen. Patient ambulates with SBA without assistive device. Patient requested PRN Tylenol for headache at end of shift. Nursing will continue with plan of care.

## 2018-01-21 NOTE — PLAN OF CARE
Problem: Patient Care Overview  Goal: Plan of Care/Patient Progress Review  Outcome: Therapy, progress toward functional goals as expected  SLP: pt demonstrates slow decision making/thought processing.  Needing moderate assist with familiar task with reasoning (ny), as well as recalling directions,  Noting improved ability to write with dominant hand. Able to read/comprehend short written paragraph with enlarged type.

## 2018-01-21 NOTE — PROGRESS NOTES
EMR reviewed.   No concern reported  Noted UA sent. UC pending. Pt w condom catheter. Monitor off abx for now unless s/s of sepsis.   Endo managing labile diabetes.   Will fu peripherally for now  Page with HARDIK Quiñonez MD   Hospitalist (Internal Medicine)  [facility]  Pager: [phone].

## 2018-01-22 LAB
ANION GAP SERPL CALCULATED.3IONS-SCNC: 6 MMOL/L (ref 3–14)
BUN SERPL-MCNC: 32 MG/DL (ref 7–30)
CALCIUM SERPL-MCNC: 8.8 MG/DL (ref 8.5–10.1)
CHLORIDE SERPL-SCNC: 102 MMOL/L (ref 94–109)
CO2 SERPL-SCNC: 29 MMOL/L (ref 20–32)
CREAT SERPL-MCNC: 1.36 MG/DL (ref 0.66–1.25)
CRP SERPL-MCNC: 5.7 MG/L (ref 0–8)
ERYTHROCYTE [DISTWIDTH] IN BLOOD BY AUTOMATED COUNT: 12.7 % (ref 10–15)
GFR SERPL CREATININE-BSD FRML MDRD: 50 ML/MIN/1.7M2
GLUCOSE BLDC GLUCOMTR-MCNC: 117 MG/DL (ref 70–99)
GLUCOSE BLDC GLUCOMTR-MCNC: 122 MG/DL (ref 70–99)
GLUCOSE BLDC GLUCOMTR-MCNC: 140 MG/DL (ref 70–99)
GLUCOSE BLDC GLUCOMTR-MCNC: 172 MG/DL (ref 70–99)
GLUCOSE BLDC GLUCOMTR-MCNC: 181 MG/DL (ref 70–99)
GLUCOSE SERPL-MCNC: 177 MG/DL (ref 70–99)
HCT VFR BLD AUTO: 42.1 % (ref 40–53)
HGB BLD-MCNC: 13.6 G/DL (ref 13.3–17.7)
MCH RBC QN AUTO: 30.6 PG (ref 26.5–33)
MCHC RBC AUTO-ENTMCNC: 32.3 G/DL (ref 31.5–36.5)
MCV RBC AUTO: 95 FL (ref 78–100)
PLATELET # BLD AUTO: 369 10E9/L (ref 150–450)
POTASSIUM SERPL-SCNC: 5.1 MMOL/L (ref 3.4–5.3)
RBC # BLD AUTO: 4.44 10E12/L (ref 4.4–5.9)
SODIUM SERPL-SCNC: 137 MMOL/L (ref 133–144)
WBC # BLD AUTO: 8.4 10E9/L (ref 4–11)

## 2018-01-22 PROCEDURE — 00000146 ZZHCL STATISTIC GLUCOSE BY METER IP

## 2018-01-22 PROCEDURE — 97116 GAIT TRAINING THERAPY: CPT | Mod: GP

## 2018-01-22 PROCEDURE — 85027 COMPLETE CBC AUTOMATED: CPT | Performed by: INTERNAL MEDICINE

## 2018-01-22 PROCEDURE — 25000128 H RX IP 250 OP 636

## 2018-01-22 PROCEDURE — 12800006 ZZH R&B REHAB

## 2018-01-22 PROCEDURE — 86140 C-REACTIVE PROTEIN: CPT | Performed by: INTERNAL MEDICINE

## 2018-01-22 PROCEDURE — 25000132 ZZH RX MED GY IP 250 OP 250 PS 637: Performed by: NURSE PRACTITIONER

## 2018-01-22 PROCEDURE — 40000133 ZZH STATISTIC OT WARD VISIT

## 2018-01-22 PROCEDURE — 25000132 ZZH RX MED GY IP 250 OP 250 PS 637: Performed by: PHYSICAL MEDICINE & REHABILITATION

## 2018-01-22 PROCEDURE — 97535 SELF CARE MNGMENT TRAINING: CPT | Mod: GO

## 2018-01-22 PROCEDURE — 97530 THERAPEUTIC ACTIVITIES: CPT | Mod: GO

## 2018-01-22 PROCEDURE — 97127 ZZHC SP THERAPEUTIC INTERVENTION W/FOCUS ON COGNITIVE FUNCTION: CPT | Mod: GN | Performed by: SPEECH-LANGUAGE PATHOLOGIST

## 2018-01-22 PROCEDURE — 25000132 ZZH RX MED GY IP 250 OP 250 PS 637: Performed by: INTERNAL MEDICINE

## 2018-01-22 PROCEDURE — 97112 NEUROMUSCULAR REEDUCATION: CPT | Mod: GP

## 2018-01-22 PROCEDURE — 40000193 ZZH STATISTIC PT WARD VISIT

## 2018-01-22 PROCEDURE — 40000225 ZZH STATISTIC SLP WARD VISIT: Performed by: SPEECH-LANGUAGE PATHOLOGIST

## 2018-01-22 PROCEDURE — 99232 SBSQ HOSP IP/OBS MODERATE 35: CPT | Performed by: INTERNAL MEDICINE

## 2018-01-22 PROCEDURE — 80048 BASIC METABOLIC PNL TOTAL CA: CPT | Performed by: INTERNAL MEDICINE

## 2018-01-22 PROCEDURE — 36415 COLL VENOUS BLD VENIPUNCTURE: CPT | Performed by: INTERNAL MEDICINE

## 2018-01-22 RX ORDER — SODIUM CHLORIDE 9 MG/ML
INJECTION, SOLUTION INTRAVENOUS
Status: COMPLETED
Start: 2018-01-22 | End: 2018-01-23

## 2018-01-22 RX ADMIN — ATORVASTATIN CALCIUM 20 MG: 20 TABLET, FILM COATED ORAL at 08:05

## 2018-01-22 RX ADMIN — Medication 500 ML: at 23:11

## 2018-01-22 RX ADMIN — TAMSULOSIN HYDROCHLORIDE 0.8 MG: 0.4 CAPSULE ORAL at 21:25

## 2018-01-22 RX ADMIN — INSULIN GLARGINE 30 UNITS: 100 INJECTION, SOLUTION SUBCUTANEOUS at 08:11

## 2018-01-22 RX ADMIN — METFORMIN HYDROCHLORIDE 500 MG: 500 TABLET ORAL at 08:05

## 2018-01-22 RX ADMIN — ASPIRIN 325 MG: 325 TABLET, DELAYED RELEASE ORAL at 08:05

## 2018-01-22 RX ADMIN — METFORMIN HYDROCHLORIDE 500 MG: 500 TABLET ORAL at 17:15

## 2018-01-22 RX ADMIN — AMLODIPINE BESYLATE 10 MG: 10 TABLET ORAL at 08:06

## 2018-01-22 RX ADMIN — I-VITE, TAB 1000-60-2MG (60/BT) 1 TABLET: TAB at 08:06

## 2018-01-22 RX ADMIN — SODIUM CHLORIDE 500 ML: 9 INJECTION, SOLUTION INTRAVENOUS at 23:11

## 2018-01-22 NOTE — PLAN OF CARE
Problem: Goal/Outcome  Goal: Goal Outcome Summary  Up in room and to BR with SBA. Calls for assist when needed. Bed /Chair alarms on for safety. Continent of bowel and bladder this shift. Offered toileting q 2 hours. Good appetite tolerating diet. Minimal right sided weakness.Slight numbness of right hand. Denies any pain this shift.

## 2018-01-22 NOTE — PLAN OF CARE
Problem: Patient Care Overview  Goal: Plan of Care/Patient Progress Review  Pt seen for speech tx to focus on cognition and higher level word finding. With reading written info - needs increased time for processing and often needs to re-read- but at 100% accuracy for paragraph level info. With memory recall - visual on a pictured scene- accuracy is at 65% even with verbal review of the items prior. Completed I-pad tasks- with min cues - pt able to recall the directions on game Flow free that he had completed in prior sessions- able to advance to 8x8 grid- but needs increased time for pre-planning and problem solving- able to correct when errors in path making pointed out to him

## 2018-01-22 NOTE — PROGRESS NOTES
Diabetes Consult Daily  Progress Note          Assessment/Plan:    Pete Sheldon is a 77 year old male with history of type 2 diabetes, hypertension, dyslipidemia, admitted to ARU from [facility] after transfer from [facility] with left thalamic stroke (1/9/2018).     Glucose trending in target since glargine reduced/metformin started.              Plan  Continue metformin  mg twice daily  Continue morning Lantus 30 units  Continue NovoLog 7 units for breakfast, 5 units for lunch and 7 units for dinner.  Continue same NovoLog high-dose correction scale (1 unit per 20 > 140 before meals and > 200 HS) for use while in hospital.  This would not be included on discharge.  Monitor blood glucose before meals, at bedtime and at 2 AM  Carbohydrate counting and documentation by nursing    Outpatient follow up with Dr. Damian, endocrinology, as scheduled in March.  Or earlier diabetes visit w/ PCP/Nati    Diabetes mgmt team will sign off from daily visits- discussed with primary team ([id number]).  Please page if pt's needs change and we'll be happy to review prior to discharge.             Interval History:   The last 24 hours progress and nursing notes reviewed.  Discharge goal date noted: 1/26    Pt and wife met with diabetes educ.  Goal for fixed meal dosing aspart without sliding scale.  Has been back on metformin and on fixed meal doses aspart.  Eating seems fairly steady.  Some issues with snacks have been noted, but minimal fluctuation in BG since 1/20 in the afternoon.    Last three eGFRs around 60.     PTA medications, Metformin 1000 mg twice a day, Lantus 32 units in morning and Humalog 2/2/2 with breakfast/lunch/dinner respectively.  In addition to that he is on correctional scale 1 unit- 25 >140 but he is not using it.       Recent Labs  Lab 01/22/18  1159 01/22/18  0746 01/22/18  0159 01/21/18  2049 01/21/18  1703 01/21/18  1205  01/20/18  0548  01/17/18  0703   "01/16/18  0622   GLC  --   --   --   --   --   --   --  266*  --  95  --  115*   * 117* 140* 110* 172* 105*  < >  --   < >  --   < >  --    < > = values in this interval not displayed.            Review of Systems:   See interval hx          Medications:       Active Diet Order      High Consistent CHO Diet     Physical Exam:  Gen: Alert, NAD, up in chair, on phone  HEENT: , mucous membranes are moist  Resp: Unlabored  Neuro:oriented x3, communicating clearly  /77 (BP Location: Left arm)  Pulse 89  Temp 97.4  F (36.3  C) (Oral)  Resp 15  Ht 1.702 m (5' 7\")  Wt 73.2 kg (161 lb 6.4 oz)  SpO2 94%  BMI 25.28 kg/m2           Data:     Lab Results   Component Value Date    A1C 7.9 01/09/2018    A1C 7.6 11/29/2017    A1C 7.8 08/16/2017    A1C 8.3 05/12/2017    A1C 7.9 02/10/2017            Recent Labs   Lab Test  01/20/18   0548  01/17/18   0703   NA  136  139   POTASSIUM  4.3  4.1   CHLORIDE  102  103   CO2  26  32   ANIONGAP  8  4   GLC  266*  95   BUN  31*  22   CR  1.17  1.20   MANAV  8.0*  8.1*     CBC RESULTS:   Recent Labs   Lab Test  01/20/18   0548   WBC  8.6   RBC  4.25*   HGB  13.2*   HCT  40.3   MCV  95   MCH  31.1   MCHC  32.8   RDW  12.7   PLT  299     Daly Ivan APRN [facility] [phone]  Diabetes Management Team job code: [id number]        "

## 2018-01-22 NOTE — PLAN OF CARE
FOCUS/GOAL  Bladder management, Pain management, Mobility and Cognition/Memory/Judgment/Problem solving    ASSESSMENT, INTERVENTIONS AND CONTINUING PLAN FOR GOAL:  Patient A/O x 4 but forgetful, requires cueing at times for sequencing. Prompted toileting, no urinary incontinence this shift. Condom catheter on at hs. Complained of headache pain, gave PRN tylenol with moderate relief. Ambulating with SBA, some instability.

## 2018-01-22 NOTE — PROGRESS NOTES
EMR reviewed.   No concern reported  No fever or chills.   No cp or sob  Denies dysuria.       On exam:     B/P: 144/77, T: 97.4, P: 89, R: 15  Temp (24hrs), Av.1  F (36.2  C), Min:96.8  F (36  C), Max:97.4  F (36.3  C)    Gen: NAD  Respi: bl clear. Non labored.   CVS: s1s2 regular.   Abd: soft non tender.   Ext: distally wwf.   Neuro: alert oriented.     Labs, imaging reviewed.   Medications reviewed.       EXAMINATION: Renal ultrasound, 2018 11:57 AM      IMPRESSION:  1.  1.6 cm simple cyst in  mid lateral pole the right kidney.  Otherwise normal renal ultrasound.      A &P:    77 year old year old male with hx of DM II, HTN, Renal Calculi is admitted to  ARU on  for rehabilitation for stroke    # ? UTI:  Noted rehab team checked UA, UC growing aerococcus urinae, final c/s pending. Patient remains afebrile and asymptomatic. Patient had condom catheter.   PVR better per rehab note. Fu.   Recent UC with same organism, tt with Keflex 500 mg BID x 7 days (stop date )     Paged Dr Gissell rogel regarding tt recommendations, a/w response.       # Tachycardia, SOB: resolved.    EKG showed normal sinus rhythm with sinus arrhythmia with no ST-T wave changes.    XR chest on  with no acute opacities  No concern at current.     # JAYA:  Most likely pre renal disease due to hypovolemia (poor po intake, HCTZ use).  Found to be orthostatic on . Creatinine improved with IV fluids.     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    - Continue Amlodipine 10 with hold parameters  - Long term goal < 140/90 if possible      # DM:   - Management per Endocrinology      Richard Quiñonez MD   Hospitalist (Internal  Medicine)  [facility]  Pager: [phone].

## 2018-01-22 NOTE — PLAN OF CARE
Problem: Patient Care Overview  Goal: Plan of Care/Patient Progress Review  PT: pt ambulated in tunnel 375' x 2 with no AD, verbal cuing to slow and control speed during declines for safety; pt engaged in dynamic balance activities, pt demo balance instability with SLS on R > L, needing UE support for balance; Cont with POC.

## 2018-01-22 NOTE — PROGRESS NOTES
[facility], [place]    Physical Medicine and Rehabilitation Daily Note     Assessment and Plan:    Pete Sheldon is a 77-year-old male who had an ischemic left thalamic stroke on 1/9 resulting in right hemiparesis, decreased balance, decreased visual acuity, and decreased independence with mobility and ADLs. Admission to acute inpatient rehab 1/12/18.     Interval History:    Patient seen on rounds, making gains with therapies.  culture results pending. Glu 105 -172. Endocrinology following for BS management. Some difficulties with comprehension, expression, memory and problem solving. SBA with transfers. Needs some asst with dressing. Ambulates with SBA and no AD. Continue therapies and discharge planning.            Review of Systems:    8 systems reviewed, see interval history for details.     Medications:    See chart     Physical Exam:      All vitals stable  Constitutional: Alert, NAD    Lungs:  Clear    Cardiovascular:  RRR   Abdomen: Soft    Genitounirinary:  See above, condom cath.    Musculoskeletal: No joint swelling or redness    Neurologic:  No new focal changes    Total time spent >25 min with chart review, patient exam, rehab plan and dictation. >50% time spent face to face and with patient care coordination.       ART ESPANA

## 2018-01-22 NOTE — PROGRESS NOTES
Addendum:     Talked to Dr Borrero about UC: Aerococcus sp.   Recommended to check cbc, crp.   If any e/o sepsis, start empiric iv Vancomycin, pending final c/s.     Noted Cr 1.36.   Ordered 500 ml nss bolus  Holding metformin    Rest as prior.     Richard Quiñonez MD   Hospitalist (Internal Medicine)  [facility]  Pager: [phone].

## 2018-01-23 LAB
ANION GAP SERPL CALCULATED.3IONS-SCNC: 7 MMOL/L (ref 3–14)
BUN SERPL-MCNC: 26 MG/DL (ref 7–30)
CALCIUM SERPL-MCNC: 8.3 MG/DL (ref 8.5–10.1)
CHLORIDE SERPL-SCNC: 107 MMOL/L (ref 94–109)
CO2 SERPL-SCNC: 26 MMOL/L (ref 20–32)
CREAT SERPL-MCNC: 1.16 MG/DL (ref 0.66–1.25)
GFR SERPL CREATININE-BSD FRML MDRD: 61 ML/MIN/1.7M2
GLUCOSE BLDC GLUCOMTR-MCNC: 132 MG/DL (ref 70–99)
GLUCOSE BLDC GLUCOMTR-MCNC: 282 MG/DL (ref 70–99)
GLUCOSE BLDC GLUCOMTR-MCNC: 64 MG/DL (ref 70–99)
GLUCOSE BLDC GLUCOMTR-MCNC: 72 MG/DL (ref 70–99)
GLUCOSE BLDC GLUCOMTR-MCNC: 84 MG/DL (ref 70–99)
GLUCOSE SERPL-MCNC: 158 MG/DL (ref 70–99)
POTASSIUM SERPL-SCNC: 4.4 MMOL/L (ref 3.4–5.3)
SODIUM SERPL-SCNC: 140 MMOL/L (ref 133–144)

## 2018-01-23 PROCEDURE — 99232 SBSQ HOSP IP/OBS MODERATE 35: CPT | Performed by: INTERNAL MEDICINE

## 2018-01-23 PROCEDURE — 97530 THERAPEUTIC ACTIVITIES: CPT | Mod: GP

## 2018-01-23 PROCEDURE — 36415 COLL VENOUS BLD VENIPUNCTURE: CPT | Performed by: INTERNAL MEDICINE

## 2018-01-23 PROCEDURE — 97112 NEUROMUSCULAR REEDUCATION: CPT | Mod: GP

## 2018-01-23 PROCEDURE — 00000146 ZZHCL STATISTIC GLUCOSE BY METER IP

## 2018-01-23 PROCEDURE — 40000193 ZZH STATISTIC PT WARD VISIT

## 2018-01-23 PROCEDURE — 97530 THERAPEUTIC ACTIVITIES: CPT | Mod: GP | Performed by: PHYSICAL THERAPIST

## 2018-01-23 PROCEDURE — 97535 SELF CARE MNGMENT TRAINING: CPT | Mod: GO

## 2018-01-23 PROCEDURE — 25000132 ZZH RX MED GY IP 250 OP 250 PS 637: Performed by: INTERNAL MEDICINE

## 2018-01-23 PROCEDURE — 25000132 ZZH RX MED GY IP 250 OP 250 PS 637: Performed by: PHYSICAL MEDICINE & REHABILITATION

## 2018-01-23 PROCEDURE — 80048 BASIC METABOLIC PNL TOTAL CA: CPT | Performed by: INTERNAL MEDICINE

## 2018-01-23 PROCEDURE — 99207 ZZC CDG-MDM COMPONENT: MEETS LOW - DOWN CODED: CPT | Performed by: INTERNAL MEDICINE

## 2018-01-23 PROCEDURE — 97110 THERAPEUTIC EXERCISES: CPT | Mod: GP | Performed by: PHYSICAL THERAPIST

## 2018-01-23 PROCEDURE — 97112 NEUROMUSCULAR REEDUCATION: CPT | Mod: GO

## 2018-01-23 PROCEDURE — 40000225 ZZH STATISTIC SLP WARD VISIT: Performed by: SPEECH-LANGUAGE PATHOLOGIST

## 2018-01-23 PROCEDURE — 97127 ZZHC SP THERAPEUTIC INTERVENTION W/FOCUS ON COGNITIVE FUNCTION: CPT | Mod: GN | Performed by: SPEECH-LANGUAGE PATHOLOGIST

## 2018-01-23 PROCEDURE — 97116 GAIT TRAINING THERAPY: CPT | Mod: GP

## 2018-01-23 PROCEDURE — 40000193 ZZH STATISTIC PT WARD VISIT: Performed by: PHYSICAL THERAPIST

## 2018-01-23 PROCEDURE — 97530 THERAPEUTIC ACTIVITIES: CPT | Mod: GO

## 2018-01-23 PROCEDURE — 12800006 ZZH R&B REHAB

## 2018-01-23 PROCEDURE — 40000133 ZZH STATISTIC OT WARD VISIT

## 2018-01-23 RX ORDER — LISINOPRIL 2.5 MG/1
2.5 TABLET ORAL DAILY
Status: DISCONTINUED | OUTPATIENT
Start: 2018-01-23 | End: 2018-01-27 | Stop reason: HOSPADM

## 2018-01-23 RX ADMIN — TAMSULOSIN HYDROCHLORIDE 0.8 MG: 0.4 CAPSULE ORAL at 19:07

## 2018-01-23 RX ADMIN — INSULIN GLARGINE 30 UNITS: 100 INJECTION, SOLUTION SUBCUTANEOUS at 08:36

## 2018-01-23 RX ADMIN — ASPIRIN 325 MG: 325 TABLET, DELAYED RELEASE ORAL at 08:33

## 2018-01-23 RX ADMIN — LISINOPRIL 2.5 MG: 2.5 TABLET ORAL at 13:59

## 2018-01-23 RX ADMIN — AMLODIPINE BESYLATE 10 MG: 10 TABLET ORAL at 08:34

## 2018-01-23 RX ADMIN — MENTHOL 1 PATCH: 205.5 PATCH TOPICAL at 08:40

## 2018-01-23 RX ADMIN — I-VITE, TAB 1000-60-2MG (60/BT) 1 TABLET: TAB at 08:33

## 2018-01-23 RX ADMIN — ATORVASTATIN CALCIUM 20 MG: 20 TABLET, FILM COATED ORAL at 08:34

## 2018-01-23 NOTE — PROGRESS NOTES
"CLINICAL NUTRITION SERVICES - REASSESSMENT NOTE     Nutrition Prescription    RECOMMENDATIONS FOR MDs/PROVIDERS TO ORDER:  Consider OP diabetes education referral with a DM educator/RD if patient/wife interested in further education after discharge.       Malnutrition Status:    -Pt does not meet 2 criteria for dx malnutrition.      Recommendations already ordered by Registered Dietitian (RD):  -Ordered Crystal Light raspberry at 10 am and 2 pm daily.    Future/Additional Recommendations:  -Continue to encourage fluid intakes.  -Provide additional diet education if requested before discharge.     EVALUATION OF THE PROGRESS TOWARD GOALS   Diet: High Consistent CHO  Room Service with Assist    Intake: PO intakes have been 100% of recorded meals.  Good appetite per nursing notes.  Needs set up assistance at meals.       NEW FINDINGS   Pt and wife were seen by RD on 1/19 per pt/family request consult and Consistent Carbohydrate Diet was reviewed.  See education flow sheet for further details.      Last Available weight (1/21):  161 lbs 6.4 oz    ARU Admission weight (1/12)  149 lbs (likely inaccurate)  Hospital Admission weight (1/9) 155 lbs 9.6 oz    Received  mL bolus on 1/22 per chart review-Cr 1.36.  Encouraging fluid intakes.  (1/23)  BUN 26 Cr 1.16    Endocrine signed off.    Now on Novolog 5 Units with lunch and 7 Units with breakfast and supper.  Novolog Sliding Scale (High Resistance Dosing)  Lantus  Metformin on Hold    Per progress notes: \"noted rehab team checked UA, UC growing aerococcus urinae, final c/s pending.\"    MALNUTRITION  % Intake: No decreased intake noted  % Weight Loss: None noted  Subcutaneous Fat Loss: None observed  Muscle Loss: None observed  Fluid Accumulation/Edema: None noted  Malnutrition Diagnosis: Patient does not meet two of the above criteria necessary for diagnosing malnutrition    Previous Goals   Pt and wife will be able to identify CHO containing foods and plan Consistent " CHO meals.    Evaluation: Met-diet education was provided to pt and wife on 1/19.      Previous Nutrition Diagnosis  Food and nutrition-related knowledge deficit related to limited recent diet education/retention as evidenced by chart review and nutrition consult for diet education before discharge  Evaluation: Improving/updated to more pertinent dx below.    CURRENT NUTRITION DIAGNOSIS  Predicted suboptimal nutrient intake (fluid) related to requiring much encouragement to drink and intermittent IV fluids.    INTERVENTIONS  Implementation  Nutrition Education:  Encouraged fluid intakes.  Discussed with RN.    Ordered Crystal Light raspberry flavor at 10 am and 2 pm daily per RN agreement.    Goals  Adequate hydration per weights,labs and clinical S&Sx.    Monitoring/Evaluation  Progress toward goals will be monitored and evaluated per protocol.    Alise Newman RD, LD

## 2018-01-23 NOTE — PLAN OF CARE
Problem: Individualization  Goal: Patient Individualization  Outcome: No Change  FOCUS/GOAL  Bladder management and Pain management    ASSESSMENT, INTERVENTIONS AND CONTINUING PLAN FOR GOAL:  Pt slept well, had incontinence episode x2, staff had to do a complete bed change. Pt needs assistance with placement of urinal. Denies pain or discomfort. NSG to continue monitoring.

## 2018-01-23 NOTE — PLAN OF CARE
Problem: Patient Care Overview  Goal: Plan of Care/Patient Progress Review  Compelted a written divided attention task- pt generally able to locate which item to connect to next ( ie: was alternating with letters and numbers in sequence)- but clinician providing some questions to cue pt ot think through whether it was an number or letter that he needed to advance to. With memory recall task- recall of 4 words and then answering a questiin that relates to word order- pt heard heach word read 2x each- cued to use strategies of rehearsal and visualization to aid in recall- pt able to compelte with 4/4 accuracy. Plan for pt d/c to home on Friday 1/16-- recommending continued HH sptx following d/c

## 2018-01-23 NOTE — PLAN OF CARE
Problem: Patient Care Overview  Goal: Plan of Care/Patient Progress Review  Pt seen for cognitive therapy targeting problem solving and memory. Pt demonstrated need for increased processing time during tasks given by SLP. Pt demonstrated accurate recall of directions from previous session to solve problem solving tasks. In a task in which the pt was required to solve a visual problem with planning on a 5x5 grid, he did so independently with 90% accuracy. Given min verbal cues, increased to 100% accurate. SLP increased task difficulty to 6x6 grid and pt was accurate 60% of the time at this level. Mistakes due to reduced attention for the complete playing area. SLP attempted 8x8 grid this date however, this level was too difficult and he required mod/max verbal cues to complete. Pt increasingly aware of his deficits following stroke.

## 2018-01-23 NOTE — PLAN OF CARE
Problem: Goal/Outcome  Goal: Goal Outcome Summary  Connected with wife per her request regarding transportation home. When talking with his wife, she shared that she was unsure if pt would d/c Friday because Pete has been expressing concerns that he doesn't feel ready to d/c. MD bo is aware of his concerns and they are planning to talk further tomorrow. Conversation ended abruptly because pts wife was getting another call in and needed to hang up. Encouraged her to call with any additional concerns.

## 2018-01-23 NOTE — PROGRESS NOTES
"Patient seen and examined     S- no new complaints. Denies any dizziness.,   Denies any dysuria/ frequency/ fever      4 point ROS done and neg unless mentioned     O-   /77 (BP Location: Left arm)  Pulse 85  Temp 95.1  F (35.1  C) (Oral)  Resp 17  Ht 1.702 m (5' 7\")  Wt 73.2 kg (161 lb 6.4 oz)  SpO2 92%  BMI 25.28 kg/m2   Gen- pleasant sitting on chair  HEENT- NAD  Neck- supple  CVS- I+II+ no m/r/g  RS- CTAB  Abdo- soft, no tenderness . No g/r/r  Ext- no edema   CNS- as per PMR      LABS:   BMP  Recent Labs  Lab 01/23/18  0753 01/22/18  1521 01/20/18  0548 01/17/18  0703    137 136 139   POTASSIUM 4.4 5.1 4.3 4.1   CHLORIDE 107 102 102 103   MANAV 8.3* 8.8 8.0* 8.1*   CO2 26 29 26 32   BUN 26 32* 31* 22   CR 1.16 1.36* 1.17 1.20   * 177* 266* 95     CBC  Recent Labs  Lab 01/22/18  1521 01/20/18  0548   WBC 8.4 8.6   RBC 4.44 4.25*   HGB 13.6 13.2*   HCT 42.1 40.3   MCV 95 95   MCH 30.6 31.1   MCHC 32.3 32.8   RDW 12.7 12.7    299   UC: Aerococcus sp  A/P:  77 year old year old male with hx of DM II, HTN, Renal Calculi is admitted to [facility] on 01/12 for rehabilitation for stroke     # ? UTI:  Noted rehab team checked UA, UC growing aerococcus urinae, final c/s pending. Patient remains afebrile and asymptomatic. Patient had condom catheter.   PVR better per rehab note. Fu.   Recent UC with same organism, tt with Keflex 500 mg BID x 7 days (stop date 1/16)   D/W PMR 1/23 who will clarify with Dr Borrero regarding tt recommendations as pt asymptomatic.       # Tachycardia, SOB: resolved.     # JAYA:  Most likely pre renal disease due to hypovolemia (poor po intake, HCTZ use).  Found to be orthostatic on 01/14. Creatinine improved with IV fluids.      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 1/23 Add Lisinopril (will monitor Cr and K closely)      # DM:   - Management per Endocrinology    Abdoulaye Beltrán MD (Pager- [phone])   Internal Medicine/ Hospitalist

## 2018-01-23 NOTE — PLAN OF CARE
Problem: Patient Care Overview  Goal: Plan of Care/Patient Progress Review  PT: pt demo gait with no AD, cuing for head turns to challenge gait, pt tending to slow down with head turns but no overt LOB; pt with good participation throughout therapy. Cont with POC.

## 2018-01-23 NOTE — PLAN OF CARE
Problem: Goal/Outcome  Goal: Goal Outcome Summary  FOCUS/GOAL  Bowel management, Bladder management, Pain management, Mobility and Equipment/Assistive devices    ASSESSMENT, INTERVENTIONS AND CONTINUING PLAN FOR GOAL:      Patient is A&O, continent of bowel/bladder, Whaley removed today. Patient is using urinal to void, PVR: 228, 168, 351. B, 122, insulin given per orders. No complaints of pain. IV fluids running at 250 ml/hr for 2 hours (500 ml total). Transfers SBA with no AD. Numbness/tingling in bilateral hands. Continue POC.

## 2018-01-23 NOTE — PROGRESS NOTES
[facility]   Acute Rehabilitation Unit  Daily progress note    interval history  Pete Sheldon was seen and examined in his room. Didn't sleep well last night; noted that condom cath was placed around 3am which interrupted his sleep. No pain or other issues. Seemed anxious and noted that he is not sure about going home. Reported ongoing issue with his balance. Neck pain improving; icy hot patch helps. Discussed the results of BMP and his Cr; elevated yesterday and improved with 500ml of bolus IVF. Encouraged fluid intake; he is concerned of urinary incontinence. Discussed the plan for timed toileting to prevent incontinence; plan was discussed with nursing as well. Will continue to check PVRs. No s/s except for urinary retention at this point; no treatment and will f/u final UC results.     Discussed with hospitalist team; lisinopril was added for better control of BP.       PT: pt demo gait with no AD, cuing for head turns to challenge gait, pt tending to slow down with head turns but no overt LOB; pt with good participation throughout therapy. Cont with POC.     Spoke with his wife over the phone; she ended conversation because she had to get another phone call. Reviewed the plan from care conference last Friday regarding 24/7 supervision at home. She had no recollection of our previous conversation.       medications  Scheduled meds    lisinopril  2.5 mg Oral Daily     insulin glargine  30 Units Subcutaneous Q24H     insulin aspart  7 Units Subcutaneous Daily with breakfast     tamsulosin  0.8 mg Oral QPM     insulin aspart  5 Units Subcutaneous Daily with lunch     insulin aspart  7 Units Subcutaneous Daily with supper     menthol   Transdermal Q8H     insulin aspart  1-10 Units Subcutaneous TID AC     insulin aspart  1-7 Units Subcutaneous At Bedtime     aspirin EC  325 mg Oral Daily     amLODIPine  10 mg Oral Daily     atorvastatin  20 mg Oral Daily     multivitamin  1  "tablet Oral Daily       PRN meds:  metFORMIN, insulin aspart, acetaminophen, menthol **AND** menthol **AND** menthol, senna-docusate, bisacodyl, glucose **OR** dextrose **OR** glucagon      physical exam  /68 (BP Location: Left arm)  Pulse 79  Temp 97.4  F (36.3  C) (Oral)  Resp 17  Ht 1.702 m (5' 7\")  Wt 73.2 kg (161 lb 6.4 oz)  SpO2 94%  BMI 25.28 kg/m2     Gen: NAD, sitting in chair   Heart: RRR  Pulm: clear breath sounds b/l   Abd: soft and non-tender   Ext: wwp, no edema in bilateral lower extremities  Neuro/MSK: alert and fully oriented, strength symmetric in bilateral upper and lower extremities,word finding difficulty, dysmetria on R > L side      labs  Reviewed     assessment and plan    Pete Sheldon is a 77-year-old male who had an ischemic left thalamic stroke on 1/9 resulting in right hemiparesis, decreased balance, decreased visual acuity, and decreased independence with mobility and ADLs. Admission to acute inpatient rehab 1/12/18.     Rehabilitation - continue comprehensive acute inpatient rehabilitation program with multidisciplinary approach including therapies, rehab nursing, and physiatry following. See interval history for updates.       Medical Conditions  # Left thalamic ischemic stroke: deficits as above.   --continue , HTN, HLD and DM control for secondary stroke prevention  --completed stroke class/education on [employer] (formerly [employer])     # DM II: HgbA1c 7.9 1/9/18  Endo team following; appreciate assistance.   1/22  Continue metformin  mg twice daily  Continue morning Lantus 30 units  Continue NovoLog 7 units for breakfast, 5 units for lunch and 7 units for dinner.  Continue same NovoLog high-dose correction scale (1 unit per 20 > 140 before meals and > 200 HS) for use while in hospital.  This would not be included on discharge.  Monitor blood glucose before meals, at bedtime and at 2 AM  Carbohydrate counting and documentation by nursing     Outpatient follow up with Dr." Nati, endocrinology, as scheduled in March.  Or earlier diabetes visit w/ PCP/Nati    01/23/18 metformin on hold due to JAYA.     # UTI: > 100k Aerococcus urinae on 1/8. continue keflex, complete 7 day course 01/16/18. 01/19/18 discussed with ID team; will repeat his UA. 01/23/18  UA with > 100K Aerococcus urinae; susceptibility pending.        # Acute transient SOB and tachycardia: one episode on 1/14. Hospitalist team was consulted. His EKG shows normal sinus rhythm w/ sinus arrhythmia and no ST-T changes. CXR clear. + Orthostasis. Improved with IVF. Continue to monitor.     # Acute kidney injury: on 1/14. Most likely pre renal disease due to hypovolemia (poor po intake, HCTZ use). Other differential could be ATN. + orthostatic on 01/14. Creatinine improved with IV fluids. 01/23/18 Cr elevated on 1/22 and improved again to wnl with 500ml of IVF.   --monitor BMP    --avoid Nephrotoxins, Renal dose medicatios  --strict I and O    # HTN: on amlodipine 10/day and HCTZ upon admission to ARU. HCTZ was held on 1/15 due to low BP and orthostasis. 01/23/18 hospitalist team added lisinopril.     # HLD: LDL 74 1/10; on lipitor.     # Headache and neck pain: started 1/15 and has been persistent since then. Improved with tylenol. 01/16/18 head and neck CT per hospitalist team recs; no acute pathologies. Most likely MSK related due to weakness and poor posture. Started icy hot patches to help with pain.       1. FEN: regular diet; high consistent CHO.   2. Bowel: on prn bowel meds. Continent.   3. Bladder: elevated PVRs since admission to ARU. UTI as above. 01/17/18 Reports nocturia at home; no frequency or urgency prior to admission. No dysuria or hematuria; + urgency and urge incontinence. PVRs remain elevated at 200-300 ml range. On flomax (can potentially increase the dose but he has had orthostasis). Will start timed toileting and encourage double voiding. Consider to get another UA if becomes symptomatic.  1/18  increased flomax to 0.8 now that BP is in better range to help with urinary retention.   4. DVT Prophylaxis: mechanical   5. GI Prophylaxis: none; oral intake   6. Code: full   7. Disposition: home   8. ELOS:  7-10 days.          Jerri Hernandez MD  Physical Medicine & Rehabilitation    I spent a total of 35 minutes face-to-face or managing the care of Pete Sheldon. Over 50% of my time on the unit was spent counseling the patient and coordinating care. See note for details.

## 2018-01-23 NOTE — PLAN OF CARE
Problem: Goal/Outcome  Goal: Goal Outcome Summary  Pt received fluids yesterday. Creatnine WNL. Fluids encouraged.  Pt had 900mL in this shift.  Pt concerned of being incontinent if he increases his fluid intake.  Explained the importance of fluid intake & encouraged to void every 3 hours to prevent incontinence.  Pt's wife called x3 this shift, asking same questions (d/c date, support needs, to connect w/social work).  SW paged for f/u.  Pt's wife expressed feeling anxious with her  (pt) coming home.  Pt needs to demonstrate ability to check BG & administer insulin.  Pt expressing frustrations with poor memory recall.  Unable to recall information provided 5 mins prior.  Pt benefits from frequent rounding to anticipate needs (using bathroom, refilling water, etc).  Pt does not like to call as he feels a burden to staff.  IV dressing reinforced x2 this shift, but tape continues to lift & peel off.

## 2018-01-24 LAB
BACTERIA SPEC CULT: ABNORMAL
GLUCOSE BLDC GLUCOMTR-MCNC: 133 MG/DL (ref 70–99)
GLUCOSE BLDC GLUCOMTR-MCNC: 140 MG/DL (ref 70–99)
GLUCOSE BLDC GLUCOMTR-MCNC: 182 MG/DL (ref 70–99)
GLUCOSE BLDC GLUCOMTR-MCNC: 236 MG/DL (ref 70–99)
GLUCOSE BLDC GLUCOMTR-MCNC: 358 MG/DL (ref 70–99)
GLUCOSE BLDC GLUCOMTR-MCNC: 65 MG/DL (ref 70–99)
GLUCOSE BLDC GLUCOMTR-MCNC: 68 MG/DL (ref 70–99)
Lab: ABNORMAL
SPECIMEN SOURCE: ABNORMAL

## 2018-01-24 PROCEDURE — 97127 ZZHC SP THERAPEUTIC INTERVENTION W/FOCUS ON COGNITIVE FUNCTION: CPT | Mod: GN | Performed by: SPEECH-LANGUAGE PATHOLOGIST

## 2018-01-24 PROCEDURE — 12800006 ZZH R&B REHAB

## 2018-01-24 PROCEDURE — 97530 THERAPEUTIC ACTIVITIES: CPT | Mod: GP | Performed by: PHYSICAL THERAPIST

## 2018-01-24 PROCEDURE — 40000193 ZZH STATISTIC PT WARD VISIT: Performed by: PHYSICAL THERAPIST

## 2018-01-24 PROCEDURE — 40000133 ZZH STATISTIC OT WARD VISIT: Performed by: OCCUPATIONAL THERAPIST

## 2018-01-24 PROCEDURE — 97750 PHYSICAL PERFORMANCE TEST: CPT | Mod: GP | Performed by: PHYSICAL THERAPIST

## 2018-01-24 PROCEDURE — 40000133 ZZH STATISTIC OT WARD VISIT

## 2018-01-24 PROCEDURE — 25000132 ZZH RX MED GY IP 250 OP 250 PS 637: Performed by: INTERNAL MEDICINE

## 2018-01-24 PROCEDURE — 99232 SBSQ HOSP IP/OBS MODERATE 35: CPT | Performed by: INTERNAL MEDICINE

## 2018-01-24 PROCEDURE — 25000132 ZZH RX MED GY IP 250 OP 250 PS 637: Performed by: PHYSICAL MEDICINE & REHABILITATION

## 2018-01-24 PROCEDURE — 00000146 ZZHCL STATISTIC GLUCOSE BY METER IP

## 2018-01-24 PROCEDURE — 97112 NEUROMUSCULAR REEDUCATION: CPT | Mod: GP | Performed by: PHYSICAL THERAPIST

## 2018-01-24 PROCEDURE — 40000225 ZZH STATISTIC SLP WARD VISIT: Performed by: SPEECH-LANGUAGE PATHOLOGIST

## 2018-01-24 PROCEDURE — 97535 SELF CARE MNGMENT TRAINING: CPT | Mod: GO | Performed by: OCCUPATIONAL THERAPIST

## 2018-01-24 PROCEDURE — 97530 THERAPEUTIC ACTIVITIES: CPT | Mod: GO

## 2018-01-24 PROCEDURE — 99207 ZZC CDG-MDM COMPONENT: MEETS LOW - DOWN CODED: CPT | Performed by: INTERNAL MEDICINE

## 2018-01-24 PROCEDURE — 97535 SELF CARE MNGMENT TRAINING: CPT | Mod: GO

## 2018-01-24 PROCEDURE — 97116 GAIT TRAINING THERAPY: CPT | Mod: GP | Performed by: PHYSICAL THERAPIST

## 2018-01-24 RX ORDER — PENICILLIN V POTASSIUM 500 MG/1
500 TABLET, FILM COATED ORAL 3 TIMES DAILY
Status: DISCONTINUED | OUTPATIENT
Start: 2018-01-24 | End: 2018-01-27 | Stop reason: HOSPADM

## 2018-01-24 RX ADMIN — INSULIN GLARGINE 30 UNITS: 100 INJECTION, SOLUTION SUBCUTANEOUS at 09:21

## 2018-01-24 RX ADMIN — ASPIRIN 325 MG: 325 TABLET, DELAYED RELEASE ORAL at 09:14

## 2018-01-24 RX ADMIN — I-VITE, TAB 1000-60-2MG (60/BT) 1 TABLET: TAB at 09:14

## 2018-01-24 RX ADMIN — AMLODIPINE BESYLATE 10 MG: 10 TABLET ORAL at 09:14

## 2018-01-24 RX ADMIN — TAMSULOSIN HYDROCHLORIDE 0.8 MG: 0.4 CAPSULE ORAL at 19:37

## 2018-01-24 RX ADMIN — ATORVASTATIN CALCIUM 20 MG: 20 TABLET, FILM COATED ORAL at 09:14

## 2018-01-24 RX ADMIN — LISINOPRIL 2.5 MG: 2.5 TABLET ORAL at 09:14

## 2018-01-24 RX ADMIN — PENICILLIN V POTASSIUM 500 MG: 500 TABLET, FILM COATED ORAL at 19:40

## 2018-01-24 NOTE — PLAN OF CARE
Problem: Patient Care Overview  Goal: Plan of Care/Patient Progress Review  FOCUS/GOAL  Bowel management and Bladder management    ASSESSMENT, INTERVENTIONS AND CONTINUING PLAN FOR GOAL:  AO with forgetfulness. CGA without assistive device for transfers. Continent of bowel. On timed voiding and has condom catheter on at night. PVR of 197 and 49 this shift.  BG of 63 and 132 this shift. Denies pain, denies SOB, pleasant and cooperative with care. Continue with POC

## 2018-01-24 NOTE — PROGRESS NOTES
"Patient seen and examined     S- no new complaints. Denies any dizziness.,   Denies any dysuria/ frequency/ fever      4 point ROS done and neg unless mentioned     O-   /62 (BP Location: Left arm)  Pulse 92  Temp 97.4  F (36.3  C) (Oral)  Resp 18  Ht 1.702 m (5' 7\")  Wt 73.2 kg (161 lb 6.4 oz)  SpO2 92%  BMI 25.28 kg/m2   Gen- pleasant sitting on chair  HEENT- NAD  Neck- supple  CVS- I+II+ no m/r/g  RS- CTAB  Abdo- soft, no tenderness . No g/r/r  Ext- no edema   CNS- as per PMR      LABS:   BMP    Recent Labs  Lab 01/23/18  0753 01/22/18  1521 01/20/18  0548    137 136   POTASSIUM 4.4 5.1 4.3   CHLORIDE 107 102 102   MANAV 8.3* 8.8 8.0*   CO2 26 29 26   BUN 26 32* 31*   CR 1.16 1.36* 1.17   * 177* 266*     CBC    Recent Labs  Lab 01/22/18  1521 01/20/18  0548   WBC 8.4 8.6   RBC 4.44 4.25*   HGB 13.6 13.2*   HCT 42.1 40.3   MCV 95 95   MCH 30.6 31.1   MCHC 32.3 32.8   RDW 12.7 12.7    299   UC: Aerococcus sp  A/P:  77 year old year old male with hx of DM II, HTN, Renal Calculi is admitted to [facility] on 01/12 for rehabilitation for stroke     # ? UTI:  Noted rehab team checked UA, UC growing aerococcus urinae, final c/s pending. Patient remains afebrile and asymptomatic. Patient had condom catheter.   PVR better per rehab note. Fu.   Recent UC with same organism, tt with Keflex 500 mg BID x 7 days (stop date 1/16)   D/W PMR 1/23 who will clarify with Dr Borrero regarding tt recommendations as pt asymptomatic.       # Tachycardia, SOB: resolved.     # JAYA:  Most likely pre renal disease due to hypovolemia (poor po intake, HCTZ use).  Found to be orthostatic on 01/14. Creatinine improved with IV fluids.      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 1/23 Added Lisinopril (will monitor Cr and K closely)      # DM:   - Management per Endocrinology    Abdoulaye Beltrán MD (Pager- [phone])   Internal Medicine/ Hospitalist

## 2018-01-24 NOTE — PLAN OF CARE
Problem: Patient Care Overview  Goal: Plan of Care/Patient Progress Review  Patient voicing concerns about going home.  MD discussed at length w/ him and his wife.  He is clinically improved subjectively and by Marissa and JOSSUE. Up in room during the day as off today.  He does have difficulty w/ problem solving and will need clothing set out for him in the morning but seems to move safely in the room.

## 2018-01-24 NOTE — PROGRESS NOTES
01/24/18 1026   Signing Clinician's Name / Credentials   Signing clinician's name / credentials Farzaneh Weiner, PT   Sanchez Balance Scale (SANCHEZ K, TASIA S, BRYANT GREEN, TULIO HENNESSY: MEASURING BALANCE IN THE ELDERLY: VALIDATION OF AN INSTRUMENT. CAN. J. PUB. HEALTH, JULY/AUGUST SUPPLEMENT 2:S7-11, 1992.)   Sit To Stand 4   Standing Unsupported 4   Sitting Unsupported 4   Stand to Sit 3   Transfers 3   Standing with Eyes Closed 4   Standing Unsupported, Feet Together 3   Reach Forward With Outstretched Arm 3   Retrieve Object From Floor 4   Turning to Look Behind 3   Turn 360 Degrees 2   Placing Alternate Foot on Stool (4-6 inches) 2   Unsupported Tandem Stand (Demonstrate to Subject) 2   One Leg Stand 2   Total Score (A score of 45 or less has been correlated with an increased risk of falls)   Total Score (out of 56) 43     Sanchez Balance Scale (BBS) Cutoff Scores: A score of ? 45/56 indicates an increased risk for falls.   Patient Score: 43/56    The BBS is a measure of static and dynamic standing balance that has been validated in community dwelling elderly individuals and individuals who have Parkinson's Disease, MS, and those who are s/p CVA and TBI. The test is administered without an assistive device. Scores from the Sanchez are used to determine the probability of falling based on the patient's previous history of falls and their test performance.     Minimal Detectable Change = 6.5   & Minimal Detectable Change (Parkinson's Disease) = 5 according to Robin & Ziggyey 2008    Assessment (rationale for performing, application to patient s function & care plan): Improved clinically from previous (37/56).  OK for up in room w/o device during the day.  One more night of bed alarms for conservative approach  Minutes billed as physical performance test: 15

## 2018-01-24 NOTE — PLAN OF CARE
Problem: Patient Care Overview  Goal: Plan of Care/Patient Progress Review  FOCUS/GOAL  Bladder management, Medical management and Reinforcement of self-care/ADL    ASSESSMENT, INTERVENTIONS AND CONTINUING PLAN FOR GOAL:  Patient is alert/oriented- is somewhat forgetful but has used call light appropriately on this shift, patient has now been approved per therapies to be Mod I in room- still Is to have alarms for the overnight shift and will need assist with AM cares.  Patient denies any pain on this shift, BG have been elevated on this shift 236 and 359.  Patient needs some encouragement for independence, is unaware of insulin needs, did call at lunch for BG but started to eat before it was taken.  Patient has been continent on this shift, continue to monitor.

## 2018-01-24 NOTE — PLAN OF CARE
FOCUS/GOAL  Bowel management, Bladder management, Skin integrity and Cognition/Memory/Judgment/Problem solving    ASSESSMENT, INTERVENTIONS AND CONTINUING PLAN FOR GOAL:  Alert and oriented x 4, seems to have some word finding difficulties at times, denied pain, condom cath in place during the night, 2500ml clear yellow output, BG of 133 at 2 am, independently shifts weight and turns in bed, denies SOB, chest pain, of difficulty breathing, no further care concerns at this time continue with POC.

## 2018-01-24 NOTE — PROGRESS NOTES
Postural Assessment Scale for Stroke    Maintaining a posture  1. Sitting without support: 3  2. Standing with support: 3  3. Standing without support: 3  4. Standing on nonparetic le  5. Standing on paretic le    Changing posture  6. Supine to affected side lateral: 3  7. Supine to nonaffected side lateral: 3  8. Supine to sitting up on the edge of the table: 3  9. Sitting on the edge of the table to supine: 3  10. Sitting to standing up: 3  11. Standing up to sitting down: 3  12. Standing, picking up a pencil from the floor: 3    Total score:    33 /36

## 2018-01-24 NOTE — PLAN OF CARE
Problem: Goal/Outcome  Goal: Goal Outcome Summary  OT: Pt completing ADL routine with SBA no AD, intermittent use of walls and furniture for stability but no overt LOB. Pt requiring increased time and general verbal cueing to initiate and sequence BADLs. Ex: Pt spent several seconds searching for clothing in nursing supply drawers. Requiring cueing to redirect to closet drawers. Noting improvement with F[facility] and ability to manage buttons and laces more efficiently.

## 2018-01-24 NOTE — PLAN OF CARE
Problem: Patient Care Overview  Goal: Plan of Care/Patient Progress Review  Outcome: Therapy, progress toward functional goals is gradual  SLP:  Pt described the rules of Quitch requiring min verbal cues to provide all relevant details. He remembered some but not all rules of the game from playing it in previous sessions. Pt reinstructed how to play Quitch and Checkers. Patient required repetition of directions initially, but required less assistance as the games continued. Pt demonstrated planning, problem solving, and self-correction skills. He required min verbal cues to make strategic decisions.

## 2018-01-24 NOTE — PROGRESS NOTES
[facility]   Acute Rehabilitation Unit  Daily progress note    interval history  Pete Sheldon was seen and examined in his room. Doing well; slept better last night.     He is mostly continent now with PVRs in lower range. Reviewed UC results with Dr. Borrero from ID; appreciate assistance. Started penicillin V 500 tid per recs for 30 days.        PT: He is clinically improved subjectively and by Ann and PASS. Up in room during the day as off today.  He does have difficulty w/ problem solving and will need clothing set out for him in the morning but seems to move safely in the room.     SLP:  Pt described the rules of [employer] requiring min verbal cues to provide all relevant details. He remembered some but not all rules of the game from playing it in previous sessions. Pt reinstructed how to play Quitch and Checkers. Patient required repetition of directions initially, but required less assistance as the games continued. Pt demonstrated planning, problem solving, and self-correction skills. He required min verbal cues to make strategic decisions.    Discussed the plan with the team and called his wife for updates. Will plan for dc home on Friday 1/26; reviewed again today our recommendations for 24/7 supervision at least initially after discharge. He will have home care with all services and therapies.       medications  Scheduled meds    lisinopril  2.5 mg Oral Daily     insulin glargine  30 Units Subcutaneous Q24H     insulin aspart  7 Units Subcutaneous Daily with breakfast     tamsulosin  0.8 mg Oral QPM     insulin aspart  5 Units Subcutaneous Daily with lunch     insulin aspart  7 Units Subcutaneous Daily with supper     menthol   Transdermal Q8H     insulin aspart  1-10 Units Subcutaneous TID AC     insulin aspart  1-7 Units Subcutaneous At Bedtime     aspirin EC  325 mg Oral Daily     amLODIPine  10 mg Oral Daily     atorvastatin  20 mg Oral Daily     multivitamin  1  "tablet Oral Daily       PRN meds:  metFORMIN, insulin aspart, acetaminophen, menthol **AND** menthol **AND** menthol, senna-docusate, bisacodyl, glucose **OR** dextrose **OR** glucagon      physical exam  BP (!) 82/50 (BP Location: Left arm)  Pulse 74  Temp 96.7  F (35.9  C) (Oral)  Resp 18  Ht 1.702 m (5' 7\")  Wt 73.2 kg (161 lb 6.4 oz)  SpO2 95%  BMI 25.28 kg/m2     Gen: NAD, sitting in chair   Heart: RRR  Pulm: clear breath sounds b/l   Abd: soft and non-tender   Ext: wwp, no edema in bilateral lower extremities  Neuro/MSK: alert and fully oriented, strength symmetric in bilateral upper and lower extremities,word finding difficulty, dysmetria on R > L side      labs  Reviewed     assessment and plan    Pete Sheldon is a 77-year-old male who had an ischemic left thalamic stroke on 1/9 resulting in right hemiparesis, decreased balance, decreased visual acuity, and decreased independence with mobility and ADLs. Admission to acute inpatient rehab 1/12/18.     Rehabilitation - continue comprehensive acute inpatient rehabilitation program with multidisciplinary approach including therapies, rehab nursing, and physiatry following. See interval history for updates.       Medical Conditions  # Left thalamic ischemic stroke: deficits as above.   --continue , HTN, HLD and DM control for secondary stroke prevention  --completed stroke class/education on [employer]     # DM II: HgbA1c 7.9 1/9/18  Endo team following; appreciate assistance.   1/22  Continue metformin  mg twice daily  Continue morning Lantus 30 units  Continue NovoLog 7 units for breakfast, 5 units for lunch and 7 units for dinner.  Continue same NovoLog high-dose correction scale (1 unit per 20 > 140 before meals and > 200 HS) for use while in hospital.  This would not be included on discharge.  Monitor blood glucose before meals, at bedtime and at 2 AM  Carbohydrate counting and documentation by nursing     Outpatient follow up with " Dr. Damian, endocrinology, as scheduled in March.  Or earlier diabetes visit w/ PCP/Nati    01/23/18 metformin on hold due to JAYA.     # UTI: > 100k Aerococcus urinae on 1/8. continue keflex, complete 7 day course 01/16/18. 01/19/18 discussed with ID team; will repeat his UA. 01/23/18  UA with > 100K Aerococcus urinae; susceptibility pending. 01/24/18 Reviewed final UC results with Dr. Borrero from ID; appreciate assistance. Started penicillin V 500 tid per recs for 30 days.  Should f/u with Dr. Borrero in clinic. Also f/u with urology regarding h/o nephrolithiasis.         # Acute transient SOB and tachycardia: one episode on 1/14. Hospitalist team was consulted. His EKG shows normal sinus rhythm w/ sinus arrhythmia and no ST-T changes. CXR clear. + Orthostasis. Improved with IVF. Continue to monitor.     # Acute kidney injury: on 1/14. Most likely pre renal disease due to hypovolemia (poor po intake, HCTZ use). Other differential could be ATN. + orthostatic on 01/14. Creatinine improved with IV fluids. 01/23/18 Cr elevated on 1/22 and improved again to wnl with 500ml of IVF.   --monitor BMP    --avoid Nephrotoxins, Renal dose medicatios  --strict I and O    # HTN: on amlodipine 10/day and HCTZ upon admission to ARU. HCTZ was held on 1/15 due to low BP and orthostasis. 01/23/18 hospitalist team added lisinopril.     # HLD: LDL 74 1/10; on lipitor.     # Headache and neck pain: started 1/15 and has been persistent since then. Improved with tylenol. 01/16/18 head and neck CT per hospitalist team recs; no acute pathologies. Most likely MSK related due to weakness and poor posture. Started icy hot patches to help with pain.       1. FEN: regular diet; high consistent CHO.   2. Bowel: on prn bowel meds. Continent.   3. Bladder: elevated PVRs since admission to ARU. UTI as above. 01/17/18 Reports nocturia at home; no frequency or urgency prior to admission. No dysuria or hematuria; + urgency and urge incontinence.  PVRs remain elevated at 200-300 ml range. On flomax (can potentially increase the dose but he has had orthostasis). Will start timed toileting and encourage double voiding. Consider to get another UA if becomes symptomatic.  1/18 increased flomax to 0.8 now that BP is in better range to help with urinary retention. 01/24/18 PVRs improving; 49 and 197.  4. DVT Prophylaxis: mechanical   5. GI Prophylaxis: none; oral intake   6. Code: full   7. Disposition: home         Jerri Hernandez MD  Physical Medicine & Rehabilitation    I spent a total of 40 minutes face-to-face or managing the care of Pete Sheldon. Over 50% of my time on the unit was spent counseling the patient and coordinating care. See note for details.

## 2018-01-25 LAB
ANION GAP SERPL CALCULATED.3IONS-SCNC: 6 MMOL/L (ref 3–14)
BUN SERPL-MCNC: 33 MG/DL (ref 7–30)
CALCIUM SERPL-MCNC: 8.8 MG/DL (ref 8.5–10.1)
CHLORIDE SERPL-SCNC: 98 MMOL/L (ref 94–109)
CO2 SERPL-SCNC: 28 MMOL/L (ref 20–32)
CREAT SERPL-MCNC: 1.23 MG/DL (ref 0.66–1.25)
GFR SERPL CREATININE-BSD FRML MDRD: 57 ML/MIN/1.7M2
GLUCOSE BLDC GLUCOMTR-MCNC: 168 MG/DL (ref 70–99)
GLUCOSE BLDC GLUCOMTR-MCNC: 266 MG/DL (ref 70–99)
GLUCOSE BLDC GLUCOMTR-MCNC: 273 MG/DL (ref 70–99)
GLUCOSE BLDC GLUCOMTR-MCNC: 273 MG/DL (ref 70–99)
GLUCOSE BLDC GLUCOMTR-MCNC: 362 MG/DL (ref 70–99)
GLUCOSE BLDC GLUCOMTR-MCNC: 366 MG/DL (ref 70–99)
GLUCOSE SERPL-MCNC: 287 MG/DL (ref 70–99)
POTASSIUM SERPL-SCNC: 4.4 MMOL/L (ref 3.4–5.3)
SODIUM SERPL-SCNC: 132 MMOL/L (ref 133–144)

## 2018-01-25 PROCEDURE — 97116 GAIT TRAINING THERAPY: CPT | Mod: GP

## 2018-01-25 PROCEDURE — 12800006 ZZH R&B REHAB

## 2018-01-25 PROCEDURE — 97110 THERAPEUTIC EXERCISES: CPT | Mod: GP

## 2018-01-25 PROCEDURE — 40000193 ZZH STATISTIC PT WARD VISIT

## 2018-01-25 PROCEDURE — 40000225 ZZH STATISTIC SLP WARD VISIT: Performed by: SPEECH-LANGUAGE PATHOLOGIST

## 2018-01-25 PROCEDURE — 97530 THERAPEUTIC ACTIVITIES: CPT | Mod: GP

## 2018-01-25 PROCEDURE — 99232 SBSQ HOSP IP/OBS MODERATE 35: CPT | Performed by: PHYSICIAN ASSISTANT

## 2018-01-25 PROCEDURE — 97127 ZZHC SP THERAPEUTIC INTERVENTION W/FOCUS ON COGNITIVE FUNCTION: CPT | Mod: GN | Performed by: SPEECH-LANGUAGE PATHOLOGIST

## 2018-01-25 PROCEDURE — 80048 BASIC METABOLIC PNL TOTAL CA: CPT | Performed by: INTERNAL MEDICINE

## 2018-01-25 PROCEDURE — 97535 SELF CARE MNGMENT TRAINING: CPT | Mod: GO | Performed by: STUDENT IN AN ORGANIZED HEALTH CARE EDUCATION/TRAINING PROGRAM

## 2018-01-25 PROCEDURE — 40000133 ZZH STATISTIC OT WARD VISIT: Performed by: STUDENT IN AN ORGANIZED HEALTH CARE EDUCATION/TRAINING PROGRAM

## 2018-01-25 PROCEDURE — 25000132 ZZH RX MED GY IP 250 OP 250 PS 637: Performed by: PHYSICAL MEDICINE & REHABILITATION

## 2018-01-25 PROCEDURE — 97112 NEUROMUSCULAR REEDUCATION: CPT | Mod: GP

## 2018-01-25 PROCEDURE — 25000132 ZZH RX MED GY IP 250 OP 250 PS 637: Performed by: INTERNAL MEDICINE

## 2018-01-25 PROCEDURE — 00000146 ZZHCL STATISTIC GLUCOSE BY METER IP

## 2018-01-25 PROCEDURE — 36415 COLL VENOUS BLD VENIPUNCTURE: CPT | Performed by: INTERNAL MEDICINE

## 2018-01-25 PROCEDURE — 25000132 ZZH RX MED GY IP 250 OP 250 PS 637: Performed by: PHYSICIAN ASSISTANT

## 2018-01-25 RX ORDER — PENICILLIN V POTASSIUM 500 MG/1
500 TABLET, FILM COATED ORAL 3 TIMES DAILY
Qty: 90 TABLET | Refills: 0 | Status: SHIPPED | OUTPATIENT
Start: 2018-01-25 | End: 2018-05-16

## 2018-01-25 RX ORDER — LISINOPRIL 2.5 MG/1
2.5 TABLET ORAL DAILY
Qty: 30 TABLET | Refills: 0 | Status: SHIPPED | OUTPATIENT
Start: 2018-01-26 | End: 2018-02-20

## 2018-01-25 RX ORDER — ATORVASTATIN CALCIUM 20 MG/1
20 TABLET, FILM COATED ORAL DAILY
Qty: 30 TABLET | Refills: 0 | Status: SHIPPED | OUTPATIENT
Start: 2018-01-25 | End: 2018-02-20

## 2018-01-25 RX ORDER — TAMSULOSIN HYDROCHLORIDE 0.4 MG/1
0.4 CAPSULE ORAL EVERY EVENING
Qty: 30 CAPSULE | Refills: 0 | Status: SHIPPED | OUTPATIENT
Start: 2018-01-25 | End: 2018-08-30

## 2018-01-25 RX ADMIN — PENICILLIN V POTASSIUM 500 MG: 500 TABLET, FILM COATED ORAL at 15:43

## 2018-01-25 RX ADMIN — INSULIN GLARGINE 30 UNITS: 100 INJECTION, SOLUTION SUBCUTANEOUS at 08:27

## 2018-01-25 RX ADMIN — AMLODIPINE BESYLATE 10 MG: 10 TABLET ORAL at 08:16

## 2018-01-25 RX ADMIN — METFORMIN HYDROCHLORIDE 500 MG: 500 TABLET, FILM COATED ORAL at 18:25

## 2018-01-25 RX ADMIN — ASPIRIN 325 MG: 325 TABLET, DELAYED RELEASE ORAL at 08:16

## 2018-01-25 RX ADMIN — PENICILLIN V POTASSIUM 500 MG: 500 TABLET, FILM COATED ORAL at 20:40

## 2018-01-25 RX ADMIN — TAMSULOSIN HYDROCHLORIDE 0.8 MG: 0.4 CAPSULE ORAL at 20:40

## 2018-01-25 RX ADMIN — LISINOPRIL 2.5 MG: 2.5 TABLET ORAL at 08:18

## 2018-01-25 RX ADMIN — PENICILLIN V POTASSIUM 500 MG: 500 TABLET, FILM COATED ORAL at 08:16

## 2018-01-25 RX ADMIN — ATORVASTATIN CALCIUM 20 MG: 20 TABLET, FILM COATED ORAL at 08:16

## 2018-01-25 RX ADMIN — I-VITE, TAB 1000-60-2MG (60/BT) 1 TABLET: TAB at 08:16

## 2018-01-25 NOTE — PROGRESS NOTES
"  [facility]   Acute Rehabilitation Unit  Daily progress note    interval history  Pete Sheldon was seen and examined in his room. Slept ok last night; no pain or discomfort. Excited for dc home tomorrow and feels confident.     ID consult pending; tolerating antibiotic for now. Discussed the plan for DM management with hospitalist team; appreciate assistance. Wife confirmed that he has glucometer and supply at home; nursing to practice injections until discharge.     Team rounds held today; please see separate document in Plan of Care tab for full details. Briefly, Al is ready for dc tomorrow. Mod I in his room; recommend 24/7supervision due to imbalance and risk of fall, difficulty with sequencing and problem solving, and cognitive deficits. Will have home care and close f/u.       medications  Scheduled meds    metFORMIN  500 mg Oral Daily with breakfast     penicillin V potassium  500 mg Oral TID     lisinopril  2.5 mg Oral Daily     insulin glargine  30 Units Subcutaneous Q24H     insulin aspart  7 Units Subcutaneous Daily with breakfast     tamsulosin  0.8 mg Oral QPM     insulin aspart  5 Units Subcutaneous Daily with lunch     insulin aspart  7 Units Subcutaneous Daily with supper     menthol   Transdermal Q8H     aspirin EC  325 mg Oral Daily     amLODIPine  10 mg Oral Daily     atorvastatin  20 mg Oral Daily     multivitamin  1 tablet Oral Daily       PRN meds:  metFORMIN, insulin aspart, acetaminophen, menthol **AND** menthol **AND** menthol, senna-docusate, bisacodyl, glucose **OR** dextrose **OR** glucagon      physical exam  /62 (BP Location: Left arm)  Pulse 65  Temp 95.1  F (35.1  C) (Oral)  Resp 18  Ht 1.702 m (5' 7\")  Wt 73.2 kg (161 lb 6.4 oz)  SpO2 97%  BMI 25.28 kg/m2     Gen: NAD, sitting in chair   Heart: RRR  Pulm: clear breath sounds b/l   Abd: soft and non-tender   Ext: wwp, no edema in bilateral lower extremities  Neuro/MSK: alert and " fully oriented, strength symmetric in bilateral upper and lower extremities,word finding difficulty, dysmetria on R > L side      labs  Reviewed     assessment and plan    Pete Sheldon is a 77-year-old male who had an ischemic left thalamic stroke on 1/9 resulting in right hemiparesis, decreased balance, decreased visual acuity, and decreased independence with mobility and ADLs. Admission to acute inpatient rehab 1/12/18.     Rehabilitation - continue comprehensive acute inpatient rehabilitation program with multidisciplinary approach including therapies, rehab nursing, and physiatry following. See interval history for updates.       Medical Conditions  # Left thalamic ischemic stroke: deficits as above.   --continue , HTN, HLD and DM control for secondary stroke prevention  --completed stroke class/education on [employer]     # DM II: HgbA1c 7.9 1/9/18  Endo team following; appreciate assistance.   1/22  Continue metformin  mg twice daily  Continue morning Lantus 30 units  Continue NovoLog 7 units for breakfast, 5 units for lunch and 7 units for dinner.  Continue same NovoLog high-dose correction scale (1 unit per 20 > 140 before meals and > 200 HS) for use while in hospital.  This would not be included on discharge.  Monitor blood glucose before meals, at bedtime and at 2 AM  Carbohydrate counting and documentation by nursing     Outpatient follow up with Dr. Damian, endocrinology, as scheduled in March.  Or earlier diabetes visit w/ PCP/Nati    01/23/18 metformin on hold due to JAYA. 01/25/18 see final plan for DM regimen in hospitalist team note.    # UTI: > 100k Aerococcus urinae on 1/8. continue keflex, complete 7 day course 01/16/18. 01/19/18 discussed with ID team; will repeat his UA. 01/23/18  UA with > 100K Aerococcus urinae; susceptibility pending. 01/24/18 Reviewed final UC results with Dr. Borrero from ID; appreciate assistance. Started penicillin V 500 tid per recs for 30 days.   Should f/u with Dr. Borrero in clinic. Also f/u with urology regarding h/o nephrolithiasis.         # Acute transient SOB and tachycardia: one episode on 1/14. Hospitalist team was consulted. His EKG shows normal sinus rhythm w/ sinus arrhythmia and no ST-T changes. CXR clear. + Orthostasis. Improved with IVF. Continue to monitor.     # Acute kidney injury: on 1/14. Most likely pre renal disease due to hypovolemia (poor po intake, HCTZ use). Other differential could be ATN. + orthostatic on 01/14. Creatinine improved with IV fluids. 01/23/18 Cr elevated on 1/22 and improved again to wnl with 500ml of IVF.   --monitor BMP    --avoid Nephrotoxins, Renal dose medicatios  --strict I and O    01/25/18 Cr stable; continue to encourage fluids and repeat lab in am.    # HTN: on amlodipine 10/day and HCTZ upon admission to ARU. HCTZ was held on 1/15 due to low BP and orthostasis. 01/23/18 hospitalist team added lisinopril.     # HLD: LDL 74 1/10; on lipitor.     # Headache and neck pain: started 1/15 and has been persistent since then. Improved with tylenol. 01/16/18 head and neck CT per hospitalist team recs; no acute pathologies. Most likely MSK related due to weakness and poor posture. Started icy hot patches to help with pain.       1. FEN: regular diet; high consistent CHO.   2. Bowel: on prn bowel meds. Continent.   3. Bladder: elevated PVRs since admission to ARU. UTI as above. 01/17/18 Reports nocturia at home; no frequency or urgency prior to admission. No dysuria or hematuria; + urgency and urge incontinence. PVRs remain elevated at 200-300 ml range. On flomax (can potentially increase the dose but he has had orthostasis). Will start timed toileting and encourage double voiding. Consider to get another UA if becomes symptomatic.  1/18 increased flomax to 0.8 now that BP is in better range to help with urinary retention. 01/24/18 PVRs improving; 49 and 197.  4. DVT Prophylaxis: mechanical   5. GI Prophylaxis: none;  oral intake   6. Code: full   7. Disposition: home         Jerri Hernandez MD  Physical Medicine & Rehabilitation    I spent a total of 30 minutes face-to-face or managing the care of Pete Sheldon. Over 50% of my time on the unit was spent counseling the patient and coordinating care. See note for details.

## 2018-01-25 NOTE — PLAN OF CARE
Problem: Patient Care Overview  Goal: Plan of Care/Patient Progress Review  PT: IND day items completed. Focus on stair training in stair well, CGA for safety. Ambulation in hallways w/o AD and introducing balance challenges, pt lost balance when turning to talk w/ staff when hallway was busy requiring assist to correct. Focus on static/dynamic balance activities. Pt on track for DC tomorrow

## 2018-01-25 NOTE — PLAN OF CARE
Problem: Goal/Outcome  Goal: Goal Outcome Summary  Pt is planning to d/c to home 1/26/18 with continued support from family. Placed referral to [facility] for RN, PT, OT, SLP, and HHA. Sw will continue to assist as needed.

## 2018-01-25 NOTE — PROGRESS NOTES
[facility] Acute Rehabilitation Unit  Internal Medicine Progress Note    ARU Day # 13    Assessment & Plan: Pete Sheldon is a 77 year old man with a history of HTN, DM II, and renal calculi who was admitted to [facility] 1/9-1/12/18 w/ CVA. Transferred to ARU for ongoing rehab therapies.     #CVA. 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    - Rehab management per primary team.   - Continue ASA, statin.     #DM II. HgbA1c 7.9%. Endocrinology signed off 1/22. Past 48 hrs had BG in 60s before dinner. Is 366 this AM. Discussed w/ endocrinology. Suspect that hypoglycemia was d/t stacked insulin doses - for example yesterday w/ breakfast (7 units scheduled + 4 units SSI) and lunch (5 units scheduled + 9 units SSI) given only 3 hrs apart, then hyperglycemia this AM d/t overcorrection.   - No need for formal endocrinology re-involvement at this time. We will follow BG.   - D/c'd the sliding scale; does not need. Continue set mealtime dosing as ordered.   - Restarted metformin at just 500 mg daily for now (stopped in recent days d/t concern for JAYA but Cr clearance still allows for use).     #UTI. Urine cx 1/8 w/ >100K aerococcus urinae treated w/ 7 d Keflex. Repeat urine cx 1/20 w/ same organism. Afebrile, asymptomatic. Using condom cath.   - Per yesterday's d/w between Dr. Beltrán and PM&R, primary team talked w/ Dr. Borrero who recommended 30 days pen VK and clinic f/u, as well as urology f/u for nephrolithiasis hx. Will defer d/c med rec on this to primary team per that discussion.     #Recent JAYA, Mild Hyponatremia. Cr 1.11-->1.51 on 1/14. Received IVF and HCTZ held. Cr stable at 1.23 today despite starting ACE-I over recent days. Na 140-->132. Suspect that this is hypovolemic.   - Discussed w/ patient/aide and wrote on whiteboard to drink 3 pink pitchers of water today. Reasonable to check BMP in AM to ensure stability or improvement w/ sodium.     #HTN. BP stable.   - Home HCTZ on hold w/ recent JAYA; do not  "anticipate resuming at d/c.   - Continue home Norvasc.   - Continue lisinopril started here.    Discussed w/ primary team.   Patient is anticipated to d/c tomorrow. Med rec completed except for the antibiotic.   Medicine will follow.     Estelita Tracy PA-C  Hospitalist Service  Pager: [phone]  ________________________________________________________________    Subjective & Interval History:  Feeling good. Looking forward to d/c. Did not feel low BG last 2 evenings. Feels that he can drink fluids today.     Last 24 hour care team notes reviewed.   ROS: 4 point ROS (including Respiratory, CV, GI and ) was performed and negative unless otherwise noted in HPI.     Medications: Reviewed in EPIC.    Physical Exam:    Blood pressure 114/54, pulse 95, temperature 98.1  F (36.7  C), temperature source Oral, resp. rate 20, height 1.702 m (5' 7\"), weight 73.2 kg (161 lb 6.4 oz), SpO2 91 %.    GENERAL: Alert and oriented x 3. Sitting up in chair, appears comfortable. Pleasant and conversant.   HEENT: Anicteric sclera. Mucous membranes moist.   CV: RRR. S1, S2. No murmurs appreciated.   RESPIRATORY: Effort normal on room air. Lungs CTAB with no wheezing, rales, rhonchi.   GI: Abdomen soft, non distended, non tender.   NEUROLOGICAL: No focal deficits. Moves all extremities.    EXTREMITIES: No peripheral edema. Intact bilateral pedal pulses.   SKIN: No jaundice. No rashes.               "

## 2018-01-25 NOTE — PLAN OF CARE
Problem: Goal/Outcome  Goal: Goal Outcome Summary  Outcome: Improving  FOCUS/GOAL  Medical management    ASSESSMENT, INTERVENTIONS AND CONTINUING PLAN FOR GOAL:  Pt was not able to call for his BG checks before meals and didn't call also for insulin coverage after meals. Metformin was not given in AM, the writer saw the order during midday, moved to 1800h with supper. Pt tried to given insulin himself after demonstration was done but unable to push the med using his rt arm. Pt will have trial to use the urinal instead of condom cath at night. Pt is d/c to home tomorrow with Home care.  Nursing will cont to encourage pt to participate in his cares

## 2018-01-25 NOTE — PLAN OF CARE
"Problem: Goal/Outcome  Goal: Goal Outcome Summary  Outcome: No Change  FOCUS/GOAL  Bladder management and Medical management    ASSESSMENT, INTERVENTIONS AND CONTINUING PLAN FOR GOAL:  Pt a&ox4. Denied pain. Pt had hypoglycemic event around dinner time. Pt reports feeling light headed and dizzy and BG was 65. Pt was given 2 apple juices and was 68 upon recheck. Pt was given 2 more apple juices and a PB & cracker, meal arrived, pt ate and BG was 140. Pt reports being continent of urine this shift using toilet in br. Requested condom cath on at NOCs \" to prevent frequent waking\". Pt stated on Penicillin for recurring UTI-Per PMR ID is coming tomorrow for consult. LBM today on day shift. Pt is to call on NOCs for assistance but was Independent on day/evening shift.         "

## 2018-01-25 NOTE — PLAN OF CARE
Problem: Patient Care Overview  Goal: Plan of Care/Patient Progress Review  Outcome: Therapy, progress toward functional goals is gradual  SLP: Patient completed verbal expression and problem solving task with categorization independently with 80% accuracy independently. Accuracy increased to 100% with verbal cues. Patient also completed a mod level deductive reasoning task requiring verbal cues and repetition when the task required integration of multiple pieces of information or referring back to information stated previously.

## 2018-01-25 NOTE — PLAN OF CARE
Acute Rehab Care Conference/Team Rounds      Type: Team Rounds    Present: Dr. Hernandez, Sushila Gordon , Mariya Sánchez SLP, Selin Polo , Verena Smith OT, Trav Beverly, Lakisha Guerrier Dietician, Jonathan Morales, RN.       Discharge Barriers/Treatment/Education    Rehab Diagnosis: stroke       Active Medical Co-morbidities/Prognosis: DM II, HTN, JAYA, HLD    Safety: Pt is alert and oriented x 4, uses call light appropriately but confused at times, transfers with stand by assist with walker at night and is , multiple episodes of BG under 70, BG of 361 at 213 am requiring one time order of 5 units Nov Log, asymptomatic during hyper and hypoglycemia.     Pain:Pt denies pain.    Medications, Skin, Tubes/Lines: Pt would benefit from MAP program- but not a good candidate, skin is intact, no tubes line or drains in place, condom catheter on by pt request during the night. Pt was not able to call for his BG checks and administer his insuline even with cues.    Swallowing/Nutrition: on regular diet     Bowel/Bladder: Pt is continent of bowel and bladder uses toilet in room during the day, has been using condom cath at night consistantly by request, LBM 1/24/18.     Psychosocial: Pt resides with his wife. Goal to return home with continued support from family. Team working towards a safe d/c plan.     ADLs/IADLs: Pt is I/SBA with self cares, cues for problem solving routine and situations within task. Pt will have 24/7 supervision at home and assist with all IADL. Recommend  OT at [facility] for continued therapy.     Mobility: Up in room w/o assistive device. Ann improved from 37/56 to 43/56. On track for d/c home tomorrow with home care f/u and 24/7 oversight, intermittent physical assist in open or new environments.     Cognition/Language:SLP: pt improving, but not yet baseline.  Mild difficulty with complex verbal expression, and reading, although with enlarged type  pt able to read short paragraphs, understand content with only min assist.  Noting moderate deficits for more complex reasoning, planning, recent and working memory.  Carryover is reduced - recommending assist with complex IADLS from family (medications, diabetes management, finances) with further SLP tx after discharge. Initially, recommending 24 hour supervision for safety and assistance.    Community Re-Entry: ambulatory w/ supervision in open environments    Transportation: Not a  due to attention, cognition; car transfer not a barrier    Decision maker: self    Plan of Care and goals reviewed and updated.    Discharge Plan/Recommendations    Fall Precautions: modify    Overall plan for the patient: doing well; ready for dc tomorrow. Mod I in his room; recommend 24/7supervision due to imbalance and risk of fall, difficulty with sequencing and problem solving, and cognitive deficits. Will have home care and close f/u.       Utilization Review and Continued Stay Justification    Medical Necessity Criteria:    For any criteria that is not met, please document reason and plan for discharge, transfer, or modification of plan of care to address.    Requires intensive rehabilitation program to treat functional deficits?: Yes    Requires 3x per week or greater involvement of rehabilitation physician to oversee rehabilitation program?: Yes    Requires rehabilitation nursing interventions?: Yes    Patient is making functional progress?: Yes    There is a potential for additional functional progress? Yes    Patient is participating in therapy 3 hours per day a minimum of 5 days per week or 15 hours per week in 7 day period?:Yes    Has discharge needs that require coordinated discharge planning approach?:Yes      Final Physician Sign off    Statement of Approval: I agree with all the recommendations detailed in this document.      Patient Goals         1. Pt will d/c to home/community setting upon completion of  therapy/RN goals  2. Pt and family will be involved in d/c planning and made aware of services available to meet pts needs.      OT goal: hygiene/grooming: independent, modified independent, while standing  OT goal: upper body dressing: Independent, including set-up/clothing retrieval  OT goal: lower body dressing: Modified independent, including set-up/clothing retrieval  OT goal: upper body bathing: Modified independent, Independent  OT goal: lower body bathing: Modified independent  OT goal: toilet transfer/toileting: Modified independent  OT goal: cognitive: Patient/caregiver will verbalize understanding of cognitive assessment results/recommendations as needed for safe discharge planning  OT goal 1: Pt will demonstrate independent with UE HEP with focus on RUE strength and coordination for ADL completion.   OT/HAYDER face-to-face collaboration occurred, patient progress and plan of care reviewed.       PT target date for goal attainment: 01/26/18  PT Frequency: daily 60 minutes  PT goal: bed mobility:  (met)  PT goal: transfers:  (met)  PT goal: gait: Independent, 100 feet, Supervision/stand-by assist, Greater than 200 feet  PT goal: stairs: Greater than 10 stairs, Rail on both sides, Modified independent, 2 stairs (12 stairs to basement where walk in shower)  PT goal: perform aerobic activity with stable cardiovascular response:  (met)     PT goal 1: Pt will successfully demonstrate fall recovery/floor transfer w/ furniture assist  PT Goal 2: car transfer ind  PT Goal 3: ind w/ HEP 4-6 exercises for gait and balance  PT Goal 4: will participate in falls prevention education     SLP target date for goal attainment: 01/31/18  SLP Frequency: daily   SLP goal 1: Pt will utilize word retrieval strategies to increase verbal fluency and word finding during structured tasks and during conversation to 90% accuracy  SLP goal 2: Pt will utilize compensatory attention and memory strategies to complete mod to complex  level  tasks involving alternating attention and recent recall/new learning to 90% accuracy  SLP goal 3: Pt will completed mod to complex level reasoning and problems solving taks with 90% accuracy   SLP goal 5: SLP: pt to complete moderate complex reading tasks, (enlarged type PRN), 90% accuracy for related questions for increased ability to read functional material goal met 1/25/18    Nursing goals   Goal: Medical Management: pt needs supervision and hands on with meds at home    Patient/Family Goal: Bladder: pt wears condom cath at night but not assessed on aplying it on himself   Goal: Cognition/Memory/Judgement/Problem Solving: pt at times doesnt call for his BG checks before meals  Patient/Family Goal: Medication Management: pt still needs assist with meds

## 2018-01-26 LAB
ALBUMIN UR-MCNC: 30 MG/DL
ANION GAP SERPL CALCULATED.3IONS-SCNC: 8 MMOL/L (ref 3–14)
APPEARANCE UR: CLEAR
BILIRUB UR QL STRIP: NEGATIVE
BUN SERPL-MCNC: 34 MG/DL (ref 7–30)
CALCIUM SERPL-MCNC: 8.7 MG/DL (ref 8.5–10.1)
CHLORIDE SERPL-SCNC: 103 MMOL/L (ref 94–109)
CO2 SERPL-SCNC: 26 MMOL/L (ref 20–32)
COLOR UR AUTO: ABNORMAL
CREAT SERPL-MCNC: 1.25 MG/DL (ref 0.66–1.25)
CRP SERPL-MCNC: 5.9 MG/L (ref 0–8)
GFR SERPL CREATININE-BSD FRML MDRD: 56 ML/MIN/1.7M2
GLUCOSE BLDC GLUCOMTR-MCNC: 169 MG/DL (ref 70–99)
GLUCOSE BLDC GLUCOMTR-MCNC: 197 MG/DL (ref 70–99)
GLUCOSE BLDC GLUCOMTR-MCNC: 198 MG/DL (ref 70–99)
GLUCOSE BLDC GLUCOMTR-MCNC: 205 MG/DL (ref 70–99)
GLUCOSE BLDC GLUCOMTR-MCNC: 87 MG/DL (ref 70–99)
GLUCOSE BLDC GLUCOMTR-MCNC: 99 MG/DL (ref 70–99)
GLUCOSE SERPL-MCNC: 171 MG/DL (ref 70–99)
GLUCOSE UR STRIP-MCNC: NEGATIVE MG/DL
HGB UR QL STRIP: NEGATIVE
KETONES UR STRIP-MCNC: NEGATIVE MG/DL
LEUKOCYTE ESTERASE UR QL STRIP: NEGATIVE
MUCOUS THREADS #/AREA URNS LPF: PRESENT /LPF
NITRATE UR QL: NEGATIVE
PH UR STRIP: 6 PH (ref 5–7)
POTASSIUM SERPL-SCNC: 4.4 MMOL/L (ref 3.4–5.3)
RBC #/AREA URNS AUTO: 1 /HPF (ref 0–2)
SODIUM SERPL-SCNC: 137 MMOL/L (ref 133–144)
SOURCE: ABNORMAL
SP GR UR STRIP: 1.01 (ref 1–1.03)
UROBILINOGEN UR STRIP-MCNC: NORMAL MG/DL (ref 0–2)
WBC #/AREA URNS AUTO: 1 /HPF (ref 0–2)

## 2018-01-26 PROCEDURE — 97530 THERAPEUTIC ACTIVITIES: CPT | Mod: GO | Performed by: STUDENT IN AN ORGANIZED HEALTH CARE EDUCATION/TRAINING PROGRAM

## 2018-01-26 PROCEDURE — 81001 URINALYSIS AUTO W/SCOPE: CPT | Performed by: PHYSICAL MEDICINE & REHABILITATION

## 2018-01-26 PROCEDURE — 40000193 ZZH STATISTIC PT WARD VISIT: Performed by: PHYSICAL THERAPIST

## 2018-01-26 PROCEDURE — 99232 SBSQ HOSP IP/OBS MODERATE 35: CPT | Performed by: INTERNAL MEDICINE

## 2018-01-26 PROCEDURE — 97116 GAIT TRAINING THERAPY: CPT | Mod: GP | Performed by: PHYSICAL THERAPIST

## 2018-01-26 PROCEDURE — 25000132 ZZH RX MED GY IP 250 OP 250 PS 637: Performed by: INTERNAL MEDICINE

## 2018-01-26 PROCEDURE — 25000132 ZZH RX MED GY IP 250 OP 250 PS 637: Performed by: PHYSICAL MEDICINE & REHABILITATION

## 2018-01-26 PROCEDURE — 00000146 ZZHCL STATISTIC GLUCOSE BY METER IP

## 2018-01-26 PROCEDURE — 40000225 ZZH STATISTIC SLP WARD VISIT: Performed by: SPEECH-LANGUAGE PATHOLOGIST

## 2018-01-26 PROCEDURE — 40000133 ZZH STATISTIC OT WARD VISIT: Performed by: STUDENT IN AN ORGANIZED HEALTH CARE EDUCATION/TRAINING PROGRAM

## 2018-01-26 PROCEDURE — 97750 PHYSICAL PERFORMANCE TEST: CPT | Mod: GP | Performed by: PHYSICAL THERAPIST

## 2018-01-26 PROCEDURE — 99207 ZZC CDG-MDM COMPONENT: MEETS LOW - DOWN CODED: CPT | Performed by: INTERNAL MEDICINE

## 2018-01-26 PROCEDURE — 97530 THERAPEUTIC ACTIVITIES: CPT | Mod: GP | Performed by: PHYSICAL THERAPIST

## 2018-01-26 PROCEDURE — 86140 C-REACTIVE PROTEIN: CPT | Performed by: PHYSICIAN ASSISTANT

## 2018-01-26 PROCEDURE — 12800006 ZZH R&B REHAB

## 2018-01-26 PROCEDURE — 25000131 ZZH RX MED GY IP 250 OP 636 PS 637: Performed by: INTERNAL MEDICINE

## 2018-01-26 PROCEDURE — 80048 BASIC METABOLIC PNL TOTAL CA: CPT | Performed by: PHYSICIAN ASSISTANT

## 2018-01-26 PROCEDURE — 97127 ZZHC SP THERAPEUTIC INTERVENTION W/FOCUS ON COGNITIVE FUNCTION: CPT | Mod: GN | Performed by: SPEECH-LANGUAGE PATHOLOGIST

## 2018-01-26 PROCEDURE — 25000132 ZZH RX MED GY IP 250 OP 250 PS 637: Performed by: PHYSICIAN ASSISTANT

## 2018-01-26 PROCEDURE — 97535 SELF CARE MNGMENT TRAINING: CPT | Mod: GO | Performed by: STUDENT IN AN ORGANIZED HEALTH CARE EDUCATION/TRAINING PROGRAM

## 2018-01-26 PROCEDURE — 36415 COLL VENOUS BLD VENIPUNCTURE: CPT | Performed by: PHYSICIAN ASSISTANT

## 2018-01-26 RX ADMIN — LISINOPRIL 2.5 MG: 2.5 TABLET ORAL at 09:31

## 2018-01-26 RX ADMIN — TAMSULOSIN HYDROCHLORIDE 0.8 MG: 0.4 CAPSULE ORAL at 21:30

## 2018-01-26 RX ADMIN — PENICILLIN V POTASSIUM 500 MG: 500 TABLET, FILM COATED ORAL at 15:51

## 2018-01-26 RX ADMIN — INSULIN GLARGINE 30 UNITS: 100 INJECTION, SOLUTION SUBCUTANEOUS at 09:30

## 2018-01-26 RX ADMIN — ASPIRIN 325 MG: 325 TABLET, DELAYED RELEASE ORAL at 09:31

## 2018-01-26 RX ADMIN — AMLODIPINE BESYLATE 10 MG: 10 TABLET ORAL at 09:31

## 2018-01-26 RX ADMIN — PENICILLIN V POTASSIUM 500 MG: 500 TABLET, FILM COATED ORAL at 21:30

## 2018-01-26 RX ADMIN — ATORVASTATIN CALCIUM 20 MG: 20 TABLET, FILM COATED ORAL at 09:31

## 2018-01-26 RX ADMIN — METFORMIN HYDROCHLORIDE 500 MG: 500 TABLET, FILM COATED ORAL at 09:31

## 2018-01-26 RX ADMIN — PENICILLIN V POTASSIUM 500 MG: 500 TABLET, FILM COATED ORAL at 09:31

## 2018-01-26 RX ADMIN — I-VITE, TAB 1000-60-2MG (60/BT) 1 TABLET: TAB at 09:31

## 2018-01-26 NOTE — PLAN OF CARE
Problem: Goal/Outcome  Goal: Goal Outcome Summary  Outcome: Improving  FOCUS/GOAL  Bladder management, Medication management, Discharge planning, Mobility and Cognition/Memory/Judgment/Problem solving    ASSESSMENT, INTERVENTIONS AND CONTINUING PLAN FOR GOAL:  Pt denied pain. Mod I for functional mobility in room. No AD required but needing extra time for ADLs.   Pt is forgetful. BG (273) checked at 1730 but called staff to check BG at 1800 when dinner tray arrived.    Insulin sliding scale d/c'd and now has set mealtime dose. Needing step by step direction and assist to administer insulin. Pt has poor vision even w/ glasses to set right insulin dose. Recommend supervision/assist with medication and insulin management at home due to safety concern w/ forgetfulness, poor vision and numbness on right hand. Left a sticky note for MD.    Pt had dribbling, soiled brief x1, pt reported it happened because he drank a lot but verbalized understanding of POC not to drink on pm to avoid frequency at night. When this writer gave pt a new brief, pt attempted to put on new brief over his pants. When told to take off pants first and then change brief, pt able to undress/dress independently w/ extra time.   BG 99 at HS.   Pt plans to use urinal or toilet at night.

## 2018-01-26 NOTE — PROGRESS NOTES
Clinic Care Coordination Contact    Situation: Patient chart reviewed by care coordinator.    Background: Patient was admitted to [facility] Acute Rehab Unit    Assessment: Patient is still active at [facility] Acute Rehab [facility]    Plan/Recommendations: This writer will do a chart review in 1 week to check TCU status    NEO Scott  Care Navigator  [facility]  [phone]

## 2018-01-26 NOTE — PROGRESS NOTES
01/25/18 0707   Signing Clinician's Name / Credentials   Signing clinician's name / credentials Verena Cahalan,OTR   Quick Adds   Rehab Discipline OT   ADL Training   Minutes of Treatment 60   Treatment Detail I day shower and ADL completed. Needed verbal cues for problem solving during routine. Pt left water running after brishing teeth and wakling to toilet to do toileitng, pt could not find glasses and neede cue for alternate strategy to wash LE during shower. See FIM.    Additional Documentation   OT Plan OT: shower transfer simulated to home set up// DC   OT - Acute Rehab Center Time   Individual Time (minutes) - enter zero if not applicable - OT 60  (60 adl)   Group Time (minutes) - enter zero if not applicable  - OT 0   Concurrent Time (minutes) - enter zero if not applicable  - OT 0   Co-Treatment Time (minutes) - enter zero if not applicable  - OT 0   ARC Total Session Time (minutes) - OT 60   Problem Solving (FIM)   Problem Solving Comment cues for problem solving situation and tasks during ADL routine   Memory (FIM)   Memory Comment could not remember where he placed his glasses.    Bladder (FIM)   Bladder Comment continent in toilet   Oral Hygiene   Describe performance completed oral hyg with extra time and assist with locating denture as he forgot to bring that into the bathroom.    Grooming (except oral cares) (FIM)   Grooming Comment combed hair and washed face and hands without assist. Cue to turn water off when he walked away from sink to use the bathroom..    Upper Body Dressing (FIM)   Describe performance Increased time to locate shirt but able to find one in closet and don it without assist   Lower Body Dressing (FIM) (Pants/Undergarments)   Describe performance Able to locate pants in closet with increased time and don them without assist   Lower Body Dressing putting on/taking off footwear (FIM)   Describe performance Able to doff and don socks and shoes without assist   Shower/Bathe self  (FIM)   Describe performance SBA for safety and for problem solving how to wash LB.    Tub / Shower Transfer (FIM)   Tub/Shower Transfer Comment Pt able to complete shower transfer with SBA for safety   Toilet Hygiene (FIM)   Describe performance Pt managed clothing and sima hyg without assist   Toilet Transfer (FIM)   Describe performance Transfered to toilet using GB

## 2018-01-26 NOTE — PROGRESS NOTES
Emergency light on in patient room with patient found down in bathroom at 0735 am this morning.  Patient found kneeling on knees and leaning to left side on the floor, unable to get up on his own.  Assist of 4 to get patient to standing position and seated on the toilet to void.  Patient states he was trying to make it to the bathroom, and ended up falling.  NA who witnessed the fall as she heard the bed alarm going off, stated patient was already up and moving when bed alarm went off and she walked into the bathroom seeing the patient moving very quickly from bed to bathroom unbalanced and once at toilet went to turn to sit down and that is when he fell. NA states he fell to the left side on the floor, she did not see him hit his head on the countertop.  VSS, afebrile, see vital sign flowsheet.  Patient appearing alert but slightly confused.  Able to state he needed to go to bathroom.  Small raised area on left side of patient head, but not tender to the touch.  Patient denies any pain anywhere.  Patient assisted back to bed after going to the bathroom, call light within reach and bed alarm on.  2nd fall within a very short period of time on night shift to start of day shift.  During night, staff report patient using urinal and having to use the bathroom every few hours. Previously had been on condom cath and was not using this tonight- very different behavior noted as the night went on an from previous nights.  Patient appeared unsteady on feet an leaning more to the left when walking.  MD notified, and post fall huddle done.

## 2018-01-26 NOTE — PLAN OF CARE
Problem: Patient Care Overview  Goal: Plan of Care/Patient Progress Review  Outcome: No Change  Alert and oriented. Numbness in right hand and right sided weakness. Able to use urinal on his own. Voiding frequently in small amounts, pt states this is from having to drink 3 Liters yesterday. Denies any painful urination. Forgetful at times. Bed alarm on at night for safety. BG at 0200 was 87. Last BM 1/25/2018. Supposed to DC today but evening nurse expressed concern over pt being forgetful and unable to administer his insulin correctly. Will continue to monitor.     0610 lab came to draw labs on pt, found him falling out of bed, left room and came to desk to notify the nurses that the pt was falling out of bed. Writer, nurse, and aid went into pt room and found pt half in bed, his feet touching the floor. Writer, nurse and aid helped pt get his feet back into bed. Pt stated he was sleeping and didn't know how he slide out of bed. Bed alarm was put back on. Pt denies any new issues. Icare filled out.     0630 writer went into pt room and noticed pt sitting near edge of bed and bed alarm had been turned off again. Pt was up using urinal. Writer helped pt with urinal and turned bed alarm back on.

## 2018-01-26 NOTE — PLAN OF CARE
Problem: Patient Care Overview  Goal: Plan of Care/Patient Progress Review  In lieu of falls overnight repeated Ann balance test today.  Score lower but not significantly from 1/24.  Gait subjectively changed but not measurably.  Discussion w/ Dr Hernandez, if overnight goes well will d/c home tomorrow.

## 2018-01-26 NOTE — PROGRESS NOTES
"Patient seen and examined     S- this am event noted- apparently had a fall. As per him lost balance. Denies any preceding/ following CP/SOB/ Palpitaitons/ dizziness/ new tingling/ numbness  Feels ok  4 point ROS done and neg unless mentioned     O-   /76 (BP Location: Left arm)  Pulse 69  Temp 98.6  F (37  C) (Oral)  Resp 18  Ht 1.702 m (5' 7\")  Wt 73.2 kg (161 lb 6.4 oz)  SpO2 97%  BMI 25.28 kg/m2   Gen- pleasant sitting on chair  HEENT- NAD  Neck- supple  CVS- I+II+ no m/r/g  RS- CTAB  Abdo- soft, no tenderness . No g/r/r  Ext- no edema   CNS- as per PMR. No external injury appreciated  AOx3     LABS:   BMP    Recent Labs  Lab 01/26/18  0601 01/25/18  0544 01/23/18  0753 01/22/18  1521    132* 140 137   POTASSIUM 4.4 4.4 4.4 5.1   CHLORIDE 103 98 107 102   MANAV 8.7 8.8 8.3* 8.8   CO2 26 28 26 29   BUN 34* 33* 26 32*   CR 1.25 1.23 1.16 1.36*   * 287* 158* 177*     CBC    Recent Labs  Lab 01/22/18  1521 01/20/18  0548   WBC 8.4 8.6   RBC 4.44 4.25*   HGB 13.6 13.2*   HCT 42.1 40.3   MCV 95 95   MCH 30.6 31.1   MCHC 32.3 32.8   RDW 12.7 12.7    299   UC: Aerococcus sp  A/P:  77 year old year old male with hx of DM II, HTN, Renal Calculi is admitted to  ARU on 01/12 for rehabilitation for stroke     #  UTI: appreciate ID review . \" reasonable to treat for total of 28 days due to prolonged infection and failure of keflex.  Dose of penicillin 500 tid and does not need to be changed for mild renal insufficiency.    Reasonable to monitor clinically for diarrhea (C diff) and check creat every one to two weeks as nephropathy has been reported and course will be a bit more prolonged than typically used. \"      # JAYA:  Most likely pre renal disease due to hypovolemia (poor po intake, HCTZ use).  Found to be orthostatic on 01/14. Creatinine improved with IV fluids.      # CVA,  Ischemic left thalamic infarct due to vertebral occlusion:   - Continue Aspirin  - Control HTN, DM  - PT/OT "         # HTN: pta On HCTZ, Amlodipine  - D/C HCTZ:   As per ACCOMPLISH trial will use angiotensin inhibitor plus a long-acting dihydropyridine calcium channel blocker. 1/23 Added Lisinopril (will monitor Cr and K closely)  - Continue Amlodipine 10 with hold parameters. 1/23 Added Lisinopril (will monitor Cr and K closely)      # DM:   -1/25: Restarted metformin at just 500 mg daily for now (stopped in recent days d/t concern for JAYA but Cr clearance still allows for use).      Recent Labs  Lab 01/26/18  1230 01/26/18  0927 01/26/18  0601 01/26/18  0211 01/25/18  2122 01/25/18  1801 01/25/18  1724  01/25/18  0544  01/23/18  0753  01/22/18  1521  01/20/18  0548   GLC  --   --  171*  --   --   --   --   --  287*  --  158*  --  177*  --  266*   * 197*  --  87 99 273* 273*  < >  --   < >  --   < >  --   < >  --    < > = values in this interval not displayed.      Abdoulaye Beltrán MD (Pager- [phone])   Internal Medicine/ Hospitalist

## 2018-01-26 NOTE — PROGRESS NOTES
[facility]   Acute Rehabilitation Unit  Daily progress note    interval history  Pete Sheldon was seen and examined in his room. He had 2 falls (one last night and one early in the morning). Balance seemed to be worse but no clear neurological changes. Lab work-up negative for any infection. Suspect worsening balance due to fatigue and insomnia. Discharge was planned for today but will monitor for one more day.     Discussed the plan with Al and his wife Radha over the phone. Answered all questions. Reviewed our recommendations again (had similar conversation on Wed, Thu and last week at care conference) to have 24/7 supervision for safety, overnight and A with meds. Wife noted that she has been managing his meds and also has been checking his BG at home. Agreed with the plan to practice insulin injection with nursing before discharge. Al will also continue to work with nursing. Radha noted that their grand daughter Eva will also assist with meds, DM management and other iADLs.     Appreciate Dr. Borrero f/u and recs.     medications  Scheduled meds    metFORMIN  500 mg Oral Daily with breakfast     penicillin V potassium  500 mg Oral TID     lisinopril  2.5 mg Oral Daily     insulin glargine  30 Units Subcutaneous Q24H     insulin aspart  7 Units Subcutaneous Daily with breakfast     tamsulosin  0.8 mg Oral QPM     insulin aspart  5 Units Subcutaneous Daily with lunch     insulin aspart  7 Units Subcutaneous Daily with supper     menthol   Transdermal Q8H     aspirin EC  325 mg Oral Daily     amLODIPine  10 mg Oral Daily     atorvastatin  20 mg Oral Daily     multivitamin  1 tablet Oral Daily       PRN meds:  metFORMIN, insulin aspart, acetaminophen, menthol **AND** menthol **AND** menthol, senna-docusate, bisacodyl, glucose **OR** dextrose **OR** glucagon      physical exam  /76 (BP Location: Left arm)  Pulse 69  Temp 98.6  F (37  C) (Oral)  Resp 18  Ht  "1.702 m (5' 7\")  Wt 73.2 kg (161 lb 6.4 oz)  SpO2 97%  BMI 25.28 kg/m2     Gen: NAD, sitting in chair   Heart: RRR  Pulm: clear breath sounds b/l   Abd: soft and non-tender   Ext: wwp, no edema in bilateral lower extremities  Neuro/MSK: unchanged   alert and fully oriented, strength symmetric in bilateral upper and lower extremities,word finding difficulty, dysmetria on R > L side      labs  Reviewed     assessment and plan    Pete Sheldon is a 77-year-old male who had an ischemic left thalamic stroke on 1/9 resulting in right hemiparesis, decreased balance, decreased visual acuity, and decreased independence with mobility and ADLs. Admission to acute inpatient rehab 1/12/18.     Rehabilitation - continue comprehensive acute inpatient rehabilitation program with multidisciplinary approach including therapies, rehab nursing, and physiatry following. See interval history for updates.       Medical Conditions  # Left thalamic ischemic stroke: deficits as above.   --continue , HTN, HLD and DM control for secondary stroke prevention  --completed stroke class/education on CBA PHARMA     # DM II: HgbA1c 7.9 1/9/18  Endo team following; appreciate assistance.   1/22  Continue metformin  mg twice daily  Continue morning Lantus 30 units  Continue NovoLog 7 units for breakfast, 5 units for lunch and 7 units for dinner.  Continue same NovoLog high-dose correction scale (1 unit per 20 > 140 before meals and > 200 HS) for use while in hospital.  This would not be included on discharge.  Monitor blood glucose before meals, at bedtime and at 2 AM  Carbohydrate counting and documentation by nursing     Outpatient follow up with Dr. Damian, endocrinology, as scheduled in March.  Or earlier diabetes visit w/ PCP/Nati    01/23/18 metformin on hold due to JAYA. 01/25/18 see final plan for DM regimen in hospitalist team note.    # UTI: > 100k Aerococcus urinae on 1/8. continue keflex, complete 7 day course " 01/16/18. 01/19/18 discussed with ID team; will repeat his UA. 01/23/18  UA with > 100K Aerococcus urinae; susceptibility pending. 01/24/18 Reviewed final UC results with Dr. Brorero from ID; appreciate assistance. Started penicillin V 500 tid per recs for 30 days.  Should f/u with Dr. Borrero in clinic. Also f/u with urology regarding h/o nephrolithiasis.         # Acute transient SOB and tachycardia: one episode on 1/14. Hospitalist team was consulted. His EKG shows normal sinus rhythm w/ sinus arrhythmia and no ST-T changes. CXR clear. + Orthostasis. Improved with IVF. Continue to monitor.     # Acute kidney injury: on 1/14. Most likely pre renal disease due to hypovolemia (poor po intake, HCTZ use). Other differential could be ATN. + orthostatic on 01/14. Creatinine improved with IV fluids. 01/23/18 Cr elevated on 1/22 and improved again to wnl with 500ml of IVF.   --monitor BMP    --avoid Nephrotoxins, Renal dose medicatios  --strict I and O    01/25/18 Cr stable; continue to encourage fluids and repeat lab in am.    # HTN: on amlodipine 10/day and HCTZ upon admission to ARU. HCTZ was held on 1/15 due to low BP and orthostasis. 01/23/18 hospitalist team added lisinopril.     # HLD: LDL 74 1/10; on lipitor.     # Headache and neck pain: started 1/15 and has been persistent since then. Improved with tylenol. 01/16/18 head and neck CT per hospitalist team recs; no acute pathologies. Most likely MSK related due to weakness and poor posture. Started icy hot patches to help with pain.       1. FEN: regular diet; high consistent CHO.   2. Bowel: on prn bowel meds. Continent.   3. Bladder: elevated PVRs since admission to ARU. UTI as above. 01/17/18 Reports nocturia at home; no frequency or urgency prior to admission. No dysuria or hematuria; + urgency and urge incontinence. PVRs remain elevated at 200-300 ml range. On flomax (can potentially increase the dose but he has had orthostasis). Will start timed toileting and  encourage double voiding. Consider to get another UA if becomes symptomatic.  1/18 increased flomax to 0.8 now that BP is in better range to help with urinary retention. 01/24/18 PVRs improving; 49 and 197.  4. DVT Prophylaxis: mechanical   5. GI Prophylaxis: none; oral intake   6. Code: full   7. Disposition: home         Jerri Hernandez MD  Physical Medicine & Rehabilitation    I spent a total of 40 minutes face-to-face or managing the care of Pete Sheldon. Over 50% of my time on the unit was spent counseling the patient and coordinating care. See note for details.

## 2018-01-26 NOTE — PROGRESS NOTES
01/25/18 1100   Signing Clinician's Name / Credentials   Signing clinician's name / credentials Ginette Oliveros, SLP Student   Quick Adds   Rehab Discipline SLP   Therapeutic Activity   Minutes of Treatment 45 minutes   Supervision   Supervision Direct supervision provided;Therapy services provided with the co-signing licensed therapist guiding and directing the services, and providing the skilled judgement and assessment throughout the session   Additional Documentation   SLP Plan SLP: reasoning, memory, writing, iPad problem solving tasks, card games. Try Quitch or Checkers to check if he remembers the rules. Pt likely d/cing Fri- with planned HH sptx following d/c   Total Session Time   Total Session Time (minutes) 45 minutes   SLP - Acute Rehab Center Time   Individual Time (minutes) - enter zero if not applicable - SLP 45  (cognitive)   Group Time (minutes) - enter zero if not applicable  - SLP 0   Concurrent Time (minutes) - enter zero if not applicable  - SLP 0   Co-Treatment Time (minutes) - enter zero if not applicable  - SLP 0   ARC Total Session Time (minutes) - SLP 45   Comprehension (FIM)   Functional Performance Requires extra time;Requires repetition (see comments);Mild difficulty comprehending complex/abstract ideas   Expression (FIM)   Functional Performance Mild difficulty expressing complex/abstract ideas   Expression Comment SLP: Patient gave a category based on three words independently in 24/25 trials. He named a word that fit with the category independently in20/25 trials. Accuracy increased to 25/25 with verbal cues.   Social Interaction (FIM)   Functional Performance Complete independence-socially appropriate in all situations   Problem Solving (FIM)   Functional Performance Needs increased time to make decisions and solve complex problems;Minimal direction-needs help to solve routine problems 10-25% of the time   Problem Solving Comment SLP: Patient completed a deductive reasoning task  requiring min-mod verbal cues when the directions were of moderate complexity (ie needing to integrate several pieces of information or refer back to previous clues).    Memory (FIM)   Functional Performance Minimal prompting-recognizes and remembers 75-90% of the time

## 2018-01-26 NOTE — PROGRESS NOTES
"   01/25/18 1013   Signing Clinician's Name / Credentials   Signing clinician's name / credentials Lacy Cruz DPT   Quick Adds   Rehab Discipline PT   Therapeutic Exercise   Minutes of Treatment 8   Symptoms Noted During/After Treatment fatigue   Treatment Detail PT: To improve LE strength pt engaged in standing marches w/ 4# wts 20 reps x 2 sets, cues for upright posture and slow speed, progressed to no UE support. Pt engaged ins james stepping w/ no UE support ~25' x 2, cues for increased step length. Trialed hip abdcution w/ wt however this too difficult for pt. Seated rest required    Neuromuscular Re-education   Minutes of Treatment 23   Symptoms Noted During/After Treatment fatigue   Treatment Detail PT: Focus on static and dynamic balance. Ball toss w/ PT providing CGA, focus on throwing outside REGINALD, pt w/ inc LOB reaching/step strategy to L side of body, instability and LOB x 4, min A x 1 to self correct. Rest break post. Pt performed wall ball toss w/ beach ball as well, improved stabliity w/ this activity as pt not reaching outside REGINALD as much however still CV response and fatiguing. Pt able to bed down to  ball when he had loss of control during activities w/ CGA, no LOB. Standing on foam, Rhomberg stance, UE shoulder flexion slow for UEdynamic movement, minor instability. Performed ~ 1.5 minutes. Standing on foam and performing cone taps anterior and laterally, 2 finger support required, CGA for safety, no overt LOB, cues to place wt anteriorly as pt tends to demo posterior lean when standing on foam. Trialed balance disc however this very difficult for pt at this time   Additional Documentation   Rehab Comments IND day    PT Plan PT: Issue HEP, DC    [facility] AM-PAC  \"6 Clicks\" V.2 Basic Mobility Inpatient Short Form   1. Turning from your back to your side while in a flat bed without using bedrails? 4 - None   2. Moving from lying on your back to sitting on the side of a flat bed " without using bedrails? 4 - None   3. Moving to and from a bed to a chair (including a wheelchair)? 4 - None   4. Standing up from a chair using your arms (e.g., wheelchair, or bedside chair)? 4 - None   5. To walk in hospital room? 3 - A Little   6. Climbing 3-5 steps with a railing? 3 - A Little   Basic Mobility Raw Score (Score out of 24.Lower scores equate to lower levels of function) 22   Total Session Time   Total Session Time (minutes) 60 minutes   PT - Acute Rehab Center Time   Individual Time (minutes) - enter zero if not applicable - PT 60  (23 NMS; 8 TE; 18 TA; 11 GT)   Group Time (minutes) - enter zero if not applicable  - PT 0   Concurrent Time (minutes) - enter zero if not applicable  - PT 0   Co-Treatment Time (minutes) - enter zero if not applicable  - PT 0   ARC Total Session Time (minutes) - PT 60   Toilet Hygiene (FIM)   Describe performance PT: Pt ambulated into BR prior to session, required VC to wash hand post, dificulty w/ problem solving noted   Toilet Transfer (FIM)   Describe performance PT: Demo proper use of handrails, able to doff and germán pants appropriately   Chair/bed-to-chair Transfer (FIM)   Describe performance PT: Mod I w/ BUE push off   Roll Left and Right   Comment IND   Sit to Lying   Comment IND   Lying to Sitting on Side of Bed   Comment IND   Sit to Stand   Comment PT: Mod I w/ use of BUE push up    Locomotion (FIM)   Functional Performance Safety concern (see comments)   Locomotion Comment PT: Pt engaged in functional ambulation in hallway. Pt ambulated ~500' total during session w/ focus on head turns, start/stop, 360* turns, no overt LOB. Pt required some assist w/ path finding. Pt ambualted w/o AD, cues for upright posture and heel toe patter. Pt w/ one LOB when turning to talk w/ staff on L side and BR door opening (pt far enough away from door however this startled him), required assist to correct this   Walk 10 Feet   Comment PT: IND w/o AD    Walk 50 Feet with Two  "Turns   Comment PT: IND    Walk 150 Feet   Comment PT: SBA for longer distance ambualtion in busy hallway w/o AD for safety as pt w/ LOB when distracted   Walking 10 Feet on Uneven Surfaces   Comment PT: SBA w/o AD   Wheel 50 Feet with Two Turns   Reason if not Attempted Activity not applicable   Wheel 150 Feet   Reason if not Attempted Activity not applicable   Stairs (FIM)   Assistive Devices Rail   Functional Performance Safety concern (see comments)   Stairs Comment PT: CGA up/down 12 8\" steps in stairwell w/ reciprocal pattern. Demo difficutly w/ eccentric control when descending stairs, no overt LOB. Rest break required post   1 Step (Curb)   Assistance Needed Supervision   Physical Assistance Level No physical assistance   Comment PT: SBA for safety w/o AD   4 Steps   Comment PT: CGA up/down 12 8\" steps in stairwell w/ reciprocal pattern. Demo difficutly w/ eccentric control when descending stairs, no overt LOB. Rest break required post   12 Steps   Comment PT: CGA up/down 12 8\" steps in stairwell w/ reciprocal pattern. Demo difficutly w/ eccentric control when descending stairs, no overt LOB. Rest break required post   Picking Up Object   Comment PT: CGA kleenex box from floor w/o UE support   Car Transfer   Assistance Needed Supervision   Physical Assistance Level No physical assistance   Comment PT: SBA, VC for proper techniue     "

## 2018-01-26 NOTE — PROGRESS NOTES
77 year ole pt with aerococcus urinae from urine for several months.  Basically asymptomatic with normal crp.  Organism is exquisitely sensitive to penicillin with DELANEY less than 0.03.  Pt has mild renal insufficiency with creat 1.25. Failed course of keflex.       Pt recently started oral pen an apparently tolerating well.  Appears reasonable to treat for total of 28 days due to prolonged infection and failure of keflex.  Dose of penicillin 500 tid and does not need to be changed for mild renal insufficiency.    Reasonable to monitor clinically for diarrhea (C diff) and check creat every one to two weeks as nephropathy has been reported and course will be a bit more prolonged than typically used.     Will not plan to do full consult.  Please call if questions. Rylan Borrero   [phone].

## 2018-01-26 NOTE — PLAN OF CARE
Problem: Patient Care Overview  Goal: Plan of Care/Patient Progress Review  Outcome: Therapy, progress toward functional goals is gradual  SLP: Patient seen at bedside while eating breakfast. He had a fall this morning and d/c will likely be delayed in result. Memory: Patient listened to a list of four words and was asked a question about them. Patient required multiple repetitions and verbal cues to remember and identify the correct word. He was 100% accurate completing this activity in a previous session, but accuracy decreased to 40% on this date.  Mental flexibility/verbal expression: Patient gave  two definitions for multi meaning words. He required increased wait time and verbal cues to complete this task than in previous sessions. Patient's overall cognitive abilities appear to be reduced on this date than in previous sessions. Discussed change in status with PT given fall this morning, recommending that discharge be held for today.

## 2018-01-26 NOTE — PROGRESS NOTES
01/26/18 1139   Signing Clinician's Name / Credentials   Signing clinician's name / credentials Farzaneh Weiner, PT   Sanchez Balance Scale (SANCHEZ K, TASIA S, BRYANT GREEN, TULIO HENNESSY: MEASURING BALANCE IN THE ELDERLY: VALIDATION OF AN INSTRUMENT. CAN. J. PUB. HEALTH, JULY/AUGUST SUPPLEMENT 2:S7-11, 1992.)   Sit To Stand 3   Standing Unsupported 3   Sitting Unsupported 4   Stand to Sit 3   Transfers 3   Standing with Eyes Closed 4   Standing Unsupported, Feet Together 3   Reach Forward With Outstretched Arm 3   Retrieve Object From Floor 3   Turning to Look Behind 3   Turn 360 Degrees 1   Placing Alternate Foot on Stool (4-6 inches) 2   Unsupported Tandem Stand (Demonstrate to Subject) 2   One Leg Stand 2   Total Score (A score of 45 or less has been correlated with an increased risk of falls)   Total Score (out of 56) 39     Previous score of 43 earlier in the week.  This is not an objective, clinical change.

## 2018-01-26 NOTE — PLAN OF CARE
Problem: Patient Care Overview  Goal: Plan of Care/Patient Progress Review  OT: Reassessed pt's arm function including strength and coordination using 9 hole peg, box and block and dynometer. Pt showed improvements in RUE function and LUE was same as previous. Completed basic ADL and simple cooking task and pt's performance is not significantly different then in previous days. Cognitive deficits do impact problem solving and safety awareness, family to assist at DC.

## 2018-01-26 NOTE — PROGRESS NOTES
01/25/18 1700   Signing Clinician's Name / Credentials   Signing clinician's name / credentials Ginette Oliveros, SLP Student   Quick Adds   Rehab Discipline SLP   Therapeutic Activity   Minutes of Treatment 30 minutes   Supervision   Supervision Direct supervision provided;Therapy services provided with the co-signing licensed therapist guiding and directing the services, and providing the skilled judgement and assessment throughout the session   Additional Documentation   SLP Plan SLP: Patient will be discharging tomorrow.  Ask patient/family if they have any questions about tx, home health, etc.    Total Session Time   Total Session Time (minutes) 30 minutes   SLP - Acute Rehab Center Time   Individual Time (minutes) - enter zero if not applicable - SLP 30  (Cognitive)   Group Time (minutes) - enter zero if not applicable  - SLP 0   Concurrent Time (minutes) - enter zero if not applicable  - SLP 0   Co-Treatment Time (minutes) - enter zero if not applicable  - SLP 0   ARC Total Session Time (minutes) - SLP 30   Comprehension (FIM)   Functional Performance Requires extra time;Requires repetition (see comments);Requires slowed speech, raised voice,visual cues or gestures   Social Interaction (FIM)   Functional Performance Complete independence-socially appropriate in all situations   Problem Solving (FIM)   Functional Performance Needs increased time to make decisions and solve complex problems;Needs help to solve routine problems less than 10% of the time   Problem Solving Comment SLP: Patient was instructed how to play connect four on iPad. He demonstrated good planning and divided attention during task. Patient needed less frequent reminders on rules of the game and was more independent in decision making than in previous sessions   Memory (FIM)   Functional Performance Minimal prompting-recognizes and remembers 75-90% of the time   Memory Comment Patient was more independent in remembering rules of therapy  task this session   Bladder (FIM)   Assistive Devices Pad/diaper   Functional Performance Incontinent episode occurred, contained in pad   Bladder Comment Pt had dirbbling in brief, able to change independnelty w/ extra time.

## 2018-01-26 NOTE — PLAN OF CARE
Problem: Goal/Outcome  Goal: Goal Outcome Summary  Outcome: No Change  FOCUS/GOAL  Bladder management, Medical management, Discharge planning, Mobility and Safety management    ASSESSMENT, INTERVENTIONS AND CONTINUING PLAN FOR GOAL:  Pt's vitals stable after the fall this morning. When asked about fall this morning, he didn't remember much and said, it just happened. Denied any pain. No injuries noted after the fall. Dr Hernandez and pt's wife notified of fall by nurse manager, Eugenie Mendenhall and also by this nurse. Discharge today on hold. Ortho BPs checked and no orthostatic hypotension noted. Pt had been independent in room and now needing SBA-CGA for safety. He still have urinary urgency and frequency this morning but subsided towards lunch time. Urine sample sent for UA/UC. Pt asked to demonstrate giving own fixed novolog at noon and he was unable to prime pen and dial up correct dose without any step by step prompting. Pt unable to see marks on novolog pen. He said he needs new glasses. Instructed to listen to click when dialing up dose. Pt's wife also called up by this nurse to update her about pt and also to tell her that she needs to assist him with giving insulin and checking BGs.  She said she knows how to check his BG but she has only seen insulin being given but had not given it herself before. Passed on to pm nurse to keep pt with practicing giving insulin to self. Morning nurse to educate pt's wife with administering insulin before discharge tomorrow.

## 2018-01-27 VITALS
TEMPERATURE: 97.9 F | BODY MASS INDEX: 25.33 KG/M2 | DIASTOLIC BLOOD PRESSURE: 77 MMHG | RESPIRATION RATE: 18 BRPM | WEIGHT: 161.4 LBS | OXYGEN SATURATION: 96 % | HEART RATE: 90 BPM | SYSTOLIC BLOOD PRESSURE: 150 MMHG | HEIGHT: 67 IN

## 2018-01-27 LAB
GLUCOSE BLDC GLUCOMTR-MCNC: 187 MG/DL (ref 70–99)
GLUCOSE BLDC GLUCOMTR-MCNC: 201 MG/DL (ref 70–99)
GLUCOSE BLDC GLUCOMTR-MCNC: 305 MG/DL (ref 70–99)

## 2018-01-27 PROCEDURE — 40000193 ZZH STATISTIC PT WARD VISIT: Performed by: PHYSICAL THERAPIST

## 2018-01-27 PROCEDURE — 40000225 ZZH STATISTIC SLP WARD VISIT: Performed by: SPEECH-LANGUAGE PATHOLOGIST

## 2018-01-27 PROCEDURE — 99207 ZZC CDG-MDM COMPONENT: MEETS LOW - DOWN CODED: CPT | Performed by: INTERNAL MEDICINE

## 2018-01-27 PROCEDURE — 97530 THERAPEUTIC ACTIVITIES: CPT | Mod: GP | Performed by: PHYSICAL THERAPIST

## 2018-01-27 PROCEDURE — 25000132 ZZH RX MED GY IP 250 OP 250 PS 637: Performed by: PHYSICAL MEDICINE & REHABILITATION

## 2018-01-27 PROCEDURE — 00000146 ZZHCL STATISTIC GLUCOSE BY METER IP

## 2018-01-27 PROCEDURE — 97530 THERAPEUTIC ACTIVITIES: CPT | Mod: GO | Performed by: STUDENT IN AN ORGANIZED HEALTH CARE EDUCATION/TRAINING PROGRAM

## 2018-01-27 PROCEDURE — 99232 SBSQ HOSP IP/OBS MODERATE 35: CPT | Performed by: INTERNAL MEDICINE

## 2018-01-27 PROCEDURE — 97110 THERAPEUTIC EXERCISES: CPT | Mod: GP | Performed by: PHYSICAL THERAPIST

## 2018-01-27 PROCEDURE — 97127 ZZHC SP THERAPEUTIC INTERVENTION W/FOCUS ON COGNITIVE FUNCTION: CPT | Mod: GN | Performed by: SPEECH-LANGUAGE PATHOLOGIST

## 2018-01-27 PROCEDURE — 25000132 ZZH RX MED GY IP 250 OP 250 PS 637: Performed by: INTERNAL MEDICINE

## 2018-01-27 PROCEDURE — 25000132 ZZH RX MED GY IP 250 OP 250 PS 637: Performed by: PHYSICIAN ASSISTANT

## 2018-01-27 PROCEDURE — 40000133 ZZH STATISTIC OT WARD VISIT: Performed by: STUDENT IN AN ORGANIZED HEALTH CARE EDUCATION/TRAINING PROGRAM

## 2018-01-27 RX ADMIN — I-VITE, TAB 1000-60-2MG (60/BT) 1 TABLET: TAB at 07:55

## 2018-01-27 RX ADMIN — METFORMIN HYDROCHLORIDE 500 MG: 500 TABLET, FILM COATED ORAL at 07:56

## 2018-01-27 RX ADMIN — INSULIN GLARGINE 30 UNITS: 100 INJECTION, SOLUTION SUBCUTANEOUS at 08:59

## 2018-01-27 RX ADMIN — PENICILLIN V POTASSIUM 500 MG: 500 TABLET, FILM COATED ORAL at 07:56

## 2018-01-27 RX ADMIN — LISINOPRIL 2.5 MG: 2.5 TABLET ORAL at 07:55

## 2018-01-27 RX ADMIN — ASPIRIN 325 MG: 325 TABLET, DELAYED RELEASE ORAL at 07:56

## 2018-01-27 RX ADMIN — AMLODIPINE BESYLATE 10 MG: 10 TABLET ORAL at 07:56

## 2018-01-27 RX ADMIN — ATORVASTATIN CALCIUM 20 MG: 20 TABLET, FILM COATED ORAL at 07:56

## 2018-01-27 NOTE — PLAN OF CARE
Problem: Patient Care Overview  Goal: Plan of Care/Patient Progress Review  Occupational Therapy Discharge Summary    Reason for therapy discharge:    Discharged to home with home therapy.    Progress towards therapy goal(s). See [facility] on [facility] record for goal details.  Goals partially met.  Barriers to achieving goals:   discharge from facility.    Therapy recommendation(s):    Continued therapy is recommended.  Rationale/Recommendations:  Recommend continued HHOT to progress ADL, improve coordination and functional cognition.      Problem: OT General Care Plan  Goal: Hygiene/Grooming (OT)  Hygiene/Grooming (OT)   Outcome: Adequate for Discharge Date Met: 01/27/18  I/SBA, pt will have 24/7 supervision  Goal: Upper Body Dressing (OT)  Upper Body Dressing (OT)   Outcome: Adequate for Discharge Date Met: 01/27/18  I/SBA, pt will have 24/7 supervision  Goal: Lower Body Dressing (OT)  Lower Body Dressing (OT)   Outcome: Adequate for Discharge Date Met: 01/27/18  I/SBA, pt will have 24/7 supervision  Goal: Upper Body Bathing (OT)  Upper Body Bathing (OT)   Outcome: Adequate for Discharge Date Met: 01/27/18  SBA, pt will have 24/7 supervision  Goal: Lower Body Bathing (OT)  Lower Body Bathing (OT)   Outcome: Adequate for Discharge Date Met: 01/27/18  SBA, pt will have 24/7 supervision  Goal: Toilet Transfer/Toileting (OT)  Toilet Transfer/Toileting (OT)   Outcome: Adequate for Discharge Date Met: 01/27/18  I/SBA, pt will have 24/7 supervision

## 2018-01-27 NOTE — DISCHARGE SUMMARY
[facility]   Acute Rehabilitation Unit  Discharge summary     Date of Admission: 1/12/2018  Date of Discharge: 1/27/18  Disposition: home   Primary Care Physician: Rom Helm  Attending physician: Jerri Hernandez MD  Other significant physician provider(s): Abdoulaye Fajardo       discharge diagnosis    # Left thalamic ischemic stroke  # DM II  # Bacteriuria   # Recent acute kidney injury  # HTN  # HLD      brief summary    Pete Sheldon is a 77-year-old male who had an ischemic left thalamic stroke on 1/9 resulting in right hemiparesis, decreased balance, decreased visual acuity, and decreased independence with mobility and ADLs. Admission to acute inpatient rehab 1/12/18.         rehabilitaiton course    ADLs/IADLs: Pt is I/SBA with self cares, cues for problem solving routine and situations within task. Pt will have 24/7 supervision at home and assist with all IADL. Recommend HH OT at DC for continued therapy.      Mobility: Up in room w/o assistive device. Ann improved from 37/56 to 43/56. On track for d/c home tomorrow with home care f/u and 24/7 oversight, intermittent physical assist in open or new environments.      Cognition/Language:SLP: pt improving, but not yet baseline.  Mild difficulty with complex verbal expression, and reading, although with enlarged type pt able to read short paragraphs, understand content with only min assist.  Noting moderate deficits for more complex reasoning, planning, recent and working memory.  Carryover is reduced - recommending assist with complex IADLS from family (medications, diabetes management, finances) with further SLP tx after discharge. Initially, recommending 24 hour supervision for safety and assistance    mEDICAL COURSE    # Left thalamic ischemic stroke: deficits as above.   --continue , HTN, HLD and DM control for secondary stroke prevention  --completed stroke class/education on [employer]      # DM II:  HgbA1c 7.9 1/9/18  Endo team was following; appreciate assistance.   1/22  Continue metformin  mg daily  Continue morning Lantus 30 units  Continue NovoLog 7 units for breakfast, 5 units for lunch and 7 units for dinner.  Monitor blood glucose before meals, and at bedtime         Outpatient follow up with Dr. Damian, endocrinology, as scheduled in March.  Or earlier diabetes visit w/ PCP/Nati        # UTI: > 100k Aerococcus urinae on 1/8. continue keflex, complete 7 day course 01/16/18. 01/19/18 discussed with ID team; will repeat his UA. 01/23/18  UA with > 100K Aerococcus urinae; susceptibility pending. 01/24/18 Reviewed final UC results with Dr. Borrero from ID; appreciate assistance. Started penicillin V 500 tid per recs for 30 days.  Should f/u with Dr. Borrero in clinic. Also f/u with urology regarding h/o nephrolithiasis.          # Acute kidney injury - resolved: on 1/14. Most likely pre renal disease due to hypovolemia (poor po intake, HCTZ use). Other differential could be ATN. + orthostatic on 01/14. Creatinine improved with IV fluids. 01/23/18 Cr elevated on 1/22 and improved again to wnl with 500ml of IVF. Cr remained stable and wnl prior to discharge.        # HTN: on amlodipine 10/day and HCTZ upon admission to ARU. HCTZ was held on 1/15 due to low BP and orthostasis.   - Continue home Norvasc.   - Continue lisinopril started here.     # HLD: LDL 74 1/10; on lipitor.      # Headache and neck pain - resolved: started 1/15 and has been persistent since then. Improved with tylenol. 01/16/18 head and neck CT per hospitalist team recs; no acute pathologies. Most likely MSK related due to weakness and poor posture. Started icy hot patches to help with pain.         1. FEN: regular diet; high consistent CHO.   2. Bowel: on prn bowel meds. Continent.   3. Bladder: elevated PVRs since admission to ARU. UTI as above. 01/17/18 Reports nocturia at home; no frequency or urgency prior to admission. No  dysuria or hematuria; + urgency and urge incontinence. PVRs remain elevated at 200-300 ml range. On flomax  1/18 increased flomax to 0.8 now that BP is in better range to help with urinary retention. 01/24/18 PVRs improving; 49 and 197.  4. DVT Prophylaxis: mechanical   5. GI Prophylaxis: none; oral intake   6. Code: full       dISCHARGE MEDICATIONS  Current Discharge Medication List      START taking these medications    Details   insulin aspart (NOVOLOG PEN) 100 UNIT/ML injection Inject 7 units w/ breakfast, 5 units w/ lunch, 7 units w/ dinner. Hold if not eating meal.  Qty: 3 mL, Refills: 0    Associated Diagnoses: Type 2 diabetes mellitus with diabetic polyneuropathy, with long-term current use of insulin (H)      lisinopril (PRINIVIL/ZESTRIL) 2.5 MG tablet Take 1 tablet (2.5 mg) by mouth daily  Qty: 30 tablet, Refills: 0    Associated Diagnoses: Hypertension goal BP (blood pressure) < 140/90      penicillin V potassium (VEETID) 500 MG tablet Take 1 tablet (500 mg) by mouth 3 times daily  Qty: 90 tablet, Refills: 0    Associated Diagnoses: Urinary tract infection without hematuria, site unspecified         CONTINUE these medications which have CHANGED    Details   insulin glargine (LANTUS) 100 UNIT/ML injection Inject 30 Units Subcutaneous every morning  Qty: 9 mL, Refills: 0    Associated Diagnoses: Type 2 diabetes mellitus with diabetic polyneuropathy, with long-term current use of insulin (H)      metFORMIN (GLUCOPHAGE) 500 MG tablet Take 1 tablet (500 mg) by mouth daily (with breakfast)  Qty: 30 tablet, Refills: 0    Associated Diagnoses: Type 2 diabetes mellitus with diabetic polyneuropathy, with long-term current use of insulin (H)      tamsulosin (FLOMAX) 0.4 MG capsule Take 1 capsule (0.4 mg) by mouth every evening  Qty: 30 capsule, Refills: 0    Associated Diagnoses: Calculus of kidney      atorvastatin (LIPITOR) 20 MG tablet Take 1 tablet (20 mg) by mouth daily  Qty: 30 tablet, Refills: 0    Associated  Diagnoses: 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)         CONTINUE these medications which have NOT CHANGED    Details   amLODIPine (NORVASC) 10 MG tablet Take 1 tablet (10 mg) by mouth daily  Qty: 30 tablet, Refills: 1    Associated Diagnoses: Hypertension goal BP (blood pressure) < 140/90; Cerebrovascular accident (CVA) due to occlusion of left vertebral artery (H); Cerebrovascular accident (CVA) due to stenosis of left vertebral artery (H)      !! blood glucose monitoring (ONETOUCH VERIO IQ) test strip One Touch Verio Flex--Use to test blood sugars 4 times daily or as directed.  Qty: 200 strip, Refills: 3    Associated Diagnoses: Type 2 diabetes mellitus with diabetic polyneuropathy, with long-term current use of insulin (H)      !! blood glucose monitoring (STEFFI CONTOUR NEXT) test strip Use to test blood sugar 4 times daily or as directed.  Qty: 50 each, Refills: 0    Associated Diagnoses: Type 2 diabetes mellitus with diabetic polyneuropathy (H)      !! STEFFI CONTOUR NEXT test strip USE TO TEST BLOOD SUGAR 4 TIMES DAILY OR AS DIRECTED (E11.42)  Qty: 400 strip, Refills: 0    Associated Diagnoses: Type 2 diabetes mellitus with diabetic polyneuropathy (H)      multivitamin (OCUVITE) TABS Take 1 tablet by mouth daily FOCUS SELECT PREMIUM WITH LUTEIN per eye doctor  Reported on 5/19/2017      insulin pen needle 31G X 8 MM B-D UF III short pen needles, use 4 times a day to inject insulin.  Qty: 300 each, Refills: 3    Associated Diagnoses: Type 2 diabetes, HbA1C goal < 8% (H)      LIFESCAN FINEPOINT LANCETS MISC Use to test blood sugars 4 times daily or as directed.  Qty: 200 strip, Refills: 3    Associated Diagnoses: Type 2 diabetes, HbA1C goal < 8% (H)      insulin syringes, disposable, U-100 0.3 ML MISC 1 each 2 times daily  Qty: 100 each, Refills: 6    Associated Diagnoses: Type II or unspecified type diabetes mellitus without mention of complication, not stated as  "uncontrolled      aspirin 81 MG chewable tablet Take 4 tablets (324 mg) by mouth daily  Qty: 36 tablet, Refills: 0    Associated Diagnoses: 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)      Glucosamine HCl 750 MG TABS Take 750 mg by mouth 2 times daily    Associated Diagnoses: Arthritis      loratadine (AF LORATADINE) 10 MG tablet Take 1 tablet (10 mg) by mouth daily  Qty: 14 tablet, Refills: 0    Associated Diagnoses: Hypertension goal BP (blood pressure) < 140/90      Nutritional Supplements (GLUCOSE MANAGEMENT) TABS 4 tabs po for low blood sugar below 70, may repeat q 10-15 minutes as needed  Qty: 100 tablet, Refills: prn    Associated Diagnoses: Type 2 diabetes mellitus with diabetic polyneuropathy, with long-term current use of insulin (H)      order for DME All DM testing supplies including test strips, lancets, solution, syringes, needles and/or pen needles for testing 4 times per day.    Brand \"Contour Next\"  Qty: 3 Month, Refills: 1    Associated Diagnoses: Type 2 diabetes mellitus with diabetic polyneuropathy (H)      ACE/ARB NOT PRESCRIBED, INTENTIONAL, by Other route continuous prn (Hyperkalemia) Reported on 5/19/2017    Comments: Adverse reaction (severe dizziness) to low-dose losartan.  Associated Diagnoses: Type 2 diabetes, HbA1C goal < 8% (H)       !! - Potential duplicate medications found. Please discuss with provider.      STOP taking these medications       cephALEXin (KEFLEX) 500 MG capsule Comments:   Reason for Stopping:         hydrochlorothiazide (MICROZIDE) 12.5 MG capsule Comments:   Reason for Stopping:         insulin lispro (HUMALOG KWIKPEN) 100 UNIT/ML injection Comments:   Reason for Stopping:         insulin lispro (HUMALOG KWIKPEN) 100 UNIT/ML injection Comments:   Reason for Stopping:                 DISCHARGE INSTRUCTIONS AND FOLLOW UP    Discharge Procedure Orders  Home care nursing referral   Referral Type: Home Health Therapies & Aides     Home " Care PT Referral for Hospital Discharge   Referral Type: Home Health Therapies & Aides     Home Care OT Referral for Hospital Discharge     Home Care SLP Referral for Hospital Discharge     INFECTIOUS DISEASE REFERRAL   Referral Type: Consultation     Reason for your hospital stay   Order Comments: Stroke     Activity   Order Comments: Your activity upon discharge:  Use your walker at all times   No driving (car, motorcycle, riding lawnmower, tractor, any motorized vehicle) until cleared by OT driving evaluation and by your PM&R physician. No power tools.   A responsible adult should be present with you when cooking in the kitchen and should supervise/double check medications and finances.   Order Specific Question Answer Comments   Is discharge order? Yes      Monitor and record   Order Comments: blood glucose 4 times a day, before meals and at bedtime  blood pressure daily  make diary of readings to bring to the appointment with your PCP     MD face to face encounter   Order Comments: Documentation of Face to Face and Certification for Home Health Services    I certify that patient: Pete Sheldon is under my care and that I, or a nurse practitioner or physician's assistant working with me, had a face-to-face encounter that meets the physician face-to-face encounter requirements with this patient on: 1/25/2018.    This encounter with the patient was in whole, or in part, for the following medical condition, which is the primary reason for home health care: stroke.    I certify that, based on my findings, the following services are medically necessary home health services: Nursing, Occupational Therapy, Physical Therapy and Speech Language Therapy.    My clinical findings support the need for the above services because: Nurse is needed: To assess BP and BG after changes in medications or other medical regimen., To provide assessment and oversight required in the home to assure adherence to the medical plan due  to: recent stroke and cognitive deficits. and To provide caregiver training to assist with: mediation management, Occupational Therapy Services are needed to assess and treat cognitive ability and address ADL safety due to recent stroke., Physical Therapy Services are needed to assess and treat the following functional impairments: mobility and balance. and Speech Therapy Services are needed to assess and treat impairments in cognition due to recent stroke.     Further, I certify that my clinical findings support that this patient is homebound (i.e. absences from home require considerable and taxing effort and are for medical reasons or [facility] or infrequently or of short duration when for other reasons) because: Requires assistance of another person or specialized equipment to access medical services because patient: Is prone to wander/get lost without assistance., Is unable to operate assistive equipment on their own., Is unable to walk for long distances without rest. and Requires supervision of another for safe transfer...    Based on the above findings. I certify that this patient is confined to the home and needs intermittent skilled nursing care, physical therapy and/or speech therapy.  The patient is under my care, and I have initiated the establishment of the plan of care.  This patient will be followed by a physician who will periodically review the plan of care.  Physician/Provider to provide follow up care: Rom Helm    Attending hospital physician (the Medicare certified [place] provider): Jerri Hernandez  Physician Signature: See electronic signature associated with these discharge orders.  Date: 1/25/2018     Full Code     Diet   Order Comments: Follow this diet upon discharge:  High Consistent CHO Diet   Order Specific Question Answer Comments   Is discharge order? Yes              physical examination    Most recent Vital Signs:   Vitals:    01/25/18 1610 01/26/18 0732 01/26/18 0906  01/26/18 1544   BP: 131/62  149/76 108/62   BP Location: Left arm Right arm Left arm Left arm   Pulse: 65 101 69 68   Resp: 18 18 18 18   Temp: 95.1  F (35.1  C) 98.6  F (37  C) 98.6  F (37  C) 98.8  F (37.1  C)   TempSrc: Oral Oral Oral Oral   SpO2: 97%   94%   Weight:       Height:                     Discharge summary was forwarded to Rom Helm (PCP) at the time of discharge, so as to bridge from hospital to outpatient care.     It was our pleasure to care for Pete Sheldon during this hospitalization. Please do not hesitate to contact me should there be questions regarding the hospital course or discharge plan.        Jerri Hernandez MD  Physical Medicine & Rehabilitation      I, Jerri Hernandez, saw and evaluated this patient prior to discharge >30 minutes spent in discharge, including >50% in counseling and coordination of care, medication review and plan of care recommended on follow up.     Patient seen by me, no new issues overnight. I agree with Dr Hernandez's note, exam and plan for home discharge today.     Pilo Chavez MD

## 2018-01-27 NOTE — PROGRESS NOTES
Patient discharged home today at 1215. Discharge instructions reviewed with patient and family. Copy of discharge instructions/ meds filled here  given to pt. Questions answered. Family provided transportation.

## 2018-01-27 NOTE — PROGRESS NOTES
"Patient seen and examined     S- no new concerns today  4 point ROS done and neg unless mentioned     O-   /77 (BP Location: Left arm)  Pulse 90  Temp 97.9  F (36.6  C) (Oral)  Resp 18  Ht 1.702 m (5' 7\")  Wt 73.2 kg (161 lb 6.4 oz)  SpO2 96%  BMI 25.28 kg/m2   Gen- pleasant sitting on chair  HEENT- NAD  Neck- supple  CVS- I+II+ no m/r/g  RS- CTAB  Abdo- soft, no tenderness . No g/r/r    LABS:   BMP    Recent Labs  Lab 01/26/18  0601 01/25/18  0544 01/23/18  0753 01/22/18  1521    132* 140 137   POTASSIUM 4.4 4.4 4.4 5.1   CHLORIDE 103 98 107 102   MANAV 8.7 8.8 8.3* 8.8   CO2 26 28 26 29   BUN 34* 33* 26 32*   CR 1.25 1.23 1.16 1.36*   * 287* 158* 177*     CBC    Recent Labs  Lab 01/22/18  1521   WBC 8.4   RBC 4.44   HGB 13.6   HCT 42.1   MCV 95   MCH 30.6   MCHC 32.3   RDW 12.7      UC: Aerococcus sp  A/P:  77 year old year old male with hx of DM II, HTN, Renal Calculi is admitted to  ARU on 01/12 for rehabilitation for stroke     #  UTI: appreciate ID review . \" reasonable to treat for total of 28 days due to prolonged infection and failure of keflex.  Dose of penicillin 500 tid and does not need to be changed for mild renal insufficiency.    Reasonable to monitor clinically for diarrhea (C diff) and check creat every one to two weeks as nephropathy has been reported and course will be a bit more prolonged than typically used. \"      # JAYA:  Most likely pre renal disease due to hypovolemia (poor po intake, HCTZ use).  Found to be orthostatic on 01/14. Creatinine improved with IV fluids.      # CVA,  Ischemic left thalamic infarct due to vertebral occlusion:   - Continue Aspirin  - Control HTN, DM  - PT/OT         # HTN: pta On HCTZ, Amlodipine  - D/C HCTZ:   As per ACCOMPLISH trial will use angiotensin inhibitor plus a long-acting dihydropyridine calcium channel blocker. 1/23 Added Lisinopril (will monitor Cr and K closely)  - Continue Amlodipine 10 with hold parameters. 1/23 " Added Lisinopril (will monitor Cr and K closely)    * Patient advised to make BP diary at home and follow with PMD in 1 week for adjustment of meds and recheck BMP    # DM:   -1/25: Restarted metformin at just 500 mg daily for now   Recent Labs  Lab 01/27/18  0755 01/27/18  0211 01/26/18  2133 01/26/18  1736 01/26/18  1230 01/26/18  0927 01/26/18  0601  01/25/18  0544  01/23/18  0753  01/22/18  1521   GLC  --   --   --   --   --   --  171*  --  287*  --  158*  --  177*   * 201* 205* 169* 198* 197*  --   < >  --   < >  --   < >  --    < > = values in this interval not displayed.      Abdoulaye Beltrán MD (Pager- [phone])   Internal Medicine/ Hospitalist

## 2018-01-27 NOTE — PLAN OF CARE
Problem: Patient Care Overview  Goal: Plan of Care/Patient Progress Review  Physical Therapy Discharge Summary    Reason for therapy discharge:    Discharged to home with home therapy.    Progress towards therapy goal(s). See goals on Care Plan in [facility] electronic health record for goal details.  Goals met    Therapy recommendation(s):    Continued therapy is recommended.  Rationale/Recommendations:  Ongoing PT services to progress gait and strength for safe functional mobility in his home and limited community mobility. Ann score 43/56.  .

## 2018-01-27 NOTE — DISCHARGE INSTRUCTIONS
Follow up appointments:    -- Primary provier in 7-10 days regarding hospitalization and to get refills for the medications.   You are scheduled to see Dr. Helm on February 2 at 11:00 am.    -- Neurology clinic (stroke clinic) in 2-4 weeks regarding stroke.  You are scheduled to see Dr. Pedro Doherty  on March 1st  at 2:25 pm.    -- Urology team in 2-3 months regarding history of kidney stones.   You are scheduled to see Dr. Jasmyne Elliott on  Friday, March 23rd on at 3 pm.    -- Infectious disease clinic in 2 weeks regarding urinary tract infection.   You are scheduled to see Dr. Mcknight on Friday, February 23rd at 2:30    -- PM&R clinic, Dr. Schneider, in 6-8 weeks regarding rehab needs.   You are scheduled to see Dr. Schneider on April 10th at 2:40.     -------------------------------------------  To reduce the risk of subsequent stroke there are several important factors including optimal management of anticoagulants, blood pressure, cholesterol, diabetes and smoking abstinence.    Anticoagulation:  You are on Aspirin    Blood Pressure:  Keeping your blood pressures less than 130/80 has been shown to reduce risk of recurrent stroke. Recording your blood pressure and heart rate once daily in a log book can help you and your providers make decisions on optimal management. You are encouraged to bring your log book with you to your primary physician and/or cardiology doctor visits.    You are currently on amlodipine and lisinopril to help control your blood pressure. Several lifestyle modifications have been associated with blood pressure reduction and are an important part of a comprehensive plan. These include: weight loss (if over-weight); a diet low in salt and cholesterol and rich in fruits and vegetables; regular aerobic physical activity and limited alcohol consumption.    Diabetes:  Optimal management of diabetes not only reduces risk of another stroke, it can help with healing process from your  "recent stroke. Blood glucose levels measure how well you're controlling your diabetes. An additional test \"hemoglobin A1C\" reflects how you have been overall with glucose control in the prior few months. This should be less than 7 though even lower below 6 is considered normal. Your recent Hgb A1C was [insert A1C]. This should be followed up with your primary provider and/or endocrinologist.    Your present plan is to check blood glucose consistently Before Meals and at Bedtime. These should be recorded along with date/time and any relevant notes such as food consumed, insulin/medication taken etc. This helps you and your providers make decisions on optimal management. You're encouraged to bring you log book with to your primary and/or endocrinology doctor visits.  Your current medications include Insulin Glargine (Lantus) and Aspart (Novolog). Specific doses and frequencies listed elsewhere.     Diet:  Diet and exercise are very important for diabetes regardless of being on medication or not. Be aware of and moderate your carbohydrate intake as instructed by doctor, dietitian or nurse.  Regular physical activity may be more difficult since your stroke though doing what you can on a daily basis is helpful.     Cholesterol:  Traditional target levels for LDL cholesterol or \"bad cholesterol\" is less than 130 however once you have had a stroke, your target LDL level is now less than 70. Additional recommendations such as increasing your HDL or \"good\" cholesterol and lowering your triglyceride level can also be important.    Your most recent lipid panel was   Lab Results   Component Value Date    CHOL 134 01/10/2018     Lab Results   Component Value Date    HDL 41 01/10/2018     Lab Results   Component Value Date    LDL 74 01/10/2018     Lab Results   Component Value Date    TRIG 95 01/10/2018     Lab Results   Component Value Date    CHOLHDLRATIO 4.3 09/25/2015       This should be followed up in 2-3 months with your " primary provider.    Smoking:  Finally one of the most important modifiable risk factors is to not smoke. Continue to remain tobacco free!      HOME CARE  [facility] [phone] will provide RN, PT, OT, Speech and Home Health Aide. They will call you after you discharge to schedule the first visit.

## 2018-01-27 NOTE — PLAN OF CARE
Problem: Goal/Outcome  Goal: Goal Outcome Summary  Outcome: No Change  Bed and chair alarm on at all times. Pt has been compliant, did not make any attempt to set off the alarms. Ambulated to the bathroom without assistive device but accompanied by staff, gait is unsteady at times.  Had a large bowel movement prior to dinner. Teaching provided on inulin administration, pt was asked to return demonstration. Pt was able to demonstrate administration but forgot few steps. Will require assistance upon discharge.

## 2018-01-27 NOTE — PLAN OF CARE
Problem: Goal/Outcome  Goal: Goal Outcome Summary  Outcome: Improving  FOCUS/GOAL  Medical management    ASSESSMENT, INTERVENTIONS AND CONTINUING PLAN FOR GOAL:  Patient slept well. He was incontinent of urine at change of the shift. Around 0040, he called for assistance to the bathroom, the NA responded immediately, patient was trying to get out of bed. He asked for a condom catheter, he was encouraged to use a urinal but insisted on getting a condom catheter, saying that when he calls for assistance with urinal, staff does not get to him right away. At time when NA was in the room, patient had already been incontinent of urine. Bladder urgency? NA ambulated patient to the bathroom with SBA. The condom catheter was applied , enen continued to ask for it. Bed alarm is on.

## 2018-01-27 NOTE — PLAN OF CARE
Problem: Patient Care Overview  Goal: Plan of Care/Patient Progress Review  Pt seen for  Final speech tx session today prior to d/c to home. Completed memory recall task involving listening to 5 words read to him 2x each and then attempting to recall the words and and answer a question related to the words. Pt cued to use strategies of rehearsal and visualization to aid in recall- pt still with reduced ability to do this today-- also noted decline yesterday too.  Today at 6/10 accuracy- 60%-- slow to process and initiate still. Attempted numerical problem solving task- mod level- pt needed max assist to set up the problems and complete. Pt is d/cing t ohome today with planned HH sptx to continue to focus on cognitive goal areas. Pt will have 24/7 supervision following d/c  Speech Language Therapy Discharge Summary    Reason for therapy discharge:    Discharged to home with home therapy.    Progress towards therapy goal(s). See goals on Care Plan in [facility] electronic health record for goal details.  Goals not met.  Barriers to achieving goals:   discharge from facility.    Therapy recommendation(s):    Continued therapy is recommended.  Rationale/Recommendations:  See above summary.

## 2018-01-29 ENCOUNTER — CARE COORDINATION (OUTPATIENT)
Dept: CARE COORDINATION | Facility: CLINIC | Age: 78
End: 2018-01-29

## 2018-01-30 ENCOUNTER — ALLIED HEALTH/NURSE VISIT (OUTPATIENT)
Dept: PHARMACY | Facility: CLINIC | Age: 78
End: 2018-01-30
Payer: COMMERCIAL

## 2018-01-30 ENCOUNTER — TELEPHONE (OUTPATIENT)
Dept: INTERNAL MEDICINE | Facility: CLINIC | Age: 78
End: 2018-01-30

## 2018-01-30 DIAGNOSIS — Z79.4 TYPE 2 DIABETES MELLITUS WITH DIABETIC POLYNEUROPATHY, WITH LONG-TERM CURRENT USE OF INSULIN (H): ICD-10-CM

## 2018-01-30 DIAGNOSIS — R30.0 DYSURIA: ICD-10-CM

## 2018-01-30 DIAGNOSIS — E63.9 NUTRITIONAL DEFICIENCY: ICD-10-CM

## 2018-01-30 DIAGNOSIS — E11.42 TYPE 2 DIABETES MELLITUS WITH DIABETIC POLYNEUROPATHY, WITH LONG-TERM CURRENT USE OF INSULIN (H): ICD-10-CM

## 2018-01-30 DIAGNOSIS — I63.212 CEREBROVASCULAR ACCIDENT (CVA) DUE TO STENOSIS OF LEFT VERTEBRAL ARTERY (H): Primary | ICD-10-CM

## 2018-01-30 DIAGNOSIS — R33.9 URINARY RETENTION: ICD-10-CM

## 2018-01-30 DIAGNOSIS — J31.0 CHRONIC RHINITIS, UNSPECIFIED TYPE: ICD-10-CM

## 2018-01-30 DIAGNOSIS — E78.5 HYPERLIPIDEMIA LDL GOAL <100: ICD-10-CM

## 2018-01-30 DIAGNOSIS — N20.0 CALCULUS OF KIDNEY: ICD-10-CM

## 2018-01-30 DIAGNOSIS — I10 HYPERTENSION GOAL BP (BLOOD PRESSURE) < 140/90: ICD-10-CM

## 2018-01-30 PROCEDURE — 99605 MTMS BY PHARM NP 15 MIN: CPT | Performed by: PHARMACIST

## 2018-01-30 PROCEDURE — 99607 MTMS BY PHARM ADDL 15 MIN: CPT | Performed by: PHARMACIST

## 2018-01-30 NOTE — LETTER
January 31, 2018      Pete Sheldon  [address] [place] [place] [address]        Dear Pete,       It was so nice to speak with you on 1/30/18.  I hope I was able to give you some useful information during our Medication Therapy Management (MTM) visit. The purpose of this visit with a clinical pharmacist was to review the medicines you received when discharged from the hospital. We want to make sure that you know which medicines to take and what they are for. We also want to make sure all your medicines are working, safe, and as easy to take as possible.    Enclosed is a summary of the suggestions we talked about and any other thoughts I had. There is also a list of your medicines included. This information has also been shared with your primary care provider.    Feel free to call if you have any questions or concerns. By working together with you and your doctor, I hope to help you feel confident managing your medicines and improving your quality of life.       Best wishes,           Marivel Zeigler, PharmD  Pharmaceutical Care Resident

## 2018-01-30 NOTE — PROGRESS NOTES
[facility] CLARIFICATION NOTE FOR DISCHARGE  Lowest score for each FIM item supported by available charting  Discharge FIM scores taken from charting on 1/25/18.    Eating: FIM 6. Charting indicates patient was independent eating with dentures.  Grooming: FIM 5. Charting indicates patient needed a verbal cue to turn off the water when he was finished with grooming.  Bladder:  FIM 5. Patient needed a vocal cue how to change his incontinence pad.  Bladder Number of Accidents: 0  Toilet transfer: FIM 6. Charting indicates patient is modified independent with the grab bar.  Locomotion distance: 500 feet  Locomotion: FIM 4. Charting indicates patient ambulated 500 feet but required min assist to correct balance one time.  Stairs: FIM 4. Charting indicates patient performed 12 steps with CGA.  Comprehension: FIM 5. Charting indicates patient needs repetition to understand basic information.  Expression: FIM 6. Charting indicates patient is at a  modified independent level expressing himself.  Problem solving: FIM 3. Charting indicates patient needs assist to solve basic problems about 50% of the time.  Memory: FIM 4. Charting indicates patient needs min assist with prompting to recall information.

## 2018-01-30 NOTE — MR AVS SNAPSHOT
After Visit Summary   1/30/2018    Pete Sheldon    MRN: [id number]           Patient Information     Date Of Birth          1940        Visit Information        Provider Department      1/30/2018 10:00 AM Iris Gonzalez, [facility] MTM        Today's Diagnoses     Cerebrovascular accident (CVA) due to stenosis of left vertebral artery (H)    -  1    Dysuria        Calculus of kidney        Urinary retention        Type 2 diabetes mellitus with diabetic polyneuropathy, with long-term current use of insulin (H)        Hypertension goal BP (blood pressure) < 140/90        Hyperlipidemia LDL goal <100        Chronic rhinitis, unspecified type        Nutritional deficiency          Care Instructions    Recommendations from today's MTM visit:                                                    MTM (medication therapy management) is a service provided by a clinical pharmacist designed to help you get the most of out of your medicines.   Today we reviewed what your medicines are for, how to know if they are working, that your medicines are safe and how to make your medicine regimen as easy as possible.     1. Try a different Novolog pen.     2. Can take loratadine 10 mg once daily in AM to help with sneezing and other allergy symptoms.     3. Some symptoms of low blood sugar (hypoglycemia) are: feel dizzy, shaky, anxiousness, confused, headaches, or vision problems    If your blood glucose drops below 70 mg/dL, treat your low blood sugar right away. Follow these instructions:     1. If you feel low, check your blood sugar. If your blood sugar is <70 mg/dL, eat or drink 15-20 grams of glucose or simple carbohydrates. If you can't check your blood sugar right away, eat or drink 15-20 grams of carbohydrates just in case.   2. Wait 15 minutes.   3. Check your blood sugar again.   4. Repeat steps 1, 2 and 3 if you either have blood sugar <70 mg/dL or still have symptoms of hypoglycemia.     Foods  that have 15-20 grams of glucose/simple carbohydrates are:   -5 to 6 pieces of hard candy   -3 glucose tablets  -One-half cup of juice or regular soda (no diet soda)  -1 tablespoon of sugar or honey    Follow-up visit: 1 month     To schedule another MTM appointment, please call the clinic directly or you may call the MTM scheduling line at [phone] or toll-free at [phone].     My Clinical Pharmacist's contact information:                                                      It was a pleasure seeing you today!  Please feel free to contact me with any questions or concerns you have.      Marivel Ziegler, PharmD  Pharmaceutical Care Resident   Pager: [phone]      You may receive a survey about the MTM services you received.  I would appreciate your feedback to help me serve you better in the future. Please fill it out and return it when you can. Your comments will be anonymous.                    Follow-ups after your visit        Your next 10 appointments already scheduled     Feb 02, 2018 11:00 AM CST   SHORT with Rom Helm MD   [facility] ([facility] [place]) [address] [place] [id number] [phone]            Feb 23, 2018  2:30 PM CST   (Arrive by 2:15 PM)   New Patient Visit with Damaris Mcknight MD   [facility] ([facility])    [address] [place] [address]  [address]  [facility] [id number] [phone]            Feb 27, 2018 11:00 AM CST   TELEMEDICINE with Iris Gonzalez [facility] ([facility])    [address]  [address]  [facility] [id number] [phone]           Note: this is not an onsite visit; there is no need to come to the facility.            Mar 01, 2018  2:25 PM CST   (Arrive by 2:10 PM)   New Patient Visit with Pedro Doherty MD   [facility] ([facility])    [address] [place] [address]  [address] [address] [phone]            Mar 07, 2018  2:00 PM CST   [facility]   [facility]    [address]  [facility] [id number] [phone]           Please do not eat 10-12 hours before your appointment if you are coming in fasting for labs on lipids, cholesterol, or glucose (sugar). This does not apply to pregnant women. Water, hot tea and black coffee (with nothing added) are okay. Do not drink other fluids, diet soda or chew gum.            Mar 07, 2018  2:30 PM CST   [facility]   [facility]    [address]  [facility] [address] [phone]            Mar 23, 2018  3:00 PM CDT   (Arrive by 2:45 PM)   New Renal Calculi with Jasmyne Elliott MD   [facility] ([facility])    [address] [place] [id number] [phone]            Apr 10, 2018  2:40 PM CDT   (Arrive by 2:25 PM)   New Patient Visit with Nataliya Schneider MD   [facility] ([facility])    [address] [place] [id number] [phone]            Jun 26, 2018 10:00 AM CDT   (Arrive by 9:45 AM)   New Stroke with Claudio Castanon MD   [facility] ([facility])    [address] [place] [id number] [phone]              Who to contact     If you have questions or need follow up information about today's clinic visit or your schedule please contact [facility] directly at [phone].  Normal or non-critical lab and imaging results will be communicated to you by MyChart, letter or phone within 4 business days after the clinic has received the results. If you do not hear from us within 7  "days, please contact the clinic through [employer] or phone. If you have a critical or abnormal lab result, we will notify you by phone as soon as possible.  Submit refill requests through [employer] or call your pharmacy and they will forward the refill request to us. Please allow 3 business days for your refill to be completed.          Additional Information About Your Visit        [employer] Information     [employer] lets you send messages to your doctor, view your test results, renew your prescriptions, schedule appointments and more. To sign up, go to [email or web address] . Click on \"Log in\" on the left side of the screen, which will take you to the Welcome page. Then click on \"Sign up Now\" on the right side of the page.     You will be asked to enter the access code listed below, as well as some personal information. Please follow the directions to create your username and password.     Your access code is: [email or web address]  Expires: 2018  2:58 PM     Your access code will  in 90 days. If you need help or a new code, please call your [facility] clinic or [phone].        Care EveryWhere ID     This is your Care EveryWhere ID. This could be used by other organizations to access your [facility] medical records  [id number]         Blood Pressure from Last 3 Encounters:   18 150/77   18 148/83   18 121/83    Weight from Last 3 Encounters:   18 161 lb 6.4 oz (73.2 kg)   18 154 lb 3.2 oz (69.9 kg)   18 158 lb 1.1 oz (71.7 kg)              Today, you had the following     No orders found for display       Primary Care Provider Office Phone # Fax #    Rom Helm [phone]       [address]  [facility] [address]        Goals        Diet    Increase water intake     Notes - Note edited  2014 11:42 AM by Patricia Khan, RN    Have 5-6 glasses (8oz) of fluid a day  Per f/u with spouse today, pt not doing well. Not eating and drinking. " Called clinic and advised to take pt to clinic.         Increase water intake        General    Medication 1 (pt-stated)     Notes - Note created  5/15/2014  1:57 PM by Patricia Khan RN    Pt will have an MTM consult         Equal Access to Services     CAR BANDA : Hadii aad ku hadbashiro Sonohemiali, waaxda luqadaha, qaybta kaalmada adeegyada, kadeem laniermarlenimercedes medrano. So [facility] [phone].    ATENCIÓN: Si habla español, tiene a moise disposición servicios gratuitos de asistencia lingüística. Llame al [phone].    We comply with applicable federal civil rights laws and [place] laws. We do not discriminate on the basis of race, color, national origin, age, disability, sex, sexual orientation, or gender identity.            Thank you!     Thank you for choosing [facility]  for your care. Our goal is always to provide you with excellent care. Hearing back from our patients is one way we can continue to improve our services. Please take a few minutes to complete the written survey that you may receive in the mail after your visit with us. Thank you!             Your Updated Medication List - Protect others around you: Learn how to safely use, store and throw away your medicines at www.disposemymeds.org.          This list is accurate as of 1/30/18 11:59 PM.  Always use your most recent med list.                   Brand Name Dispense Instructions for use Diagnosis    ACE/ARB/ARNI NOT PRESCRIBED (INTENTIONAL)      by Other route continuous prn (Hyperkalemia) Reported on 5/19/2017    Type 2 diabetes, HbA1C goal < 8% (H)       amLODIPine 10 MG tablet    NORVASC    30 tablet    Take 1 tablet (10 mg) by mouth daily    Hypertension goal BP (blood pressure) < 140/90, Cerebrovascular accident (CVA) due to occlusion of left vertebral artery (H), Cerebrovascular accident (CVA) due to stenosis of left vertebral artery (H)       aspirin 81 MG chewable tablet     36 tablet    Take 4 tablets (324  mg) by mouth daily    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    Type 2 diabetes mellitus with diabetic polyneuropathy, with long-term current use of insulin (H)       Glucosamine HCl 750 MG Tabs      Take 750 mg by mouth 2 times daily    Arthritis       GLUCOSE MANAGEMENT Tabs     100 tablet    4 tabs po for low blood sugar below 70, may repeat q 10-15 minutes as needed    Type 2 diabetes mellitus with diabetic polyneuropathy, with long-term current use of insulin (H)       insulin aspart 100 UNIT/ML injection    NovoLOG PEN    3 mL    Inject 7 units w/ breakfast, 5 units w/ lunch, 7 units w/ dinner. Hold if not eating meal.    Type 2 diabetes mellitus with diabetic polyneuropathy, with long-term current use of insulin (H)       insulin glargine 100 UNIT/ML injection    LANTUS    9 mL    Inject 30 Units Subcutaneous every morning    Type 2 diabetes mellitus with diabetic polyneuropathy, with long-term current use of insulin (H)       insulin pen needle 31G X 8 MM     300 each    B-D UF III short pen needles, use 4 times a day to inject insulin.    Type 2 diabetes, HbA1C goal < 8% (H)       insulin syringes (disposable) U-100 0.3 ML Misc     100 each    1 each 2 times daily    Type II or unspecified type diabetes mellitus without mention of complication, not stated as uncontrolled       LIFESCAN FINEPOINT LANCETS Misc     200 strip    Use to test blood sugars 4 times daily or as directed.    Type 2 diabetes, HbA1C goal < 8% (H)       lisinopril 2.5 MG tablet    PRINIVIL/Zestril    30 tablet    Take 1 tablet (2.5 mg) by mouth daily    Hypertension goal BP  "(blood pressure) < 140/90       loratadine 10 MG tablet    AF LORATADINE    14 tablet    Take 1 tablet (10 mg) by mouth daily    Hypertension goal BP (blood pressure) < 140/90       metFORMIN 500 MG tablet    GLUCOPHAGE    30 tablet    Take 1 tablet (500 mg) by mouth daily (with breakfast)    Type 2 diabetes mellitus with diabetic polyneuropathy, with long-term current use of insulin (H)       multivitamin Tabs tablet      Take 1 tablet by mouth daily FOCUS SELECT PREMIUM WITH LUTEIN per eye doctor Reported on 5/19/2017        order for DME     3 Month    All DM testing supplies including test strips, lancets, solution, syringes, needles and/or pen needles for testing 4 times per day.  Brand \"Contour Next\"    Type 2 diabetes mellitus with diabetic polyneuropathy (H)       penicillin V potassium 500 MG tablet    VEETID    90 tablet    Take 1 tablet (500 mg) by mouth 3 times daily    Urinary tract infection without hematuria, site unspecified       tamsulosin 0.4 MG capsule    FLOMAX    30 capsule    Take 1 capsule (0.4 mg) by mouth every evening    Calculus of kidney         "

## 2018-01-30 NOTE — PROGRESS NOTES
SUBJECTIVE/OBJECTIVE:                Pete Sheldon is a 77 year old male called for a transitions of care visit.  He was discharged from [facility] on 1/12/18 for left thalamic ischemic stroke and UTI. Was in rehab facility from 1/12/18-1/27/18. Today, visit was with wife, Radha - responsible for Pete's medication regimen and administration. Consent to communicate on file.     Chief Complaint: Wondering what is safe for Pete to take for allergies. Has appt with Dr. Helm on Friday for follow-up from hospital discharge.     Allergies/ADRs: Reviewed in Epic.   Tobacco: No tobacco use   Alcohol: none  Caffeine: no caffeine  Activity: Rehab,   PMH: Reviewed in Epic    Medication Adherence/Access  Since back from rehab center, puts all medication bottles in a ice cream bucket. Each bottle is numbered and goes by number to help to remember to take medications. Does not like using pill box.   The patient misses their medication 0 times per week. He has assistance with medication administration.  Adherence/Compliance is described as excellent.  Medication barriers: no issues reported by patient.  The patient fills general medications at  [facility].      CVA: Currently taking aspirin 324 mg once daily (4x81 mg tablets). No medication side effects. No bleeding or bruising. Was not taking aspirin prior to stroke. On 1/9/18 fell in bathroom, did not hit head but wife, Radha could not get him up, called the [facility]. When first presenting to the ED he was found to have a UTI, then later was found to have right arm numbness and difficulty ambulating - later was diagnosed with left thalamic ischemic stroke. Was discharged from hospital 1/12/18, and was sent to a rehab facility from 1/12/18-1/27/18. Wife states that rehab center has helped. Has appointment with Neurologist with Dr. Doherty on 3/1/18.     UTI: Current medications include penicillin  mg TID for 30 days. Was diagnosed with a UTI 1/9, originally prescribed  Keflex but once micro came back was changed to penicillin VK. No medication side effects. Urology appt scheduled for 3/23/18.     Hx kidney stones/Urinary retention: Currently taking tamsulosin 0.4 mg once nightly. No medication side effects. Has a history of kidney stones, last incidence was in .  Presently to wife's knowledge, no issues with urinary retention more than normal, but it was recommended he follow up with Urology.     Diabetes:  Pt currently taking metformin 500 mg once daily with breakfast, Lantus 30 units every morning, Novolog 7 units with breakfast, 5 units with lunch, 5 units with dinner. Before hospitalization he was using Humalog, but when in hospital and after discharge had to switch to Novolog per insurance (patient reported).  Wife thinks Novolog pen has been more difficult compared to Humalog, had 1-2 instances where she feels like maybe the insulin was not coming out of pen (the pen being used at home is the pen that had been started and used when patient was in Rehab- has new box of Novolog pens at home in fridge). Glucometer and test strips needed to change per insurance - now OneTouch Verio.  Pt is not experiencing side effects.  SMBG: four times daily (before meals and at bedtime). Ranges (patient reported):  :  AM fastin   : 167 AM fasting, 134 before lunch, 108 before dinner, bedtime: 236 (had a bedtime snack before testing)  : 176 before breakfast, 347 before lunch (instance where Novolog pen was acting up and wife thinks he may not have gotten insulin), 237 before dinner, 208 bedtime (snacked after dinner).  Patient is not experiencing hypoglycemia.   Recent symptoms of high blood sugar? none  Eye exam: up to date. Had cataract surgery in , going back in April for check up.   Foot exam: up to date  Microalbumin is not < 30 mg/g. Pt is not taking an ACEi/ARB.  Aspirin: 324 mg once daily (4 x 81 mg tablets), no bleeding or bruising, doesn't know how long he  will be on this dose.   Diet: Has a lot of home cooked meals, meat and roasts, vegetables - sometimes will have treats like a brownie or monkey bread.   Lab Results   Component Value Date    A1C 7.9 01/09/2018    A1C 7.6 11/29/2017    A1C 7.8 08/16/2017    A1C 8.3 05/12/2017    A1C 7.9 02/10/2017     Hypertension: Current medications include amlodipine 10 mg once daily AM, lisinopril 2.5 mg once daily AM. Has blood pressure monitor at home - just started monitoring blood pressure at home since discharged from rehab Home BP monitoring in range of 129/59, 139/72 were the last 2 days of readings. Patient reports no current medication side effects.  Estimated Creatinine Clearance: 48.6 mL/min (based on Cr of 1.25).    Hyperlipidemia: Current therapy includes atorvastatin 20mg once daily.  Pt reports no significant myalgias or other side effects. Was on lovastatin previous to hospitalization, then switched to atorvastatin.   Recent Labs   Lab Test  01/10/18   0926  08/16/17   0800   09/25/15   0844  06/19/15   0815   CHOL  134  141   < >  134  135   HDL  41  37*   < >  31*  36*   LDL  74  85   < >  80  83   TRIG  95  97   < >  117  78   CHOLHDLRATIO   --    --    --   4.3  3.8    < > = values in this interval not displayed.     Allergic rhinitis: Current medications include none. Primary symptoms are sneezing, cough, runny nose, sometimes itchy eyes but not often. Patient is wondering what can be taken for allergies that is safe for Pete, bel Xyzaab, Zyrtec and equate brand loratadine at home.     Supplements: Current medications include glucosamine 750 mg once daily, multivitamin daily. No medication side effects. Patient finds that glucosamine has been helpful for both her and his osteoarthrosis. Has been on for a very long time.     Current labs include:  BP Readings from Last 3 Encounters:   01/27/18 150/77   01/12/18 148/83   01/08/18 121/83     Today's Vitals: There were no vitals taken for this visit.  Telemedicine visit - no vitals.   Lab Results   Component Value Date    A1C 7.9 01/09/2018   .  Lab Results   Component Value Date    CHOL 134 01/10/2018     Lab Results   Component Value Date    TRIG 95 01/10/2018     Lab Results   Component Value Date    HDL 41 01/10/2018     Lab Results   Component Value Date    LDL 74 01/10/2018       Liver Function Studies -   Recent Labs   Lab Test  01/10/18   0926   PROTTOTAL  7.9   ALBUMIN  3.1*   BILITOTAL  0.6   ALKPHOS  83   AST  23   ALT  35       Lab Results   Component Value Date    UCRR 53 07/22/2016    MICROL 208 07/22/2016    UMALCR 390.98 (H) 07/22/2016       Last Basic Metabolic Panel:  Lab Results   Component Value Date     01/26/2018      Lab Results   Component Value Date    POTASSIUM 4.4 01/26/2018     Lab Results   Component Value Date    CHLORIDE 103 01/26/2018     Lab Results   Component Value Date    BUN 34 01/26/2018     Lab Results   Component Value Date    CR 1.25 01/26/2018     GFR Estimate   Date Value Ref Range Status   01/26/2018 56 (L) >60 mL/min/1.7m2 Final     Comment:     Non  GFR Calc   01/25/2018 57 (L) >60 mL/min/1.7m2 Final     Comment:     Non  GFR Calc   01/23/2018 61 >60 mL/min/1.7m2 Final     Comment:     Non  GFR Calc     GFR Estimate If Black   Date Value Ref Range Status   01/26/2018 67 >60 mL/min/1.7m2 Final     Comment:      GFR Calc   01/25/2018 69 >60 mL/min/1.7m2 Final     Comment:      GFR Calc   01/23/2018 73 >60 mL/min/1.7m2 Final     Comment:      GFR Calc     TSH   Date Value Ref Range Status   07/22/2016 5.58 (H) 0.40 - 4.00 mU/L Final   ]    Most Recent Immunizations   Administered Date(s) Administered     Influenza (H1N1) 12/28/2009     Influenza (High Dose) 3 valent vaccine 09/05/2014     Influenza (IIV3) PF 08/29/2012     Pneumococcal 23 valent 03/16/2012     TD (ADULT, 7+) 10/22/2003     TDAP Vaccine (Adacel)  01/31/2014     Zoster vaccine, live 10/15/2011       ASSESSMENT:                 Current medications were reviewed today.      Medication Adherence: no issues identified    CVA: Stable. Patient to follow-up with Neurology.     UTI: Stable. Patient to follow-up with Urology.     Hx kidney stones/Urinary retention: Stable. Patient to follow up with Urology.     Diabetes:  Needs improvement. A1c is at goal <8%. It was recommended that if patient/wife are still having issues with that specific Novolog pen, to try and use a different pen and see if administration is easier. Educated patient on when and how to treat low blood sugar when necessary. Will call patient in 1 month to reassess blood sugars and insulin administration.     Hypertension: Stable.     Hyperlipidemia: Stable.     Allergic rhinitis: Needs improvement. Patient was recommended out of the allergy medications that she has at home that loratadine would potentially be the safest since non-drowsy. Patient can take loratadine 10 mg once daily as needed for allergies.     Supplements: Stable.     PLAN:                  1. Patient to try different Novolog pen should patient continue to have issues with current Novolog pen.     2. Patient can try loratadine 10 mg once daily as needed for allergies.    I spent 45 minutes with this patient today. I offer these suggestions for consideration by the PCP. A copy of the visit note was provided to the patient's primary care provider.    Will follow up in 1 month.    I concur with the note as dictated above which reflects our joint assessment and plan.   Iris Gonzalez, [place]    The patient was mailed a summary of these recommendations as an after visit summary.    Marivel Ziegler PharmD  Pharmaceutical Care Resident   Pager: [phone]

## 2018-01-30 NOTE — Clinical Note
Hi Dr. Helm,   Please see note as FYI from encounter with Vicitor 1/30.   Marivel Ziegler, PharmD Pharmaceutical Care Resident  Pager: [phone]

## 2018-01-30 NOTE — PATIENT INSTRUCTIONS
Recommendations from today's MTM visit:                                                    MTM (medication therapy management) is a service provided by a clinical pharmacist designed to help you get the most of out of your medicines.   Today we reviewed what your medicines are for, how to know if they are working, that your medicines are safe and how to make your medicine regimen as easy as possible.     1. Try a different Novolog pen.     2. Can take loratadine 10 mg once daily in AM to help with sneezing and other allergy symptoms.     3. Some symptoms of low blood sugar (hypoglycemia) are: feel dizzy, shaky, anxiousness, confused, headaches, or vision problems    If your blood glucose drops below 70 mg/dL, treat your low blood sugar right away. Follow these instructions:     1. If you feel low, check your blood sugar. If your blood sugar is <70 mg/dL, eat or drink 15-20 grams of glucose or simple carbohydrates. If you can't check your blood sugar right away, eat or drink 15-20 grams of carbohydrates just in case.   2. Wait 15 minutes.   3. Check your blood sugar again.   4. Repeat steps 1, 2 and 3 if you either have blood sugar <70 mg/dL or still have symptoms of hypoglycemia.     Foods that have 15-20 grams of glucose/simple carbohydrates are:   -5 to 6 pieces of hard candy   -3 glucose tablets  -One-half cup of juice or regular soda (no diet soda)  -1 tablespoon of sugar or honey    Follow-up visit: 1 month     To schedule another MTM appointment, please call the clinic directly or you may call the MTM scheduling line at [phone] or toll-free at [phone].     My Clinical Pharmacist's contact information:                                                      It was a pleasure seeing you today!  Please feel free to contact me with any questions or concerns you have.      Marivel Ziegler, PharmD  Pharmaceutical Care Resident   Pager: [phone]      You may receive a survey about the MTM services you  received.  I would appreciate your feedback to help me serve you better in the future. Please fill it out and return it when you can. Your comments will be anonymous.

## 2018-01-31 ENCOUNTER — PRE VISIT (OUTPATIENT)
Dept: UROLOGY | Facility: CLINIC | Age: 78
End: 2018-01-31

## 2018-01-31 NOTE — TELEPHONE ENCOUNTER
Radha ALEXUS nurse calls left voice message at 4:18pm today on triage line   Patient had stroke 1/9/18  Discharged, TCU 1/12/18-1/27/18.  Requesting verbal order:    SNV 2w2, 1w3, plus 3 prn.  PT, OT, ST eval and treat  SW for community services.  HHA 1w5    Please call back with orders.

## 2018-02-02 ENCOUNTER — OFFICE VISIT (OUTPATIENT)
Dept: INTERNAL MEDICINE | Facility: CLINIC | Age: 78
End: 2018-02-02
Payer: COMMERCIAL

## 2018-02-02 VITALS
TEMPERATURE: 97.5 F | HEIGHT: 67 IN | WEIGHT: 156 LBS | HEART RATE: 89 BPM | SYSTOLIC BLOOD PRESSURE: 118 MMHG | DIASTOLIC BLOOD PRESSURE: 54 MMHG | BODY MASS INDEX: 24.48 KG/M2 | OXYGEN SATURATION: 98 %

## 2018-02-02 DIAGNOSIS — Z79.4 TYPE 2 DIABETES MELLITUS WITH DIABETIC POLYNEUROPATHY, WITH LONG-TERM CURRENT USE OF INSULIN (H): ICD-10-CM

## 2018-02-02 DIAGNOSIS — E78.5 HYPERLIPIDEMIA LDL GOAL <100: ICD-10-CM

## 2018-02-02 DIAGNOSIS — I63.212 CEREBROVASCULAR ACCIDENT (CVA) DUE TO STENOSIS OF LEFT VERTEBRAL ARTERY (H): Primary | ICD-10-CM

## 2018-02-02 DIAGNOSIS — E11.42 TYPE 2 DIABETES MELLITUS WITH DIABETIC POLYNEUROPATHY, WITH LONG-TERM CURRENT USE OF INSULIN (H): ICD-10-CM

## 2018-02-02 DIAGNOSIS — I10 HYPERTENSION GOAL BP (BLOOD PRESSURE) < 140/90: ICD-10-CM

## 2018-02-02 PROCEDURE — 99495 TRANSJ CARE MGMT MOD F2F 14D: CPT | Performed by: INTERNAL MEDICINE

## 2018-02-02 RX ORDER — AMLODIPINE BESYLATE 5 MG/1
5 TABLET ORAL DAILY
Qty: 30 TABLET | Refills: 1 | Status: SHIPPED | OUTPATIENT
Start: 2018-02-02 | End: 2018-04-05

## 2018-02-02 NOTE — MR AVS SNAPSHOT
After Visit Summary   2/2/2018    Pete Sheldon    MRN: [id number]           Patient Information     Date Of Birth          1940        Visit Information        Provider Department      2/2/2018 11:00 AM Rom Helm MD [facility]        Today's Diagnoses     Cerebrovascular accident (CVA) due to stenosis of left vertebral artery (H)    -  1    Type 2 diabetes mellitus with diabetic polyneuropathy, with long-term current use of insulin (H)        Hypertension goal BP (blood pressure) < 140/90        Hyperlipidemia LDL goal <100          Care Instructions    Blood pressure appears a bit low today in our office.   Will reduce amlodipine from 10 mg daily, back down to 5 mg daily.   (You had been taking 5 mg daily prior to your stroke).     If any of the specialists want to make changes in your blood pressure meds, that is okay with me.     No other medication changes have been made today.     Follow up with the specialists as scheduled.     See me back within          Follow-ups after your visit        Your next 10 appointments already scheduled     Feb 23, 2018  2:30 PM CST   (Arrive by 2:15 PM)   New Patient Visit with Damaris Mcknight MD   [facility] ([facility])    [address]  [address]  [facility] [id number] [phone]            Feb 27, 2018 11:00 AM CST   TELEMEDICINE with Iris Gonzalez [facility] ([facility])    [address]  [address]  [facility] [id number] [phone]           Note: this is not an onsite visit; there is no need to come to the facility.            Mar 01, 2018  2:25 PM CST   (Arrive by 2:10 PM)   New Patient Visit with Pedro Doherty MD   [facility] ([facility])    [address]  3rd Floor  [facility] [id number] [phone]            Mar 07, 2018  2:00 PM CST    LAB with [facility]   [facility]    [address]  [facility] [id number] [phone]           Please do not eat 10-12 hours before your appointment if you are coming in fasting for labs on lipids, cholesterol, or glucose (sugar). This does not apply to pregnant women. Water, hot tea and black coffee (with nothing added) are okay. Do not drink other fluids, diet soda or chew gum.            Mar 07, 2018  2:30 PM CST   Return Visit with Lori Damian MD   [facility] ([facility])    [address]  [facility] [id number] [phone]            Mar 23, 2018  3:00 PM CDT   (Arrive by 2:45 PM)   New Renal Calculi with Jasmyne Elliott MD   [facility] ([facility])    [address] [place] [address]  [address] [facility] [id number] [phone]            Apr 10, 2018  2:40 PM CDT   (Arrive by 2:25 PM)   New Patient Visit with Nataliya Schneider MD   [facility] ([facility])    [address] [place] [id number] [phone]            Jun 26, 2018 10:00 AM CDT   (Arrive by 9:45 AM)   New Stroke with Claudio Castanon MD   [facility] ([facility])    [address] [place] [id number] [phone]              Who to contact     If you have questions or need follow up information about today's clinic visit or your schedule please contact [facility] directly at [phone].  Normal or non-critical lab and imaging results will be communicated to you by MyChart, letter or phone within 4 business days after the clinic has received the results. If you do not hear from us within 7 days, please contact the clinic through MyChart or phone. If you have a critical or  "abnormal lab result, we will notify you by phone as soon as possible.  Submit refill requests through [employer] or call your pharmacy and they will forward the refill request to us. Please allow 3 business days for your refill to be completed.          Additional Information About Your Visit        [place] Information     [employer] lets you send messages to your doctor, view your test results, renew your prescriptions, schedule appointments and more. To sign up, go to [email or web address] . Click on \"Log in\" on the left side of the screen, which will take you to the Welcome page. Then click on \"Sign up Now\" on the right side of the page.     You will be asked to enter the access code listed below, as well as some personal information. Please follow the directions to create your username and password.     Your access code is: [email or web address]  Expires: 2018  2:58 PM     Your access code will  in 90 days. If you need help or a new code, please call your [facility] clinic or [phone].        Care EveryWhere ID     This is your Care EveryWhere ID. This could be used by other organizations to access your [facility] medical records  [id number]        Your Vitals Were     Pulse Temperature Height Pulse Oximetry BMI (Body Mass Index)       89 97.5  F (36.4  C) (Oral) 5' 7\" (1.702 m) 98% 24.43 kg/m2        Blood Pressure from Last 3 Encounters:   18 118/54   18 150/77   18 148/83    Weight from Last 3 Encounters:   18 156 lb (70.8 kg)   18 161 lb 6.4 oz (73.2 kg)   18 154 lb 3.2 oz (69.9 kg)              Today, you had the following     No orders found for display         Today's Medication Changes          These changes are accurate as of 18 11:49 AM.  If you have any questions, ask your nurse or doctor.               These medicines have changed or have updated prescriptions.        Dose/Directions    amLODIPine 5 MG tablet   Commonly known as:  NORVASC   This may have " changed:    - medication strength  - how much to take   Used for:  Hypertension goal BP (blood pressure) < 140/90   Changed by:  Rom Helm MD        Dose:  5 mg   Take 1 tablet (5 mg) by mouth daily   Quantity:  30 tablet   Refills:  1            Where to get your medicines      These medications were sent to [id number] - [facility] [address] [facility]  [address] [facility] [address]     Phone:  [phone]     amLODIPine 5 MG tablet                Primary Care Provider Office Phone # Fax #    Rom Helm [phone]       [address] [facility] [address]  [facility] [address]        Goals        Diet    Increase water intake     Notes - Note edited  5/14/2014 11:42 AM by Patricia Khan RN    Have 5-6 glasses (8oz) of fluid a day  Per f/u with spouse today, pt not doing well. Not eating and drinking. Called clinic and advised to take pt to clinic.         Increase water intake        General    Medication 1 (pt-stated)     Notes - Note created  5/15/2014  1:57 PM by Patricia Khan, JULIA    Pt will have an MTM consult         Equal Access to Services     REINA BANDA AH: Hadii aad ku hadasho Soomaali, waaxda luqadaha, qaybta kaalmada adeegyada, kadeem medrano. So [facility] [phone].    ATENCIÓN: Si habla español, tiene a moise disposición servicios gratuitos de asistencia lingüística. Llame al [phone].    We comply with applicable federal civil rights laws and [place] laws. We do not discriminate on the basis of race, color, national origin, age, disability, sex, sexual orientation, or gender identity.            Thank you!     Thank you for choosing [facility]  for your care. Our goal is always to provide you with excellent care. Hearing back from our patients is one way we can continue to improve our services. Please take a few minutes to complete the written survey that you may receive in the mail after your  visit with us. Thank you!             Your Updated Medication List - Protect others around you: Learn how to safely use, store and throw away your medicines at www.disposemymeds.org.          This list is accurate as of 2/2/18 11:49 AM.  Always use your most recent med list.                   Brand Name Dispense Instructions for use Diagnosis    ACE/ARB/ARNI NOT PRESCRIBED (INTENTIONAL)      by Other route continuous prn (Hyperkalemia) Reported on 5/19/2017    Type 2 diabetes, HbA1C goal < 8% (H)       amLODIPine 5 MG tablet    NORVASC    30 tablet    Take 1 tablet (5 mg) by mouth daily    Hypertension goal BP (blood pressure) < 140/90       aspirin 81 MG chewable tablet     36 tablet    Take 4 tablets (324 mg) by mouth daily    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    Type 2 diabetes mellitus with diabetic polyneuropathy, with long-term current use of insulin (H)       Glucosamine HCl 750 MG Tabs      Take 750 mg by mouth 2 times daily    Arthritis       GLUCOSE MANAGEMENT Tabs     100 tablet    4 tabs po for low blood sugar below 70, may repeat q 10-15 minutes as needed    Type 2 diabetes mellitus with diabetic polyneuropathy, with long-term current use of insulin (H)       insulin aspart 100 UNIT/ML injection    NovoLOG PEN    3 mL    Inject 7 units w/ breakfast, 5 units w/ lunch, 7 units w/ dinner. Hold if not eating meal.    Type 2 diabetes mellitus with diabetic polyneuropathy, with long-term current use of insulin (H)       insulin glargine 100 UNIT/ML injection    LANTUS    9 mL    Inject 30 Units Subcutaneous every morning    Type 2 diabetes  "mellitus with diabetic polyneuropathy, with long-term current use of insulin (H)       insulin pen needle 31G X 8 MM     300 each    B-D UF III short pen needles, use 4 times a day to inject insulin.    Type 2 diabetes, HbA1C goal < 8% (H)       insulin syringes (disposable) U-100 0.3 ML Misc     100 each    1 each 2 times daily    Type II or unspecified type diabetes mellitus without mention of complication, not stated as uncontrolled       LIFESCAN FINEPOINT LANCETS Misc     200 strip    Use to test blood sugars 4 times daily or as directed.    Type 2 diabetes, HbA1C goal < 8% (H)       lisinopril 2.5 MG tablet    PRINIVIL/Zestril    30 tablet    Take 1 tablet (2.5 mg) by mouth daily    Hypertension goal BP (blood pressure) < 140/90       loratadine 10 MG tablet    AF LORATADINE    14 tablet    Take 1 tablet (10 mg) by mouth daily    Hypertension goal BP (blood pressure) < 140/90       metFORMIN 500 MG tablet    GLUCOPHAGE    30 tablet    Take 1 tablet (500 mg) by mouth daily (with breakfast)    Type 2 diabetes mellitus with diabetic polyneuropathy, with long-term current use of insulin (H)       multivitamin Tabs tablet      Take 1 tablet by mouth daily FOCUS SELECT PREMIUM WITH LUTEIN per eye doctor Reported on 5/19/2017        order for DME     3 Month    All DM testing supplies including test strips, lancets, solution, syringes, needles and/or pen needles for testing 4 times per day.  Brand \"Contour Next\"    Type 2 diabetes mellitus with diabetic polyneuropathy (H)       penicillin V potassium 500 MG tablet    VEETID    90 tablet    Take 1 tablet (500 mg) by mouth 3 times daily    Urinary tract infection without hematuria, site unspecified       tamsulosin 0.4 MG capsule    FLOMAX    30 capsule    Take 1 capsule (0.4 mg) by mouth every evening    Calculus of kidney         "

## 2018-02-02 NOTE — PROGRESS NOTES
Clinic Care Coordination Contact    Situation: Patient chart reviewed by care coordinator.    Background: DC'd from TCU with home care    Assessment: Patient is at PCP appt    Plan/Recommendations: Writer will call patient on Monday     NEO Scott  [facility]  [facility]  [phone]      Clinic Care Coordination Contact    Situation: Patient chart reviewed by care coordinator.    Background: patient was discharged from TCU    Assessment: Patient already had PCP appt no referral was made to care coordination. Patient has home care coming out to his house    Plan/Recommendations: No outreaches will be made at this time.    NEO Scott  [facility]  [facility]  [phone]

## 2018-02-02 NOTE — NURSING NOTE
"Chief Complaint   Patient presents with     Hospital F/U       Initial /64 (BP Location: Left arm, Patient Position: Sitting, Cuff Size: Adult Regular)  Pulse 89  Temp 97.5  F (36.4  C) (Oral)  Ht 5' 7\" (1.702 m)  Wt 156 lb (70.8 kg)  SpO2 98%  BMI 24.43 kg/m2 Estimated body mass index is 24.43 kg/(m^2) as calculated from the following:    Height as of this encounter: 5' 7\" (1.702 m).    Weight as of this encounter: 156 lb (70.8 kg).  Medication Reconciliation: complete   Leann BAKER      "

## 2018-02-02 NOTE — PATIENT INSTRUCTIONS
Blood pressure appears a bit low today in our office.   Will reduce amlodipine from 10 mg daily, back down to 5 mg daily.   (You had been taking 5 mg daily prior to your stroke).     If any of the specialists want to make changes in your blood pressure meds, that is okay with me.     No other medication changes have been made today.     Follow up with the specialists as scheduled.     See me back within

## 2018-02-05 ENCOUNTER — DOCUMENTATION ONLY (OUTPATIENT)
Dept: CARE COORDINATION | Facility: CLINIC | Age: 78
End: 2018-02-05

## 2018-02-05 DIAGNOSIS — Z79.4 TYPE 2 DIABETES MELLITUS WITH DIABETIC POLYNEUROPATHY, WITH LONG-TERM CURRENT USE OF INSULIN (H): Primary | ICD-10-CM

## 2018-02-05 DIAGNOSIS — E11.42 TYPE 2 DIABETES MELLITUS WITH DIABETIC POLYNEUROPATHY, WITH LONG-TERM CURRENT USE OF INSULIN (H): Primary | ICD-10-CM

## 2018-02-05 DIAGNOSIS — E78.5 HYPERLIPIDEMIA LDL GOAL <100: ICD-10-CM

## 2018-02-05 DIAGNOSIS — N20.0 CALCULUS OF KIDNEY: ICD-10-CM

## 2018-02-05 NOTE — PROGRESS NOTES
Requesting order for FU SW visit to complete [employer] nicolette, address ACP needs, and continue to educate re community resources.

## 2018-02-06 RX ORDER — TAMSULOSIN HYDROCHLORIDE 0.4 MG/1
CAPSULE ORAL
Qty: 90 CAPSULE | Refills: 1 | Status: SHIPPED | OUTPATIENT
Start: 2018-02-06 | End: 2018-05-16

## 2018-02-06 NOTE — TELEPHONE ENCOUNTER
"Requested Prescriptions   Pending Prescriptions Disp Refills     tamsulosin (FLOMAX) 0.4 MG capsule [Pharmacy Med Name: TAMSULOSIN 0.4MG    CAP] 90 capsule 1     Sig: TAKE ONE CAPSULE BY MOUTH ONCE DAILY    Alpha Blockers Passed    2/6/2018 10:25 AM       Passed - BP is less than 140/90    BP Readings from Last 3 Encounters:   02/02/18 118/54   01/27/18 150/77   01/12/18 148/83                Passed - Recent or future visit with authorizing provider's specialty    Patient had office visit in the last year or has a visit in the next 30 days with authorizing provider.  See \"Patient Info\" tab in inbasket, or \"Choose Columns\" in Meds & Orders section of the refill encounter.            Passed - Patient does not have Tadalafil, Vardenafil, or Sildenafil on their medication list       Passed - Patient is 18 years of age or older        blood glucose monitoring (ONETOUCH VERIO IQ) test strip 100 strip 0     Sig: Use to test blood sugars 4 times daily or as directed using One Touch Verio Flex.    Diabetic Supplies Protocol Passed    2/6/2018 10:25 AM       Passed - Patient is 18 years of age or older       Passed - Patient has had appt within past 6 mos    IV to PO - Antibiotics     None                    Prescription approved per [facility] Refill Protocol. DENITA Gallardo R.N.        "

## 2018-02-07 ENCOUNTER — TELEPHONE (OUTPATIENT)
Dept: INTERNAL MEDICINE | Facility: CLINIC | Age: 78
End: 2018-02-07

## 2018-02-07 NOTE — TELEPHONE ENCOUNTER
"Arlyn Hernandez,  - [facility] calls, left  relaying that pt is reporting intermittent right hip pain that has been going on \"for a while\". He cancelled PT d/t the pain. Pt had appt 02/02 with PCP, but forgot to mention the pain as he wasn't having it at that time.    Called pt's home and spoke to his wife (per consent to communicate). She states the pain is on/off. This AM she cancelled PT d/t his hip pain keeping him in bed. Indicates after tylenol and some time pt's pain resolved and was able to get out of bed. Wife doesn't feel the pain is a concern.    Please advise, thanks.    Discussed with wife that unless PCP has additional recommendations no f/u call will be made. She's agreeable to this.  "

## 2018-02-08 ENCOUNTER — TELEPHONE (OUTPATIENT)
Dept: INTERNAL MEDICINE | Facility: CLINIC | Age: 78
End: 2018-02-08

## 2018-02-09 ENCOUNTER — TELEPHONE (OUTPATIENT)
Dept: INTERNAL MEDICINE | Facility: CLINIC | Age: 78
End: 2018-02-09

## 2018-02-09 NOTE — TELEPHONE ENCOUNTER
Form received from: [facility]    Form requesting following info/need: OT orders    DEMOND needed?: No    Location of form: Dr. Helm's yellow basket    When completed the route for return: Fax

## 2018-02-13 ENCOUNTER — TELEPHONE (OUTPATIENT)
Dept: INTERNAL MEDICINE | Facility: CLINIC | Age: 78
End: 2018-02-13

## 2018-02-15 DIAGNOSIS — Z53.9 DIAGNOSIS NOT YET DEFINED: Primary | ICD-10-CM

## 2018-02-15 PROCEDURE — G0180 MD CERTIFICATION HHA PATIENT: HCPCS | Performed by: INTERNAL MEDICINE

## 2018-02-20 DIAGNOSIS — I10 HYPERTENSION GOAL BP (BLOOD PRESSURE) < 140/90: ICD-10-CM

## 2018-02-20 DIAGNOSIS — I63.112 CEREBROVASCULAR ACCIDENT (CVA) DUE TO EMBOLISM OF LEFT VERTEBRAL ARTERY (H): ICD-10-CM

## 2018-02-20 NOTE — TELEPHONE ENCOUNTER
"Requested Prescriptions   Pending Prescriptions Disp Refills     lisinopril (PRINIVIL/ZESTRIL) 2.5 MG tablet 30 tablet 0     Sig: Take 1 tablet (2.5 mg) by mouth daily    ACE Inhibitors (Including Combos) Protocol Passed    2/20/2018  3:08 PM       Passed - Blood pressure under 140/90 in past 12 months    BP Readings from Last 3 Encounters:   02/02/18 118/54   01/27/18 150/77   01/12/18 148/83                Passed - Recent or future visit with authorizing provider's specialty    Patient had office visit in the last year or has a visit in the next 30 days with authorizing provider.  See \"Patient Info\" tab in inbasket, or \"Choose Columns\" in Meds & Orders section of the refill encounter.            Passed - Patient is age 18 or older       Passed - Normal serum creatinine on file in past 12 months    Recent Labs   Lab Test  01/26/18   0601   CR  1.25            Passed - Normal serum potassium on file in past 12 months    Recent Labs   Lab Test  01/26/18   0601   POTASSIUM  4.4             atorvastatin (LIPITOR) 20 MG tablet 30 tablet 0     Sig: Take 1 tablet (20 mg) by mouth daily    Statins Protocol Passed    2/20/2018  3:08 PM       Passed - LDL on file in past 12 months    Recent Labs   Lab Test  01/10/18   0926   LDL  74            Passed - No abnormal creatine kinase in past 12 months    No lab results found.         Passed - Recent or future visit with authorizing provider    Patient had office visit in the last year or has a visit in the next 30 days with authorizing provider.  See \"Patient Info\" tab in inbasket, or \"Choose Columns\" in Meds & Orders section of the refill encounter.            Passed - Patient is age 18 or older        Routing refill request to provider for review/approval because:  Previously ordered while pt was in hospital        "

## 2018-02-21 RX ORDER — LISINOPRIL 2.5 MG/1
2.5 TABLET ORAL DAILY
Qty: 30 TABLET | Refills: 3 | Status: SHIPPED | OUTPATIENT
Start: 2018-02-21 | End: 2018-06-17

## 2018-02-21 RX ORDER — ATORVASTATIN CALCIUM 20 MG/1
20 TABLET, FILM COATED ORAL DAILY
Qty: 30 TABLET | Refills: 3 | Status: SHIPPED | OUTPATIENT
Start: 2018-02-21 | End: 2018-06-20

## 2018-02-23 ENCOUNTER — TELEPHONE (OUTPATIENT)
Dept: INTERNAL MEDICINE | Facility: CLINIC | Age: 78
End: 2018-02-23

## 2018-02-23 ENCOUNTER — OFFICE VISIT (OUTPATIENT)
Dept: INFECTIOUS DISEASES | Facility: CLINIC | Age: 78
End: 2018-02-23
Attending: STUDENT IN AN ORGANIZED HEALTH CARE EDUCATION/TRAINING PROGRAM
Payer: COMMERCIAL

## 2018-02-23 VITALS
BODY MASS INDEX: 25.69 KG/M2 | OXYGEN SATURATION: 98 % | DIASTOLIC BLOOD PRESSURE: 68 MMHG | WEIGHT: 163.7 LBS | HEIGHT: 67 IN | SYSTOLIC BLOOD PRESSURE: 133 MMHG | TEMPERATURE: 97.7 F | HEART RATE: 73 BPM

## 2018-02-23 DIAGNOSIS — N20.0 CALCULUS OF KIDNEY: ICD-10-CM

## 2018-02-23 DIAGNOSIS — E16.2 HYPOGLYCEMIA: ICD-10-CM

## 2018-02-23 DIAGNOSIS — Z87.440 RECENT URINARY TRACT INFECTION: Primary | ICD-10-CM

## 2018-02-23 LAB
ALBUMIN UR-MCNC: 30 MG/DL
APPEARANCE UR: ABNORMAL
BILIRUB UR QL STRIP: NEGATIVE
COLOR UR AUTO: YELLOW
GLUCOSE UR STRIP-MCNC: >499 MG/DL
HGB UR QL STRIP: NEGATIVE
KETONES UR STRIP-MCNC: NEGATIVE MG/DL
LEUKOCYTE ESTERASE UR QL STRIP: NEGATIVE
NITRATE UR QL: NEGATIVE
PH UR STRIP: 5 PH (ref 5–7)
RBC #/AREA URNS AUTO: 17 /HPF (ref 0–2)
SOURCE: ABNORMAL
SP GR UR STRIP: 1.01 (ref 1–1.03)
UROBILINOGEN UR STRIP-MCNC: 0 MG/DL (ref 0–2)
WBC #/AREA URNS AUTO: <1 /HPF (ref 0–2)

## 2018-02-23 PROCEDURE — G0463 HOSPITAL OUTPT CLINIC VISIT: HCPCS | Mod: ZF

## 2018-02-23 PROCEDURE — 81001 URINALYSIS AUTO W/SCOPE: CPT | Performed by: STUDENT IN AN ORGANIZED HEALTH CARE EDUCATION/TRAINING PROGRAM

## 2018-02-23 ASSESSMENT — PAIN SCALES - GENERAL: PAINLEVEL: NO PAIN (0)

## 2018-02-23 NOTE — LETTER
2/23/2018       RE: Pete Sheldon  [address] [place] [address]     Dear Colleague,    Thank you for referring your patient, Pete Sheldon, to the [facility] AND INFECTIOUS DISEASES at [facility]. Please see a copy of my visit note below.    [facility]  Infectious Disease Consultation note  Today's Date: 02/23/2018    Recommendations:  - complete the 1 month course of PCN for UTI (in another 2 days will be complete)  - check UA today to make sure   - if symptoms of UTI were to develop in the future he will contact me or his PCP - provided my contact info  - will email his endocrinologist about the low blood sugar readings.     Thank you for involving Infectious Disease in the care of this patient. Will continue to follow. Please do not hesitate to contact with any questions.     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 He was then admitted in rehab and dced home a month ago.     UTI: from aerococcus urinae. Patient was either asymptomatic or had symptoms of urgency. Likely had a brown while hospitalized. He has been on PCN V 500 mg po tid for the past month. He is asymptomatic now. Will check UA today and patient is to complete the 2 remaining days of PCN. He will contact me if symptoms of UTI occur. No nephrolithiasis currently.     Low blood sugars: will email his endocrine doctor the readings so that it may be adjusted. If he does not hear back soon, then recommended decreasing novolog to 5 units from 7 units for breakfast.     - Immunization status; up to date     Attestation: I have reviewed today's vital signs, medications, labs and imaging. I personally reviewed the imaging. Reviewed medical history, surgical history, and family history in  Epic History tab. No updates needed to be made. Face to face time 25 mins. >50% spent coordinating care and counseling on symptoms of UTI and plan going forward.     -------------------------------------------------------------------------------------------------------------------    Reason for consult / Chief complaint:   Consulted by Dr. 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 He was then admitted in rehab and dced home a month ago.     He is here for a UTI he had while admitted. Its not clear from the notes the details. It appears that patient had a brown catheter while hospitalized. Patient reports having urgency of urination while hospitalized. This was very bothersome to him as he could hardly make it on time. He does not remember when this urgency was during the stay. Urine culture on Jan 8  And Jan 20 grew >100k Aerococcus urinae S to PCN. He was treated with keflex after the first urine culture but since the repeat urine cx continued to grow the organism, he was treated with  mg po tid for 1 month - prolonged course due to failure of first course of abxs. He is due to run out of the PCN in a couple of days.     He reports that he feels well with no urgency. He has never had dysuria or frequency of urination before. He has h/o nephrolithiasis long time ago but none recently. March 2017 CT abdomen showed small bladder calculi but recent kidney and bladder ultrasound do not show stones.     He is now able to walk with little support from a walker. His wife thinks he can start taking a shower without help in a weeks time. Wife reports that the last 4 days some of the blood sugar readings have been low at 50s and 60s. He has no symptoms of hypoglycemia. I reviewed the chart of recordings - the past couple of days readings prior to breakfast (fasting) have been low.  Before that quite a few readings before lunch have been low. Patients takes 30 units of lantus at bedtime and 7 units of novolog during breakfast and dinner and 5 units with lunch.       Past Medical History:  Past Medical History:   Diagnosis Date     Calculus of ureter 1980's     Hypertension      Microalbuminuria 2/15/2016     Other and unspecified hyperlipidemia      Type 2 diabetes, HbA1C goal < 8% (H) 7/1995       Social Hx:  Social History   Substance Use Topics     Smoking status: Former Smoker     Quit date: 3/11/1953     Smokeless tobacco: Never Used     Alcohol use No         Immunizations:  Immunization History   Administered Date(s) Administered     Influenza (H1N1) 12/28/2009     Influenza (High Dose) 3 valent vaccine 09/05/2013, 09/05/2014     Influenza (IIV3) PF 11/06/2001, 11/14/2002, 10/18/2005, 11/01/2006, 10/26/2007, 10/30/2008, 10/21/2010, 10/15/2011, 08/29/2012     Pneumococcal 23 valent 11/06/2001, 03/16/2012     TD (ADULT, 7+) 10/22/2003     TDAP Vaccine (Adacel) 01/31/2014     Zoster vaccine, live 10/15/2011       Allergies:   Allergies   Allergen Reactions     Losartan Other (See Comments)     Dizziness       Medications:  Current Outpatient Prescriptions   Medication Sig Dispense Refill     lisinopril (PRINIVIL/ZESTRIL) 2.5 MG tablet Take 1 tablet (2.5 mg) by mouth daily 30 tablet 3     atorvastatin (LIPITOR) 20 MG tablet Take 1 tablet (20 mg) by mouth daily 30 tablet 3     tamsulosin (FLOMAX) 0.4 MG capsule TAKE ONE CAPSULE BY MOUTH ONCE DAILY 90 capsule 1     blood glucose monitoring (ONETOUCH VERIO IQ) test strip Use to test blood sugars 4 times daily or as directed using One Touch Verio Flex. 200 strip 11     amLODIPine (NORVASC) 5 MG tablet Take 1 tablet (5 mg) by mouth daily 30 tablet 1     insulin aspart (NOVOLOG PEN) 100 UNIT/ML injection Inject 7 units w/ breakfast, 5 units w/ lunch, 7 units w/ dinner. Hold if not eating meal. 30 mL 0     insulin glargine (LANTUS) 100 UNIT/ML  "injection Inject 30 Units Subcutaneous every morning 9 mL 0     metFORMIN (GLUCOPHAGE) 500 MG tablet Take 1 tablet (500 mg) by mouth daily (with breakfast) 30 tablet 0     tamsulosin (FLOMAX) 0.4 MG capsule Take 1 capsule (0.4 mg) by mouth every evening 30 capsule 0     penicillin V potassium (VEETID) 500 MG tablet Take 1 tablet (500 mg) by mouth 3 times daily 90 tablet 0     aspirin 81 MG chewable tablet Take 4 tablets (324 mg) by mouth daily 36 tablet 0     Glucosamine HCl 750 MG TABS Take 750 mg by mouth 2 times daily       loratadine (AF LORATADINE) 10 MG tablet Take 1 tablet (10 mg) by mouth daily 14 tablet 0     Nutritional Supplements (GLUCOSE MANAGEMENT) TABS 4 tabs po for low blood sugar below 70, may repeat q 10-15 minutes as needed 100 tablet prn     blood glucose monitoring (ONETOUCH VERIO IQ) test strip One Touch Verio Flex--Use to test blood sugars 4 times daily or as directed. 200 strip 3     multivitamin (OCUVITE) TABS Take 1 tablet by mouth daily FOCUS SELECT PREMIUM WITH LUTEIN per eye doctor  Reported on 5/19/2017       insulin pen needle 31G X 8 MM B-D UF III short pen needles, use 4 times a day to inject insulin. 300 each 3     order for DME All DM testing supplies including test strips, lancets, solution, syringes, needles and/or pen needles for testing 4 times per day.    Brand \"Contour Next\" 3 Month 1     LIFESCAN FINEPOINT LANCETS MISC Use to test blood sugars 4 times daily or as directed. 200 strip 3     insulin syringes, disposable, U-100 0.3 ML MISC 1 each 2 times daily 100 each 6     ACE/ARB NOT PRESCRIBED, INTENTIONAL, by Other route continuous prn (Hyperkalemia) Reported on 5/19/2017           Past Medical Hx:  Past Medical History:   Diagnosis Date     Calculus of ureter 1980's     Hypertension      Microalbuminuria 2/15/2016     Other and unspecified hyperlipidemia      Type 2 diabetes, HbA1C goal < 8% (H) 7/1995         Family History:  Family History   Problem Relation Age of Onset " "    CEREBROVASCULAR DISEASE Mother       age 95     HEART DISEASE Mother      age 89,  age 95     CEREBROVASCULAR DISEASE Father       age 91, stroke two years previous     Cancer - colorectal Brother      Half-brother     CANCER Sister      Half-sister (?type)     CANCER Sister      Half-sister (?type)     HEART DISEASE Brother      Neurologic Disorder Daughter      Multiple Sclerosis     Psychotic Disorder Son      Schizophrenia         Review of Systems:  10 systems reviewed, pertinent positives noted in my HPI      Examination:  Vital signs:   /68  Pulse 73  Temp 97.7  F (36.5  C) (Oral)  Ht 1.702 m (5' 7\")  Wt 74.3 kg (163 lb 11.2 oz)  SpO2 98%  BMI 25.64 kg/m2    Constitutional: Patient in no distress, walking with a walker  Eyes: not pale, not jaundiced  CVS: no added sounds  RS: clear  Abdomen: soft, BS+  Skin: no rash  Extremities: unremarkable  Psych: Alert and oriented x 3      Laboratory:  Hematology:  Recent Labs   Lab Test  18   1521  18   0548  18   1158  18   0707  18   0948  01/10/18   0926   18   1345   17   1303  16   0705  16   1820  16   1234   WBC  8.4  8.6  9.6   --   11.2*  8.7   --   11.1*   < >  8.8  8.4  7.9  10.3   ANEU   --    --   5.9   --    --    --    --   7.6   --   4.7  4.7  5.2  7.5   ALYM   --    --   1.8   --    --    --    --   2.1   --   2.3  2.0  1.8  2.0   ROSEMARIE   --    --   1.1   --    --    --    --   1.0   --   1.1  1.0  0.7  0.5   AEOS   --    --   0.7   --    --    --    --   0.3   --   0.6  0.6  0.1  0.2   HGB  13.6  13.2*  14.2   --   14.5  14.3   --   15.6   < >  14.5  12.9*  13.7  14.9   HCT  42.1  40.3  42.6   --   44.7  43.4   --   46.8   < >  44.3  38.7*  40.8  44.8   PLT  369  299  280  256  248  292   < >  313   < >  318  228  224  295    < > = values in this interval not displayed.       Chemistry:  Recent Labs   Lab Test  18   0601  18   0544  18   0753  " 01/22/18   1521  01/20/18   0548  01/17/18   0703   01/09/18   0750   01/08/18   0728   12/05/16   0705  12/04/16   0630   12/02/16   1820  12/02/16   1248  12/02/16   1234   NA  137  132*  140  137  136  139   < >   --    < >   --    < >  137  138   --   136   --   131*   POTASSIUM  4.4  4.4  4.4  5.1  4.3  4.1   < >   --    < >   --    < >  3.9  3.8   --   3.9   --   4.1   CHLORIDE  103  98  107  102  102  103   < >   --    < >   --    < >  106  105   --   100   --   93*   CO2  26  28  26  29  26  32   < >   --    < >   --    < >  26  27   --   25   --   20   ANIONGAP  8  6  7  6  8  4   < >   --    < >   --    < >  5  6   --   11   --   18*   BUN  34*  33*  26  32*  31*  22   < >   --    < >   --    < >  13  12   --   31*   --   37*   CR  1.25  1.23  1.16  1.36*  1.17  1.20   < >  1.15   < >   --    < >  0.96  1.05   --   1.29*   --   1.45*   GFRESTIMATED  56*  57*  61  50*  60*  58*   < >  61   < >   --    < >  75  68   --   54*   --   47*   GLC  171*  287*  158*  177*  266*  95   < >   --    < >   --    < >  87  193*   --   212*   --   443*   A1C   --    --    --    --    --    --    --   7.9*   --    --    < >   --    --    < >   --    --    --    MANAV  8.7  8.8  8.3*  8.8  8.0*  8.1*   < >   --    < >   --    < >  7.8*  7.8*   --   8.5   --   9.0   PHOS   --    --    --    --    --    --    --    --    --    --    --    --    --    --   2.7   --   3.7   MAG   --    --    --    --    --    --    --    --    --    --    --   1.8  1.8   --   1.4*   --   1.6   LACT   --    --    --    --    --    --    --    --    --   1.8   --    --    --    --    --   2.1   --     < > = values in this interval not displayed.       Liver Function Studies:  Recent Labs   Lab Test  01/10/18   0926  08/16/17   0800  06/13/17   1512  05/12/17   0906  03/02/17   1303  02/10/17   0849   BILITOTAL  0.6  0.4  0.3  0.5  0.5  0.4   ALKPHOS  83  80  71  84  83  76   ALBUMIN  3.1*  3.4  3.4  3.5  3.7  3.6   AST  23  24  15  21  27  20   ALT   35  27  21  25  35  25     Results for HORACIO HUYNH (MRN [id number]) as of 2/23/2018 14:50   Ref. Range 3/16/2017 14:10 1/8/2018 08:17 1/20/2018 12:00 1/26/2018 13:35   Color Urine Unknown Yellow Yellow Yellow Light Yellow   Appearance Urine Unknown Clear Slightly Cloudy Slightly Cloudy Clear   Glucose Urine Latest Ref Range: NEG^Negative mg/dL Negative >499 (A) 300 (A) Negative   Bilirubin Urine Latest Ref Range: NEG^Negative  Negative Negative Negative Negative   Ketones Urine Latest Ref Range: NEG^Negative mg/dL Negative Negative 10 (A) Negative   Specific Gravity Urine Latest Ref Range: 1.003 - 1.035  1.020 1.013 1.013 1.010   pH Urine Latest Ref Range: 5.0 - 7.0 pH 5.5 5.0 5.5 6.0   Protein Albumin Urine Latest Ref Range: NEG^Negative mg/dL 100 (A) 100 (A) 30 (A) 30 (A)   Urobilinogen mg/dL Latest Ref Range: 0.0 - 2.0 mg/dL  0.0 2.0 Normal   Urobilinogen Urine Latest Ref Range: 0.2 - 1.0 EU/dL 0.2      Nitrite Urine Latest Ref Range: NEG^Negative  Negative Negative Negative Negative   Blood Urine Latest Ref Range: NEG^Negative  Negative Negative Negative Negative   Leukocyte Esterase Urine Latest Ref Range: NEG^Negative  Negative Trace (A) Small (A) Negative   Source Unknown Midstream Urine Midstream Urine Midstream Urine Midstream Urine   WBC Urine Latest Ref Range: 0 - 2 /HPF O - 2 12 (H) 29 (H) 1   RBC Urine Latest Ref Range: 0 - 2 /HPF O - 2 1 1 1   Bacteria Urine Latest Ref Range: NEG^Negative /HPF  Few (A) Many (A)    Mucous Urine Latest Ref Range: NEG^Negative /LPF Present (A)  Present (A) Present (A)   Amorphous Crystals Latest Ref Range: NEG^Negative /HPF  Many (A)         Microbiology:  1/8 and 1/20 urine cx >100k Aerococcus urinae S to PCN     Imaging:  March 2017 Ct abdomen  IMPRESSION:  1. Negative for ureter stone.  2. Renal and bladder calculi.  3. Suspected left femoral or inguinal hernia.    Renal USG 1/12/18  1.6 cm simple cyst in  mid lateral pole the right kidney.  Otherwise normal  renal ultrasound.          Sincerely,    Damaris Mcknight MD

## 2018-02-23 NOTE — MR AVS SNAPSHOT
After Visit Summary   2/23/2018    Pete Sheldon    MRN: [id number]           Patient Information     Date Of Birth          1940        Visit Information        Provider Department      2/23/2018 2:30 PM Damaris Mcknight MD [facility]        Today's Diagnoses     Recent urinary tract infection    -  1    Calculus of kidney        Hypoglycemia          Care Instructions    Preventive Care:    Colorectal Cancer Screening: During our visit today, we discussed that it is recommended you receive colorectal cancer screening. Please call or make an appointment with your primary care provider to discuss this. You may also call the [facility] scheduling line ([phone]) to set up a colonoscopy appointment.              Follow-ups after your visit        Your next 10 appointments already scheduled     Feb 27, 2018 11:00 AM CST   TELEMEDICINE with Iris Gonzalez [facility] ([facility])    [address]  [address]  [facility] [address] [phone]           Note: this is not an onsite visit; there is no need to come to the facility.            Mar 01, 2018  2:25 PM CST   (Arrive by 2:10 PM)   New Patient Visit with Pedro Doherty MD   [facility]rology ([facility])    [address]  3rd Floor  [place] [id number] [phone]            Mar 07, 2018  2:00 PM CST   LAB with [facility]   [facility]    [address] [place] [id number] [phone]           Please do not eat 10-12 hours before your appointment if you are coming in fasting for labs on lipids, cholesterol, or glucose (sugar). This does not apply to pregnant women. Water, hot tea and black coffee (with nothing added) are okay. Do not drink other fluids, diet soda or chew gum.            Mar 07, 2018  2:30 PM CST   Return Visit with Lori  MD Nati   [facility] ([facility])    [address]  [facility] [id number] [phone]            Mar 23, 2018  3:00 PM CDT   (Arrive by 2:45 PM)   New Renal Calculi with Jasmyne Elliott MD   [facility]rologic [facility] ([facility])    [address] [place] [address]  4th Floor  [facility] [id number] [phone]            Apr 10, 2018  2:40 PM CDT   (Arrive by 2:25 PM)   New Patient Visit with Nataliya Schneider MD   [facility] ([facility])    [address]  3rd [facility] [address] [phone]            Jun 26, 2018 10:00 AM CDT   (Arrive by 9:45 AM)   New Stroke with Claudio Castanon MD   [facility] ([facility])    [address] [place] [address]  3rd [facility] [address] [phone]              Who to contact     If you have questions or need follow up information about today's clinic visit or your schedule please contact [facility] directly at [phone].  Normal or non-critical lab and imaging results will be communicated to you by MyChart, letter or phone within 4 business days after the clinic has received the results. If you do not hear from us within 7 days, please contact the clinic through MyChart or phone. If you have a critical or abnormal lab result, we will notify you by phone as soon as possible.  Submit refill requests through [employer] or call your pharmacy and they will forward the refill request to us. Please allow 3 business days for your refill to be completed.          Additional Information About Your Visit        [employer] Information     [employer] lets you send messages to your doctor, view your test results, renew your prescriptions, schedule appointments and more. To sign up, go to  [email or web address] . Click on \"Log in\" on the left side of the screen, which will take you to the Welcome page. Then click on \"Sign up Now\" on the right side of the page.     You will be asked to enter the access code listed below, as well as some personal information. Please follow the directions to create your username and password.     Your access code is: [email or web address]  Expires: 2018  2:58 PM     Your access code will  in 90 days. If you need help or a new code, please call your [facility] clinic or [phone].        Care EveryWhere ID     This is your Care EveryWhere ID. This could be used by other organizations to access your [facility] medical records  [id number]        Your Vitals Were     Pulse Temperature Height Pulse Oximetry BMI (Body Mass Index)       73 97.7  F (36.5  C) (Oral) 1.702 m (5' 7\") 98% 25.64 kg/m2        Blood Pressure from Last 3 Encounters:   18 133/68   18 118/54   18 150/77    Weight from Last 3 Encounters:   18 74.3 kg (163 lb 11.2 oz)   18 70.8 kg (156 lb)   18 73.2 kg (161 lb 6.4 oz)              We Performed the Following     Routine UA with micro - Reflex to culture        Primary Care Provider Office Phone # Fax #    oRm Helm [phone]       [address] [place] [address]        Goals        Diet    Increase water intake     Notes - Note edited  2014 11:42 AM by Patricia Kahn RN    Have 5-6 glasses (8oz) of fluid a day  Per f/u with spouse today, pt not doing well. Not eating and drinking. Called clinic and advised to take pt to clinic.         Increase water intake        General    Medication 1 (pt-stated)     Notes - Note created  5/15/2014  1:57 PM by Patricia Khan RN    Pt will have an MTM consult         Equal Access to Services     CAR BANDA : laury Jacksonda luqadaha, yodit fu, kadeem kahn" ah. So [facility] [place] [phone].    ATENCIÓN: Si dung churchill, tiene a moise disposición servicios gratuitos de asistencia lingüística. Ryne al [phone].    We comply with applicable federal civil rights laws and [place] laws. We do not discriminate on the basis of race, color, national origin, age, disability, sex, sexual orientation, or gender identity.            Thank you!     Thank you for choosing [facility] INFECTIOUS DISEASES  for your care. Our goal is always to provide you with excellent care. Hearing back from our patients is one way we can continue to improve our services. Please take a few minutes to complete the written survey that you may receive in the mail after your visit with us. Thank you!             Your Updated Medication List - Protect others around you: Learn how to safely use, store and throw away your medicines at www.disposemymeds.org.          This list is accurate as of 2/23/18  3:44 PM.  Always use your most recent med list.                   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    Type 2 diabetes mellitus with diabetic polyneuropathy, with long-term current use of  insulin (H)       * blood glucose monitoring test strip    ONETOUCH VERIO IQ    200 strip    Use to test blood sugars 4 times daily or as directed using One Touch Verio Flex.    Type 2 diabetes mellitus with diabetic polyneuropathy, with long-term current use of insulin (H)       Glucosamine HCl 750 MG Tabs      Take 750 mg by mouth 2 times daily    Arthritis       GLUCOSE MANAGEMENT Tabs     100 tablet    4 tabs po for low blood sugar below 70, may repeat q 10-15 minutes as needed    Type 2 diabetes mellitus with diabetic polyneuropathy, with long-term current use of insulin (H)       insulin aspart 100 UNIT/ML injection    NovoLOG PEN    30 mL    Inject 7 units w/ breakfast, 5 units w/ lunch, 7 units w/ dinner. Hold if not eating meal.    Type 2 diabetes mellitus with diabetic polyneuropathy, with long-term current use of insulin (H)       insulin glargine 100 UNIT/ML injection    LANTUS    9 mL    Inject 30 Units Subcutaneous every morning    Type 2 diabetes mellitus with diabetic polyneuropathy, with long-term current use of insulin (H)       insulin pen needle 31G X 8 MM     300 each    B-D UF III short pen needles, use 4 times a day to inject insulin.    Type 2 diabetes, HbA1C goal < 8% (H)       insulin syringes (disposable) U-100 0.3 ML Misc     100 each    1 each 2 times daily    Type II or unspecified type diabetes mellitus without mention of complication, not stated as uncontrolled       LIFESCAN FINEPOINT LANCETS Misc     200 strip    Use to test blood sugars 4 times daily or as directed.    Type 2 diabetes, HbA1C goal < 8% (H)       lisinopril 2.5 MG tablet    PRINIVIL/Zestril    30 tablet    Take 1 tablet (2.5 mg) by mouth daily    Hypertension goal BP (blood pressure) < 140/90       loratadine 10 MG tablet    AF LORATADINE    14 tablet    Take 1 tablet (10 mg) by mouth daily    Hypertension goal BP (blood pressure) < 140/90       metFORMIN 500 MG tablet    GLUCOPHAGE    30 tablet    Take 1 tablet (500  "mg) by mouth daily (with breakfast)    Type 2 diabetes mellitus with diabetic polyneuropathy, with long-term current use of insulin (H)       multivitamin Tabs tablet      Take 1 tablet by mouth daily FOCUS SELECT PREMIUM WITH LUTEIN per eye doctor Reported on 5/19/2017        order for DME     3 Month    All DM testing supplies including test strips, lancets, solution, syringes, needles and/or pen needles for testing 4 times per day.  Brand \"Contour Next\"    Type 2 diabetes mellitus with diabetic polyneuropathy (H)       penicillin V potassium 500 MG tablet    VEETID    90 tablet    Take 1 tablet (500 mg) by mouth 3 times daily    Urinary tract infection without hematuria, site unspecified       * tamsulosin 0.4 MG capsule    FLOMAX    30 capsule    Take 1 capsule (0.4 mg) by mouth every evening    Calculus of kidney       * tamsulosin 0.4 MG capsule    FLOMAX    90 capsule    TAKE ONE CAPSULE BY MOUTH ONCE DAILY    Hyperlipidemia LDL goal <100, Calculus of kidney       * Notice:  This list has 4 medication(s) that are the same as other medications prescribed for you. Read the directions carefully, and ask your doctor or other care provider to review them with you.      "

## 2018-02-23 NOTE — NURSING NOTE
"Chief Complaint   Patient presents with     Consult For     Calculus of kidney- UTI without hematuria, site unspecified.       Initial /68  Pulse 73  Temp 97.7  F (36.5  C) (Oral)  Ht 1.702 m (5' 7\")  Wt 74.3 kg (163 lb 11.2 oz)  SpO2 98%  BMI 25.64 kg/m2 Estimated body mass index is 25.64 kg/(m^2) as calculated from the following:    Height as of this encounter: 1.702 m (5' 7\").    Weight as of this encounter: 74.3 kg (163 lb 11.2 oz).  Medication Reconciliation: unable or not appropriate to perform    "

## 2018-02-23 NOTE — PATIENT INSTRUCTIONS
Preventive Care:    Colorectal Cancer Screening: During our visit today, we discussed that it is recommended you receive colorectal cancer screening. Please call or make an appointment with your primary care provider to discuss this. You may also call the [facility] scheduling line ([phone]) to set up a colonoscopy appointment.

## 2018-02-23 NOTE — PROGRESS NOTES
[facility]  Infectious Disease Consultation note  Today's Date: 02/23/2018    Recommendations:  - complete the 1 month course of PCN for UTI (in another 2 days will be complete)  - check UA today to make sure   - if symptoms of UTI were to develop in the future he will contact me or his PCP - provided my contact info  - will email his endocrinologist about the low blood sugar readings.     Thank you for involving Infectious Disease in the care of this patient. Will continue to follow. Please do not hesitate to contact with any questions.     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 He was then admitted in rehab and dced home a month ago.     UTI: from aerococcus urinae. Patient was either asymptomatic or had symptoms of urgency. Likely had a brown while hospitalized. He has been on PCN V 500 mg po tid for the past month. He is asymptomatic now. Will check UA today and patient is to complete the 2 remaining days of PCN. He will contact me if symptoms of UTI occur. No nephrolithiasis currently.     Low blood sugars: will email his endocrine doctor the readings so that it may be adjusted. If he does not hear back soon, then recommended decreasing novolog to 5 units from 7 units for breakfast.     - Immunization status; up to date     Attestation: I have reviewed today's vital signs, medications, labs and imaging. I personally reviewed the imaging. Reviewed medical history, surgical history, and family history in Epic History tab. No updates needed to be made. Face to face time 25 mins. >50% spent coordinating care and counseling on symptoms of UTI and plan going forward.     -------------------------------------------------------------------------------------------------------------------    Reason for consult / Chief  complaint:   Consulted by Dr. 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 He was then admitted in rehab and dced home a month ago.     He is here for a UTI he had while admitted. Its not clear from the notes the details. It appears that patient had a brown catheter while hospitalized. Patient reports having urgency of urination while hospitalized. This was very bothersome to him as he could hardly make it on time. He does not remember when this urgency was during the stay. Urine culture on Jan 8  And Jan 20 grew >100k Aerococcus urinae S to PCN. He was treated with keflex after the first urine culture but since the repeat urine cx continued to grow the organism, he was treated with  mg po tid for 1 month - prolonged course due to failure of first course of abxs. He is due to run out of the PCN in a couple of days.     He reports that he feels well with no urgency. He has never had dysuria or frequency of urination before. He has h/o nephrolithiasis long time ago but none recently. March 2017 CT abdomen showed small bladder calculi but recent kidney and bladder ultrasound do not show stones.     He is now able to walk with little support from a walker. His wife thinks he can start taking a shower without help in a weeks time. Wife reports that the last 4 days some of the blood sugar readings have been low at 50s and 60s. He has no symptoms of hypoglycemia. I reviewed the chart of recordings - the past couple of days readings prior to breakfast (fasting) have been low. Before that quite a few readings before lunch have been low. Patients takes 30 units of lantus at bedtime and 7 units of novolog during breakfast and dinner and 5 units with lunch.       Past Medical History:  Past Medical History:   Diagnosis Date     Calculus of ureter 1980's     Hypertension      Microalbuminuria  2/15/2016     Other and unspecified hyperlipidemia      Type 2 diabetes, HbA1C goal < 8% (H) 7/1995       Social Hx:  Social History   Substance Use Topics     Smoking status: Former Smoker     Quit date: 3/11/1953     Smokeless tobacco: Never Used     Alcohol use No         Immunizations:  Immunization History   Administered Date(s) Administered     Influenza (H1N1) 12/28/2009     Influenza (High Dose) 3 valent vaccine 09/05/2013, 09/05/2014     Influenza (IIV3) PF 11/06/2001, 11/14/2002, 10/18/2005, 11/01/2006, 10/26/2007, 10/30/2008, 10/21/2010, 10/15/2011, 08/29/2012     Pneumococcal 23 valent 11/06/2001, 03/16/2012     TD (ADULT, 7+) 10/22/2003     TDAP Vaccine (Adacel) 01/31/2014     Zoster vaccine, live 10/15/2011       Allergies:   Allergies   Allergen Reactions     Losartan Other (See Comments)     Dizziness       Medications:  Current Outpatient Prescriptions   Medication Sig Dispense Refill     lisinopril (PRINIVIL/ZESTRIL) 2.5 MG tablet Take 1 tablet (2.5 mg) by mouth daily 30 tablet 3     atorvastatin (LIPITOR) 20 MG tablet Take 1 tablet (20 mg) by mouth daily 30 tablet 3     tamsulosin (FLOMAX) 0.4 MG capsule TAKE ONE CAPSULE BY MOUTH ONCE DAILY 90 capsule 1     blood glucose monitoring (ONETOUCH VERIO IQ) test strip Use to test blood sugars 4 times daily or as directed using One Touch Verio Flex. 200 strip 11     amLODIPine (NORVASC) 5 MG tablet Take 1 tablet (5 mg) by mouth daily 30 tablet 1     insulin aspart (NOVOLOG PEN) 100 UNIT/ML injection Inject 7 units w/ breakfast, 5 units w/ lunch, 7 units w/ dinner. Hold if not eating meal. 30 mL 0     insulin glargine (LANTUS) 100 UNIT/ML injection Inject 30 Units Subcutaneous every morning 9 mL 0     metFORMIN (GLUCOPHAGE) 500 MG tablet Take 1 tablet (500 mg) by mouth daily (with breakfast) 30 tablet 0     tamsulosin (FLOMAX) 0.4 MG capsule Take 1 capsule (0.4 mg) by mouth every evening 30 capsule 0     penicillin V potassium (VEETID) 500 MG tablet Take  "1 tablet (500 mg) by mouth 3 times daily 90 tablet 0     aspirin 81 MG chewable tablet Take 4 tablets (324 mg) by mouth daily 36 tablet 0     Glucosamine HCl 750 MG TABS Take 750 mg by mouth 2 times daily       loratadine (AF LORATADINE) 10 MG tablet Take 1 tablet (10 mg) by mouth daily 14 tablet 0     Nutritional Supplements (GLUCOSE MANAGEMENT) TABS 4 tabs po for low blood sugar below 70, may repeat q 10-15 minutes as needed 100 tablet prn     blood glucose monitoring (ONETOUCH VERIO IQ) test strip One Touch Verio Flex--Use to test blood sugars 4 times daily or as directed. 200 strip 3     multivitamin (OCUVITE) TABS Take 1 tablet by mouth daily FOCUS SELECT PREMIUM WITH LUTEIN per eye doctor  Reported on 2017       insulin pen needle 31G X 8 MM B-D UF III short pen needles, use 4 times a day to inject insulin. 300 each 3     order for DME All DM testing supplies including test strips, lancets, solution, syringes, needles and/or pen needles for testing 4 times per day.    Brand \"Contour Next\" 3 Month 1     LIFESCAN FINEPOINT LANCETS MISC Use to test blood sugars 4 times daily or as directed. 200 strip 3     insulin syringes, disposable, U-100 0.3 ML MISC 1 each 2 times daily 100 each 6     ACE/ARB NOT PRESCRIBED, INTENTIONAL, by Other route continuous prn (Hyperkalemia) Reported on 2017           Past Medical Hx:  Past Medical History:   Diagnosis Date     Calculus of ureter      Hypertension      Microalbuminuria 2/15/2016     Other and unspecified hyperlipidemia      Type 2 diabetes, HbA1C goal < 8% (H) 1995         Family History:  Family History   Problem Relation Age of Onset     CEREBROVASCULAR DISEASE Mother       age 95     HEART DISEASE Mother      age 89,  age 95     CEREBROVASCULAR DISEASE Father       age 91, stroke two years previous     Cancer - colorectal Brother      Half-brother     CANCER Sister      Half-sister (?type)     CANCER Sister      Half-sister (?type)     " "HEART DISEASE Brother      Neurologic Disorder Daughter      Multiple Sclerosis     Psychotic Disorder Son      Schizophrenia         Review of Systems:  10 systems reviewed, pertinent positives noted in my HPI      Examination:  Vital signs:   /68  Pulse 73  Temp 97.7  F (36.5  C) (Oral)  Ht 1.702 m (5' 7\")  Wt 74.3 kg (163 lb 11.2 oz)  SpO2 98%  BMI 25.64 kg/m2    Constitutional: Patient in no distress, walking with a walker  Eyes: not pale, not jaundiced  CVS: no added sounds  RS: clear  Abdomen: soft, BS+  Skin: no rash  Extremities: unremarkable  Psych: Alert and oriented x 3      Laboratory:  Hematology:  Recent Labs   Lab Test  01/22/18   1521  01/20/18   0548  01/14/18   1158  01/12/18   0707  01/11/18   0948  01/10/18   0926   01/08/18   1345   03/02/17   1303  12/05/16   0705  12/02/16   1820  12/02/16   1234   WBC  8.4  8.6  9.6   --   11.2*  8.7   --   11.1*   < >  8.8  8.4  7.9  10.3   ANEU   --    --   5.9   --    --    --    --   7.6   --   4.7  4.7  5.2  7.5   ALYM   --    --   1.8   --    --    --    --   2.1   --   2.3  2.0  1.8  2.0   ROSEMARIE   --    --   1.1   --    --    --    --   1.0   --   1.1  1.0  0.7  0.5   AEOS   --    --   0.7   --    --    --    --   0.3   --   0.6  0.6  0.1  0.2   HGB  13.6  13.2*  14.2   --   14.5  14.3   --   15.6   < >  14.5  12.9*  13.7  14.9   HCT  42.1  40.3  42.6   --   44.7  43.4   --   46.8   < >  44.3  38.7*  40.8  44.8   PLT  369  299  280  256  248  292   < >  313   < >  318  228  224  295    < > = values in this interval not displayed.       Chemistry:  Recent Labs   Lab Test  01/26/18   0601  01/25/18   0544  01/23/18   0753  01/22/18   1521  01/20/18   0548  01/17/18   0703   01/09/18   0750   01/08/18   0728   12/05/16   0705  12/04/16   0630   12/02/16   1820  12/02/16   1248  12/02/16   1234   NA  137  132*  140  137  136  139   < >   --    < >   --    < >  137  138   --   136   --   131*   POTASSIUM  4.4  4.4  4.4  5.1  4.3  4.1   < >   --   "  < >   --    < >  3.9  3.8   --   3.9   --   4.1   CHLORIDE  103  98  107  102  102  103   < >   --    < >   --    < >  106  105   --   100   --   93*   CO2  26  28  26  29  26  32   < >   --    < >   --    < >  26  27   --   25   --   20   ANIONGAP  8  6  7  6  8  4   < >   --    < >   --    < >  5  6   --   11   --   18*   BUN  34*  33*  26  32*  31*  22   < >   --    < >   --    < >  13  12   --   31*   --   37*   CR  1.25  1.23  1.16  1.36*  1.17  1.20   < >  1.15   < >   --    < >  0.96  1.05   --   1.29*   --   1.45*   GFRESTIMATED  56*  57*  61  50*  60*  58*   < >  61   < >   --    < >  75  68   --   54*   --   47*   GLC  171*  287*  158*  177*  266*  95   < >   --    < >   --    < >  87  193*   --   212*   --   443*   A1C   --    --    --    --    --    --    --   7.9*   --    --    < >   --    --    < >   --    --    --    MANAV  8.7  8.8  8.3*  8.8  8.0*  8.1*   < >   --    < >   --    < >  7.8*  7.8*   --   8.5   --   9.0   PHOS   --    --    --    --    --    --    --    --    --    --    --    --    --    --   2.7   --   3.7   MAG   --    --    --    --    --    --    --    --    --    --    --   1.8  1.8   --   1.4*   --   1.6   LACT   --    --    --    --    --    --    --    --    --   1.8   --    --    --    --    --   2.1   --     < > = values in this interval not displayed.       Liver Function Studies:  Recent Labs   Lab Test  01/10/18   0926  08/16/17   0800  06/13/17   1512  05/12/17   0906  03/02/17   1303  02/10/17   0849   BILITOTAL  0.6  0.4  0.3  0.5  0.5  0.4   ALKPHOS  83  80  71  84  83  76   ALBUMIN  3.1*  3.4  3.4  3.5  3.7  3.6   AST  23  24  15  21  27  20   ALT  35  27  21  25  35  25     Results for HORACIO HUYNH (MRN [id number]) as of 2/23/2018 14:50   Ref. Range 3/16/2017 14:10 1/8/2018 08:17 1/20/2018 12:00 1/26/2018 13:35   Color Urine Unknown Yellow Yellow Yellow Light Yellow   Appearance Urine Unknown Clear Slightly Cloudy Slightly Cloudy Clear   Glucose Urine Latest Ref  Range: NEG^Negative mg/dL Negative >499 (A) 300 (A) Negative   Bilirubin Urine Latest Ref Range: NEG^Negative  Negative Negative Negative Negative   Ketones Urine Latest Ref Range: NEG^Negative mg/dL Negative Negative 10 (A) Negative   Specific Gravity Urine Latest Ref Range: 1.003 - 1.035  1.020 1.013 1.013 1.010   pH Urine Latest Ref Range: 5.0 - 7.0 pH 5.5 5.0 5.5 6.0   Protein Albumin Urine Latest Ref Range: NEG^Negative mg/dL 100 (A) 100 (A) 30 (A) 30 (A)   Urobilinogen mg/dL Latest Ref Range: 0.0 - 2.0 mg/dL  0.0 2.0 Normal   Urobilinogen Urine Latest Ref Range: 0.2 - 1.0 EU/dL 0.2      Nitrite Urine Latest Ref Range: NEG^Negative  Negative Negative Negative Negative   Blood Urine Latest Ref Range: NEG^Negative  Negative Negative Negative Negative   Leukocyte Esterase Urine Latest Ref Range: NEG^Negative  Negative Trace (A) Small (A) Negative   Source Unknown Midstream Urine Midstream Urine Midstream Urine Midstream Urine   WBC Urine Latest Ref Range: 0 - 2 /HPF O - 2 12 (H) 29 (H) 1   RBC Urine Latest Ref Range: 0 - 2 /HPF O - 2 1 1 1   Bacteria Urine Latest Ref Range: NEG^Negative /HPF  Few (A) Many (A)    Mucous Urine Latest Ref Range: NEG^Negative /LPF Present (A)  Present (A) Present (A)   Amorphous Crystals Latest Ref Range: NEG^Negative /HPF  Many (A)         Microbiology:  1/8 and 1/20 urine cx >100k Aerococcus urinae S to PCN     Imaging:  March 2017 Ct abdomen  IMPRESSION:  1. Negative for ureter stone.  2. Renal and bladder calculi.  3. Suspected left femoral or inguinal hernia.    Renal USG 1/12/18  1.6 cm simple cyst in  mid lateral pole the right kidney.  Otherwise normal renal ultrasound.

## 2018-02-23 NOTE — TELEPHONE ENCOUNTER
Form received from: [facility]    Form requesting following info/need: PT orders    DEMOND needed?: No    Location of form: Dr. Helm's yellow folder    When completed the route for return: Fax

## 2018-02-27 ENCOUNTER — ALLIED HEALTH/NURSE VISIT (OUTPATIENT)
Dept: PHARMACY | Facility: CLINIC | Age: 78
End: 2018-02-27
Payer: COMMERCIAL

## 2018-02-27 DIAGNOSIS — Z79.4 TYPE 2 DIABETES MELLITUS WITH DIABETIC POLYNEUROPATHY, WITH LONG-TERM CURRENT USE OF INSULIN (H): Primary | ICD-10-CM

## 2018-02-27 DIAGNOSIS — E11.42 TYPE 2 DIABETES MELLITUS WITH DIABETIC POLYNEUROPATHY, WITH LONG-TERM CURRENT USE OF INSULIN (H): Primary | ICD-10-CM

## 2018-02-27 DIAGNOSIS — I63.212 CEREBROVASCULAR ACCIDENT (CVA) DUE TO STENOSIS OF LEFT VERTEBRAL ARTERY (H): ICD-10-CM

## 2018-02-27 PROCEDURE — 99606 MTMS BY PHARM EST 15 MIN: CPT | Performed by: PHARMACIST

## 2018-02-27 PROCEDURE — 99607 MTMS BY PHARM ADDL 15 MIN: CPT | Performed by: PHARMACIST

## 2018-02-27 NOTE — MR AVS SNAPSHOT
After Visit Summary   2/27/2018    Pete Sheldon    MRN: [id number]           Patient Information     Date Of Birth          1940        Visit Information        Provider Department      2/27/2018 11:00 AM Marivel Ziegler, [facility]        Today's Diagnoses     Type 2 diabetes mellitus with diabetic polyneuropathy, with long-term current use of insulin (H)    -  1    Cerebrovascular accident (CVA) due to stenosis of left vertebral artery (H)           Follow-ups after your visit        Your next 10 appointments already scheduled     Mar 01, 2018  2:25 PM CST   (Arrive by 2:10 PM)   New Patient Visit with Pedro Doherty MD   [facility] Neurology ([facility])    [address]  [address]rd [facility] [id number] [phone]            Mar 07, 2018  2:00 PM CST   LAB with [facility]   [facility]    [address]  [facility] [id number] [phone]           Please do not eat 10-12 hours before your appointment if you are coming in fasting for labs on lipids, cholesterol, or glucose (sugar). This does not apply to pregnant women. Water, hot tea and black coffee (with nothing added) are okay. Do not drink other fluids, diet soda or chew gum.            Mar 07, 2018  2:30 PM CST   Return Visit with Lori Damian MD   [facility]    [address]  [facility] [id number] [phone]            Mar 23, 2018  3:00 PM CDT   (Arrive by 2:45 PM)   New Renal Calculi with Jasmyne Elliott MD   [facility] Urology and Inst for Prostate and Urologic Cancers ([facility])    [address]  4th Floor  [facility] [id number] [phone]            Apr 10, 2018  2:40 PM CDT   (Arrive by 2:25 PM)   New Patient Visit with Nataliya Schneider MD   [facility] Physical Medicine and  "Rehabilitation ([facility])    [address]  [address] [facility] [address] [phone]            2018 10:00 AM CDT   (Arrive by 9:45 AM)   New Stroke with Claudio Castanon MD   [facility] Neurology ([facility])    [address] [address] [phone]              Who to contact     If you have questions or need follow up information about today's clinic visit or your schedule please contact [facility] directly at [phone].  Normal or non-critical lab and imaging results will be communicated to you by MyChart, letter or phone within 4 business days after the clinic has received the results. If you do not hear from us within 7 days, please contact the clinic through MyChart or phone. If you have a critical or abnormal lab result, we will notify you by phone as soon as possible.  Submit refill requests through [employer] or call your pharmacy and they will forward the refill request to us. Please allow 3 business days for your refill to be completed.          Additional Information About Your Visit        [employer] Information     [employer] lets you send messages to your doctor, view your test results, renew your prescriptions, schedule appointments and more. To sign up, go to [email or web address] . Click on \"Log in\" on the left side of the screen, which will take you to the Welcome page. Then click on \"Sign up Now\" on the right side of the page.     You will be asked to enter the access code listed below, as well as some personal information. Please follow the directions to create your username and password.     Your access code is: [email or web address]  Expires: 2018  2:53 PM     Your access code will  in 90 days. If you need help or a new code, please call your [place] clinic or [phone].        Care EveryWhere ID     This is your Care EveryWhere ID. This could be used by other " organizations to access your [facility] medical records  [id number]         Blood Pressure from Last 3 Encounters:   02/23/18 133/68   02/02/18 118/54   01/27/18 150/77    Weight from Last 3 Encounters:   02/23/18 163 lb 11.2 oz (74.3 kg)   02/02/18 156 lb (70.8 kg)   01/21/18 161 lb 6.4 oz (73.2 kg)              Today, you had the following     No orders found for display       Primary Care Provider Office Phone # Fax #    Rom Helm [phone]       [address] [facility] [address]  [facility] [address]        Goals        Diet    Increase water intake     Notes - Note edited  5/14/2014 11:42 AM by Patricia Khan RN    Have 5-6 glasses (8oz) of fluid a day  Per f/u with spouse today, pt not doing well. Not eating and drinking. Called clinic and advised to take pt to clinic.         Increase water intake        General    Medication 1 (pt-stated)     Notes - Note created  5/15/2014  1:57 PM by Patricia Khan, JULIA    Pt will have an MTM consult         Equal Access to Services     CAR BANDA AH: Hadii aad ku hadasho Soomaali, waaxda luqadaha, qaybta kaalmada adeegyada, kadeem medrano. So [facility] [phone].    ATENCIÓN: Si habla español, tiene a moise disposición servicios gratuitos de asistencia lingüística. Llame al [phone].    We comply with applicable federal civil rights laws and [place] laws. We do not discriminate on the basis of race, color, national origin, age, disability, sex, sexual orientation, or gender identity.            Thank you!     Thank you for choosing [facility]  for your care. Our goal is always to provide you with excellent care. Hearing back from our patients is one way we can continue to improve our services. Please take a few minutes to complete the written survey that you may receive in the mail after your visit with us. Thank you!             Your Updated Medication List - Protect others around you: Learn how to safely  use, store and throw away your medicines at www.disposemymeds.org.          This list is accurate as of 2/27/18 11:59 PM.  Always use your most recent med list.                   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    Type 2 diabetes mellitus with diabetic polyneuropathy, with long-term current use of insulin (H)       * blood glucose monitoring test strip    ONETOUCH VERIO IQ    200 strip    Use to test blood sugars 4 times daily or as directed using One Touch Verio Flex.    Type 2 diabetes mellitus with diabetic polyneuropathy, with long-term current use of insulin (H)       Glucosamine HCl 750 MG Tabs      Take 750 mg by mouth 2 times daily    Arthritis       GLUCOSE MANAGEMENT Tabs     100 tablet    4 tabs po for low blood sugar below 70, may repeat q 10-15 minutes as needed    Type 2 diabetes mellitus with diabetic polyneuropathy, with long-term current use of insulin (H)       insulin aspart 100 UNIT/ML injection    NovoLOG PEN    30 mL    Inject 7 units w/ breakfast, 5 units w/ lunch, 7 units w/ dinner. Hold if not eating meal.    Type 2 diabetes mellitus with diabetic polyneuropathy, with long-term current  "use of insulin (H)       insulin glargine 100 UNIT/ML injection    LANTUS    9 mL    Inject 30 Units Subcutaneous every morning    Type 2 diabetes mellitus with diabetic polyneuropathy, with long-term current use of insulin (H)       insulin pen needle 31G X 8 MM     300 each    B-D UF III short pen needles, use 4 times a day to inject insulin.    Type 2 diabetes, HbA1C goal < 8% (H)       insulin syringes (disposable) U-100 0.3 ML Misc     100 each    1 each 2 times daily    Type II or unspecified type diabetes mellitus without mention of complication, not stated as uncontrolled       LIFESCAN FINEPOINT LANCETS Misc     200 strip    Use to test blood sugars 4 times daily or as directed.    Type 2 diabetes, HbA1C goal < 8% (H)       lisinopril 2.5 MG tablet    PRINIVIL/Zestril    30 tablet    Take 1 tablet (2.5 mg) by mouth daily    Hypertension goal BP (blood pressure) < 140/90       loratadine 10 MG tablet    AF LORATADINE    14 tablet    Take 1 tablet (10 mg) by mouth daily    Hypertension goal BP (blood pressure) < 140/90       metFORMIN 500 MG tablet    GLUCOPHAGE    30 tablet    Take 1 tablet (500 mg) by mouth daily (with breakfast)    Type 2 diabetes mellitus with diabetic polyneuropathy, with long-term current use of insulin (H)       multivitamin Tabs tablet      Take 1 tablet by mouth daily FOCUS SELECT PREMIUM WITH LUTEIN per eye doctor Reported on 5/19/2017        order for DME     3 Month    All DM testing supplies including test strips, lancets, solution, syringes, needles and/or pen needles for testing 4 times per day.  Brand \"Contour Next\"    Type 2 diabetes mellitus with diabetic polyneuropathy (H)       penicillin V potassium 500 MG tablet    VEETID    90 tablet    Take 1 tablet (500 mg) by mouth 3 times daily    Urinary tract infection without hematuria, site unspecified       * tamsulosin 0.4 MG capsule    FLOMAX    30 capsule    Take 1 capsule (0.4 mg) by mouth every evening    Calculus of " kidney       * tamsulosin 0.4 MG capsule    FLOMAX    90 capsule    TAKE ONE CAPSULE BY MOUTH ONCE DAILY    Hyperlipidemia LDL goal <100, Calculus of kidney       * Notice:  This list has 4 medication(s) that are the same as other medications prescribed for you. Read the directions carefully, and ask your doctor or other care provider to review them with you.

## 2018-02-27 NOTE — PROGRESS NOTES
SUBJECTIVE/OBJECTIVE:                Pete Sheldon is a 77 year old male called for a ROBERT follow-up visit for Medication Therapy Management. He was discharged from [facility] on 18 for left thalamic ischemic stroke and UTI. Was in rehab facility from 18-18. Today, visit was with wife, Radha - responsible for Pete's medication regimen and administration. Consent to communicate on file.       Chief Complaint: Follow up from our visit on 18.  Diabetes    Tobacco: No tobacco use  Alcohol: none    Medication Adherence/Access:  no issues reported    CVA: Currently taking aspirin 324 mg once daily (4x81 mg tablets). No medication side effects. No bleeding or bruising. Was not taking aspirin prior to stroke. Wife states that rehab center has helped, currently doing PT and exercises to help. Has follow-up appt with Neurology 3/1/18.     Diabetes:  Pt currently taking metformin 500 mg once daily with breakfast, Lantus 30 units every morning, Novolog 5 units with breakfast, 5 units with lunch, 7 units with dinner. Patient lowered breakfast dose since was having issues with low blood sugar. Finds that the Novolog pen is working better.  Pt is not experiencing side effects. Blood sugars have been sporadic for years. Patient has refused CGM in the past. Has follow-up appt with Dr. Damian 3/7/18 and wishes to hold off on changing insulin until meeting with Dr. Damian.   SMBG: four times daily (before meals and at bedtime). Ranges (patient reported):  AM fastin, 205, 62  Didn't have before lunch and dinner readings written down.   Bedtime before snack: 353, 356, 174, 129  When patient gets low blood sugar readings, does not present symptoms. Usually lows are in the morning. He will eat an orange, oatmeal and salami. Will not recheck blood sugar 15 minutes later because fingers area already sore.   Recent symptoms of high blood sugar? Polyphagia   Eye exam: up to date.Foot exam: up to  date  Microalbumin is not < 30 mg/g. Pt is not taking an ACEi/ARB.  Aspirin: 324 mg once daily (4 x 81 mg tablets), no bleeding or bruising  Diet: Breakfast: orange, oatmeal, slice of salami, Lunch/Dinner: varies (chicken, roast vegetable, etc), bedtime snack: 2 slices of toast with peanut butter. Wife states that per meal patient tries to get 5 carb choices per meal every meal - has a carb counting book.   Exercise: Has been trying to walk more.   Lab Results   Component Value Date    A1C 7.9 01/09/2018    A1C 7.6 11/29/2017    A1C 7.8 08/16/2017    A1C 8.3 05/12/2017    A1C 7.9 02/10/2017   Estimated Creatinine Clearance: 50.4 mL/min (based on Cr of 1.25).    Current labs include:  Today's Vitals: There were no vitals taken for this visit.  Telemedicine visit.   BP Readings from Last 3 Encounters:   02/23/18 133/68   02/02/18 118/54   01/27/18 150/77     Lab Results   Component Value Date    A1C 7.9 01/09/2018   .  Lab Results   Component Value Date    CHOL 134 01/10/2018     Lab Results   Component Value Date    TRIG 95 01/10/2018     Lab Results   Component Value Date    HDL 41 01/10/2018     Lab Results   Component Value Date    LDL 74 01/10/2018       Liver Function Studies -   Recent Labs   Lab Test  01/10/18   0926   PROTTOTAL  7.9   ALBUMIN  3.1*   BILITOTAL  0.6   ALKPHOS  83   AST  23   ALT  35       Lab Results   Component Value Date    UCRR 53 07/22/2016    MICROL 208 07/22/2016    UMALCR 390.98 (H) 07/22/2016       Last Basic Metabolic Panel:  Lab Results   Component Value Date     01/26/2018      Lab Results   Component Value Date    POTASSIUM 4.4 01/26/2018     Lab Results   Component Value Date    CHLORIDE 103 01/26/2018     Lab Results   Component Value Date    BUN 34 01/26/2018     Lab Results   Component Value Date    CR 1.25 01/26/2018     GFR Estimate   Date Value Ref Range Status   01/26/2018 56 (L) >60 mL/min/1.7m2 Final     Comment:     Non  GFR Calc   01/25/2018 57 (L)  >60 mL/min/1.7m2 Final     Comment:     Non  GFR Calc   01/23/2018 61 >60 mL/min/1.7m2 Final     Comment:     Non  GFR Calc     TSH   Date Value Ref Range Status   07/22/2016 5.58 (H) 0.40 - 4.00 mU/L Final       Most Recent Immunizations   Administered Date(s) Administered     Influenza (H1N1) 12/28/2009     Influenza (High Dose) 3 valent vaccine 09/05/2014     Influenza (IIV3) PF 08/29/2012     Pneumococcal 23 valent 03/16/2012     TD (ADULT, 7+) 10/22/2003     TDAP Vaccine (Adacel) 01/31/2014     Zoster vaccine, live 10/15/2011       ASSESSMENT:              Current medications were reviewed today as discussed above.      Medication Adherence: good, no issues identified    CVA: Stable     Diabetes: Needs Improvement. Patient is meeting A1c goal of < 8%. However, self monitoring of blood glucose is not at goal of fasting  mg/dL and post prandial < 180 mg/dL. Pt would potentially benefit from CGM with LibrePro due to such sporadic changes in blood sugar throughout the day; however patient refuses. Patient may also benefit from insulin adjustment - usually a 1:1 ratio with long acting:rapid acting insulins. Since the regimen appears to be long acting insulin heavy, it may be worth pulling back on lantus dose and adjusting rapid acting insulin. However, difficult to assess without a full picture of blood sugars. Since patient wanting to hold off on insulin adjustments,  pt following up with Dr. Damian 3/7/18. Future consideration: increasing dose of metformin.      PLAN:                  1. Patient to follow up with Dr. Damian regarding blood sugars and insulin. Patient refused CGM LibrePro.     Future consideration: Increasing metformin dose.     I spent 20 minutes with this patient today. I offer these suggestions for consideration by the PCP. A copy of the visit note was provided to the patient's primary care provider.     Will follow up in 3 months.    The patient  declined a summary of these recommendations as an after visit summary.    Marivel Ziegler PharmD  Pharmaceutical Care Resident   Pager: [phone]     I agree with the resident note and plan of care.      Paty Reed PharmD [facility]  Medication Therapy Management Provider  Pager #[phone]

## 2018-03-01 ENCOUNTER — PRE VISIT (OUTPATIENT)
Dept: NEUROLOGY | Facility: CLINIC | Age: 78
End: 2018-03-01

## 2018-03-01 ENCOUNTER — TELEPHONE (OUTPATIENT)
Dept: INTERNAL MEDICINE | Facility: CLINIC | Age: 78
End: 2018-03-01

## 2018-03-01 ENCOUNTER — OFFICE VISIT (OUTPATIENT)
Dept: NEUROLOGY | Facility: CLINIC | Age: 78
End: 2018-03-01
Payer: COMMERCIAL

## 2018-03-01 ENCOUNTER — DOCUMENTATION ONLY (OUTPATIENT)
Dept: ENDOCRINOLOGY | Facility: CLINIC | Age: 78
End: 2018-03-01

## 2018-03-01 VITALS
TEMPERATURE: 98 F | HEART RATE: 70 BPM | RESPIRATION RATE: 24 BRPM | SYSTOLIC BLOOD PRESSURE: 155 MMHG | OXYGEN SATURATION: 97 % | DIASTOLIC BLOOD PRESSURE: 77 MMHG | WEIGHT: 159.9 LBS | HEIGHT: 66 IN | BODY MASS INDEX: 25.7 KG/M2

## 2018-03-01 DIAGNOSIS — Z86.73 HISTORY OF STROKE: ICD-10-CM

## 2018-03-01 DIAGNOSIS — E78.5 HYPERLIPIDEMIA LDL GOAL <100: ICD-10-CM

## 2018-03-01 DIAGNOSIS — I10 HYPERTENSION GOAL BP (BLOOD PRESSURE) < 140/90: ICD-10-CM

## 2018-03-01 DIAGNOSIS — Z86.73 HISTORY OF STROKE: Primary | ICD-10-CM

## 2018-03-01 LAB
CHOLEST SERPL-MCNC: 123 MG/DL
HBA1C MFR BLD: 8.3 % (ref 4.3–6)
HDLC SERPL-MCNC: 35 MG/DL
LDLC SERPL CALC-MCNC: 70 MG/DL
NONHDLC SERPL-MCNC: 88 MG/DL
TRIGL SERPL-MCNC: 91 MG/DL

## 2018-03-01 ASSESSMENT — PAIN SCALES - GENERAL: PAINLEVEL: NO PAIN (0)

## 2018-03-01 NOTE — PATIENT INSTRUCTIONS
1. Check A1C and LDL today.  2. We encourage you to take your BP at home using a home blood pressure cuff. These can be obtained at your local drug store.  3. We have referred you to a sleep study to check for obstructive sleep apnea. We will schedule you for this study.  4. Your long-term goal blood pressure is less than 140/90.  5. Your long-term goal A1C is < 7%.  6. Your long-term goal LDL is < 100.

## 2018-03-01 NOTE — NURSING NOTE
Chief Complaint   Patient presents with     Consult     [facility]- STROKE, HOSP F/U     Nadir Ackerman, CMA

## 2018-03-01 NOTE — TELEPHONE ENCOUNTER
JULIA Garcia with [facility] calls. Requesting to extend home care orders.   Skilled nursing - 1w3, 1 PRN  Home health aid - 1w2    Verbal authorization given for home care orders per [facility] protocol.

## 2018-03-01 NOTE — PROGRESS NOTES
Records sent by  reviewed.  Pete has history of type 1 diabetes and has difficult diabetes control.  He is brittle.  Blood sugars are variable.  Ranging from 58-200s.  This has been observed in past 2 and multiple dose adjustment have been done in past.  He is able to feel symptoms of hypoglycemia and is able to correct it.  Can you please call and check with Pete how he is doing?  Please ask to be consistent with each meal and carbohydrate count.  Let me know if he is dropping blood sugar more than 2-3 times a week.  He should see me in next few weeks- 1 month.

## 2018-03-01 NOTE — PROGRESS NOTES
DEPARTMENT OF NEUROLOGY  Referral for: stroke f/u  Patient Name: Pete Sheldon  MRN: [id number]  : 1940  Date of Clinic Visit: 2018  Primary Care Provider: Rom Helm  Referring Provider: Ubaldo Dunne    CHIEF COMPLAINT: stroke hospital f/u    HISTORY OF PRESENT ILLNESS:  Pete Sheldon is a 77 year old male with past medical history of DMT2 w/ neuropathy, HTN, and HLD presenting for follow-up after hospitalization for cerebral infarct.   Ashley was hospitalized from -2018 for infarct of the L thalamus.  He presented to the ED on  with right facial weakness, right arm weakness and ataxia, and right arm sensory loss.  He was not given tPA because the time of onset was unclear.  CTA Head/Neck demonstrated an occlusion of the right vertebral artery but no large vessel occlusion in the anterior circulation, thus, no endovascular procedure was indicated.  His hospital course was complicated only by a urinary tract infection and elevated blood glucose.  He was evaluated by OT, PT, and SLP.  Endocrinology was consulted and he was put on an insulin drip while his sugars were brought under control.  Endocrinology made some medication changes to his insulin regimen and restarted him on metformin at discharge.  At the time of hospitalization, his A1c = 7.9%, LDL = 74.  On discharge, he scored 2/3 on the DIANE screen and 5/9 on the PHQ-9.  He was discharged on  and Atorvastatin 20.  No SSRI was initiated.  He was not discharged on a cardiac monitor.  NIH examination score = 1 on discharge.   He was discharged to an acute rehab facility.  He has since graduated from there and has been at home for the past 3 weeks.  His RUE numbness is still present but not bothersome.  There are no gross motor deficits but he's unable to button clothes, instead relying on zippers.  He notes increased time required to tie his shoes and he's unable to cut his own fingernails.  He has been  utilizing a two-handed approach to inject his SQ Insulin.  He completed his course of antibiotics for the urinary tract infection and the symptoms have completely resolved.  He has no new neurological deficits.  He has been working with occupational and physical therapy at home.  He also has a home RN who visits weekly.  He has not been measuring his blood pressure at home unfortunately, mostly because they do not have a blood pressure cuff available.  He has been recording his blood sugars in the morning, at noon, and in the evening.  I reviewed these with the patient and his wife; the values were typically in the mid 100s-300s but there were a few instances of blood sugar being in the 40s, 50s, and 60s.  They think that these are related to days that he undergoes therapy.  He and his wife endorse eating a balanced diet, avoiding unnecessary sugars.    ASSESSMENT AND PLAN:  Pete is a 77-year-old male following up for stroke hospitalization.  His examination today is stable.  He currently has all of the appropriate therapies in place, although he has some room for improvement in order to reach his goals.  In particular, I would like Pete to obtain a blood pressure cuff so that he can measure pressures at home, we reviewed the long-term goals with him and they are reiterated below.  It is possible that the elevated blood pressure today was due to difficulty getting to clinic on time.  If his blood pressures at home are consistently above goal, it seems like there is some room to increase amlodipine, but we will defer to his PCP Dr. Helm from making this change.  He will be seeing endocrinology next week and hopefully will get some improvement in his blood sugars; I am worried about the instances where he is low in the 40s.  He scored high enough on the PHQ-9 to warrant being seen by a psychiatrist, but the patient declined a referral today.  He scored 2/3 on the DIANE screen, so he also should be evaluated in a  "formal sleep study.    Plan:  - check A1C and LDL today  - encourage follow-up on PHQ-9 with PCP to assess for depression treatments  - referral for sleep evaluation to check for DIANE  - long-term goals: A1C < 7%, LDL < 100, BP < 140/90  - encouraged home blood pressure measurements    I spent a total of 75 minutes with the patient today, more than 50% of which was spent counseling.    Patient was seen and discussed with Dr. Bradley.    Pedro Doherty MD  Neurology PGY3  [facility]  Pager: [phone]    PHYSICAL EXAMINATION:  Vitals: /77  Pulse 70  Temp 98  F (36.7  C) (Oral)  Resp 24  Ht 1.67 m (5' 5.75\")  Wt 72.5 kg (159 lb 14.4 oz)  SpO2 97%  BMI 26.01 kg/m2  General: sitting in chair, wife present  HEENT: normocephalic, atraumatic  Cardiac: NSR, mildly hypertensive  Pulmonary: non-labored on room air  Abdomen: non-distended  Extremities: warm to palpatin, no edema  Skin: no rashes, no bruising  Psych: cooperative, appropriate  Neuro:   Mental status: alert; language is fluent with intact comprehension and naming   Cranial nerves: PERRL, EOMI with intact smooth pursuit, full visual fields, no facial asymmetry, very mild R ptosis, facial sensation intact and symmetric, hearing intact to conversation, no hypophonia, no dysarthria   Motor: No abnormal posture, tone, atrophy, or movements/fasciculations.   RIGHT LEFT    5 5   Biceps 5 5   Triceps 5 5   Deltoid 5 5   Hip flexion 5 5   Knee extension 5 5   Knee flexion 5 5   Dorsiflexion 5 5   Plantar flexion 5 5    Reflexes: absent Babinski.   RIGHT LEFT   Brachioradialis 2+ 2+   Biceps 2+ 2+   Triceps 2+ 2+   Patellar 2+ 2+   Achilles 2+ 2+    Sensory: Intact to light touch, pin prick, and proprioception in all extremities without extinction, except for a length-dependent reduction in LT and pain distal to the middle R forearm circumferentially.   Coordination: No dysmetria. No dysdiadochokinesia. No ataxia.   Gait: Able to stand " from sitting position without assistance, kyphotic posture, short stride length with reduced arm swing but symmetric.    National Institutes of Health Stroke Scale   Score    Level of consciousness: (0)   Alert, keenly responsive    LOC questions: (0)   Answers both questions correctly    LOC commands: (0)   Performs both tasks correctly    Best gaze: (0)   Normal    Visual: (0)   No visual loss    Facial palsy: (0)   Normal symmetrical movements    Motor arm (left): (0)   No drift    Motor arm (right): (0)   No drift    Motor leg (left): (0)   No drift    Motor leg (right): (0)   No drift    Limb ataxia: (0)   Absent    Sensory: (1)   Mild to moderate sensory loss    Best language: (0)   Normal- no aphasia    Dysarthria: (0)   Normal    Extinction and inattention: (0)   No abnormality        Total Score:  1       INVESTIGATIONS:   MRI Brain Stroke Protocol 1/10/18:  Impression:  1. Redemonstration of subacute left thalamic infarct. No new infarct or hemorrhage identified.  2. Stable generalized cerebral volume loss, findings of chronic small vessel ischemic disease and cerebellar chronic microhemorrhages.  3. Increased mucosal thickening and opacification of paranasal sinuses.  4. Atherosclerotic irregularities in the left carotid artery bifurcation without any significant stenosis or evidence of dissection.     3/1/2018 17:06   Hemoglobin A1C 8.3 (H)   Cholesterol 123   HDL Cholesterol 35 (L)   LDL Cholesterol Calculated 70   Non HDL Cholesterol 88   Triglycerides 91     REVIEW OF SYSTEMS: 12-point RoS negative except as per HPI.    Allergies   Allergen Reactions     Losartan Other (See Comments)     Dizziness     Current Outpatient Prescriptions   Medication Sig Dispense Refill     lisinopril (PRINIVIL/ZESTRIL) 2.5 MG tablet Take 1 tablet (2.5 mg) by mouth daily 30 tablet 3     atorvastatin (LIPITOR) 20 MG tablet Take 1 tablet (20 mg) by mouth daily 30 tablet 3     tamsulosin (FLOMAX) 0.4 MG capsule TAKE ONE  "CAPSULE BY MOUTH ONCE DAILY 90 capsule 1     blood glucose monitoring (ONETOUCH VERIO IQ) test strip Use to test blood sugars 4 times daily or as directed using One Touch Verio Flex. 200 strip 11     amLODIPine (NORVASC) 5 MG tablet Take 1 tablet (5 mg) by mouth daily 30 tablet 1     insulin aspart (NOVOLOG PEN) 100 UNIT/ML injection Inject 7 units w/ breakfast, 5 units w/ lunch, 7 units w/ dinner. Hold if not eating meal. 30 mL 0     insulin glargine (LANTUS) 100 UNIT/ML injection Inject 30 Units Subcutaneous every morning 9 mL 0     metFORMIN (GLUCOPHAGE) 500 MG tablet Take 1 tablet (500 mg) by mouth daily (with breakfast) 30 tablet 0     tamsulosin (FLOMAX) 0.4 MG capsule Take 1 capsule (0.4 mg) by mouth every evening 30 capsule 0     penicillin V potassium (VEETID) 500 MG tablet Take 1 tablet (500 mg) by mouth 3 times daily 90 tablet 0     aspirin 81 MG chewable tablet Take 4 tablets (324 mg) by mouth daily 36 tablet 0     Glucosamine HCl 750 MG TABS Take 750 mg by mouth 2 times daily       loratadine (AF LORATADINE) 10 MG tablet Take 1 tablet (10 mg) by mouth daily 14 tablet 0     Nutritional Supplements (GLUCOSE MANAGEMENT) TABS 4 tabs po for low blood sugar below 70, may repeat q 10-15 minutes as needed 100 tablet prn     blood glucose monitoring (ONETOUCH VERIO IQ) test strip One Touch Verio Flex--Use to test blood sugars 4 times daily or as directed. 200 strip 3     multivitamin (OCUVITE) TABS Take 1 tablet by mouth daily FOCUS SELECT PREMIUM WITH LUTEIN per eye doctor  Reported on 5/19/2017       insulin pen needle 31G X 8 MM B-D UF III short pen needles, use 4 times a day to inject insulin. 300 each 3     order for DME All DM testing supplies including test strips, lancets, solution, syringes, needles and/or pen needles for testing 4 times per day.    Brand \"Contour Next\" 3 Month 1     LIFESCAN FINEPOINT LANCETS MISC Use to test blood sugars 4 times daily or as directed. 200 strip 3     insulin syringes, " disposable, U-100 0.3 ML MISC 1 each 2 times daily 100 each 6     ACE/ARB NOT PRESCRIBED, INTENTIONAL, by Other route continuous prn (Hyperkalemia) Reported on 2017       Past Medical History:   Diagnosis Date     Calculus of ureter      Hypertension      Microalbuminuria 2/15/2016     Other and unspecified hyperlipidemia      Type 2 diabetes, HbA1C goal < 8% (H) 1995     Past Surgical History:   Procedure Laterality Date     C NONSPECIFIC PROCEDURE      Nasal septoplasty     HC COLONOSCOPY THRU STOMA, DIAGNOSTIC  2007    Normal     HC FLEX SIGMOIDOSCOPY W/WO MIGUEL SPEC BY BRUSH/WASH  1999    Normal to 60 cm     HC REPAIR INCISIONAL HERNIA,REDUCIBLE  1999    Hernia Repair, Incisional, Unilateral (right)     HC REPAIR INCISIONAL HERNIA,REDUCIBLE      Hernia Repair, Incisional, Unilateral (left, with mesh placement)     Family History   Problem Relation Age of Onset     CEREBROVASCULAR DISEASE Mother       age 95     HEART DISEASE Mother      age 89,  age 95     CEREBROVASCULAR DISEASE Father       age 91, stroke two years previous     Cancer - colorectal Brother      Half-brother     CANCER Sister      Half-sister (?type)     CANCER Sister      Half-sister (?type)     HEART DISEASE Brother      Neurologic Disorder Daughter      Multiple Sclerosis     Psychotic Disorder Son      Schizophrenia     Social History     Social History     Marital status:      Spouse name: N/A     Number of children: 2     Years of education: N/A     Occupational History     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         This is a supervisory note  for Dr. Doherty.  I have personally seen the patient and confirmed both history and physical examination findings.  I have reviewed documentation listed above by Dr. Doherty and agree with his note.    In summary Mr. Sheldon is a 77-year-old gentleman who was hospitalized in early January for a left thalamic infarct.  Symptoms initially resulted in right-sided facial weakness, right arm weakness and ataxia and right arm sensory loss.  He was not a recipient of any intervention such as TPA.  He has had improvement of symptoms but not resolution.    Extensive time was spent today by Dr. Doherty educating the patient regarding stroke risk factors and how to modify them.  The patient was encouraged to follow closely with Dr. Helm and make sure his blood pressure is at goal.  His diabetes needs to continue to be monitored and treated.  The patient most likely has sleep apnea and was recommended to pursue sleep evaluation for obstructive sleep apnea.  He also appears to be somewhat depressed and may benefit from an antidepressant treatment but again we will defer to Dr. Helm.  Dr. Doherty spent 75 minutes with patient over 50% counseling regarding the information above.    Kala Bradley MD [facility]  Department of Neurology

## 2018-03-01 NOTE — MR AVS SNAPSHOT
After Visit Summary   3/1/2018    Pete Sheldon    MRN: [id number]           Patient Information     Date Of Birth          1940        Visit Information        Provider Department      3/1/2018 2:25 PM Pedro Doherty MD [facility]        Today's Diagnoses     History of stroke    -  1      Care Instructions    1. Check A1C and LDL today.  2. We encourage you to take your BP at home using a home blood pressure cuff. These can be obtained at your local drug store.  3. We have referred you to a sleep study to check for obstructive sleep apnea. We will schedule you for this study.  4. Your long-term goal blood pressure is less than 140/90.  5. Your long-term goal A1C is < 7%.  6. Your long-term goal LDL is < 100.          Follow-ups after your visit        Additional Services     SLEEP EVALUATION & MANAGEMENT REFERRAL - [facility] [phone]  (Age 18 and up)       Please be aware that coverage of these services is subject to the terms and limitations of your health insurance plan.  Call member services at your health plan with any benefit or coverage questions.      Please bring the following to your appointment:    >>   List of current medications   >>   This referral request   >>   Any documents/labs given to you for this referral                      Your next 10 appointments already scheduled     Mar 07, 2018  2:00 PM CST   LAB with [facility] LAB   [facility]    [address]  [facility] [id number] [phone]           Please do not eat 10-12 hours before your appointment if you are coming in fasting for labs on lipids, cholesterol, or glucose (sugar). This does not apply to pregnant women. Water, hot tea and black coffee (with nothing added) are okay. Do not drink other fluids, diet soda or chew gum.            Mar 07, 2018  2:30 PM CST   Return Visit with Lori Damian MD   [place]  [facility] ([facility])    [address]  [facility] [address]   [phone]            Mar 15, 2018  1:00 PM CDT   New Sleep Patient with Brown Mike MD   [facility]    [address]  [facility] [address]   [phone]            Mar 23, 2018  3:00 PM CDT   (Arrive by 2:45 PM)   New Renal Calculi with Jasmyne Elliott MD   [facility] ([facility])    [address]  4th Floor  [facility] [id number] [phone]            Apr 10, 2018  2:40 PM CDT   (Arrive by 2:25 PM)   New Patient Visit with Nataliya Schneider MD   [facility] ([facility])    [address]  3rd Floor  [facility] [id number] [phone]            Jun 26, 2018 10:00 AM CDT   (Arrive by 9:45 AM)   New Stroke with Claudio Castanon MD   [facility] ([facility])    [address]  3rd Floor  [facility] [id number] [phone]              Future tests that were ordered for you today     Open Future Orders        Priority Expected Expires Ordered    Lipid panel Routine  3/1/2019 3/1/2018    Hemoglobin A1c Routine  3/1/2019 3/1/2018    SLEEP EVALUATION & MANAGEMENT REFERRAL - [facility]ULT -[facility] [phone]  (Age 18 and up) Routine  3/1/2019 3/1/2018            Who to contact     Please call your clinic at [phone] to:    Ask questions about your health    Make or cancel appointments    Discuss your medicines    Learn about your test results    Speak to your doctor            Additional Information About Your Visit        [employer]el Information     [employer] is an electronic gateway that provides easy, online access to your medical records. With [employer], you can request a clinic  "appointment, read your test results, renew a prescription or communicate with your care team.     To sign up for All About [email or web address].hart visit the website at [email or web address]   You will be asked to enter the access code listed below, as well as some personal information. Please follow the directions to create your username and password.     Your access code is: [email or web address]  Expires: 2018  2:53 PM     Your access code will  in 90 days. If you need help or a new code, please contact your [facility] or call [phone] for assistance.        Care EveryWhere ID     This is your Care EveryWhere ID. This could be used by other organizations to access your [place] medical records  [id number]        Your Vitals Were     Pulse Temperature Respirations Height Pulse Oximetry BMI (Body Mass Index)    70 98  F (36.7  C) (Oral) 24 1.67 m (5' 5.75\") 97% 26.01 kg/m2       Blood Pressure from Last 3 Encounters:   18 155/77   18 133/68   18 118/54    Weight from Last 3 Encounters:   18 72.5 kg (159 lb 14.4 oz)   18 74.3 kg (163 lb 11.2 oz)   18 70.8 kg (156 lb)               Primary Care Provider Office Phone # Fax #    Rom Helm [phone]       [address]  [facility] [address]        Goals        Diet    Increase water intake     Notes - Note edited  2014 11:42 AM by Patricia Khan RN    Have 5-6 glasses (8oz) of fluid a day  Per f/u with spouse today, pt not doing well. Not eating and drinking. Called clinic and advised to take pt to clinic.         Increase water intake        General    Medication 1 (pt-stated)     Notes - Note created  5/15/2014  1:57 PM by Patricia Khan, JULIA    Pt will have an MTM consult         Equal Access to Services     REINA BANDA AH: Hadii tasha Calvert, wadenitada luraineradaha, qaybta kadeem [facility]. So [facility] " [phone].    ATENCIÓN: Si dung churchill, tiene a moise disposición servicios gratuitos de asistencia lingüística. Ryne wood [phone].    We comply with applicable federal civil rights laws and [place] laws. We do not discriminate on the basis of race, color, national origin, age, disability, sex, sexual orientation, or gender identity.            Thank you!     Thank you for choosing [facility]  for your care. Our goal is always to provide you with excellent care. Hearing back from our patients is one way we can continue to improve our services. Please take a few minutes to complete the written survey that you may receive in the mail after your visit with us. Thank you!             Your Updated Medication List - Protect others around you: Learn how to safely use, store and throw away your medicines at www.disposemymeds.org.          This list is accurate as of 3/1/18  4:16 PM.  Always use your most recent med list.                   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    Type 2 diabetes mellitus with diabetic polyneuropathy, with long-term current use of insulin (H)       * blood glucose monitoring  test strip    ONETOUCH VERIO IQ    200 strip    Use to test blood sugars 4 times daily or as directed using One Touch Verio Flex.    Type 2 diabetes mellitus with diabetic polyneuropathy, with long-term current use of insulin (H)       Glucosamine HCl 750 MG Tabs      Take 750 mg by mouth 2 times daily    Arthritis       GLUCOSE MANAGEMENT Tabs     100 tablet    4 tabs po for low blood sugar below 70, may repeat q 10-15 minutes as needed    Type 2 diabetes mellitus with diabetic polyneuropathy, with long-term current use of insulin (H)       insulin aspart 100 UNIT/ML injection    NovoLOG PEN    30 mL    Inject 7 units w/ breakfast, 5 units w/ lunch, 7 units w/ dinner. Hold if not eating meal.    Type 2 diabetes mellitus with diabetic polyneuropathy, with long-term current use of insulin (H)       insulin glargine 100 UNIT/ML injection    LANTUS    9 mL    Inject 30 Units Subcutaneous every morning    Type 2 diabetes mellitus with diabetic polyneuropathy, with long-term current use of insulin (H)       insulin pen needle 31G X 8 MM     300 each    B-D UF III short pen needles, use 4 times a day to inject insulin.    Type 2 diabetes, HbA1C goal < 8% (H)       insulin syringes (disposable) U-100 0.3 ML Misc     100 each    1 each 2 times daily    Type II or unspecified type diabetes mellitus without mention of complication, not stated as uncontrolled       LIFESCAN FINEPOINT LANCETS Misc     200 strip    Use to test blood sugars 4 times daily or as directed.    Type 2 diabetes, HbA1C goal < 8% (H)       lisinopril 2.5 MG tablet    PRINIVIL/Zestril    30 tablet    Take 1 tablet (2.5 mg) by mouth daily    Hypertension goal BP (blood pressure) < 140/90       loratadine 10 MG tablet    AF LORATADINE    14 tablet    Take 1 tablet (10 mg) by mouth daily    Hypertension goal BP (blood pressure) < 140/90       metFORMIN 500 MG tablet    GLUCOPHAGE    30 tablet    Take 1 tablet (500 mg) by mouth daily (with breakfast)    Type  "2 diabetes mellitus with diabetic polyneuropathy, with long-term current use of insulin (H)       multivitamin Tabs tablet      Take 1 tablet by mouth daily FOCUS SELECT PREMIUM WITH LUTEIN per eye doctor Reported on 5/19/2017        order for DME     3 Month    All DM testing supplies including test strips, lancets, solution, syringes, needles and/or pen needles for testing 4 times per day.  Brand \"Contour Next\"    Type 2 diabetes mellitus with diabetic polyneuropathy (H)       penicillin V potassium 500 MG tablet    VEETID    90 tablet    Take 1 tablet (500 mg) by mouth 3 times daily    Urinary tract infection without hematuria, site unspecified       * tamsulosin 0.4 MG capsule    FLOMAX    30 capsule    Take 1 capsule (0.4 mg) by mouth every evening    Calculus of kidney       * tamsulosin 0.4 MG capsule    FLOMAX    90 capsule    TAKE ONE CAPSULE BY MOUTH ONCE DAILY    Hyperlipidemia LDL goal <100, Calculus of kidney       * Notice:  This list has 4 medication(s) that are the same as other medications prescribed for you. Read the directions carefully, and ask your doctor or other care provider to review them with you.      "

## 2018-03-01 NOTE — LETTER
3/1/2018       RE: Pete Sheldon  [id number]  [place] [id number]     Dear Colleague,    Thank you for referring your patient, Pete Sheldon, to the [facility] NEUROLOGY at [facility]. Please see a copy of my visit note below.      DEPARTMENT OF NEUROLOGY  Referral for: stroke f/u  Patient Name: Pete Sheldon  MRN: [id number]  : 1940  Date of Clinic Visit: 2018  Primary Care Provider: Rom Helm  Referring Provider: Ubaldo Dunne    CHIEF COMPLAINT: stroke hospital f/u    HISTORY OF PRESENT ILLNESS:  Pete Sheldon is a 77 year old male with past medical history of DMT2 w/ neuropathy, HTN, and HLD presenting for follow-up after hospitalization for cerebral infarct.   Ashley was hospitalized from -2018 for infarct of the L thalamus.  He presented to the ED on  with right facial weakness, right arm weakness and ataxia, and right arm sensory loss.  He was not given tPA because the time of onset was unclear.  CTA Head/Neck demonstrated an occlusion of the right vertebral artery but no large vessel occlusion in the anterior circulation, thus, no endovascular procedure was indicated.  His hospital course was complicated only by a urinary tract infection and elevated blood glucose.  He was evaluated by OT, PT, and SLP.  Endocrinology was consulted and he was put on an insulin drip while his sugars were brought under control.  Endocrinology made some medication changes to his insulin regimen and restarted him on metformin at discharge.  At the time of hospitalization, his A1c = 7.9%, LDL = 74.  On discharge, he scored 2/3 on the DIANE screen and 5/9 on the PHQ-9.  He was discharged on  and Atorvastatin 20.  No SSRI was initiated.  He was not discharged on a cardiac monitor.  NIH examination score = 1 on discharge.   He was discharged to an acute rehab facility.  He has since graduated from there and has been at home  for the past 3 weeks.  His RUE numbness is still present but not bothersome.  There are no gross motor deficits but he's unable to button clothes, instead relying on zippers.  He notes increased time required to tie his shoes and he's unable to cut his own fingernails.  He has been utilizing a two-handed approach to inject his SQ Insulin.  He completed his course of antibiotics for the urinary tract infection and the symptoms have completely resolved.  He has no new neurological deficits.  He has been working with occupational and physical therapy at home.  He also has a home RN who visits weekly.  He has not been measuring his blood pressure at home unfortunately, mostly because they do not have a blood pressure cuff available.  He has been recording his blood sugars in the morning, at noon, and in the evening.  I reviewed these with the patient and his wife; the values were typically in the mid 100s-300s but there were a few instances of blood sugar being in the 40s, 50s, and 60s.  They think that these are related to days that he undergoes therapy.  He and his wife endorse eating a balanced diet, avoiding unnecessary sugars.    ASSESSMENT AND PLAN:  Pete is a 77-year-old male following up for stroke hospitalization.  His examination today is stable.  He currently has all of the appropriate therapies in place, although he has some room for improvement in order to reach his goals.  In particular, I would like Pete to obtain a blood pressure cuff so that he can measure pressures at home, we reviewed the long-term goals with him and they are reiterated below.  It is possible that the elevated blood pressure today was due to difficulty getting to clinic on time.  If his blood pressures at home are consistently above goal, it seems like there is some room to increase amlodipine, but we will defer to his PCP Dr. Helm from making this change.  He will be seeing endocrinology next week and hopefully will get some  "improvement in his blood sugars; I am worried about the instances where he is low in the 40s.  He scored high enough on the PHQ-9 to warrant being seen by a psychiatrist, but the patient declined a referral today.  He scored 2/3 on the DIANE screen, so he also should be evaluated in a formal sleep study.    Plan:  - check A1C and LDL today  - encourage follow-up on PHQ-9 with PCP to assess for depression treatments  - referral for sleep evaluation to check for DIANE  - long-term goals: A1C < 7%, LDL < 100, BP < 140/90  - encouraged home blood pressure measurements    I spent a total of 75 minutes with the patient today, more than 50% of which was spent counseling.    Patient was seen and discussed with Dr. Bradley.    Pedro Doherty MD  Neurology PGY3  [facility]  Pager: [phone]    PHYSICAL EXAMINATION:  Vitals: /77  Pulse 70  Temp 98  F (36.7  C) (Oral)  Resp 24  Ht 1.67 m (5' 5.75\")  Wt 72.5 kg (159 lb 14.4 oz)  SpO2 97%  BMI 26.01 kg/m2  General: sitting in chair, wife present  HEENT: normocephalic, atraumatic  Cardiac: NSR, mildly hypertensive  Pulmonary: non-labored on room air  Abdomen: non-distended  Extremities: warm to palpatin, no edema  Skin: no rashes, no bruising  Psych: cooperative, appropriate  Neuro:   Mental status: alert; language is fluent with intact comprehension and naming   Cranial nerves: PERRL, EOMI with intact smooth pursuit, full visual fields, no facial asymmetry, very mild R ptosis, facial sensation intact and symmetric, hearing intact to conversation, no hypophonia, no dysarthria   Motor: No abnormal posture, tone, atrophy, or movements/fasciculations.   RIGHT LEFT    5 5   Biceps 5 5   Triceps 5 5   Deltoid 5 5   Hip flexion 5 5   Knee extension 5 5   Knee flexion 5 5   Dorsiflexion 5 5   Plantar flexion 5 5    Reflexes: absent Babinski.   RIGHT LEFT   Brachioradialis 2+ 2+   Biceps 2+ 2+   Triceps 2+ 2+   Patellar 2+ 2+   Achilles 2+ 2+    Sensory: " Intact to light touch, pin prick, and proprioception in all extremities without extinction, except for a length-dependent reduction in LT and pain distal to the middle R forearm circumferentially.   Coordination: No dysmetria. No dysdiadochokinesia. No ataxia.   Gait: Able to stand from sitting position without assistance, kyphotic posture, short stride length with reduced arm swing but symmetric.    National Institutes of Health Stroke Scale   Score    Level of consciousness: (0)   Alert, keenly responsive    LOC questions: (0)   Answers both questions correctly    LOC commands: (0)   Performs both tasks correctly    Best gaze: (0)   Normal    Visual: (0)   No visual loss    Facial palsy: (0)   Normal symmetrical movements    Motor arm (left): (0)   No drift    Motor arm (right): (0)   No drift    Motor leg (left): (0)   No drift    Motor leg (right): (0)   No drift    Limb ataxia: (0)   Absent    Sensory: (1)   Mild to moderate sensory loss    Best language: (0)   Normal- no aphasia    Dysarthria: (0)   Normal    Extinction and inattention: (0)   No abnormality        Total Score:  1       INVESTIGATIONS:   MRI Brain Stroke Protocol 1/10/18:  Impression:  1. Redemonstration of subacute left thalamic infarct. No new infarct or hemorrhage identified.  2. Stable generalized cerebral volume loss, findings of chronic small vessel ischemic disease and cerebellar chronic microhemorrhages.  3. Increased mucosal thickening and opacification of paranasal sinuses.  4. Atherosclerotic irregularities in the left carotid artery bifurcation without any significant stenosis or evidence of dissection.     3/1/2018 17:06   Hemoglobin A1C 8.3 (H)   Cholesterol 123   HDL Cholesterol 35 (L)   LDL Cholesterol Calculated 70   Non HDL Cholesterol 88   Triglycerides 91     REVIEW OF SYSTEMS: 12-point RoS negative except as per HPI.    Allergies   Allergen Reactions     Losartan Other (See Comments)     Dizziness     Current Outpatient  Prescriptions   Medication Sig Dispense Refill     lisinopril (PRINIVIL/ZESTRIL) 2.5 MG tablet Take 1 tablet (2.5 mg) by mouth daily 30 tablet 3     atorvastatin (LIPITOR) 20 MG tablet Take 1 tablet (20 mg) by mouth daily 30 tablet 3     tamsulosin (FLOMAX) 0.4 MG capsule TAKE ONE CAPSULE BY MOUTH ONCE DAILY 90 capsule 1     blood glucose monitoring (ONETOUCH VERIO IQ) test strip Use to test blood sugars 4 times daily or as directed using One Touch Verio Flex. 200 strip 11     amLODIPine (NORVASC) 5 MG tablet Take 1 tablet (5 mg) by mouth daily 30 tablet 1     insulin aspart (NOVOLOG PEN) 100 UNIT/ML injection Inject 7 units w/ breakfast, 5 units w/ lunch, 7 units w/ dinner. Hold if not eating meal. 30 mL 0     insulin glargine (LANTUS) 100 UNIT/ML injection Inject 30 Units Subcutaneous every morning 9 mL 0     metFORMIN (GLUCOPHAGE) 500 MG tablet Take 1 tablet (500 mg) by mouth daily (with breakfast) 30 tablet 0     tamsulosin (FLOMAX) 0.4 MG capsule Take 1 capsule (0.4 mg) by mouth every evening 30 capsule 0     penicillin V potassium (VEETID) 500 MG tablet Take 1 tablet (500 mg) by mouth 3 times daily 90 tablet 0     aspirin 81 MG chewable tablet Take 4 tablets (324 mg) by mouth daily 36 tablet 0     Glucosamine HCl 750 MG TABS Take 750 mg by mouth 2 times daily       loratadine (AF LORATADINE) 10 MG tablet Take 1 tablet (10 mg) by mouth daily 14 tablet 0     Nutritional Supplements (GLUCOSE MANAGEMENT) TABS 4 tabs po for low blood sugar below 70, may repeat q 10-15 minutes as needed 100 tablet prn     blood glucose monitoring (ONETOUCH VERIO IQ) test strip One Touch Verio Flex--Use to test blood sugars 4 times daily or as directed. 200 strip 3     multivitamin (OCUVITE) TABS Take 1 tablet by mouth daily FOCUS SELECT PREMIUM WITH LUTEIN per eye doctor  Reported on 5/19/2017       insulin pen needle 31G X 8 MM B-D UF III short pen needles, use 4 times a day to inject insulin. 300 each 3     order for DME All DM  "testing supplies including test strips, lancets, solution, syringes, needles and/or pen needles for testing 4 times per day.    Brand \"Contour Next\" 3 Month 1     LIFESCAN FINEPOINT LANCETS MISC Use to test blood sugars 4 times daily or as directed. 200 strip 3     insulin syringes, disposable, U-100 0.3 ML MISC 1 each 2 times daily 100 each 6     ACE/ARB NOT PRESCRIBED, INTENTIONAL, by Other route continuous prn (Hyperkalemia) Reported on 2017       Past Medical History:   Diagnosis Date     Calculus of ureter      Hypertension      Microalbuminuria 2/15/2016     Other and unspecified hyperlipidemia      Type 2 diabetes, HbA1C goal < 8% (H) 1995     Past Surgical History:   Procedure Laterality Date     C NONSPECIFIC PROCEDURE      Nasal septoplasty     HC COLONOSCOPY THRU STOMA, DIAGNOSTIC  2007    Normal     HC FLEX SIGMOIDOSCOPY W/WO MIGUEL SPEC BY BRUSH/WASH  1999    Normal to 60 cm     HC REPAIR INCISIONAL HERNIA,REDUCIBLE  1999    Hernia Repair, Incisional, Unilateral (right)     HC REPAIR INCISIONAL HERNIA,REDUCIBLE      Hernia Repair, Incisional, Unilateral (left, with mesh placement)     Family History   Problem Relation Age of Onset     CEREBROVASCULAR DISEASE Mother       age 95     HEART DISEASE Mother      age 89,  age 95     CEREBROVASCULAR DISEASE Father       age 91, stroke two years previous     Cancer - colorectal Brother      Half-brother     CANCER Sister      Half-sister (?type)     CANCER Sister      Half-sister (?type)     HEART DISEASE Brother      Neurologic Disorder Daughter      Multiple Sclerosis     Psychotic Disorder Son      Schizophrenia     Social History     Social History     Marital status:      Spouse name: N/A     Number of children: 2     Years of education: N/A     Occupational History     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         This is a supervisory note for Dr. Doherty.  I have personally seen the patient and confirmed both history and physical examination findings.  I have reviewed documentation listed above by Dr. Doherty and agree with his note.    In summary Mr. Sheldon is a 77-year-old gentleman who was hospitalized in early January for a left thalamic infarct.  Symptoms initially resulted in right-sided facial weakness, right arm weakness and ataxia and right arm sensory loss.  He was not a recipient of any intervention such as TPA.  He has had improvement of symptoms but not resolution.    Extensive time was spent today by Dr. Doherty educating the patient regarding stroke risk factors and how to modify them.  The patient was encouraged to follow closely with Dr. Helm and make sure his blood pressure is at goal.  His diabetes needs to continue to be monitored and treated.  The patient most likely has sleep apnea and was recommended to pursue sleep evaluation for obstructive sleep apnea.  He also appears to be somewhat depressed and may benefit from an antidepressant treatment but again we will defer to Dr. Helm.  Dr. Dhoerty spent 75 minutes with patient over 50% counseling regarding the information above.    Kala Bradley MD [facility]  Department of Neurology              Again, thank you for allowing me to participate in the care of your patient.      Sincerely,    Pedro Doherty MD

## 2018-03-06 DIAGNOSIS — N20.0 KIDNEY STONE: Primary | ICD-10-CM

## 2018-03-07 ENCOUNTER — OFFICE VISIT (OUTPATIENT)
Dept: ENDOCRINOLOGY | Facility: CLINIC | Age: 78
End: 2018-03-07
Payer: COMMERCIAL

## 2018-03-07 ENCOUNTER — TELEPHONE (OUTPATIENT)
Dept: INTERNAL MEDICINE | Facility: CLINIC | Age: 78
End: 2018-03-07

## 2018-03-07 VITALS
HEIGHT: 66 IN | OXYGEN SATURATION: 96 % | TEMPERATURE: 98.4 F | SYSTOLIC BLOOD PRESSURE: 110 MMHG | DIASTOLIC BLOOD PRESSURE: 62 MMHG | HEART RATE: 92 BPM | WEIGHT: 160 LBS | BODY MASS INDEX: 25.71 KG/M2

## 2018-03-07 DIAGNOSIS — E11.42 TYPE 2 DIABETES MELLITUS WITH DIABETIC POLYNEUROPATHY, WITH LONG-TERM CURRENT USE OF INSULIN (H): Primary | ICD-10-CM

## 2018-03-07 DIAGNOSIS — E11.42 TYPE 2 DIABETES MELLITUS WITH DIABETIC POLYNEUROPATHY, WITH LONG-TERM CURRENT USE OF INSULIN (H): ICD-10-CM

## 2018-03-07 DIAGNOSIS — Z79.4 TYPE 2 DIABETES MELLITUS WITH DIABETIC POLYNEUROPATHY, WITH LONG-TERM CURRENT USE OF INSULIN (H): ICD-10-CM

## 2018-03-07 DIAGNOSIS — Z79.4 TYPE 2 DIABETES MELLITUS WITH DIABETIC POLYNEUROPATHY, WITH LONG-TERM CURRENT USE OF INSULIN (H): Primary | ICD-10-CM

## 2018-03-07 LAB — HBA1C MFR BLD: 8.4 % (ref 4.3–6)

## 2018-03-07 PROCEDURE — 99214 OFFICE O/P EST MOD 30 MIN: CPT | Performed by: INTERNAL MEDICINE

## 2018-03-07 PROCEDURE — 83036 HEMOGLOBIN GLYCOSYLATED A1C: CPT | Performed by: INTERNAL MEDICINE

## 2018-03-07 PROCEDURE — 99207 C FOOT EXAM  NO CHARGE: CPT | Performed by: INTERNAL MEDICINE

## 2018-03-07 PROCEDURE — 36415 COLL VENOUS BLD VENIPUNCTURE: CPT | Performed by: INTERNAL MEDICINE

## 2018-03-07 NOTE — NURSING NOTE
"Chief Complaint   Patient presents with     Diabetes     follow up  type 2 LOV 17       Initial /62 (BP Location: Right arm, Patient Position: Chair, Cuff Size: Adult Regular)  Pulse 92  Temp 98.4  F (36.9  C) (Oral)  Ht 1.67 m (5' 5.75\")  Wt 72.6 kg (160 lb)  SpO2 96%  BMI 26.02 kg/m2 Estimated body mass index is 26.02 kg/(m^2) as calculated from the following:    Height as of this encounter: 1.67 m (5' 5.75\").    Weight as of this encounter: 72.6 kg (160 lb).  Medication Reconciliation: complete     ENDOCRINOLOGY INTAKE FORM    Patient Name:  Pete Sheldon  :  1940    Is patient Diabetic?   Yes: type 2  Does patient have non-diabetic or other endocrine issues?  No    Vitals: /62 (BP Location: Right arm, Patient Position: Chair, Cuff Size: Adult Regular)  Pulse 92  Temp 98.4  F (36.9  C) (Oral)  Ht 1.67 m (5' 5.75\")  Wt 72.6 kg (160 lb)  SpO2 96%  BMI 26.02 kg/m2  BMI= Body mass index is 26.02 kg/(m^2).    Flu vaccine:  No  Pneumonia vaccine:  Yes: 3/16/12    Smoking and Alcohol use:  Social History   Substance Use Topics     Smoking status: Former Smoker     Quit date: 3/11/1953     Smokeless tobacco: Never Used     Alcohol use No       Foot Exam: No  Eye Exam:  Yes: 18  Dental Exam:  Yes:   Aspirin Use:  Yes:     Lab Results   Component Value Date    A1C 8.4 2018    A1C 8.3 2018    A1C 7.9 2018    A1C 7.6 2017    A1C 7.8 2017       Lab Results   Component Value Date    MICROL 208 2016     No results found for: MICROALBUMIN    Staff Signature:  Annette Spence CMA ([facility])        "

## 2018-03-07 NOTE — PROGRESS NOTES
ENDOCRINOLOGY CLINIC NOTE:  Name: Pete Sheldon  Seen for f/u of Diabetes.  He is accompanied by his wife.  HPI:  Pete Sheldon is a 77 year old male who presents for the evaluation/management of:  Was in the hospital 1/2018 with new diagnosis of stroke. Was in rehab after that.  During hospitalization he was on insulin gtt.  BG were high.  Was d/buddy on: discharged on50 units of Lantus as well as an increase in his prandial insulin to 1 unit per 5g CHO with meals and snacks, as well as high correction scale before meals and before bed.    Currently he is taking Lantus 30 Units/day, Humalog 7/5/7 Units with breakfast/lunch/dinner respectively.    Of note he is having bedtime snack almost every days.  Typical bedtime snack is whole bagel/ PB sandwich.  Has variable blood sugar pattern.  I doubt if he has clear understanding of insulin dosing and the carbohydrates.  Also trouble with memory.  I discussed continuous glucose monitor with him in past but he does not want sensors.  Low C-peptide.     1. Type 2 DM:  Orginally diagnosed in 1995.  Current Regimen: Metformin 1000 mg twice a day, Lantus 30 Units/day, Humalog 7/5/7 Units with breakfast/lunch/dinner respectively. In addition to that he is on correctional scale 1 unit- 25 >140 but he is not using it.    Does not feel comfortable with carbohydrate counting.    BS checks: Three times a day    Average Meter Download:  170. In general bloods sugar are variable especially morning BG. During day tend to run on higher side. Range     Exercise: Minimal  Episodes of hypoglycemia (low blood sugar):  Yes. improving  Fixed meal pattern: NO. Carb content varies. In general diet is high in carbs.  Patient counting carbs: No    DM Complications:   Nephropathy: Yes: Microalbuminuria present  Retinopathy: Yes: History of laser treatment in past  Neuropathy: Yes.  Microalbuminuria: Yes: Microalbuminuria present.  Not on ACE secondary to history of hyperkalemia.  MICROL       208   2016  MICROALBUMIN   390.98   2016    CAD/PAD: No  Gastroparesis: No  Hypoglycemia unawareness: Yes: Previous notes mentioning history of hypoglycemia unawareness.    2. Hypertension: Blood Pressure today:   BP Readings from Last 3 Encounters:   18 110/62   18 155/77   18 133/68     Blood pressure medications include hydrochlorothiazide 12.5 mg, amlodipine 5 mg.      3. Hyperlipidemia: Takes lovastatin 40 mg for lipid control.  Denies muscle aches of pains.        PMH/PSH:  Past Medical History:   Diagnosis Date     Calculus of ureter      Hypertension      Microalbuminuria 2/15/2016     Other and unspecified hyperlipidemia      Type 2 diabetes, HbA1C goal < 8% (H) 1995     Past Surgical History:   Procedure Laterality Date     C NONSPECIFIC PROCEDURE      Nasal septoplasty     HC COLONOSCOPY THRU STOMA, DIAGNOSTIC  2007    Normal     HC FLEX SIGMOIDOSCOPY W/WO MIGUEL SPEC BY BRUSH/WASH  1999    Normal to 60 cm     HC REPAIR INCISIONAL HERNIA,REDUCIBLE  1999    Hernia Repair, Incisional, Unilateral (right)     HC REPAIR INCISIONAL HERNIA,REDUCIBLE      Hernia Repair, Incisional, Unilateral (left, with mesh placement)     Family Hx:  Family History   Problem Relation Age of Onset     CEREBROVASCULAR DISEASE Mother       age 95     HEART DISEASE Mother      age 89,  age 95     CEREBROVASCULAR DISEASE Father       age 91, stroke two years previous     Cancer - colorectal Brother      Half-brother     CANCER Sister      Half-sister (?type)     CANCER Sister      Half-sister (?type)     HEART DISEASE Brother      Neurologic Disorder Daughter      Multiple Sclerosis     Psychotic Disorder Son      Schizophrenia       Social Hx:  Social History     Social History     Marital status:      Spouse name: N/A     Number of children: 2     Years of education: N/A     Occupational History     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          MEDICATIONS:  has a current medication list which includes the following prescription(s): lisinopril, atorvastatin, tamsulosin, blood glucose monitoring, amlodipine, insulin aspart, insulin glargine, metformin, tamsulosin, aspirin, glucosamine hcl, loratadine, glucose management, blood glucose monitoring, multivitamin, insulin pen needle, order for dme, [employer] finepoint lancets, insulin syringes (disposable), ACE/ARB NOT PRESCRIBED, INTENTIONAL,, and penicillin v potassium.    ROS     ROS: 10 point ROS neg other than the symptoms noted above in the HPI.    Physical Exam   VS: /62 (BP Location: Right arm, Patient Position: Chair, Cuff Size: Adult Regular)  Pulse 92  Temp 98.4  F (36.9  C) (Oral)  Ht 1.67 m (5' 5.75\")  Wt 72.6 kg (160 lb)  SpO2 96%  BMI 26.02 kg/m2  GENERAL: AXOX3, NAD, well dressed,appears stated age.  HEENT: No exopthalmous, no proptosis, EOMI, no lig lag, no retraction  CV: RRR  LUNGS: CTAB  ABDOMEN: +BS  NEUROLOGY: CN grossly intact, no tremors  PSYCH: normal affect and mood      LABS:  Component      Latest Ref Rng 4/15/2016   C-Peptide      0.9 - 6.9 ng/mL <0.1 (L)   Glutamic Acid Decarboxylase Antibody       <5.0 . . .     A1c:  Lab Results   Component Value Date    A1C 8.4 03/07/2018    A1C 8.3 03/01/2018    A1C 7.9 01/09/2018    A1C 7.6 11/29/2017    A1C 7.8 08/16/2017         Creatinine:  Creatinine   Date Value Ref Range Status   01/26/2018 1.25 0.66 - 1.25 mg/dL Final     Urine Micro:  Lab Results   Component Value Date    UMALCR 390.98 07/22/2016        LFTs/Lipids:  Recent Labs   Lab Test  08/16/17   0800  05/12/17   0906   09/25/15   " 0844  06/19/15   0815   CHOL  141  129   < >  134  135   HDL  37*  33*   < >  31*  36*   LDL  85  79   < >  80  83   TRIG  97  83   < >  117  78   CHOLHDLRATIO   --    --    --   4.3  3.8    < > = values in this interval not displayed.     TFTs:  TSH   Date Value Ref Range Status   07/22/2016 5.58 (H) 0.40 - 4.00 mU/L Final       All pertinent notes, labs, and images personally reviewed by me.     A/P  Mr.Victor ANURAG Sheldon is a 75 year old here for the evaluation/management of diabetes:    1. DM2 - (low C-peptide, negative FAVIOLA): Under Poor control.  A1c 8.4%.  Diabetes complicated by microalbuminuria, neuropathy and retinopathy.   Concerns for hypoglycemia unawareness. Variable BG.  Does not feel comfortable with carbohydrate counting.  Does not want Dexcom. This has been discussed multiple times with pt in past.  Hypoglycemic episodes are also less now and improving.   H/o brittle DM. Lot of variability in BG.  Carbohydrate content is with meals and days.  In general diet is high in carbohydrates.  Plan to continue current regimen for now.  Continue Metformin 1000 mg twice a day  Lantus: continue 30 units in a.m. only   HUmalog: Continue 7/5/7 Units with breakfast/lunch/dinner repectively.  Do not use correctional scale ( he is not using it)    He is eating a bagel/PB sandwich every night at bedtime and is consistent with that. He does not take insulin with it.  He will benefit from personal continuous glucose monitor but he does not want since that and is resistant to that idea.   Need to have consistent bedtime snack and consistent carbs with each meal.    Recommend checking blood sugars before meals and at bedtime.    If Blood glucose are low more often-> 2-3 times/week- give us a call.  The patient is advised to Make better food choices: reduce carbs, Reduce portion size, weight loss and exercise 3-4 times a week.  Discussed hypoglycemia signs and symptoms as well as management in detail.    2. Hypertension -  Under Fair control.  Continue hydrochlorothiazide 12.5 mg, amlodipine 5 mg.    3. Hyperlipidemia - Under Good control.  Continue lovastatin 40 mg. LDL 91, HDL 34.    4. Prevention  Flu Shot-September 2015  Pneumovax- March 2012  Opthalmology-Yes. June 2017    ASA-no. 2/2 to h/o nosebleeds  Smoking- No    5. Microalbuminuria:  MICROL      208   7/22/2016  MICROALBUMIN   390.98   7/22/2016  Not on ACE 2/2 to h/o hyperkalemia    All questions were answered.  The patient indicates understanding of the above issues and agrees with the plan set forth.     More than 50% of face to face time spent with Mr. Sheldon on counseling / coordinating his care.  Total appointment times was 20 minutes.      Follow-up:  Follow up 3 months    Lori Damian M.D  Endocrinology  [facility]    Disclaimer: This note consists of symbols derived from keyboarding, dictation and/or voice recognition software. As a result, there may be errors in the script that have gone undetected. Please consider this when interpreting information found in this chart.

## 2018-03-07 NOTE — MR AVS SNAPSHOT
After Visit Summary   3/7/2018    Pete Sheldon    MRN: [id number]           Patient Information     Date Of Birth          1940        Visit Information        Provider Department      3/7/2018 2:30 PM Lori Damian MD [facility]        Today's Diagnoses     Type 2 diabetes mellitus with diabetic polyneuropathy, with long-term current use of insulin (H)    -  1      Care Instructions    [facility] & [place] locations   Dr Damian, Endocrinology Department      [facility]   [address]  [place] [address]  Appointment [phone]  Fax: [phone]  [place]: Monday and Tuesday         [facility]   [address]  [place] [address]  Appointment [phone]  Fax: [phone]  [place]: Wednesday and Thursday          Please arrive 30 minutes before your scheduled appointment.   First go to the main floor lab suit  for previsit A1c lab appointment and then come upstairs and get checked in with  for clinic visit.  This will allow for your blood glucose meter/insulin pump to be downloaded and glycosylated hemoglobin (A1c) to be obtained prior to your appointment.    Continue current medication of Diabetes.  Labs in 3 months.  Follow up in 3 months.    Recommend checking blood sugars before meals and at bedtime.    If Blood glucose are low more often-> 2-3 times/week- give us a call.  The patient is advised to Make better food choices: reduce carbs, Reduce portion size, weight loss and exercise 3-4 times a week.  Discussed hypoglycemia signs and symptoms as well as management in detail.                  Follow-ups after your visit        Your next 10 appointments already scheduled     Mar 15, 2018  1:00 PM CDT   New Sleep Patient with Brown Mike MD   [facility] ([facility])    [address]  [facility] [id number] [phone]            Mar 23, 2018  2:20 PM CDT   CT ABDOMEN PELVIS W/O CONTRAST with UCCT1   [facility] ([facility])    [address]  1st Floor  [facility] [id number] [phone]           Please bring any scans or X-rays taken at other hospitals, if similar tests were done. Also bring a list of your medicines, including vitamins, minerals and over-the-counter drugs. It is safest to leave personal items at home.  Be sure to tell your doctor:   If you have any allergies.   If there s any chance you are pregnant.   If you are breastfeeding.  You may have contrast for this exam. To prepare:   Do not eat or drink for 2 hours before your exam. If you need to take medicine, you may take it with small sips of water. (We may ask you to take liquid medicine as well.)   The day before your exam, drink extra fluids at least six 8-ounce glasses (unless your doctor tells you to restrict your fluids).   You will be given instructions on how to drink the contrast.  Patients over 70 or patients with diabetes or kidney problems:   If you haven t had a blood test (creatinine test) within the last 30 days, the Cardiologist/Radiologist may require you to get this test prior to your exam.  If you have diabetes:   Continue to take your metformin medication on the day of your exam  Please wear loose clothing, such as a sweat suit or jogging clothes. Avoid snaps, zippers and other metal. We may ask you to undress and put on a hospital gown.  If you have any questions, please call the Imaging Department where you will have your exam.            Mar 23, 2018  3:00 PM CDT   (Arrive by 2:45 PM)   New Renal Calculi with Jasmyne Elliott MD   [facility] ([facility] [place])    [address]  4th Floor  [facility] [id number] [phone]             "Apr 10, 2018  2:40 PM CDT   (Arrive by 2:25 PM)   New Patient Visit with Nataliya Schneider MD   [facility] ([facility])    [address] [place] [address]   [phone]            Jun 26, 2018 10:00 AM CDT   (Arrive by 9:45 AM)   New Stroke with Claudio Castanon MD   [facility]rology ([facility])    [address] [place] [address]   [phone]              Future tests that were ordered for you today     Open Future Orders        Priority Expected Expires Ordered    Hemoglobin A1c ASAP 5/6/2018 1/1/2019 3/7/2018    CT Abdomen Pelvis w/o Contrast Routine 3/6/2018 3/6/2019 3/6/2018            Who to contact     If you have questions or need follow up information about today's clinic visit or your schedule please contact [facility] directly at [phone].  Normal or non-critical lab and imaging results will be communicated to you by Diagnostic Biochipshart, letter or phone within 4 business days after the clinic has received the results. If you do not hear from us within 7 days, please contact the clinic through Media Lanternt or phone. If you have a critical or abnormal lab result, we will notify you by phone as soon as possible.  Submit refill requests through ED01 or call your pharmacy and they will forward the refill request to us. Please allow 3 business days for your refill to be completed.          Additional Information About Your Visit        ED01 Information     ED01 lets you send messages to your doctor, view your test results, renew your prescriptions, schedule appointments and more. To sign up, go to [email or web address] . Click on \"Log in\" on the left side of the screen, which will take you to the Welcome page. Then click on \"Sign up Now\" on the right side of the page.     You will be asked to enter the access code listed below, as well as " "some personal information. Please follow the directions to create your username and password.     Your access code is: [email or web address]  Expires: 2018  2:53 PM     Your access code will  in 90 days. If you need help or a new code, please call your [place] clinic or [phone].        Care EveryWhere ID     This is your Care EveryWhere ID. This could be used by other organizations to access your [place] medical records  [id number]        Your Vitals Were     Pulse Temperature Height Pulse Oximetry BMI (Body Mass Index)       92 98.4  F (36.9  C) (Oral) 1.67 m (5' 5.75\") 96% 26.02 kg/m2        Blood Pressure from Last 3 Encounters:   18 110/62   18 155/77   18 133/68    Weight from Last 3 Encounters:   18 72.6 kg (160 lb)   18 72.5 kg (159 lb 14.4 oz)   18 74.3 kg (163 lb 11.2 oz)              We Performed the Following     FOOT EXAM        Primary Care Provider Office Phone # Fax #    Rom Helm [phone]       [address] [place] [address]        Goals        Diet    Increase water intake     Notes - Note edited  2014 11:42 AM by Patricia Khan, RN    Have 5-6 glasses (8oz) of fluid a day  Per f/u with spouse today, pt not doing well. Not eating and drinking. Called clinic and advised to take pt to clinic.         Increase water intake        General    Medication 1 (pt-stated)     Notes - Note created  5/15/2014  1:57 PM by Patricia Khan, JULIA    Pt will have an MTM consult         Equal Access to Services     CAR BANDA AH: Irvin Calvert, chucky kang, yodit kaalmakadeem mccallum. So [facility] [phone].    ATENCIÓN: Si habla español, tiene a moise disposición servicios gratuitos de asistencia lingüística. Llame al [phone].    We comply with applicable federal civil rights laws and [place] laws. We do not discriminate on the basis of race, color, " national origin, age, disability, sex, sexual orientation, or gender identity.            Thank you!     Thank you for choosing [facility]  for your care. Our goal is always to provide you with excellent care. Hearing back from our patients is one way we can continue to improve our services. Please take a few minutes to complete the written survey that you may receive in the mail after your visit with us. Thank you!             Your Updated Medication List - Protect others around you: Learn how to safely use, store and throw away your medicines at www.disposemymeds.org.          This list is accurate as of 3/7/18  2:51 PM.  Always use your most recent med list.                   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    Type 2 diabetes mellitus with diabetic polyneuropathy, with long-term current use of insulin (H)       * blood glucose monitoring test strip    ONETOUCH VERIO IQ    200 strip    Use to test blood sugars 4 times daily or as directed using One Touch Verio Flex.    Type 2 diabetes mellitus with diabetic polyneuropathy, with long-term current use of insulin (H)       Glucosamine HCl 750  MG Tabs      Take 750 mg by mouth 2 times daily    Arthritis       GLUCOSE MANAGEMENT Tabs     100 tablet    4 tabs po for low blood sugar below 70, may repeat q 10-15 minutes as needed    Type 2 diabetes mellitus with diabetic polyneuropathy, with long-term current use of insulin (H)       insulin aspart 100 UNIT/ML injection    NovoLOG PEN    30 mL    Inject 7 units w/ breakfast, 5 units w/ lunch, 7 units w/ dinner. Hold if not eating meal.    Type 2 diabetes mellitus with diabetic polyneuropathy, with long-term current use of insulin (H)       insulin glargine 100 UNIT/ML injection    LANTUS    9 mL    Inject 30 Units Subcutaneous every morning    Type 2 diabetes mellitus with diabetic polyneuropathy, with long-term current use of insulin (H)       insulin pen needle 31G X 8 MM     300 each    B-D UF III short pen needles, use 4 times a day to inject insulin.    Type 2 diabetes, HbA1C goal < 8% (H)       insulin syringes (disposable) U-100 0.3 ML Misc     100 each    1 each 2 times daily    Type II or unspecified type diabetes mellitus without mention of complication, not stated as uncontrolled       LIFESCAN FINEPOINT LANCETS Misc     200 strip    Use to test blood sugars 4 times daily or as directed.    Type 2 diabetes, HbA1C goal < 8% (H)       lisinopril 2.5 MG tablet    PRINIVIL/Zestril    30 tablet    Take 1 tablet (2.5 mg) by mouth daily    Hypertension goal BP (blood pressure) < 140/90       loratadine 10 MG tablet    AF LORATADINE    14 tablet    Take 1 tablet (10 mg) by mouth daily    Hypertension goal BP (blood pressure) < 140/90       metFORMIN 500 MG tablet    GLUCOPHAGE    30 tablet    Take 1 tablet (500 mg) by mouth daily (with breakfast)    Type 2 diabetes mellitus with diabetic polyneuropathy, with long-term current use of insulin (H)       multivitamin Tabs tablet      Take 1 tablet by mouth daily FOCUS SELECT PREMIUM WITH LUTEIN per eye doctor Reported on 5/19/2017        order for DME     3  "Month    All DM testing supplies including test strips, lancets, solution, syringes, needles and/or pen needles for testing 4 times per day.  Brand \"Contour Next\"    Type 2 diabetes mellitus with diabetic polyneuropathy (H)       penicillin V potassium 500 MG tablet    VEETID    90 tablet    Take 1 tablet (500 mg) by mouth 3 times daily    Urinary tract infection without hematuria, site unspecified       * tamsulosin 0.4 MG capsule    FLOMAX    30 capsule    Take 1 capsule (0.4 mg) by mouth every evening    Calculus of kidney       * tamsulosin 0.4 MG capsule    FLOMAX    90 capsule    TAKE ONE CAPSULE BY MOUTH ONCE DAILY    Hyperlipidemia LDL goal <100, Calculus of kidney       * Notice:  This list has 4 medication(s) that are the same as other medications prescribed for you. Read the directions carefully, and ask your doctor or other care provider to review them with you.      "

## 2018-03-07 NOTE — LETTER
3/7/2018         RE: Pete Sheldon  [address] [place] [address]        Dear Colleague,    Thank you for referring your patient, Peet Sheldon, to the [facility]. Please see a copy of my visit note below.    ENDOCRINOLOGY CLINIC NOTE:  Name: Pete Sheldon  Seen for f/u of Diabetes.  He is accompanied by his wife.  HPI:  Pete Sheldon is a 77 year old male who presents for the evaluation/management of:  Was in the hospital 1/2018 with new diagnosis of stroke. Was in rehab after that.  During hospitalization he was on insulin gtt.  BG were high.  Was d/buddy on: discharged on50 units of Lantus as well as an increase in his prandial insulin to 1 unit per 5g CHO with meals and snacks, as well as high correction scale before meals and before bed.    Currently he is taking Lantus 30 Units/day, Humalog 7/5/7 Units with breakfast/lunch/dinner respectively.    Of note he is having bedtime snack almost every days.  Typical bedtime snack is whole bagel/ PB sandwich.  Has variable blood sugar pattern.  I doubt if he has clear understanding of insulin dosing and the carbohydrates.  Also trouble with memory.  I discussed continuous glucose monitor with him in past but he does not want sensors.  Low C-peptide.     1. Type 2 DM:  Orginally diagnosed in 1995.  Current Regimen: Metformin 1000 mg twice a day, Lantus 30 Units/day, Humalog 7/5/7 Units with breakfast/lunch/dinner respectively. In addition to that he is on correctional scale 1 unit- 25 >140 but he is not using it.    Does not feel comfortable with carbohydrate counting.    BS checks: Three times a day    Average Meter Download:  170. In general bloods sugar are variable especially morning BG. During day tend to run on higher side. Range     Exercise: Minimal  Episodes of hypoglycemia (low blood sugar):  Yes. improving  Fixed meal pattern: NO. Carb content varies. In general diet is high in carbs.  Patient counting carbs:  No    DM Complications:   Nephropathy: Yes: Microalbuminuria present  Retinopathy: Yes: History of laser treatment in past  Neuropathy: Yes.  Microalbuminuria: Yes: Microalbuminuria present.  Not on ACE secondary to history of hyperkalemia.  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    2. Hypertension: Blood Pressure today:   BP Readings from Last 3 Encounters:   18 110/62   18 155/77   18 133/68     Blood pressure medications include hydrochlorothiazide 12.5 mg, amlodipine 5 mg.      3. Hyperlipidemia: Takes lovastatin 40 mg for lipid control.  Denies muscle aches of pains.        PMH/PSH:  Past Medical History:   Diagnosis Date     Calculus of ureter      Hypertension      Microalbuminuria 2/15/2016     Other and unspecified hyperlipidemia      Type 2 diabetes, HbA1C goal < 8% (H) 1995     Past Surgical History:   Procedure Laterality Date     C NONSPECIFIC PROCEDURE      Nasal septoplasty     HC COLONOSCOPY THRU STOMA, DIAGNOSTIC  2007    Normal     HC FLEX SIGMOIDOSCOPY W/WO MIGUEL SPEC BY BRUSH/WASH  1999    Normal to 60 cm     HC REPAIR INCISIONAL HERNIA,REDUCIBLE  1999    Hernia Repair, Incisional, Unilateral (right)     HC REPAIR INCISIONAL HERNIA,REDUCIBLE      Hernia Repair, Incisional, Unilateral (left, with mesh placement)     Family Hx:  Family History   Problem Relation Age of Onset     CEREBROVASCULAR DISEASE Mother       age 95     HEART DISEASE Mother      age 89,  age 95     CEREBROVASCULAR DISEASE Father       age 91, stroke two years previous     Cancer - colorectal Brother      Half-brother     CANCER Sister      Half-sister (?type)     CANCER Sister      Half-sister (?type)     HEART DISEASE Brother      Neurologic Disorder Daughter      Multiple Sclerosis     Psychotic Disorder Son   "    Schizophrenia       Social Hx:  Social History     Social History     Marital status:      Spouse name: N/A     Number of children: 2     Years of education: N/A     Occupational History     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          MEDICATIONS:  has a current medication list which includes the following prescription(s): lisinopril, atorvastatin, tamsulosin, blood glucose monitoring, amlodipine, insulin aspart, insulin glargine, metformin, tamsulosin, aspirin, glucosamine hcl, loratadine, glucose management, blood glucose monitoring, multivitamin, insulin pen needle, order for dme, [employer] finepoint lancets, insulin syringes (disposable), ACE/ARB NOT PRESCRIBED, INTENTIONAL,, and penicillin v potassium.    ROS     ROS: 10 point ROS neg other than the symptoms noted above in the HPI.    Physical Exam   VS: /62 (BP Location: Right arm, Patient Position: Chair, Cuff Size: Adult Regular)  Pulse 92  Temp 98.4  F (36.9  C) (Oral)  Ht 1.67 m (5' 5.75\")  Wt 72.6 kg (160 lb)  SpO2 96%  BMI 26.02 kg/m2  GENERAL: AXOX3, NAD, well dressed,appears stated age.  HEENT: No exopthalmous, no proptosis, EOMI, no lig lag, no retraction  CV: RRR  LUNGS: CTAB  ABDOMEN: +BS  NEUROLOGY: CN grossly intact, no tremors  PSYCH: normal affect and mood      LABS:  Component      Latest Ref Rng 4/15/2016   C-Peptide      0.9 - 6.9 ng/mL <0.1 (L)   Glutamic Acid Decarboxylase Antibody       <5.0 . . .     A1c:  Lab Results   Component Value Date    A1C 8.4 03/07/2018    A1C 8.3 03/01/2018    A1C 7.9 01/09/2018    A1C 7.6 11/29/2017    A1C 7.8 08/16/2017         Creatinine:  Creatinine "   Date Value Ref Range Status   01/26/2018 1.25 0.66 - 1.25 mg/dL Final     Urine Micro:  Lab Results   Component Value Date    UMALCR 390.98 07/22/2016        LFTs/Lipids:  Recent Labs   Lab Test  08/16/17   0800  05/12/17   0906   09/25/15   0844  06/19/15   0815   CHOL  141  129   < >  134  135   HDL  37*  33*   < >  31*  36*   LDL  85  79   < >  80  83   TRIG  97  83   < >  117  78   CHOLHDLRATIO   --    --    --   4.3  3.8    < > = values in this interval not displayed.     TFTs:  TSH   Date Value Ref Range Status   07/22/2016 5.58 (H) 0.40 - 4.00 mU/L Final       All pertinent notes, labs, and images personally reviewed by me.     A/P  Mr.Victor ANURAG Sheldon is a 75 year old here for the evaluation/management of diabetes:    1. DM2 - (low C-peptide, negative FAVIOLA): Under Poor control.  A1c 8.4%.  Diabetes complicated by microalbuminuria, neuropathy and retinopathy.   Concerns for hypoglycemia unawareness. Variable BG.  Does not feel comfortable with carbohydrate counting.  Does not want Dexcom. This has been discussed multiple times with pt in past.  Hypoglycemic episodes are also less now and improving.   H/o brittle DM. Lot of variability in BG.  Carbohydrate content is with meals and days.  In general diet is high in carbohydrates.  Plan to continue current regimen for now.  Continue Metformin 1000 mg twice a day  Lantus: continue 30 units in a.m. only   HUmalog: Continue 7/5/7 Units with breakfast/lunch/dinner repectively.  Do not use correctional scale ( he is not using it)    He is eating a bagel/PB sandwich every night at bedtime and is consistent with that. He does not take insulin with it.  He will benefit from personal continuous glucose monitor but he does not want since that and is resistant to that idea.   Need to have consistent bedtime snack and consistent carbs with each meal.    Recommend checking blood sugars before meals and at bedtime.    If Blood glucose are low more often-> 2-3 times/week-  give us a call.  The patient is advised to Make better food choices: reduce carbs, Reduce portion size, weight loss and exercise 3-4 times a week.  Discussed hypoglycemia signs and symptoms as well as management in detail.    2. Hypertension - Under Fair control.  Continue hydrochlorothiazide 12.5 mg, amlodipine 5 mg.    3. Hyperlipidemia - Under Good control.  Continue lovastatin 40 mg. LDL 91, HDL 34.    4. Prevention  Flu Shot-September 2015  Pneumovax- March 2012  Opthalmology-Yes. June 2017    ASA-no. 2/2 to h/o nosebleeds  Smoking- No    5. Microalbuminuria:  MICROL      208   7/22/2016  MICROALBUMIN   390.98   7/22/2016  Not on ACE 2/2 to h/o hyperkalemia    All questions were answered.  The patient indicates understanding of the above issues and agrees with the plan set forth.     More than 50% of face to face time spent with Mr. Sheldon on counseling / coordinating his care.  Total appointment times was 20 minutes.      Follow-up:  Follow up 3 months    Lori Damian M.D  Endocrinology  [facility]    Disclaimer: This note consists of symbols derived from keyboarding, dictation and/or voice recognition software. As a result, there may be errors in the script that have gone undetected. Please consider this when interpreting information found in this chart.        Again, thank you for allowing me to participate in the care of your patient.        Sincerely,        Lori Damian MD

## 2018-03-07 NOTE — PATIENT INSTRUCTIONS
[facility] & [place] locations   Dr Damian, Endocrinology Department      [facility]   [address]  [place] [address]  Appointment [phone]  Fax: [phone]  [place]: Monday and Tuesday         [facility]   [address]  [place] [address]  Appointment [phone]  Fax: [phone]  [place]: Wednesday and Thursday          Please arrive 30 minutes before your scheduled appointment.   First go to the main floor lab suit  for previsit A1c lab appointment and then come upstairs and get checked in with  for clinic visit.  This will allow for your blood glucose meter/insulin pump to be downloaded and glycosylated hemoglobin (A1c) to be obtained prior to your appointment.    Continue current medication of Diabetes.  Labs in 3 months.  Follow up in 3 months.    Recommend checking blood sugars before meals and at bedtime.    If Blood glucose are low more often-> 2-3 times/week- give us a call.  The patient is advised to Make better food choices: reduce carbs, Reduce portion size, weight loss and exercise 3-4 times a week.  Discussed hypoglycemia signs and symptoms as well as management in detail.

## 2018-03-12 ENCOUNTER — PRE VISIT (OUTPATIENT)
Dept: UROLOGY | Facility: CLINIC | Age: 78
End: 2018-03-12

## 2018-03-15 ENCOUNTER — OFFICE VISIT (OUTPATIENT)
Dept: SLEEP MEDICINE | Facility: CLINIC | Age: 78
End: 2018-03-15
Payer: COMMERCIAL

## 2018-03-15 VITALS
HEIGHT: 66 IN | DIASTOLIC BLOOD PRESSURE: 63 MMHG | RESPIRATION RATE: 18 BRPM | WEIGHT: 160 LBS | SYSTOLIC BLOOD PRESSURE: 109 MMHG | OXYGEN SATURATION: 94 % | BODY MASS INDEX: 25.71 KG/M2 | HEART RATE: 80 BPM

## 2018-03-15 DIAGNOSIS — G47.9 SLEEP DISTURBANCE: Primary | ICD-10-CM

## 2018-03-15 DIAGNOSIS — Z86.73 HISTORY OF STROKE: ICD-10-CM

## 2018-03-15 DIAGNOSIS — F51.04 PSYCHOPHYSIOLOGIC INSOMNIA: ICD-10-CM

## 2018-03-15 DIAGNOSIS — Z72.821 POOR SLEEP HYGIENE: ICD-10-CM

## 2018-03-15 PROCEDURE — 99204 OFFICE O/P NEW MOD 45 MIN: CPT | Performed by: INTERNAL MEDICINE

## 2018-03-15 RX ORDER — ZOLPIDEM TARTRATE 10 MG/1
TABLET ORAL
Qty: 1 TABLET | Refills: 0 | Status: SHIPPED | OUTPATIENT
Start: 2018-03-15 | End: 2018-05-16

## 2018-03-15 NOTE — PROGRESS NOTES
[facility]  Outpatient Sleep Medicine Consultation  March 15, 2018      Name: Pete Sheldon MRN# [id number]   Age: 77 year old YOB: 1940     Date of Consultation: March 15, 2018  Consultation is requested by: Pedro Doherty MD  [id number]  [place] [address]  Primary care provider: Rom Helm  Home clinic: [facility]       Reason for Sleep Consult:     Pete Sheldon is a 77 year old male nightly snoring, poor quality of sleep and excessive daytime sleepiness and tiredness.         Assessment and Plan:     Summary Sleep Diagnoses/Recommendations:    1. Sleep Disturbance:  High suspicion of sleep disordered breathing based on patient's symptoms (snoring, excessive daytime sleepiness and fatigue)  neck circumference and oropharyngeal examination in setting of recent stroke and may be predisposed for central sleep apneas. Will schedule Home sleep study vs PSG with PAP titration in second half if patient meets criteria for DIANE in first half of PSG with TCM CO2. We also discussed the pathophysiology of sleep disordered breathing and the importance of treating it if S/he should have it. Patient is advised not to drive if he/she feels drowsy or sleepy.  Follow up after sleep study to discuss the result of sleep study and treatment options.    2. Insomnia: related to above or psychophysiological insomnia: we recommend good sleep hygiene and stimulus control, instructions given. Get sleep study as above.      Orders Placed This Encounter   Procedures     Comprehensive Sleep Study    Ambien 10 mg x1    Summary Counseling:  See instructions    Counseling included a comprehensive review of diagnostic and therapeutic strategies as well as risks of inadequate therapy.  Educational materials provided in instructions.    All questions were answered.  The patient indicates understanding of the above issues and agrees with the plan set  forth.           History of Present Illness:     Pete Sheldon is a 77 year old male with history of hypertension, left thalamus CVA 1/2018, DM type II, hyperlipidemia and ureteral calculi who presents to the [facility] in [place] with complains of sleep disturbance. I was asked to see Mr. Sheldon regarding sleep apnea by Dr. Doherty.   Patient complains snoring, excessive daytime sleepiness and tiredness, difficulty falling and staying asleep, occasional morning headache, difficulty breathing from nose and fatigue. He denies witnessed apnea, waking up gasping air, restless legs and acid reflux. When he is no able to fall asleep, he lays his bed and his mind racing, rumination about sleep and worries. He has a clock in his bedroom which he checks the time during the sleep.    Please see below for sleep ROS details.    PREVIOUS IN- LAB or HOME SLEEP STUDIES:  None     SLEEP-WAKE SCHEDULE:     Pete Sheldon     -Describes themself as a morning person;      -ON WEEKDAYS and ON WEEKENDS, goes to sleep at 9:00 PM during the week; awakens  9:00 AM without an alarm; falls asleep in 120 minutes; has difficulty falling asleep.      -Awakens 2 times a night for 60 minutes before falling back to sleep; awakens to go to the bathroom and uncertain reasons.      -Total sleep time: 3-4 hours per night.    -Naps 0 times/days per week but falls asleep inadvertently often at the recliner as per wife.      BEDTIME ACTIVITIES AND SHIFT WORK:    Pete Sheldon    -does not use electronics in bed, watch TV in bed and read in bed.     -does not do shift work.  He is retired.     SCALES       SLEEP APNEA: [place] score: 5       INSOMNIA:  Insomnia severity score: N/A. Unable to fill ABDIEL       SLEEPINESS: [place] sleepiness scale (ESS):  3   Drowsy driving/near accidents: Non           PHQ9: N/A    SLEEP COMPLAINTS:   Snoring- 7 days/week  Witness apnea: No  Gasping/Choking: No  Excessive daytime sleep:  Yes  Toss/turn: Yes/No  Excessive tiredness/fatigue:  Yes  Morning headaches: Yes  Dry mouth/throat: No  Dyspnea: sometimes  Coexisting Lung disease: No    Coexisting Heart disease: No    Does patient have a bed partner: Yes  Has bed partner been sleeping separately because of snoring:  No            RLS Screen: When you try to relax in the evening or sleep at  night, do you ever have unpleasant, restless feelings in your  legs that can be relieved by walking or movement? No    Periodic limb movement: No    Narcolepsy:       denies sudden urges of sleep attacks     denies cataplexy     denies sleep paralysis      denies hallucinations     Sleep Behaviors:     denies leg symptoms/movements     denies motor restlessness     denies night terrors     denies bruxism     denies automatic behaviors    Other subjective complaints:     denies anxiety or rumination      denies pain and discomfort at  night     denies waking up with heart pounding or racing     denies GERD/heartburn         Parasomnia:   NREM - denies recurrent persistent confusional arousal, night eating, sleep walking or sleep terrors   REM  - denies dream enactment; no injuries.     Safety: None             Medications:     Current Outpatient Prescriptions   Medication Sig     lisinopril (PRINIVIL/ZESTRIL) 2.5 MG tablet Take 1 tablet (2.5 mg) by mouth daily     atorvastatin (LIPITOR) 20 MG tablet Take 1 tablet (20 mg) by mouth daily     tamsulosin (FLOMAX) 0.4 MG capsule TAKE ONE CAPSULE BY MOUTH ONCE DAILY     blood glucose monitoring (ONETOUCH VERIO IQ) test strip Use to test blood sugars 4 times daily or as directed using One Touch Verio Flex.     amLODIPine (NORVASC) 5 MG tablet Take 1 tablet (5 mg) by mouth daily     insulin aspart (NOVOLOG PEN) 100 UNIT/ML injection Inject 7 units w/ breakfast, 5 units w/ lunch, 7 units w/ dinner. Hold if not eating meal.     insulin glargine (LANTUS) 100 UNIT/ML injection Inject 30 Units Subcutaneous every morning  "    metFORMIN (GLUCOPHAGE) 500 MG tablet Take 1 tablet (500 mg) by mouth daily (with breakfast)     tamsulosin (FLOMAX) 0.4 MG capsule Take 1 capsule (0.4 mg) by mouth every evening     penicillin V potassium (VEETID) 500 MG tablet Take 1 tablet (500 mg) by mouth 3 times daily (Patient not taking: Reported on 3/7/2018)     aspirin 81 MG chewable tablet Take 4 tablets (324 mg) by mouth daily     Glucosamine HCl 750 MG TABS Take 750 mg by mouth 2 times daily     loratadine (AF LORATADINE) 10 MG tablet Take 1 tablet (10 mg) by mouth daily     Nutritional Supplements (GLUCOSE MANAGEMENT) TABS 4 tabs po for low blood sugar below 70, may repeat q 10-15 minutes as needed     blood glucose monitoring (ONETOUCH VERIO IQ) test strip One Touch Verio Flex--Use to test blood sugars 4 times daily or as directed.     multivitamin (OCUVITE) TABS Take 1 tablet by mouth daily FOCUS SELECT PREMIUM WITH LUTEIN per eye doctor  Reported on 5/19/2017     insulin pen needle 31G X 8 MM B-D UF III short pen needles, use 4 times a day to inject insulin.     order for DME All DM testing supplies including test strips, lancets, solution, syringes, needles and/or pen needles for testing 4 times per day.    Brand \"Contour Next\"     LIFESCAN FINEPOINT LANCETS MISC Use to test blood sugars 4 times daily or as directed.     insulin syringes, disposable, U-100 0.3 ML MISC 1 each 2 times daily     ACE/ARB NOT PRESCRIBED, INTENTIONAL, by Other route continuous prn (Hyperkalemia) Reported on 5/19/2017     No current facility-administered medications for this visit.         Medication that can affect sleep: None    Allergies   Allergen Reactions     Losartan Other (See Comments)     Dizziness            Past Medical History:     Does not need 02 supplement at night     Past Medical History:   Diagnosis Date     Calculus of ureter 1980's     Hypertension      Microalbuminuria 2/15/2016     Other and unspecified hyperlipidemia      Type 2 diabetes, HbA1C " goal < 8% (H) 1995               Past Surgical History:    No previous upper airway surgery     Past Surgical History:   Procedure Laterality Date     C NONSPECIFIC PROCEDURE      Nasal septoplasty     HC COLONOSCOPY THRU STOMA, DIAGNOSTIC  2007    Normal     HC FLEX SIGMOIDOSCOPY W/WO MIGUEL SPEC BY BRUSH/WASH  1999    Normal to 60 cm     HC REPAIR INCISIONAL HERNIA,REDUCIBLE  1999    Hernia Repair, Incisional, Unilateral (right)     HC REPAIR INCISIONAL HERNIA,REDUCIBLE      Hernia Repair, Incisional, Unilateral (left, with mesh placement)            Social History:     Social History   Substance Use Topics     Smoking status: Former Smoker     Quit date: 3/11/1953     Smokeless tobacco: Never Used     Alcohol use No         Chemical History:     Tobacco: No     Uses no caffeine.     EtOH: No   Recreational Drugs: No    Psych Hx:   None    Current dangers to self or others: None           Family History:     Family History   Problem Relation Age of Onset     CEREBROVASCULAR DISEASE Mother       age 95     HEART DISEASE Mother      age 89,  age 95     CEREBROVASCULAR DISEASE Father       age 91, stroke two years previous     Cancer - colorectal Brother      Half-brother     CANCER Sister      Half-sister (?type)     CANCER Sister      Half-sister (?type)     HEART DISEASE Brother      Neurologic Disorder Daughter      Multiple Sclerosis     Psychotic Disorder Son      Schizophrenia        Sleep Family Hx:        RLS- No  DIANE - No  Insomnia - No  Parasomnia - No         Review of Systems:     A complete 10 point review of systems was negative other than HPI or as commented below:   Patient denies chest pain, dyspnea with activity and or rest, wheezing, abdominal pain, n&v, fever, chills, dysuria, leg pain or swelling. Patient is also denies ear pain, sore throat, postnasal drip, running nose, dry cough.  Patient has vision change, productive cough, weakness and numbness of  "ext.    Pete Sheldon has gained 5 pounds in the last year.            Physical Examination:   /63  Pulse 80  Resp 18  Ht 1.676 m (5' 6\")  Wt 72.6 kg (160 lb)  SpO2 94%  BMI 25.82 kg/m2     Neck Circumference: 42 cm   Constitutional: . Awake, alert, cooperative, in no apparent distress  Mood: euthymic; affect congruent with full range and intensity.  Attention/Concentration:  Normal   Eyes: Pupils round and reactive. No icterus.  ENT: Mallampati Class: IV.   Tonsillar Stage: 1  hidden by pillars  Clear nasal passages. Enlarged inferior turbinates. No deviated septum.  Oropharynx: No high arched palate. No pharyngeal erythema or exudates, elongated uvula. No lateral narrowing  Tongue: No relative macroglossia   Dentition: poor, missing teeth  Dentures: None  Neck: Supple, no thyroid enlargement.   Cardiovascular: Regular S1 and S2, no gallops or murmurs.   Pulmonary:  Chest symmetric, lungs clear bilaterally and no crackles, wheezes or rales.  Abdomen: Soft, obese, non tender.  Extremities:  No pedal edema.  Muscle/joint: Strength and tone normal   Skin:  No rash or significant lesions.   Neurologic: Alert, oriented x3, moving all ext.           Data: All pertinent previous laboratory data reviewed     Lab Results   Component Value Date    PH 7.43 12/02/2016    PO2 73 (L) 12/02/2016    PCO2 39 12/02/2016    HCO3 25 12/02/2016    GREGG 0.9 12/02/2016     Lab Results   Component Value Date    TSH 5.58 (H) 07/22/2016    TSH 4.39 (H) 09/25/2015     Lab Results   Component Value Date     (H) 01/26/2018     (H) 01/25/2018     Lab Results   Component Value Date    HGB 13.6 01/22/2018    HGB 13.2 (L) 01/20/2018     Lab Results   Component Value Date    BUN 34 (H) 01/26/2018    BUN 33 (H) 01/25/2018    CR 1.25 01/26/2018    CR 1.23 01/25/2018     Lab Results   Component Value Date    CO2 26 01/26/2018    CO2 28 01/25/2018     No results found for: ADAM      Echocardiography: 1/9/18  Global and " regional left ventricular function is normal with an EF of 60-65%.  Left ventricular size is normal. Left ventricular wall thickness is normal.  Normal left ventricular filling for age. Abnormal non-specific septal motion  is present.  The right ventricle is normal size. Global right ventricular function is  normal.  Both atria appear normal. There is no right to left shunt at the atrial septal  level by agitated saline contrast study at rest and with Valsalva maneuver.     Chest x-ray: January 14, 2018 1:17 PM       IMPRESSION: No acute cardiopulmonary disease. Stable calcified    PFT: No        Copy to: Rom Helm  Copy to: Pedro Mike MD 3/15/2018   [facility] [address], [place] [address] [phone] Clinic    Total time spent with patient: 45 minutes with this patient today in which 25 minutes was spent in counseling/coordination of care and going over planned testing and recommendations.

## 2018-03-15 NOTE — NURSING NOTE
"Chief Complaint   Patient presents with     Consult     Snores, Insomnia,  No SS or cpap       Initial /63  Pulse 80  Resp 18  Ht 1.676 m (5' 6\")  Wt 72.6 kg (160 lb)  SpO2 94%  BMI 25.82 kg/m2 Estimated body mass index is 25.82 kg/(m^2) as calculated from the following:    Height as of this encounter: 1.676 m (5' 6\").    Weight as of this encounter: 72.6 kg (160 lb).  Medication Reconciliation: complete       Neck 42cm  16.5in  Ess 3      Alba Palumbo LPN/CMA  "

## 2018-03-15 NOTE — MR AVS SNAPSHOT
"              After Visit Summary   3/15/2018    Pete Sheldon    MRN: [id number]           Patient Information     Date Of Birth          1940        Visit Information        Provider Department      3/15/2018 1:00 PM Brown Mike MD [facility] [place]        Today's Diagnoses     Sleep disturbance    -  1    History of stroke          Care Instructions    MY TREATMENT INFORMATION FOR SLEEP DISTURBANCE-  Pete Sheldon    DOCTOR : Brown Mike MD  SLEEP CENTER :  [place]  MY CONTACT NUMBER:[phone]        If I haven't had a sleep study yet, what can I expect?  A personal story from [employer]  https://www.[employer].com/watch?v=AxPLmlRpnCs    Suspected sleep apnea: Sleep study ordered.    Follow up in sleep clinic 1-2 weeks after sleep study to discuss results of sleep study and treatment options.    Patient was advised not to drive if drowsy or sleepy.    Frequently asked questions:  1. What is Obstructive Sleep Apnea (DIANE)? DIANE is the most common type of sleep apnea. Apnea literally means, \"without breath.\" It is characterized by repetitive pauses in breathing, despite continued effort to breathe, and is usually associated with a reduction in blood oxygen saturation. Apneas can last 10 to over 60 seconds. It is caused by narrowing or collapse of the upper airway as muscles relax during sleep. Severity of sleep apnea is determined by frequency of breathing events and their effect on your sleep and oxygen levels determined during sleep testing.   2. What are the consequences of DIANE? Symptoms include: daytime sleepiness- possibly increasing the risk of falling asleep while driving, unrefreshing/restless sleep, snoring, insomnia, waking frequently to urinate, waking with heartburn or reflux, reduced concentration and memory, and morning headaches. Other health consequences may include development of high blood pressure and other cardiovascular disease in persons who are " susceptible. Untreated DIANE  can contribute to heart disease, stroke and diabetes.   3. What are the treatment options? In most situations, sleep apnea is a lifelong disease that must be managed with daily therapy. Medications are not effective for sleep apnea and surgery is generally not performed until other therapies have been tried. Therapy is usually tailored to the individual patient based on many factors including your wishes as well as severity of sleep apnea and severity of obesity. Continuous Positive Airway (CPAP) is the most reliable treatment. An oral device to hold your jaw forward is usually the next most reliable option. Other options include postioning devices (to keep you off your back), weight loss, and surgery including a tongue pacing device. There is more detail about some of these options below.    Central sleep apnea is a disorder in which your breathing repeatedly stops and starts during sleep.  Central sleep apnea occurs because your brain doesn't send proper signals to the muscles that control your breathing. This condition is different from obstructive sleep apnea, in which you can't breathe normally because of upper airway obstruction. Central sleep apnea is less common than obstructive sleep apnea.  Central sleep apnea may occur as a result of other conditions, such as heart failure and stroke. Sleeping at a high altitude and pain medications also may cause central sleep apnea.        1. CPAP-  WHAT DOES IT DO AND HOW CAN I LEARN TO WEAR IT?                               BEFORE I START, CAN I WATCH A MOVIE TO GET A PLAN ON HOW TO USE CPAP?  https://www.[employer].com/watch?l=y7K11kc930C      Continuous positive airway pressure, or CPAP, is the most effective treatment for obstructive sleep apnea. It works by blowing room air, through a mask, to hold your throat open. A decision to use CPAP is a major step forward in the pursuit of a healthier life. The successful use of CPAP will help you  "breathe easier, sleep better and live healthier. You can choose CPAP equipment from any durable medical equipment provider that meets your needs.  Using CPAP can be a positive experience if you keep these torrez points in mind:  1. Commitment  CPAP is not a quick fix for your problem. It involves a long-term commitment to improve your sleep and your health.    2. Communication  Stay in close communication with both your sleep doctor and your CPAP supplier. Ask lots of questions and seek help when you need it.    3. Consistency  Use CPAP all night, every night and for every nap. You will receive the maximum health benefits from CPAP when you use it every time that you sleep. This will also make it easier for your body to adjust to the treatment.    4. Correction  The first machine and mask that you try may not be the best ones for you. Work with your sleep doctor and your CPAP supplier to make corrections to your equipment selection. Ask about trying a different type of machine or mask if you have ongoing problems. Make sure that your mask is a good fit and learn to use your equipment properly.    5. Challenge  Tell a family member or close friend to ask you each morning if you used your CPAP the previous night. Have someone to challenge you to give it your best effort.    6. Connection   Your adjustment to CPAP will be easier if you are able to connect with others who use the same treatment. Ask your sleep doctor if there is a support group in your area for people who have sleep apnea, or look for one on the Internet.  7. Comfort   Increase your level of comfort by using a saline spray, decongestant or heated humidifier if CPAP irritates your nose, mouth or throat. Use your unit's \"ramp\" setting to slowly get used to the air pressure level. There may be soft pads you can buy that will fit over your mask straps. Look on www.CPAP.com for accessories that can help make CPAP use more comfortable.  8. Cleaning   Clean your " mask, tubing and headgear on a regular basis. Put this time in your schedule so that you don't forget to do it. Check and replace the filters for your CPAP unit and humidifier.    9. Completion   Although you are never finished with CPAP therapy, you should reward yourself by celebrating the completion of your first month of treatment. Expect this first month to be your hardest period of adjustment. It will involve some trial and error as you find the machine, mask and pressure settings that are right for you.    10. Continuation  After your first month of treatment, continue to make a daily commitment to use your CPAP all night, every night and for every nap.    CPAP-Tips to starting with success:  Begin using your CPAP for short periods of time during the day while you watch TV or read.    Use CPAP every night and for every nap. Using it less often reduces the health benefits and makes it harder for your body to get used to it.    Make small adjustments to your mask, tubing, straps and headgear until you get the right fit. Tightening the mask may actually worsen the leak.  If it leaves significant marks on your face or irritates the bridge of your nose, it may not be the best mask for you.  Speak with the person who supplied the mask and consider trying other masks. Insurances will allow you to try different masks during the first month of starting CPAP.  Insurance also covers a new mask, hose and filter about every 6 months.    Use a saline nasal spray to ease mild nasal congestion. Neti-Pot or saline nasal rinses may also help. Nasal gel sprays can help reduce nasal dryness.  Biotene mouthwash can be helpful to protect your teeth if you experience frequent dry mouth.  Dry mouth may be a sign of air escaping out of your mouth or out of the mask in the case of a full face mask.  Speak with your provider if you expect that is the case.     Take a nasal decongestant to relieve more severe nasal or sinus congestion.   Do not use Afrin (oxymetazoline) nasal spray more than 3 days in a row.  Speak with your sleep doctor if your nasal congestion is chronic.    Use a heated humidifier that fits your CPAP model to enhance your breathing comfort. Adjust the heat setting up if you get a dry nose or throat, down if you get condensation in the hose or mask.  Position the CPAP lower than you so that any condensation in the hose drains back into the machine rather than towards the mask.    Try a system that uses nasal pillows if traditional masks give you problems.    Clean your mask, tubing and headgear once a week. Make sure the equipment dries fully.    Regularly check and replace the filters for your CPAP unit and humidifier.    Work closely with your sleep provider and your CPAP supplier to make sure that you have the machine, mask and air pressure setting that works best for you. It is better to stop using it and call your provider to solve problems than to lay awake all night frustrated with the device.    BESIDES CPAP, WHAT OTHER THERAPIES ARE THERE?      Positioning Device  Positioning devices are generally used when sleep apnea is mild and only occurs on your back.This example shows a pillow that straps around the waist. It may be appropriate for those whose sleep study shows milder sleep apnea that occurs primarily when lying flat on one's back. Preliminary studies have shown benefit but effectiveness at home may need to be verified by a home sleep test. These devices are generally not covered by medical insurance.                      Oral Appliance  What is oral appliance therapy?  An oral appliance is a small acrylic device that fits over the upper and lower teeth or tongue (similar to an orthodontic retainer or a mouth guard). This device slightly advances the lower jaw or tongue, which moves the base of the tongue forward, opens the airway, improves breathing and can effectively treat snoring and obstructive sleep apnea  sleep apnea. The appliance is fabricated and customized by a qualified dentist with experience in treating snoring and sleep apnea. Oral appliances are usually well tolerated and have relatively high compliance by patients1, 2, 3.  When is an oral appliance indicated?  Oral appliance therapy is recommended as a first-line treatment for patients with primary snoring, mild sleep apnea, and for patients with moderate sleep apnea who prefer appliance therapy to use of CPAP4, 5. Severity of sleep apnea is determined by sleep testing and is based on the number of respiratory events per hour of sleep.   How successful is oral appliance therapy?  The success rate of oral appliance therapy in patients with mild sleep apnea is 75-80% while in patients with moderate sleep apnea it is 50-70%. The chance of success in patients with severe sleep apnea is 40-50%. The research also shows that oral appliances have a beneficial effect on the cardiovascular health of DIANE patients at the same magnitude as CPAP therapy7.  Oral appliances should be a second-line treatment in cases of severe sleep apnea, but if not completely successful then a combination therapy utilizing CPAP plus oral appliance therapy may be effective. Oral appliances tend to be effective in a broad range of patients although studies show that the patients who have the highest success are females, younger patients, those with milder disease, and less severe obesity. 3, 6.   The chances of success are lower in patients who have more severe DIANE, are older, and those who are morbidly obese.     Example of an oral appliance   Finding a dentist that practices dental sleep medicine  Specific training is available through the American Academy of Dental Sleep Medicine for dentists interested in working in the field of sleep. To find a dentist who is educated in the field of sleep and the use of oral appliances, near you, visit the Web site of the American Academy of Dental  Sleep Medicine; also see   http://www.accpstorage.org/newOrganization/patients/oralAppliances.pdf  To search for a dentist certified in these practices:  Http://aadsm.org/FindADentist.aspx?1  1. Jose et al. Objectively measured vs self-reported compliance during oral appliance therapy for sleep-disordered breathing. Chest 2013; 144(5): 2230-2274.  2. Ulices, et al. Objective measurement of compliance during oral appliance therapy for sleep-disordered breathing. Thorax 2013; 68(1): 91-96.  3. Zahra et al. Mandibular advancement devices in 620 men and women with DIANE and snoring: tolerability and predictors of treatment success. Chest 2004; 125: 3807-2690.  4. Aniyah et al. Oral appliances for snoring and DIANE: a review. Sleep 2006; 29: 244-262.  5. Mercedez, et al. Oral appliance treatment for DIANE: an update. J Clin Sleep Med 2014; 10(2): 215-227.  6. Lexii et al. Predictors of OSAH treatment outcome. J Dent Res 2007; 86: 7798-6755.    Nasal Valves                 Nasal valves may not be effective if you have frequent nasal congestion or have difficulty breathing through your nose. They may be an option for mild apnea if other options are not well tolerated. The efficacy of these devices is generally less than CPAP or oral appliances.      Weight Loss:    Weight management is a personal decision.  If you are interested in exploring weight loss strategies, the following discussion covers the impact on weight loss on sleep apnea and the approaches that may be successful.    Weight loss decreases severity of sleep apnea in most people with obesity. For those with mild obesity who have developed snoring with weight gain, even 15-30 pound weight loss can improve and occasionally eliminate sleep apnea.  Structured and life-long dietary and health habits are necessary to lose weight and keep healthier weight levels.     Though there may be significant health benefits from weight loss, long-term weight  loss is very difficult to achieve- studies show success with dietary management in less than 10% of people. In addition, substantial weight loss may require years of dietary control and may be difficult if patients have severe obesity. In these cases, surgical management may be considered.  Finally, older individuals who have tolerated obesity without health complications may be less likely to benefit from weight loss strategies.    Your BMI is Body mass index is 25.82 kg/(m^2).  Body mass index (BMI) is one way to tell whether you are at a healthy weight, overweight, or obese. It measures your weight in relation to your height.  A BMI of 18.5 to 24.9 is in the healthy range. A person with a BMI of 25 to 29.9 is considered overweight, and someone with a BMI of 30 or greater is considered obese. More than two-thirds of American adults are considered overweight or obese.  Being overweight or obese increases the risk for further weight gain. Excess weight may lead to heart disease and diabetes.  Creating and following plans for healthy eating and physical activity may help you improve your health.  Weight control is part of healthy lifestyle and includes exercise, emotional health, and healthy eating habits. Careful eating habits lifelong are the mainstay of weight control. Though there are significant health benefits from weight loss, long-term weight loss with diet alone may be very difficult to achieve- studies show long-term success with dietary management in less than 10% of people. Attaining a healthy weight may be especially difficult to achieve in those with severe obesity. In some cases, medications, devices and surgical management might be considered.  What can you do?  If you are overweight or obese and are interested in methods for weight loss, you should discuss this with your provider.     Consider reducing daily calorie intake by 500 calories.     Keep a food journal.     Avoiding skipping meals,  consider cutting portions instead.    Diet combined with exercise helps maintain muscle while optimizing fat loss. Strength training is particularly important for building and maintaining muscle mass. Exercise helps reduce stress, increase energy, and improves fitness. Increasing exercise without diet control, however, may not burn enough calories to loose weight.       Start walking three days a week 10-20 minutes at a time    Work towards walking thirty minutes five days a week     Eventually, increase the speed of your walking for 1-2 minutes at time    In addition, we recommend that you review healthy lifestyles and methods for weight loss available through the National Institutes of Health patient information sites:  http://win.niddk.nih.gov/publications/index.htm    And look into health and wellness programs that may be available through your health insurance provider, employer, local community center, or bhavin club.    Weight management plan: Patient was referred to their PCP to discuss a diet and exercise plan.    Surgery:    Upper Airway Surgery for DIANE  Surgery for DIANE is a second-line treatment option in the management of sleep apnea.  Surgery should be considered for patients who are having a difficult time tolerating CPAP.    Surgery for DIANE is directed at areas that are responsible for narrowing or complete obstruction of the airway during sleep.  There are a wide range of procedures available to enlarge and/or stabilize the airway to prevent blockage of breathing in the three major areas where it can occur: the palate, tongue, and nasal regions.  Successful surgical treatment depends on the accurate identification of the factors responsible for obstructive sleep apnea in each person.  A personalized approach is required because there is no single treatment that works well for everyone.  Because of anatomic variation, consultation with an examination by a sleep surgeon is a critical first step in  determining what surgical options are best for each patient.  In some cases, examination during sedation may be recommended in order to guide the selection of procedures.  Patients will be counseled about risks and benefits as well as the typical recovery course after surgery. Surgery is typically not a cure for a person s DIANE.  However, surgery will often significantly improve one s DIANE severity (termed  success rate ).  Even in the absence of a cure, surgery will decrease the cardiovascular risk associated with OSA7; improve overall quality of life8 (sleepiness, functionality, sleep quality, etc).          Palate Procedures:  Patients with DIANE often have narrowing of their airway in the region of their tonsils and uvula.  The goals of palate procedures are to widen the airway in this region as well as to help the tissues resist collapse.  Modern palate procedure techniques focus on tissue conservation and soft tissue rearrangement, rather than tissue removal.  Often the uvula is preserved in this procedure. Residual sleep apnea is common in patient after pharyngoplasty with an average reduction in sleep apnea events of 33%2.      Tongue Procedures:  While patients are awake, the muscles that surround the throat are active and keep this region open for breathing. These muscles relax during sleep, allowing the tongue and other structures to collapse and block breathing.  There are several different tongue procedures available.  Selection of a tongue base procedure depends on characteristics seen on physical exam.  Generally, procedures are aimed at removing bulky tissues in this area or preventing the back of the tongue from falling back during sleep.  Success rates for tongue surgery range from 50-62%3.    Hypoglossal Nerve Stimulation:  Hypoglossal nerve stimulation has recently received approval from the [place] Food and Drug Administration for the treatment of obstructive sleep apnea.  This is based on  research showing that the system was safe and effective in treating sleep apnea6.  Results showed that the median AHI score decreased 68%, from 29.3 to 9.0. This therapy uses an implant system that senses breathing patterns and delivers mild stimulation to airway muscles, which keeps the airway open during sleep.  The system consists of three fully implanted components: a small generator (similar in size to a pacemaker), a breathing sensor, and a stimulation lead.  Using a small handheld remote, a patient turns the therapy on before bed and off upon awakening.    Candidates for this device must be greater than 22 years of age, have moderate to severe DIANE (AHI between 20-65), BMI less than 32, have tried CPAP/oral appliance without success, and have appropriate upper airway anatomy (determined by a sleep endoscopy performed by Dr. Torres).    Hypoglossal Nerve Stimulation Pathway:    The sleep surgeon s office will work with the patient through the insurance prior-authorization process (including communications and appeals).    Nasal Procedures:  Nasal obstruction can interfere with nasal breathing during the day and night.  Studies have shown that relief of nasal obstruction can improve the ability of some patients to tolerate positive airway pressure therapy for obstructive sleep apnea1.  Treatment options include medications such as nasal saline, topical corticosteroid and antihistamine sprays, and oral medications such as antihistamines or decongestants. Non-surgical treatments can include external nasal dilators for selected patients. If these are not successful by themselves, surgery can improve the nasal airway either alone or in combination with these other options.      Combination Procedures:  Combination of surgical procedures and other treatments may be recommended, particularly if patients have more than one area of narrowing or persistent positional disease.  The success rate of combination surgery ranges  from 66-80%2,3.      1. Deirdre OSBORN. The Role of the Nose in Snoring and Obstructive Sleep Apnoea: An Update.  Eur Arch Otorhinolaryngol. 2011; 268: 1365-73.  2.  Deysi SM; Maciej JA; Jaja JR; Pallanch JF; Jorden MB; Sandi SG; Eugenie ARTEAGA. Surgical modifications of the upper airway for obstructive sleep apnea in adults: a systematic review and meta-analysis. SLEEP 2010;33(10):3095-1563. Sraah ZUÑIGA. Hypopharyngeal surgery in obstructive sleep apnea: an evidence-based medicine review.  Arch Otolaryngol Head Neck Surg. 2006 Feb;132(2):206-13.  3. Jose Roberto YH1, Lori Y, Gabino PEYMAN. The efficacy of anatomically based multilevel surgery for obstructive sleep apnea. Otolaryngol Head Neck Surg. 2003 Oct;129(4):327-35.  4. Sarah ZUÑIGA, Goldberg A. Hypopharyngeal Surgery in Obstructive Sleep Apnea: An Evidence-Based Medicine Review. Arch Otolaryngol Head Neck Surg. 2006 Feb;132(2):206-13.  5. Miguel Angel PJ et al. Upper-Airway Stimulation for Obstructive Sleep Apnea.  N Engl J Med. 2014 Jan 9;370(2):139-49.  6. Dario Y et al. Increased Incidence of Cardiovascular Disease in Middle-aged Men with Obstructive Sleep Apnea. Am J Respir Crit Care Med; 2002 166: 159-165  7. Vasquez EM et al. Studying Life Effects and Effectiveness of Palatopharyngoplasty (SLEEP) study: Subjective Outcomes of Isolated Uvulopalatopharyngoplasty. Otolaryngol Head Neck Surg. 2011; 144: 623-631.  8.   Your blood pressure was checked while you were in clinic today.  Please read the guidelines below about what these numbers mean and what you should do about them.  Your systolic blood pressure is the top number.  This is the pressure when the heart is pumping.  Your diastolic blood pressure is the bottom number.  This is the pressure in between beats.  If your systolic blood pressure is less than 120 and your diastolic blood pressure is less than 80, then your blood pressure is normal. There is nothing more that you need to do about it  If your systolic blood  pressure is 120-139 or your diastolic blood pressure is 80-89, your blood pressure may be higher than it should be.  You should have your blood pressure re-checked within a year by a primary care provider.  If your systolic blood pressure is 140 or greater or your diastolic blood pressure is 90 or greater, you may have high blood pressure.  High blood pressure is treatable, but if left untreated over time it can put you at risk for heart attack, stroke, or kidney failure.  You should have your blood pressure re-checked by a primary care provider within the next four weeks.    Good Sleep hygiene tips (American Academy of Sleep Medicine):  Maintain a regular sleep-wake routine    Go to bed at the same time. Wake up at the same time. Ideally, your schedule will remain the same (+/- 20 minutes) every night of the week.  Avoid naps if possible    Naps decrease the  Sleep Debt  that is so necessary for easy sleep onset.    Each of us needs a certain amount of sleep per 24-hour period. We need that amount, and we don t need more than that.    When we take naps, it decreases the amount of sleep that we need the next night - which may cause sleep fragmentation and difficulty initiating sleep, and may lead to insomnia.  Don t stay in bed awake for more than 15-20 minutes (Stimulus control)    If you find your mind racing, or worrying about not being able to sleep during the middle of the night, get out of bed, and sit in a chair in the dark. Do your mind racing in the chair until you are sleepy, then return to bed. No TV, or phone/Ipad, or computer or internet or eat during these periods! That will just stimulate you more than desired.    If this happens several times during the night, that is OK. Just maintain your regular wake time, and try to avoid naps.  Don t watch TV, phone, computer, video games or read in bed or eat in bed.    When you watch TV or read in bed or eat in bed, you associate the bed with  wakefulness.    The bed is reserved for two things - sleep and hanky panky.  Drink caffeinated drinks with caution    The effects of caffeine may last for several hours after ingestion. Caffeine can fragment sleep, and cause difficulty initiating sleep. If you drink caffeine, use it only before noon.    Remember that soda and tea contain caffeine as well.  Avoid inappropriate substances that interfere with sleep    Cigarettes, alcohol, and over-the-counter medications may cause fragmented sleep.  Exercise regularly    Exercise promotes continuous sleep.    Rigorous exercise (close to bedtime cause) circulates endorphins into the body which may cause difficulty initiating sleep.   Have a quiet, comfortable bedroom    Set your bedroom thermostat at a comfortable temperature. Generally, a little cooler is better than a little warmer.    Turn off the TV and other extraneous noise that may disrupt sleep. Background  white noise  like a fan is OK.    If your pets awaken you, keep them outside the bedroom.    Your bedroom should be dark. Turn off bright lights.    Have a comfortable mattress.  If you are a  clock watcher  at night, hide the clock.      Have a comfortable pre-bedtime routine    A warm bath, shower    Meditation, or quiet time    Wind-down 20 minutes prior of bedtime.            Follow-ups after your visit        Your next 10 appointments already scheduled     Mar 23, 2018  2:20 PM CDT   CT ABDOMEN PELVIS W/O CONTRAST with UCCT1   [facility] ([facility])    [address] [address] [phone]           Please bring any scans or X-rays taken at other hospitals, if similar tests were done. Also bring a list of your medicines, including vitamins, minerals and over-the-counter drugs. It is safest to leave personal items at home.  Be sure to tell your doctor:   If you have any allergies.   If there s any chance you are pregnant.   If  you are breastfeeding.  You may have contrast for this exam. To prepare:   Do not eat or drink for 2 hours before your exam. If you need to take medicine, you may take it with small sips of water. (We may ask you to take liquid medicine as well.)   The day before your exam, drink extra fluids at least six 8-ounce glasses (unless your doctor tells you to restrict your fluids).   You will be given instructions on how to drink the contrast.  Patients over 70 or patients with diabetes or kidney problems:   If you haven t had a blood test (creatinine test) within the last 30 days, the Cardiologist/Radiologist may require you to get this test prior to your exam.  If you have diabetes:   Continue to take your metformin medication on the day of your exam  Please wear loose clothing, such as a sweat suit or jogging clothes. Avoid snaps, zippers and other metal. We may ask you to undress and put on a hospital gown.  If you have any questions, please call the Imaging Department where you will have your exam.            Mar 23, 2018  3:00 PM CDT   (Arrive by 2:45 PM)   New Renal Calculi with Jasmyne Elliott MD   [facility] ([facility])    [address] [place] [address]  4th [facility] [address] [phone]            Apr 10, 2018  2:40 PM CDT   (Arrive by 2:25 PM)   New Patient Visit with Nataliya Schneider MD   [facility] ([facility])    [address] [place] [address] [phone]            Jun 06, 2018  2:00 PM CDT   LAB with [facility]   [facility] ([facility]    [address]  [facility] [id number] [phone]           Please do not eat 10-12 hours before your appointment if you are coming in fasting for labs on lipids, cholesterol, or glucose (sugar). This does not apply to pregnant women.  "Water, hot tea and black coffee (with nothing added) are okay. Do not drink other fluids, diet soda or chew gum.            Jun 06, 2018  2:30 PM CDT   [facility]   [facility]    [address]  [facility] [address] [phone]            Jun 26, 2018 10:00 AM CDT   (Arrive by 9:45 AM)   New Stroke with Claudio Castanon MD   [facility] ([facility])    [address] [facility]  3rd Floor  [facility] [address] [phone]              Future tests that were ordered for you today     Open Future Orders        Priority Expected Expires Ordered    Comprehensive Sleep Study Routine  9/11/2018 3/15/2018            Who to contact     If you have questions or need follow up information about today's clinic visit or your schedule please contact [facility] directly at [phone].  Normal or non-critical lab and imaging results will be communicated to you by MyChart, letter or phone within 4 business days after the clinic has received the results. If you do not hear from us within 7 days, please contact the clinic through [employer] or phone. If you have a critical or abnormal lab result, we will notify you by phone as soon as possible.  Submit refill requests through [employer] or call your pharmacy and they will forward the refill request to us. Please allow 3 business days for your refill to be completed.          Additional Information About Your Visit        [employer] Information     [employer] lets you send messages to your doctor, view your test results, renew your prescriptions, schedule appointments and more. To sign up, go to [email or web address] . Click on \"Log in\" on the left side of the screen, which will take you to the Welcome page. Then click on \"Sign up Now\" on the right side of the page.     You will be asked to enter the access code listed below, " "as well as some personal information. Please follow the directions to create your username and password.     Your access code is: [email or web address]  Expires: 2018  3:53 PM     Your access code will  in 90 days. If you need help or a new code, please call your [place] clinic or [phone].        Care EveryWhere ID     This is your Care EveryWhere ID. This could be used by other organizations to access your [place] medical records  [id number]        Your Vitals Were     Pulse Respirations Height Pulse Oximetry BMI (Body Mass Index)       80 18 1.676 m (5' 6\") 94% 25.82 kg/m2        Blood Pressure from Last 3 Encounters:   03/15/18 109/63   18 110/62   18 155/77    Weight from Last 3 Encounters:   03/15/18 72.6 kg (160 lb)   18 72.6 kg (160 lb)   18 72.5 kg (159 lb 14.4 oz)              We Performed the Following     SLEEP EVALUATION & MANAGEMENT REFERRAL - ADULT -[place] [facility] [phone]  (Age 18 and up)          Today's Medication Changes          These changes are accurate as of 3/15/18  1:45 PM.  If you have any questions, ask your nurse or doctor.               Start taking these medicines.        Dose/Directions    zolpidem 10 MG tablet   Commonly known as:  AMBIEN   Used for:  Sleep disturbance        Take tablet by mouth 15 minutes prior to sleep, for Sleep Study   Quantity:  1 tablet   Refills:  0            Where to get your medicines      Some of these will need a paper prescription and others can be bought over the counter.  Ask your nurse if you have questions.     Bring a paper prescription for each of these medications     zolpidem 10 MG tablet                Primary Care Provider Office Phone # Fax #    Rom Helm [phone]       [address] [facility] [address]  [facility] [address]        Goals        Diet    Increase water intake     Notes - Note edited  2014 11:42 AM by Patricia Khan RN    Have 5-6 glasses (8oz) " of fluid a day  Per f/u with spouse today, pt not doing well. Not eating and drinking. Called clinic and advised to take pt to clinic.         Increase water intake        General    Medication 1 (pt-stated)     Notes - Note created  5/15/2014  1:57 PM by Patricia Khan, JULIA    Pt will have an MTM consult         Equal Access to Services     CAR BANDA : Hadii aad ku hadasho Soomaali, waaxda luqadaha, qaybta kaalmada ademandeepyada, kadeem kahn . So [facility] [phone].    ATENCIÓN: Si habla español, tiene a moise disposición servicios gratuitos de asistencia lingüística. Llame al [phone].    We comply with applicable federal civil rights laws and [place] laws. We do not discriminate on the basis of race, color, national origin, age, disability, sex, sexual orientation, or gender identity.            Thank you!     Thank you for choosing [facility]  for your care. Our goal is always to provide you with excellent care. Hearing back from our patients is one way we can continue to improve our services. Please take a few minutes to complete the written survey that you may receive in the mail after your visit with us. Thank you!             Your Updated Medication List - Protect others around you: Learn how to safely use, store and throw away your medicines at www.disposemymeds.org.          This list is accurate as of 3/15/18  1:45 PM.  Always use your most recent med list.                   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    Type 2 diabetes mellitus with diabetic polyneuropathy, with long-term current use of insulin (H)       * blood glucose monitoring test strip    ONETOUCH VERIO IQ    200 strip    Use to test blood sugars 4 times daily or as directed using One Touch Verio Flex.    Type 2 diabetes mellitus with diabetic polyneuropathy, with long-term current use of insulin (H)       Glucosamine HCl 750 MG Tabs      Take 750 mg by mouth 2 times daily    Arthritis       GLUCOSE MANAGEMENT Tabs     100 tablet    4 tabs po for low blood sugar below 70, may repeat q 10-15 minutes as needed    Type 2 diabetes mellitus with diabetic polyneuropathy, with long-term current use of insulin (H)       insulin aspart 100 UNIT/ML injection    NovoLOG PEN    30 mL    Inject 7 units w/ breakfast, 5 units w/ lunch, 7 units w/ dinner. Hold if not eating meal.    Type 2 diabetes mellitus with diabetic polyneuropathy, with long-term current use of insulin (H)       insulin glargine 100 UNIT/ML injection    LANTUS    9 mL    Inject 30 Units Subcutaneous every morning    Type 2 diabetes mellitus with diabetic polyneuropathy, with long-term current use of insulin (H)       insulin pen needle 31G X 8 MM     300 each    B-D UF III short pen needles, use 4 times a day to inject insulin.    Type 2 diabetes, HbA1C goal < 8% (H)       insulin syringes (disposable) U-100 0.3 ML Misc     100 each    1 each 2 times daily    Type II or unspecified type diabetes mellitus without mention of complication, not stated as uncontrolled       LIFESCAN FINEPOINT LANCETS Misc     200 strip    Use to test  "blood sugars 4 times daily or as directed.    Type 2 diabetes, HbA1C goal < 8% (H)       lisinopril 2.5 MG tablet    PRINIVIL/Zestril    30 tablet    Take 1 tablet (2.5 mg) by mouth daily    Hypertension goal BP (blood pressure) < 140/90       loratadine 10 MG tablet    AF LORATADINE    14 tablet    Take 1 tablet (10 mg) by mouth daily    Hypertension goal BP (blood pressure) < 140/90       metFORMIN 500 MG tablet    GLUCOPHAGE    30 tablet    Take 1 tablet (500 mg) by mouth daily (with breakfast)    Type 2 diabetes mellitus with diabetic polyneuropathy, with long-term current use of insulin (H)       multivitamin Tabs tablet      Take 1 tablet by mouth daily FOCUS SELECT PREMIUM WITH LUTEIN per eye doctor Reported on 5/19/2017        order for DME     3 Month    All DM testing supplies including test strips, lancets, solution, syringes, needles and/or pen needles for testing 4 times per day.  Brand \"Contour Next\"    Type 2 diabetes mellitus with diabetic polyneuropathy (H)       penicillin V potassium 500 MG tablet    VEETID    90 tablet    Take 1 tablet (500 mg) by mouth 3 times daily    Urinary tract infection without hematuria, site unspecified       * tamsulosin 0.4 MG capsule    FLOMAX    30 capsule    Take 1 capsule (0.4 mg) by mouth every evening    Calculus of kidney       * tamsulosin 0.4 MG capsule    FLOMAX    90 capsule    TAKE ONE CAPSULE BY MOUTH ONCE DAILY    Hyperlipidemia LDL goal <100, Calculus of kidney       zolpidem 10 MG tablet    AMBIEN    1 tablet    Take tablet by mouth 15 minutes prior to sleep, for Sleep Study    Sleep disturbance       * Notice:  This list has 4 medication(s) that are the same as other medications prescribed for you. Read the directions carefully, and ask your doctor or other care provider to review them with you.      "

## 2018-03-15 NOTE — PATIENT INSTRUCTIONS
"MY TREATMENT INFORMATION FOR SLEEP DISTURBANCE-  Pete Sheldon    DOCTOR : Brown Mike MD  SLEEP CENTER :  [place]  MY CONTACT NUMBER:[phone]        If I haven't had a sleep study yet, what can I expect?  A personal story from Jean Claude  [email or web address]/[email or web address]?v=[email or web address]    Suspected sleep apnea: Sleep study ordered.    Follow up in sleep clinic 1-2 weeks after sleep study to discuss results of sleep study and treatment options.    Patient was advised not to drive if drowsy or sleepy.    Frequently asked questions:  1. What is Obstructive Sleep Apnea (DAINE)? DIANE is the most common type of sleep apnea. Apnea literally means, \"without breath.\" It is characterized by repetitive pauses in breathing, despite continued effort to breathe, and is usually associated with a reduction in blood oxygen saturation. Apneas can last 10 to over 60 seconds. It is caused by narrowing or collapse of the upper airway as muscles relax during sleep. Severity of sleep apnea is determined by frequency of breathing events and their effect on your sleep and oxygen levels determined during sleep testing.   2. What are the consequences of DIANE? Symptoms include: daytime sleepiness- possibly increasing the risk of falling asleep while driving, unrefreshing/restless sleep, snoring, insomnia, waking frequently to urinate, waking with heartburn or reflux, reduced concentration and memory, and morning headaches. Other health consequences may include development of high blood pressure and other cardiovascular disease in persons who are susceptible. Untreated DIANE  can contribute to heart disease, stroke and diabetes.   3. What are the treatment options? In most situations, sleep apnea is a lifelong disease that must be managed with daily therapy. Medications are not effective for sleep apnea and surgery is generally not performed until other therapies have been tried. Therapy is usually tailored to the individual patient based on " many factors including your wishes as well as severity of sleep apnea and severity of obesity. Continuous Positive Airway (CPAP) is the most reliable treatment. An oral device to hold your jaw forward is usually the next most reliable option. Other options include postioning devices (to keep you off your back), weight loss, and surgery including a tongue pacing device. There is more detail about some of these options below.    Central sleep apnea is a disorder in which your breathing repeatedly stops and starts during sleep.  Central sleep apnea occurs because your brain doesn't send proper signals to the muscles that control your breathing. This condition is different from obstructive sleep apnea, in which you can't breathe normally because of upper airway obstruction. Central sleep apnea is less common than obstructive sleep apnea.  Central sleep apnea may occur as a result of other conditions, such as heart failure and stroke. Sleeping at a high altitude and pain medications also may cause central sleep apnea.        1. CPAP-  WHAT DOES IT DO AND HOW CAN I LEARN TO WEAR IT?                               BEFORE I START, CAN I WATCH A MOVIE TO GET A PLAN ON HOW TO USE CPAP?  [email or web address]/watch?t=u6D32de392V      Continuous positive airway pressure, or CPAP, is the most effective treatment for obstructive sleep apnea. It works by blowing room air, through a mask, to hold your throat open. A decision to use CPAP is a major step forward in the pursuit of a healthier life. The successful use of CPAP will help you breathe easier, sleep better and live healthier. You can choose CPAP equipment from any durable medical equipment provider that meets your needs.  Using CPAP can be a positive experience if you keep these torrez points in mind:  1. Commitment  CPAP is not a quick fix for your problem. It involves a long-term commitment to improve your sleep and your health.    2. Communication  Stay in close  "communication with both your sleep doctor and your CPAP supplier. Ask lots of questions and seek help when you need it.    3. Consistency  Use CPAP all night, every night and for every nap. You will receive the maximum health benefits from CPAP when you use it every time that you sleep. This will also make it easier for your body to adjust to the treatment.    4. Correction  The first machine and mask that you try may not be the best ones for you. Work with your sleep doctor and your CPAP supplier to make corrections to your equipment selection. Ask about trying a different type of machine or mask if you have ongoing problems. Make sure that your mask is a good fit and learn to use your equipment properly.    5. Challenge  Tell a family member or close friend to ask you each morning if you used your CPAP the previous night. Have someone to challenge you to give it your best effort.    6. Connection   Your adjustment to CPAP will be easier if you are able to connect with others who use the same treatment. Ask your sleep doctor if there is a support group in your area for people who have sleep apnea, or look for one on the Internet.  7. Comfort   Increase your level of comfort by using a saline spray, decongestant or heated humidifier if CPAP irritates your nose, mouth or throat. Use your unit's \"ramp\" setting to slowly get used to the air pressure level. There may be soft pads you can buy that will fit over your mask straps. Look on www.CPAP.com for accessories that can help make CPAP use more comfortable.  8. Cleaning   Clean your mask, tubing and headgear on a regular basis. Put this time in your schedule so that you don't forget to do it. Check and replace the filters for your CPAP unit and humidifier.    9. Completion   Although you are never finished with CPAP therapy, you should reward yourself by celebrating the completion of your first month of treatment. Expect this first month to be your hardest period of " adjustment. It will involve some trial and error as you find the machine, mask and pressure settings that are right for you.    10. Continuation  After your first month of treatment, continue to make a daily commitment to use your CPAP all night, every night and for every nap.    CPAP-Tips to starting with success:  Begin using your CPAP for short periods of time during the day while you watch TV or read.    Use CPAP every night and for every nap. Using it less often reduces the health benefits and makes it harder for your body to get used to it.    Make small adjustments to your mask, tubing, straps and headgear until you get the right fit. Tightening the mask may actually worsen the leak.  If it leaves significant marks on your face or irritates the bridge of your nose, it may not be the best mask for you.  Speak with the person who supplied the mask and consider trying other masks. Insurances will allow you to try different masks during the first month of starting CPAP.  Insurance also covers a new mask, hose and filter about every 6 months.    Use a saline nasal spray to ease mild nasal congestion. Neti-Pot or saline nasal rinses may also help. Nasal gel sprays can help reduce nasal dryness.  Biotene mouthwash can be helpful to protect your teeth if you experience frequent dry mouth.  Dry mouth may be a sign of air escaping out of your mouth or out of the mask in the case of a full face mask.  Speak with your provider if you expect that is the case.     Take a nasal decongestant to relieve more severe nasal or sinus congestion.  Do not use Afrin (oxymetazoline) nasal spray more than 3 days in a row.  Speak with your sleep doctor if your nasal congestion is chronic.    Use a heated humidifier that fits your CPAP model to enhance your breathing comfort. Adjust the heat setting up if you get a dry nose or throat, down if you get condensation in the hose or mask.  Position the CPAP lower than you so that any  condensation in the hose drains back into the machine rather than towards the mask.    Try a system that uses nasal pillows if traditional masks give you problems.    Clean your mask, tubing and headgear once a week. Make sure the equipment dries fully.    Regularly check and replace the filters for your CPAP unit and humidifier.    Work closely with your sleep provider and your CPAP supplier to make sure that you have the machine, mask and air pressure setting that works best for you. It is better to stop using it and call your provider to solve problems than to lay awake all night frustrated with the device.    BESIDES CPAP, WHAT OTHER THERAPIES ARE THERE?      Positioning Device  Positioning devices are generally used when sleep apnea is mild and only occurs on your back.This example shows a pillow that straps around the waist. It may be appropriate for those whose sleep study shows milder sleep apnea that occurs primarily when lying flat on one's back. Preliminary studies have shown benefit but effectiveness at home may need to be verified by a home sleep test. These devices are generally not covered by medical insurance.                      Oral Appliance  What is oral appliance therapy?  An oral appliance is a small acrylic device that fits over the upper and lower teeth or tongue (similar to an orthodontic retainer or a mouth guard). This device slightly advances the lower jaw or tongue, which moves the base of the tongue forward, opens the airway, improves breathing and can effectively treat snoring and obstructive sleep apnea sleep apnea. The appliance is fabricated and customized by a qualified dentist with experience in treating snoring and sleep apnea. Oral appliances are usually well tolerated and have relatively high compliance by patients1, 2, 3.  When is an oral appliance indicated?  Oral appliance therapy is recommended as a first-line treatment for patients with primary snoring, mild sleep apnea,  and for patients with moderate sleep apnea who prefer appliance therapy to use of CPAP4, 5. Severity of sleep apnea is determined by sleep testing and is based on the number of respiratory events per hour of sleep.   How successful is oral appliance therapy?  The success rate of oral appliance therapy in patients with mild sleep apnea is 75-80% while in patients with moderate sleep apnea it is 50-70%. The chance of success in patients with severe sleep apnea is 40-50%. The research also shows that oral appliances have a beneficial effect on the cardiovascular health of DIANE patients at the same magnitude as CPAP therapy7.  Oral appliances should be a second-line treatment in cases of severe sleep apnea, but if not completely successful then a combination therapy utilizing CPAP plus oral appliance therapy may be effective. Oral appliances tend to be effective in a broad range of patients although studies show that the patients who have the highest success are females, younger patients, those with milder disease, and less severe obesity. 3, 6.   The chances of success are lower in patients who have more severe DIANE, are older, and those who are morbidly obese.     Example of an oral appliance   Finding a dentist that practices dental sleep medicine  Specific training is available through the American Academy of Dental Sleep Medicine for dentists interested in working in the field of sleep. To find a dentist who is educated in the field of sleep and the use of oral appliances, near you, visit the Web site of the American Academy of Dental Sleep Medicine; also see   http://www.accpstorage.org/newOrganization/patients/oralAppliances.pdf  To search for a dentist certified in these practices:  Http://aadsm.org/FindADentist.aspx?1  1. Jose et al. Objectively measured vs self-reported compliance during oral appliance therapy for sleep-disordered breathing. Chest 2013; 144(5): 4245-9245.  2. Ulices et al. Objective  measurement of compliance during oral appliance therapy for sleep-disordered breathing. Thorax 2013; 68(1): 91-96.  3. Zahra et al. Mandibular advancement devices in 620 men and women with DIANE and snoring: tolerability and predictors of treatment success. Chest 2004; 125: 8902-8863.  4. Aniyah et al. Oral appliances for snoring and DIANE: a review. Sleep 2006; 29: 244-262.  5. Mercedez et al. Oral appliance treatment for DIANE: an update. J Clin Sleep Med 2014; 10(2): 215-227.  6. Lexii et al. Predictors of OSAH treatment outcome. J Dent Res 2007; 86: 2173-2862.    Nasal Valves                 Nasal valves may not be effective if you have frequent nasal congestion or have difficulty breathing through your nose. They may be an option for mild apnea if other options are not well tolerated. The efficacy of these devices is generally less than CPAP or oral appliances.      Weight Loss:    Weight management is a personal decision.  If you are interested in exploring weight loss strategies, the following discussion covers the impact on weight loss on sleep apnea and the approaches that may be successful.    Weight loss decreases severity of sleep apnea in most people with obesity. For those with mild obesity who have developed snoring with weight gain, even 15-30 pound weight loss can improve and occasionally eliminate sleep apnea.  Structured and life-long dietary and health habits are necessary to lose weight and keep healthier weight levels.     Though there may be significant health benefits from weight loss, long-term weight loss is very difficult to achieve- studies show success with dietary management in less than 10% of people. In addition, substantial weight loss may require years of dietary control and may be difficult if patients have severe obesity. In these cases, surgical management may be considered.  Finally, older individuals who have tolerated obesity without health complications may be less  likely to benefit from weight loss strategies.    Your BMI is Body mass index is 25.82 kg/(m^2).  Body mass index (BMI) is one way to tell whether you are at a healthy weight, overweight, or obese. It measures your weight in relation to your height.  A BMI of 18.5 to 24.9 is in the healthy range. A person with a BMI of 25 to 29.9 is considered overweight, and someone with a BMI of 30 or greater is considered obese. More than two-thirds of American adults are considered overweight or obese.  Being overweight or obese increases the risk for further weight gain. Excess weight may lead to heart disease and diabetes.  Creating and following plans for healthy eating and physical activity may help you improve your health.  Weight control is part of healthy lifestyle and includes exercise, emotional health, and healthy eating habits. Careful eating habits lifelong are the mainstay of weight control. Though there are significant health benefits from weight loss, long-term weight loss with diet alone may be very difficult to achieve- studies show long-term success with dietary management in less than 10% of people. Attaining a healthy weight may be especially difficult to achieve in those with severe obesity. In some cases, medications, devices and surgical management might be considered.  What can you do?  If you are overweight or obese and are interested in methods for weight loss, you should discuss this with your provider.     Consider reducing daily calorie intake by 500 calories.     Keep a food journal.     Avoiding skipping meals, consider cutting portions instead.    Diet combined with exercise helps maintain muscle while optimizing fat loss. Strength training is particularly important for building and maintaining muscle mass. Exercise helps reduce stress, increase energy, and improves fitness. Increasing exercise without diet control, however, may not burn enough calories to loose weight.       Start walking three  days a week 10-20 minutes at a time    Work towards walking thirty minutes five days a week     Eventually, increase the speed of your walking for 1-2 minutes at time    In addition, we recommend that you review healthy lifestyles and methods for weight loss available through the National Institutes of Health patient information sites:  http://win.niddk.nih.gov/publications/index.htm    And look into health and wellness programs that may be available through your health insurance provider, employer, local community center, or bhavin club.    Weight management plan: Patient was referred to their PCP to discuss a diet and exercise plan.    Surgery:    Upper Airway Surgery for DIANE  Surgery for DIANE is a second-line treatment option in the management of sleep apnea.  Surgery should be considered for patients who are having a difficult time tolerating CPAP.    Surgery for DIANE is directed at areas that are responsible for narrowing or complete obstruction of the airway during sleep.  There are a wide range of procedures available to enlarge and/or stabilize the airway to prevent blockage of breathing in the three major areas where it can occur: the palate, tongue, and nasal regions.  Successful surgical treatment depends on the accurate identification of the factors responsible for obstructive sleep apnea in each person.  A personalized approach is required because there is no single treatment that works well for everyone.  Because of anatomic variation, consultation with an examination by a sleep surgeon is a critical first step in determining what surgical options are best for each patient.  In some cases, examination during sedation may be recommended in order to guide the selection of procedures.  Patients will be counseled about risks and benefits as well as the typical recovery course after surgery. Surgery is typically not a cure for a person s DIANE.  However, surgery will often significantly improve one s DIANE  severity (termed  success rate ).  Even in the absence of a cure, surgery will decrease the cardiovascular risk associated with OSA7; improve overall quality of life8 (sleepiness, functionality, sleep quality, etc).          Palate Procedures:  Patients with DIANE often have narrowing of their airway in the region of their tonsils and uvula.  The goals of palate procedures are to widen the airway in this region as well as to help the tissues resist collapse.  Modern palate procedure techniques focus on tissue conservation and soft tissue rearrangement, rather than tissue removal.  Often the uvula is preserved in this procedure. Residual sleep apnea is common in patient after pharyngoplasty with an average reduction in sleep apnea events of 33%2.      Tongue Procedures:  While patients are awake, the muscles that surround the throat are active and keep this region open for breathing. These muscles relax during sleep, allowing the tongue and other structures to collapse and block breathing.  There are several different tongue procedures available.  Selection of a tongue base procedure depends on characteristics seen on physical exam.  Generally, procedures are aimed at removing bulky tissues in this area or preventing the back of the tongue from falling back during sleep.  Success rates for tongue surgery range from 50-62%3.    Hypoglossal Nerve Stimulation:  Hypoglossal nerve stimulation has recently received approval from the [place] Food and Drug Administration for the treatment of obstructive sleep apnea.  This is based on research showing that the system was safe and effective in treating sleep apnea6.  Results showed that the median AHI score decreased 68%, from 29.3 to 9.0. This therapy uses an implant system that senses breathing patterns and delivers mild stimulation to airway muscles, which keeps the airway open during sleep.  The system consists of three fully implanted components: a small generator  (similar in size to a pacemaker), a breathing sensor, and a stimulation lead.  Using a small handheld remote, a patient turns the therapy on before bed and off upon awakening.    Candidates for this device must be greater than 22 years of age, have moderate to severe DIANE (AHI between 20-65), BMI less than 32, have tried CPAP/oral appliance without success, and have appropriate upper airway anatomy (determined by a sleep endoscopy performed by Dr. Torres).    Hypoglossal Nerve Stimulation Pathway:    The sleep surgeon s office will work with the patient through the insurance prior-authorization process (including communications and appeals).    Nasal Procedures:  Nasal obstruction can interfere with nasal breathing during the day and night.  Studies have shown that relief of nasal obstruction can improve the ability of some patients to tolerate positive airway pressure therapy for obstructive sleep apnea1.  Treatment options include medications such as nasal saline, topical corticosteroid and antihistamine sprays, and oral medications such as antihistamines or decongestants. Non-surgical treatments can include external nasal dilators for selected patients. If these are not successful by themselves, surgery can improve the nasal airway either alone or in combination with these other options.      Combination Procedures:  Combination of surgical procedures and other treatments may be recommended, particularly if patients have more than one area of narrowing or persistent positional disease.  The success rate of combination surgery ranges from 66-80%2,3.      1. Deirdre OSBORN. The Role of the Nose in Snoring and Obstructive Sleep Apnoea: An Update.  Eur Arch Otorhinolaryngol. 2011; 268: 1365-73.  2.  Deysi SM; Maciej JA; Jaja JR; Pallanch JF; Jorden PASCUAL; Sandi BRICENO; Eugenie ARTEAGA. Surgical modifications of the upper airway for obstructive sleep apnea in adults: a systematic review and meta-analysis. SLEEP  2010;33(10):8588-9204. Sarah ZUÑIGA. Hypopharyngeal surgery in obstructive sleep apnea: an evidence-based medicine review.  Arch Otolaryngol Head Neck Surg. 2006 Feb;132(2):206-13.  3. Jose Roberto HUMPHRIES, Lori Y, Gabino PEYMAN. The efficacy of anatomically based multilevel surgery for obstructive sleep apnea. Otolaryngol Head Neck Surg. 2003 Oct;129(4):327-35.  4. Kezirian E, Goldberg A. Hypopharyngeal Surgery in Obstructive Sleep Apnea: An Evidence-Based Medicine Review. Arch Otolaryngol Head Neck Surg. 2006 Feb;132(2):206-13.  5. Miguel Angel GUADALUPE et al. Upper-Airway Stimulation for Obstructive Sleep Apnea.  N Engl J Med. 2014 Jan 9;370(2):139-49.  6. Dario Y et al. Increased Incidence of Cardiovascular Disease in Middle-aged Men with Obstructive Sleep Apnea. Am J Respir Crit Care Med; 2002 166: 159-165  7. Pedro EM et al. Studying Life Effects and Effectiveness of Palatopharyngoplasty (SLEEP) study: Subjective Outcomes of Isolated Uvulopalatopharyngoplasty. Otolaryngol Head Neck Surg. 2011; 144: 623-631.  8.   Your blood pressure was checked while you were in clinic today.  Please read the guidelines below about what these numbers mean and what you should do about them.  Your systolic blood pressure is the top number.  This is the pressure when the heart is pumping.  Your diastolic blood pressure is the bottom number.  This is the pressure in between beats.  If your systolic blood pressure is less than 120 and your diastolic blood pressure is less than 80, then your blood pressure is normal. There is nothing more that you need to do about it  If your systolic blood pressure is 120-139 or your diastolic blood pressure is 80-89, your blood pressure may be higher than it should be.  You should have your blood pressure re-checked within a year by a primary care provider.  If your systolic blood pressure is 140 or greater or your diastolic blood pressure is 90 or greater, you may have high blood pressure.  High blood pressure is treatable,  but if left untreated over time it can put you at risk for heart attack, stroke, or kidney failure.  You should have your blood pressure re-checked by a primary care provider within the next four weeks.    Good Sleep hygiene tips (American Academy of Sleep Medicine):  Maintain a regular sleep-wake routine    Go to bed at the same time. Wake up at the same time. Ideally, your schedule will remain the same (+/- 20 minutes) every night of the week.  Avoid naps if possible    Naps decrease the  Sleep Debt  that is so necessary for easy sleep onset.    Each of us needs a certain amount of sleep per 24-hour period. We need that amount, and we don t need more than that.    When we take naps, it decreases the amount of sleep that we need the next night   which may cause sleep fragmentation and difficulty initiating sleep, and may lead to insomnia.  Don t stay in bed awake for more than 15-20 minutes (Stimulus control)    If you find your mind racing, or worrying about not being able to sleep during the middle of the night, get out of bed, and sit in a chair in the dark. Do your mind racing in the chair until you are sleepy, then return to bed. No TV, or phone/Ipad, or computer or internet or eat during these periods! That will just stimulate you more than desired.    If this happens several times during the night, that is OK. Just maintain your regular wake time, and try to avoid naps.  Don t watch TV, phone, computer, video games or read in bed or eat in bed.    When you watch TV or read in bed or eat in bed, you associate the bed with wakefulness.    The bed is reserved for two things   sleep and hanky panky.  Drink caffeinated drinks with caution    The effects of caffeine may last for several hours after ingestion. Caffeine can fragment sleep, and cause difficulty initiating sleep. If you drink caffeine, use it only before noon.    Remember that soda and tea contain caffeine as well.  Avoid inappropriate substances that  interfere with sleep    Cigarettes, alcohol, and over-the-counter medications may cause fragmented sleep.  Exercise regularly    Exercise promotes continuous sleep.    Rigorous exercise (close to bedtime cause) circulates endorphins into the body which may cause difficulty initiating sleep.   Have a quiet, comfortable bedroom    Set your bedroom thermostat at a comfortable temperature. Generally, a little cooler is better than a little warmer.    Turn off the TV and other extraneous noise that may disrupt sleep. Background  white noise  like a fan is OK.    If your pets awaken you, keep them outside the bedroom.    Your bedroom should be dark. Turn off bright lights.    Have a comfortable mattress.  If you are a  clock watcher  at night, hide the clock.      Have a comfortable pre-bedtime routine    A warm bath, shower    Meditation, or quiet time    Wind-down 20 minutes prior of bedtime.

## 2018-03-22 ENCOUNTER — TELEPHONE (OUTPATIENT)
Dept: ENDOCRINOLOGY | Facility: CLINIC | Age: 78
End: 2018-03-22

## 2018-03-22 NOTE — TELEPHONE ENCOUNTER
Continue metformin 500 mg OD  He has h/o brittle DM.  In the setting of low- decrease Lantus to 28 Units.    Please call patient with above information.    Lori Damian MD  Endocrinology   [facility]/[place]  March 22, 2018

## 2018-03-22 NOTE — TELEPHONE ENCOUNTER
Radha from [facility] calling.  Pt's blood sugars have been all over the past week.    Fasting am ranging   Lunch time ranging   Dinner time ranging   Bedtime ranging     Currently taking Metformin 500mg daily since TCU discharge in Jan 2018.  Per OV dictation 3-7-18, states current regimen is Metformin 1000mg BID.    Pt is taking Lantus 30u daily and Humalog 7/5/7.    Please advise re: Metformin dosing, thanks.

## 2018-03-23 ENCOUNTER — OFFICE VISIT (OUTPATIENT)
Dept: UROLOGY | Facility: CLINIC | Age: 78
End: 2018-03-23
Payer: COMMERCIAL

## 2018-03-23 ENCOUNTER — RADIANT APPOINTMENT (OUTPATIENT)
Dept: CT IMAGING | Facility: CLINIC | Age: 78
End: 2018-03-23
Attending: UROLOGY
Payer: COMMERCIAL

## 2018-03-23 VITALS
WEIGHT: 150 LBS | SYSTOLIC BLOOD PRESSURE: 116 MMHG | BODY MASS INDEX: 24.11 KG/M2 | DIASTOLIC BLOOD PRESSURE: 66 MMHG | HEIGHT: 66 IN | HEART RATE: 70 BPM

## 2018-03-23 DIAGNOSIS — R35.0 URINARY FREQUENCY: Primary | ICD-10-CM

## 2018-03-23 DIAGNOSIS — N20.0 KIDNEY STONE: ICD-10-CM

## 2018-03-23 RX ORDER — FINASTERIDE 5 MG/1
5 TABLET, FILM COATED ORAL DAILY
Qty: 90 TABLET | Refills: 3 | Status: SHIPPED | OUTPATIENT
Start: 2018-03-23 | End: 2019-03-16

## 2018-03-23 ASSESSMENT — ENCOUNTER SYMPTOMS
MEMORY LOSS: 0
DEPRESSION: 0
HEADACHES: 0
DISTURBANCES IN COORDINATION: 1
LOSS OF CONSCIOUSNESS: 0
COUGH: 0
DYSPNEA ON EXERTION: 0
TREMORS: 0
NERVOUS/ANXIOUS: 0
HEMOPTYSIS: 0
PARALYSIS: 0
INSOMNIA: 1
DECREASED CONCENTRATION: 0
SPUTUM PRODUCTION: 0
COUGH DISTURBING SLEEP: 0
WHEEZING: 0
TINGLING: 0
SPEECH CHANGE: 0
SNORES LOUDLY: 1
PANIC: 0
WEAKNESS: 1
DIZZINESS: 1
POSTURAL DYSPNEA: 0
SHORTNESS OF BREATH: 0
SEIZURES: 0

## 2018-03-23 ASSESSMENT — PAIN SCALES - GENERAL: PAINLEVEL: NO PAIN (0)

## 2018-03-23 NOTE — PROGRESS NOTES
"It was my pleasure to see Pete Sheldon a 77 year old year old male today. Patient was seen in consultation for history of stones.    HPI:     Patient referred to urology for \"history of kidney stones.\" No stones noted on CT scan today except for possible small bladder stones.   Patient with recent history of CVA for which he was admitted and was subsequently transferred to TCU.   Patient notes a long history of lower urinary tract symptoms including frequency and nocturia. Has taken tamsulosin with some improvement but would like to see further improvement if possible.     No hematuria, dysuria.   No current renal colic   No evidence of stones on CT scan.     Past Medical History:   Diagnosis Date     Calculus of ureter      Hypertension      Microalbuminuria 2/15/2016     Other and unspecified hyperlipidemia      Type 2 diabetes, HbA1C goal < 8% (H) 1995       Past Surgical History:   Procedure Laterality Date     C NONSPECIFIC PROCEDURE      Nasal septoplasty     HC COLONOSCOPY THRU STOMA, DIAGNOSTIC  2007    Normal     HC FLEX SIGMOIDOSCOPY W/WO MIGUEL SPEC BY BRUSH/WASH  1999    Normal to 60 cm     HC REPAIR INCISIONAL HERNIA,REDUCIBLE  1999    Hernia Repair, Incisional, Unilateral (right)     HC REPAIR INCISIONAL HERNIA,REDUCIBLE      Hernia Repair, Incisional, Unilateral (left, with mesh placement)       Family History   Problem Relation Age of Onset     CEREBROVASCULAR DISEASE Mother       age 95     HEART DISEASE Mother      age 89,  age 95     CEREBROVASCULAR DISEASE Father       age 91, stroke two years previous     Cancer - colorectal Brother      Half-brother     CANCER Sister      Half-sister (?type)     CANCER Sister      Half-sister (?type)     HEART DISEASE Brother      Neurologic Disorder Daughter      Multiple Sclerosis     Psychotic Disorder Son      Schizophrenia       Current Outpatient Prescriptions   Medication Sig Dispense Refill     zolpidem " (AMBIEN) 10 MG tablet Take tablet by mouth 15 minutes prior to sleep, for Sleep Study 1 tablet 0     lisinopril (PRINIVIL/ZESTRIL) 2.5 MG tablet Take 1 tablet (2.5 mg) by mouth daily 30 tablet 3     atorvastatin (LIPITOR) 20 MG tablet Take 1 tablet (20 mg) by mouth daily 30 tablet 3     tamsulosin (FLOMAX) 0.4 MG capsule TAKE ONE CAPSULE BY MOUTH ONCE DAILY 90 capsule 1     blood glucose monitoring (ONETOUCH VERIO IQ) test strip Use to test blood sugars 4 times daily or as directed using One Touch Verio Flex. 200 strip 11     amLODIPine (NORVASC) 5 MG tablet Take 1 tablet (5 mg) by mouth daily 30 tablet 1     insulin aspart (NOVOLOG PEN) 100 UNIT/ML injection Inject 7 units w/ breakfast, 5 units w/ lunch, 7 units w/ dinner. Hold if not eating meal. 30 mL 0     insulin glargine (LANTUS) 100 UNIT/ML injection Inject 30 Units Subcutaneous every morning 9 mL 0     metFORMIN (GLUCOPHAGE) 500 MG tablet Take 1 tablet (500 mg) by mouth daily (with breakfast) 30 tablet 0     tamsulosin (FLOMAX) 0.4 MG capsule Take 1 capsule (0.4 mg) by mouth every evening 30 capsule 0     penicillin V potassium (VEETID) 500 MG tablet Take 1 tablet (500 mg) by mouth 3 times daily 90 tablet 0     aspirin 81 MG chewable tablet Take 4 tablets (324 mg) by mouth daily 36 tablet 0     Glucosamine HCl 750 MG TABS Take 750 mg by mouth 2 times daily       loratadine (AF LORATADINE) 10 MG tablet Take 1 tablet (10 mg) by mouth daily 14 tablet 0     Nutritional Supplements (GLUCOSE MANAGEMENT) TABS 4 tabs po for low blood sugar below 70, may repeat q 10-15 minutes as needed 100 tablet prn     blood glucose monitoring (ONETOUCH VERIO IQ) test strip One Touch Verio Flex--Use to test blood sugars 4 times daily or as directed. 200 strip 3     multivitamin (OCUVITE) TABS Take 1 tablet by mouth daily FOCUS SELECT PREMIUM WITH LUTEIN per eye doctor  Reported on 5/19/2017       insulin pen needle 31G X 8 MM B-D UF III short pen needles, use 4 times a day to  "inject insulin. 300 each 3     order for DME All DM testing supplies including test strips, lancets, solution, syringes, needles and/or pen needles for testing 4 times per day.    Brand \"Contour Next\" 3 Month 1     LIFESCAN FINEPOINT LANCETS MISC Use to test blood sugars 4 times daily or as directed. 200 strip 3     insulin syringes, disposable, U-100 0.3 ML MISC 1 each 2 times daily 100 each 6     ACE/ARB NOT PRESCRIBED, INTENTIONAL, by Other route continuous prn (Hyperkalemia) Reported on 5/19/2017         ALLERGIES: Losartan      REVIEW OF SYSTEMS:  Skin: negative  Eyes: negative  Ears/Nose/Throat: negative  Respiratory: No shortness of breath, dyspnea on exertion, cough, or hemoptysis  Cardiovascular: negative  Gastrointestinal: negative  Genitourinary: as above  Musculoskeletal: negative  Neurologic: negative  Psychiatric: negative  Hematologic/Lymphatic/Immunologic: negative  Endocrine: negative      GENERAL PHYSICAL EXAM:   Vitals: /66  Pulse 70  Ht 1.676 m (5' 6\")  Wt 68 kg (150 lb)  BMI 24.21 kg/m2  Body mass index is 24.21 kg/(m^2).  Constitutional: healthy, alert and no distress  Head: Normocephalic. No masses, lesions, tenderness or abnormalities  Neck: Neck supple. No adenopathy. Thyroid symmetric, normal size,, Carotids without bruits.  ENT: ENT exam normal, no neck nodes or sinus tenderness  Cardiovascular: negative, PMI normal. No lifts, heaves, or thrills. RRR. No murmurs, clicks gallops or rub  Respiratory: negative, Percussion normal. Good diaphragmatic excursion. Lungs clear  Gastrointestinal: Abdomen soft, non-tender. BS normal. No masses, organomegaly  Musculoskeletal: extremities normal- no gross deformities noted, gait normal and normal muscle tone  Skin: no suspicious lesions or rashes  Neurologic: Gait normal. Reflexes normal and symmetric. Sensation grossly WNL.  Psychiatric: affect normal/bright and mentation appears normal.  Hematologic/Lymphatic/Immunologic: normal ant/post " cervical, axillary, supraclavicular and inguinal nodes     EXAM:   Left Flank: negative  Right Flank: negative  Inguinal Area: normal  Phallus is meatus normal and no plaques palpated.       RECTAL EXAM:   Size: 1-2+   Sphincter tone: normal  Tenderness: Absent  Rectal Mass: Absent  Prostate Nodule: Absent         RADIOLOGY: The following tests were reviewed: Need to complete the following Radiology exams prior to the office appointment:  LABS: The last test results for Needs to complete the necessary tests prior to appointment: were reviewed.     ASSESSMENT: 78 y/o M with history of stones as well as some lower urinary tract symptoms     PLAN:   Continue tamsulosin   Will add finasteride   Uroflow/PVR in six months   Follow up in six months       I spent over 20 minutes with the patient.  Over half this time was spent on counseling for nephrolithiasis and lower urinary tract symptoms.      Answers for HPI/ROS submitted by the patient on 3/23/2018   General Symptoms: No  Skin Symptoms: No  HENT Symptoms: No  EYE SYMPTOMS: No  HEART SYMPTOMS: No  LUNG SYMPTOMS: Yes  INTESTINAL SYMPTOMS: No  URINARY SYMPTOMS: No  REPRODUCTIVE SYMPTOMS: No  SKELETAL SYMPTOMS: No  BLOOD SYMPTOMS: No  NERVOUS SYSTEM SYMPTOMS: Yes  MENTAL HEALTH SYMPTOMS: Yes  Cough: No  Sputum or phlegm: No  Coughing up blood: No  Difficulty breating or shortness of breath: No  Snoring: Yes  Wheezing: No  Difficulty breathing on exertion: No  Nighttime Cough: No  Difficulty breathing when lying flat: No  Trouble with coordination: Yes  Dizziness or trouble with balance: Yes  Fainting or black-out spells: No  Memory loss: No  Headache: No  Seizures: No  Speech problems: No  Tingling: No  Tremor: No  Weakness: Yes  Difficulty walking: No  Paralysis: No  Nervous or Anxious: No  Depression: No  Trouble sleeping: Yes  Trouble thinking or concentrating: No  Mood changes: No  Panic attacks: No

## 2018-03-23 NOTE — PATIENT INSTRUCTIONS
Please follow up in 6 months for a flow/PVR   Please come to this appointment with a full bladder for a flow/PVR      It was a pleasure meeting with you today.  Thank you for allowing me and my team the privilege of caring for you today.  YOU are the reason we are here, and I truly hope we provided you with the excellent service you deserve.  Please let us know if there is anything else we can do for you so that we can be sure you are leaving completely satisfied with your care experience.

## 2018-03-23 NOTE — LETTER
"3/23/2018       RE: Pete Sheldon  [id number] [place] [address]     Dear Colleague,    Thank you for referring your patient, Pete Sheldon, to the [facility] UROLOGY AND [facility] FOR PROSTATE AND UROLOGIC CANCERS at [facility]. Please see a copy of my visit note below.    It was my pleasure to see Pete Sheldon a 77 year old year old male today. Patient was seen in consultation for history of stones.    HPI:     Patient referred to urology for \"history of kidney stones.\" No stones noted on CT scan today except for possible small bladder stones.   Patient with recent history of CVA for which he was admitted and was subsequently transferred to TCU.   Patient notes a long history of lower urinary tract symptoms including frequency and nocturia. Has taken tamsulosin with some improvement but would like to see further improvement if possible.     No hematuria, dysuria.   No current renal colic   No evidence of stones on CT scan.     Past Medical History:   Diagnosis Date     Calculus of ureter      Hypertension      Microalbuminuria 2/15/2016     Other and unspecified hyperlipidemia      Type 2 diabetes, HbA1C goal < 8% (H) 1995       Past Surgical History:   Procedure Laterality Date     C NONSPECIFIC PROCEDURE      Nasal septoplasty     HC COLONOSCOPY THRU STOMA, DIAGNOSTIC  2007    Normal     HC FLEX SIGMOIDOSCOPY W/WO MIGUEL SPEC BY BRUSH/WASH  1999    Normal to 60 cm     HC REPAIR INCISIONAL HERNIA,REDUCIBLE  1999    Hernia Repair, Incisional, Unilateral (right)     HC REPAIR INCISIONAL HERNIA,REDUCIBLE      Hernia Repair, Incisional, Unilateral (left, with mesh placement)       Family History   Problem Relation Age of Onset     CEREBROVASCULAR DISEASE Mother       age 95     HEART DISEASE Mother      age 89,  age 95     CEREBROVASCULAR DISEASE Father       age 91, stroke two years previous     Cancer - colorectal " Brother      Half-brother     CANCER Sister      Half-sister (?type)     CANCER Sister      Half-sister (?type)     HEART DISEASE Brother      Neurologic Disorder Daughter      Multiple Sclerosis     Psychotic Disorder Son      Schizophrenia       Current Outpatient Prescriptions   Medication Sig Dispense Refill     zolpidem (AMBIEN) 10 MG tablet Take tablet by mouth 15 minutes prior to sleep, for Sleep Study 1 tablet 0     lisinopril (PRINIVIL/ZESTRIL) 2.5 MG tablet Take 1 tablet (2.5 mg) by mouth daily 30 tablet 3     atorvastatin (LIPITOR) 20 MG tablet Take 1 tablet (20 mg) by mouth daily 30 tablet 3     tamsulosin (FLOMAX) 0.4 MG capsule TAKE ONE CAPSULE BY MOUTH ONCE DAILY 90 capsule 1     blood glucose monitoring (ONETOUCH VERIO IQ) test strip Use to test blood sugars 4 times daily or as directed using One Touch Verio Flex. 200 strip 11     amLODIPine (NORVASC) 5 MG tablet Take 1 tablet (5 mg) by mouth daily 30 tablet 1     insulin aspart (NOVOLOG PEN) 100 UNIT/ML injection Inject 7 units w/ breakfast, 5 units w/ lunch, 7 units w/ dinner. Hold if not eating meal. 30 mL 0     insulin glargine (LANTUS) 100 UNIT/ML injection Inject 30 Units Subcutaneous every morning 9 mL 0     metFORMIN (GLUCOPHAGE) 500 MG tablet Take 1 tablet (500 mg) by mouth daily (with breakfast) 30 tablet 0     tamsulosin (FLOMAX) 0.4 MG capsule Take 1 capsule (0.4 mg) by mouth every evening 30 capsule 0     penicillin V potassium (VEETID) 500 MG tablet Take 1 tablet (500 mg) by mouth 3 times daily 90 tablet 0     aspirin 81 MG chewable tablet Take 4 tablets (324 mg) by mouth daily 36 tablet 0     Glucosamine HCl 750 MG TABS Take 750 mg by mouth 2 times daily       loratadine (AF LORATADINE) 10 MG tablet Take 1 tablet (10 mg) by mouth daily 14 tablet 0     Nutritional Supplements (GLUCOSE MANAGEMENT) TABS 4 tabs po for low blood sugar below 70, may repeat q 10-15 minutes as needed 100 tablet prn     blood glucose monitoring (ONETOUCH VERIO  "IQ) test strip One Touch Verio Flex--Use to test blood sugars 4 times daily or as directed. 200 strip 3     multivitamin (OCUVITE) TABS Take 1 tablet by mouth daily FOCUS SELECT PREMIUM WITH LUTEIN per eye doctor  Reported on 5/19/2017       insulin pen needle 31G X 8 MM B-D UF III short pen needles, use 4 times a day to inject insulin. 300 each 3     order for DME All DM testing supplies including test strips, lancets, solution, syringes, needles and/or pen needles for testing 4 times per day.    Brand \"Contour Next\" 3 Month 1     LIFESCAN FINEPOINT LANCETS MISC Use to test blood sugars 4 times daily or as directed. 200 strip 3     insulin syringes, disposable, U-100 0.3 ML MISC 1 each 2 times daily 100 each 6     ACE/ARB NOT PRESCRIBED, INTENTIONAL, by Other route continuous prn (Hyperkalemia) Reported on 5/19/2017         ALLERGIES: Losartan      REVIEW OF SYSTEMS:  Skin: negative  Eyes: negative  Ears/Nose/Throat: negative  Respiratory: No shortness of breath, dyspnea on exertion, cough, or hemoptysis  Cardiovascular: negative  Gastrointestinal: negative  Genitourinary: as above  Musculoskeletal: negative  Neurologic: negative  Psychiatric: negative  Hematologic/Lymphatic/Immunologic: negative  Endocrine: negative      GENERAL PHYSICAL EXAM:   Vitals: /66  Pulse 70  Ht 1.676 m (5' 6\")  Wt 68 kg (150 lb)  BMI 24.21 kg/m2  Body mass index is 24.21 kg/(m^2).  Constitutional: healthy, alert and no distress  Head: Normocephalic. No masses, lesions, tenderness or abnormalities  Neck: Neck supple. No adenopathy. Thyroid symmetric, normal size,, Carotids without bruits.  ENT: ENT exam normal, no neck nodes or sinus tenderness  Cardiovascular: negative, PMI normal. No lifts, heaves, or thrills. RRR. No murmurs, clicks gallops or rub  Respiratory: negative, Percussion normal. Good diaphragmatic excursion. Lungs clear  Gastrointestinal: Abdomen soft, non-tender. BS normal. No masses, " organomegaly  Musculoskeletal: extremities normal- no gross deformities noted, gait normal and normal muscle tone  Skin: no suspicious lesions or rashes  Neurologic: Gait normal. Reflexes normal and symmetric. Sensation grossly WNL.  Psychiatric: affect normal/bright and mentation appears normal.  Hematologic/Lymphatic/Immunologic: normal ant/post cervical, axillary, supraclavicular and inguinal nodes     EXAM:   Left Flank: negative  Right Flank: negative  Inguinal Area: normal  Phallus is meatus normal and no plaques palpated.       RECTAL EXAM:   Size: 1-2+   Sphincter tone: normal  Tenderness: Absent  Rectal Mass: Absent  Prostate Nodule: Absent         RADIOLOGY: The following tests were reviewed: Need to complete the following Radiology exams prior to the office appointment:  LABS: The last test results for Needs to complete the necessary tests prior to appointment: were reviewed.     ASSESSMENT: 76 y/o M with history of stones as well as some lower urinary tract symptoms     PLAN:   Continue tamsulosin   Will add finasteride   Uroflow/PVR in six months   Follow up in six months       I spent over 20 minutes with the patient.  Over half this time was spent on counseling for nephrolithiasis and lower urinary tract symptoms.      Again, thank you for allowing me to participate in the care of your patient.      Sincerely,    Jasmyne Elliott MD

## 2018-03-23 NOTE — MR AVS SNAPSHOT
After Visit Summary   3/23/2018    Pete Sheldon    MRN: [id number]           Patient Information     Date Of Birth          1940        Visit Information        Provider Department      3/23/2018 3:00 PM Jasmyne Elliott MD [facility] and [facility]        Today's Diagnoses     Urinary frequency    -  1      Care Instructions    Please follow up in 6 months for a flow/PVR   Please come to this appointment with a full bladder for a flow/PVR      It was a pleasure meeting with you today.  Thank you for allowing me and my team the privilege of caring for you today.  YOU are the reason we are here, and I truly hope we provided you with the excellent service you deserve.  Please let us know if there is anything else we can do for you so that we can be sure you are leaving completely satisfied with your care experience.                  Follow-ups after your visit        Your next 10 appointments already scheduled     Apr 10, 2018  2:40 PM CDT   (Arrive by 2:25 PM)   New Patient Visit with Nataliya Schneider MD   [facility] ([facility])    [address] [address] [phone]            May 03, 2018  8:30 PM CDT   Psg Split W/Tcm with BED 3 SH SLEEP   [facility] ([facility])    [address]  [facility] [address] [phone]            May 10, 2018  2:00 PM CDT   Return Sleep Patient with Brown Mike MD   [facility])    [address]  [facility] [address]   [phone]            Jun 06, 2018  2:00 PM CDT   LAB with RI LAB   [facility] ([facility])    [address]  [facility] [id number] [phone]           Please do not eat 10-12 hours before your  appointment if you are coming in fasting for labs on lipids, cholesterol, or glucose (sugar). This does not apply to pregnant women. Water, hot tea and black coffee (with nothing added) are okay. Do not drink other fluids, diet soda or chew gum.            2018  2:30 PM CDT   Return Visit with Lori Damian MD   [facility]    [address]  [facility] [address]   [phone]            2018 10:00 AM CDT   (Arrive by 9:45 AM)   New Stroke with Claudio Castanon MD   [facility] Neurology ([facility])    [address] [place] [id number] [phone]            Oct 05, 2018  2:30 PM CDT   (Arrive by 2:15 PM)   Return Visit with Jasmyne Elliott MD   [facility]rology and [facility] Urologic C[facility] ([facility])    [address] [place] [address] [phone]              Who to contact     Please call your clinic at [phone] to:    Ask questions about your health    Make or cancel appointments    Discuss your medicines    Learn about your test results    Speak to your doctor            Additional Information About Your Visit        MyChart Information     [employer] is an electronic gateway that provides easy, online access to your medical records. With [employer], you can request a clinic appointment, read your test results, renew a prescription or communicate with your care team.     To sign up for [employer] visit the website at [email or web address]   You will be asked to enter the access code listed below, as well as some personal information. Please follow the directions to create your username and password.     Your access code is: [email or web address]  Expires: 2018  3:53 PM     Your access code will  in 90 days. If you need help or a new code, please contact your [facility]  [facility] or call [phone] for assistance.        Care EveryWhere ID     This is your Care EveryWhere ID. This could be used by other organizations to access your [place] medical records  [id number]        Your Vitals Were     Pulse Height BMI (Body Mass Index)             70 1.676 m (5' 6\") 24.21 kg/m2          Blood Pressure from Last 3 Encounters:   03/23/18 116/66   03/15/18 109/63   03/07/18 110/62    Weight from Last 3 Encounters:   03/23/18 68 kg (150 lb)   03/15/18 72.6 kg (160 lb)   03/07/18 72.6 kg (160 lb)              Today, you had the following     No orders found for display         Today's Medication Changes          These changes are accurate as of 3/23/18  3:18 PM.  If you have any questions, ask your nurse or doctor.               Start taking these medicines.        Dose/Directions    finasteride 5 MG tablet   Commonly known as:  PROSCAR   Used for:  Urinary frequency   Started by:  Jasmyne Elliott MD        Dose:  5 mg   Take 1 tablet (5 mg) by mouth daily   Quantity:  90 tablet   Refills:  3            Where to get your medicines      These medications were sent to [facility] [address] [place] [address]     Phone:  [phone]     finasteride 5 MG tablet                Primary Care Provider Office Phone # Fax #    Rom Helm [phone]       [address] [place] [address]        Goals        Diet    Increase water intake     Notes - Note edited  5/14/2014 11:42 AM by Patricia Khan RN    Have 5-6 glasses (8oz) of fluid a day  Per f/u with spouse today, pt not doing well. Not eating and drinking. Called clinic and advised to take pt to clinic.         Increase water intake        General    Medication 1 (pt-stated)     Notes - Note created  5/15/2014  1:57 PM by Patricia Khan, RN    Pt will have an MTM consult         Equal Access to Services     " [facility]: Hadii aad ku dago Calvert, waaxda luqadaha, qaybta kaalmada adejesus, kadeem rafiin hayaatiffany carrillo robertamercedes kahn . So [facility] [phone].    ATENCIÓN: Si habla zhao, tiene a moise disposición servicios gratuitos de asistencia lingüística. Llame al [phone].    We comply with applicable federal civil rights laws and [place] laws. We do not discriminate on the basis of race, color, national origin, age, disability, sex, sexual orientation, or gender identity.            Thank you!     Thank you for choosing [facility] UROLOGY AND [facility] FOR PROSTATE AND UROLOGIC CANCERS  for your care. Our goal is always to provide you with excellent care. Hearing back from our patients is one way we can continue to improve our services. Please take a few minutes to complete the written survey that you may receive in the mail after your visit with us. Thank you!             Your Updated Medication List - Protect others around you: Learn how to safely use, store and throw away your medicines at www.disposemymeds.org.          This list is accurate as of 3/23/18  3:18 PM.  Always use your most recent med list.                   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    Type 2 diabetes mellitus with diabetic polyneuropathy, with long-term current use of insulin (H)       * blood glucose monitoring test strip    ONETOUCH VERIO IQ    200 strip    Use to test blood sugars 4 times daily or as directed using One Touch Verio Flex.    Type 2 diabetes mellitus with diabetic polyneuropathy, with long-term current use of insulin (H)       finasteride 5 MG tablet    PROSCAR    90 tablet    Take 1 tablet (5 mg) by mouth daily    Urinary frequency       Glucosamine HCl 750 MG Tabs      Take 750 mg by mouth 2 times daily    Arthritis       GLUCOSE MANAGEMENT Tabs     100 tablet    4 tabs po for low blood sugar below 70, may repeat q 10-15 minutes as needed    Type 2 diabetes mellitus with diabetic polyneuropathy, with long-term current use of insulin (H)       insulin aspart 100 UNIT/ML injection    NovoLOG PEN    30 mL    Inject 7 units w/ breakfast, 5 units w/ lunch, 7 units w/ dinner. Hold if not eating meal.    Type 2 diabetes mellitus with diabetic polyneuropathy, with long-term current use of insulin (H)       insulin glargine 100 UNIT/ML injection    LANTUS    9 mL    Inject 30 Units Subcutaneous every morning    Type 2 diabetes mellitus with diabetic polyneuropathy, with long-term current use of insulin (H)       insulin pen needle 31G X 8 MM     300 each    B-D UF III short pen needles, use 4 times a day to inject insulin.    Type 2 diabetes, HbA1C goal < 8% (H)       insulin syringes (disposable) U-100 0.3 ML Misc     100 each    1 each 2 times daily    Type II or unspecified type diabetes mellitus without mention of complication, not stated as uncontrolled       LIFESCAN FINEPOINT LANCETS Misc     200 strip    Use to test blood sugars 4 times daily or as directed.    Type 2 diabetes, HbA1C goal < 8% (H)       lisinopril 2.5 MG tablet    PRINIVIL/Zestril    30 tablet    Take 1 tablet (2.5 mg) by mouth daily    Hypertension goal BP  "(blood pressure) < 140/90       loratadine 10 MG tablet    AF LORATADINE    14 tablet    Take 1 tablet (10 mg) by mouth daily    Hypertension goal BP (blood pressure) < 140/90       metFORMIN 500 MG tablet    GLUCOPHAGE    30 tablet    Take 1 tablet (500 mg) by mouth daily (with breakfast)    Type 2 diabetes mellitus with diabetic polyneuropathy, with long-term current use of insulin (H)       multivitamin Tabs tablet      Take 1 tablet by mouth daily FOCUS SELECT PREMIUM WITH LUTEIN per eye doctor Reported on 5/19/2017        order for DME     3 Month    All DM testing supplies including test strips, lancets, solution, syringes, needles and/or pen needles for testing 4 times per day.  Brand \"Contour Next\"    Type 2 diabetes mellitus with diabetic polyneuropathy (H)       penicillin V potassium 500 MG tablet    VEETID    90 tablet    Take 1 tablet (500 mg) by mouth 3 times daily    Urinary tract infection without hematuria, site unspecified       * tamsulosin 0.4 MG capsule    FLOMAX    30 capsule    Take 1 capsule (0.4 mg) by mouth every evening    Calculus of kidney       * tamsulosin 0.4 MG capsule    FLOMAX    90 capsule    TAKE ONE CAPSULE BY MOUTH ONCE DAILY    Hyperlipidemia LDL goal <100, Calculus of kidney       zolpidem 10 MG tablet    AMBIEN    1 tablet    Take tablet by mouth 15 minutes prior to sleep, for Sleep Study    Sleep disturbance       * Notice:  This list has 4 medication(s) that are the same as other medications prescribed for you. Read the directions carefully, and ask your doctor or other care provider to review them with you.      "

## 2018-03-28 ENCOUNTER — TELEPHONE (OUTPATIENT)
Dept: INTERNAL MEDICINE | Facility: CLINIC | Age: 78
End: 2018-03-28

## 2018-03-28 DIAGNOSIS — Z53.9 DIAGNOSIS NOT YET DEFINED: Primary | ICD-10-CM

## 2018-04-05 DIAGNOSIS — I10 HYPERTENSION GOAL BP (BLOOD PRESSURE) < 140/90: ICD-10-CM

## 2018-04-06 ENCOUNTER — TELEPHONE (OUTPATIENT)
Dept: INTERNAL MEDICINE | Facility: CLINIC | Age: 78
End: 2018-04-06

## 2018-04-06 DIAGNOSIS — R53.81 PHYSICAL DECONDITIONING: Primary | ICD-10-CM

## 2018-04-06 DIAGNOSIS — R26.89 BALANCE PROBLEMS: ICD-10-CM

## 2018-04-06 RX ORDER — AMLODIPINE BESYLATE 5 MG/1
TABLET ORAL
Qty: 90 TABLET | Refills: 0 | Status: SHIPPED | OUTPATIENT
Start: 2018-04-06 | End: 2018-06-25

## 2018-04-06 NOTE — TELEPHONE ENCOUNTER
"Pt's wife calls, states that she forgot to request Amlodipine earlier this week and he will run over the weekend. Requesting to have this filled today and would like a 90 day supply.    Requested Prescriptions   Pending Prescriptions Disp Refills     amLODIPine (NORVASC) 5 MG tablet [Pharmacy Med Name: AMLODIPINE 5MG TAB]  Last Written Prescription Date:  2/2/18  Last Fill Quantity: 30,  # refills: 1   Last office visit: 2/2/2018 with prescribing provider:  yes   Future Office Visit:   Next 5 appointments (look out 90 days)     May 16, 2018  1:20 PM CDT   SHORT with Rom Helm MD   [facility]    [address]  [facility] [id number] [phone]            Jun 06, 2018  2:30 PM CDT   Return Visit with Lori Damian MD   [facility] ([facility])    [address]  [facility] [address] [phone]                       30 tablet 1     Sig: TAKE ONE TABLET BY MOUTH ONCE DAILY    Calcium Channel Blockers Protocol  Passed    4/5/2018  9:12 PM       Passed - Blood pressure under 140/90 in past 12 months    BP Readings from Last 3 Encounters:   03/23/18 116/66   03/15/18 109/63   03/07/18 110/62                Passed - Recent (12 mo) or future (30 days) visit within the authorizing provider's specialty    Patient had office visit in the last 12 months or has a visit in the next 30 days with authorizing provider or within the authorizing provider's specialty.  See \"Patient Info\" tab in inbasket, or \"Choose Columns\" in Meds & Orders section of the refill encounter.           Passed - Patient is age 18 or older       Passed - Normal serum creatinine on file in past 12 months    Recent Labs   Lab Test  01/26/18   0601   CR  1.25          Prescription approved per [facility] Refill Protocol.       "

## 2018-04-06 NOTE — TELEPHONE ENCOUNTER
Pt's wife calls. Pt was getting PT through home care, but home care closed his case about a month ago. She has noticed he his having more problems with balance recently and concerned that he may fall (no falls so far). She is requesting an order for outpatient PT at [facility].

## 2018-04-07 DIAGNOSIS — E11.9 TYPE 2 DIABETES, HBA1C GOAL < 8% (H): ICD-10-CM

## 2018-04-10 RX ORDER — PEN NEEDLE, DIABETIC 31 GX5/16"
NEEDLE, DISPOSABLE MISCELLANEOUS
Qty: 400 EACH | Refills: 0 | Status: SHIPPED | OUTPATIENT
Start: 2018-04-10 | End: 2018-06-11

## 2018-04-12 ENCOUNTER — HOSPITAL ENCOUNTER (OUTPATIENT)
Dept: PHYSICAL THERAPY | Facility: CLINIC | Age: 78
Setting detail: THERAPIES SERIES
End: 2018-04-12
Attending: INTERNAL MEDICINE
Payer: COMMERCIAL

## 2018-04-12 PROCEDURE — 40000719 ZZHC STATISTIC PT DEPARTMENT NEURO VISIT: Performed by: PHYSICAL THERAPIST

## 2018-04-12 PROCEDURE — G8978 MOBILITY CURRENT STATUS: HCPCS | Mod: GP,CJ | Performed by: PHYSICAL THERAPIST

## 2018-04-12 PROCEDURE — 97161 PT EVAL LOW COMPLEX 20 MIN: CPT | Mod: GP | Performed by: PHYSICAL THERAPIST

## 2018-04-12 PROCEDURE — 97116 GAIT TRAINING THERAPY: CPT | Mod: GP | Performed by: PHYSICAL THERAPIST

## 2018-04-12 PROCEDURE — G8979 MOBILITY GOAL STATUS: HCPCS | Mod: GP,CI | Performed by: PHYSICAL THERAPIST

## 2018-04-13 NOTE — PROGRESS NOTES
04/12/18 1300   Quick Adds   Type of Visit Initial OP PT Evaluation   General Information   Start of Care Date 04/12/18   Referring Physician Lulú San MD   Orders Evaluate and Treat as Indicated   Order Date 04/06/18   Medical Diagnosis Physical deconditioning R53.81  - Primary ;  Balance problems R26.89    Precautions/Limitations fall precautions   Special Instructions 1/9/18  (date of admit to hospital for infarct L thalamus)   Surgical/Medical history reviewed Yes   Pertinent history of current problem (include personal factors and/or comorbidities that impact the POC) 76 yo M was hospitizated from 1/9/18-1/12/18 for infarct L thalamus and being seen today for OP PT evaluation of balance limitations. Refered per pt's spouse request following d/c from recent homecare therapies. His PCP is Dr Helm although orders for today's evaluation from Dr San. Per chart review of f/u with neurology on 3/1/18: pt presented to hospital with R facial weakness, R arm weakness and ataxia, and R arm sensory loss. CTA head/neck demonstrated occlusion of R vertebral artery. Hospital course complicated by UTI and elevated blood glucose. Endocrinology made medications changes to insulin and sugars brought under control. He was discharged to acute rehab and then home in early February. His spouse, Goldy, accompanies pt today and assists with providing current limitations. They estimate he was seen for about 1 month of PT/OT/SLP and d/c'd from homecare about 1 month ago. He has a home RN who continues to visit weekly. He has not obtained BP cuff as recommened by MD to monitor pressures at home but spouse reports this is a priority. Other PMH: DM2 with neuropathy, HTN, and HLD.  Pt denies any numbness or tingling in LEs or feet. No focal LE weakness reported.    CURRENT FUNCTION: He continues to report R UE numbness and fine motor deficits, unable to button clothes and with increaed time to tie shoes, but this has  "improved some. He has not received orders to f/u with OP OT.  Mod (I) bathing sitting on bathbench. Otherwise (I) with ADLs. Spouse observes he isn't walking steady at home without AD, he stumbles, especially in the kitchen. Denies any falls since fall with CVA in January. Reports some near falls with support from nearby furnature/wall to prevent fall to floor, but cannot recal circumstances. Uses 2ww in community. No instability with use of 2ww. Reports sometimes gets dizziness after has been standing for a while, only since stroke. He has continued with exercises provided by home therapies PT/OT/SLP that takes most of the day with breaks between. Has been out of house for snowblowing and has walked outside for getting mail, feeding birds, walking short distances outdoors without AD or instability. Hasn't gone grociery shopping - neither he or spouse drive. Using \"Go Go Grandparent\" for rides to medical appointments. Friend helps with groceries.    Pertinent Visual History  Cateract surgery 6/2017 and needs new perscription for glasses with f/u with optomologist next Tuesday.     Prior level of function comment Prior to CVA: Reports independent mobility, no instability or falls, not using AD. Gardening. Driving.   Previous/Current Treatment Physical Therapy;Occupational Therapy;Other  (SLP)   Current Community Support Other/Comments;Emergency call system;Family/friend caregiver  (home RN)   Patient role/Employment history Retired  ()   Living environment [facility] [place]  (with spouse)   Home/Community Accessibility Comments 3 steps to enter with railing and flight of stairs with railing to basement. Has shower in basement and upstairs - does not go to basement.   Current Assistive Devices Front Wheeled Walker   ADL Devices Raised Toilet Seat;Extended Tub Bench   Assistive Devices Comments Grandchildren have helepd set-up home with railings for stairs to enter home, raised toliet seat and bathbench.  "   Patient/Family Goals Statement gardening, improved balance   Fall Risk Screen   Fall screen completed by PT   Have you fallen 2 or more times in the past year? No   Have you fallen and had an injury in the past year? No  (1 fall 1/2018)   Timed Up and Go score (seconds) n/t (see DGI)   Is patient a fall risk? Yes;Department fall risk interventions implemented   Fall screen comments Conisdered inc fall risk per DGIDANIEL. Denies any falls since fall with CVA in January. Reports some near falls with support from nearby furnature/wall to prevent fall to floor, but cannot recal circumstances. Uses 2ww in community. No instability with use of 2ww.    Pain   Patient currently in pain No   Cognitive Status Examination   Orientation orientation to person, place and time   Level of Consciousness alert   Follows Commands and Answers Questions 100% of the time;able to follow multistep instructions   Personal Safety and Judgment intact   Cognitive Comment spouse reports some gradual memory changes, no notable changes in memory since CVA   Posture   Posture Comments B knee and trunk flexion, able to partially correct with cues, mild L trunk SB, forward head   Range of Motion (ROM)   ROM Comment B HS/HC tightness, able to achieve full knee extension in supine   Strength   Strength Comments R hip flexion 4-/5, L hip flexion 4+/5, B knee flexion/ext 5/5, B DF 5/5   Bed Mobility   Bed Mobility Comments independent   Transfer Skills   Transfer Comments able to rise without UE support, vaired stability   Gait   Gait Comments over short distances indoors without AD: short step length, abesent heelstrike, mild R trendelberg, mild path deviations without LOB, minimal armswing, mild forward flexed posture at trunk sustained throughout   Gait Special Tests   Gait Special Tests 25 FOOT TIMED WALK;DYNAMIC GAIT INDEX   Gait Special Tests 25 Foot Timed Walk   Seconds 8.82 sec   Comments no AD (.86 m/s)   Gait Special Tests Dynamic Gait Index    Score out of 24 16   Comments </=19/24 = inc fall risk   Balance   Balance Comments minimal sway instanding w/o UE support   Balance Special Tests   Balance Special Tests Sit to stand reps;Ann balance;Romberg;Sharpened Romberg   Balance Special Tests Ann Balance   Score out of 56 44   Comments </= 45 = inc fall risk   Balance Special Tests Romberg   Comments (+) romberg EO: 30 sec, EC: immediate LOB   Balance Special Tests Sharpened Romberg   Comments x13 sec R foot posterior before LOB   Balance Special Tests Sit to Stand Reps in 30 Seconds   Reps in 30 seconds 6   Height 18    Comments no UE support, inital posterior LOB into chair, able to continue with improved stability   Coordination   Coordination Comments functionally impaired as observed with gait and dyntamic stability challenges   Muscle Tone   Muscle Tone no deficits were identified   Planned Therapy Interventions   Planned Therapy Interventions balance training;gait training;motor coordination training;neuromuscular re-education;strengthening;stretching;transfer training   Clinical Impression   Criteria for Skilled Therapeutic Interventions Met yes, treatment indicated   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   Influenced by the following impairments impaired posture, LE ms tissue tightness, static and dynamic postural stability, gait and gait stability, coordination, reports visual impairements    Functional limitations due to impairments increased risk for falls, impaired functional home and community ambulation, participation in prior recreational activities (ie gardening)   Clinical Presentation Stable/Uncomplicated   Clinical Decision Making (Complexity) Low complexity   Therapy Frequency 2 times/Week   Predicted Duration of Therapy Intervention (days/wks) 12 weeks   Risk & Benefits of therapy have been explained Yes   Patient, Family & other staff in agreement with plan of care Yes   Education Assessment   Barriers to Learning Visual  "  GOALS   PT Eval Goals 1;2;3;4;5;6   Goal 1   Goal Identifier HEP   Goal Description Pt to demonstrate (I) with HEP for strengthening, postural and gait stability, and endurance exercises for progress towards all goals and continued self maintainance following d/c from therapy    Target Date 07/05/18   Goal 2   Goal Identifier 360 degree turn (baseline: 10 step to turn, mild instability, no LOB)   Goal Description Pt to demonstrate improved dynamic SL stance stability towards improved safety with functional turns navigaiting home by completing 360 degreen turn in </= 5 steps.   Target Date 07/05/18   Goal 3   Goal Identifier 30 sec sit < >stand (baseline: 6 reps from 18\" without UE support; age/gender norms 14 reps)   Goal Description Pt to complete >/= 10 reps sit <>  30 sec to demostrate improved functional LE strength and safety with transfers from standard height chair.    Target Date 07/05/18   Goal 4   Goal Identifier DGI (baseline: no AD, 16/24; MDC chonic stroke 2.6 pts)   Goal Description The pt will improve score on DGI from 16 to >19/24 on the dynamic gait index to indicate low fall risk with community ambulation or use a cane consistently    Target Date 07/05/18   Goal 5   Goal Identifier Gait/Gait speed (baseline: 25' walk .86 m/s without AD; short step length, abesent heelstrike, mild R trendelberg, mild path deviations without LOB, minimal armswing, mild forward flexed posture at trunk sustained throughout)   Goal Description Pt to demonstrate imporved observable gait and gait speed to achieve age and gender matched norms >/= 1.2 m/s towards improved safety with community ambulatino and crossing streets.   Target Date 07/05/18   Goal 6   Goal Identifier Floor transfers/Gardening   Goal Description Pt to demonstrate independance and stabiltiy with floor transfers <> standing on firm and compliant surfances towards safety returning to gardening activity.   Target Date 07/05/18   Total " Evaluation Time   Total Evaluation Time (Minutes) 50

## 2018-04-17 ENCOUNTER — TRANSFERRED RECORDS (OUTPATIENT)
Dept: HEALTH INFORMATION MANAGEMENT | Facility: CLINIC | Age: 78
End: 2018-04-17

## 2018-04-19 ENCOUNTER — HOSPITAL ENCOUNTER (OUTPATIENT)
Dept: PHYSICAL THERAPY | Facility: CLINIC | Age: 78
Setting detail: THERAPIES SERIES
End: 2018-04-19
Attending: INTERNAL MEDICINE
Payer: COMMERCIAL

## 2018-04-19 PROCEDURE — 97110 THERAPEUTIC EXERCISES: CPT | Mod: GP | Performed by: PHYSICAL THERAPIST

## 2018-04-19 PROCEDURE — 40000719 ZZHC STATISTIC PT DEPARTMENT NEURO VISIT: Performed by: PHYSICAL THERAPIST

## 2018-04-21 DIAGNOSIS — E13.10 DIABETIC KETOACIDOSIS WITHOUT COMA ASSOCIATED WITH OTHER SPECIFIED DIABETES MELLITUS (H): ICD-10-CM

## 2018-04-23 RX ORDER — INSULIN GLARGINE 100 [IU]/ML
INJECTION, SOLUTION SUBCUTANEOUS
Qty: 30 ML | Refills: 0 | Status: SHIPPED | OUTPATIENT
Start: 2018-04-23 | End: 2018-07-26

## 2018-05-01 ENCOUNTER — HOSPITAL ENCOUNTER (OUTPATIENT)
Dept: PHYSICAL THERAPY | Facility: CLINIC | Age: 78
Setting detail: THERAPIES SERIES
End: 2018-05-01
Attending: INTERNAL MEDICINE
Payer: COMMERCIAL

## 2018-05-01 PROCEDURE — 40000719 ZZHC STATISTIC PT DEPARTMENT NEURO VISIT: Performed by: PHYSICAL THERAPIST

## 2018-05-01 PROCEDURE — 97110 THERAPEUTIC EXERCISES: CPT | Mod: GP | Performed by: PHYSICAL THERAPIST

## 2018-05-07 ENCOUNTER — HOSPITAL ENCOUNTER (OUTPATIENT)
Dept: PHYSICAL THERAPY | Facility: CLINIC | Age: 78
Setting detail: THERAPIES SERIES
End: 2018-05-07
Attending: INTERNAL MEDICINE
Payer: COMMERCIAL

## 2018-05-07 PROCEDURE — 97112 NEUROMUSCULAR REEDUCATION: CPT | Mod: GP | Performed by: PHYSICAL THERAPIST

## 2018-05-07 PROCEDURE — 97116 GAIT TRAINING THERAPY: CPT | Mod: GP | Performed by: PHYSICAL THERAPIST

## 2018-05-07 PROCEDURE — 40000719 ZZHC STATISTIC PT DEPARTMENT NEURO VISIT: Performed by: PHYSICAL THERAPIST

## 2018-05-07 PROCEDURE — 97110 THERAPEUTIC EXERCISES: CPT | Mod: GP | Performed by: PHYSICAL THERAPIST

## 2018-05-11 ENCOUNTER — HOSPITAL ENCOUNTER (OUTPATIENT)
Dept: PHYSICAL THERAPY | Facility: CLINIC | Age: 78
Setting detail: THERAPIES SERIES
End: 2018-05-11
Attending: INTERNAL MEDICINE
Payer: COMMERCIAL

## 2018-05-11 PROCEDURE — 40000719 ZZHC STATISTIC PT DEPARTMENT NEURO VISIT: Performed by: PHYSICAL THERAPIST

## 2018-05-11 PROCEDURE — 97112 NEUROMUSCULAR REEDUCATION: CPT | Mod: GP | Performed by: PHYSICAL THERAPIST

## 2018-05-15 ENCOUNTER — TELEPHONE (OUTPATIENT)
Dept: PHYSICAL MEDICINE AND REHAB | Facility: CLINIC | Age: 78
End: 2018-05-15

## 2018-05-15 NOTE — TELEPHONE ENCOUNTER
[facility] Call Center    Phone Message    May a detailed message be left on voicemail: yes    Reason for Call: Other: Pt's wife is requesting to cancel appointment today.      Action Taken: Message routed to:  [facility] ([facility]): PM&R

## 2018-05-15 NOTE — TELEPHONE ENCOUNTER
Appointment cancelled due to transportation issues. It is too [facility] for [facility] [place] [facility] [place] [facility] per [place]. She did not want to reschedule. He will follow up with his primary MD.

## 2018-05-16 ENCOUNTER — OFFICE VISIT (OUTPATIENT)
Dept: INTERNAL MEDICINE | Facility: CLINIC | Age: 78
End: 2018-05-16
Payer: COMMERCIAL

## 2018-05-16 VITALS
OXYGEN SATURATION: 100 % | WEIGHT: 165 LBS | HEART RATE: 76 BPM | HEIGHT: 66 IN | DIASTOLIC BLOOD PRESSURE: 62 MMHG | BODY MASS INDEX: 26.52 KG/M2 | SYSTOLIC BLOOD PRESSURE: 106 MMHG | TEMPERATURE: 97.4 F | RESPIRATION RATE: 14 BRPM

## 2018-05-16 DIAGNOSIS — I10 HYPERTENSION GOAL BP (BLOOD PRESSURE) < 140/90: ICD-10-CM

## 2018-05-16 DIAGNOSIS — I63.212 CEREBROVASCULAR ACCIDENT (CVA) DUE TO STENOSIS OF LEFT VERTEBRAL ARTERY (H): ICD-10-CM

## 2018-05-16 DIAGNOSIS — E78.5 HYPERLIPIDEMIA LDL GOAL <100: ICD-10-CM

## 2018-05-16 DIAGNOSIS — E11.42 TYPE 2 DIABETES MELLITUS WITH DIABETIC POLYNEUROPATHY, WITH LONG-TERM CURRENT USE OF INSULIN (H): Primary | ICD-10-CM

## 2018-05-16 DIAGNOSIS — Z79.4 TYPE 2 DIABETES MELLITUS WITH DIABETIC POLYNEUROPATHY, WITH LONG-TERM CURRENT USE OF INSULIN (H): Primary | ICD-10-CM

## 2018-05-16 DIAGNOSIS — R39.198 SLOWING OF URINARY STREAM: ICD-10-CM

## 2018-05-16 PROCEDURE — 99214 OFFICE O/P EST MOD 30 MIN: CPT | Performed by: INTERNAL MEDICINE

## 2018-05-16 NOTE — PATIENT INSTRUCTIONS
"Contact information provided for \"[facility]\" in [place], in case this is easier than getting to the Neurologist at the [facility].     Keep working with Physical Therapy (Occupational Therapy too if they also recommend this).     Keep working with Dr Damian on the diabetes. We can check a urine protein test at your next lab visit in June, along with the A1c.     Blood pressure and cholesterol look good.     See me in 3-6 months, depending on how closely Dr Damian is wanting to see you.   "

## 2018-05-16 NOTE — PROGRESS NOTES
SUBJECTIVE:   Pete Sheldon is a 77 year old male who presents to clinic today for the following health issues:  Ongoing late effects of stroke, diabetes mellitus, hyperlipidemia, hypertension.     Patient present with his wife today.    Diabetes Follow-up    Patient is checking blood sugars: four times daily.    Blood sugar testing frequency justification: Risk of hypoglycemia with medication(s)  Results are as follows:         am - 64- 137         lunchtime - 220         suppertime - 220         bedtime - 221    Diabetic concerns: blood sugars vary greatly, frequently over 200     Symptoms of hypoglycemia (low blood sugar): none     Paresthesias (numbness or burning in feet) or sores: No     Date of last diabetic eye exam: 4/17/2018    Hemoglobin A1C (%)   Date Value   03/07/2018 8.4 (H)   03/01/2018 8.3 (H)     A1c still above target. Patient has an appointment with Dr. Damian + labs scheduled 6/6/2018.     Hyperlipidemia Follow-Up      Rate your low fat/cholesterol diet?: good    Taking statin?  Yes, no muscle aches from statin    Other lipid medications/supplements?:  None    LDL Cholesterol Calculated (mg/dL)   Date Value   03/01/2018 70   01/10/2018 74     LDL controlled. He used to be on lovastatin 40 mg, but is now on atorvastatin 20 mg daily.     Hypertension Follow-up      Outpatient blood pressures are not being checked.    Low Salt Diet: no added salt    Amount of exercise or physical activity: None    Problems taking medications regularly: No    Medication side effects: possibly, spouse has a lot of quesitons    Diet: low salt, low fat/cholesterol and watches carbohydrate intake    BP Readings from Last 2 Encounters:   05/16/18 106/62   03/23/18 116/66     BP controlled. Patient's wife reports that she has noticed he has been coughing. He is currently on lisinopril 2.5 mg. He has had issues with lisinopril in the past due to hypotension and syncope.     Stroke  Patient is only going to PT, no  longer going to OT or speech therapy. They are going to inquire if he can restart OT tomorrow. Patient relates that his walking is not very good. Notes that his balance is still poor. He denies any falls. Patient relates that he feels unsteady on his feet, denies any visual spinning or dizziness. His speech was not affected with the stroke. His vision has always been poor, he has had cataract surgery but it did not provide the expected amount of relief. Notes that he has poor vision of the right eye.     Physicsl therapy reports having some concern about his cognition and vision and requests an order for OT to evaluate and treat.     Patient reports that it feels as if his the digits on his right hand are frozen-- they are numb. Also notes some numbness in the right arm. Because of this, he had difficulty administering insulin himself. He now administers insulin with his left hand.     Patient's wife notes that they have been having issues with transportation to neurology in [place]. Inquired if there are any specialists that are here in [place].     Problem list and histories reviewed & adjusted, as indicated.  Additional history: as documented    Reviewed and updated as needed this visit by clinical staff  Tobacco  Allergies  Meds  Med Hx  Surg Hx  Fam Hx  Soc Hx      Reviewed and updated as needed this visit by Provider         ROS:  No dyspnea or cough. No chest discomfort, dizziness or palpitations. No diarrhea, abdominal pain or rectal bleeding.   No acute problems with vision or speech, lateralizing weakness or paresthesias.    ROS: as above or negative for Respiratory, CV, GI, endocrine, neuro systems.    NEURO: POSITIVE for right hand and right arm numbness    This document serves as a record of the services and decisions personally performed and made by Rom Heml MD. It was created on his behalf by Doris Barajas, a trained medical scribe. The creation of this document is based on the  "provider's statements to the medical scribe.  Doris Barajas May 16, 2018 1:32 PM     OBJECTIVE:     /62 (BP Location: Right arm, Patient Position: Sitting, Cuff Size: Adult Large)  Pulse 76  Temp 97.4  F (36.3  C) (Oral)  Resp 14  Ht 1.676 m (5' 6\")  Wt 74.8 kg (165 lb)  SpO2 100%  BMI 26.63 kg/m2  Body mass index is 26.63 kg/(m^2).     GENERAL: healthy, alert and no distress  RESP: lungs clear to auscultation - no rales, rhonchi or wheezes  CV: regular rate and rhythm, normal S1 S2, no S3 or S4, no murmur, click or rub, no peripheral edema and peripheral pulses strong  MS: no gross musculoskeletal defects noted, no edema  SKIN: no suspicious lesions or rashes  NEURO: Normal strength and tone, mentation intact and speech normal  PSYCH: mentation appears normal, affect normal/bright    ASSESSMENT/PLAN:   (E11.42,  Z79.4) Type 2 diabetes mellitus with diabetic polyneuropathy, with long-term current use of insulin (H)  (primary encounter diagnosis)  Comment: A1c still above target. Continue current meds and follow up with Dr. Damian as planned  Plan: Albumin Random Urine Quantitative with Creat         Ratio          (E78.5) Hyperlipidemia LDL goal <100  Comment: Last LDL at target. Continue current meds.    (I63.212) Cerebrovascular accident (CVA) due to stenosis of left vertebral artery (H)  Comment: PT reporting some concerns about his cognition and vision. Contact information for neurology provided to patient and wife, in case they wish to go somewhere closer than at the [facility]  Placed order for OT  Plan: NEUROLOGY ADULT REFERRAL          (I10) Hypertension goal BP (blood pressure) < 140/90  Comment: BP at target. Continue current meds.    (R39.198) Slowing of urinary stream  Comment: Stable. Continue current meds.     FUTURE APPOINTMENTS:       - Follow-up visit in 3-6 months    Patient Instructions   Contact information provided for \"[facility]\" in [place], in case this is " easier than getting to the Neurologist at the [facility].     Keep working with Physical Therapy (Occupational Therapy too if they also recommend this).     Keep working with Dr Damian on the diabetes. We can check a urine protein test at your next lab visit in June, along with the A1c.     Blood pressure and cholesterol look good.     See me in 3-6 months, depending on how closely Dr Damian is wanting to see you.       The information in this document, created by the medical scribe for me, accurately reflects the services I personally performed and the decisions made by me. I have reviewed and approved this document for accuracy prior to leaving the patient care area.  May 16, 2018 1:32 PM    Rom Helm MD  [facility]

## 2018-05-16 NOTE — MR AVS SNAPSHOT
"              After Visit Summary   5/16/2018    Pete Sheldon    MRN: [id number]           Patient Information     Date Of Birth          1940        Visit Information        Provider Department      5/16/2018 1:20 PM Rom Helm MD [facility]        Today's Diagnoses     Type 2 diabetes mellitus with diabetic polyneuropathy, with long-term current use of insulin (H)    -  1    Hyperlipidemia LDL goal <100        Cerebrovascular accident (CVA) due to stenosis of left vertebral artery (H)        Hypertension goal BP (blood pressure) < 140/90        Slowing of urinary stream          Care Instructions    Contact information provided for \"[facility]\" in [place], in case this is easier than getting to the Neurologist at the [facility].     Keep working with Physical Therapy (Occupational Therapy too if they also recommend this).     Keep working with Dr Damian on the diabetes. We can check a urine protein test at your next lab visit in June, along with the A1c.     Blood pressure and cholesterol look good.     See me in 3-6 months, depending on how closely Dr Damian is wanting to see you.           Follow-ups after your visit        Additional Services     NEUROLOGY ADULT REFERRAL       Your provider has referred you for the following:   Consult at [facility]: Neurology Clinic - [place] [phone]   [email or web address]  Stroke/Endovascular  [facility]: [facility] [phone]   [email or web address]  [place] [phone]   [email or web address]    Please be aware that coverage of these services is subject to the terms and limitations of your health insurance plan.  Call member services at your health plan with any benefit or coverage questions.      Please bring the following with you to your appointment:    (1) Any X-Rays, CTs or " MRIs which have been performed.  Contact the facility where they were done to arrange for  prior to your scheduled appointment.    (2) List of current medications  (3) This referral request   (4) Any documents/labs given to you for this referral                  Your next 10 appointments already scheduled     May 17, 2018 11:15 AM CDT   Neuro Treatment with Page Hutchison, PT   [facility] ([facility])    [address] [address]   [phone]            May 21, 2018  1:00 PM CDT   Neuro Treatment with Page Hutchison, PT   [facility] ([facility])    [address] [address]   [phone]            May 23, 2018  4:00 PM CDT   Neuro Treatment with Page Hutchison, PT   [facility] ([facility])    [address] [address]   [phone]            May 31, 2018  2:00 PM CDT   Neuro Treatment with Page Hutchison, PT   [facility] ([facility])    [address] [address]   [phone]            Jun 01, 2018  1:30 PM CDT   Neuro Treatment with Celestina Harris, PT   [facility] ([facility])    [address] [address]   [phone]            Jun 04, 2018  1:00 PM CDT   Neuro Treatment with Page Hutchison, PT   [facility] ([facility])    [address] [address]   [phone]            Jun 06, 2018  2:00 PM CDT   LAB with [facility]   [facility] ([facility])    [address]  [facility] [address]   [phone]           Please do not eat 10-12 hours before your appointment if you are coming in fasting for labs on lipids, cholesterol, or glucose (sugar). This does not apply to pregnant women. Water, hot tea and black  "coffee (with nothing added) are okay. Do not drink other fluids, diet soda or chew gum.            Jun 06, 2018  2:30 PM CDT   Return Visit with Lori Damian MD   [facility] ([facility])    [address]  [facility] [id number] [phone]            Jun 07, 2018  1:00 PM CDT   Neuro Treatment with Page Hutchison, PT   [facility] ([facility])    [address] [id number] [phone]            Jun 11, 2018  1:00 PM CDT   Neuro Treatment with Page Hutchison, PT   [facility] ([facility])    [address] [id number] [phone]              Future tests that were ordered for you today     Open Future Orders        Priority Expected Expires Ordered    Albumin Random Urine Quantitative with Creat Ratio Routine  5/16/2019 5/16/2018            Who to contact     If you have questions or need follow up information about today's clinic visit or your schedule please contact [facility] directly at [phone].  Normal or non-critical lab and imaging results will be communicated to you by MyChart, letter or phone within 4 business days after the clinic has received the results. If you do not hear from us within 7 days, please contact the clinic through [employer] or phone. If you have a critical or abnormal lab result, we will notify you by phone as soon as possible.  Submit refill requests through [employer] or call your pharmacy and they will forward the refill request to us. Please allow 3 business days for your refill to be completed.          Additional Information About Your Visit        [employer] Information     [employer] lets you send messages to your doctor, view your test results, renew your prescriptions, schedule appointments and more. To sign up, go to [email or web address] . Click on \"Log in\" on the left side of the " "screen, which will take you to the Welcome page. Then click on \"Sign up Now\" on the right side of the page.     You will be asked to enter the access code listed below, as well as some personal information. Please follow the directions to create your username and password.     Your access code is: [email or web address]  Expires: 2018  3:53 PM     Your access code will  in 90 days. If you need help or a new code, please call your [facility] or [phone].        Care EveryWhere ID     This is your Care EveryWhere ID. This could be used by other organizations to access your [facility] medical records  [id number]        Your Vitals Were     Pulse Temperature Respirations Height Pulse Oximetry BMI (Body Mass Index)    76 97.4  F (36.3  C) (Oral) 14 5' 6\" (1.676 m) 100% 26.63 kg/m2       Blood Pressure from Last 3 Encounters:   18 106/62   18 116/66   03/15/18 109/63    Weight from Last 3 Encounters:   18 165 lb (74.8 kg)   18 150 lb (68 kg)   03/15/18 160 lb (72.6 kg)              We Performed the Following     NEUROLOGY ADULT REFERRAL        Primary Care Provider Office Phone # Fax #    Rom Helm [phone]       [address]  [facility] [address]        Goals        Diet    Increase water intake     Notes - Note edited  2014 11:42 AM by Patricia Khan RN    Have 5-6 glasses (8oz) of fluid a day  Per f/u with spouse today, pt not doing well. Not eating and drinking. Called clinic and advised to take pt to clinic.         Increase water intake        General    Medication 1 (pt-stated)     Notes - Note created  5/15/2014  1:57 PM by Patricia Khan RN    Pt will have an MTM consult         Equal Access to Services     CAR BANDA AH: Irvin Calvert, waaxda luqadaha, qaybta kaalmakadeem mccallum ah. So [facility] [phone].    ATENCIÓN: Si habla español, tiene a moise disposición servicios " dirk de asistencia lingüística. Ryne wood [phone].    We comply with applicable federal civil rights laws and [place] laws. We do not discriminate on the basis of race, color, national origin, age, disability, sex, sexual orientation, or gender identity.            Thank you!     Thank you for choosing [facility]  for your care. Our goal is always to provide you with excellent care. Hearing back from our patients is one way we can continue to improve our services. Please take a few minutes to complete the written survey that you may receive in the mail after your visit with us. Thank you!             Your Updated Medication List - Protect others around you: Learn how to safely use, store and throw away your medicines at www.disposemymeds.org.          This list is accurate as of 5/16/18  1:58 PM.  Always use your most recent med list.                   Brand Name Dispense Instructions for use Diagnosis    ACE/ARB/ARNI NOT PRESCRIBED (INTENTIONAL)      by Other route continuous prn (Hyperkalemia) Reported on 5/19/2017    Type 2 diabetes, HbA1C goal < 8% (H)       amLODIPine 5 MG tablet    NORVASC    90 tablet    TAKE ONE TABLET BY MOUTH ONCE DAILY    Hypertension goal BP (blood pressure) < 140/90       aspirin 81 MG chewable tablet     36 tablet    Take 4 tablets (324 mg) by mouth daily    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)       atorvastatin 20 MG tablet    LIPITOR    30 tablet    Take 1 tablet (20 mg) by mouth daily    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)       B-D U/F 31G X 8 MM   Generic drug:  insulin pen needle     400 each    B-D ULTRA FINE SHORT PEN NEEDLES, USE 4 TIMES A DAY TO INJECT INSULIN    Type 2 diabetes, HbA1C goal < 8% (H)       * blood glucose monitoring test strip    ONETOUCH VERIO IQ    200 strip    One Touch Verio Flex--Use to test blood sugars 4 times daily or as directed.    Type 2  diabetes mellitus with diabetic polyneuropathy, with long-term current use of insulin (H)       * blood glucose monitoring test strip    ONETOUCH VERIO IQ    200 strip    Use to test blood sugars 4 times daily or as directed using One Touch Verio Flex.    Type 2 diabetes mellitus with diabetic polyneuropathy, with long-term current use of insulin (H)       finasteride 5 MG tablet    PROSCAR    90 tablet    Take 1 tablet (5 mg) by mouth daily    Urinary frequency       Glucosamine HCl 750 MG Tabs      Take 750 mg by mouth 2 times daily    Arthritis       GLUCOSE MANAGEMENT Tabs     100 tablet    4 tabs po for low blood sugar below 70, may repeat q 10-15 minutes as needed    Type 2 diabetes mellitus with diabetic polyneuropathy, with long-term current use of insulin (H)       insulin aspart 100 UNIT/ML injection    NovoLOG PEN    30 mL    Inject 7 units w/ breakfast, 5 units w/ lunch, 7 units w/ dinner. Hold if not eating meal.    Type 2 diabetes mellitus with diabetic polyneuropathy, with long-term current use of insulin (H)       * insulin glargine 100 UNIT/ML injection    LANTUS    9 mL    Inject 30 Units Subcutaneous every morning    Type 2 diabetes mellitus with diabetic polyneuropathy, with long-term current use of insulin (H)       * LANTUS SOLOSTAR 100 UNIT/ML injection   Generic drug:  insulin glargine     30 mL    INJECT 35 UNITS SUBCUTANEOUSLY IN THE MORNING BEFORE BREAKFAST    Diabetic ketoacidosis without coma associated with other specified diabetes mellitus (H)       insulin syringes (disposable) U-100 0.3 ML Misc     100 each    1 each 2 times daily    Type II or unspecified type diabetes mellitus without mention of complication, not stated as uncontrolled       LIFESCAN FINEPOINT LANCETS Misc     200 strip    Use to test blood sugars 4 times daily or as directed.    Type 2 diabetes, HbA1C goal < 8% (H)       lisinopril 2.5 MG tablet    PRINIVIL/Zestril    30 tablet    Take 1 tablet (2.5 mg) by mouth  "daily    Hypertension goal BP (blood pressure) < 140/90       loratadine 10 MG tablet    AF LORATADINE    14 tablet    Take 1 tablet (10 mg) by mouth daily    Hypertension goal BP (blood pressure) < 140/90       metFORMIN 500 MG tablet    GLUCOPHAGE    30 tablet    Take 1 tablet (500 mg) by mouth daily (with breakfast)    Type 2 diabetes mellitus with diabetic polyneuropathy, with long-term current use of insulin (H)       multivitamin Tabs tablet      Take 1 tablet by mouth daily FOCUS SELECT PREMIUM WITH LUTEIN per eye doctor Reported on 5/19/2017        order for DME     3 Month    All DM testing supplies including test strips, lancets, solution, syringes, needles and/or pen needles for testing 4 times per day.  Brand \"Contour Next\"    Type 2 diabetes mellitus with diabetic polyneuropathy (H)       tamsulosin 0.4 MG capsule    FLOMAX    30 capsule    Take 1 capsule (0.4 mg) by mouth every evening    Calculus of kidney       * Notice:  This list has 4 medication(s) that are the same as other medications prescribed for you. Read the directions carefully, and ask your doctor or other care provider to review them with you.      "

## 2018-05-17 ENCOUNTER — HOSPITAL ENCOUNTER (OUTPATIENT)
Dept: PHYSICAL THERAPY | Facility: CLINIC | Age: 78
Setting detail: THERAPIES SERIES
End: 2018-05-17
Attending: INTERNAL MEDICINE
Payer: COMMERCIAL

## 2018-05-17 PROCEDURE — 97110 THERAPEUTIC EXERCISES: CPT | Mod: GP | Performed by: PHYSICAL THERAPIST

## 2018-05-17 PROCEDURE — 97112 NEUROMUSCULAR REEDUCATION: CPT | Mod: GP | Performed by: PHYSICAL THERAPIST

## 2018-05-17 PROCEDURE — 97116 GAIT TRAINING THERAPY: CPT | Mod: GP | Performed by: PHYSICAL THERAPIST

## 2018-05-17 PROCEDURE — 40000719 ZZHC STATISTIC PT DEPARTMENT NEURO VISIT: Performed by: PHYSICAL THERAPIST

## 2018-05-21 ENCOUNTER — HOSPITAL ENCOUNTER (OUTPATIENT)
Dept: PHYSICAL THERAPY | Facility: CLINIC | Age: 78
Setting detail: THERAPIES SERIES
End: 2018-05-21
Attending: INTERNAL MEDICINE
Payer: COMMERCIAL

## 2018-05-21 PROCEDURE — 97116 GAIT TRAINING THERAPY: CPT | Mod: GP | Performed by: PHYSICAL THERAPIST

## 2018-05-21 PROCEDURE — 97110 THERAPEUTIC EXERCISES: CPT | Mod: GP | Performed by: PHYSICAL THERAPIST

## 2018-05-21 PROCEDURE — 40000719 ZZHC STATISTIC PT DEPARTMENT NEURO VISIT: Performed by: PHYSICAL THERAPIST

## 2018-05-23 ENCOUNTER — HOSPITAL ENCOUNTER (OUTPATIENT)
Dept: PHYSICAL THERAPY | Facility: CLINIC | Age: 78
Setting detail: THERAPIES SERIES
End: 2018-05-23
Attending: INTERNAL MEDICINE
Payer: COMMERCIAL

## 2018-05-23 PROCEDURE — 97116 GAIT TRAINING THERAPY: CPT | Mod: GP | Performed by: PHYSICAL THERAPIST

## 2018-05-23 PROCEDURE — 40000719 ZZHC STATISTIC PT DEPARTMENT NEURO VISIT: Performed by: PHYSICAL THERAPIST

## 2018-05-23 PROCEDURE — 97110 THERAPEUTIC EXERCISES: CPT | Mod: GP | Performed by: PHYSICAL THERAPIST

## 2018-05-31 ENCOUNTER — HOSPITAL ENCOUNTER (OUTPATIENT)
Dept: PHYSICAL THERAPY | Facility: CLINIC | Age: 78
Setting detail: THERAPIES SERIES
End: 2018-05-31
Attending: INTERNAL MEDICINE
Payer: COMMERCIAL

## 2018-05-31 PROCEDURE — 97112 NEUROMUSCULAR REEDUCATION: CPT | Mod: GP | Performed by: PHYSICAL THERAPIST

## 2018-05-31 PROCEDURE — 97530 THERAPEUTIC ACTIVITIES: CPT | Mod: GP | Performed by: PHYSICAL THERAPIST

## 2018-05-31 PROCEDURE — 40000719 ZZHC STATISTIC PT DEPARTMENT NEURO VISIT: Performed by: PHYSICAL THERAPIST

## 2018-06-01 ENCOUNTER — HOSPITAL ENCOUNTER (OUTPATIENT)
Dept: PHYSICAL THERAPY | Facility: CLINIC | Age: 78
Setting detail: THERAPIES SERIES
End: 2018-06-01
Attending: INTERNAL MEDICINE
Payer: COMMERCIAL

## 2018-06-01 PROCEDURE — 40000719 ZZHC STATISTIC PT DEPARTMENT NEURO VISIT: Performed by: PHYSICAL THERAPIST

## 2018-06-01 PROCEDURE — 97116 GAIT TRAINING THERAPY: CPT | Mod: GP | Performed by: PHYSICAL THERAPIST

## 2018-06-01 PROCEDURE — 97110 THERAPEUTIC EXERCISES: CPT | Mod: GP | Performed by: PHYSICAL THERAPIST

## 2018-06-04 ENCOUNTER — HOSPITAL ENCOUNTER (OUTPATIENT)
Dept: PHYSICAL THERAPY | Facility: CLINIC | Age: 78
Setting detail: THERAPIES SERIES
End: 2018-06-04
Attending: INTERNAL MEDICINE
Payer: COMMERCIAL

## 2018-06-04 DIAGNOSIS — Z86.73 H/O: CVA (CEREBROVASCULAR ACCIDENT): Primary | ICD-10-CM

## 2018-06-04 PROCEDURE — 40000719 ZZHC STATISTIC PT DEPARTMENT NEURO VISIT: Performed by: PHYSICAL THERAPIST

## 2018-06-04 PROCEDURE — 97750 PHYSICAL PERFORMANCE TEST: CPT | Mod: GP,XU | Performed by: PHYSICAL THERAPIST

## 2018-06-04 PROCEDURE — 97530 THERAPEUTIC ACTIVITIES: CPT | Mod: GP | Performed by: PHYSICAL THERAPIST

## 2018-06-06 NOTE — ADDENDUM NOTE
Encounter addended by: Page Hutchison, PT on: 6/6/2018 12:45 PM<BR>     Actions taken: Flowsheet data copied forward, Flowsheet accepted

## 2018-06-07 ENCOUNTER — HOSPITAL ENCOUNTER (OUTPATIENT)
Dept: OCCUPATIONAL THERAPY | Facility: CLINIC | Age: 78
Setting detail: THERAPIES SERIES
End: 2018-06-07
Attending: INTERNAL MEDICINE
Payer: COMMERCIAL

## 2018-06-07 ENCOUNTER — HOSPITAL ENCOUNTER (OUTPATIENT)
Dept: PHYSICAL THERAPY | Facility: CLINIC | Age: 78
Setting detail: THERAPIES SERIES
End: 2018-06-07
Attending: INTERNAL MEDICINE
Payer: COMMERCIAL

## 2018-06-07 PROCEDURE — G8986 CARRY D/C STATUS: HCPCS | Mod: GO | Performed by: OCCUPATIONAL THERAPIST

## 2018-06-07 PROCEDURE — G8979 MOBILITY GOAL STATUS: HCPCS | Mod: GP,CI | Performed by: PHYSICAL THERAPIST

## 2018-06-07 PROCEDURE — 97166 OT EVAL MOD COMPLEX 45 MIN: CPT | Mod: GO | Performed by: OCCUPATIONAL THERAPIST

## 2018-06-07 PROCEDURE — 97110 THERAPEUTIC EXERCISES: CPT | Mod: GP | Performed by: PHYSICAL THERAPIST

## 2018-06-07 PROCEDURE — 40000719 ZZHC STATISTIC PT DEPARTMENT NEURO VISIT: Performed by: PHYSICAL THERAPIST

## 2018-06-07 PROCEDURE — G8984 CARRY CURRENT STATUS: HCPCS | Mod: GO | Performed by: OCCUPATIONAL THERAPIST

## 2018-06-07 PROCEDURE — 97112 NEUROMUSCULAR REEDUCATION: CPT | Mod: GP | Performed by: PHYSICAL THERAPIST

## 2018-06-07 PROCEDURE — 40000125 ZZHC STATISTIC OT OUTPT VISIT: Performed by: OCCUPATIONAL THERAPIST

## 2018-06-07 PROCEDURE — 97110 THERAPEUTIC EXERCISES: CPT | Mod: GO | Performed by: OCCUPATIONAL THERAPIST

## 2018-06-07 PROCEDURE — G8980 MOBILITY D/C STATUS: HCPCS | Mod: GP,CI | Performed by: PHYSICAL THERAPIST

## 2018-06-07 PROCEDURE — G8985 CARRY GOAL STATUS: HCPCS | Mod: GO,CI | Performed by: OCCUPATIONAL THERAPIST

## 2018-06-08 NOTE — PROGRESS NOTES
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   Onset of Illness/Injury or Date of Surgery 01/01/18   Surgical/Medical History Reviewed Yes   Additional Occupational Profile Info/Pertinent History of Current Problem Patient is 78 yo male who was hospitizated from 1/9/18-1/12/18 for infarct L thalamus and being seen today for OP OT evaluation His PCP is Dr Helm with orders for today's evaluation from Dr San as well as orders from Dr. Helm. Per chart review  pt presented to hospital with R facial weakness, R arm weakness and ataxia, and R arm sensory loss. CTA head/neck demonstrated occlusion of R vertebral artery. Hospital course complicated by UTI and elevated blood glucose. Endocrinology made medications changes to insulin and sugars brought under control. He was discharged to acute rehab and then home in early February. His spouse, Goldy, accompanies pt today and assists with providing current limitations. They estimate he was seen for about 1 month of PT/OT/SLP and d/c'd from homecare about 2 months ago. Other PMH: DM2 with neuropathy, HTN, and HLD.  Pt denies any numbness or tingling in LEs or feet. No focal LE weakness reported.    CURRENT FUNCTION: He continues to report R UE numbness and fine motor deficits, unable to button clothes and with increased time to tie shoes, but this has improved some..  Mod (I) bathing sitting on bathbench. Otherwise (I) with ADLs. - neither he or spouse drive. Using \"Go Go Grandparent\" for rides to medical appointments. Friend helps with groceries.   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    Role/Living Environment Comments Patient has lack of sensation in R UE and hand, describing the feeling as \"wet sand on the hand that needs to be brushed off.\"  This limits his ability for FMC with things like writing and clothing fasteners, turning pages of books or magazines and opening containers. Patient currently can mow the lawn but must take at least two rest breaks sitting down during the process to completed the task.    Patient/family Goals Statement R UE working better so you can write, garden, do insulin shots, have more strength   Pain   Patient currently in pain No   Fall Risk Screen   Fall screen completed by PT   Fall screen comments Patient just completed his episode with PT today.   Cognitive Status Examination   Orientation Orientation to person, place and time   Level of Consciousness Alert   Follows Commands and Answers Questions 100% of the time   Memory Intact   Visual Perception   Visual Perception Wears glasses   Visual Perception Comments Patient needs new glasses   Range of Motion (ROM)   ROM Quick Adds No deficits identified   Hand Strength   Hand Dominance Right   Left Hand  (pounds) 72 pounds   Right Hand  (pounds) 65 pounds   Left Lateral Pinch (pounds) 15 pounds   Right Lateral Pinch (pounds) 12 pounds   Left Three Point Pinch (pounds) 12 pounds   Right Three Point Pinch (pounds) 10 pounds   Hand Strength Comments decreased pinch strength impaired pinch for R three point pinch and below average for the Left 3point and B torrez pinch. R and L  strength WNL   Coordination   Fine Motor Coordination impaired   Left Hand, Nine Hole Peg Test (seconds) 35   Right Hand, Nine " Hole Peg Test (seconds) 45   Coordination Comments FMC impaired based on norms for age and gender   Bathing   Level of [place] - Bathing independent   Assistive Device tub seat with back   Bathing Comments Patient requires extra time to complete tasks due to fatigue and lack of sensation in R UE   Upper Body Dressing   Level of [place]: Dress Upper Body independent   Upper Body Dressing Comments Patient requires extra time to complete tasks due to fatigue and lack of sensation in R UE   Lower Body Dressing   Level of [place]: Dress Lower Body independent   Lower Body Dressing Comments Patient requires extra time to complete tasks due to fatigue and lack of sensation in R UE   Toileting   Level of [place]: Toilet independent   Grooming   Level of [place]: Grooming independent   Grooming Comments Patient requires extra time to complete tasks due to fatigue and lack of sensation in R UE   Eating/Self-Feeding   Level of [place]: Eating independent   Eating/Self Feeding Comments Patient requires extra time to complete tasks due to fatigue and lack of sensation in R UE   Planned Therapy Interventions   Planned Therapy Interventions ADL training;IADL training;Cognitive performance testing;Coordination training;Self care/Home management;Strengthening   OT Goal 1   Goal Identifier HEP strength and coordination   Goal Description Patient to demonstrate improved B strength and coordination skills through independence with FM HEP and an increased performance with ADLs (manipulating buttons, zippers, typing skills, etc.) using compensatory measures prn and by demonstrating improved 9-Hole Peg Test score.   Target Date 09/06/18   OT Goal 2   Goal Identifier UE Sensation exercises and strategies   Goal Description Patient will verbalize 2-3 strategies to compensate for decreased UE sensation for increased independence and safety during ADL/IADLs (bathing, meal prep, etc.).  And will demonstrate  sensation re-education exercises to perform as part of a home exercises program.   Target Date 09/06/18   OT Goal 3   Goal Identifier AE for ADLs/handwriting, silverware, grasping with R hand   Goal Description Patient to verbalize understanding of and demonstrate use of 3 new pieces of adaptive equipment and/or adapted techniques to increase his independence and safety with ADL/IADLs (handwriting, feeding self, dressing, etc.   Target Date 09/06/18   OT Goal 4   Goal Identifier Fatigue managmet/EC techniques   Goal Description Patient to verbalize and independently utilize 3 strategies for energy conservation/work simplification and fatigue management for improved independence with ADL/IADLs at home and in the community, and demonstrate use of these tasks during session (Errands List, taking breaks prn during session, etc.)   Target Date 09/06/18   Clinical Impression   Criteria for Skilled Therapeutic Interventions Met Yes, treatment indicated   OT Diagnosis decreased ADLs/IADLs   Influenced by the following impairments numb arm and hand-loss of sensation, decreased coordination, decreased pinch strength, fatigue   Assessment of Occupational Performance 3-5 Performance Deficits   Identified Performance Deficits difficulty turning pages, giving self insulin shots, writing, opening containers,    Clinical Decision Making (Complexity) Moderate complexity   Therapy Frequency 1x/week   Predicted Duration of Therapy Intervention (days/wks) 12 weeks   Risks and Benefits of Treatment have been explained. Yes   Patient, Family & other staff in agreement with plan of care Yes   Clinical Impression Comments Patient will benefit from OT services to address the above goals   Education Assessment   Barriers To Learning No Barriers   Preferred Learning Style Listening;Reading;Demonstration;Pictures/video   Therapy Certification   Certification date from 06/08/18   Certification date to 09/07/18   Certification I certify the need  for these services furnished under this plan of treatment and while under my care.  (Physician co-signature of this document indicates review and certification of the therapy plan)   Total Evaluation Time   Total Evaluation Time 45

## 2018-06-08 NOTE — PROGRESS NOTES
[facility]          OUTPATIENT OCCUPATIONAL THERAPY  EVALUATION  PLAN OF TREATMENT FOR OUTPATIENT REHABILITATION  (COMPLETE FOR INITIAL CLAIMS ONLY)  Patient's Last Name, First Name, M.I.  YOB: 1940  Pete Sheldon                        Provider's Name  [facility] Medical Record No.  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                          OT Diagnosis:     decreased ADLs/IADLs Visits from SOC:  1   _________________________________________________________________________________  Plan of Treatment/Functional Goals:  ADL training, IADL training, Cognitive performance testing, Coordination training, Self care/Home management, Strengthening                    Goals  Goal Identifier:  HEP strength and coordination  Goal Description: Patient to demonstrate improved B strength and coordination skills through independence with FM HEP and an increased performance with ADLs (manipulating buttons, zippers, typing skills, etc.) using compensatory measures prn and by demonstrating improved 9-Hole Peg Test score.  Target Date: 09/06/18     Goal Identifier: UE Sensation exercises and strategies  Goal Description: Patient will verbalize 2-3 strategies to compensate for decreased UE sensation for increased independence and safety during ADL/IADLs (bathing, meal prep, etc.).  And will demonstrate sensation re-education exercises to perform as part of a home exercises program.  Target Date: 09/06/18     Goal Identifier: AE for ADLs/handwriting, silverware, grasping with R hand  Goal Description: Patient to verbalize understanding of and demonstrate use of 3 new pieces of adaptive equipment and/or  adapted techniques to increase his independence and safety with ADL/IADLs (handwriting, feeding self, dressing, etc.  Target Date: 09/06/18     Goal Identifier: Fatigue managmet/EC techniques  Goal Description: Patient to verbalize and independently utilize 3 strategies for energy conservation/work simplification and fatigue management for improved independence with ADL/IADLs at home and in the community, and demonstrate use of these tasks during session (Errands List, taking breaks prn during session, etc.)  Target Date: 09/06/18                                                          Therapy Frequency: 1x/week     Predicted Duration of Therapy Intervention (days/wks): 12 weeks  Lillian Talavera OT          I CERTIFY THE NEED FOR THESE SERVICES FURNISHED UNDER        THIS PLAN OF TREATMENT AND WHILE UNDER MY CARE     (Physician co-signature of this document indicates review and certification of the therapy plan).                 ,    Certification date from: 06/08/18, Certification date to: 09/07/18               Referring Physician: Rom Helm MD and Lulú San MD     Initial Assessment        See Epic Evaluation      Start Of Care Date: 06/07/18

## 2018-06-11 DIAGNOSIS — E11.9 TYPE 2 DIABETES, HBA1C GOAL < 8% (H): ICD-10-CM

## 2018-06-14 ENCOUNTER — TELEPHONE (OUTPATIENT)
Dept: INTERNAL MEDICINE | Facility: CLINIC | Age: 78
End: 2018-06-14

## 2018-06-14 DIAGNOSIS — E11.9 TYPE 2 DIABETES, HBA1C GOAL < 8% (H): ICD-10-CM

## 2018-06-14 NOTE — TELEPHONE ENCOUNTER
"Requested Prescriptions   Pending Prescriptions Disp Refills     insulin pen needle (B-D U/F) 31G X 8  each 0     Sig: B-D ULTRA FINE SHORT PEN NEEDLES, USE 4 TIMES A DAY TO INJECT INSULIN    Diabetic Supplies Protocol Passed    6/11/2018 10:02 AM       Passed - Patient is 18 years of age or older       Passed - Recent (6 mo) or future (30 days) visit within the authorizing provider's specialty    Patient had office visit in the last 6 months or has a visit in the next 30 days with authorizing provider.  See \"Patient Info\" tab in inbasket, or \"Choose Columns\" in Meds & Orders section of the refill encounter.              "

## 2018-06-14 NOTE — TELEPHONE ENCOUNTER
Reason for Call: Request for an order or referral:    Order or referral being requested: referral     Date needed: as soon as possible    Has the patient been seen by the PCP for this problem? YES    Additional comments: Patient's wife called in requesting a referral to see a neurologist.     Phone number Patient can be reached at:  Cell number on file:    No relevant phone numbers on file. [phone]       Best Time:  Any     Can we leave a detailed message on this number?  YES    Call taken on 6/14/2018 at 11:43 AM by Darrel Weiner

## 2018-06-14 NOTE — TELEPHONE ENCOUNTER
Prescription approved per [facility] Refill Protocol.  Mariela Dai RN

## 2018-06-14 NOTE — TELEPHONE ENCOUNTER
Referral has already been given in May.  Spoke with patient's wife.  She thought she needed another referral, but is in the process of scheduling.  Advised to have neurology office call our office if they need anything further.  Paty Brooks RN

## 2018-06-15 NOTE — PROGRESS NOTES
"   06/04/18 1200   Signing Clinician's Name / Credentials   Signing clinician's name / credentials Page Hutchison PT, DPT   Session Number   Session Number PROGRESS NOTE: Episode of care 4/12/18-6/4/18. Pt has participated in 11 PT visits including evaluation and today's reassessment. Reassessment completed with 1 more visit approved by insurance following today's appointment. 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     Authorization status auth exp 06/30/2018    Adult Goals   PT Eval Goals 1;2;3;4;5;6   Goal 1   Goal Identifier HEP   Goal Description Pt to demonstrate (I) with HEP for strengthening, postural and gait stability, and endurance exercises for progress towards all goals and continued self maintainance following d/c from therapy    Target Date 07/05/18   Date Met (6/4: in progress, reports consistancy)   Goal 2   Goal Identifier 360 degree turn (baseline: 10 step to turn, mild instability, no LOB)   Goal Description Pt to demonstrate improved dynamic SL stance stability towards improved safety with functional turns navigaiting home by completing 360 degreen turn in </= 5 steps.   Target Date 07/05/18   Date Met (6/4: progress made, 10-6 steps, L sided lean, no LOB)   Goal 3   Goal Identifier 30 sec sit < >stand (baseline: 6 reps from 18\" without UE support; age/gender norms 14 reps)   Goal Description Pt to complete >/= 10 reps sit <>  30 sec to demostrate improved functional LE strength and safety with transfers from standard height chair.    Target Date 07/05/18   Date Met (6/15: no significant change, 6-7 reps)   Goal 4   Goal Identifier DGI (baseline: no AD, 16/24; MDC chonic stroke 2.6 pts)   Goal Description The pt will improve score on DGI from 16 to >19/24 on the dynamic gait index to indicate low fall risk with community ambulation or use a cane consistently    Target Date 07/05/18   Date Met 06/04/18  (goal met, 20/24)   Goal 5   Goal Identifier " "Gait/Gait speed (baseline: 25' walk .86 m/s without AD; short step length, abesent heelstrike, mild R trendelberg, mild path deviations without LOB, minimal armswing, mild forward flexed posture at trunk sustained throughout)   Goal Description Pt to demonstrate imporved observable gait and gait speed to achieve age and gender matched norms >/= 1.2 m/s towards improved safety with community ambulatino and crossing streets.   Target Date 07/05/18   Date Met (6/4: progress made, 8.3 sec, no AD (.92 m/s))   Goal 6   Goal Identifier Floor transfers/Gardening   Goal Description Pt to demonstrate independance and stabiltiy with floor transfers <> standing on firm and compliant surfances towards safety returning to gardening activity.   Target Date 07/05/18   Date Met 06/04/18   Subjective Report   Subjective Report Arrives accompanied by spouse and with SPC in L hand. Reports he has been keeping up with exercises 2x/day which takes him 2-3 hours. No questions on exercises, feels they are a good challenge. Didn't bring HEP handouts with him today. Gardening yesterday in morning and evening 2-3 hours at a time, pulling weeds and planting tomato plants.  Uses lawn chair to take breaks. Gets up and own from ground feeling steady, able to transition from ground without using chair.  No dizziness for the past couple of weeks. Spouse feels his walking has improved, \"picking his feet up more\" \"stumbling less\".   Objective Measures   Objective Measures Objective Measure 1;Objective Measure 2;Objective Measure 3;Objective Measure 4;Objective Measure 5   Objective Measure 1   Objective Measure 25' walk   Details 8.3 sec, no AD (.92 m/s)   Objective Measure 2   Objective Measure 30 sec sit <> stand   Details 1st trial: 6 reps, increased L trunk twist/functional R sided weakness, no LOB; 2nd trial: 7 reps, improved equal WBing    Objective Measure 3   Objective Measure 360 degree turns   Details L sided lean no LOB 10-6 steps to turn, " fewer steps with cues   Objective Measure 4   Objective Measure Gait   Details imporved step length and armswing, continued R sided weakness, no veering, instability or LOB   Objective Measure 5   Objective Measure DGI   Details 20/24   Treatment Interventions   Interventions Physical Performance Test/Measures;Therapeutic Procedure/Exercise;Therapeutic Activity   Therapeutic Procedure/exercise   Minutes 5   Skilled Intervention cues for reassessment with feedback on performance relative to baseline and goals.    Patient Response (see objectives)   Treatment Detail 30 sec sit <> stand reasessment   Progress He continues to have functional LE weakness demonstrated per 30 sec sit <> stand and gait, however he reports recent return to prefered gardening activities without stability limitations with transitions from ground.  He reports consistancy with HEP provided but reports prolonged time and would benefit from final visit with PT for review.   Therapeutic Activity   Minutes 10   Skilled Intervention cues for reassessment with feedback on performance relative to baseline and goals.    Patient Response (see objectives)   Treatment Detail 360 degree turns x6 reps, gait training with cues for larger steps and armswing   Progress He reports participation in all prefered home activities including gardening and lawn mowing.    Neuromuscular Re-education   Progress His previously reported dizziness has resolved over the past couple of weeks without clear etiology.   Physical Performance Test/measures   Minutes 15   Skilled Intervention cues for reassessment with feedback on performance relative to baseline and goals.    Patient Response (see objectives)   Treatment Detail DGI reassessment   Progress Pete demonstrates progress in dynamic stepping and gait stability per reassessments in DGI and 360 degree turns. He is no longer considered increased risk for falls based on DGI. However, he continues to demonstrate mild  stability challenges/veering tendencies while walking d/t continued functional R sided weakness. Therapist has recommended use of SPC for safety with outdoor ambulation which he has used consistantly and denies any falls or near falls throughout episode of care.  He has also recently progressed community ambulation by joining spouse for outings to store as advised.     Education   Learner Patient;Significant Other   Readiness Acceptance   Method Explanation;Demonstration   Response Verbalizes Understanding;Demonstrates Understanding;Needs Reinforcement   Education Comments (above)   Plan   Home program 1) sitting TKE with small knee flex maintaining leg lifted <> strong contraction TNE 2) sit < >stand with B UE reaching for controled initation and descent 3) standing hip flexion/marching x5 sec hold multiple reps ea LE B UE support 4) heel/toe raises x5 sec holds 5) walking 10 lap in house for every 15 minutes of sitting (set timer),   Plan for next session review and modifed HEP prn and anticipate d/t   Total Session Time   Timed Code Treatment Minutes 30   Total Treatment Time (sum of timed and untimed services) 30

## 2018-06-15 NOTE — PROGRESS NOTES
"   06/07/18 1200   Signing Clinician's Name / Credentials   Signing clinician's name / credentials Page Hutchison PT, DPT   Session Number   Session Number DISCHARGE NOTE: Episode of care 4/12/18-6/7/18. Pt has participated in 12 PT visits including evaluation and today's discharge. Reassessment completed on 6/4/18 (see note for details). 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     Authorization status auth exp 06/30/2018 - [employer]   PT Medicare Only G-code   G-code Mobility: Walking & Moving Around   Mobility: Walking & Moving Around   Mobility Goal,  (eval/re-eval, every progress note, & discharge) CI: 1-19% impairment   Mobility Discharge Status,  (discharge) CI: 1-19% impairment   Discharge Mobility Modifier Rationale 25' walk, DGI, therapist judgement   Mobility Comments goal met (see reassessment 6/4/18 for details)   Goal 1   Goal Identifier HEP   Goal Description Pt to demonstrate (I) with HEP for strengthening, postural and gait stability, and endurance exercises for progress towards all goals and continued self maintainance following d/c from therapy    Target Date 07/05/18   Date Met 06/07/18   Goal 2   Goal Identifier 360 degree turn (baseline: 10 step to turn, mild instability, no LOB)   Goal Description Pt to demonstrate improved dynamic SL stance stability towards improved safety with functional turns navigaiting home by completing 360 degreen turn in </= 5 steps.   Target Date 07/05/18   Date Met (6/4: progress made, 10-6 steps, L sided lean, no LOB)   Goal 3   Goal Identifier 30 sec sit < >stand (baseline: 6 reps from 18\" without UE support; age/gender norms 14 reps)   Goal Description Pt to complete >/= 10 reps sit <>  30 sec to demostrate improved functional LE strength and safety with transfers from standard height chair.    Target Date 07/05/18   Date Met (6/15: no significant change, 6-7 reps)   Goal 4   Goal Identifier DGI (baseline: no " AD, 16/24; [facility] chonic stroke 2.6 pts)   Goal Description The pt will improve score on DGI from 16 to >19/24 on the dynamic gait index to indicate low fall risk with community ambulation or use a cane consistently    Target Date 07/05/18   Date Met 06/04/18  (goal met, 20/24)   Goal 5   Goal Identifier Gait/Gait speed (baseline: 25' walk .86 m/s without AD; short step length, abesent heelstrike, mild R trendelberg, mild path deviations without LOB, minimal armswing, mild forward flexed posture at trunk sustained throughout)   Goal Description Pt to demonstrate imporved observable gait and gait speed to achieve age and gender matched norms >/= 1.2 m/s towards improved safety with community ambulatino and crossing streets.   Target Date 07/05/18   Date Met (6/4: progress made, 8.3 sec, no AD (.92 m/s))   Goal 6   Goal Identifier Floor transfers/Gardening   Goal Description Pt to demonstrate independance and stabiltiy with floor transfers <> standing on firm and compliant surfances towards safety returning to gardening activity.   Target Date 07/05/18   Date Met 06/04/18   Subjective Report   Subjective Report Arrives with spouse and SPC. Walks to mailbox and back. Mowed lawn yesterday edith. Went grocery shopping with spouse, using [employer]. Has HEP handouts with him. Denies any falls or near falls. Has OT evaluation after final PT session today.    Therapeutic Procedure/exercise   Minutes 32   Skilled Intervention cues, feedback   Patient Response reviewing HEP, pt reports 2-3 hours a day, completed in 45 minute session today, therapist feedback for pt to continue exercises 1 vs 2x/day along with daily activities   Treatment Detail 1) sitting TKE with small knee flex maintaining leg lifted <> strong contraction TNE - demos good carryover of technique, min cues for posture 2) sit < >stand with B UE reaching for controled initation and descent - 15-20 repts before fatigues, education on progression from  lower heights 3) standing hip flexion/marching x5 sec hold multiple reps ea LE with 1 UE support, min inital cues for trunk posture to limit rotation d/t LE weakness 4) heel/toe raises x5 sec holds - inital cues for posture, reports feeling good fatigue in calges x10 reps, progressed with fingertip vs hand support 5) outdoor ambulation 6 minutes on sidewalk with SPC x650', demos R LE fatigue towards end of walk but maintains stability , education on continued walking 6-8 minutes of walking progressing endurance approx 1 minute at a time every week   Progress He continues to have functional LE weakness demonstrated per 30 sec sit <> stand and gait, however he reports recent return to prefered gardening activities without stability limitations with transitions from ground. (see progress note 6/4/18 for details).  He reports consistancy with HEP provided and demostrates good understanding and efficiency with minimal cues, appropriate for continued self guided HEP.    Therapeutic Activity   Progress He reports participation in all prefered home activities including gardening and lawn mowing.    Neuromuscular Re-education   Minutes 8   Skilled Intervention cues for set-up, feedback for continued safe practice for HEP   Treatment Detail 360 degree turns: no dizziness, mild sway, no LOB with and without SPC, 6 steps to turn   Progress His previously reported dizziness has resolved over the past couple of weeks without clear etiology.   Physical Performance Test/measures   Progress Pete demonstrates progress in dynamic stepping and gait stability per reassessments in DGI and 360 degree turns. (see 6/4/18 note for details). He is no longer considered increased risk for falls based on DGI. However, he continues to demonstrate mild stability challenges/veering tendencies while walking d/t continued functional R sided weakness. Therapist has recommended use of SPC for safety with outdoor ambulation which he has used  consistantly and denies any falls or near falls throughout episode of care.  He has also recently progressed community ambulation by joining spouse for outings to store as advised.     Education   Learner Patient;Significant Other   Readiness Acceptance   Method Explanation;Demonstration   Response Verbalizes Understanding;Demonstrates Understanding;Needs Reinforcement   Education Comments (above)   Plan   Home program 1) sitting TKE with small knee flex maintaining leg lifted <> strong contraction TNE 2) sit < >stand with B UE reaching for controled initation and descent 3) standing hip flexion/marching x5 sec hold multiple reps ea LE B UE support 4) heel/toe raises x5 sec holds 5) walking 6-8 minutes of walking progressing endurance approx 1 minute at a time every week 6) 360 degree turns in corner space    Updates to plan of care Discharge with continued HEP as provided. Patient is f/u with OT evaluation after today's appointment.   Total Session Time   Timed Code Treatment Minutes 40   Total Treatment Time (sum of timed and untimed services) 40

## 2018-06-15 NOTE — ADDENDUM NOTE
Encounter addended by: Page Hutchison, PT on: 6/15/2018  1:30 PM<BR>     Actions taken: Flowsheet data copied forward, Sign clinical note, Flowsheet accepted

## 2018-06-15 NOTE — ADDENDUM NOTE
Encounter addended by: Page Hutchison, PT on: 6/15/2018  1:48 PM<BR>     Actions taken: Flowsheet data copied forward, Sign clinical note, Flowsheet accepted, Episode resolved

## 2018-06-17 DIAGNOSIS — I10 HYPERTENSION GOAL BP (BLOOD PRESSURE) < 140/90: ICD-10-CM

## 2018-06-19 NOTE — TELEPHONE ENCOUNTER
"Requested Prescriptions   Pending Prescriptions Disp Refills     B-D U/F 31G X 8 MM insulin pen needle [Pharmacy Med Name: B-D UF III SHORT PEN NEED MIS]  Last Written Prescription Date:  6/14/2018  Last Fill Quantity: 400 each,  # refills: 0   Last Office Visit: 5/16/2018   Future Office Visit:    Next 5 appointments (look out 90 days)     Aug 30, 2018  9:30 AM CDT   Return Visit with Lori Damian MD   [facility]    [address] [facility] [address]  [facility] [address]   [phone]                    400 each 0     Sig: USE 4 TIMES A DAY TO INJECT INSULIN    Diabetic Supplies Protocol Passed    6/14/2018  8:19 AM       Passed - Patient is 18 years of age or older       Passed - Recent (6 mo) or future (30 days) visit within the authorizing provider's specialty    Patient had office visit in the last 6 months or has a visit in the next 30 days with authorizing provider.  See \"Patient Info\" tab in inbasket, or \"Choose Columns\" in Meds & Orders section of the refill encounter.              "

## 2018-06-20 DIAGNOSIS — I63.112 CEREBROVASCULAR ACCIDENT (CVA) DUE TO EMBOLISM OF LEFT VERTEBRAL ARTERY (H): ICD-10-CM

## 2018-06-20 RX ORDER — LISINOPRIL 2.5 MG/1
TABLET ORAL
Qty: 90 TABLET | Refills: 2 | Status: SHIPPED | OUTPATIENT
Start: 2018-06-20 | End: 2019-03-10

## 2018-06-20 RX ORDER — PEN NEEDLE, DIABETIC 31 GX5/16"
NEEDLE, DISPOSABLE MISCELLANEOUS
Qty: 400 EACH | Refills: 3 | Status: SHIPPED | OUTPATIENT
Start: 2018-06-20 | End: 2019-07-21

## 2018-06-20 NOTE — TELEPHONE ENCOUNTER
"Requested Prescriptions   Pending Prescriptions Disp Refills     lisinopril (PRINIVIL/ZESTRIL) 2.5 MG tablet [Pharmacy Med Name: LISINOPRIL 2.5MG    TAB] 30 tablet 3     Sig: TAKE 1 TABLET BY MOUTH ONCE DAILY    ACE Inhibitors (Including Combos) Protocol Passed    6/20/2018  9:05 AM       Passed - Blood pressure under 140/90 in past 12 months    BP Readings from Last 3 Encounters:   05/16/18 106/62   03/23/18 116/66   03/15/18 109/63                Passed - Recent (12 mo) or future (30 days) visit within the authorizing provider's specialty    Patient had office visit in the last 12 months or has a visit in the next 30 days with authorizing provider or within the authorizing provider's specialty.  See \"Patient Info\" tab in inbasket, or \"Choose Columns\" in Meds & Orders section of the refill encounter.           Passed - Patient is age 18 or older       Passed - Normal serum creatinine on file in past 12 months    Recent Labs   Lab Test  01/26/18   0601   CR  1.25            Passed - Normal serum potassium on file in past 12 months    Recent Labs   Lab Test  01/26/18   0601   POTASSIUM  4.4           5/16/18 dictation:  (I10) Hypertension goal BP (blood pressure) < 140/90  Comment: BP at target. Continue current meds.  Prescription approved per [facility] Refill Protocol.    "

## 2018-06-21 RX ORDER — ATORVASTATIN CALCIUM 20 MG/1
TABLET, FILM COATED ORAL
Qty: 90 TABLET | Refills: 1 | Status: SHIPPED | OUTPATIENT
Start: 2018-06-21 | End: 2018-12-12

## 2018-06-21 NOTE — TELEPHONE ENCOUNTER
"Requested Prescriptions   Pending Prescriptions Disp Refills     atorvastatin (LIPITOR) 20 MG tablet [Pharmacy Med Name: ATORVASTATIN 20MG   TAB] 30 tablet 3     Sig: TAKE 1 TABLET BY MOUTH DAILY    Statins Protocol Passed    6/20/2018  8:32 PM       Passed - LDL on file in past 12 months    Recent Labs   Lab Test  03/01/18   1706   LDL  70            Passed - No abnormal creatine kinase in past 12 months    No lab results found.            Passed - Recent (12 mo) or future (30 days) visit within the authorizing provider's specialty    Patient had office visit in the last 12 months or has a visit in the next 30 days with authorizing provider or within the authorizing provider's specialty.  See \"Patient Info\" tab in inbasket, or \"Choose Columns\" in Meds & Orders section of the refill encounter.           Passed - Patient is age 18 or older        Prescription approved per [facility] Refill Protocol. DENITA Gallardo R.N.        "

## 2018-06-28 ENCOUNTER — HOSPITAL ENCOUNTER (OUTPATIENT)
Dept: OCCUPATIONAL THERAPY | Facility: CLINIC | Age: 78
Setting detail: THERAPIES SERIES
End: 2018-06-28
Attending: INTERNAL MEDICINE
Payer: COMMERCIAL

## 2018-06-28 PROCEDURE — 97110 THERAPEUTIC EXERCISES: CPT | Mod: GO | Performed by: OCCUPATIONAL THERAPIST

## 2018-06-28 PROCEDURE — 40000125 ZZHC STATISTIC OT OUTPT VISIT: Performed by: OCCUPATIONAL THERAPIST

## 2018-07-05 ENCOUNTER — HOSPITAL ENCOUNTER (OUTPATIENT)
Dept: OCCUPATIONAL THERAPY | Facility: CLINIC | Age: 78
Setting detail: THERAPIES SERIES
End: 2018-07-05
Attending: INTERNAL MEDICINE
Payer: COMMERCIAL

## 2018-07-05 PROCEDURE — 40000125 ZZHC STATISTIC OT OUTPT VISIT: Performed by: OCCUPATIONAL THERAPIST

## 2018-07-05 PROCEDURE — 97110 THERAPEUTIC EXERCISES: CPT | Mod: GO | Performed by: OCCUPATIONAL THERAPIST

## 2018-07-05 PROCEDURE — 97535 SELF CARE MNGMENT TRAINING: CPT | Mod: GO | Performed by: OCCUPATIONAL THERAPIST

## 2018-07-12 ENCOUNTER — HOSPITAL ENCOUNTER (OUTPATIENT)
Dept: OCCUPATIONAL THERAPY | Facility: CLINIC | Age: 78
Setting detail: THERAPIES SERIES
End: 2018-07-12
Attending: INTERNAL MEDICINE
Payer: COMMERCIAL

## 2018-07-12 PROCEDURE — 97110 THERAPEUTIC EXERCISES: CPT | Mod: GO | Performed by: OCCUPATIONAL THERAPIST

## 2018-07-12 PROCEDURE — 97535 SELF CARE MNGMENT TRAINING: CPT | Mod: GO | Performed by: OCCUPATIONAL THERAPIST

## 2018-07-12 PROCEDURE — 40000125 ZZHC STATISTIC OT OUTPT VISIT: Performed by: OCCUPATIONAL THERAPIST

## 2018-07-19 ENCOUNTER — HOSPITAL ENCOUNTER (OUTPATIENT)
Dept: OCCUPATIONAL THERAPY | Facility: CLINIC | Age: 78
Setting detail: THERAPIES SERIES
End: 2018-07-19
Attending: INTERNAL MEDICINE
Payer: COMMERCIAL

## 2018-07-19 PROCEDURE — 97535 SELF CARE MNGMENT TRAINING: CPT | Mod: GO | Performed by: OCCUPATIONAL THERAPIST

## 2018-07-19 PROCEDURE — 40000125 ZZHC STATISTIC OT OUTPT VISIT: Performed by: OCCUPATIONAL THERAPIST

## 2018-07-24 ENCOUNTER — TRANSFERRED RECORDS (OUTPATIENT)
Dept: HEALTH INFORMATION MANAGEMENT | Facility: CLINIC | Age: 78
End: 2018-07-24

## 2018-07-26 ENCOUNTER — HOSPITAL ENCOUNTER (OUTPATIENT)
Dept: OCCUPATIONAL THERAPY | Facility: CLINIC | Age: 78
Setting detail: THERAPIES SERIES
End: 2018-07-26
Attending: INTERNAL MEDICINE
Payer: COMMERCIAL

## 2018-07-26 DIAGNOSIS — E13.10 DIABETIC KETOACIDOSIS WITHOUT COMA ASSOCIATED WITH OTHER SPECIFIED DIABETES MELLITUS (H): ICD-10-CM

## 2018-07-26 PROCEDURE — G9169 MEMORY GOAL STATUS: HCPCS | Mod: GO,CI | Performed by: OCCUPATIONAL THERAPIST

## 2018-07-26 PROCEDURE — 97110 THERAPEUTIC EXERCISES: CPT | Mod: GO | Performed by: OCCUPATIONAL THERAPIST

## 2018-07-26 PROCEDURE — 40000125 ZZHC STATISTIC OT OUTPT VISIT: Performed by: OCCUPATIONAL THERAPIST

## 2018-07-26 PROCEDURE — 96125 COGNITIVE TEST BY HC PRO: CPT | Mod: GO | Performed by: OCCUPATIONAL THERAPIST

## 2018-07-26 PROCEDURE — G9168 MEMORY CURRENT STATUS: HCPCS | Mod: GO,CI | Performed by: OCCUPATIONAL THERAPIST

## 2018-07-26 PROCEDURE — G9170 MEMORY D/C STATUS: HCPCS | Mod: GO,CI | Performed by: OCCUPATIONAL THERAPIST

## 2018-07-30 RX ORDER — INSULIN GLARGINE 100 [IU]/ML
INJECTION, SOLUTION SUBCUTANEOUS
Qty: 30 ML | Refills: 0 | Status: SHIPPED | OUTPATIENT
Start: 2018-07-30 | End: 2018-08-30

## 2018-07-30 NOTE — ADDENDUM NOTE
Encounter addended by: Lillian Talavera OT on: 7/30/2018  9:32 AM<BR>     Actions taken: Sign clinical note, Charge Capture section accepted

## 2018-07-30 NOTE — PROGRESS NOTES
Cognitive Performance Test    SUMMARY OF TEST:    The Cognitive Performance Test (CPT) is a standardized performance-based assessment to measure working memory/executive function processing capacities that underlie functional performance. Subtasks include common basic and instrumental activities of daily living (ADL/IADL) which are rated based on the manner in which patients respond to task demands of varying complexity. The total CPT score describes a level of functioning that indicates how information is processed, implications for functional activities, potential safety risks and a recommended level of supervision or assist based on cognitive function. The highest total score on this test is in the range of 5.6 to 5.8.    DATE OF TESTIN2018    RESULTS OF TESTING:                                                                                         CPT Subtest Results    MEDBOX: 4. SHOP/GLOVES:  PHONE:    WASH:   TRAVEL:  TOAST:    DRESS:    TOTAL CPT SCORE:  27.5/33     Average CPT Score  4.58/5.5    INTERPRETATION OF TEST RESULTS:    Based on the Cognitive Performance Test, this patient scored at CPT Level 4.5.  See CPT Levels reference below.    Summary of functional cognitive status:   Mild to moderate functional decline: significant difficulty with completion of complex daily tasks and start of difficulty with self-care tasks - May show decreased quality or initiation of self-cares. Shows significant decline with memory and planning.  Person may not be aware of his own deficits and may be able to verbally speak better than they can actually perform.  Can learn new information with repetition.      Patient's spouse thinks his cognition may be at baseline and does not report any change in his memory.     Factors affecting performance:  Other:  Patient experiencing loss of sensation in R UE    Recommendations:    Supervision in living setting:  Daily checks    Patient currently  "lives with his spouse/caretaker who checks on him frequently                                                     TIME ADMINISTERING TEST: 45    TIME FOR INTERPRETATION AND PREPARATION OF REPORT: 15    TOTAL TIME: 60      CPT Levels Reference:    Patient's Average CPT Score:  4.58                                                                                                                                                  Individual scores range along a continuum as outlined below.  In addition to cognitive status, other factors may affect safety in a home environment.  Please refer to specific recommendations for this patient.    ___5.6-5.8  Normal functioning (absence of cognitive-functional disability).  Independent in managing personal affairs, monitors and directs own behavior.  Uses complex information to carry out daily activities with safety and accuracy.    Proficient with instrumental activities of daily living (IADL) and learning new activity.  Problems are anticipated, errors are avoided, and consequences of actions are considered.      ___5.0   Mild cognitive-functional disability; deficits in working memory and executive thought processes. Difficulty using complex information. Problems may be observed with recent memory, judgment, reasoning and planning ahead. May be impulsive or have difficulty anticipating consequences.  Safety:  May require assistance to plan ahead; or to manage complex medication schedules, appointments or finances.  Hazardous activities may need to be monitored or limited.  ADL:  Mild functional decline.  Able to complete basic self-care and routine household tasks.  May have difficulty with complex daily tasks such as reading, writing, meal preparation, shopping or driving.   Learns through hands on teaching. Self-centered behavior or difficulty considering the needs of others may be seen related to trouble seeing the  whole picture\". Can appear disorganized or " uninhibited.    xx___4.5  Mild to moderate cognitive-functional disability. Significant deficits in working memory and executive thought processes. Judgment, reasoning and planning show obvious impairment.  Distractible with inability to shift attention/actions given competing stimuli.  Difficulty with problem solving and managing details. Complex daily tasks performed with inconsistency, difficulty, or error.     Safety:  Medications should be monitored, stove use may require supervision, and driving ability may be affected.  Impaired safety awareness with inability to anticipate potential problems.  May not recognize or respond to emergent situations. Requires frequent check-in support.   ADL:  Mild difficulty with simple everyday self-care tasks. Benefits from structured, routine activity.  Will likely need reminders to complete tasks outside of the routine. Requires assistance with planning and IADL tasks like shopping and finances. Learns concrete tasks through repetition, but performance may not generalize. Tends to be impulsive with poor insight. Self centered behavior or inability to consider the needs of others is common.    ___4.0  Moderate cognitive-functional disability; abstract to concrete thought processes. Working memory and executive function impairments are obvious. Difficulty with planning and problem solving.  Behavior is goal-directed, but unable to follow multi-step directions, is easily distracted, and may not recognize mistakes.  Inability to anticipate hazards or understand precautions.  Safety:  Recommend 24-hour supervision for safety. Supervision needed for medication management and for hazardous activities. May not be able to follow a restricted diet. Can get lost in unfamiliar surroundings. Generally, persons functioning at level 4 should not be driving.   ADL:  Some decline in quality or frequency of ADL.  [place] enhanced by use of a routine, simple concrete directions, and  caregiver set-up of needed items. Complex tasks such as money or home management typically requires assistance.  Relies heavily on vision to guide behavior; will ignore objects/hazards not in plain sight and can be distracted by irrelevant objects. Often has poor insight.  Able to carry out social conversation and may verbally  cover  for deficits leading caregivers to believe they are capable of functioning independently.       ___3.5  Moderate cognitive-functional disability; increased cues needed for task completion. Aware of concrete task steps but needs prompting or cues to initiate and complete simple tasks. Attention span is limited, simple directions may need to be repeated, and re-focus to a topic or task may be required.  Safety:  24-hour supervision required for safety and for assistance with daily tasks. Assistance required with medications, and access to medication should be limited. Meals, nutrition and dietary restrictions need to be monitored.  All hazardous activities should be restricted or supervised. Should not drive. Prone to wandering and can become lost.  ADL:  Moderate functional decline. Familiar tasks usually requires set-up of supplies and directions to complete steps. May need objects handed to them for task initiation. Function best with a set schedule in familiar surroundings with familiar people. All complex tasks must be done by others. Vocabulary is diminished and speech often unfocused.

## 2018-07-30 NOTE — TELEPHONE ENCOUNTER
Patient's wife calls to check on status of request, she asks if Dr. Helm can fill this since Dr. Damian has not responded to refill request. Apologized for the delay.     Requested Prescriptions   Pending Prescriptions Disp Refills     LANTUS SOLOSTAR 100 UNIT/ML soln [Pharmacy Med Name: LANTUS SOLOSTAR     INJ]  Last Written Prescription Date:  4/23/18  Last Fill Quantity: 30mL,  # refills: 0   Last office visit: 3/7/18 with prescribing provider:  Dr. Damian  Future Office Visit:   Next 5 appointments (look out 90 days)     Aug 30, 2018  9:30 AM CDT   Return Visit with Lori Damian MD   [facility]    [address] [facility] [address]  [facility] [id number] [phone]                  0     Sig: INJECT 35 UNITS SUBCUTANEOUSLY IN THE MORNING BEFORE  BREAKFAST    Long Acting Insulin Protocol Failed    7/26/2018  8:17 AM       Failed - Microalbumin on file in past 12 months    Recent Labs   Lab Test  07/22/16   0835   MICROL  208   UMALCR  390.98*            Failed - HgbA1C in past 3 or 6 months    If HgbA1C is 8 or greater, it needs to be on file within the past 3 months.  If less than 8, must be on file within the past 6 months.     Recent Labs   Lab Test  03/07/18   1337   A1C  8.4*            Passed - Blood pressure less than 140/90 in past 6 months    BP Readings from Last 3 Encounters:   05/16/18 106/62   03/23/18 116/66   03/15/18 109/63                Passed - LDL on file in past 12 months    Recent Labs   Lab Test  03/01/18   1706   LDL  70            Passed - Serum creatinine on file in past 12 months    Recent Labs   Lab Test  01/26/18   0601   CR  1.25            Passed - Patient is age 18 or older       Passed - Recent (6 mo) or future (30 days) visit within the authorizing provider's specialty    Patient had office visit in the last 6 months or has a visit in the next 30 days with authorizing provider or within the authorizing provider's  "specialty.  See \"Patient Info\" tab in inbasket, or \"Choose Columns\" in Meds & Orders section of the refill encounter.          Medication is being filled for 1 time refill only due to:  future appointment scheduled   "

## 2018-07-31 ENCOUNTER — TRANSFERRED RECORDS (OUTPATIENT)
Dept: HEALTH INFORMATION MANAGEMENT | Facility: CLINIC | Age: 78
End: 2018-07-31

## 2018-08-01 ENCOUNTER — TELEPHONE (OUTPATIENT)
Dept: INTERNAL MEDICINE | Facility: CLINIC | Age: 78
End: 2018-08-01

## 2018-08-01 NOTE — TELEPHONE ENCOUNTER
"Reason for Call:  Other appointment    Detailed comments: Pt's wife is calling and wants pt to be seen \"now.\"  She states that the pt is getting up frequently in the night to void and his neurologist wants him to have a sleep study but this issue needs to be solved first.  Did not want to see another provider.    Phone Number Patient can be reached at: Home number on file [phone] (home)    Best Time: any    Can we leave a detailed message on this number? YES    Call taken on 8/1/2018 at 10:02 AM by Farzaneh Santana      "

## 2018-08-02 ENCOUNTER — HOSPITAL ENCOUNTER (OUTPATIENT)
Dept: OCCUPATIONAL THERAPY | Facility: CLINIC | Age: 78
Setting detail: THERAPIES SERIES
End: 2018-08-02
Attending: INTERNAL MEDICINE
Payer: COMMERCIAL

## 2018-08-02 PROCEDURE — 40000125 ZZHC STATISTIC OT OUTPT VISIT: Performed by: OCCUPATIONAL THERAPIST

## 2018-08-02 PROCEDURE — 97535 SELF CARE MNGMENT TRAINING: CPT | Mod: GO | Performed by: OCCUPATIONAL THERAPIST

## 2018-08-02 PROCEDURE — 97110 THERAPEUTIC EXERCISES: CPT | Mod: GO | Performed by: OCCUPATIONAL THERAPIST

## 2018-08-03 DIAGNOSIS — Z79.4 TYPE 2 DIABETES MELLITUS WITH DIABETIC POLYNEUROPATHY, WITH LONG-TERM CURRENT USE OF INSULIN (H): ICD-10-CM

## 2018-08-03 DIAGNOSIS — E11.42 TYPE 2 DIABETES MELLITUS WITH DIABETIC POLYNEUROPATHY, WITH LONG-TERM CURRENT USE OF INSULIN (H): ICD-10-CM

## 2018-08-03 NOTE — TELEPHONE ENCOUNTER
Requested Prescriptions   Pending Prescriptions Disp Refills     NOVOLOG FLEXPEN 100 UNIT/ML soln [Pharmacy Med Name: NOVOLOG FLEXPEN 100UNIT/ML INJ]  0    Last Written Prescription Date:  02/02/2018  Last Fill Quantity: 30 mL,  # refills: 0   Last office visit: 5/16/2018 with prescribing provider:     Future Office Visit:   Next 5 appointments (look out 90 days)     Aug 15, 2018  1:20 PM CDT   [facility] Evgeny Dubon [facility]   [facility]    [address] [place]  [facility] [address]   [phone]            Aug 30, 2018  9:30 AM CDT   Return Visit with Lori Damian MD   [facility]    [address]  [facility] [address]   [phone]                Sig: INJECT 7 UNITS WITH BREAKFAST, 5 UNITS WITH LUNCH, 7 UNITS WITH DINNER HOLD IF NOT EATING MEAL    Short Acting Insulin Protocol Failed    8/3/2018 11:33 AM       Failed - Microalbumin on file in past 12 months    Recent Labs   Lab Test  07/22/16   0835   MICROL  208   UMALCR  390.98*            Failed - HgbA1C in past 3 or 6 months    If HgbA1C is 8 or greater, it needs to be on file within the past 3 months.  If less than 8, must be on file within the past 6 months.     Recent Labs   Lab Test  03/07/18   1337   A1C  8.4*            Passed - Blood pressure less than 140/90 in past 6 months    BP Readings from Last 3 Encounters:   05/16/18 106/62   03/23/18 116/66   03/15/18 109/63                Passed - LDL on file in past 12 months    Recent Labs   Lab Test  03/01/18   1706   LDL  70            Passed - Serum creatinine on file in past 12 months    Recent Labs   Lab Test  01/26/18   0601   CR  1.25            Passed - Patient is age 18 or older       Passed - Recent (6 mo) or future (30 days) visit within the authorizing provider's specialty    Patient had office visit in the last 6 months or has a visit in the next 30 days with  "authorizing provider or within the authorizing provider's specialty.  See \"Patient Info\" tab in inbasket, or \"Choose Columns\" in Meds & Orders section of the refill encounter.            "

## 2018-08-04 RX ORDER — INSULIN ASPART 100 [IU]/ML
INJECTION, SOLUTION INTRAVENOUS; SUBCUTANEOUS
Qty: 30 ML | Refills: 0 | Status: SHIPPED | OUTPATIENT
Start: 2018-08-04 | End: 2018-08-30

## 2018-08-09 ENCOUNTER — HOSPITAL ENCOUNTER (OUTPATIENT)
Dept: OCCUPATIONAL THERAPY | Facility: CLINIC | Age: 78
Setting detail: THERAPIES SERIES
End: 2018-08-09
Attending: INTERNAL MEDICINE
Payer: COMMERCIAL

## 2018-08-09 PROCEDURE — 97535 SELF CARE MNGMENT TRAINING: CPT | Mod: GO | Performed by: OCCUPATIONAL THERAPIST

## 2018-08-09 PROCEDURE — 40000125 ZZHC STATISTIC OT OUTPT VISIT: Performed by: OCCUPATIONAL THERAPIST

## 2018-08-09 PROCEDURE — 97110 THERAPEUTIC EXERCISES: CPT | Mod: GO | Performed by: OCCUPATIONAL THERAPIST

## 2018-08-16 ENCOUNTER — MEDICAL CORRESPONDENCE (OUTPATIENT)
Dept: HEALTH INFORMATION MANAGEMENT | Facility: CLINIC | Age: 78
End: 2018-08-16

## 2018-08-16 ENCOUNTER — TRANSFERRED RECORDS (OUTPATIENT)
Dept: HEALTH INFORMATION MANAGEMENT | Facility: CLINIC | Age: 78
End: 2018-08-16

## 2018-08-21 ENCOUNTER — HOSPITAL ENCOUNTER (OUTPATIENT)
Dept: OCCUPATIONAL THERAPY | Facility: CLINIC | Age: 78
Setting detail: THERAPIES SERIES
End: 2018-08-21
Attending: INTERNAL MEDICINE
Payer: COMMERCIAL

## 2018-08-21 DIAGNOSIS — E78.5 HYPERLIPIDEMIA LDL GOAL <100: ICD-10-CM

## 2018-08-21 DIAGNOSIS — N20.0 CALCULUS OF KIDNEY: ICD-10-CM

## 2018-08-21 PROCEDURE — 97535 SELF CARE MNGMENT TRAINING: CPT | Mod: GO | Performed by: OCCUPATIONAL THERAPIST

## 2018-08-21 PROCEDURE — 40000249 ZZH STATISTIC VISIT LOW VISION CLINIC: Performed by: OCCUPATIONAL THERAPIST

## 2018-08-22 ENCOUNTER — TRANSFERRED RECORDS (OUTPATIENT)
Dept: HEALTH INFORMATION MANAGEMENT | Facility: CLINIC | Age: 78
End: 2018-08-22

## 2018-08-22 LAB — TSH SERPL-ACNC: 6.75 UIU/ML (ref 0.45–4.5)

## 2018-08-22 NOTE — TELEPHONE ENCOUNTER
"Requested Prescriptions   Pending Prescriptions Disp Refills     tamsulosin (FLOMAX) 0.4 MG capsule [Pharmacy Med Name: TAMSULOSIN 0.4MG    CAP] 90 capsule 1    Last Written Prescription Date:  01/25/2018  Last Fill Quantity: 30,  # refills: 0   Last office visit: 5/16/2018 with prescribing provider:     Future Office Visit:   Next 5 appointments (look out 90 days)     Aug 30, 2018  9:30 AM CDT   [facility]   [facility]    [address] [facility] [address]  [facility] [address] [phone]                Sig: TAKE ONE CAPSULE BY MOUTH ONCE DAILY    Alpha Blockers Passed    8/21/2018  9:47 PM       Passed - Blood pressure under 140/90 in past 12 months    BP Readings from Last 3 Encounters:   05/16/18 106/62   03/23/18 116/66   03/15/18 109/63                Passed - Recent (12 mo) or future (30 days) visit within the authorizing provider's specialty    Patient had office visit in the last 12 months or has a visit in the next 30 days with authorizing provider or within the authorizing provider's specialty.  See \"Patient Info\" tab in inbasket, or \"Choose Columns\" in Meds & Orders section of the refill encounter.           Passed - Patient does not have Tadalafil, Vardenafil, or Sildenafil on their medication list       Passed - Patient is 18 years of age or older        "

## 2018-08-24 ENCOUNTER — OFFICE VISIT (OUTPATIENT)
Dept: INTERNAL MEDICINE | Facility: CLINIC | Age: 78
End: 2018-08-24
Payer: COMMERCIAL

## 2018-08-24 VITALS
DIASTOLIC BLOOD PRESSURE: 78 MMHG | BODY MASS INDEX: 27.25 KG/M2 | HEART RATE: 83 BPM | TEMPERATURE: 97.9 F | WEIGHT: 168.8 LBS | SYSTOLIC BLOOD PRESSURE: 108 MMHG | RESPIRATION RATE: 13 BRPM | OXYGEN SATURATION: 96 %

## 2018-08-24 DIAGNOSIS — R94.6 THYROID FUNCTION TEST ABNORMAL: Primary | ICD-10-CM

## 2018-08-24 PROCEDURE — 99213 OFFICE O/P EST LOW 20 MIN: CPT | Performed by: INTERNAL MEDICINE

## 2018-08-24 NOTE — MR AVS SNAPSHOT
After Visit Summary   8/24/2018    Pete Sheldon    MRN: [id number]           Patient Information     Date Of Birth          1940        Visit Information        Provider Department      8/24/2018 1:40 PM Arti Silva MD [facility]        Today's Diagnoses     Thyroid function test abnormal    -  1       Follow-ups after your visit        Your next 10 appointments already scheduled     Aug 30, 2018  9:00 AM CDT   LAB with [facility] LAB   [facility] ([facility])    [address] [place]  [facility] [address]   [phone]           Please do not eat 10-12 hours before your appointment if you are coming in fasting for labs on lipids, cholesterol, or glucose (sugar). This does not apply to pregnant women. Water, hot tea and black coffee (with nothing added) are okay. Do not drink other fluids, diet soda or chew gum.            Aug 30, 2018  9:30 AM CDT   Return Visit with Lori Damian MD   [facility] ([facility])    [address]  [facility] [address]   [phone]            Aug 31, 2018 11:00 AM CDT   L/Vision Treatment with Thelma Borges OT   [facility] ([facility])    [address] [facility] [address]   [phone]            Sep 06, 2018  1:00 PM CDT   Neuro Treatment with Lillian Talavera OT   [facility] ([facility])    [address] [address]   [phone]            Sep 13, 2018  1:00 PM CDT   Neuro Treatment with Lillian Talavera OT   [facility] ([facility])    [address] [facility] [address]   [phone]            Oct 05, 2018  2:30 PM CDT   (Arrive by 2:15 PM)   Return Visit with Jasmyne Elliott MD   [facility] ([facility])    [address]  4th Floor  [facility] [address] [phone]              Who to contact     If you have questions or need follow up information about today's clinic visit or your schedule please contact [facility] directly at [phone].  Normal or non-critical lab and imaging results will be communicated to you by MyChart, letter or phone within 4 business days after the clinic has received the results. If you do not hear from us within 7 days, please contact the clinic through MyChart or phone. If you have a critical or abnormal lab result, we will notify you by phone as soon as possible.  Submit refill requests through [employer] or call your pharmacy and they will forward the refill request to us. Please allow 3 business days for your refill to be completed.          Additional Information About Your Visit        Care EveryWhere ID     This is your Care EveryWhere ID. This could be used by other organizations to access your [facility] medical records  [id number]        Your Vitals Were     Pulse Temperature Respirations Pulse Oximetry BMI (Body Mass Index)       83 97.9  F (36.6  C) (Oral) 13 96% 27.25 kg/m2        Blood Pressure from Last 3 Encounters:   08/24/18 108/78   05/16/18 106/62   03/23/18 116/66    Weight from Last 3 Encounters:   08/24/18 168 lb 12.8 oz (76.6 kg)   05/16/18 165 lb (74.8 kg)   03/23/18 150 lb (68 kg)               Primary Care Provider Office Phone # Fax #    Rom Helm [phone]       [address] [facility] [address]  [facility] [address]        Goals        Diet    Increase water intake     Notes - Note edited  5/14/2014 11:42 AM by Patricia Khan, RN    Have 5-6 glasses (8oz) of fluid a day  Per f/u with spouse today, pt not doing well. Not eating and drinking. Called clinic and advised to take pt to clinic.         Increase water intake        General    Medication 1 (pt-stated)     Notes - Note  created  5/15/2014  1:57 PM by Patricia Khan RN    Pt will have an MTM consult         Equal Access to Services     CAR BANDA : Hadii aad ku hadbashirira Calvert, lauryda nathanieltrumanha, yodit kadavidada nickie, kadeem rafiin hayaatiffany worleymandeep chavarria lakatiuskatiffany medrano. So [facility] [phone].    ATENCIÓN: Si habla español, tiene a moise disposición servicios gratuitos de asistencia lingüística. Llame al [phone].    We comply with applicable federal civil rights laws and [place] laws. We do not discriminate on the basis of race, color, national origin, age, disability, sex, sexual orientation, or gender identity.            Thank you!     Thank you for choosing [facility]  for your care. Our goal is always to provide you with excellent care. Hearing back from our patients is one way we can continue to improve our services. Please take a few minutes to complete the written survey that you may receive in the mail after your visit with us. Thank you!             Your Updated Medication List - Protect others around you: Learn how to safely use, store and throw away your medicines at www.disposemymeds.org.          This list is accurate as of 8/24/18 11:59 PM.  Always use your most recent med list.                   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    Type 2 diabetes mellitus with diabetic polyneuropathy, with long-term current use of insulin (H)       * blood glucose monitoring test strip    ONETOUCH VERIO IQ    200 strip    Use to test blood sugars 4 times daily or as directed using One Touch Verio Flex.    Type 2 diabetes mellitus with diabetic polyneuropathy, with long-term current use of insulin (H)       finasteride 5 MG tablet    PROSCAR    90 tablet    Take 1 tablet (5 mg) by mouth daily    Urinary frequency       Glucosamine HCl 750 MG Tabs      Take 750 mg by mouth 2 times daily    Arthritis       GLUCOSE MANAGEMENT Tabs     100 tablet    4 tabs po for low blood sugar below 70, may repeat q 10-15 minutes as needed    Type 2 diabetes mellitus with diabetic polyneuropathy, with long-term current use of insulin (H)       * insulin glargine 100 UNIT/ML injection    LANTUS    9 mL    Inject 30 Units Subcutaneous every morning    Type 2 diabetes mellitus with diabetic polyneuropathy, with long-term current use of insulin (H)       * LANTUS SOLOSTAR 100 UNIT/ML injection   Generic drug:  insulin glargine     30 mL    INJECT 35 UNITS SUBCUTANEOUSLY IN THE MORNING BEFORE  BREAKFAST    Diabetic ketoacidosis without coma associated with other specified diabetes mellitus (H)       * insulin pen needle 31G X 8 MM    B-D U/F    400 each    B-D ULTRA FINE SHORT PEN NEEDLES, USE 4 TIMES A DAY TO INJECT INSULIN    Type 2 diabetes, HbA1C goal < 8% (H)       * B-D U/F 31G X 8 MM   Generic drug:  insulin pen needle     400 each    USE 4 TIMES A DAY TO INJECT INSULIN    Type 2 diabetes, HbA1C goal < 8% (H)       insulin syringes (disposable) U-100 0.3 ML Misc     100 each    1 each 2 times daily    Type II or unspecified type diabetes mellitus without mention of complication, not stated as uncontrolled       LIFESCAN FINEPOINT LANCETS  "Misc     200 strip    Use to test blood sugars 4 times daily or as directed.    Type 2 diabetes, HbA1C goal < 8% (H)       lisinopril 2.5 MG tablet    PRINIVIL/Zestril    90 tablet    TAKE 1 TABLET BY MOUTH ONCE DAILY    Hypertension goal BP (blood pressure) < 140/90       loratadine 10 MG tablet    AF LORATADINE    14 tablet    Take 1 tablet (10 mg) by mouth daily    Hypertension goal BP (blood pressure) < 140/90       metFORMIN 500 MG tablet    GLUCOPHAGE    30 tablet    Take 1 tablet (500 mg) by mouth daily (with breakfast)    Type 2 diabetes mellitus with diabetic polyneuropathy, with long-term current use of insulin (H)       multivitamin Tabs tablet      Take 1 tablet by mouth daily FOCUS SELECT PREMIUM WITH LUTEIN per eye doctor Reported on 5/19/2017        NovoLOG FLEXPEN 100 UNIT/ML injection   Generic drug:  insulin aspart     30 mL    INJECT 7 UNITS WITH BREAKFAST, 5 UNITS WITH LUNCH, 7 UNITS WITH DINNER HOLD IF NOT EATING MEAL    Type 2 diabetes mellitus with diabetic polyneuropathy, with long-term current use of insulin (H)       order for DME     3 Month    All DM testing supplies including test strips, lancets, solution, syringes, needles and/or pen needles for testing 4 times per day.  Brand \"Contour Next\"    Type 2 diabetes mellitus with diabetic polyneuropathy (H)       * tamsulosin 0.4 MG capsule    FLOMAX    30 capsule    Take 1 capsule (0.4 mg) by mouth every evening    Calculus of kidney       * tamsulosin 0.4 MG capsule    FLOMAX    90 capsule    TAKE ONE CAPSULE BY MOUTH ONCE DAILY    Hyperlipidemia LDL goal <100, Calculus of kidney       * Notice:  This list has 8 medication(s) that are the same as other medications prescribed for you. Read the directions carefully, and ask your doctor or other care provider to review them with you.      "

## 2018-08-24 NOTE — NURSING NOTE
/78  Pulse 83  Temp 97.9  F (36.6  C) (Oral)  Resp 13  Wt 168 lb 12.8 oz (76.6 kg)  SpO2 96%  BMI 27.25 kg/m2  Patient in for consult on  New thyroid problem.  Radha Leal, CMA

## 2018-08-25 RX ORDER — TAMSULOSIN HYDROCHLORIDE 0.4 MG/1
CAPSULE ORAL
Qty: 90 CAPSULE | Refills: 2 | Status: SHIPPED | OUTPATIENT
Start: 2018-08-25 | End: 2019-04-29

## 2018-08-29 NOTE — ADDENDUM NOTE
Encounter addended by: Lillian Talavera, OT on: 8/29/2018  3:19 PM<BR>     Actions taken: Flowsheet accepted, Charge Capture section accepted

## 2018-08-30 ENCOUNTER — OFFICE VISIT (OUTPATIENT)
Dept: ENDOCRINOLOGY | Facility: CLINIC | Age: 78
End: 2018-08-30
Payer: COMMERCIAL

## 2018-08-30 VITALS
DIASTOLIC BLOOD PRESSURE: 56 MMHG | BODY MASS INDEX: 26.84 KG/M2 | SYSTOLIC BLOOD PRESSURE: 110 MMHG | HEIGHT: 66 IN | HEART RATE: 77 BPM | RESPIRATION RATE: 20 BRPM | TEMPERATURE: 97.9 F | WEIGHT: 167 LBS | OXYGEN SATURATION: 98 %

## 2018-08-30 DIAGNOSIS — Z79.4 TYPE 2 DIABETES MELLITUS WITH DIABETIC POLYNEUROPATHY, WITH LONG-TERM CURRENT USE OF INSULIN (H): ICD-10-CM

## 2018-08-30 DIAGNOSIS — R94.6 THYROID FUNCTION TEST ABNORMAL: ICD-10-CM

## 2018-08-30 DIAGNOSIS — E11.42 TYPE 2 DIABETES MELLITUS WITH DIABETIC POLYNEUROPATHY, WITH LONG-TERM CURRENT USE OF INSULIN (H): ICD-10-CM

## 2018-08-30 LAB
HBA1C MFR BLD: 8.6 % (ref 0–5.6)
T4 FREE SERPL-MCNC: 1.12 NG/DL (ref 0.76–1.46)
TSH SERPL DL<=0.005 MIU/L-ACNC: 7.98 MU/L (ref 0.4–4)

## 2018-08-30 PROCEDURE — 99214 OFFICE O/P EST MOD 30 MIN: CPT | Performed by: INTERNAL MEDICINE

## 2018-08-30 PROCEDURE — 84439 ASSAY OF FREE THYROXINE: CPT | Performed by: INTERNAL MEDICINE

## 2018-08-30 PROCEDURE — 83036 HEMOGLOBIN GLYCOSYLATED A1C: CPT | Performed by: INTERNAL MEDICINE

## 2018-08-30 PROCEDURE — 82043 UR ALBUMIN QUANTITATIVE: CPT | Performed by: INTERNAL MEDICINE

## 2018-08-30 PROCEDURE — 84443 ASSAY THYROID STIM HORMONE: CPT | Performed by: INTERNAL MEDICINE

## 2018-08-30 PROCEDURE — 36415 COLL VENOUS BLD VENIPUNCTURE: CPT | Performed by: INTERNAL MEDICINE

## 2018-08-30 NOTE — NURSING NOTE
"ENDOCRINOLOGY INTAKE FORM    Patient Name:  Pete Sheldon  :  1940    Is patient Diabetic?   Yes:   Does patient have non-diabetic or other endocrine issues?  No    Vitals: BP (!) 82/50  Pulse 99  Temp 97.9  F (36.6  C) (Oral)  Resp 20  Ht 5' 5.75\" (1.67 m)  Wt 167 lb (75.8 kg)  SpO2 95%  BMI 27.16 kg/m2  BMI= Body mass index is 27.16 kg/(m^2).    Flu vaccine:  No  Pneumonia vaccine:  Yes: pneumococcal, needs prevnar 13        Smoking and Alcohol use:  Social History   Substance Use Topics     Smoking status: Former Smoker     Quit date: 3/11/1953     Smokeless tobacco: Never Used     Alcohol use No       Foot Exam: Yes: 18  Eye Exam:  Yes: 18  Dental Exam:  No- HAS APPIONTMENT 18  Aspirin Use:  Yes: 81 MG 4 TABS EVERY DAY     Lab Results   Component Value Date    A1C 8.6 2018    A1C 8.4 2018    A1C 8.3 2018    A1C 7.9 2018    A1C 7.6 2017       Lab Results   Component Value Date    MICROL 208 2016     No results found for: MICROALBUMIN        "

## 2018-08-30 NOTE — PROGRESS NOTES
ENDOCRINOLOGY CLINIC NOTE:  Name: Pete Sheldon  Seen for f/u of Diabetes.  He is accompanied by his wife.  HPI:  Pete Sheldon is a 78  year old male who presents for the evaluation/management of:  Was in the hospital 1/2018 with new diagnosis of stroke. Was in rehab after that.  Currently he is taking Lantus 30 Units/day, Humalog 7/5/7 Units with breakfast/lunch/dinner respectively.    Of note he is having bedtime snack almost every days.  Typical bedtime snack is whole bagel/ PB sandwich.  Has variable blood sugar pattern. This has been noted consistently in past and with multiple dose adjustment.  I doubt if he has clear understanding of insulin dosing and the carbohydrates.  Also trouble with memory.  I discussed continuous glucose monitor with him in past but he does not want sensors.  Low C-peptide.     Today in clinic patient was noted to be shaky, sweaty and was nauseous.  He reports that he took his insulin this morning and had regular breakfast.  Unfortunately we will not able to check blood sugar right away but blood sugar was checked after he had a can of apple juice and it was 155.  He denies similar episodes occurring at home.  He reports that he is able to feel when blood sugar is dropping low.  And is able to correct that.    1. Type 2 DM:  Orginally diagnosed in 1995.  Current Regimen: Metformin 1000 mg twice a day, Lantus 30 Units/day, Humalog 7/5/7 Units with breakfast/lunch/dinner respectively. In addition to that he is on correctional scale 1 unit- 25 >140 but he is not using it.    Does not feel comfortable with carbohydrate counting.    BS checks: Three times a day    Average Meter Download: Blood sugar data reviewed.  In general blood sugars are variable but mostly ranging in 150s-200s most of the time.  Occasional episodes of low blood sugar.  In the last few days 45 was the lowest number.  He was able to feel symptoms of hypoglycemia and was able to correct it.    Exercise:  Minimal  Episodes of hypoglycemia (low blood sugar):  Yes. As noted above.  Fixed meal pattern: NO. Carb content varies. In general diet is high in carbs.  Patient counting carbs: No    DM Complications:   Nephropathy: Yes: Microalbuminuria present  Retinopathy: Yes: History of laser treatment in past  Neuropathy: Yes.  Microalbuminuria: Yes: Microalbuminuria present.  Not on ACE secondary to history of hyperkalemia.  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    2. Hypertension: Blood Pressure today:   BP Readings from Last 3 Encounters:   18 110/56   18 108/78   18 106/62     Blood pressure medications include hydrochlorothiazide 12.5 mg, amlodipine 5 mg.      3. Hyperlipidemia: Takes lovastatin 40 mg for lipid control.  Denies muscle aches of pains.        PMH/PSH:  Past Medical History:   Diagnosis Date     Calculus of ureter      Hypertension      Microalbuminuria 2/15/2016     Other and unspecified hyperlipidemia      Type 2 diabetes, HbA1C goal < 8% (H) 1995     Past Surgical History:   Procedure Laterality Date     C NONSPECIFIC PROCEDURE      Nasal septoplasty     HC COLONOSCOPY THRU STOMA, DIAGNOSTIC  2007    Normal     HC FLEX SIGMOIDOSCOPY W/WO MIGUEL SPEC BY BRUSH/WASH  1999    Normal to 60 cm     HC REPAIR INCISIONAL HERNIA,REDUCIBLE  1999    Hernia Repair, Incisional, Unilateral (right)     HC REPAIR INCISIONAL HERNIA,REDUCIBLE      Hernia Repair, Incisional, Unilateral (left, with mesh placement)     Family Hx:  Family History   Problem Relation Age of Onset     Cerebrovascular Disease Mother       age 95     HEART DISEASE Mother      age 89,  age 95     Cerebrovascular Disease Father       age 91, stroke two years previous     Cancer - colorectal Brother      Half-brother     Cancer Sister  "     Half-sister (?type)     Cancer Sister      Half-sister (?type)     HEART DISEASE Brother      Neurologic Disorder Daughter      Multiple Sclerosis     Psychotic Disorder Son      Schizophrenia       Social Hx:  Social History     Social History     Marital status:      Spouse name: N/A     Number of children: 2     Years of education: N/A     Occupational History     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          MEDICATIONS:  has a current medication list which includes the following prescription(s): amlodipine, aspirin, atorvastatin, b-d u/f, blood glucose monitoring, blood glucose monitoring, finasteride, glucosamine hcl, insulin aspart, insulin glargine, insulin pen needle, insulin syringes (disposable), Simple Admitcan finepoint lancets, lisinopril, loratadine, metformin, multivitamin, glucose management, ACE/ARB NOT PRESCRIBED, INTENTIONAL,, order for dme, and tamsulosin.    ROS     ROS: 10 point ROS neg other than the symptoms noted above in the HPI.    Physical Exam   VS: /56 (BP Location: Right arm, Patient Position: Chair, Cuff Size: Adult Regular)  Pulse 77  Temp 97.9  F (36.6  C) (Oral)  Resp 20  Ht 1.67 m (5' 5.75\")  Wt 75.8 kg (167 lb)  SpO2 98%  BMI 27.16 kg/m2  GENERAL: AXOX3, NAD, well dressed,appears stated age.  HEENT: No exopthalmous, no proptosis, EOMI, no lig lag, no retraction  CV: RRR  LUNGS: CTAB  ABDOMEN: +BS  NEUROLOGY: CN grossly intact, no tremors  PSYCH: normal affect and mood      LABS:  Component      Latest Ref Rng 4/15/2016   C-Peptide      0.9 - 6.9 ng/mL <0.1 (L)   Glutamic Acid Decarboxylase Antibody       <5.0 . . . "     A1c:  Lab Results   Component Value Date    A1C 8.6 08/30/2018    A1C 8.4 03/07/2018    A1C 8.3 03/01/2018    A1C 7.9 01/09/2018    A1C 7.6 11/29/2017     Creatinine:  Creatinine   Date Value Ref Range Status   01/26/2018 1.25 0.66 - 1.25 mg/dL Final     Urine Micro:  Lab Results   Component Value Date    UMALCR 390.98 07/22/2016        LFTs/Lipids:  Recent Labs   Lab Test  08/16/17   0800  05/12/17   0906   09/25/15   0844  06/19/15   0815   CHOL  141  129   < >  134  135   HDL  37*  33*   < >  31*  36*   LDL  85  79   < >  80  83   TRIG  97  83   < >  117  78   CHOLHDLRATIO   --    --    --   4.3  3.8    < > = values in this interval not displayed.     TFTs:  TSH   Date Value Ref Range Status   07/22/2016 5.58 (H) 0.40 - 4.00 mU/L Final       All pertinent notes, labs, and images personally reviewed by me.     A/P  Mr.Victor ANURAG Sheldon is a 75 year old here for the evaluation/management of diabetes:    1. DM2 - (low C-peptide, negative FAVIOLA): Under Poor control.  A1c 8.6%.  Diabetes complicated by microalbuminuria, neuropathy and retinopathy.   Concerns for hypoglycemia unawareness. Variable BG. Brittle DM.  Multiple dose changes made in past but still not able to get stable blood sugar numbers.  Blood sugars are variable throughout the day.  Does not feel comfortable with carbohydrate counting.  Does not want Dexcom. This has been discussed multiple times with pt in past.  Again I discussed with patient and his wife that given his history of hypoglycemia unawareness and variable blood sugar he should strongly consider it.  H/o brittle DM. Lot of variability in BG.  Plan  In general diet is high in carbohydrates.  Continue Metformin 1000 mg twice a day   Decrease HUmalog: take 6/5/6 Units with breakfast/lunch/dinner repectively.  Do not use correctional scale ( he is not using it)    If blood glucose is still dropping low then decrease novolog by 1 unit with each meal (5/4/5 Units with breakfast/lunch/dinner  repectively )and decrease lantus to 28 Units.    I suspect that episode in clinic this AM was c/w hypoglycemic episode though we were not able to get BG on time. BG after a can of apple juice was 155.    Need to have consistent bedtime snack and consistent carbs with each meal.    Recommend checking blood sugars before meals and at bedtime.    If Blood glucose are low more often-> 2-3 times/week- give us a call.  The patient is advised to Make better food choices: reduce carbs, Reduce portion size, weight loss and exercise 3-4 times a week.  Discussed hypoglycemia signs and symptoms as well as management in detail.    2. Hypertension - Under Fair control.  Continue hydrochlorothiazide 12.5 mg, amlodipine 5 mg.    3. Hyperlipidemia - Under Good control.  Continue lovastatin 40 mg. LDL 91, HDL 34.    4. Prevention  Flu Shot-September 2015  Pneumovax- March 2012  Opthalmology-Yes. June 2017    ASA-no. 2/2 to h/o nosebleeds  Smoking- No    5. Microalbuminuria:  MICROL      208   7/22/2016  MICROALBUMIN   390.98   7/22/2016  Not on ACE 2/2 to h/o hyperkalemia    All questions were answered.  The patient indicates understanding of the above issues and agrees with the plan set forth.     More than 50% of face to face time spent with Mr. Sheldon on counseling / coordinating his care.  Total appointment times was 25 minutes.      Follow-up:  Follow up 3 months    Lori Damian M.D  Endocrinology  [address]    Disclaimer: This note consists of symbols derived from keyboarding, dictation and/or voice recognition software. As a result, there may be errors in the script that have gone undetected. Please consider this when interpreting information found in this chart.

## 2018-08-30 NOTE — PATIENT INSTRUCTIONS
[facility]   Dr Damian, Endocrinology Department      [facility]   [address] [address]  Appointment [phone]  Fax: [phone]   Monday and Tuesday         [facility]   [address] [place] [address]  Appointment [phone]  Fax: [phone]  Wednesday and Thursday       To provide the best diabetic care, please bring your blood glucose meter to each and every visit with your Endocrinologist.  Your blood glucose meter/insulin pump will be downloaded at every appointment.      Please arrive 15 minutes before your scheduled appointment.  This will allow for your blood glucose meter/insulin pump to be downloaded and glycosylated hemoglobin (A1c) to be obtained prior to your appointment.    Continue lantus 30 Units/day  Novolog- decrease the dose- take 6 Units with breakfast, 5 units with lunch and 6 units with dinner.    If blood glucose is still dropping low then decrease novolog by 1 unit with each meal (5/4/5 Units with breakfast/lunch/dinner repectively )and decrease lantus to 28 Units.    Let me know how Blood glucose are in 4 weeks.    Recommend checking blood sugars before meals and at bedtime.    If Blood glucose are low more often-> 2-3 times/week- give us a call.  The patient is advised to Make better food choices: reduce carbs, Reduce portion size, weight loss and exercise 3-4 times a week.  Discussed hypoglycemia signs and symptoms as well as management in detail.

## 2018-08-30 NOTE — MR AVS SNAPSHOT
After Visit Summary   2018    Pete Sheldon    MRN: [id number]           Patient Information     Date Of Birth          1940        Visit Information        Provider Department      2018 9:30 AM Lori Damian MD [facility]        Today's Diagnoses     Type 2 diabetes mellitus with diabetic polyneuropathy, with long-term current use of insulin (H)          Care Instructions    [facility] [place] locations   Dr Damian, Endocrinology Department      [facility]   [address] [address]  Appointment [phone]  Fax: [phone]   Monday and Tuesday         [facility]   [address] [facility] [place] [address]  Appointment [phone]  Fax: [phone]  Wednesday and Thursday       To provide the best diabetic care, please bring your blood glucose meter to each and every visit with your Endocrinologist.  Your blood glucose meter/insulin pump will be downloaded at every appointment.      Please arrive 15 minutes before your scheduled appointment.  This will allow for your blood glucose meter/insulin pump to be downloaded and glycosylated hemoglobin (A1c) to be obtained prior to your appointment.    Continue lantus 30 Units/day  Novolog- decrease the dose- take 6 Units with breakfast, 5 units with lunch and 6 units with dinner.    If blood glucose is still dropping low then decrease novolog by 1 unit with each meal (5/4/5 Units with breakfast/lunch/dinner repectively )and decrease lantus to 28 Units.    Let me know how Blood glucose are in 4 weeks.    Recommend checking blood sugars before meals and at bedtime.    If Blood glucose are low more often-> 2-3 times/week- give us a call.  The patient is advised to Make better food choices: reduce carbs, Reduce portion size, weight loss and exercise 3-4 times a week.  Discussed hypoglycemia signs and  symptoms as well as management in detail.                Follow-ups after your visit        Your next 10 appointments already scheduled     Aug 31, 2018 11:00 AM CDT   L/Vision Treatment with Thelma Borges OT   [facility] ([facility])    [address] [facility] [address] [facility] [address]   [phone]            Sep 06, 2018  1:00 PM CDT   Neuro Treatment with Lillian Talavera OT   [facility] ([facility])    [address] [facility] [address]   [phone]            Sep 13, 2018  1:00 PM CDT   Neuro Treatment with Lillian Talavera, OT   [facility] ([facility])    [address] [facility] [address]   [phone]            Oct 05, 2018  2:30 PM CDT   (Arrive by 2:15 PM)   Return Visit with Jasmyne Elliott MD   [facility]rologic [facility] ([facility])    [address] [place] [address] [phone]              Future tests that were ordered for you today     Open Future Orders        Priority Expected Expires Ordered    Hemoglobin A1c ASAP 10/29/2018 6/26/2019 8/30/2018            Who to contact     If you have questions or need follow up information about today's clinic visit or your schedule please contact [facility] directly at [phone].  Normal or non-critical lab and imaging results will be communicated to you by MyChart, letter or phone within 4 business days after the clinic has received the results. If you do not hear from us within 7 days, please contact the clinic through MyChart or phone. If you have a critical or abnormal lab result, we will notify you by phone as soon as possible.  Submit refill requests through [employer] or call your pharmacy and they will forward the refill request to us. Please allow 3 business days for your refill to be  "completed.          Additional Information About Your Visit        Care EveryWhere ID     This is your Care EveryWhere ID. This could be used by other organizations to access your [place] medical records  [id number]        Your Vitals Were     Pulse Temperature Respirations Height Pulse Oximetry BMI (Body Mass Index)    77 97.9  F (36.6  C) (Oral) 20 1.67 m (5' 5.75\") 98% 27.16 kg/m2       Blood Pressure from Last 3 Encounters:   08/30/18 105/62   08/24/18 108/78   05/16/18 106/62    Weight from Last 3 Encounters:   08/30/18 75.8 kg (167 lb)   08/24/18 76.6 kg (168 lb 12.8 oz)   05/16/18 74.8 kg (165 lb)                 Today's Medication Changes          These changes are accurate as of 8/30/18 11:02 AM.  If you have any questions, ask your nurse or doctor.               These medicines have changed or have updated prescriptions.        Dose/Directions    insulin aspart 100 UNIT/ML injection   Commonly known as:  NovoLOG FLEXPEN   This may have changed:  See the new instructions.   Used for:  Type 2 diabetes mellitus with diabetic polyneuropathy, with long-term current use of insulin (H)   Changed by:  Lori Damian MD        INJECT 6 UNITS WITH BREAKFAST, 5 UNITS WITH LUNCH, 6 UNITS WITH DINNER HOLD IF NOT EATING MEAL   Quantity:  30 mL   Refills:  0       insulin glargine 100 UNIT/ML injection   Commonly known as:  LANTUS   This may have changed:  Another medication with the same name was removed. Continue taking this medication, and follow the directions you see here.   Used for:  Type 2 diabetes mellitus with diabetic polyneuropathy, with long-term current use of insulin (H)   Changed by:  Lori Damian MD        Dose:  30 Units   Inject 30 Units Subcutaneous every morning   Quantity:  9 mL   Refills:  0       tamsulosin 0.4 MG capsule   Commonly known as:  FLOMAX   This may have changed:  Another medication with the same name was removed. Continue taking this medication, and follow the " directions you see here.   Used for:  Hyperlipidemia LDL goal <100, Calculus of kidney   Changed by:  Lori Damian MD        TAKE ONE CAPSULE BY MOUTH ONCE DAILY   Quantity:  90 capsule   Refills:  2            Where to get your medicines      These medications were sent to [facility] - [facility] [address] [facility] [address]     Phone:  [phone]     insulin aspart 100 UNIT/ML injection                Primary Care Provider Office Phone # Fax #    Rom Helm [phone]       [address] [facility] [address]  [facility] [address]        Goals        Diet    Increase water intake     Notes - Note edited  5/14/2014 11:42 AM by Patricia Khan RN    Have 5-6 glasses (8oz) of fluid a day  Per f/u with spouse today, pt not doing well. Not eating and drinking. Called clinic and advised to take pt to clinic.         Increase water intake        General    Medication 1 (pt-stated)     Notes - Note created  5/15/2014  1:57 PM by Patricia Khan, JULIA    Pt will have an MTM consult         Equal Access to Services     [facility] AH: Hadii aad ku hadasho Soomaali, waaxda luqadaha, qaybta kaalmada adeegyada, kadeem roman haysergein lorena kahn . So [facility] [phone].    ATENCIÓN: Si habla español, tiene a moise disposición servicios gratuitos de asistencia lingüística. Llame al [phone].    We comply with applicable federal civil rights laws and [place] laws. We do not discriminate on the basis of race, color, national origin, age, disability, sex, sexual orientation, or gender identity.            Thank you!     Thank you for choosing [facility]  for your care. Our goal is always to provide you with excellent care. Hearing back from our patients is one way we can continue to improve our services. Please take a few minutes to complete the written survey that you may receive in the mail after your visit with us.  Thank you!             Your Updated Medication List - Protect others around you: Learn how to safely use, store and throw away your medicines at www.disposemymeds.org.          This list is accurate as of 8/30/18 11:02 AM.  Always use your most recent med list.                   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    Type 2 diabetes mellitus with diabetic polyneuropathy, with long-term current use of insulin (H)       * blood glucose monitoring test strip    ONETOUCH VERIO IQ    200 strip    Use to test blood sugars 4 times daily or as directed using One Touch Verio Flex.    Type 2 diabetes mellitus with diabetic polyneuropathy, with long-term current use of insulin (H)       finasteride 5 MG tablet    PROSCAR    90 tablet    Take 1 tablet (5 mg) by mouth daily    Urinary frequency       Glucosamine HCl 750 MG Tabs      Take 750 mg by mouth 2 times daily    Arthritis       GLUCOSE MANAGEMENT Tabs     100 tablet    4 tabs po for low blood sugar below 70, may repeat q 10-15 minutes as needed    Type 2 diabetes mellitus with diabetic polyneuropathy, with long-term current use of insulin (H)       insulin aspart 100 UNIT/ML  injection    NovoLOG FLEXPEN    30 mL    INJECT 6 UNITS WITH BREAKFAST, 5 UNITS WITH LUNCH, 6 UNITS WITH DINNER HOLD IF NOT EATING MEAL    Type 2 diabetes mellitus with diabetic polyneuropathy, with long-term current use of insulin (H)       insulin glargine 100 UNIT/ML injection    LANTUS    9 mL    Inject 30 Units Subcutaneous every morning    Type 2 diabetes mellitus with diabetic polyneuropathy, with long-term current use of insulin (H)       * insulin pen needle 31G X 8 MM    B-D U/F    400 each    B-D ULTRA FINE SHORT PEN NEEDLES, USE 4 TIMES A DAY TO INJECT INSULIN    Type 2 diabetes, HbA1C goal < 8% (H)       * B-D U/F 31G X 8 MM   Generic drug:  insulin pen needle     400 each    USE 4 TIMES A DAY TO INJECT INSULIN    Type 2 diabetes, HbA1C goal < 8% (H)       insulin syringes (disposable) U-100 0.3 ML Misc     100 each    1 each 2 times daily    Type II or unspecified type diabetes mellitus without mention of complication, not stated as uncontrolled       LIFESCAN FINEPOINT LANCETS Misc     200 strip    Use to test blood sugars 4 times daily or as directed.    Type 2 diabetes, HbA1C goal < 8% (H)       lisinopril 2.5 MG tablet    PRINIVIL/Zestril    90 tablet    TAKE 1 TABLET BY MOUTH ONCE DAILY    Hypertension goal BP (blood pressure) < 140/90       loratadine 10 MG tablet    AF LORATADINE    14 tablet    Take 1 tablet (10 mg) by mouth daily    Hypertension goal BP (blood pressure) < 140/90       metFORMIN 500 MG tablet    GLUCOPHAGE    30 tablet    Take 1 tablet (500 mg) by mouth daily (with breakfast)    Type 2 diabetes mellitus with diabetic polyneuropathy, with long-term current use of insulin (H)       multivitamin Tabs tablet      Take 1 tablet by mouth daily FOCUS SELECT PREMIUM WITH LUTEIN per eye doctor Reported on 5/19/2017        order for DME     3 Month    All DM testing supplies including test strips, lancets, solution, syringes, needles and/or pen needles for testing 4 times per day.  Brand  "\"Contour Next\"    Type 2 diabetes mellitus with diabetic polyneuropathy (H)       tamsulosin 0.4 MG capsule    FLOMAX    90 capsule    TAKE ONE CAPSULE BY MOUTH ONCE DAILY    Hyperlipidemia LDL goal <100, Calculus of kidney       * Notice:  This list has 4 medication(s) that are the same as other medications prescribed for you. Read the directions carefully, and ask your doctor or other care provider to review them with you.      "

## 2018-08-31 ENCOUNTER — HOSPITAL ENCOUNTER (OUTPATIENT)
Dept: OCCUPATIONAL THERAPY | Facility: CLINIC | Age: 78
Setting detail: THERAPIES SERIES
End: 2018-08-31
Attending: INTERNAL MEDICINE
Payer: COMMERCIAL

## 2018-08-31 PROCEDURE — 40000249 ZZH STATISTIC VISIT LOW VISION CLINIC: Performed by: OCCUPATIONAL THERAPIST

## 2018-08-31 PROCEDURE — G8984 CARRY CURRENT STATUS: HCPCS | Mod: GO,CJ | Performed by: OCCUPATIONAL THERAPIST

## 2018-08-31 PROCEDURE — 97535 SELF CARE MNGMENT TRAINING: CPT | Mod: GO | Performed by: OCCUPATIONAL THERAPIST

## 2018-08-31 PROCEDURE — G8985 CARRY GOAL STATUS: HCPCS | Mod: GO,CI | Performed by: OCCUPATIONAL THERAPIST

## 2018-09-02 LAB
CREAT UR-MCNC: 75 MG/DL
MICROALBUMIN UR-MCNC: 127 MG/L
MICROALBUMIN/CREAT UR: 170.01 MG/G CR (ref 0–17)

## 2018-09-06 ENCOUNTER — HOSPITAL ENCOUNTER (OUTPATIENT)
Dept: OCCUPATIONAL THERAPY | Facility: CLINIC | Age: 78
Setting detail: THERAPIES SERIES
End: 2018-09-06
Attending: INTERNAL MEDICINE
Payer: COMMERCIAL

## 2018-09-06 PROCEDURE — 97535 SELF CARE MNGMENT TRAINING: CPT | Mod: GO | Performed by: OCCUPATIONAL THERAPIST

## 2018-09-06 PROCEDURE — 40000125 ZZHC STATISTIC OT OUTPT VISIT: Performed by: OCCUPATIONAL THERAPIST

## 2018-09-12 ENCOUNTER — TELEPHONE (OUTPATIENT)
Dept: INTERNAL MEDICINE | Facility: CLINIC | Age: 78
End: 2018-09-12

## 2018-09-12 DIAGNOSIS — R39.9 URINARY SYMPTOM OR SIGN: Primary | ICD-10-CM

## 2018-09-12 NOTE — PROGRESS NOTES
Outpatient Occupational Therapy Progress Note     Patient: Pete Sheldon  : 1940    Beginning/End Dates of Reporting Period:  2018 to 2018    Referring Provider: Rom Helm MC    Therapy Diagnosis: decreased ADLs/IADLs    Client Self Report: Patient doesn't feel like his vision changed much after his laser surgery for L eye on Wednesday - they didn't say it would necessarily improve his vision.  He had a visit with his diabetes MD yesterday and had very low blood sugars there - he felt nauseas, etc.     Objective Measurements:     Objective Measure: MNRead   Details: Wearing bifocals: MNRead with task light: Smallest print size read: 1.6M (-1) - improved from last week; Critical Print size: 2.5M (same); WPM at critical print size: 107.  With task light and yellow transparency over the chart to help with glare, patient was able to read 1.6M line in 11 seconds versus 19.9 seconds with task light only.      Contrast Sensitivity Function  15 - BNLs    Eye Dominance  appears to be R    Central visual field screen via Clock Face:   R eye: unable to see much at all; L eye: patient reports all areas appear intact      Goals:     Goal Identifier  HEP strength and coordination   Goal Description Patient to demonstrate improved B strength and coordination skills through independence with FM HEP and an increased performance with ADLs (manipulating buttons, zippers, typing skills, etc.) using compensatory measures prn and by demonstrating improved 9-Hole Peg Test score.   Target Date 18   Date Met      Progress: Pt discontinued using putty due to increased L UE numbness after use.     Goal Identifier UE Sensation exercises and strategies   Goal Description Patient will verbalize 2-3 strategies to compensate for decreased UE sensation for increased independence and safety during ADL/IADLs (bathing, meal prep, etc.).  And will demonstrate sensation re-education exercises to perform as part of a home  "exercises program.   Target Date 09/06/18   Date Met      Progress:Pt instructed in UE  Sensory reeducation techniques.  Pt reports increased sensation of R hand, but little compliance with technques at home.     Goal Identifier AE for ADLs/handwriting, silverware, grasping with R hand   Goal Description Patient to verbalize understanding of and demonstrate use of 3 new pieces of adaptive equipment and/or adapted techniques to increase his independence and safety with ADL/IADLs (handwriting, feeding self, dressing, etc.   Target Date 09/06/18   Date Met      Progress: Pt trialed multitple pencil  and gripping strategies for increased handwriting ability, preferring the rubber gripper and demonstrating improvement with his signiture.  He was also instructed in use of button hook.      Goal Identifier Fatigue managmet/EC techniques   Goal Description Patient to verbalize and independently utilize 3 strategies for energy conservation/work simplification and fatigue management for improved independence with ADL/IADLs at home and in the community, and demonstrate use of these tasks during session (Errands List, taking breaks prn during session, etc.)   Target Date 09/06/18   Date Met      Progress: Pt instructed in EC techniques demonstrated improved FACITscore but still requires rest breaks between strenuous tasks.     Progress this reporting period:   Pt demonstrated gains with incorporating energy compensation strategies and, increasing his FACIT score by 7 points, resulting in a fatigue level that is WNL. He still requires rest breaks between tasks to manage fatigue.  Pt demonstrated improved signature with rubber pencil  and was issued one for home use.  He continues to experience R hand numbness with a \"eva\" sensation but it has improved from covering the palm and fingers to how just affecting the fingers on the R hand.  Pt reports he is buttoning his shirts more independently but has to take his time.  " He decided not to obtain a button hook. Pt discontinued using putty due to an increase in numbness after its use.  He does complete UE ROM exercises for the hand, which has not had any negative impact. Pt and family educated on CPT level 4.58 with instructions for patient to have daily safety checks if his wife is out of town and he is left at home alone for a few days.  Low vision rehab has been initiated as well to address goals #3 and 4 above, recommending adaptive equipment and also as a technique to decrease his fatigue levels and improve activity tolerance with less visual strain.    Plan:  Continue therapy per current plan of care at a frequency of 1x/week x 4 more weeks.

## 2018-09-12 NOTE — TELEPHONE ENCOUNTER
Reason for Call:  Other call back    Detailed comments: Patient calling wondering if Dr. Helm can make a recommendation for a urology & prostate doctor.     Phone Number Patient can be reached at: Home number on file [phone] (home) Talk to wife Radha    Best Time:     Can we leave a detailed message on this number? YES    Call taken on 9/12/2018 at 9:33 AM by Ksenia Galvan

## 2018-09-12 NOTE — ADDENDUM NOTE
Encounter addended by: Lillian Talavera OT on: 9/12/2018 10:58 AM<BR>     Actions taken: Pend clinical note

## 2018-09-13 ENCOUNTER — HOSPITAL ENCOUNTER (OUTPATIENT)
Dept: OCCUPATIONAL THERAPY | Facility: CLINIC | Age: 78
Setting detail: THERAPIES SERIES
End: 2018-09-13
Attending: INTERNAL MEDICINE
Payer: COMMERCIAL

## 2018-09-13 PROCEDURE — 40000249 ZZH STATISTIC VISIT LOW VISION CLINIC: Performed by: OCCUPATIONAL THERAPIST

## 2018-09-13 PROCEDURE — G8986 CARRY D/C STATUS: HCPCS | Mod: GO,CJ | Performed by: OCCUPATIONAL THERAPIST

## 2018-09-13 PROCEDURE — G8985 CARRY GOAL STATUS: HCPCS | Mod: GO,CI | Performed by: OCCUPATIONAL THERAPIST

## 2018-09-13 PROCEDURE — 97535 SELF CARE MNGMENT TRAINING: CPT | Mod: GO | Performed by: OCCUPATIONAL THERAPIST

## 2018-09-13 NOTE — ADDENDUM NOTE
Encounter addended by: Thelma Borges, OT on: 9/13/2018  1:29 PM<BR>     Actions taken: Sign clinical note

## 2018-09-13 NOTE — ADDENDUM NOTE
Encounter addended by: Thelma Borges, OT on: 9/13/2018  1:17 PM<BR>     Actions taken: Sign clinical note, Flowsheet accepted, Charge Capture section accepted

## 2018-09-13 NOTE — PROGRESS NOTES
[facility]      OUTPATIENT OCCUPATIONAL THERAPY  PLAN OF TREATMENT FOR OUTPATIENT REHABILITATION    Patient's Last Name, First Name, M.I.                YOB: 1940  Pete Sheldon                        Provider's Name  [facility] Medical Record No.  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                        OT Diagnosis: decreased ADLs/IADLs      _________________________________________________________________________________  Plan of Treatment/Frequency/Duration/Goals: Please see most recent progress note linked to 8/31/2018 visit for details.      Certification date from 9/07/18 to 10/07/18.    Thelma Borges, OTHILDA/L, OT          I CERTIFY THE NEED FOR THESE SERVICES FURNISHED UNDER        THIS PLAN OF TREATMENT AND WHILE UNDER MY CARE     (Physician co-signature of this document indicates review and certification of the therapy plan).                Referring Provider: Rom Helm MD

## 2018-09-13 NOTE — PROGRESS NOTES
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    Client Self Report: Patient reports his right hand almost feels more weak after doing the Theraputty exercises.  His grandson ordered all the recommended adaptive equipment for low vision and he should be getting it later this week.     Objective Measurements:  9hpt  R 35 sec (improved from 45 sec.) L 44 sec (improved from 35 sec)    Goals:     Goal Identifier  HEP strength and coordination   Goal Description Patient to demonstrate improved B strength and coordination skills through independence with FM HEP and an increased performance with ADLs (manipulating buttons, zippers, typing skills, etc.) using compensatory measures prn and by demonstrating improved 9-Hole Peg Test score.   Target Date 18   Date Met      Progress: Goal discontinued with last progress note, however, issued less resistive putty for patient to trial and see if better tolerated     Goal Identifier UE Sensation exercises and strategies   Goal Description Patient will verbalize 2-3 strategies to compensate for decreased UE sensation for increased independence and safety during ADL/IADLs (bathing, meal prep, etc.).  And will demonstrate sensation re-education exercises to perform as part of a home exercises program.   Target Date 18   Date Met   18   Progress:  Goal met.     Goal Identifier AE for ADLs/handwriting, silverware, grasping with R hand   Goal Description Patient to verbalize understanding of and demonstrate use of 3 new pieces of adaptive equipment and/or adapted techniques to increase his independence and safety with ADL/IADLs (handwriting, feeding self, dressing, etc.   Target Date 18   Date Met    9/13/18   Progress:  Goal met     Goal Identifier Fatigue managmet/EC techniques   Goal Description Patient to verbalize and independently utilize 3 strategies for energy conservation/work simplification and fatigue management for improved independence with ADL/IADLs at home and in the community, and demonstrate use of these tasks during session (Errands List, taking breaks prn during session, etc.)   Target Date 09/06/18   Date Met   9/13/18   Progress:  Goal met.     Progress Toward Goals:   Progress this reporting period: Patient seen for a total of 12 visits to address the following deficits: numb arm and hand-loss of sensation, decreased coordination, decreased pinch strength, fatigue, decreased vision.  He demonstrated improvement in fine motor coordination as noted above and demonstrated increased knowledge of strategies and adaptive equipment to utilize for decreased vision and decreased hand function.      Plan:  Discharge from therapy.    Discharge:    Reason for Discharge: Patient has met all goals.    Equipment Issued: Red and Yellow Theraputty; recommend the following low vision equipment and strategies for times super bright stand magnifier:, Yellow transparency over white paper due to glare issues, bold lined paper, 20/20 bold pens, strategies for contrast and lighting    Discharge Plan: Patient to continue home program.

## 2018-09-16 NOTE — TELEPHONE ENCOUNTER
Recommend [facility] in [place]--Dr Pina or Lan.   Please provide patient's wife with contact inormation.   [facility]: [facility] [place] [phone]

## 2018-09-17 NOTE — TELEPHONE ENCOUNTER
Patient's wife informed Dr. Helm recommends he see Dr. Montenegro or Dr. Pina at [facility], phone number provided.    She asks that appointment with Dr. Elliott on 10/5/18 be cancelled - done.

## 2018-09-28 DIAGNOSIS — E03.9 HYPOTHYROIDISM, UNSPECIFIED TYPE: Primary | ICD-10-CM

## 2018-09-28 DIAGNOSIS — R79.89 TSH ELEVATION: ICD-10-CM

## 2018-09-28 PROCEDURE — 84439 ASSAY OF FREE THYROXINE: CPT | Performed by: INTERNAL MEDICINE

## 2018-09-28 PROCEDURE — 84443 ASSAY THYROID STIM HORMONE: CPT | Performed by: INTERNAL MEDICINE

## 2018-09-28 PROCEDURE — 36415 COLL VENOUS BLD VENIPUNCTURE: CPT | Performed by: INTERNAL MEDICINE

## 2018-09-29 PROBLEM — E03.9 HYPOTHYROIDISM, UNSPECIFIED TYPE: Status: ACTIVE | Noted: 2018-09-29

## 2018-09-29 LAB
T4 FREE SERPL-MCNC: 1 NG/DL (ref 0.76–1.46)
TSH SERPL DL<=0.005 MIU/L-ACNC: 5.66 MU/L (ref 0.4–4)

## 2018-09-29 RX ORDER — LEVOTHYROXINE SODIUM 25 UG/1
25 TABLET ORAL DAILY
Qty: 90 TABLET | Refills: 0 | Status: SHIPPED | OUTPATIENT
Start: 2018-09-29 | End: 2018-12-26

## 2018-10-09 ENCOUNTER — TRANSFERRED RECORDS (OUTPATIENT)
Dept: HEALTH INFORMATION MANAGEMENT | Facility: CLINIC | Age: 78
End: 2018-10-09

## 2018-10-18 ENCOUNTER — TELEPHONE (OUTPATIENT)
Dept: INTERNAL MEDICINE | Facility: CLINIC | Age: 78
End: 2018-10-18

## 2018-10-18 DIAGNOSIS — Z79.4 TYPE 2 DIABETES MELLITUS WITH DIABETIC POLYNEUROPATHY, WITH LONG-TERM CURRENT USE OF INSULIN (H): ICD-10-CM

## 2018-10-18 DIAGNOSIS — E11.42 TYPE 2 DIABETES MELLITUS WITH DIABETIC POLYNEUROPATHY, WITH LONG-TERM CURRENT USE OF INSULIN (H): ICD-10-CM

## 2018-10-18 RX ORDER — LANCETS
EACH MISCELLANEOUS
Qty: 200 EACH | Refills: 6 | Status: SHIPPED | OUTPATIENT
Start: 2018-10-18 | End: 2020-01-01

## 2018-10-18 NOTE — TELEPHONE ENCOUNTER
Patient's wife calling.  Needs prescription for Onetouch ultra soft lancets.  States he checks his blood sugars QID.    Next 5 appointments (look out 90 days)     Dec 05, 2018  2:00 PM CST   PHYSICAL with Rom Helm MD   [facility] ([facility])    [address]  [facility] [id number] [phone]                  Rx sent to pharmacy.  Wife informed.

## 2018-10-25 DIAGNOSIS — I10 HYPERTENSION GOAL BP (BLOOD PRESSURE) < 140/90: ICD-10-CM

## 2018-10-25 RX ORDER — AMLODIPINE BESYLATE 5 MG/1
TABLET ORAL
Qty: 90 TABLET | Refills: 2 | Status: SHIPPED | OUTPATIENT
Start: 2018-10-25 | End: 2018-12-05

## 2018-10-25 NOTE — TELEPHONE ENCOUNTER
"Requested Prescriptions   Pending Prescriptions Disp Refills     amLODIPine (NORVASC) 5 MG tablet [Pharmacy Med Name: AMLODIPINE 5MG TAB] 90 tablet 1    Last Written Prescription Date:  06/28/2018  Last Fill Quantity: 90,  # refills: 1   Last office visit: 8/24/2018 with prescribing provider:     Future Office Visit:   Next 5 appointments (look out 90 days)     Dec 05, 2018  2:00 PM CST   PHYSICAL with Rom Helm MD   [facility] ([facility])    [address]  [facility] [id number] [phone]                Sig: TAKE 1 TABLET BY MOUTH ONCE DAILY    Calcium Channel Blockers Protocol  Passed    10/25/2018  8:39 AM       Passed - Blood pressure under 140/90 in past 12 months    BP Readings from Last 3 Encounters:   08/30/18 110/56   08/24/18 108/78   05/16/18 106/62                Passed - Recent (12 mo) or future (30 days) visit within the authorizing provider's specialty    Patient had office visit in the last 12 months or has a visit in the next 30 days with authorizing provider or within the authorizing provider's specialty.  See \"Patient Info\" tab in inbasket, or \"Choose Columns\" in Meds & Orders section of the refill encounter.             Passed - Patient is age 18 or older       Passed - Normal serum creatinine on file in past 12 months    Recent Labs   Lab Test  01/26/18   0601   01/08/18   1351   CR  1.25   < >   --    CREAT   --    --   1.2    < > = values in this interval not displayed.             "

## 2018-10-29 ENCOUNTER — OFFICE VISIT (OUTPATIENT)
Dept: UROLOGY | Facility: CLINIC | Age: 78
End: 2018-10-29
Payer: COMMERCIAL

## 2018-10-29 VITALS — WEIGHT: 167 LBS | HEIGHT: 66 IN | OXYGEN SATURATION: 97 % | HEART RATE: 72 BPM | BODY MASS INDEX: 26.84 KG/M2

## 2018-10-29 DIAGNOSIS — R35.1 NOCTURIA: ICD-10-CM

## 2018-10-29 DIAGNOSIS — N40.0 ENLARGED PROSTATE: ICD-10-CM

## 2018-10-29 DIAGNOSIS — R35.0 URINARY FREQUENCY: Primary | ICD-10-CM

## 2018-10-29 DIAGNOSIS — Z79.4 TYPE 2 DIABETES MELLITUS WITH DIABETIC POLYNEUROPATHY, WITH LONG-TERM CURRENT USE OF INSULIN (H): ICD-10-CM

## 2018-10-29 DIAGNOSIS — E03.9 HYPOTHYROIDISM, UNSPECIFIED TYPE: Primary | ICD-10-CM

## 2018-10-29 DIAGNOSIS — E03.9 HYPOTHYROIDISM, UNSPECIFIED TYPE: ICD-10-CM

## 2018-10-29 DIAGNOSIS — E11.42 TYPE 2 DIABETES MELLITUS WITH DIABETIC POLYNEUROPATHY, WITH LONG-TERM CURRENT USE OF INSULIN (H): ICD-10-CM

## 2018-10-29 LAB
ALBUMIN UR-MCNC: 30 MG/DL
APPEARANCE UR: CLEAR
BILIRUB UR QL STRIP: NEGATIVE
COLOR UR AUTO: YELLOW
GLUCOSE UR STRIP-MCNC: 500 MG/DL
HBA1C MFR BLD: 9.1 % (ref 0–5.6)
HGB UR QL STRIP: NEGATIVE
KETONES UR STRIP-MCNC: NEGATIVE MG/DL
LEUKOCYTE ESTERASE UR QL STRIP: NEGATIVE
NITRATE UR QL: NEGATIVE
PH UR STRIP: 5.5 PH (ref 5–7)
RESIDUAL VOLUME (RV) (EXTERNAL): 28
SOURCE: ABNORMAL
SP GR UR STRIP: 1.01 (ref 1–1.03)
UROBILINOGEN UR STRIP-ACNC: 0.2 EU/DL (ref 0.2–1)

## 2018-10-29 PROCEDURE — 84443 ASSAY THYROID STIM HORMONE: CPT | Performed by: INTERNAL MEDICINE

## 2018-10-29 PROCEDURE — 81003 URINALYSIS AUTO W/O SCOPE: CPT | Mod: QW | Performed by: UROLOGY

## 2018-10-29 PROCEDURE — 99213 OFFICE O/P EST LOW 20 MIN: CPT | Performed by: UROLOGY

## 2018-10-29 PROCEDURE — 36415 COLL VENOUS BLD VENIPUNCTURE: CPT | Performed by: INTERNAL MEDICINE

## 2018-10-29 PROCEDURE — 84439 ASSAY OF FREE THYROXINE: CPT | Performed by: INTERNAL MEDICINE

## 2018-10-29 PROCEDURE — 83036 HEMOGLOBIN GLYCOSYLATED A1C: CPT | Performed by: INTERNAL MEDICINE

## 2018-10-29 ASSESSMENT — PAIN SCALES - GENERAL: PAINLEVEL: NO PAIN (0)

## 2018-10-29 NOTE — LETTER
10/29/2018       RE: Pete Sheldon  [address] [place] [address]     Dear Colleague,    Thank you for referring your patient, Pete Sheldon, to the [facility] [place] [facility]. Please see a copy of my visit note below.    Office Visit Note  [facility]  [phone]    UROLOGIC DIAGNOSES:   History of kidney stones, enlarged prostate, nocturia and frequency    CURRENT INTERVENTIONS:   Flomax and finasteride    HISTORY:   This is an 78-year-old gentleman who had been seeing Dr. Elliott for an enlarged prostate. He wanted to come to a clinic closer to his home so he switched care to the [facility] today. He was started on finasteride in March and he says that the finasteride and Flomax do not really improve his urinary symptoms. He reports he is still getting up 3-5 times nightly to urinate. He says that during the day he gets an urge to urinate often but only goes in very small amounts. He is interested in further treatment options for this.      PAST MEDICAL HISTORY:   Past Medical History:   Diagnosis Date     Calculus of ureter 1980's     Hypertension      Microalbuminuria 2/15/2016     Mumps      Other and unspecified hyperlipidemia      Type 2 diabetes, HbA1C goal < 8% (H) 7/1995       PAST SURGICAL HISTORY:   Past Surgical History:   Procedure Laterality Date     C NONSPECIFIC PROCEDURE  1980's    Nasal septoplasty     HC COLONOSCOPY THRU STOMA, DIAGNOSTIC  6/12/2007    Normal     HC FLEX SIGMOIDOSCOPY W/WO MIGUEL SPEC BY BRUSH/WASH  12/30/1999    Normal to 60 cm     HC REPAIR INCISIONAL HERNIA,REDUCIBLE  1/2001, 1999    Hernia Repair, Incisional, Unilateral (right)     HC REPAIR INCISIONAL HERNIA,REDUCIBLE  1996    Hernia Repair, Incisional, Unilateral (left, with mesh placement)     TESTICLE SURGERY       VASECTOMY         FAMILY HISTORY:   Family History   Problem Relation Age of Onset      "Cerebrovascular Disease Mother       age 95     HEART DISEASE Mother      age 89,  age 95     Cerebrovascular Disease Father       age 91, stroke two years previous     Cancer - colorectal Brother      Half-brother     Cancer Sister      Half-sister (?type)     Cancer Sister      Half-sister (?type)     HEART DISEASE Brother      Neurologic Disorder Daughter      Multiple Sclerosis     Psychotic Disorder Son      Schizophrenia       SOCIAL HISTORY:   Social History   Substance Use Topics     Smoking status: Former Smoker     Quit date: 3/11/1953     Smokeless tobacco: Never Used     Alcohol use No       Current Outpatient Prescriptions   Medication     amLODIPine (NORVASC) 5 MG tablet     aspirin 81 MG chewable tablet     atorvastatin (LIPITOR) 20 MG tablet     B-D U/F 31G X 8 MM insulin pen needle     blood glucose monitoring (ONE TOUCH ULTRASOFT) lancets     blood glucose monitoring (ONETOUCH VERIO IQ) test strip     blood glucose monitoring (ONETOUCH VERIO IQ) test strip     finasteride (PROSCAR) 5 MG tablet     Glucosamine HCl 750 MG TABS     insulin aspart (NOVOLOG FLEXPEN) 100 UNIT/ML injection     insulin glargine (LANTUS) 100 UNIT/ML injection     insulin pen needle (B-D U/F) 31G X 8 MM     insulin syringes, disposable, U-100 0.3 ML MISC     levothyroxine (SYNTHROID/LEVOTHROID) 25 MCG tablet     LIFESCAN FINEPOINT LANCETS MISC     lisinopril (PRINIVIL/ZESTRIL) 2.5 MG tablet     loratadine (AF LORATADINE) 10 MG tablet     metFORMIN (GLUCOPHAGE) 500 MG tablet     multivitamin (OCUVITE) TABS     Nutritional Supplements (GLUCOSE MANAGEMENT) TABS     order for DME     tamsulosin (FLOMAX) 0.4 MG capsule     ACE/ARB NOT PRESCRIBED, INTENTIONAL,     No current facility-administered medications for this visit.          PHYSICAL EXAM:    Pulse 72  Ht 1.67 m (5' 5.75\")  Wt 75.8 kg (167 lb)  SpO2 97%  BMI 27.16 kg/m2    Constitutional: Well developed. Conversant and in no acute distress  Eyes: Anicteric " sclera, conjunctiva clear, normal extraocular movements  ENT: Normocephalic and atraumatic,   Skin: Warm and dry. No rashes or lesions  Cardiac: No peripheral edema  Back/Flank: Not done  CNS/PNS: Normal musculature and movements, moves all extremities normally  Respiratory: Normal non-labored breathing  Abdomen: Soft nontender and nondistended  Peripheral Vascular: No peripheral edema  Mental Status/Psych: Alert and Oriented x 3. Normal mood and affect    Penis: Not done  Scrotal Skin: Not done  Testicles: Not done  Epididymis: Not done  Digital Rectal Exam:     Cystoscopy: Not done    Imaging: None    Urinalysis: UA RESULTS:  Recent Labs   Lab Test  02/23/18   1445   03/16/17   1410   COLOR  Yellow   < >  Yellow   APPEARANCE  Slightly Cloudy   < >  Clear   URINEGLC  >499*   < >  Negative   URINEBILI  Negative   < >  Negative   URINEKETONE  Negative   < >  Negative   SG  1.014   < >  1.020   UBLD  Negative   < >  Negative   URINEPH  5.0   < >  5.5   PROTEIN  30*   < >  100*   UROBILINOGEN   --    --   0.2   NITRITE  Negative   < >  Negative   LEUKEST  Negative   < >  Negative   RBCU  17*   < >  O - 2   WBCU  <1   < >  O - 2    < > = values in this interval not displayed.       PSA: 1.23 in 2013    Post Void Residual: 28mL    Other labs: None today      IMPRESSION:  Nocturia and urinary frequency    PLAN:  The Flomax and finasteride have not improved his symptoms appreciably. He is maximized on medical therapy at this point and understands that any further therapy would involve a procedure or surgery. He is interested in discussing treatment options. I recommended that we perform an office cystoscopy to look at the anatomy of the prostate for treatment recommendations regarding a procedure and he agreed to this. We will get him scheduled for office cystoscopy in the near future.    Total Time: 15 minutes                                      Total in Consultation: 15 minutes        Clovis Montenegro MD

## 2018-10-29 NOTE — NURSING NOTE
pvr by scanner=28mL  Pt has day and nt freq.  Pt up 3-5 times a nt to void.  Pt feels like he has to void and when he does he gets a few drops out.  Pt cant tell if he is better since he saw DARVIN last.......... He doesn't remember.  Pt denies dysuria.  Pt denies he drinks bladder irritants.  SARAI Garrido, CMA

## 2018-10-29 NOTE — MR AVS SNAPSHOT
After Visit Summary   10/29/2018    Pete Sheldon    MRN: [id number]           Patient Information     Date Of Birth          1940        Visit Information        Provider Department      10/29/2018 2:30 PM Clovis Montenegro MD [facility]        Today's Diagnoses     Urinary frequency    -  1    Nocturia        Enlarged prostate           Follow-ups after your visit        Follow-up notes from your care team     Return for Office cystoscopy.      Your next 10 appointments already scheduled     Oct 29, 2018  3:15 PM CDT   LAB with [facility]   [facility]    [address] [facility] [address]   [phone]           Please do not eat 10-12 hours before your appointment if you are coming in fasting for labs on lipids, cholesterol, or glucose (sugar). This does not apply to pregnant women. Water, hot tea and black coffee (with nothing added) are okay. Do not drink other fluids, diet soda or chew gum.            Dec 05, 2018  2:00 PM CST   PHYSICAL with Rom Helm MD   [facility] ([facility]    [address] [facility] [address]   [phone]              Who to contact     If you have questions or need follow up information about today's clinic visit or your schedule please contact [facility]OLOG[facility] directly at [phone].  Normal or non-critical lab and imaging results will be communicated to you by MyChart, letter or phone within 4 business days after the clinic has received the results. If you do not hear from us within 7 days, please contact the clinic through MyChart or phone. If you have a critical or abnormal lab result, we will notify you by phone as soon as possible.  Submit refill requests through [employer] or call your pharmacy and they will forward the refill request to us. Please allow  "3 business days for your refill to be completed.          Additional Information About Your Visit        MyChart Information     [employer] lets you send messages to your doctor, view your test results, renew your prescriptions, schedule appointments and more. To sign up, go to [email or web address] . Click on \"Log in\" on the left side of the screen, which will take you to the Welcome page. Then click on \"Sign up Now\" on the right side of the page.     You will be asked to enter the access code listed below, as well as some personal information. Please follow the directions to create your username and password.     Your access code is: [email or web address]  Expires: 2019  2:43 PM     Your access code will  in 90 days. If you need help or a new code, please call your [place] clinic or [phone].        Care EveryWhere ID     This is your Care EveryWhere ID. This could be used by other organizations to access your [place] medical records  [id number]        Your Vitals Were     Pulse Height Pulse Oximetry BMI (Body Mass Index)          72 1.67 m (5' 5.75\") 97% 27.16 kg/m2         Blood Pressure from Last 3 Encounters:   18 110/56   18 108/78   18 106/62    Weight from Last 3 Encounters:   10/29/18 75.8 kg (167 lb)   18 75.8 kg (167 lb)   18 76.6 kg (168 lb 12.8 oz)              We Performed the Following     Bladder scan     UA without Microscopic        Primary Care Provider Office Phone # Fax #    Rom Helm [phone]       [address] [facility] [address]  [facility] [address]        Goals        Diet    Increase water intake     Notes - Note edited  2014 11:42 AM by Patricia Khan RN    Have 5-6 glasses (8oz) of fluid a day  Per f/u with spouse today, pt not doing well. Not eating and drinking. Called clinic and advised to take pt to clinic.         Increase water intake        General    Medication 1 (pt-stated)     Notes - Note created  5/15/2014  " 1:57 PM by Patricia Khan RN    Pt will have an MTM consult         Equal Access to Services     CAR BANDA : Irvin Calvert, wadenitada lurainertrumanha, qaybta kadavidada nickie, kadeem medrano. So [facility] [phone].    ATENCIÓN: Si habla español, tiene a moise disposición servicios gratuitos de asistencia lingüística. Llame al [phone].    We comply with applicable federal civil rights laws and [place] laws. We do not discriminate on the basis of race, color, national origin, age, disability, sex, sexual orientation, or gender identity.            Thank you!     Thank you for choosing [facility] [place]  for your care. Our goal is always to provide you with excellent care. Hearing back from our patients is one way we can continue to improve our services. Please take a few minutes to complete the written survey that you may receive in the mail after your visit with us. Thank you!             Your Updated Medication List - Protect others around you: Learn how to safely use, store and throw away your medicines at www.disposemymeds.org.          This list is accurate as of 10/29/18  2:43 PM.  Always use your most recent med list.                   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    Type 2 diabetes mellitus with diabetic polyneuropathy, with long-term current use of insulin (H)       * blood glucose monitoring test strip    ONETOUCH VERIO IQ    200 strip    Use to test blood sugars 4 times daily or as directed using One Touch Verio Flex.    Type 2 diabetes mellitus with diabetic polyneuropathy, with long-term current use of insulin (H)       finasteride 5 MG tablet    PROSCAR    90 tablet    Take 1 tablet (5 mg) by mouth daily    Urinary frequency       Glucosamine HCl 750 MG Tabs      Take 750 mg by mouth 2 times daily    Arthritis       GLUCOSE MANAGEMENT Tabs     100 tablet    4 tabs po for low blood sugar below 70, may repeat q 10-15 minutes as needed    Type 2 diabetes mellitus with diabetic polyneuropathy, with long-term current use of insulin (H)       insulin aspart 100 UNIT/ML injection    NovoLOG FLEXPEN    30 mL    INJECT 6 UNITS WITH BREAKFAST, 5 UNITS WITH LUNCH, 6 UNITS WITH DINNER HOLD IF NOT EATING MEAL    Type 2 diabetes mellitus with diabetic polyneuropathy, with long-term current use of insulin (H)       insulin glargine 100 UNIT/ML injection    LANTUS    9 mL    Inject 30 Units Subcutaneous every morning    Type 2 diabetes mellitus with diabetic polyneuropathy, with long-term current use of insulin (H)       * insulin pen needle 31G X 8 MM    B-D U/F    400 each    B-D ULTRA FINE SHORT PEN NEEDLES, USE 4 TIMES A DAY TO INJECT INSULIN    Type 2 diabetes, HbA1C goal < 8% (H)       * B-D U/F 31G X 8 MM   Generic drug:  insulin pen needle     400 each    USE 4 TIMES A DAY TO INJECT INSULIN    Type 2 diabetes, HbA1C goal < 8% (H)       insulin syringes (disposable) U-100 0.3 ML Misc     100 each    1 each 2 times daily    Type II or unspecified type diabetes mellitus without mention of complication, not stated as uncontrolled        "levothyroxine 25 MCG tablet    SYNTHROID/LEVOTHROID    90 tablet    Take 1 tablet (25 mcg) by mouth daily    Hypothyroidism, unspecified type       * LIFESCAN FINEPOINT LANCETS Misc     200 strip    Use to test blood sugars 4 times daily or as directed.    Type 2 diabetes, HbA1C goal < 8% (H)       * blood glucose monitoring lancets     200 each    Use to test blood sugar 4 times daily or as directed.    Type 2 diabetes mellitus with diabetic polyneuropathy, with long-term current use of insulin (H)       lisinopril 2.5 MG tablet    PRINIVIL/Zestril    90 tablet    TAKE 1 TABLET BY MOUTH ONCE DAILY    Hypertension goal BP (blood pressure) < 140/90       loratadine 10 MG tablet    AF LORATADINE    14 tablet    Take 1 tablet (10 mg) by mouth daily    Hypertension goal BP (blood pressure) < 140/90       metFORMIN 500 MG tablet    GLUCOPHAGE    30 tablet    Take 1 tablet (500 mg) by mouth daily (with breakfast)    Type 2 diabetes mellitus with diabetic polyneuropathy, with long-term current use of insulin (H)       multivitamin Tabs tablet      Take 1 tablet by mouth daily FOCUS SELECT PREMIUM WITH LUTEIN per eye doctor Reported on 5/19/2017        order for DME     3 Month    All DM testing supplies including test strips, lancets, solution, syringes, needles and/or pen needles for testing 4 times per day.  Brand \"Contour Next\"    Type 2 diabetes mellitus with diabetic polyneuropathy (H)       tamsulosin 0.4 MG capsule    FLOMAX    90 capsule    TAKE ONE CAPSULE BY MOUTH ONCE DAILY    Hyperlipidemia LDL goal <100, Calculus of kidney       * Notice:  This list has 6 medication(s) that are the same as other medications prescribed for you. Read the directions carefully, and ask your doctor or other care provider to review them with you.      "

## 2018-10-29 NOTE — LETTER
[facility]  [address], [address]  [place] [address]  [phone]        October 29, 2018    Pete Sheldon  [address] [place] [address]            Dear Mr. Pete Sheldon:    Labs/ Imaging studies done with endocrinology showed:   Lab Results        Component                Value               Date                        A1C                      9.1                 10/29/2018                  A1C                      8.6                 08/30/2018               Follow up as discussed in last clinic visit.     Please call endocrinology clinic (nurse line: [phone]) if questions.     Sincerely,        Dr. Lori Damian/swati    Results for orders placed or performed in visit on 10/29/18   Hemoglobin A1c   Result Value Ref Range    Hemoglobin A1C 9.1 (H) 0 - 5.6 %

## 2018-10-29 NOTE — LETTER
[facility]  [address] [place] [address]                  [phone]   October 30, 2018    Pete Sheldon  [address] [address]      Dear Pete,    Labs/ Imaging studies done with endocrinology showed improving thyroid labs.  This is reassuring.  TSH and FT4 are the labs to check thyroid function.    Follow up as discussed in last clinic visit.    Please call endocrinology clinic (nurse line: [phone]) if questions.    Please send a letter with above lab/test results and above comments.    Thank you.    Lori Damian    Results for orders placed or performed in visit on 10/29/18   Hemoglobin A1c   Result Value Ref Range    Hemoglobin A1C 9.1 (H) 0 - 5.6 %   TSH with free T4 reflex   Result Value Ref Range    TSH 4.08 (H) 0.40 - 4.00 mU/L   T4 free   Result Value Ref Range    T4 Free 1.12 0.76 - 1.46 ng/dL

## 2018-10-29 NOTE — PROGRESS NOTES
Office Visit Note  [facility]  [phone]    UROLOGIC DIAGNOSES:   History of kidney stones, enlarged prostate, nocturia and frequency    CURRENT INTERVENTIONS:   Flomax and finasteride    HISTORY:   This is an 78-year-old gentleman who had been seeing Dr. Elliott for an enlarged prostate. He wanted to come to a clinic closer to his home so he switched care to the [place] clinic today. He was started on finasteride in March and he says that the finasteride and Flomax do not really improve his urinary symptoms. He reports he is still getting up 3-5 times nightly to urinate. He says that during the day he gets an urge to urinate often but only goes in very small amounts. He is interested in further treatment options for this.      PAST MEDICAL HISTORY:   Past Medical History:   Diagnosis Date     Calculus of ureter      Hypertension      Microalbuminuria 2/15/2016     Mumps      Other and unspecified hyperlipidemia      Type 2 diabetes, HbA1C goal < 8% (H) 1995       PAST SURGICAL HISTORY:   Past Surgical History:   Procedure Laterality Date     C NONSPECIFIC PROCEDURE      Nasal septoplasty     HC COLONOSCOPY THRU STOMA, DIAGNOSTIC  2007    Normal     HC FLEX SIGMOIDOSCOPY W/WO MIGUEL SPEC BY BRUSH/WASH  1999    Normal to 60 cm     HC REPAIR INCISIONAL HERNIA,REDUCIBLE  1999    Hernia Repair, Incisional, Unilateral (right)     HC REPAIR INCISIONAL HERNIA,REDUCIBLE      Hernia Repair, Incisional, Unilateral (left, with mesh placement)     TESTICLE SURGERY       VASECTOMY         FAMILY HISTORY:   Family History   Problem Relation Age of Onset     Cerebrovascular Disease Mother       age 95     HEART DISEASE Mother      age 89,  age 95     Cerebrovascular Disease Father       age 91, stroke two years previous     Cancer - colorectal Brother      Half-brother     Cancer Sister      Half-sister (?type)     Cancer Sister      Half-sister (?type)     HEART  "DISEASE Brother      Neurologic Disorder Daughter      Multiple Sclerosis     Psychotic Disorder Son      Schizophrenia       SOCIAL HISTORY:   Social History   Substance Use Topics     Smoking status: Former Smoker     Quit date: 3/11/1953     Smokeless tobacco: Never Used     Alcohol use No       Current Outpatient Prescriptions   Medication     amLODIPine (NORVASC) 5 MG tablet     aspirin 81 MG chewable tablet     atorvastatin (LIPITOR) 20 MG tablet     B-D U/F 31G X 8 MM insulin pen needle     blood glucose monitoring (ONE TOUCH ULTRASOFT) lancets     blood glucose monitoring (ONETOUCH VERIO IQ) test strip     blood glucose monitoring (ONETOUCH VERIO IQ) test strip     finasteride (PROSCAR) 5 MG tablet     Glucosamine HCl 750 MG TABS     insulin aspart (NOVOLOG FLEXPEN) 100 UNIT/ML injection     insulin glargine (LANTUS) 100 UNIT/ML injection     insulin pen needle (B-D U/F) 31G X 8 MM     insulin syringes, disposable, U-100 0.3 ML MISC     levothyroxine (SYNTHROID/LEVOTHROID) 25 MCG tablet     LyfeSystemsCAN FINEPOINT LANCETS MISC     lisinopril (PRINIVIL/ZESTRIL) 2.5 MG tablet     loratadine (AF LORATADINE) 10 MG tablet     metFORMIN (GLUCOPHAGE) 500 MG tablet     multivitamin (OCUVITE) TABS     Nutritional Supplements (GLUCOSE MANAGEMENT) TABS     order for DME     tamsulosin (FLOMAX) 0.4 MG capsule     ACE/ARB NOT PRESCRIBED, INTENTIONAL,     No current facility-administered medications for this visit.          PHYSICAL EXAM:    Pulse 72  Ht 1.67 m (5' 5.75\")  Wt 75.8 kg (167 lb)  SpO2 97%  BMI 27.16 kg/m2    Constitutional: Well developed. Conversant and in no acute distress  Eyes: Anicteric sclera, conjunctiva clear, normal extraocular movements  ENT: Normocephalic and atraumatic,   Skin: Warm and dry. No rashes or lesions  Cardiac: No peripheral edema  Back/Flank: Not done  CNS/PNS: Normal musculature and movements, moves all extremities normally  Respiratory: Normal non-labored breathing  Abdomen: Soft " nontender and nondistended  Peripheral Vascular: No peripheral edema  Mental Status/Psych: Alert and Oriented x 3. Normal mood and affect    Penis: Not done  Scrotal Skin: Not done  Testicles: Not done  Epididymis: Not done  Digital Rectal Exam:     Cystoscopy: Not done    Imaging: None    Urinalysis: UA RESULTS:  Recent Labs   Lab Test  02/23/18   1445   03/16/17   1410   COLOR  Yellow   < >  Yellow   APPEARANCE  Slightly Cloudy   < >  Clear   URINEGLC  >499*   < >  Negative   URINEBILI  Negative   < >  Negative   URINEKETONE  Negative   < >  Negative   SG  1.014   < >  1.020   UBLD  Negative   < >  Negative   URINEPH  5.0   < >  5.5   PROTEIN  30*   < >  100*   UROBILINOGEN   --    --   0.2   NITRITE  Negative   < >  Negative   LEUKEST  Negative   < >  Negative   RBCU  17*   < >  O - 2   WBCU  <1   < >  O - 2    < > = values in this interval not displayed.       PSA: 1.23 in 2013    Post Void Residual: 28mL    Other labs: None today      IMPRESSION:  Nocturia and urinary frequency    PLAN:  The Flomax and finasteride have not improved his symptoms appreciably. He is maximized on medical therapy at this point and understands that any further therapy would involve a procedure or surgery. He is interested in discussing treatment options. I recommended that we perform an office cystoscopy to look at the anatomy of the prostate for treatment recommendations regarding a procedure and he agreed to this. We will get him scheduled for office cystoscopy in the near future.    Total Time: 15 minutes                                      Total in Consultation: 15 minutes      Clovis Montenegro M.D.

## 2018-10-30 DIAGNOSIS — E13.10 DIABETIC KETOACIDOSIS WITHOUT COMA ASSOCIATED WITH OTHER SPECIFIED DIABETES MELLITUS (H): ICD-10-CM

## 2018-10-30 LAB
T4 FREE SERPL-MCNC: 1.12 NG/DL (ref 0.76–1.46)
TSH SERPL DL<=0.005 MIU/L-ACNC: 4.08 MU/L (ref 0.4–4)

## 2018-10-31 NOTE — TELEPHONE ENCOUNTER
"Requested Prescriptions   Pending Prescriptions Disp Refills     LANTUS SOLOSTAR 100 UNIT/ML soln [Pharmacy Med Name: LANTUS SOLOSTAR     INJ]  0    Last Written Prescription Date:  01/25/18  Last Fill Quantity: 9,  # refills: 0   Last office visit: 8/24/2018 with prescribing provider:  no   Future Office Visit:   Next 5 appointments (look out 90 days)     Dec 05, 2018  2:00 PM CST   PHYSICAL with Rom Helm MD   [facility] ([facility])    [address]  [facility] [address]   [phone]                  Sig: INJECT 35 UNITS SUBCUTANEOUSLY IN THE MORNING BEFORE BREAKFAST    Long Acting Insulin Protocol Passed    10/30/2018 10:50 AM       Passed - Blood pressure less than 140/90 in past 6 months    BP Readings from Last 3 Encounters:   08/30/18 110/56   08/24/18 108/78   05/16/18 106/62                Passed - LDL on file in past 12 months    Recent Labs   Lab Test  03/01/18   1706   LDL  70            Passed - Microalbumin on file in past 12 months    Recent Labs   Lab Test  08/30/18   0844   MICROL  127   UMALCR  170.01*            Passed - Serum creatinine on file in past 12 months    Recent Labs   Lab Test  01/26/18   0601   01/08/18   1351   CR  1.25   < >   --    CREAT   --    --   1.2    < > = values in this interval not displayed.            Passed - HgbA1C in past 3 or 6 months    If HgbA1C is 8 or greater, it needs to be on file within the past 3 months.  If less than 8, must be on file within the past 6 months.     Recent Labs   Lab Test  10/29/18   1450   A1C  9.1*            Passed - Patient is age 18 or older       Passed - Recent (6 mo) or future (30 days) visit within the authorizing provider's specialty    Patient had office visit in the last 6 months or has a visit in the next 30 days with authorizing provider or within the authorizing provider's specialty.  See \"Patient Info\" tab in inbasket, or \"Choose Columns\" in Meds & Orders section of the " refill encounter.

## 2018-11-01 NOTE — TELEPHONE ENCOUNTER
Pts wife calls for Lantus refill.     He has upcoming appt with Dr Helm.     Confirmed that he takes 30 units daily.     Prescription approved per [facility] Refill Protocol.

## 2018-11-27 DIAGNOSIS — E11.42 TYPE 2 DIABETES MELLITUS WITH DIABETIC POLYNEUROPATHY, WITH LONG-TERM CURRENT USE OF INSULIN (H): Primary | ICD-10-CM

## 2018-11-27 DIAGNOSIS — Z79.4 TYPE 2 DIABETES MELLITUS WITH DIABETIC POLYNEUROPATHY, WITH LONG-TERM CURRENT USE OF INSULIN (H): Primary | ICD-10-CM

## 2018-11-27 DIAGNOSIS — E78.5 HYPERLIPIDEMIA LDL GOAL <100: ICD-10-CM

## 2018-11-27 DIAGNOSIS — E03.9 HYPOTHYROIDISM, UNSPECIFIED TYPE: ICD-10-CM

## 2018-11-27 PROCEDURE — 80053 COMPREHEN METABOLIC PANEL: CPT | Performed by: INTERNAL MEDICINE

## 2018-11-27 PROCEDURE — 84439 ASSAY OF FREE THYROXINE: CPT | Performed by: INTERNAL MEDICINE

## 2018-11-27 PROCEDURE — 80061 LIPID PANEL: CPT | Performed by: INTERNAL MEDICINE

## 2018-11-27 PROCEDURE — 36415 COLL VENOUS BLD VENIPUNCTURE: CPT | Performed by: INTERNAL MEDICINE

## 2018-11-28 LAB
ALBUMIN SERPL-MCNC: 3.3 G/DL (ref 3.4–5)
ALP SERPL-CCNC: 92 U/L (ref 40–150)
ALT SERPL W P-5'-P-CCNC: 20 U/L (ref 0–70)
ANION GAP SERPL CALCULATED.3IONS-SCNC: 7 MMOL/L (ref 3–14)
AST SERPL W P-5'-P-CCNC: 16 U/L (ref 0–45)
BILIRUB SERPL-MCNC: 0.5 MG/DL (ref 0.2–1.3)
BUN SERPL-MCNC: 30 MG/DL (ref 7–30)
CALCIUM SERPL-MCNC: 9.1 MG/DL (ref 8.5–10.1)
CHLORIDE SERPL-SCNC: 103 MMOL/L (ref 94–109)
CHOLEST SERPL-MCNC: 110 MG/DL
CO2 SERPL-SCNC: 27 MMOL/L (ref 20–32)
CREAT SERPL-MCNC: 1.67 MG/DL (ref 0.66–1.25)
GFR SERPL CREATININE-BSD FRML MDRD: 40 ML/MIN/1.7M2
GLUCOSE SERPL-MCNC: 296 MG/DL (ref 70–99)
HDLC SERPL-MCNC: 26 MG/DL
LDLC SERPL CALC-MCNC: 59 MG/DL
NONHDLC SERPL-MCNC: 84 MG/DL
POTASSIUM SERPL-SCNC: 4.7 MMOL/L (ref 3.4–5.3)
PROT SERPL-MCNC: 7.8 G/DL (ref 6.8–8.8)
SODIUM SERPL-SCNC: 137 MMOL/L (ref 133–144)
T4 FREE SERPL-MCNC: 1.16 NG/DL (ref 0.76–1.46)
TRIGL SERPL-MCNC: 125 MG/DL

## 2018-12-05 ENCOUNTER — OFFICE VISIT (OUTPATIENT)
Dept: INTERNAL MEDICINE | Facility: CLINIC | Age: 78
End: 2018-12-05
Payer: COMMERCIAL

## 2018-12-05 ENCOUNTER — TELEPHONE (OUTPATIENT)
Dept: ENDOCRINOLOGY | Facility: CLINIC | Age: 78
End: 2018-12-05

## 2018-12-05 VITALS
SYSTOLIC BLOOD PRESSURE: 102 MMHG | OXYGEN SATURATION: 95 % | WEIGHT: 166 LBS | HEIGHT: 66 IN | DIASTOLIC BLOOD PRESSURE: 64 MMHG | RESPIRATION RATE: 22 BRPM | TEMPERATURE: 98.6 F | HEART RATE: 75 BPM | BODY MASS INDEX: 26.68 KG/M2

## 2018-12-05 DIAGNOSIS — E03.9 HYPOTHYROIDISM, UNSPECIFIED TYPE: ICD-10-CM

## 2018-12-05 DIAGNOSIS — Z79.4 TYPE 2 DIABETES MELLITUS WITH DIABETIC POLYNEUROPATHY, WITH LONG-TERM CURRENT USE OF INSULIN (H): Primary | ICD-10-CM

## 2018-12-05 DIAGNOSIS — E11.42 TYPE 2 DIABETES MELLITUS WITH DIABETIC POLYNEUROPATHY, WITH LONG-TERM CURRENT USE OF INSULIN (H): ICD-10-CM

## 2018-12-05 DIAGNOSIS — Z79.4 TYPE 2 DIABETES MELLITUS WITH DIABETIC POLYNEUROPATHY, WITH LONG-TERM CURRENT USE OF INSULIN (H): ICD-10-CM

## 2018-12-05 DIAGNOSIS — E78.5 HYPERLIPIDEMIA LDL GOAL <100: ICD-10-CM

## 2018-12-05 DIAGNOSIS — I10 HYPERTENSION GOAL BP (BLOOD PRESSURE) < 140/90: ICD-10-CM

## 2018-12-05 DIAGNOSIS — E11.42 TYPE 2 DIABETES MELLITUS WITH DIABETIC POLYNEUROPATHY, WITH LONG-TERM CURRENT USE OF INSULIN (H): Primary | ICD-10-CM

## 2018-12-05 DIAGNOSIS — Z00.00 ROUTINE GENERAL MEDICAL EXAMINATION AT A HEALTH CARE FACILITY: Primary | ICD-10-CM

## 2018-12-05 PROCEDURE — G0439 PPPS, SUBSEQ VISIT: HCPCS | Performed by: INTERNAL MEDICINE

## 2018-12-05 NOTE — PATIENT INSTRUCTIONS
Preventive Health Recommendations:     See your health care provider every year to    Review health changes.     Discuss preventive care.      Review your medicines if your doctor has prescribed any.    Talk with your health care provider about whether you should have a test to screen for prostate cancer (PSA).    Every 3 years, have a diabetes test (fasting glucose). If you are at risk for diabetes, you should have this test more often.    Every 5 years, have a cholesterol test. Have this test more often if you are at risk for high cholesterol or heart disease.     Every 10 years, have a colonoscopy. Or, have a yearly FIT test (stool test). These exams will check for colon cancer.    Talk to with your health care provider about screening for Abdominal Aortic Aneurysm if you have a family history of AAA or have a history of smoking.  Shots:     Get a flu shot each year.     Get a tetanus shot every 10 years.     Talk to your doctor about your pneumonia vaccines. There are now two you should receive - Pneumovax (PPSV 23) and Prevnar (PCV 13).    Talk to your pharmacist about a shingles vaccine.     Talk to your doctor about the hepatitis B vaccine.  Nutrition:     Eat at least 5 servings of fruits and vegetables each day.     Eat whole-grain bread, whole-wheat pasta and brown rice instead of white grains and rice.     Get adequate Calcium and Vitamin D.   Lifestyle    Exercise for at least 150 minutes a week (30 minutes a day, 5 days a week). This will help you control your weight and prevent disease.     Limit alcohol to one drink per day.     No smoking.     Wear sunscreen to prevent skin cancer.     See your dentist every six months for an exam and cleaning.     See your eye doctor every 1 to 2 years to screen for conditions such as glaucoma, macular degeneration and cataracts.    Personalized Prevention Plan  You are due for the preventive services outlined below.  Your care team is available to assist you in  scheduling these services.  If you have already completed any of these items, please share that information with your care team to update in your medical record.    Health Maintenance Due   Topic Date Due     Colonoscopy - ever 10 years  06/12/2017     Flu Vaccine (1) 09/01/2018     Since you are feeling dizzy when standing, let's stop the amlodipine for now.   No other medication changes made today.     Refills sent to the pharmacy for Novolog and metformin.     Let's have you see Dr Damian in about three months (March 2019), and Dr Helm again in about six months (June 2019).

## 2018-12-05 NOTE — NURSING NOTE
"Chief Complaint   Patient presents with     Medicare Visit     initial /64  Pulse 75  Temp 98.6  F (37  C) (Oral)  Resp 22  Ht 5' 5.75\" (1.67 m)  Wt 166 lb (75.3 kg)  SpO2 95%  BMI 27 kg/m2 Estimated body mass index is 27 kg/(m^2) as calculated from the following:    Height as of this encounter: 5' 5.75\" (1.67 m).    Weight as of this encounter: 166 lb (75.3 kg)..  bp completed using cuff size large  ALYSHA HARRISON LPN  "

## 2018-12-05 NOTE — LETTER
"  [facility]  [address] [place] [address]  [phone]    12/5/2018    Pete Sheldon  [address] [place] [address]           Dear Mr. Sheldon,    The results of your lab tests are enclosed. Everything looks fine. Unless noted otherwise below, any results that are outside the \"normal\" range are within acceptable limits and are of no concern.    LDL= Bad Cholesterol-- the target is below 100.     HDL= Good Cholesterol-- although this is determined mostly by heredity, exercise and/or medications may sometimes raise this number.    Triglycerides are another type of fat in the blood, and can sometimes be lowered by reducing intake of sweets or excess carbohydrates, alcohol, and by weight reduction if needed.  Sometimes medications are also used.    AST and ALT are liver tests, as are the bilirubin (total and direct), albumin, total protein, and alkaline phosphatase. Yours are all normal.     Urea Nitrogen and Creatinine are kidney tests--yours are normal. GFR stands for Glomerular Filtration Rate, a more precise estimate of kidney function.    Sodium, Potassium, Chloride, Carbon Dioxide, and Calcium are all normal salts in the bloodstream. Yours all look normal. Your glucose (blood sugar) also looks fine. (You can ignore the anion gap result).    Your Free T4 level is the actual level of thyroid hormone circulating in your blood. Yours is normal.    If you have any further questions or problems, please contact our office.    Sincerely,        Rom Helm MD  Attachment: Lab results     "

## 2018-12-05 NOTE — PROGRESS NOTES
"  SUBJECTIVE:   Pete Sheldon is a 78 year old male who presents for Preventive Visit.    Are you in the first 12 months of your Medicare Part B coverage?  No    Physical Health:    In general, how would you rate your overall physical health? fair    Outside of work, how many days during the week do you exercise? 2-3 days/week    Outside of work, approximately how many minutes a day do you exercise?less than 15 minutes    If you drink alcohol do you typically have >3 drinks per day or >7 drinks per week? No    Do you usually eat at least 4 servings of fruit and vegetables a day, include whole grains & fiber and avoid regularly eating high fat or \"junk\" foods? Yes    Do you have any problems taking medications regularly?  No    Do you have any side effects from medications? makes him feel nauseated    Needs assistance for the following daily activities: bathing    Which of the following safety concerns are present in your home?  none identified     Hearing impairment: No    In the past 6 months, have you been bothered by leaking of urine? no    Mental Health:    In general, how would you rate your overall mental or emotional health? fair  PHQ-2 Score:      Do you feel safe in your environment? Yes    Do you have a Health Care Directive? Yes: Patient states has Advance Directive and will bring in a copy to clinic.    Additional concerns to address?  No    Fall risk:  Fallen 2 or more times in the past year?: No  Any fall with injury in the past year?: No    Cognitive Screenin) Repeat 3 items (Leader, Season, Table)    2) Clock draw: NORMAL  3) 3 item recall: Recalls 1 object   Results: NORMAL clock, 1-2 items recalled: COGNITIVE IMPAIRMENT LESS LIKELY    Mini-CogTM Copyright OZ [employer]. Licensed by the author for use in [facility]; reprinted with permission (anu@.[facility]). All rights reserved.      Do you have sleep apnea, excessive snoring or daytime drowsiness?: snores and also does not sleep " well at noc     Hypothyroidism    Lab Results   Component Value Date    TSH 4.08 10/29/2018     Patient was seen by Dr. Damian on 8/30/2018. TSH slightly above target range. T4 levels were 116 ng/dL. He is currently on levothyroxine 25 mcg daily.     Diabetes    Lab Results   Component Value Date    A1C 9.1 10/29/2018    A1C 8.6 08/30/2018    A1C 8.4 03/07/2018    A1C 8.3 03/01/2018    A1C 7.9 01/09/2018     A1c still above target range. Patient was referred to Dr. Damian due to variable blood glucose levels and episodes of hypoglycemia. He was seen on 8/30/2018 and Dr. Damian reduced novolog dosage to 6 units AM, 5 units in the afternoon, and 6 units PM. He remains on lantus 30 units daily and metformin 500 mg daily. Recalled that during hospital stay 1/12-1/27 (hospitalized at [facility] due to left thalamic ischemic stroke) metformin dosage was reduced from 1,000 mg daily to 500 mg daily.     Patient's wife states that glucose levels have been high. He has not had any low readings.     Hypertension    BP Readings from Last 3 Encounters:   12/05/18 102/64   08/30/18 110/56   08/24/18 108/78     BP controlled with amlodipine 5 mg and lisinopril 2.5 mg daily.     Vision loss  Patient reports poor vision. He is being seen by a retinal specialist. There is suspicion of macular degeneration. He has been receiving injections for this.     Hyperlipidemia    Recent Labs   Lab Test  11/27/18   0927  03/01/18   1706   09/25/15   0844  06/19/15   0815   CHOL  110  123   < >  134  135   HDL  26*  35*   < >  31*  36*   LDL  59  70   < >  80  83   TRIG  125  91   < >  117  78   CHOLHDLRATIO   --    --    --   4.3  3.8    < > = values in this interval not displayed.     LDL controlled with atorvastatin 20 mg daily.     Kidney stones  Patient is currently on flomax and finasteride. He is being followed by urology and has an upcoming appointment scheduled 12/14.    Past/recent records reviewed and discussed for:  -Reviewed  "and updated medications, family hx, social hx, and immunizations.   -Patient reports right hand numbness. \"It feels like it was injected with novocain\". This has been ongoing since the stroke 1/2018.   -He reports difficulty sleeping. This has been ongoing for some time now.     Reviewed and updated as needed this visit by clinical staff  Tobacco  Allergies  Meds  Med Hx  Surg Hx  Fam Hx  Soc Hx        Reviewed and updated as needed this visit by Provider        Social History   Substance Use Topics     Smoking status: Former Smoker     Quit date: 3/11/1953     Smokeless tobacco: Never Used     Alcohol use No     Current providers sharing in care for this patient include:   Patient Care Team:  Rom Helm MD as PCP - General  Claudio Castanon MD as MD (Neurology)  Ubaldo Dunne MD as Referring Physician (Physical Medicine and Rehab)  Pedro Doherty MD as Resident (Neurology)  Jasmyne Elliott MD as MD (Urology)  Jenny Macias, RN as Registered Nurse    The following health maintenance items are reviewed in Epic and correct as of today:  Health Maintenance   Topic Date Due     COLONOSCOPY Q10 YR  06/12/2017     INFLUENZA VACCINE (1) 09/01/2018     FALL RISK ASSESSMENT  03/01/2019     FOOT EXAM Q1 YEAR  03/07/2019     A1C Q6 MO  04/29/2019     EYE EXAM Q1 YEAR  08/16/2019     MICROALBUMIN Q1 YEAR  08/30/2019     PHQ-2 Q1 YR  08/30/2019     CREATININE Q1 YEAR  11/27/2019     LIPID MONITORING Q1 YEAR  11/27/2019     ADVANCE DIRECTIVE PLANNING Q5 YRS  08/24/2020     TSH W/ FREE T4 REFLEX Q2 YEAR  11/27/2020     TETANUS Q10 YR  01/31/2024     PNEUMOCOCCAL  Addressed     ROS:  CONSTITUTIONAL: NEGATIVE for fever, chills, change in weight  INTEGUMENTARY/SKIN: NEGATIVE for worrisome rashes, moles or lesions  EYES: NEGATIVE for vision changes or irritation  ENT/MOUTH: NEGATIVE for ear, mouth and throat problems  RESP: NEGATIVE for significant cough or SOB  BREAST: NEGATIVE for masses, " "tenderness or discharge  CV: NEGATIVE for chest pain, palpitations or peripheral edema  GI: NEGATIVE for nausea, abdominal pain, heartburn, or change in bowel habits  : NEGATIVE for frequency, dysuria, or hematuria  MUSCULOSKELETAL: NEGATIVE for significant arthralgias or myalgia  NEURO: NEGATIVE for weakness, dizziness or paresthesias  ENDOCRINE: NEGATIVE for temperature intolerance, skin/hair changes  HEME: NEGATIVE for bleeding problems  PSYCHIATRIC: NEGATIVE for changes in mood or affect    This document serves as a record of the services and decisions personally performed and made by Rom Helm MD. It was created on his behalf by Doris Barajas, a trained medical scribe. The creation of this document is based on the provider's statements to the medical scribe.  Doris Barajas December 5, 2018 2:27 PM     OBJECTIVE:   /64  Pulse 75  Temp 98.6  F (37  C) (Oral)  Resp 22  Ht 1.67 m (5' 5.75\")  Wt 75.3 kg (166 lb)  SpO2 95%  BMI 27 kg/m2 Estimated body mass index is 27 kg/(m^2) as calculated from the following:    Height as of this encounter: 1.67 m (5' 5.75\").    Weight as of this encounter: 75.3 kg (166 lb).     GENERAL: healthy, alert and no distress  EYES: Eyes grossly normal to inspection, PERRL and conjunctivae and sclerae normal  HENT: ear canals and TM's normal, nose and mouth without ulcers or lesions  NECK: no adenopathy, no asymmetry, masses, or scars and thyroid normal to palpation  RESP: lungs clear to auscultation - no rales, rhonchi or wheezes  CV: regular rate and rhythm, normal S1 S2, no S3 or S4, no murmur, click or rub, no peripheral edema and peripheral pulses strong  ABDOMEN: soft, nontender, no hepatosplenomegaly, no masses and bowel sounds normal   (male): normal male genitalia without lesions or urethral discharge, no hernia  RECTAL: normal sphincter tone, no rectal masses, prostate normal size, smooth, nontender without nodules or masses  MS: no gross musculoskeletal " "defects noted, no edema  SKIN: no suspicious lesions or rashes  NEURO: Normal strength and tone, mentation intact and speech normal  PSYCH: mentation appears normal, affect normal/bright    Diagnostic Test Results:  No results found for this or any previous visit (from the past 24 hour(s)).    ASSESSMENT / PLAN:   (Z00.00) Routine general medical examination at a health care facility  (primary encounter diagnosis)  Comment: Stable health. See epic orders.    (E11.42,  Z79.4) Type 2 diabetes mellitus with diabetic polyneuropathy, with long-term current use of insulin (H)  (primary encounter diagnosis)  Comment: A1c remains above target range however hypoglycemia events have reduced. Continue current measures and follow up with Dr. Damian in 3 months.   Plan: insulin aspart (NOVOLOG FLEXPEN) 100 UNIT/ML         pen, metFORMIN (GLUCOPHAGE) 500 MG tablet          (E03.9) Hypothyroidism, unspecified type  Comment: Recent thyroid levels acceptable. Continue current meds and follow up with Dr. Damian as recommended.     (E78.5) Hyperlipidemia LDL goal <100  Comment: LDL at target. Continue current meds.    (I10) Hypertension goal BP (blood pressure) < 140/90  Comment: BP at target. Continue current meds.    No medication changes made today.   Refills sent to the pharmacy for Novolog and metformin.     Let's have you see Dr Damian in about three months (March 2019), and Dr Helm again in about six months (June 2019).     End of Life Planning:  Patient currently has an advanced directive: Yes.  Practitioner is supportive of decision.    COUNSELING:  Reviewed preventive health counseling, as reflected in patient instructions       Regular exercise       Healthy diet/nutrition       Prostate cancer screening    BP Readings from Last 1 Encounters:   12/05/18 102/64     Estimated body mass index is 27 kg/(m^2) as calculated from the following:    Height as of this encounter: 1.67 m (5' 5.75\").    Weight as of this " encounter: 75.3 kg (166 lb).   reports that he quit smoking about 65 years ago. He has never used smokeless tobacco.    Appropriate preventive services were discussed with this patient, including applicable screening as appropriate for cardiovascular disease, diabetes, osteopenia/osteoporosis, and glaucoma.  As appropriate for age/gender, discussed screening for colorectal cancer, prostate cancer, breast cancer, and cervical cancer. Checklist reviewing preventive services available has been given to the patient.    Reviewed patients plan of care and provided an AVS. The Basic Care Plan (routine screening as documented in Health Maintenance) for Pete meets the Care Plan requirement. This Care Plan has been established and reviewed with the Patient and spouse.    Counseling Resources:  ATP IV Guidelines  Pooled Cohorts Equation Calculator  Breast Cancer Risk Calculator  FRAX Risk Assessment  ICSI Preventive Guidelines  Dietary Guidelines for Americans, 2010  USDA's MyPlate  ASA Prophylaxis  Lung CA Screening    The information in this document, created by the medical scribe for me, accurately reflects the services I personally performed and the decisions made by me. I have reviewed and approved this document for accuracy prior to leaving the patient care area.  December 5, 2018 3:13 PM    Rom Helm MD  [facility]

## 2018-12-05 NOTE — MR AVS SNAPSHOT
After Visit Summary   12/5/2018    Pete Sheldon    MRN: [id number]           Patient Information     Date Of Birth          1940        Visit Information        Provider Department      12/5/2018 2:00 PM Rom Helm MD [facility]        Today's Diagnoses     Routine general medical examination at a health care facility    -  1    Type 2 diabetes mellitus with diabetic polyneuropathy, with long-term current use of insulin (H)        Hypothyroidism, unspecified type        Hyperlipidemia LDL goal <100        Hypertension goal BP (blood pressure) < 140/90          Care Instructions      Preventive Health Recommendations:     See your health care provider every year to    Review health changes.     Discuss preventive care.      Review your medicines if your doctor has prescribed any.    Talk with your health care provider about whether you should have a test to screen for prostate cancer (PSA).    Every 3 years, have a diabetes test (fasting glucose). If you are at risk for diabetes, you should have this test more often.    Every 5 years, have a cholesterol test. Have this test more often if you are at risk for high cholesterol or heart disease.     Every 10 years, have a colonoscopy. Or, have a yearly FIT test (stool test). These exams will check for colon cancer.    Talk to with your health care provider about screening for Abdominal Aortic Aneurysm if you have a family history of AAA or have a history of smoking.  Shots:     Get a flu shot each year.     Get a tetanus shot every 10 years.     Talk to your doctor about your pneumonia vaccines. There are now two you should receive - Pneumovax (PPSV 23) and Prevnar (PCV 13).    Talk to your pharmacist about a shingles vaccine.     Talk to your doctor about the hepatitis B vaccine.  Nutrition:     Eat at least 5 servings of fruits and vegetables each day.     Eat whole-grain bread, whole-wheat pasta and brown rice instead of  white grains and rice.     Get adequate Calcium and Vitamin D.   Lifestyle    Exercise for at least 150 minutes a week (30 minutes a day, 5 days a week). This will help you control your weight and prevent disease.     Limit alcohol to one drink per day.     No smoking.     Wear sunscreen to prevent skin cancer.     See your dentist every six months for an exam and cleaning.     See your eye doctor every 1 to 2 years to screen for conditions such as glaucoma, macular degeneration and cataracts.    Personalized Prevention Plan  You are due for the preventive services outlined below.  Your care team is available to assist you in scheduling these services.  If you have already completed any of these items, please share that information with your care team to update in your medical record.    Health Maintenance Due   Topic Date Due     Colonoscopy - ever 10 years  06/12/2017     Flu Vaccine (1) 09/01/2018     Since you are feeling dizzy when standing, let's stop the amlodipine for now.   No other medication changes made today.     Refills sent to the pharmacy for Novolog and metformin.     Let's have you see Dr Damian in about three months (March 2019), and Dr Helm again in about six months (June 2019).           Follow-ups after your visit        Follow-up notes from your care team     Return in about 6 months (around 6/5/2019) for Diabetes Recheck.      Your next 10 appointments already scheduled     Dec 14, 2018 11:15 AM CST   Cystoscopy with Clovis Montenegro MD, [facility] ([facility])    [address]  [address]  [facility] [address] [phone]              Who to contact     If you have questions or need follow up information about today's clinic visit or your schedule please contact [facility] directly at [phone].  Normal or non-critical lab and imaging results will be communicated to you by  "MyChart, letter or phone within 4 business days after the clinic has received the results. If you do not hear from us within 7 days, please contact the clinic through MyChart or phone. If you have a critical or abnormal lab result, we will notify you by phone as soon as possible.  Submit refill requests through [employer] or call your pharmacy and they will forward the refill request to us. Please allow 3 business days for your refill to be completed.          Additional Information About Your Visit        Care EveryWhere ID     This is your Care EveryWhere ID. This could be used by other organizations to access your [facility] medical records  [id number]        Your Vitals Were     Pulse Temperature Respirations Height Pulse Oximetry BMI (Body Mass Index)    75 98.6  F (37  C) (Oral) 22 5' 5.75\" (1.67 m) 95% 27 kg/m2       Blood Pressure from Last 3 Encounters:   12/05/18 102/64   08/30/18 110/56   08/24/18 108/78    Weight from Last 3 Encounters:   12/05/18 166 lb (75.3 kg)   10/29/18 167 lb (75.8 kg)   08/30/18 167 lb (75.8 kg)              Today, you had the following     No orders found for display         Today's Medication Changes          These changes are accurate as of 12/5/18  3:12 PM.  If you have any questions, ask your nurse or doctor.               These medicines have changed or have updated prescriptions.        Dose/Directions    blood glucose monitoring test strip   Commonly known as:  ONETOUCH VERIO IQ   This may have changed:  Another medication with the same name was removed. Continue taking this medication, and follow the directions you see here.   Used for:  Type 2 diabetes mellitus with diabetic polyneuropathy, with long-term current use of insulin (H)   Changed by:  Rom Helm MD        Use to test blood sugars 4 times daily or as directed using One Touch Verio Flex.   Quantity:  200 strip   Refills:  11         Stop taking these medicines if you haven't already. Please contact your care " team if you have questions.     amLODIPine 5 MG tablet   Commonly known as:  NORVASC   Stopped by:  Rom Helm MD                Where to get your medicines      These medications were sent to [facility] [place] [id number] [place] [address]  [address] [place] [address]     Phone:  [phone]     insulin aspart 100 UNIT/ML pen    metFORMIN 500 MG tablet                Primary Care Provider Office Phone # Fax #    Rom Helm [phone]       [address] [facility] [address]  [facility] [address]        Goals        Diet    Increase water intake     Notes - Note edited  5/14/2014 11:42 AM by Patricia Khan RN    Have 5-6 glasses (8oz) of fluid a day  Per f/u with spouse today, pt not doing well. Not eating and drinking. Called clinic and advised to take pt to clinic.         Increase water intake        General    Medication 1 (pt-stated)     Notes - Note created  5/15/2014  1:57 PM by Patricia Khan, JULIA    Pt will have an MTM consult         Equal Access to Services     [facility]: Hadii aad ku hadasho Soomaali, waaxda luqadaha, qaybta kaalmada adeegyada, waxay abel kahn . So [facility] [phone].    ATENCIÓN: Si habla español, tiene a moise disposición servicios gratuitos de asistencia lingüística. Llame al [phone].    We comply with applicable federal civil rights laws and [place] laws. We do not discriminate on the basis of race, color, national origin, age, disability, sex, sexual orientation, or gender identity.            Thank you!     Thank you for choosing [facility]  for your care. Our goal is always to provide you with excellent care. Hearing back from our patients is one way we can continue to improve our services. Please take a few minutes to complete the written survey that you may receive in the mail after your visit with us. Thank you!             Your Updated Medication List - Protect others  around you: Learn how to safely use, store and throw away your medicines at www.disposemymeds.org.          This list is accurate as of 12/5/18  3:12 PM.  Always use your most recent med list.                   Brand Name Dispense Instructions for use Diagnosis    ACE/ARB/ARNI NOT PRESCRIBED    INTENTIONAL     by Other route continuous prn (Hyperkalemia) Reported on 5/19/2017    Type 2 diabetes, HbA1C goal < 8% (H)       aspirin 81 MG chewable tablet    ASA    36 tablet    Take 4 tablets (324 mg) by mouth daily    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)       atorvastatin 20 MG tablet    LIPITOR    90 tablet    TAKE 1 TABLET BY MOUTH DAILY    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)       * blood glucose lancets     200 strip    Use to test blood sugars 4 times daily or as directed.    Type 2 diabetes, HbA1C goal < 8% (H)       * blood glucose monitoring lancets     200 each    Use to test blood sugar 4 times daily or as directed.    Type 2 diabetes mellitus with diabetic polyneuropathy, with long-term current use of insulin (H)       blood glucose monitoring test strip    ONETOUCH VERIO IQ    200 strip    Use to test blood sugars 4 times daily or as directed using One Touch Verio Flex.    Type 2 diabetes mellitus with diabetic polyneuropathy, with long-term current use of insulin (H)       finasteride 5 MG tablet    PROSCAR    90 tablet    Take 1 tablet (5 mg) by mouth daily    Urinary frequency       Glucosamine HCl 750 MG Tabs      Take 750 mg by mouth 2 times daily    Arthritis       GLUCOSE MANAGEMENT Tabs     100 tablet    4 tabs po for low blood sugar below 70, may repeat q 10-15 minutes as needed    Type 2 diabetes mellitus with diabetic polyneuropathy, with long-term current use of insulin (H)       insulin aspart 100 UNIT/ML pen    NovoLOG FLEXPEN    30 mL    INJECT 6 UNITS WITH BREAKFAST, 5 UNITS WITH LUNCH, 6 UNITS WITH DINNER HOLD IF  "NOT EATING MEAL    Type 2 diabetes mellitus with diabetic polyneuropathy, with long-term current use of insulin (H)       insulin glargine 100 UNIT/ML pen    LANTUS SOLOSTAR    30 mL    Inject 30 Units Subcutaneous every morning    Diabetic ketoacidosis without coma associated with other specified diabetes mellitus (H)       * insulin pen needle 31G X 8 MM miscellaneous    B-D U/F    400 each    B-D ULTRA FINE SHORT PEN NEEDLES, USE 4 TIMES A DAY TO INJECT INSULIN    Type 2 diabetes, HbA1C goal < 8% (H)       * B-D U/F 31G X 8 MM miscellaneous   Generic drug:  insulin pen needle     400 each    USE 4 TIMES A DAY TO INJECT INSULIN    Type 2 diabetes, HbA1C goal < 8% (H)       insulin syringes (disposable) U-100 0.3 ML Misc     100 each    1 each 2 times daily    Type II or unspecified type diabetes mellitus without mention of complication, not stated as uncontrolled       levothyroxine 25 MCG tablet    SYNTHROID/LEVOTHROID    90 tablet    Take 1 tablet (25 mcg) by mouth daily    Hypothyroidism, unspecified type       lisinopril 2.5 MG tablet    PRINIVIL/Zestril    90 tablet    TAKE 1 TABLET BY MOUTH ONCE DAILY    Hypertension goal BP (blood pressure) < 140/90       loratadine 10 MG tablet    AF LORATADINE    14 tablet    Take 1 tablet (10 mg) by mouth daily    Hypertension goal BP (blood pressure) < 140/90       metFORMIN 500 MG tablet    GLUCOPHAGE    90 tablet    Take 1 tablet (500 mg) by mouth daily (with breakfast)    Type 2 diabetes mellitus with diabetic polyneuropathy, with long-term current use of insulin (H)       multivitamin Tabs tablet      Take 1 tablet by mouth daily FOCUS SELECT PREMIUM WITH LUTEIN per eye doctor Reported on 5/19/2017        order for DME     3 Month    All DM testing supplies including test strips, lancets, solution, syringes, needles and/or pen needles for testing 4 times per day.  Brand \"Contour Next\"    Type 2 diabetes mellitus with diabetic polyneuropathy (H)       tamsulosin 0.4 " MG capsule    FLOMAX    90 capsule    TAKE ONE CAPSULE BY MOUTH ONCE DAILY    Hyperlipidemia LDL goal <100, Calculus of kidney       * Notice:  This list has 4 medication(s) that are the same as other medications prescribed for you. Read the directions carefully, and ask your doctor or other care provider to review them with you.

## 2018-12-12 DIAGNOSIS — I63.112 CEREBROVASCULAR ACCIDENT (CVA) DUE TO EMBOLISM OF LEFT VERTEBRAL ARTERY (H): ICD-10-CM

## 2018-12-12 NOTE — TELEPHONE ENCOUNTER
"Requested Prescriptions   Pending Prescriptions Disp Refills     atorvastatin (LIPITOR) 20 MG tablet [Pharmacy Med Name: ATORVASTATIN 20MG   TAB] 90 tablet 1    Last Written Prescription Date:  06/21/2018  Last Fill Quantity: 90,  # refills: 1   Last office visit: 12/5/2018 with prescribing provider:     Future Office Visit:   Next 5 appointments (look out 90 days)    Mar 06, 2019 10:30 AM CST  [facility] [address]  [facility] [address] [phone]        Sig: TAKE 1 TABLET BY MOUTH ONCE DAILY    Statins Protocol Passed - 12/12/2018  9:11 AM       Passed - LDL on file in past 12 months    Recent Labs   Lab Test 11/27/18  0927   LDL 59            Passed - No abnormal creatine kinase in past 12 months    No lab results found.            Passed - Recent (12 mo) or future (30 days) visit within the authorizing provider's specialty    Patient had office visit in the last 12 months or has a visit in the next 30 days with authorizing provider or within the authorizing provider's specialty.  See \"Patient Info\" tab in inbasket, or \"Choose Columns\" in Meds & Orders section of the refill encounter.             Passed - Patient is age 18 or older        "

## 2018-12-13 RX ORDER — ATORVASTATIN CALCIUM 20 MG/1
TABLET, FILM COATED ORAL
Qty: 90 TABLET | Refills: 3 | Status: SHIPPED | OUTPATIENT
Start: 2018-12-13 | End: 2019-12-20

## 2018-12-14 ENCOUNTER — OFFICE VISIT (OUTPATIENT)
Dept: UROLOGY | Facility: CLINIC | Age: 78
End: 2018-12-14
Payer: COMMERCIAL

## 2018-12-14 VITALS — BODY MASS INDEX: 26.68 KG/M2 | HEIGHT: 66 IN | WEIGHT: 166 LBS | OXYGEN SATURATION: 92 % | HEART RATE: 58 BPM

## 2018-12-14 DIAGNOSIS — R35.0 URINARY FREQUENCY: Primary | ICD-10-CM

## 2018-12-14 DIAGNOSIS — R35.1 NOCTURIA: ICD-10-CM

## 2018-12-14 DIAGNOSIS — N40.0 ENLARGED PROSTATE: ICD-10-CM

## 2018-12-14 PROCEDURE — 52000 CYSTOURETHROSCOPY: CPT | Performed by: UROLOGY

## 2018-12-14 RX ORDER — LIDOCAINE HYDROCHLORIDE 20 MG/ML
5 INJECTION, SOLUTION INFILTRATION; PERINEURAL ONCE
Status: DISCONTINUED | OUTPATIENT
Start: 2018-12-14 | End: 2019-08-14 | Stop reason: CLARIF

## 2018-12-14 RX ORDER — CIPROFLOXACIN 500 MG/1
500 TABLET, FILM COATED ORAL ONCE
Qty: 1 TABLET | Refills: 0 | Status: SHIPPED | OUTPATIENT
Start: 2018-12-14 | End: 2019-03-06

## 2018-12-14 ASSESSMENT — MIFFLIN-ST. JEOR: SCORE: 1401.75

## 2018-12-14 ASSESSMENT — PAIN SCALES - GENERAL: PAINLEVEL: NO PAIN (0)

## 2018-12-14 NOTE — LETTER
2018       RE: Pete Sheldon  [address] [place] [address]     Dear Colleague,    Thank you for referring your patient, Pete Sheldon, to the [facility] [place] [facility]. Please see a copy of my visit note below.    Office Visit Note  [facility]  [phone]    UROLOGIC DIAGNOSES:   Nocturia and urinary frequency    CURRENT INTERVENTIONS:   Flomax and finasteride    HISTORY:   Pete returns to clinic today for further evaluation with a cystoscopy.      PAST MEDICAL HISTORY:   Past Medical History:   Diagnosis Date     Calculus of ureter      Hypertension      Hypothyroidism      Microalbuminuria 2/15/2016     Mumps      Other and unspecified hyperlipidemia      Type 2 diabetes, HbA1C goal < 8% (H) 1995       PAST SURGICAL HISTORY:   Past Surgical History:   Procedure Laterality Date     C NONSPECIFIC PROCEDURE      Nasal septoplasty     HC COLONOSCOPY THRU STOMA, DIAGNOSTIC  2007    Normal     HC FLEX SIGMOIDOSCOPY W/WO MIGUEL SPEC BY BRUSH/WASH  1999    Normal to 60 cm     HC REPAIR INCISIONAL HERNIA,REDUCIBLE  1999    Hernia Repair, Incisional, Unilateral (right)     HC REPAIR INCISIONAL HERNIA,REDUCIBLE      Hernia Repair, Incisional, Unilateral (left, with mesh placement)     TESTICLE SURGERY       VASECTOMY         FAMILY HISTORY:   Family History   Problem Relation Age of Onset     Cerebrovascular Disease Mother          age 95     Heart Disease Mother         age 89,  age 95     Cerebrovascular Disease Father          age 91, stroke two years previous     Cancer - colorectal Brother         Half-brother     Cancer Sister         Half-sister (?type)     Cancer Sister         Half-sister (?type)     Heart Disease Brother      Neurologic Disorder Daughter         Multiple Sclerosis     Psychotic Disorder Son         Schizophrenia       SOCIAL  "HISTORY:   Social History     Tobacco Use     Smoking status: Former Smoker     Last attempt to quit: 3/11/1953     Years since quittin.8     Smokeless tobacco: Never Used   Substance Use Topics     Alcohol use: No       Current Outpatient Medications   Medication     ACE/ARB NOT PRESCRIBED, INTENTIONAL,     aspirin 81 MG chewable tablet     atorvastatin (LIPITOR) 20 MG tablet     B-D U/F 31G X 8 MM insulin pen needle     blood glucose monitoring (ONE TOUCH ULTRASOFT) lancets     blood glucose monitoring (ONETOUCH VERIO IQ) test strip     finasteride (PROSCAR) 5 MG tablet     Glucosamine HCl 750 MG TABS     insulin aspart (NOVOLOG FLEXPEN) 100 UNIT/ML pen     insulin glargine (LANTUS SOLOSTAR) 100 UNIT/ML pen     insulin pen needle (B-D U/F) 31G X 8 MM     insulin syringes, disposable, U-100 0.3 ML MISC     levothyroxine (SYNTHROID/LEVOTHROID) 25 MCG tablet     [employer]CAN FINEPOINT LANCETS MISC     loratadine (AF LORATADINE) 10 MG tablet     metFORMIN (GLUCOPHAGE) 500 MG tablet     multivitamin (OCUVITE) TABS     order for DME     tamsulosin (FLOMAX) 0.4 MG capsule     lisinopril (PRINIVIL/ZESTRIL) 2.5 MG tablet     Nutritional Supplements (GLUCOSE MANAGEMENT) TABS     No current facility-administered medications for this visit.      PHYSICAL EXAM:    Pulse 58   Ht 1.67 m (5' 5.75\")   Wt 75.3 kg (166 lb)   SpO2 92%   BMI 27.00 kg/m       Constitutional: Well developed. Conversant and in no acute distress  Eyes: Anicteric sclera, conjunctiva clear, normal extraocular movements  ENT: Normocephalic and atraumatic,   Skin: Warm and dry. No rashes or lesions  Cardiac: No peripheral edema  Back/Flank: Not done  CNS/PNS: Normal musculature and movements, moves all extremities normally  Respiratory: Normal non-labored breathing  Abdomen: Soft nontender and nondistended  Peripheral Vascular: No peripheral edema  Mental Status/Psych: Alert and Oriented x 3. Normal mood and affect    Penis: Not done  Scrotal Skin: Not " done  Testicles: Not done  Epididymis: Not done  Digital Rectal Exam:     Cystoscopy: I performed flexible cystoscopy today  And the bladder was normal throughout. Minimal trabeculations in the bladder. There was some uric acid debris in the dependent portion of the bladder but no stones.On retroflexion of the scope he had no intravesical segment of the prostate.  On removal of the scope was mild enlargement of the prostate and mild obstruction from the lateral lobes of the prostate.    Imaging: None    Urinalysis: UA RESULTS:  Recent Labs   Lab Test 10/29/18  1433 02/23/18  1445   COLOR Yellow Yellow   APPEARANCE Clear Slightly Cloudy   URINEGLC 500* >499*   URINEBILI Negative Negative   URINEKETONE Negative Negative   SG 1.015 1.014   UBLD Negative Negative   URINEPH 5.5 5.0   PROTEIN 30* 30*   UROBILINOGEN 0.2  --    NITRITE Negative Negative   LEUKEST Negative Negative   RBCU  --  17*   WBCU  --  <1     PSA:     Post Void Residual:     Other labs: None today    IMPRESSION:  Nocturia and urinary frequency    PLAN:  We discussed my findings today. The prostate only appeared slightly enlarged. We discussed surgical procedures for the prostate.  We could perform surgical procedure, but based on my findings today,  I would not be optimistic that it would help her much. I asked him more about his fluid intake.He says that he does drink fairly large amounts of water  During the day and even when he gets up at night he drinks more water.  I think this is more likely the cause of his urinary frequency. I recommended that he drink only when he is thirsty and that he especially limit fluid intake after 6 PM. He will try these conservative measures. He is welcome to follow up with me as needed in the future.    Total Time: 20 min                                      Total in Consultation: 15 min    Clovis Montenegro M.D.

## 2018-12-14 NOTE — PROGRESS NOTES
Office Visit Note  [facility]  [phone]    UROLOGIC DIAGNOSES:   Nocturia and urinary frequency    CURRENT INTERVENTIONS:   Flomax and finasteride    HISTORY:   Pete returns to clinic today for further evaluation with a cystoscopy.      PAST MEDICAL HISTORY:   Past Medical History:   Diagnosis Date     Calculus of ureter      Hypertension      Hypothyroidism      Microalbuminuria 2/15/2016     Mumps      Other and unspecified hyperlipidemia      Type 2 diabetes, HbA1C goal < 8% (H) 1995       PAST SURGICAL HISTORY:   Past Surgical History:   Procedure Laterality Date     C NONSPECIFIC PROCEDURE      Nasal septoplasty     HC COLONOSCOPY THRU STOMA, DIAGNOSTIC  2007    Normal     HC FLEX SIGMOIDOSCOPY W/WO MIGUEL SPEC BY BRUSH/WASH  1999    Normal to 60 cm     HC REPAIR INCISIONAL HERNIA,REDUCIBLE  1999    Hernia Repair, Incisional, Unilateral (right)     HC REPAIR INCISIONAL HERNIA,REDUCIBLE      Hernia Repair, Incisional, Unilateral (left, with mesh placement)     TESTICLE SURGERY       VASECTOMY         FAMILY HISTORY:   Family History   Problem Relation Age of Onset     Cerebrovascular Disease Mother          age 95     Heart Disease Mother         age 89,  age 95     Cerebrovascular Disease Father          age 91, stroke two years previous     Cancer - colorectal Brother         Half-brother     Cancer Sister         Half-sister (?type)     Cancer Sister         Half-sister (?type)     Heart Disease Brother      Neurologic Disorder Daughter         Multiple Sclerosis     Psychotic Disorder Son         Schizophrenia       SOCIAL HISTORY:   Social History     Tobacco Use     Smoking status: Former Smoker     Last attempt to quit: 3/11/1953     Years since quittin.8     Smokeless tobacco: Never Used   Substance Use Topics     Alcohol use: No       Current Outpatient Medications   Medication     ACE/ARB NOT PRESCRIBED, INTENTIONAL,     aspirin  "81 MG chewable tablet     atorvastatin (LIPITOR) 20 MG tablet     B-D U/F 31G X 8 MM insulin pen needle     blood glucose monitoring (ONE TOUCH ULTRASOFT) lancets     blood glucose monitoring (ONETOUCH VERIO IQ) test strip     finasteride (PROSCAR) 5 MG tablet     Glucosamine HCl 750 MG TABS     insulin aspart (NOVOLOG FLEXPEN) 100 UNIT/ML pen     insulin glargine (LANTUS SOLOSTAR) 100 UNIT/ML pen     insulin pen needle (B-D U/F) 31G X 8 MM     insulin syringes, disposable, U-100 0.3 ML MISC     levothyroxine (SYNTHROID/LEVOTHROID) 25 MCG tablet     LIFESCAN FINEPOINT LANCETS MISC     loratadine (AF LORATADINE) 10 MG tablet     metFORMIN (GLUCOPHAGE) 500 MG tablet     multivitamin (OCUVITE) TABS     order for DME     tamsulosin (FLOMAX) 0.4 MG capsule     lisinopril (PRINIVIL/ZESTRIL) 2.5 MG tablet     Nutritional Supplements (GLUCOSE MANAGEMENT) TABS     No current facility-administered medications for this visit.          PHYSICAL EXAM:    Pulse 58   Ht 1.67 m (5' 5.75\")   Wt 75.3 kg (166 lb)   SpO2 92%   BMI 27.00 kg/m      Constitutional: Well developed. Conversant and in no acute distress  Eyes: Anicteric sclera, conjunctiva clear, normal extraocular movements  ENT: Normocephalic and atraumatic,   Skin: Warm and dry. No rashes or lesions  Cardiac: No peripheral edema  Back/Flank: Not done  CNS/PNS: Normal musculature and movements, moves all extremities normally  Respiratory: Normal non-labored breathing  Abdomen: Soft nontender and nondistended  Peripheral Vascular: No peripheral edema  Mental Status/Psych: Alert and Oriented x 3. Normal mood and affect    Penis: Not done  Scrotal Skin: Not done  Testicles: Not done  Epididymis: Not done  Digital Rectal Exam:     Cystoscopy: I performed flexible cystoscopy today  And the bladder was normal throughout. Minimal trabeculations in the bladder. There was some uric acid debris in the dependent portion of the bladder but no stones.On retroflexion of the scope he " had no intravesical segment of the prostate.  On removal of the scope was mild enlargement of the prostate and mild obstruction from the lateral lobes of the prostate.    Imaging: None    Urinalysis: UA RESULTS:  Recent Labs   Lab Test 10/29/18  1433 02/23/18  1445   COLOR Yellow Yellow   APPEARANCE Clear Slightly Cloudy   URINEGLC 500* >499*   URINEBILI Negative Negative   URINEKETONE Negative Negative   SG 1.015 1.014   UBLD Negative Negative   URINEPH 5.5 5.0   PROTEIN 30* 30*   UROBILINOGEN 0.2  --    NITRITE Negative Negative   LEUKEST Negative Negative   RBCU  --  17*   WBCU  --  <1       PSA:     Post Void Residual:     Other labs: None today      IMPRESSION:  Nocturia and urinary frequency    PLAN:  We discussed my findings today. The prostate only appeared slightly enlarged. We discussed surgical procedures for the prostate.  We could perform surgical procedure, but based on my findings today,  I would not be optimistic that it would help her much. I asked him more about his fluid intake.He says that he does drink fairly large amounts of water  During the day and even when he gets up at night he drinks more water.  I think this is more likely the cause of his urinary frequency. I recommended that he drink only when he is thirsty and that he especially limit fluid intake after 6 PM. He will try these conservative measures. He is welcome to follow up with me as needed in the future.    Total Time: 20 min                                      Total in Consultation: 15 min      Clovis Montenegro M.D.

## 2018-12-14 NOTE — NURSING NOTE
Prior to the start of the procedure and with procedural staff participation, I verbally confirmed the patient s identity using two indicators, relevant allergies, that the procedure was appropriate and matched the consent or emergent situation, and that the correct equipment/implants were available. Immediately prior to starting the procedure I conducted the Time Out with the procedural staff and re-confirmed the patient s name, procedure, and site/side. (The Joint Commission universal protocol was followed.)  Yes    Sedation (Moderate or Deep): None  Pt has signed the consent form stating that we will be doing a CYSTOSCOPY (with or without stent removal) today, and that it is the correct procedure. I verbally confirmed the patient s identity using two indicators, relevant allergies, and that the correct equipment was available. Post-op information given to the pt as needed at check-out. I have sent an appropriate antibiotic to the pharmacy in our building as recommended by the MD. SARAI Jordan CMA

## 2018-12-14 NOTE — PATIENT INSTRUCTIONS
"     AFTER YOUR CYSTOSCOPY         You have just completed a cystoscopy, or \"cysto\", which allowed your physician to learn more about your bladder (or to remove a stent placed after surgery). We suggest that you continue to avoid caffeine, fruit juice, and alcohol for the next 24 hours, however, you are encouraged to return to your normal activities.       A few things that are considered normal after your cystoscopy:    * small amount of bleeding (or spotting) that clears within the next 24 hours    * slight burning sensation with urination    * sensation to of needing to avoid more frequently    * the feeling of \"air\" in your urine    * mild discomfort that is relieved with Tylonol        Please contact our office promptly if you:    * develop a fever above 101 degrees    * are unable to urinate    * develop bright red blood that does not stop    * severe pain or swelling        And of course, please contact our office with any concerns or questions [phone]        "

## 2019-02-03 DIAGNOSIS — E13.10 DIABETIC KETOACIDOSIS WITHOUT COMA ASSOCIATED WITH OTHER SPECIFIED DIABETES MELLITUS (H): ICD-10-CM

## 2019-02-04 NOTE — TELEPHONE ENCOUNTER
"Requested Prescriptions   Pending Prescriptions Disp Refills     LANTUS SOLOSTAR 100 UNIT/ML soln [Pharmacy Med Name: LANTUS SOLOSTAR     INJ]  0    Last Written Prescription Date:  11/01/18  Last Fill Quantity: 30,  # refills: 0   Last office visit: 12/5/2018 with prescribing provider:  no   Future Office Visit:   Next 5 appointments (look out 90 days)    Mar 06, 2019 10:30 AM CST  Return Visit with Lori Damian MD  [facility] ([facility]) [address]  [facility] [id number] [phone]          Sig:  INJECT 30 UNITS SUBCUTANEOUS EVERY MORNING    Long Acting Insulin Protocol Failed - 2/3/2019  6:12 PM       Failed - HgbA1C in past 3 or 6 months    If HgbA1C is 8 or greater, it needs to be on file within the past 3 months.  If less than 8, must be on file within the past 6 months.     Recent Labs   Lab Test 10/29/18  1450   A1C 9.1*            Passed - Blood pressure less than 140/90 in past 6 months    BP Readings from Last 3 Encounters:   12/05/18 102/64   08/30/18 110/56   08/24/18 108/78                Passed - LDL on file in past 12 months    Recent Labs   Lab Test 11/27/18  0927   LDL 59            Passed - Microalbumin on file in past 12 months    Recent Labs   Lab Test 08/30/18  0844   MICROL 127   UMALCR 170.01*            Passed - Serum creatinine on file in past 12 months    Recent Labs   Lab Test 11/27/18  0927  01/08/18  1351   CR 1.67*   < >  --    CREAT  --   --  1.2    < > = values in this interval not displayed.            Passed - Medication is active on med list       Passed - Patient is age 18 or older       Passed - Recent (6 mo) or future (30 days) visit within the authorizing provider's specialty    Patient had office visit in the last 6 months or has a visit in the next 30 days with authorizing provider or within the authorizing provider's specialty.  See \"Patient Info\" tab in inbasket, or \"Choose Columns\" in Meds & Orders section " of the refill encounter.

## 2019-02-06 ENCOUNTER — TELEPHONE (OUTPATIENT)
Dept: INTERNAL MEDICINE | Facility: CLINIC | Age: 79
End: 2019-02-06

## 2019-02-06 RX ORDER — INSULIN GLARGINE 100 [IU]/ML
INJECTION, SOLUTION SUBCUTANEOUS
Qty: 30 ML | Refills: 1 | Status: SHIPPED | OUTPATIENT
Start: 2019-02-06 | End: 2019-03-06

## 2019-02-06 NOTE — TELEPHONE ENCOUNTER
Pt's wife called back stating they're worried pt is going to run out of insulin by this evening.     Would like a call back as soon as possible about Rx refill request from this morning.

## 2019-02-06 NOTE — TELEPHONE ENCOUNTER
Prescription approved per [facility] Refill Protocol.    Yamilet Mendenhall RN

## 2019-02-06 NOTE — TELEPHONE ENCOUNTER
Reason for Call:  Medication or medication refill:    Do you use a [facility]?  Name of the pharmacy and phone number for the current request:  [facility]    Name of the medication requested: Lantus    Other request: asap    Can we leave a detailed message on this number? YES    Phone number patient can be reached at: Home number on file [phone] (home)    Best Time: any    Call taken on 2/6/2019 at 9:25 AM by Elizabeth Horowitz

## 2019-02-06 NOTE — TELEPHONE ENCOUNTER
Medication was authorized and e-scribed to pharmacy today.  See refill request 2-3-19.    Patient's wife informed.

## 2019-03-06 ENCOUNTER — OFFICE VISIT (OUTPATIENT)
Dept: ENDOCRINOLOGY | Facility: CLINIC | Age: 79
End: 2019-03-06
Payer: COMMERCIAL

## 2019-03-06 VITALS
TEMPERATURE: 97.8 F | BODY MASS INDEX: 26.63 KG/M2 | HEART RATE: 77 BPM | HEIGHT: 66 IN | DIASTOLIC BLOOD PRESSURE: 80 MMHG | OXYGEN SATURATION: 93 % | WEIGHT: 165.7 LBS | SYSTOLIC BLOOD PRESSURE: 140 MMHG

## 2019-03-06 DIAGNOSIS — E03.9 HYPOTHYROIDISM, UNSPECIFIED TYPE: ICD-10-CM

## 2019-03-06 DIAGNOSIS — Z79.4 TYPE 2 DIABETES MELLITUS WITH DIABETIC POLYNEUROPATHY, WITH LONG-TERM CURRENT USE OF INSULIN (H): ICD-10-CM

## 2019-03-06 DIAGNOSIS — E11.42 TYPE 2 DIABETES MELLITUS WITH DIABETIC POLYNEUROPATHY, WITH LONG-TERM CURRENT USE OF INSULIN (H): ICD-10-CM

## 2019-03-06 DIAGNOSIS — E13.10 DIABETIC KETOACIDOSIS WITHOUT COMA ASSOCIATED WITH OTHER SPECIFIED DIABETES MELLITUS (H): ICD-10-CM

## 2019-03-06 LAB — HBA1C MFR BLD: 9.4 % (ref 0–5.6)

## 2019-03-06 PROCEDURE — 83036 HEMOGLOBIN GLYCOSYLATED A1C: CPT | Performed by: INTERNAL MEDICINE

## 2019-03-06 PROCEDURE — 84443 ASSAY THYROID STIM HORMONE: CPT | Performed by: INTERNAL MEDICINE

## 2019-03-06 PROCEDURE — 84439 ASSAY OF FREE THYROXINE: CPT | Performed by: INTERNAL MEDICINE

## 2019-03-06 PROCEDURE — 36415 COLL VENOUS BLD VENIPUNCTURE: CPT | Performed by: INTERNAL MEDICINE

## 2019-03-06 PROCEDURE — 99214 OFFICE O/P EST MOD 30 MIN: CPT | Performed by: INTERNAL MEDICINE

## 2019-03-06 ASSESSMENT — MIFFLIN-ST. JEOR: SCORE: 1400.39

## 2019-03-06 NOTE — LETTER
3/6/2019         RE: Pete Sheldon  [address] [place] [address]        Dear Colleague,    Thank you for referring your patient, Pete Sheldon, to the [facility]. Please see a copy of my visit note below.    ENDOCRINOLOGY CLINIC NOTE:  Name: Pete Sheldon  Seen for f/u of Diabetes.  He is accompanied by his wife.  HPI:  Pete Sheldon is a 78  year old male who presents for the evaluation/management of:  Of note he is having bedtime snack almost every days.  Typical bedtime snack is whole bagel/ PB sandwich.  Has variable blood sugar pattern. This has been noted consistently in past and with multiple dose adjustment.  I doubt if he has clear understanding of insulin dosing and the carbohydrates.  Also trouble with memory.  I discussed continuous glucose monitor with him in past but he does not want sensors.  Low C-peptide.     1. Type 2 DM:  Orginally diagnosed in 1995.  Current Regimen: Metformin 1000 mg twice a day, Lantus 30 Units/day, Humalog 6/5/6 Units with breakfast/lunch/dinner respectively. In addition to that he is on correctional scale 1 unit- 25 >140 but he is not using it.  yes:     Diabetes Medication(s)     Biguanides       metFORMIN (GLUCOPHAGE) 500 MG tablet    Take 1 tablet (500 mg) by mouth daily (with breakfast)    Insulin       insulin aspart (NOVOLOG FLEXPEN) 100 UNIT/ML pen    INJECT 6 UNITS WITH BREAKFAST, 5 UNITS WITH LUNCH, 6 UNITS WITH DINNER HOLD IF NOT EATING MEAL     LANTUS SOLOSTAR 100 UNIT/ML soln     INJECT 30 UNITS SUBCUTANEOUS EVERY MORNING        during hospital stay 1/12-1/27 (hospitalized at [facility] due to left thalamic ischemic stroke) metformin dosage was reduced from 1,000 mg daily to 500 mg daily in the setting of CKD.      Does not feel comfortable with carbohydrate counting.    BS checks: Three times a day    Average Meter Download: 186 with SD 89.  Variable BG.  Lowest was 48 and highest was 454.    Episodes of hypoglycemic  episodes have improved.    Exercise: Minimal  Episodes of hypoglycemia (low blood sugar):  Yes. As noted above.  Fixed meal pattern: NO. Carb content varies. In general diet is high in carbs.  Patient counting carbs: No    DM Complications:   Nephropathy: Yes: Microalbuminuria present  Retinopathy: Yes: History of laser treatment in past  Neuropathy: Yes.  Microalbuminuria: Yes: Microalbuminuria present.  Not on ACE secondary to history of hyperkalemia.  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    2. Hypertension: Blood Pressure today:   BP Readings from Last 3 Encounters:   19 140/80   18 102/64   18 110/56     Blood pressure medications include hydrochlorothiazide 12.5 mg, amlodipine 5 mg.      3. Hyperlipidemia: Takes lovastatin 40 mg for lipid control.  Denies muscle aches of pains.        PMH/PSH:  Past Medical History:   Diagnosis Date     Calculus of ureter      Hypertension      Hypothyroidism      Microalbuminuria 2/15/2016     Mumps      Other and unspecified hyperlipidemia      Type 2 diabetes, HbA1C goal < 8% (H) 1995     Past Surgical History:   Procedure Laterality Date     C NONSPECIFIC PROCEDURE      Nasal septoplasty     HC COLONOSCOPY THRU STOMA, DIAGNOSTIC  2007    Normal     HC FLEX SIGMOIDOSCOPY W/WO MIGUEL SPEC BY BRUSH/WASH  1999    Normal to 60 cm     HC REPAIR INCISIONAL HERNIA,REDUCIBLE  1999    Hernia Repair, Incisional, Unilateral (right)     HC REPAIR INCISIONAL HERNIA,REDUCIBLE      Hernia Repair, Incisional, Unilateral (left, with mesh placement)     TESTICLE SURGERY       VASECTOMY       Family Hx:  Family History   Problem Relation Age of Onset     Cerebrovascular Disease Mother          age 95     Heart Disease Mother         age 89,  age 95     Cerebrovascular Disease  Father          age 91, stroke two years previous     Cancer - colorectal Brother         Half-brother     Cancer Sister         Half-sister (?type)     Cancer Sister         Half-sister (?type)     Heart Disease Brother      Neurologic Disorder Daughter         Multiple Sclerosis     Psychotic Disorder Son         Schizophrenia       Social Hx:  Social History     Socioeconomic History     Marital status:      Spouse name: Not on file     Number of children: 2     Years of education: Not on file     Highest education level: Not on file   Occupational History     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[employer]     Employer: RETIRED   Social Needs     Financial resource strain: Not on file     Food insecurity:     Worry: Not on file     Inability: Not on file     Transportation needs:     Medical: Not on file     Non-medical: Not on file   Tobacco Use     Smoking status: Former Smoker     Last attempt to quit: 3/11/1953     Years since quittin.0     Smokeless tobacco: Never Used   Substance and Sexual Activity     Alcohol use: No     Drug use: No     Sexual activity: Yes     Partners: Female   Lifestyle     Physical activity:     Days per week: Not on file     Minutes per session: Not on file     Stress: Not on file   Relationships     Social connections:     Talks on phone: Not on file     Gets together: Not on file     Attends Nondenominational service: Not on file     Active member of club or organization: Not on file     Attends meetings of clubs or organizations: Not on file     Relationship status: Not on file     Intimate partner violence:     Fear of current or ex partner: Not on file     Emotionally abused: Not on file     Physically abused: Not on file     Forced sexual activity: Not on file   Other Topics Concern     Parent/sibling w/ CABG, MI or angioplasty before 65F 55M? Not Asked   Social History Narrative    Retired  for [employer].    Two children, son in [facility]  "[place], daughter in [facility]).    Three grandchildren, one great-granddaughter.              MEDICATIONS:  has a current medication list which includes the following prescription(s): ace/arb/arni not prescribed, aspirin, atorvastatin, b-d u/f, blood glucose monitoring, blood glucose, finasteride, glucosamine hcl, insulin aspart, insulin pen needle, insulin syringes (disposable), lantus solostar, levothyroxine, blood glucose, lisinopril, loratadine, metformin, multivitamin, glucose management, order for dme, and tamsulosin, and the following Facility-Administered Medications: lidocaine.    ROS     ROS: 10 point ROS neg other than the symptoms noted above in the HPI.    Physical Exam   VS: /80 (BP Location: Right arm, Patient Position: Chair, Cuff Size: Adult Regular)   Pulse 77   Temp 97.8  F (36.6  C) (Oral)   Ht 1.67 m (5' 5.75\")   Wt 75.2 kg (165 lb 11.2 oz)   SpO2 93%   BMI 26.95 kg/m     GENERAL: AXOX3, NAD, well dressed,appears stated age.  HEENT: No exopthalmous, no proptosis, EOMI, no lig lag, no retraction  CV: RRR  LUNGS: CTAB  ABDOMEN: +BS  DM FOOT EXAM: normal DP and PT pulses, no trophic changes or ulcerative lesions, normal sensory exam and normal monofilament exam.   NEUROLOGY: CN grossly intact, no tremors  PSYCH: normal affect and mood      LABS:  Component      Latest Ref Rng 4/15/2016   C-Peptide      0.9 - 6.9 ng/mL <0.1 (L)   Glutamic Acid Decarboxylase Antibody       <5.0 . . .     A1c:  Lab Results   Component Value Date    A1C 9.4 03/06/2019    A1C 9.1 10/29/2018    A1C 8.6 08/30/2018    A1C 8.4 03/07/2018    A1C 8.3 03/01/2018     Creatinine:  Creatinine   Date Value Ref Range Status   11/27/2018 1.67 (H) 0.66 - 1.25 mg/dL Final     Urine Micro:  Lab Results   Component Value Date    UMALCR 390.98 07/22/2016        LFTs/Lipids:  Recent Labs   Lab Test  08/16/17   0800  05/12/17   0906   09/25/15   0844  06/19/15   0815   CHOL  141  129   < >  134  135   HDL  " 37*  33*   < >  31*  36*   LDL  85  79   < >  80  83   TRIG  97  83   < >  117  78   CHOLHDLRATIO   --    --    --   4.3  3.8    < > = values in this interval not displayed.     TFTs:  TSH   Date Value Ref Range Status   10/29/2018 4.08 (H) 0.40 - 4.00 mU/L Final       All pertinent notes, labs, and images personally reviewed by me.     A/P  Mr.Victor ANURAG Sheldon is a 75 year old here for the evaluation/management of diabetes:    1. DM2 - (low C-peptide, negative FAVIOLA): Under Poor control.  A1c 9.4%.  Diabetes complicated by microalbuminuria, neuropathy and retinopathy.   Concerns for hypoglycemia unawareness. Variable BG. Brittle DM.  Multiple dose changes made in past but still not able to get stable blood sugar numbers.  Blood sugars are variable throughout the day.  Does not feel comfortable with carbohydrate counting.  Does not want Dexcom. This has been discussed multiple times with pt in past.   H/o brittle DM. Lot of variability in BG.  In general hypoglycemic episodes have improved.  Metformin decreased to 500 mg in the setting of CKD.  Plan  In general diet is high in carbohydrates.  Continue Metformin 500 mg/day  Increase Lantus to 31 units (from 30 units/day)  Continue NovoLog at current dose:  take 6/5/6 Units with breakfast/lunch/dinner repectively.   If having with carbohydrate meal and then increase NovoLog by 1 unit with each meal.    Need to have consistent bedtime snack and consistent carbs with each meal.  Labs and follow-up in 3 months.    Recommend checking blood sugars before meals and at bedtime.    If Blood glucose are low more often-> 2-3 times/week- give us a call.  The patient is advised to Make better food choices: reduce carbs, Reduce portion size, weight loss and exercise 3-4 times a week.  Discussed hypoglycemia signs and symptoms as well as management in detail.    2. Hypertension - Under Fair control.  Continue hydrochlorothiazide 12.5 mg, amlodipine 5 mg.    3. Hyperlipidemia - Under  Good control.  Continue lovastatin 40 mg. LDL 91, HDL 34.      5. Microalbuminuria:  Lab Results   Component Value Date    UMALCR 170.01 08/30/2018    Not on ACE 2/2 to h/o hyperkalemia    5. Hypothyroidism:  On levothyroxine 25 mcg/day  Started 9/2018 by Dr. Silva is  Clinically looks euthyroid  Labs pending  Dose change based on labs    4. Prevention  Flu Shot-September 2015  Pneumovax- March 2012  Opthalmology-Yes. June 2017    ASA-no. 2/2 to h/o nosebleeds  Smoking- No    Most Recent Immunizations   Administered Date(s) Administered     Influenza (H1N1) 12/28/2009     Influenza (High Dose) 3 valent vaccine 09/06/2018     Influenza (IIV3) PF 08/29/2012     Pneumococcal 23 valent 03/16/2012     TD (ADULT, 7+) 10/22/2003     TDAP Vaccine (Adacel) 01/31/2014     Zoster vaccine, live 10/15/2011       All questions were answered.  The patient indicates understanding of the above issues and agrees with the plan set forth.     More than 50% of face to face time spent with Mr. Sheldon on counseling / coordinating his care.  Total appointment times was 25 minutes.      Follow-up:  Follow up 3 months    Lori Damian M.D  Endocrinology  [facility]    Disclaimer: This note consists of symbols derived from keyboarding, dictation and/or voice recognition software. As a result, there may be errors in the script that have gone undetected. Please consider this when interpreting information found in this chart.        Again, thank you for allowing me to participate in the care of your patient.        Sincerely,        Lori Damian MD

## 2019-03-06 NOTE — NURSING NOTE
"ENDOCRINOLOGY INTAKE FORM    Patient Name:  Pete Sheldon  :  1940    Is patient Diabetic?   Yes: type 2  Does patient have non-diabetic or other endocrine issues?  Yes:     Vitals: /80 (BP Location: Right arm, Patient Position: Chair, Cuff Size: Adult Regular)   Pulse 77   Temp 97.8  F (36.6  C) (Oral)   Ht 1.67 m (5' 5.75\")   Wt 75.2 kg (165 lb 11.2 oz)   SpO2 93%   BMI 26.95 kg/m    BMI= Body mass index is 26.95 kg/m .    Flu vaccine:  Yes: fall  Pneumonia vaccine:  Yes: both    Smoking and Alcohol use:  Social History     Tobacco Use     Smoking status: Former Smoker     Last attempt to quit: 3/11/1953     Years since quittin.0     Smokeless tobacco: Never Used   Substance Use Topics     Alcohol use: No     Drug use: No       Foot Exam: No  Eye Exam:  Yes: 18  Dental Exam:  Yes: every 6 mos  Aspirin Use:  Yes: 81 mg    Lab Results   Component Value Date    A1C 9.4 2019    A1C 9.1 10/29/2018    A1C 8.6 2018    A1C 8.4 2018    A1C 8.3 2018       Lab Results   Component Value Date    MICROL 127 2018     No results found for: MICROALBUMIN    Lilia Pacheco CMA  [facility]    "

## 2019-03-06 NOTE — PATIENT INSTRUCTIONS
[facility] & [place] locations   Dr Damian, Endocrinology Department      [facility]   [address]  [place] [address]  Appointment [phone]  Fax: [phone]  [place]: Monday and Tuesday         [facility]   [address]  [place] [address]  Appointment [phone]  Fax: [phone]  [place]: Wednesday and Thursday            To provide the best diabetic care, please bring your blood glucose meter to each and every visit with your Endocrinologist.  Your blood glucose meter/insulin pump will be downloaded at every appointment.    Please arrive 15 minutes before your scheduled appointment.  This will allow for your blood glucose meter/insulin pump to be downloaded.  If you are wearing DEXCOM please bring  or sharing code so that it can be downloaded.  If you are using freestyle po personal sensors please bring the reader.  If you are using TANDEM insulin pump please have your username and password to get info from [employer] website.      Increase Lantus to 31 Units/day  Continue Novolog at current dose 6/5/6 Units with breakfast/lunch/dinner repectively  If you are eating out or having a big meal then increase NOVOLOG bu 1 Unit.  Labs in 3 months and follow up at that time.    Recommend checking blood sugars before meals and at bedtime.    If Blood glucose are low more often-> 2-3 times/week- give us a call.  The patient is advised to Make better food choices: reduce carbs, Reduce portion size, weight loss and exercise 3-4 times a week.  Discussed hypoglycemia signs and symptoms as well as management in detail.

## 2019-03-06 NOTE — PROGRESS NOTES
ENDOCRINOLOGY CLINIC NOTE:  Name: Pete Sheldon  Seen for f/u of Diabetes.  He is accompanied by his wife.  HPI:  Pete Sheldon is a 78  year old male who presents for the evaluation/management of:  Of note he is having bedtime snack almost every days.  Typical bedtime snack is whole bagel/ PB sandwich.  Has variable blood sugar pattern. This has been noted consistently in past and with multiple dose adjustment.  I doubt if he has clear understanding of insulin dosing and the carbohydrates.  Also trouble with memory.  I discussed continuous glucose monitor with him in past but he does not want sensors.  Low C-peptide.     1. Type 2 DM:  Orginally diagnosed in 1995.  Current Regimen: Metformin 1000 mg twice a day, Lantus 30 Units/day, Humalog 6/5/6 Units with breakfast/lunch/dinner respectively. In addition to that he is on correctional scale 1 unit- 25 >140 but he is not using it.  yes:     Diabetes Medication(s)     Biguanides       metFORMIN (GLUCOPHAGE) 500 MG tablet    Take 1 tablet (500 mg) by mouth daily (with breakfast)    Insulin       insulin aspart (NOVOLOG FLEXPEN) 100 UNIT/ML pen    INJECT 6 UNITS WITH BREAKFAST, 5 UNITS WITH LUNCH, 6 UNITS WITH DINNER HOLD IF NOT EATING MEAL     LANTUS SOLOSTAR 100 UNIT/ML soln     INJECT 30 UNITS SUBCUTANEOUS EVERY MORNING        during hospital stay 1/12-1/27 (hospitalized at [facility] due to left thalamic ischemic stroke) metformin dosage was reduced from 1,000 mg daily to 500 mg daily in the setting of CKD.      Does not feel comfortable with carbohydrate counting.    BS checks: Three times a day    Average Meter Download: 186 with SD 89.  Variable BG.  Lowest was 48 and highest was 454.    Episodes of hypoglycemic episodes have improved.    Exercise: Minimal  Episodes of hypoglycemia (low blood sugar):  Yes. As noted above.  Fixed meal pattern: NO. Carb content varies. In general diet is high in carbs.  Patient counting carbs: No    DM Complications:    Nephropathy: Yes: Microalbuminuria present  Retinopathy: Yes: History of laser treatment in past  Neuropathy: Yes.  Microalbuminuria: Yes: Microalbuminuria present.  Not on ACE secondary to history of hyperkalemia.  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    2. Hypertension: Blood Pressure today:   BP Readings from Last 3 Encounters:   19 140/80   18 102/64   18 110/56     Blood pressure medications include hydrochlorothiazide 12.5 mg, amlodipine 5 mg.      3. Hyperlipidemia: Takes lovastatin 40 mg for lipid control.  Denies muscle aches of pains.        PMH/PSH:  Past Medical History:   Diagnosis Date     Calculus of ureter      Hypertension      Hypothyroidism      Microalbuminuria 2/15/2016     Mumps      Other and unspecified hyperlipidemia      Type 2 diabetes, HbA1C goal < 8% (H) 1995     Past Surgical History:   Procedure Laterality Date     C NONSPECIFIC PROCEDURE      Nasal septoplasty     HC COLONOSCOPY THRU STOMA, DIAGNOSTIC  2007    Normal     HC FLEX SIGMOIDOSCOPY W/WO MIGUEL SPEC BY BRUSH/WASH  1999    Normal to 60 cm     HC REPAIR INCISIONAL HERNIA,REDUCIBLE  1999    Hernia Repair, Incisional, Unilateral (right)     HC REPAIR INCISIONAL HERNIA,REDUCIBLE      Hernia Repair, Incisional, Unilateral (left, with mesh placement)     TESTICLE SURGERY       VASECTOMY       Family Hx:  Family History   Problem Relation Age of Onset     Cerebrovascular Disease Mother          age 95     Heart Disease Mother         age 89,  age 95     Cerebrovascular Disease Father          age 91, stroke two years previous     Cancer - colorectal Brother         Half-brother     Cancer Sister         Half-sister (?type)     Cancer Sister         Half-sister (?type)     Heart Disease Brother      Neurologic  Disorder Daughter         Multiple Sclerosis     Psychotic Disorder Son         Schizophrenia       Social Hx:  Social History     Socioeconomic History     Marital status:      Spouse name: Not on file     Number of children: 2     Years of education: Not on file     Highest education level: Not on file   Occupational History     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[employer]     Employer: RETIRED   Social Needs     Financial resource strain: Not on file     Food insecurity:     Worry: Not on file     Inability: Not on file     Transportation needs:     Medical: Not on file     Non-medical: Not on file   Tobacco Use     Smoking status: Former Smoker     Last attempt to quit: 3/11/1953     Years since quittin.0     Smokeless tobacco: Never Used   Substance and Sexual Activity     Alcohol use: No     Drug use: No     Sexual activity: Yes     Partners: Female   Lifestyle     Physical activity:     Days per week: Not on file     Minutes per session: Not on file     Stress: Not on file   Relationships     Social connections:     Talks on phone: Not on file     Gets together: Not on file     Attends Quaker service: Not on file     Active member of club or organization: Not on file     Attends meetings of clubs or organizations: Not on file     Relationship status: Not on file     Intimate partner violence:     Fear of current or ex partner: Not on file     Emotionally abused: Not on file     Physically abused: Not on file     Forced sexual activity: Not on file   Other Topics Concern     Parent/sibling w/ CABG, MI or angioplasty before 65F 55M? Not Asked   Social History Narrative    Retired  for [employer].    Two children, son in [place], daughter in [place] ([facility]).    Three grandchildren, one great-granddaughter.              MEDICATIONS:  has a current medication list which includes the following prescription(s): ace/arb/arni not prescribed, aspirin,  "atorvastatin, b-d u/f, blood glucose monitoring, blood glucose, finasteride, glucosamine hcl, insulin aspart, insulin pen needle, insulin syringes (disposable), lantus solostar, levothyroxine, blood glucose, lisinopril, loratadine, metformin, multivitamin, glucose management, order for dme, and tamsulosin, and the following Facility-Administered Medications: lidocaine.    ROS     ROS: 10 point ROS neg other than the symptoms noted above in the HPI.    Physical Exam   VS: /80 (BP Location: Right arm, Patient Position: Chair, Cuff Size: Adult Regular)   Pulse 77   Temp 97.8  F (36.6  C) (Oral)   Ht 1.67 m (5' 5.75\")   Wt 75.2 kg (165 lb 11.2 oz)   SpO2 93%   BMI 26.95 kg/m    GENERAL: AXOX3, NAD, well dressed,appears stated age.  HEENT: No exopthalmous, no proptosis, EOMI, no lig lag, no retraction  CV: RRR  LUNGS: CTAB  ABDOMEN: +BS  DM FOOT EXAM: normal DP and PT pulses, no trophic changes or ulcerative lesions, normal sensory exam and normal monofilament exam.   NEUROLOGY: CN grossly intact, no tremors  PSYCH: normal affect and mood      LABS:  Component      Latest Ref Rng 4/15/2016   C-Peptide      0.9 - 6.9 ng/mL <0.1 (L)   Glutamic Acid Decarboxylase Antibody       <5.0 . . .     A1c:  Lab Results   Component Value Date    A1C 9.4 03/06/2019    A1C 9.1 10/29/2018    A1C 8.6 08/30/2018    A1C 8.4 03/07/2018    A1C 8.3 03/01/2018     Creatinine:  Creatinine   Date Value Ref Range Status   11/27/2018 1.67 (H) 0.66 - 1.25 mg/dL Final     Urine Micro:  Lab Results   Component Value Date    UMALCR 390.98 07/22/2016        LFTs/Lipids:  Recent Labs   Lab Test  08/16/17   0800  05/12/17   0906   09/25/15   0844  06/19/15   0815   CHOL  141  129   < >  134  135   HDL  37*  33*   < >  31*  36*   LDL  85  79   < >  80  83   TRIG  97  83   < >  117  78   CHOLHDLRATIO   --    --    --   4.3  3.8    < > = values in this interval not displayed.     TFTs:  TSH   Date Value Ref Range Status   10/29/2018 4.08 (H) " 0.40 - 4.00 mU/L Final       All pertinent notes, labs, and images personally reviewed by me.     A/P  Mr.Victor ANURAG Sheldon is a 75 year old here for the evaluation/management of diabetes:    1. DM2 - (low C-peptide, negative FAVIOLA): Under Poor control.  A1c 9.4%.  Diabetes complicated by microalbuminuria, neuropathy and retinopathy.   Concerns for hypoglycemia unawareness. Variable BG. Brittle DM.  Multiple dose changes made in past but still not able to get stable blood sugar numbers.  Blood sugars are variable throughout the day.  Does not feel comfortable with carbohydrate counting.  Does not want Dexcom. This has been discussed multiple times with pt in past.   H/o brittle DM. Lot of variability in BG.  In general hypoglycemic episodes have improved.  Metformin decreased to 500 mg in the setting of CKD.  Plan  In general diet is high in carbohydrates.  Continue Metformin 500 mg/day  Increase Lantus to 31 units (from 30 units/day)  Continue NovoLog at current dose:  take 6/5/6 Units with breakfast/lunch/dinner repectively.   If having with carbohydrate meal and then increase NovoLog by 1 unit with each meal.    Need to have consistent bedtime snack and consistent carbs with each meal.  Labs and follow-up in 3 months.    Recommend checking blood sugars before meals and at bedtime.    If Blood glucose are low more often-> 2-3 times/week- give us a call.  The patient is advised to Make better food choices: reduce carbs, Reduce portion size, weight loss and exercise 3-4 times a week.  Discussed hypoglycemia signs and symptoms as well as management in detail.    2. Hypertension - Under Fair control.  Continue hydrochlorothiazide 12.5 mg, amlodipine 5 mg.    3. Hyperlipidemia - Under Good control.  Continue lovastatin 40 mg. LDL 91, HDL 34.      5. Microalbuminuria:  Lab Results   Component Value Date    UMALCR 170.01 08/30/2018    Not on ACE 2/2 to h/o hyperkalemia    5. Hypothyroidism:  On levothyroxine 25  mcg/day  Started 9/2018 by Dr. Silva is  Clinically looks euthyroid  Labs pending  Dose change based on labs    4. Prevention  Flu Shot-September 2015  Pneumovax- March 2012  Opthalmology-Yes. June 2017    ASA-no. 2/2 to h/o nosebleeds  Smoking- No    Most Recent Immunizations   Administered Date(s) Administered     Influenza (H1N1) 12/28/2009     Influenza (High Dose) 3 valent vaccine 09/06/2018     Influenza (IIV3) PF 08/29/2012     Pneumococcal 23 valent 03/16/2012     TD (ADULT, 7+) 10/22/2003     TDAP Vaccine (Adacel) 01/31/2014     Zoster vaccine, live 10/15/2011       All questions were answered.  The patient indicates understanding of the above issues and agrees with the plan set forth.     More than 50% of face to face time spent with Mr. Sheldon on counseling / coordinating his care.  Total appointment times was 25 minutes.      Follow-up:  Follow up 3 months    Lori Damian M.D  [facility]    Disclaimer: This note consists of symbols derived from keyboarding, dictation and/or voice recognition software. As a result, there may be errors in the script that have gone undetected. Please consider this when interpreting information found in this chart.

## 2019-03-07 ENCOUNTER — TELEPHONE (OUTPATIENT)
Dept: ENDOCRINOLOGY | Facility: CLINIC | Age: 79
End: 2019-03-07

## 2019-03-07 LAB
T4 FREE SERPL-MCNC: 1.14 NG/DL (ref 0.76–1.46)
TSH SERPL DL<=0.005 MIU/L-ACNC: 5.85 MU/L (ref 0.4–4)

## 2019-03-07 NOTE — TELEPHONE ENCOUNTER
ENDO THYROID LABS-[facility] Latest Ref Rng & Units 3/6/2019 11/27/2018   TSH 0.40 - 4.00 mU/L 5.85 (H)    T4 FREE 0.76 - 1.46 ng/dL 1.14 1.16     ENDO THYROID LABS-[facility] Latest Ref Rng & Units 10/29/2018   TSH 0.40 - 4.00 mU/L 4.08 (H)   T4 FREE 0.76 - 1.46 ng/dL 1.12       Thyroid labs are in acceptable range  Given his age and other medical comorbidities TSH is in acceptable range  Plan to continue current dose of levothyroxine 25 mcg/day  Please inform patient.

## 2019-03-07 NOTE — LETTER
[facility]  [address], [address]  [place] [address]  [phone]        March 7, 2019    Pete Sheldon  [address] [place] [address]            Dear Janis Sheldon:    Thyroid labs are in acceptable range    Plan to continue current dose of levothyroxine 25 mcg/day      If you have any further questions or problems, please contact our office.    Sincerely,        Dr. Lori Damian/swati    Results for orders placed or performed in visit on 03/06/19   TSH   Result Value Ref Range    TSH 5.85 (H) 0.40 - 4.00 mU/L   T4 free   Result Value Ref Range    T4 Free 1.14 0.76 - 1.46 ng/dL   Hemoglobin A1c   Result Value Ref Range    Hemoglobin A1C 9.4 (H) 0 - 5.6 %

## 2019-03-14 DIAGNOSIS — Z79.4 TYPE 2 DIABETES MELLITUS WITH DIABETIC POLYNEUROPATHY, WITH LONG-TERM CURRENT USE OF INSULIN (H): ICD-10-CM

## 2019-03-14 DIAGNOSIS — E11.42 TYPE 2 DIABETES MELLITUS WITH DIABETIC POLYNEUROPATHY, WITH LONG-TERM CURRENT USE OF INSULIN (H): ICD-10-CM

## 2019-03-14 NOTE — TELEPHONE ENCOUNTER
"Requested Prescriptions   Pending Prescriptions Disp Refills     blood glucose (ONETOUCH VERIO IQ) test strip [Pharmacy Med Name: ONETOUCH VERIO FADI]  Last Written Prescription Date:  2/6/2018  Last Fill Quantity: 200,  # refills: 11   Last office visit: 12/5/2018 with prescribing provider:     Future Office Visit:   Next 5 appointments (look out 90 days)    Jun 05, 2019  3:20 PM CDT  [facility] [address]  [facility] [address]  [phone]   Jun 06, 2019  2:00 PM CDT  [facility] [address]  [facility] [address]  [phone]        200 strip 11     Sig: USE TO TEST BLOOD SUGARS 4 TIMES DAILY OR AS DIRECTED USING ONE TOUCH VERIO FLEX (Z794)    Diabetic Supplies Protocol Passed - 3/14/2019 10:46 AM       Passed - Medication is active on med list       Passed - Patient is 18 years of age or older       Passed - Recent (6 mo) or future (30 days) visit within the authorizing provider's specialty    Patient had office visit in the last 6 months or has a visit in the next 30 days with authorizing provider.  See \"Patient Info\" tab in inbasket, or \"Choose Columns\" in Meds & Orders section of the refill encounter.            "

## 2019-03-16 DIAGNOSIS — R35.0 URINARY FREQUENCY: ICD-10-CM

## 2019-03-18 NOTE — TELEPHONE ENCOUNTER
Patient as 2 appointments scheduled with Dr. Helm - 6/5 and 6/6. Contacted patient's wife, consent to communicate on file. She states that 6/6 appointment was supposed to be with Dr. Damian. Appointment on 6/6/19 changed to Dr. Damian at 2pm.    Prescription approved per [facility] Refill Protocol.

## 2019-03-19 ENCOUNTER — TELEPHONE (OUTPATIENT)
Dept: INTERNAL MEDICINE | Facility: CLINIC | Age: 79
End: 2019-03-19

## 2019-03-19 RX ORDER — FINASTERIDE 5 MG/1
1 TABLET, FILM COATED ORAL DAILY
Qty: 90 TABLET | Refills: 0 | Status: SHIPPED | OUTPATIENT
Start: 2019-03-19 | End: 2019-06-14

## 2019-03-19 NOTE — TELEPHONE ENCOUNTER
Aspirin 81mg - take 4 per day, was ordered following hospitalization for diagnosis of CVA. Routed to provider to review if patient should continue this or not.

## 2019-03-19 NOTE — TELEPHONE ENCOUNTER
Reason for Call:  Other aspirin    Detailed comments: Pt's spouse is calling asking if pt really needs to be on 4 baby aspirin per day.    Phone Number Patient can be reached at: Home number on file [phone] (home)    Best Time: any    Can we leave a detailed message on this number? YES    Call taken on 3/19/2019 at 8:27 AM by Farzaneh Santana

## 2019-03-20 NOTE — TELEPHONE ENCOUNTER
Called pt, advised he can switch to 325 mg ASA, will complete 81 mg on hand first. Holly Macias RN

## 2019-03-27 DIAGNOSIS — E03.9 HYPOTHYROIDISM, UNSPECIFIED TYPE: ICD-10-CM

## 2019-03-27 NOTE — TELEPHONE ENCOUNTER
"Requested Prescriptions   Pending Prescriptions Disp Refills     levothyroxine (SYNTHROID/LEVOTHROID) 25 MCG tablet [Pharmacy Med Name: LEVOTHYROXIN 25MCG TAB] 90 tablet 0    Last Written Prescription Date:  12/28/2018  Last Fill Quantity: 90,  # refills: 0   Last office visit: 3/6/2019 with prescribing provider:     Future Office Visit:   Next 5 appointments (look out 90 days)    Jun 05, 2019  3:20 PM CDT  Office Visit with Rom [facility]m MD  [facility] [address]  [facility] [address]  [phone]   Jun 06, 2019  2:00 PM CDT  Return Visit with Lori Damian MD  [facility]) [address]  [facility] [address]  [phone]        Sig: TAKE 1 TABLET BY MOUTH ONCE DAILY    Thyroid Protocol Failed - 3/27/2019  8:48 AM       Failed - Normal TSH on file in past 12 months    Recent Labs   Lab Test 03/06/19  0932   TSH 5.85*             Passed - Patient is 12 years or older       Passed - Recent (12 mo) or future (30 days) visit within the authorizing provider's specialty    Patient had office visit in the last 12 months or has a visit in the next 30 days with authorizing provider or within the authorizing provider's specialty.  See \"Patient Info\" tab in inbasket, or \"Choose Columns\" in Meds & Orders section of the refill encounter.             Passed - Medication is active on med list        "

## 2019-03-28 RX ORDER — LEVOTHYROXINE SODIUM 25 UG/1
TABLET ORAL
Qty: 90 TABLET | Refills: 0 | Status: SHIPPED | OUTPATIENT
Start: 2019-03-28 | End: 2019-06-21

## 2019-03-28 NOTE — TELEPHONE ENCOUNTER
Per telephone encounter 3/7/19 based on lab results 3/6/19:    Thyroid labs are in acceptable range  Given his age and other medical comorbidities TSH is in acceptable range  Plan to continue current dose of levothyroxine 25 mcg/day    Medication is being filled for 1 time refill only due to:  Patient needs to be seen because is schedule for f/u appointment with lawson.

## 2019-04-17 ENCOUNTER — TRANSFERRED RECORDS (OUTPATIENT)
Dept: HEALTH INFORMATION MANAGEMENT | Facility: CLINIC | Age: 79
End: 2019-04-17

## 2019-04-23 ENCOUNTER — TRANSFERRED RECORDS (OUTPATIENT)
Dept: HEALTH INFORMATION MANAGEMENT | Facility: CLINIC | Age: 79
End: 2019-04-23

## 2019-05-01 DIAGNOSIS — N20.0 CALCULUS OF KIDNEY: ICD-10-CM

## 2019-05-01 DIAGNOSIS — E78.5 HYPERLIPIDEMIA LDL GOAL <100: ICD-10-CM

## 2019-05-01 DIAGNOSIS — E11.42 TYPE 2 DIABETES MELLITUS WITH DIABETIC POLYNEUROPATHY, WITH LONG-TERM CURRENT USE OF INSULIN (H): ICD-10-CM

## 2019-05-01 DIAGNOSIS — Z79.4 TYPE 2 DIABETES MELLITUS WITH DIABETIC POLYNEUROPATHY, WITH LONG-TERM CURRENT USE OF INSULIN (H): ICD-10-CM

## 2019-05-01 RX ORDER — TAMSULOSIN HYDROCHLORIDE 0.4 MG/1
CAPSULE ORAL
Qty: 90 CAPSULE | Refills: 2 | OUTPATIENT
Start: 2019-05-01

## 2019-05-01 NOTE — TELEPHONE ENCOUNTER
"Requested Prescriptions   Pending Prescriptions Disp Refills     tamsulosin (FLOMAX) 0.4 MG capsule  Last Written Prescription Date:  8/25/18  Last Fill Quantity: 90,  # refills: 2   Last office visit: 12/5/2018 with prescribing provider:  Alicja   Future Office Visit:   Next 5 appointments (look out 90 days)    Jun 05, 2019  3:20 PM CDT  Office Visit with Rom Helm MD  [facility] [address] [place]  [facility] [id number] [phone]   Jun 06, 2019  2:00 PM CDT  Return Visit with Lori Damian MD  [facility]) [address]  [facility] [id number] [phone]          90 capsule 2       Alpha Blockers Failed - 5/1/2019  8:37 AM        Failed - Blood pressure under 140/90 in past 12 months     BP Readings from Last 3 Encounters:   03/06/19 140/80   12/05/18 102/64   08/30/18 110/56                 Passed - Recent (12 mo) or future (30 days) visit within the authorizing provider's specialty     Patient had office visit in the last 12 months or has a visit in the next 30 days with authorizing provider or within the authorizing provider's specialty.  See \"Patient Info\" tab in inbasket, or \"Choose Columns\" in Meds & Orders section of the refill encounter.              Passed - Patient does not have Tadalafil, Vardenafil, or Sildenafil on their medication list        Passed - Medication is active on med list        Passed - Patient is 18 years of age or older          "

## 2019-05-08 ENCOUNTER — TELEPHONE (OUTPATIENT)
Dept: ENDOCRINOLOGY | Facility: CLINIC | Age: 79
End: 2019-05-08

## 2019-05-08 NOTE — TELEPHONE ENCOUNTER
Received fax from [employer]:    1.  Novolog Inj Flexpen - he reports bs is running high and currently has fixed doses for each meal; consider giving sliding scale to correct bs before meals and cover carbohydrates will be eating.    2.  Metformin tab 500 mg - consider increase to 1000 mg daily and then to 1500 mg daily which is target dose per mfr to better help lower his bs.    3.  Pt BP today 165/66, I recommend he monitor at home at different times of day and keep a record to see if it is consistently high, may need increase in lisinopril or additional therapy.    [facility]

## 2019-05-09 NOTE — TELEPHONE ENCOUNTER
Received fax from [employer]:     1.  History of type 1 diabetes.  Significant fluctuation in blood sugars.  History of significant hypoglycemia in past.  Will avoid sliding scale as it would be more confusing for patient.  Given age and other risk factor goal is to avoid hypoglycemia.    2.   History of type 1 diabetes.  Use of metformin is not clear in that setting  But can be considered in future     3.  Pete to follow up with Primary Care provider regarding elevated blood pressure.

## 2019-06-05 ENCOUNTER — OFFICE VISIT (OUTPATIENT)
Dept: INTERNAL MEDICINE | Facility: CLINIC | Age: 79
End: 2019-06-05
Payer: COMMERCIAL

## 2019-06-05 VITALS
BODY MASS INDEX: 26.68 KG/M2 | OXYGEN SATURATION: 97 % | WEIGHT: 166 LBS | DIASTOLIC BLOOD PRESSURE: 68 MMHG | HEART RATE: 75 BPM | RESPIRATION RATE: 16 BRPM | SYSTOLIC BLOOD PRESSURE: 122 MMHG | TEMPERATURE: 98 F | HEIGHT: 66 IN

## 2019-06-05 DIAGNOSIS — N18.30 TYPE 2 DIABETES MELLITUS WITH STAGE 3 CHRONIC KIDNEY DISEASE, WITH LONG-TERM CURRENT USE OF INSULIN (H): ICD-10-CM

## 2019-06-05 DIAGNOSIS — Z79.4 TYPE 2 DIABETES MELLITUS WITH STAGE 3 CHRONIC KIDNEY DISEASE, WITH LONG-TERM CURRENT USE OF INSULIN (H): ICD-10-CM

## 2019-06-05 DIAGNOSIS — I10 HYPERTENSION GOAL BP (BLOOD PRESSURE) < 140/90: Primary | ICD-10-CM

## 2019-06-05 DIAGNOSIS — R05.9 COUGH: ICD-10-CM

## 2019-06-05 DIAGNOSIS — I77.74 VERTEBRAL ARTERY DISSECTION (H): ICD-10-CM

## 2019-06-05 DIAGNOSIS — E78.5 HYPERLIPIDEMIA LDL GOAL <100: ICD-10-CM

## 2019-06-05 DIAGNOSIS — E11.42 TYPE 2 DIABETES MELLITUS WITH DIABETIC POLYNEUROPATHY, WITH LONG-TERM CURRENT USE OF INSULIN (H): ICD-10-CM

## 2019-06-05 DIAGNOSIS — E03.9 HYPOTHYROIDISM, UNSPECIFIED TYPE: ICD-10-CM

## 2019-06-05 DIAGNOSIS — Z79.4 TYPE 2 DIABETES MELLITUS WITH DIABETIC POLYNEUROPATHY, WITH LONG-TERM CURRENT USE OF INSULIN (H): ICD-10-CM

## 2019-06-05 DIAGNOSIS — E11.22 TYPE 2 DIABETES MELLITUS WITH STAGE 3 CHRONIC KIDNEY DISEASE, WITH LONG-TERM CURRENT USE OF INSULIN (H): ICD-10-CM

## 2019-06-05 PROCEDURE — 99214 OFFICE O/P EST MOD 30 MIN: CPT | Performed by: INTERNAL MEDICINE

## 2019-06-05 ASSESSMENT — MIFFLIN-ST. JEOR: SCORE: 1396.75

## 2019-06-05 NOTE — PATIENT INSTRUCTIONS
Let's stop the lisinopril, to see if your cough goes away.   Often if we have to stop lisinopril due to cough, we replace it with a medication like Losartan.   However, you had high potassium and low blood pressure years ago on Losartan, which forced us to stop it at that time.   If we did decide to restart Losartan, we would need to start at the lowest dose, and watch potassium level, kidney test and blood pressure carefully.     Keep working with Dr Damian on the diabetes. We will make sure you are scheduled to see her.     No change in cholesterol medication (atorvastatin). We will need to recheck cholesterol sometime later this year.

## 2019-06-05 NOTE — PROGRESS NOTES
Subjective     [id number]---[id number]    Pete Sheldon is a 79 year old male who presents to clinic today for health counseling regarding several health issues.32 minutes were spent with the patient and his wife face to face today, over 50% of which was devoted to health counseling and education regarding these issues.     Patient present with wife.     HPI   Diabetes Follow-up      How often are you checking your blood sugar? Four or more times daily    What time of day are you checking your blood sugars (select all that apply)?  Before meals and At bedtime    Have you had any blood sugars above 200?  Yes     Have you had any blood sugars below 70?  Yes     What symptoms do you notice when your blood sugar is low?  None    What concerns do you have today about your diabetes? None     Do you have any of these symptoms? (Select all that apply)  Numbness in feet and Burning in feet     Have you had a diabetic eye exam in the last 12 months? Yes- Date of last eye exam: Sees a retina specialist frequently. May 14, 2019 at [facility]; [place], [place]    Lab Results   Component Value Date    A1C 9.4 03/06/2019    A1C 9.1 10/29/2018    A1C 8.6 08/30/2018    A1C 8.4 03/07/2018    A1C 8.3 03/01/2018     Diabetes Management Resources    Patient has been following with Dr. Damian of endocrinology, last seen on 3/6/2019.     Hyperlipidemia Follow-Up      Are you having any of the following symptoms? (Select all that apply)  No complaints of shortness of breath, chest pain or pressure.  No increased sweating or nausea with activity.  No left-sided neck or arm pain.  No complaints of pain in calves when walking 1-2 blocks.    Are you regularly taking any medication or supplement to lower your cholesterol?   Yes- Atorvastatin.    Are you having muscle aches or other side effects that you think could be caused by your cholesterol lowering medication?  No    Recent Labs   Lab Test 11/27/18  0927 03/01/18  1706  09/25/15  0844 06/19/15  0815    CHOL 110 123   < > 134 135   HDL 26* 35*   < > 31* 36*   LDL 59 70   < > 80 83   TRIG 125 91   < > 117 78   CHOLHDLRATIO  --   --   --  4.3 3.8    < > = values in this interval not displayed.     Taking atorvastatin 20 mg daily.     Hypertension Follow-up      Do you check your blood pressure regularly outside of the clinic? No     Are you following a low salt diet? Yes    Are your blood pressures ever more than 140 on the top number (systolic) OR more   than 90 on the bottom number (diastolic), for example 140/90? No    Amount of exercise or physical activity: Daily activities and mowing the lawn.    Problems taking medications regularly: No, wife assists with medication.    Medication side effects: none    Diet: low salt    BP Readings from Last 3 Encounters:   06/05/19 122/68   03/06/19 140/80   12/05/18 102/64     First BP reading was 134/66. Second BP reading improved to 122/68. Currently on lisinopril 2.5 mg daily. Wife comments that he has been coughing. Patient describes it as a tickle like cough.     Hx of CVA  Stroke in 2018 affected primarily his balance and right hand. Right hand remains numb.     Past/recent records reviewed and discussed for:  -[facility] had patient consult with Any Reyez, a Pharm D which occurred at [email or web address] in [place]. This was apparently at the initiative of [facility]. They have several questions regarding these recommendations. He brings with him a list of recommended med changes and wishes to have a second opinion. Per patient and wife, they feel as if he is on too many medications and wish to discontinue any if able.     We reviewed in detail each of the specific recommendations made by the Pharm D. After this discussion, we agreed to very few med changes. Many of the recommendations lack complete context of the patient's medical history. We agreed that his Claritin 10 mg dissolving tablets could easily be switched to plain tablets.     The patient has been bothered for some  "time by a cough, associated a \"tickling\" in his throat. He was started on lisinopril in January 2018 during his hospitalization for acute left thalamic ischemic stroke.   Of note, he had taken Losartan years ago, but it was discontinued in early 2011 in the context of hospitalization with hypotension, hyperkalemia and acute renal failure.     We agreed that lisinopril should be discontinued to see if cough resolves.   This will leave him on no antihypertensive medications temporarily. We discussed that if an ARB medication such as Losartan were to be added back, that we will need to carefully watch his BP, creatinine and potassium.      -Reviewed and updated medications.   -Notes occasional leg aches      Reviewed and updated as needed this visit by Provider         Review of Systems   No dyspnea. No chest discomfort or palpitations. Occasional orthostatic lightheadedness. No diarrhea, abdominal pain or rectal bleeding.   No acute problems with vision or speech, lateralizing weakness or paresthesias.    ROS: as above or negative for Respiratory, CV, GI, endocrine, neuro systems.    This document serves as a record of the services and decisions personally performed and made by Rom Helm MD. It was created on his behalf by Doris Barajas, a trained medical scribe. The creation of this document is based on the provider's statements to the medical scribe.  Doris aBrajas June 5, 2019 3:45 PM           Objective    /68   Pulse 75   Temp 98  F (36.7  C) (Oral)   Resp 16   Ht 1.67 m (5' 5.75\")   Wt 75.3 kg (166 lb)   SpO2 97%   BMI 27.00 kg/m    Body mass index is 27 kg/m .     Physical Exam   GENERAL: healthy, alert and no distress  RESP: lungs clear to auscultation - no rales, rhonchi or wheezes  CV: regular rate and rhythm, normal S1 S2, no S3 or S4, no murmur, click or rub, no peripheral edema and peripheral pulses strong  MS: no gross musculoskeletal defects noted, no edema  SKIN: no suspicious " "lesions or rashes  NEURO: Normal strength and tone, mentation intact and speech normal  PSYCH: mentation appears normal, affect normal/bright    Diagnostic Test Results:  Labs reviewed in Epic        Assessment & Plan   Time spent in room: 15:46- 16:18    Health counseling regarding several health issues.32 minutes were spent with the patient and his wife face to face today, over 50% of which was devoted to health counseling and education regarding these issues.    (I10) Hypertension goal BP (blood pressure) < 140/90  (primary encounter diagnosis)  Comment: BP stable. Suggested discontinuing lisinopril and monitoring if cough improves. Reviewed that Losartan was discontinued years ago due to hypotension and high potassium.     (R05) Cough  Comment: See above.     (E11.42,  Z79.4) Type 2 diabetes mellitus with diabetic polyneuropathy, with long-term current use of insulin (H)  (E11.22,  N18.3,  Z79.4) Type 2 diabetes mellitus with stage 3 chronic kidney disease, with long-term current use of insulin (H)  Comment: Overall stable. Continue current measures and follow up with Dr. Damian as recommended.     (I77.74) Vertebral artery dissection (H)  Comment: Occurred in January 2018. Stable late effects.     (E03.9) Hypothyroidism, unspecified type  Comment: Stable. Continue current meds.     (E78.5) Hyperlipidemia LDL goal <100  Comment: LDL at target. Continue current meds.       BMI:   Estimated body mass index is 27 kg/m  as calculated from the following:    Height as of this encounter: 1.67 m (5' 5.75\").    Weight as of this encounter: 75.3 kg (166 lb).     FUTURE APPOINTMENTS:       - Follow-up visit in 6-8 weeks    Patient Instructions   Let's stop the lisinopril, to see if your cough goes away.   Often if we have to stop lisinopril due to cough, we replace it with a medication like Losartan.   However, you had high potassium and low blood pressure years ago on Losartan, which forced us to stop it at that time. "   If we did decide to restart Losartan, we would need to start at the lowest dose, and watch potassium level, kidney test and blood pressure carefully.     Keep working with Dr Damian on the diabetes. We will make sure you are scheduled to see her.     No change in cholesterol medication (atorvastatin). We will need to recheck cholesterol sometime later this year.     The information in this document, created by the medical scribe for me, accurately reflects the services I personally performed and the decisions made by me. I have reviewed and approved this document for accuracy prior to leaving the patient care area.  June 5, 2019 4:18 PM    Rom Helm MD,   [facility]

## 2019-06-06 ENCOUNTER — OFFICE VISIT (OUTPATIENT)
Dept: ENDOCRINOLOGY | Facility: CLINIC | Age: 79
End: 2019-06-06
Payer: COMMERCIAL

## 2019-06-06 VITALS
HEART RATE: 86 BPM | HEIGHT: 66 IN | TEMPERATURE: 98.4 F | SYSTOLIC BLOOD PRESSURE: 130 MMHG | RESPIRATION RATE: 24 BRPM | BODY MASS INDEX: 26.69 KG/M2 | DIASTOLIC BLOOD PRESSURE: 64 MMHG | WEIGHT: 166.1 LBS | OXYGEN SATURATION: 93 %

## 2019-06-06 DIAGNOSIS — E11.42 TYPE 2 DIABETES MELLITUS WITH DIABETIC POLYNEUROPATHY, WITH LONG-TERM CURRENT USE OF INSULIN (H): Primary | ICD-10-CM

## 2019-06-06 DIAGNOSIS — E13.10 DIABETIC KETOACIDOSIS WITHOUT COMA ASSOCIATED WITH OTHER SPECIFIED DIABETES MELLITUS (H): ICD-10-CM

## 2019-06-06 DIAGNOSIS — Z79.4 TYPE 2 DIABETES MELLITUS WITH DIABETIC POLYNEUROPATHY, WITH LONG-TERM CURRENT USE OF INSULIN (H): Primary | ICD-10-CM

## 2019-06-06 DIAGNOSIS — I10 HYPERTENSION GOAL BP (BLOOD PRESSURE) < 140/90: ICD-10-CM

## 2019-06-06 LAB — HBA1C MFR BLD: 8.9 % (ref 0–5.6)

## 2019-06-06 PROCEDURE — 80048 BASIC METABOLIC PNL TOTAL CA: CPT | Performed by: INTERNAL MEDICINE

## 2019-06-06 PROCEDURE — 99214 OFFICE O/P EST MOD 30 MIN: CPT | Performed by: INTERNAL MEDICINE

## 2019-06-06 PROCEDURE — 83036 HEMOGLOBIN GLYCOSYLATED A1C: CPT | Performed by: INTERNAL MEDICINE

## 2019-06-06 PROCEDURE — 36415 COLL VENOUS BLD VENIPUNCTURE: CPT | Performed by: INTERNAL MEDICINE

## 2019-06-06 ASSESSMENT — MIFFLIN-ST. JEOR: SCORE: 1397.2

## 2019-06-06 NOTE — PROGRESS NOTES
ENDOCRINOLOGY CLINIC NOTE:  Name: Pete Sheldon  Seen for f/u of Diabetes.  He is accompanied by his wife.  HPI:  Pete Sheldon is a 79  year old male who presents for the evaluation/management of:  Of note he is having bedtime snack almost every days.  Typical bedtime snack is whole bagel/ PB sandwich.  Has variable blood sugar pattern. This has been noted consistently in past and with multiple dose adjustment.  I doubt if he has clear understanding of insulin dosing and the carbohydrates.  Also trouble with memory.  I discussed continuous glucose monitor with him in past but he does not want sensors.  Low C-peptide.     1. Type 2 DM:  Orginally diagnosed in 1995.  Current Regimen: Metformin 500 mg/day, Lantus 31 Units/day in AM, Humalog 6/5/6 Units with breakfast/lunch/dinner respectively. In addition to that he is on correctional scale 1 unit- 25 >140 but he is not using it.  yes:     Diabetes Medication(s)     Biguanides       metFORMIN (GLUCOPHAGE) 500 MG tablet    Take 1 tablet (500 mg) by mouth daily (with breakfast)    Insulin       insulin aspart (NOVOLOG FLEXPEN) 100 UNIT/ML pen    INJECT 6 UNITS WITH BREAKFAST, 5 UNITS WITH LUNCH, 6 UNITS WITH DINNER, HOLD IF NOT EATING MEAL     insulin glargine (LANTUS SOLOSTAR PEN) 100 UNIT/ML pen    Inject 31 Units Subcutaneous daily        during hospital stay 1/12-1/27 (hospitalized at [facility] due to left thalamic ischemic stroke) metformin dosage was reduced from 1,000 mg daily to 500 mg daily in the setting of CKD.      Does not feel comfortable with carbohydrate counting.    BS checks: Three times a day    Average Meter Download: 176 with SD 74  Variable BG.  Lowest was 48 and highest was 454.    Episodes of hypoglycemic episodes have improved.    Exercise: Minimal  Episodes of hypoglycemia (low blood sugar):  Yes. As noted above.  Fixed meal pattern: NO. Carb content varies. In general diet is high in carbs.  Patient counting carbs: No    DM Complications:    Nephropathy: Yes: Microalbuminuria present  Retinopathy: Yes: History of laser treatment in past  Neuropathy: Yes.  Microalbuminuria: Yes: Microalbuminuria present.  Not on ACE secondary to history of hyperkalemia.  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    2. Hypertension: Blood Pressure today:   BP Readings from Last 3 Encounters:   19 130/64   19 122/68   19 140/80     Blood pressure medications include hydrochlorothiazide 12.5 mg, amlodipine 5 mg.      3. Hyperlipidemia: Takes lovastatin 40 mg for lipid control.  Denies muscle aches of pains.        PMH/PSH:  Past Medical History:   Diagnosis Date     Calculus of ureter      Hypertension      Hypothyroidism      Microalbuminuria 2/15/2016     Mumps      Other and unspecified hyperlipidemia      Type 2 diabetes, HbA1C goal < 8% (H) 1995     Past Surgical History:   Procedure Laterality Date     C NONSPECIFIC PROCEDURE      Nasal septoplasty     HC COLONOSCOPY THRU STOMA, DIAGNOSTIC  2007    Normal     HC FLEX SIGMOIDOSCOPY W/WO MIGUEL SPEC BY BRUSH/WASH  1999    Normal to 60 cm     HC REPAIR INCISIONAL HERNIA,REDUCIBLE  1999    Hernia Repair, Incisional, Unilateral (right)     HC REPAIR INCISIONAL HERNIA,REDUCIBLE      Hernia Repair, Incisional, Unilateral (left, with mesh placement)     TESTICLE SURGERY       VASECTOMY       Family Hx:  Family History   Problem Relation Age of Onset     Cerebrovascular Disease Mother          age 95     Heart Disease Mother         age 89,  age 95     Cerebrovascular Disease Father          age 91, stroke two years previous     Cancer - colorectal Brother         Half-brother     Cancer Sister         Half-sister (?type)     Cancer Sister         Half-sister (?type)     Heart Disease Brother      Neurologic  Disorder Daughter         Multiple Sclerosis     Psychotic Disorder Son         Schizophrenia       Social Hx:  Social History     Socioeconomic History     Marital status:      Spouse name: Not on file     Number of children: 2     Years of education: Not on file     Highest education level: Not on file   Occupational History     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[email or web address]     Employer: RETIRED   Social Needs     Financial resource strain: Not on file     Food insecurity:     Worry: Not on file     Inability: Not on file     Transportation needs:     Medical: Not on file     Non-medical: Not on file   Tobacco Use     Smoking status: Former Smoker     Last attempt to quit: 3/11/1953     Years since quittin.2     Smokeless tobacco: Never Used   Substance and Sexual Activity     Alcohol use: No     Drug use: No     Sexual activity: Yes     Partners: Female   Lifestyle     Physical activity:     Days per week: Not on file     Minutes per session: Not on file     Stress: Not on file   Relationships     Social connections:     Talks on phone: Not on file     Gets together: Not on file     Attends Jewish service: Not on file     Active member of club or organization: Not on file     Attends meetings of clubs or organizations: Not on file     Relationship status: Not on file     Intimate partner violence:     Fear of current or ex partner: Not on file     Emotionally abused: Not on file     Physically abused: Not on file     Forced sexual activity: Not on file   Other Topics Concern     Parent/sibling w/ CABG, MI or angioplasty before 65F 55M? Not Asked   Social History Narrative    Retired  for [employer].    Two children, son in [place], daughter in [place] ([facility]).    Three grandchildren, one great-granddaughter.              MEDICATIONS:  has a current medication list which includes the following prescription(s): ace/arb/arni not prescribed, aspirin,  "atorvastatin, b-d u/f, blood glucose, blood glucose monitoring, finasteride, glucosamine hcl, insulin aspart, insulin glargine, insulin pen needle, insulin syringes (disposable), levothyroxine, blood glucose, loratadine, metformin, multivitamin, glucose management, order for dme, and tamsulosin, and the following Facility-Administered Medications: lidocaine.    ROS     ROS: 10 point ROS neg other than the symptoms noted above in the HPI.    Physical Exam   VS: /64 (BP Location: Left arm, Patient Position: Chair, Cuff Size: Adult Regular)   Pulse 86   Temp 98.4  F (36.9  C) (Oral)   Resp 24   Ht 1.67 m (5' 5.75\")   Wt 75.3 kg (166 lb 1.6 oz)   SpO2 93%   BMI 27.01 kg/m    GENERAL: AXOX3, NAD, well dressed, answering questions appropriately, appears stated age.  NEUROLOGY: CN grossly intact, no tremors  MSK: grossly intact  Psych: normal mood      LABS:  Component      Latest Ref Rng 4/15/2016   C-Peptide      0.9 - 6.9 ng/mL <0.1 (L)   Glutamic Acid Decarboxylase Antibody       <5.0 . . .     A1c:  Lab Results   Component Value Date    A1C 9.4 03/06/2019    A1C 9.1 10/29/2018    A1C 8.6 08/30/2018    A1C 8.4 03/07/2018    A1C 8.3 03/01/2018     Creatinine:  Creatinine   Date Value Ref Range Status   11/27/2018 1.67 (H) 0.66 - 1.25 mg/dL Final     Urine Micro:  Lab Results   Component Value Date    UMALCR 170.01 08/30/2018        LFTs/Lipids:  Recent Labs   Lab Test 11/27/18  0927 03/01/18  1706  09/25/15  0844 06/19/15  0815   CHOL 110 123   < > 134 135   HDL 26* 35*   < > 31* 36*   LDL 59 70   < > 80 83   TRIG 125 91   < > 117 78   CHOLHDLRATIO  --   --   --  4.3 3.8    < > = values in this interval not displayed.     TFTs:  TSH   Date Value Ref Range Status   03/06/2019 5.85 (H) 0.40 - 4.00 mU/L Final     All pertinent notes, labs, and images personally reviewed by me.     A/P  Mr.Victor ANURAG Sheldon is a 79 year old here for the evaluation/management of diabetes:    1. DM2 - (low C-peptide, negative " FAVIOLA): Under Poor control.  A1c 9.4%.  Diabetes complicated by microalbuminuria, neuropathy and retinopathy.   Concerns for hypoglycemia unawareness. Variable BG. Brittle DM.  Multiple dose changes made in past but still not able to get stable blood sugar numbers.  Blood sugars are variable throughout the day.  Does not feel comfortable with carbohydrate counting.  Does not want Dexcom. This has been discussed multiple times with pt in past.   H/o brittle DM. Lot of variability in BG.  In general hypoglycemic episodes have improved.  Metformin decreased to 500 mg in the setting of CKD.  Plan  In general diet is high in carbohydrates.  Labs today.  Continue Metformin 500 mg/day.  Increase Lantus to 32 Units/day.  INJECT 6 UNITS WITH BREAKFAST, 4 UNITS WITH LUNCH, 5 UNITS WITH DINNER, HOLD IF NOT EATING MEAL.  Labs and follow up in 3 months.  Eat small snack before you go in the yard for yard work.    Recommend checking blood sugars before meals and at bedtime.    If Blood glucose are low more often-> 2-3 times/week- give us a call.  The patient is advised to Make better food choices: reduce carbs, Reduce portion size, weight loss and exercise 3-4 times a week.  Discussed hypoglycemia signs and symptoms as well as management in detail.    2. Hypertension - Under Fair control.  Continue hydrochlorothiazide 12.5 mg, amlodipine 5 mg.    3. Hyperlipidemia - Under Good control.  Continue lovastatin 40 mg. LDL 91, HDL 34.      5. Microalbuminuria:  Lab Results   Component Value Date    UMALCR 170.01 08/30/2018    Not on ACE 2/2 to h/o hyperkalemia    5. Hypothyroidism:  On levothyroxine 25 mcg/day  Started 9/2018 by Dr. Silva is  Clinically looks euthyroid  Labs pending  Dose change based on labs    4. Prevention  Flu Shot-September 2015  Pneumovax- March 2012  Opthalmology-Yes. June 2017    ASA-no. 2/2 to h/o nosebleeds  Smoking- No    Most Recent Immunizations   Administered Date(s) Administered     Influenza (H1N1)  12/28/2009     Influenza (High Dose) 3 valent vaccine 09/06/2018     Influenza (IIV3) PF 08/29/2012     Pneumococcal 23 valent 03/16/2012     TD (ADULT, 7+) 10/22/2003     TDAP Vaccine (Adacel) 01/31/2014     Zoster vaccine, live 10/15/2011       All questions were answered.  The patient indicates understanding of the above issues and agrees with the plan set forth.     More than 50% of face to face time spent with Mr. Sheldon on counseling / coordinating his care.  Total appointment times was 25 minutes.      Follow-up:  Follow up 3 months    Lori Damian M.D  [facility]    Disclaimer: This note consists of symbols derived from keyboarding, dictation and/or voice recognition software. As a result, there may be errors in the script that have gone undetected. Please consider this when interpreting information found in this chart.

## 2019-06-06 NOTE — NURSING NOTE
ENDOCRINOLOGY INTAKE FORM    Patient Name:  Pete Sheldon  :  1940    Is patient Diabetic?   Yes: type 2  Does patient have non-diabetic or other endocrine issues?  Yes: hypothyroidism, hyperlipidemia, ED    Vitals: There were no vitals taken for this visit.  BMI= There is no height or weight on file to calculate BMI.    Flu vaccine:  Yes: 18  Pneumonia vaccine:  Yes: both    Smoking and Alcohol use:  Social History     Tobacco Use     Smoking status: Former Smoker     Last attempt to quit: 3/11/1953     Years since quittin.2     Smokeless tobacco: Never Used   Substance Use Topics     Alcohol use: No     Drug use: No       Foot Exam: Yes: 19  Eye Exam:  Yes: 18  Dental Exam:  Yes: 19  Aspirin Use:  Yes: 81 mg    Lab Results   Component Value Date    A1C 9.4 2019    A1C 9.1 10/29/2018    A1C 8.6 2018    A1C 8.4 2018    A1C 8.3 2018       Lab Results   Component Value Date    MICROL 127 2018     No results found for: MICROALBUMIN    OLIVIA [facility]  Ave/[place]

## 2019-06-06 NOTE — LETTER
[facility]  [address], [address]  [place] [address]  [phone]        Rea 10, 2019    Pete Sheldon  [address] [address] [facility] [address]            Dear Mr. Pete Sheldon:    Labs/ Imaging studies done with endocrinology showed:   Lab Results        Component                Value               Date                        A1C                      8.9                 06/06/2019                  A1C                      9.4                 03/06/2019                    Please call endocrinology clinic (nurse line: [phone]) if questions.     Sincerely,      Dr. Lori Damian    Results for orders placed or performed in visit on 06/06/19   Hemoglobin A1c   Result Value Ref Range    Hemoglobin A1C 8.9 (H) 0 - 5.6 %

## 2019-06-06 NOTE — PATIENT INSTRUCTIONS
[facility] & [place] locations   Dr Damian, Endocrinology Department      [facility]   [address]  [place] [address]  Appointment [phone]  Fax: [phone]  [place]: Monday and Tuesday         [facility]   [address]  [place] [address]  Appointment [phone]  Fax: [phone]  [place]: Wednesday and Thursday            To provide the best diabetic care, please bring your blood glucose meter to each and every visit with your Endocrinologist.  Your blood glucose meter/insulin pump will be downloaded at every appointment.    Please arrive 15 minutes before your scheduled appointment.  This will allow for your blood glucose meter/insulin pump to be downloaded.  If you are wearing DEXCOM please bring  or sharing code so that it can be downloaded.  If you are using freestyle po personal sensors please bring the reader.  If you are using TANDEM insulin pump please have your username and password to get info from [employer] website.    Labs today.  Increase Lantus to 32 Units/day.  INJECT 6 UNITS WITH BREAKFAST, 4 UNITS WITH LUNCH, 5 UNITS WITH DINNER, HOLD IF NOT EATING MEAL.  Labs and follow up in 3 months.  Eat small snack before you go in the yard for yard work.    Recommend checking blood sugars before meals and at bedtime.    If Blood glucose are low more often-> 2-3 times/week- give us a call.  The patient is advised to Make better food choices: reduce carbs, Reduce portion size, weight loss and exercise 3-4 times a week.  Discussed hypoglycemia signs and symptoms as well as management in detail.

## 2019-06-06 NOTE — LETTER
6/6/2019         RE: Pete Sheldon  [address] [place] [address]        Dear Colleague,    Thank you for referring your patient, Pete Sheldon, to the [facility] [address]. Please see a copy of my visit note below.    ENDOCRINOLOGY CLINIC NOTE:  Name: Pete Sheldon  Seen for f/u of Diabetes.  He is accompanied by his wife.  HPI:  Pete Sheldon is a 79  year old male who presents for the evaluation/management of:  Of note he is having bedtime snack almost every days.  Typical bedtime snack is whole bagel/ PB sandwich.  Has variable blood sugar pattern. This has been noted consistently in past and with multiple dose adjustment.  I doubt if he has clear understanding of insulin dosing and the carbohydrates.  Also trouble with memory.  I discussed continuous glucose monitor with him in past but he does not want sensors.  Low C-peptide.     1. Type 2 DM:  Orginally diagnosed in 1995.  Current Regimen: Metformin 500 mg/day, Lantus 31 Units/day in AM, Humalog 6/5/6 Units with breakfast/lunch/dinner respectively. In addition to that he is on correctional scale 1 unit- 25 >140 but he is not using it.  yes:     Diabetes Medication(s)     Biguanides       metFORMIN (GLUCOPHAGE) 500 MG tablet    Take 1 tablet (500 mg) by mouth daily (with breakfast)    Insulin       insulin aspart (NOVOLOG FLEXPEN) 100 UNIT/ML pen    INJECT 6 UNITS WITH BREAKFAST, 5 UNITS WITH LUNCH, 6 UNITS WITH DINNER, HOLD IF NOT EATING MEAL     insulin glargine (LANTUS SOLOSTAR PEN) 100 UNIT/ML pen    Inject 31 Units Subcutaneous daily        during hospital stay 1/12-1/27 (hospitalized at  of  due to left thalamic ischemic stroke) metformin dosage was reduced from 1,000 mg daily to 500 mg daily in the setting of CKD.      Does not feel comfortable with carbohydrate counting.    BS checks: Three times a day    Average Meter Download: 176 with SD 74  Variable BG.  Lowest was 48 and highest was 454.    Episodes of  hypoglycemic episodes have improved.    Exercise: Minimal  Episodes of hypoglycemia (low blood sugar):  Yes. As noted above.  Fixed meal pattern: NO. Carb content varies. In general diet is high in carbs.  Patient counting carbs: No    DM Complications:   Nephropathy: Yes: Microalbuminuria present  Retinopathy: Yes: History of laser treatment in past  Neuropathy: Yes.  Microalbuminuria: Yes: Microalbuminuria present.  Not on ACE secondary to history of hyperkalemia.  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    2. Hypertension: Blood Pressure today:   BP Readings from Last 3 Encounters:   19 130/64   19 122/68   19 140/80     Blood pressure medications include hydrochlorothiazide 12.5 mg, amlodipine 5 mg.      3. Hyperlipidemia: Takes lovastatin 40 mg for lipid control.  Denies muscle aches of pains.        PMH/PSH:  Past Medical History:   Diagnosis Date     Calculus of ureter      Hypertension      Hypothyroidism      Microalbuminuria 2/15/2016     Mumps      Other and unspecified hyperlipidemia      Type 2 diabetes, HbA1C goal < 8% (H) 1995     Past Surgical History:   Procedure Laterality Date     C NONSPECIFIC PROCEDURE      Nasal septoplasty     HC COLONOSCOPY THRU STOMA, DIAGNOSTIC  2007    Normal     HC FLEX SIGMOIDOSCOPY W/WO MIGUEL SPEC BY BRUSH/WASH  1999    Normal to 60 cm     HC REPAIR INCISIONAL HERNIA,REDUCIBLE  1999    Hernia Repair, Incisional, Unilateral (right)     HC REPAIR INCISIONAL HERNIA,REDUCIBLE      Hernia Repair, Incisional, Unilateral (left, with mesh placement)     TESTICLE SURGERY       VASECTOMY       Family Hx:  Family History   Problem Relation Age of Onset     Cerebrovascular Disease Mother          age 95     Heart Disease Mother         age 89,  age 95      Cerebrovascular Disease Father          age 91, stroke two years previous     Cancer - colorectal Brother         Half-brother     Cancer Sister         Half-sister (?type)     Cancer Sister         Half-sister (?type)     Heart Disease Brother      Neurologic Disorder Daughter         Multiple Sclerosis     Psychotic Disorder Son         Schizophrenia       Social Hx:  Social History     Socioeconomic History     Marital status:      Spouse name: Not on file     Number of children: 2     Years of education: Not on file     Highest education level: Not on file   Occupational History     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[employer]     Employer: RETIRED   Social Needs     Financial resource strain: Not on file     Food insecurity:     Worry: Not on file     Inability: Not on file     Transportation needs:     Medical: Not on file     Non-medical: Not on file   Tobacco Use     Smoking status: Former Smoker     Last attempt to quit: 3/11/1953     Years since quittin.2     Smokeless tobacco: Never Used   Substance and Sexual Activity     Alcohol use: No     Drug use: No     Sexual activity: Yes     Partners: Female   Lifestyle     Physical activity:     Days per week: Not on file     Minutes per session: Not on file     Stress: Not on file   Relationships     Social connections:     Talks on phone: Not on file     Gets together: Not on file     Attends Alevism service: Not on file     Active member of club or organization: Not on file     Attends meetings of clubs or organizations: Not on file     Relationship status: Not on file     Intimate partner violence:     Fear of current or ex partner: Not on file     Emotionally abused: Not on file     Physically abused: Not on file     Forced sexual activity: Not on file   Other Topics Concern     Parent/sibling w/ CABG, MI or angioplasty before 65F 55M? Not Asked   Social History Narrative    Retired  for [employer].    Two  "children, son in [place], daughter in [place] ([facility]).    Three grandchildren, one great-granddaughter.              MEDICATIONS:  has a current medication list which includes the following prescription(s): ace/arb/arni not prescribed, aspirin, atorvastatin, b-d u/f, blood glucose, blood glucose monitoring, finasteride, glucosamine hcl, insulin aspart, insulin glargine, insulin pen needle, insulin syringes (disposable), levothyroxine, blood glucose, loratadine, metformin, multivitamin, glucose management, order for dme, and tamsulosin, and the following Facility-Administered Medications: lidocaine.    ROS     ROS: 10 point ROS neg other than the symptoms noted above in the HPI.    Physical Exam   VS: /64 (BP Location: Left arm, Patient Position: Chair, Cuff Size: Adult Regular)   Pulse 86   Temp 98.4  F (36.9  C) (Oral)   Resp 24   Ht 1.67 m (5' 5.75\")   Wt 75.3 kg (166 lb 1.6 oz)   SpO2 93%   BMI 27.01 kg/m     GENERAL: AXOX3, NAD, well dressed, answering questions appropriately, appears stated age.  NEUROLOGY: CN grossly intact, no tremors  MSK: grossly intact  Psych: normal mood      LABS:  Component      Latest Ref Rng 4/15/2016   C-Peptide      0.9 - 6.9 ng/mL <0.1 (L)   Glutamic Acid Decarboxylase Antibody       <5.0 . . .     A1c:  Lab Results   Component Value Date    A1C 9.4 03/06/2019    A1C 9.1 10/29/2018    A1C 8.6 08/30/2018    A1C 8.4 03/07/2018    A1C 8.3 03/01/2018     Creatinine:  Creatinine   Date Value Ref Range Status   11/27/2018 1.67 (H) 0.66 - 1.25 mg/dL Final     Urine Micro:  Lab Results   Component Value Date    UMALCR 170.01 08/30/2018        LFTs/Lipids:  Recent Labs   Lab Test 11/27/18  0927 03/01/18  1706  09/25/15  0844 06/19/15  0815   CHOL 110 123   < > 134 135   HDL 26* 35*   < > 31* 36*   LDL 59 70   < > 80 83   TRIG 125 91   < > 117 78   CHOLHDLRATIO  --   --   --  4.3 3.8    < > = values in this interval not displayed.     TFTs:  TSH   Date Value " Ref Range Status   03/06/2019 5.85 (H) 0.40 - 4.00 mU/L Final     All pertinent notes, labs, and images personally reviewed by me.     A/P  Mr.Victor ANURAG Sheldon is a 79 year old here for the evaluation/management of diabetes:    1. DM2 - (low C-peptide, negative FAVIOLA): Under Poor control.  A1c 9.4%.  Diabetes complicated by microalbuminuria, neuropathy and retinopathy.   Concerns for hypoglycemia unawareness. Variable BG. Brittle DM.  Multiple dose changes made in past but still not able to get stable blood sugar numbers.  Blood sugars are variable throughout the day.  Does not feel comfortable with carbohydrate counting.  Does not want Dexcom. This has been discussed multiple times with pt in past.   H/o brittle DM. Lot of variability in BG.  In general hypoglycemic episodes have improved.  Metformin decreased to 500 mg in the setting of CKD.  Plan  In general diet is high in carbohydrates.  Labs today.  Continue Metformin 500 mg/day.  Increase Lantus to 32 Units/day.  INJECT 6 UNITS WITH BREAKFAST, 4 UNITS WITH LUNCH, 5 UNITS WITH DINNER, HOLD IF NOT EATING MEAL.  Labs and follow up in 3 months.  Eat small snack before you go in the yard for yard work.    Recommend checking blood sugars before meals and at bedtime.    If Blood glucose are low more often-> 2-3 times/week- give us a call.  The patient is advised to Make better food choices: reduce carbs, Reduce portion size, weight loss and exercise 3-4 times a week.  Discussed hypoglycemia signs and symptoms as well as management in detail.    2. Hypertension - Under Fair control.  Continue hydrochlorothiazide 12.5 mg, amlodipine 5 mg.    3. Hyperlipidemia - Under Good control.  Continue lovastatin 40 mg. LDL 91, HDL 34.      5. Microalbuminuria:  Lab Results   Component Value Date    UMALCR 170.01 08/30/2018    Not on ACE 2/2 to h/o hyperkalemia    5. Hypothyroidism:  On levothyroxine 25 mcg/day  Started 9/2018 by Dr. Silva is  Clinically looks euthyroid  Labs  pending  Dose change based on labs    4. Prevention  Flu Shot-September 2015  Pneumovax- March 2012  Opthalmology-Yes. June 2017    ASA-no. 2/2 to h/o nosebleeds  Smoking- No    Most Recent Immunizations   Administered Date(s) Administered     Influenza (H1N1) 12/28/2009     Influenza (High Dose) 3 valent vaccine 09/06/2018     Influenza (IIV3) PF 08/29/2012     Pneumococcal 23 valent 03/16/2012     TD (ADULT, 7+) 10/22/2003     TDAP Vaccine (Adacel) 01/31/2014     Zoster vaccine, live 10/15/2011       All questions were answered.  The patient indicates understanding of the above issues and agrees with the plan set forth.     More than 50% of face to face time spent with Mr. Sheldon on counseling / coordinating his care.  Total appointment times was 25 minutes.      Follow-up:  Follow up 3 months    Lori Damian M.D  Endocrinology  [facility]    Disclaimer: This note consists of symbols derived from keyboarding, dictation and/or voice recognition software. As a result, there may be errors in the script that have gone undetected. Please consider this when interpreting information found in this chart.        Again, thank you for allowing me to participate in the care of your patient.        Sincerely,        Lori Damian MD

## 2019-06-07 LAB
ANION GAP SERPL CALCULATED.3IONS-SCNC: 7 MMOL/L (ref 3–14)
BUN SERPL-MCNC: 24 MG/DL (ref 7–30)
CALCIUM SERPL-MCNC: 8.4 MG/DL (ref 8.5–10.1)
CHLORIDE SERPL-SCNC: 107 MMOL/L (ref 94–109)
CO2 SERPL-SCNC: 24 MMOL/L (ref 20–32)
CREAT SERPL-MCNC: 1.47 MG/DL (ref 0.66–1.25)
GFR SERPL CREATININE-BSD FRML MDRD: 44 ML/MIN/{1.73_M2}
GLUCOSE SERPL-MCNC: 211 MG/DL (ref 70–99)
POTASSIUM SERPL-SCNC: 4.7 MMOL/L (ref 3.4–5.3)
SODIUM SERPL-SCNC: 138 MMOL/L (ref 133–144)

## 2019-06-13 DIAGNOSIS — E11.42 TYPE 2 DIABETES MELLITUS WITH DIABETIC POLYNEUROPATHY, WITH LONG-TERM CURRENT USE OF INSULIN (H): ICD-10-CM

## 2019-06-13 DIAGNOSIS — Z79.4 TYPE 2 DIABETES MELLITUS WITH DIABETIC POLYNEUROPATHY, WITH LONG-TERM CURRENT USE OF INSULIN (H): ICD-10-CM

## 2019-06-13 NOTE — TELEPHONE ENCOUNTER
"Requested Prescriptions   Pending Prescriptions Disp Refills     blood glucose (ONETOUCH VERIO IQ) test strip 400 strip 0     Sig: USE TO TEST BLOOD SUGARS 4 TIMES DAILY OR AS DIRECTED USING ONE TOUCH VERIO FLEX (Z794)       Diabetic Supplies Protocol Passed - 6/13/2019 12:56 PM        Passed - Medication is active on med list        Passed - Patient is 18 years of age or older        Passed - Recent (6 mo) or future (30 days) visit within the authorizing provider's specialty     Patient had office visit in the last 6 months or has a visit in the next 30 days with authorizing provider.  See \"Patient Info\" tab in inbasket, or \"Choose Columns\" in Meds & Orders section of the refill encounter.            Last Written Prescription Date:  03/18/19  Last Fill Quantity: 400,  # refills: 0   Last office visit: 6/5/2019 with prescribing provider:  yes   Future Office Visit:   Next 5 appointments (look out 90 days)    Jul 17, 2019  1:20 PM CDT  SHORT with Rom Helm MD  [facility] [address]  [facility] [id number] [phone]   Sep 04, 2019  2:00 PM CDT  Return Visit with Lori Damian MD  [facility] ([facility]) [address] [facility] [address]  [facility] [address] [phone]           "

## 2019-06-14 DIAGNOSIS — R35.0 URINARY FREQUENCY: ICD-10-CM

## 2019-06-18 RX ORDER — FINASTERIDE 5 MG/1
TABLET, FILM COATED ORAL
Qty: 90 TABLET | Refills: 1 | Status: SHIPPED | OUTPATIENT
Start: 2019-06-18 | End: 2019-12-15

## 2019-06-21 DIAGNOSIS — E03.9 HYPOTHYROIDISM, UNSPECIFIED TYPE: ICD-10-CM

## 2019-06-21 DIAGNOSIS — E11.42 TYPE 2 DIABETES MELLITUS WITH DIABETIC POLYNEUROPATHY, WITH LONG-TERM CURRENT USE OF INSULIN (H): ICD-10-CM

## 2019-06-21 DIAGNOSIS — Z79.4 TYPE 2 DIABETES MELLITUS WITH DIABETIC POLYNEUROPATHY, WITH LONG-TERM CURRENT USE OF INSULIN (H): ICD-10-CM

## 2019-06-21 NOTE — TELEPHONE ENCOUNTER
"Requested Prescriptions   Pending Prescriptions Disp Refills     levothyroxine (SYNTHROID/LEVOTHROID) 25 MCG tablet Last Written Prescription Date:  03/26/2019  Last Fill Quantity: 90 tablets,  # refills: 0   Last office visit: 6/5/2019 with prescribing provider: 06/05/2019  Future Office Visit:   Next 5 appointments (look out 90 days)    Aug 14, 2019  2:00 PM CDT  SHORT with Rom Helm MD  [facility] [address]  [facility] [id number] [phone]   Sep 04, 2019  2:00 PM CDT  Return Visit with Lori Damian MD  [facility] ([facility]) [address] [facility] [address]  [facility] [id number] [phone]        90 tablet 0     Sig: Take 1 tablet (25 mcg) by mouth daily       Thyroid Protocol Failed - 6/21/2019  9:55 AM        Failed - Normal TSH on file in past 12 months     Recent Labs   Lab Test 03/06/19  0932   TSH 5.85*              Passed - Patient is 12 years or older        Passed - Recent (12 mo) or future (30 days) visit within the authorizing provider's specialty     Patient had office visit in the last 12 months or has a visit in the next 30 days with authorizing provider or within the authorizing provider's specialty.  See \"Patient Info\" tab in inbasket, or \"Choose Columns\" in Meds & Orders section of the refill encounter.              Passed - Medication is active on med list        metFORMIN (GLUCOPHAGE) 500 MG tablet Last Written Prescription Date:  12/05/2018  Last Fill Quantity: 90 tabelts,  # refills: 1   Last office visit: 6/5/2019 with prescribing provider:  06/05/2019   Future Office Visit:   Next 5 appointments (look out 90 days)    Aug 14, 2019  2:00 PM CDT  SHORT with Rom Helm MD  [facility]) [address]  [facility] [id number] [phone]   Sep 04, 2019  2:00 PM CDT  Return Visit with Lori Damian MD  [place] " "[facility] ([facility]) [address]  [facility] [id number] [phone]        90 tablet 1     Sig: Take 1 tablet (500 mg) by mouth daily (with breakfast)       Biguanide Agents Failed - 6/21/2019  9:55 AM        Failed - Patient's CR is NOT>1.4 OR Patient's EGFR is NOT<45 within past 12 mos.     Recent Labs   Lab Test 06/06/19  1324   GFRESTIMATED 44*   GFRESTBLACK 51*       Recent Labs   Lab Test 06/06/19  1324   CR 1.47*             Passed - Blood pressure less than 140/90 in past 6 months     BP Readings from Last 3 Encounters:   06/06/19 130/64   06/05/19 122/68   03/06/19 140/80                 Passed - Patient has documented LDL within the past 12 mos.     Recent Labs   Lab Test 11/27/18  0927   LDL 59             Passed - Patient has had a Microalbumin in the past 15 mos.     Recent Labs   Lab Test 08/30/18  0844   MICROL 127   UMALCR 170.01*             Passed - Patient is age 10 or older        Passed - Patient has documented A1c within the specified period of time.     If HgbA1C is 8 or greater, it needs to be on file within the past 3 months.  If less than 8, must be on file within the past 6 months.     Recent Labs   Lab Test 06/06/19  1443   A1C 8.9*             Passed - Patient does NOT have a diagnosis of CHF.        Passed - Medication is active on med list        Passed - Recent (6 mo) or future (30 days) visit within the authorizing provider's specialty     Patient had office visit in the last 6 months or has a visit in the next 30 days with authorizing provider or within the authorizing provider's specialty.  See \"Patient Info\" tab in inbasket, or \"Choose Columns\" in Meds & Orders section of the refill encounter.            "

## 2019-06-22 NOTE — TELEPHONE ENCOUNTER
Levothyroxine  Routing refill request to provider for review/approval because:  Labs out of range:  TSH    Metformin  Routing refill request to provider for review/approval because:  Labs out of range    Next 5 appointments (look out 90 days)    Aug 14, 2019  2:00 PM CDT  SHORT with Rom Helm MD  [facility] [address]  [facility] [address]  [phone]   Sep 04, 2019  2:00 PM CDT  Return Visit with Lori Damian MD  [facility] [address]  [facility] [address]  [phone]

## 2019-06-24 RX ORDER — LEVOTHYROXINE SODIUM 25 UG/1
25 TABLET ORAL DAILY
Qty: 90 TABLET | Refills: 0 | Status: SHIPPED | OUTPATIENT
Start: 2019-06-24 | End: 2019-06-27

## 2019-06-27 RX ORDER — LEVOTHYROXINE SODIUM 25 UG/1
25 TABLET ORAL DAILY
Qty: 90 TABLET | Refills: 0 | Status: SHIPPED | OUTPATIENT
Start: 2019-06-27 | End: 2019-12-31

## 2019-06-27 NOTE — TELEPHONE ENCOUNTER
Pharmacy didn't receive, please resend.    Lilia Pacheco, OLIVIA  [facility]  Ave/[place]

## 2019-07-21 DIAGNOSIS — E11.9 TYPE 2 DIABETES, HBA1C GOAL < 8% (H): ICD-10-CM

## 2019-07-22 RX ORDER — PEN NEEDLE, DIABETIC 31 GX5/16"
NEEDLE, DISPOSABLE MISCELLANEOUS
Qty: 400 EACH | Refills: 3 | Status: SHIPPED | OUTPATIENT
Start: 2019-07-22 | End: 2020-01-01

## 2019-07-22 NOTE — TELEPHONE ENCOUNTER
"Requested Prescriptions   Pending Prescriptions Disp Refills     B-D U/F 31G X 8 MM insulin pen needle [Pharmacy Med Name: B-D UF III SHORT PEN NEED MIS] 400 each 3     Sig: USE  4 TIMES DAILY TO  INJECT  INSULIN   Last Written Prescription Date:  06/20/2018  Last Fill Quantity: 400,  # refills: 03   Last office visit: 6/5/2019 with prescribing provider:     Future Office Visit:   Next 5 appointments (look out 90 days)    Aug 14, 2019  2:00 PM CDT  SHORT with Rom Helm MD  [facility] [address]  [facility] [address]  [phone]   Sep 04, 2019  2:00 PM CDT  Return Visit with Lori Damian MD  [facility]) [address]  [facility] [address]  [phone]           Diabetic Supplies Protocol Passed - 7/21/2019  6:06 PM        Passed - Medication is active on med list        Passed - Patient is 18 years of age or older        Passed - Recent (6 mo) or future (30 days) visit within the authorizing provider's specialty     Patient had office visit in the last 6 months or has a visit in the next 30 days with authorizing provider.  See \"Patient Info\" tab in inbasket, or \"Choose Columns\" in Meds & Orders section of the refill encounter.            "

## 2019-07-30 DIAGNOSIS — E13.10 DIABETIC KETOACIDOSIS WITHOUT COMA ASSOCIATED WITH OTHER SPECIFIED DIABETES MELLITUS (H): ICD-10-CM

## 2019-07-30 RX ORDER — INSULIN GLARGINE 100 [IU]/ML
INJECTION, SOLUTION SUBCUTANEOUS
Qty: 30 ML | Refills: 1 | Status: SHIPPED | OUTPATIENT
Start: 2019-07-30 | End: 2019-08-14

## 2019-07-30 NOTE — TELEPHONE ENCOUNTER
"Prescription approved per [facility] Refill Protocol.        Requested Prescriptions   Pending Prescriptions Disp Refills     LANTUS SOLOSTAR 100 UNIT/ML soln [Pharmacy Med Name: LANT SOLOSTAR     INJ]  1     Sig:  INJECT 30 UNITS SUBCUTTANEOUS EVERY MORNING       Long Acting Insulin Protocol Passed - 7/30/2019 10:53 AM        Passed - Blood pressure less than 140/90 in past 6 months     BP Readings from Last 3 Encounters:   06/06/19 130/64   06/05/19 122/68   03/06/19 140/80                 Passed - LDL on file in past 12 months     Recent Labs   Lab Test 11/27/18  0927   LDL 59             Passed - Microalbumin on file in past 12 months     Recent Labs   Lab Test 08/30/18  0844   MICROL 127   UMALCR 170.01*             Passed - Serum creatinine on file in past 12 months     Recent Labs   Lab Test 06/06/19  1324  01/08/18  1351   CR 1.47*   < >  --    CREAT  --   --  1.2    < > = values in this interval not displayed.             Passed - HgbA1C in past 3 or 6 months     If HgbA1C is 8 or greater, it needs to be on file within the past 3 months.  If less than 8, must be on file within the past 6 months.     Recent Labs   Lab Test 06/06/19  1443   A1C 8.9*             Passed - Medication is active on med list        Passed - Patient is age 18 or older        Passed - Recent (6 mo) or future (30 days) visit within the authorizing provider's specialty     Patient had office visit in the last 6 months or has a visit in the next 30 days with authorizing provider or within the authorizing provider's specialty.  See \"Patient Info\" tab in inbasket, or \"Choose Columns\" in Meds & Orders section of the refill encounter.              "

## 2019-07-30 NOTE — TELEPHONE ENCOUNTER
Requested Prescriptions   Pending Prescriptions Disp Refills     LANTUS SOLOSTAR 100 UNIT/ML soln [Pharmacy Med Name: LANTUS SOLOSTAR     INJ]  1     Sig:  INJECT 30 UNITS SUBCUTTANEOUS EVERY MORNING   Last Written Prescription Date:  06/06/19  Last Fill Quantity: 30,  # refills: 1   Last office visit: 6/5/2019 with prescribing provider:  yes   Future Office Visit:   Next 5 appointments (look out 90 days)    Aug 14, 2019  2:00 PM CDT  SHORT with Rom Helm MD  [facility] [address]  [facility] [address]  [phone]   Sep 04, 2019  2:00 PM CDT  Return Visit with Lori Damian MD  [facility]) [address]  [facility] [address]  [phone]             Long Acting Insulin Protocol Passed - 7/30/2019 10:36 AM        Passed - Blood pressure less than 140/90 in past 6 months     BP Readings from Last 3 Encounters:   06/06/19 130/64   06/05/19 122/68   03/06/19 140/80                 Passed - LDL on file in past 12 months     Recent Labs   Lab Test 11/27/18  0927   LDL 59             Passed - Microalbumin on file in past 12 months     Recent Labs   Lab Test 08/30/18  0844   MICROL 127   UMALCR 170.01*             Passed - Serum creatinine on file in past 12 months     Recent Labs   Lab Test 06/06/19  1324  01/08/18  1351   CR 1.47*   < >  --    CREAT  --   --  1.2    < > = values in this interval not displayed.             Passed - HgbA1C in past 3 or 6 months     If HgbA1C is 8 or greater, it needs to be on file within the past 3 months.  If less than 8, must be on file within the past 6 months.     Recent Labs   Lab Test 06/06/19  1443   A1C 8.9*             Passed - Medication is active on med list        Passed - Patient is age 18 or older        Passed - Recent (6 mo) or future (30 days) visit within the authorizing provider's specialty     Patient had office visit in the last  "6 months or has a visit in the next 30 days with authorizing provider or within the authorizing provider's specialty.  See \"Patient Info\" tab in inbasket, or \"Choose Columns\" in Meds & Orders section of the refill encounter.              "

## 2019-08-14 ENCOUNTER — OFFICE VISIT (OUTPATIENT)
Dept: INTERNAL MEDICINE | Facility: CLINIC | Age: 79
End: 2019-08-14
Payer: COMMERCIAL

## 2019-08-14 VITALS
TEMPERATURE: 98.2 F | HEART RATE: 74 BPM | DIASTOLIC BLOOD PRESSURE: 56 MMHG | OXYGEN SATURATION: 94 % | RESPIRATION RATE: 22 BRPM | HEIGHT: 66 IN | SYSTOLIC BLOOD PRESSURE: 112 MMHG | WEIGHT: 162 LBS | BODY MASS INDEX: 26.03 KG/M2

## 2019-08-14 DIAGNOSIS — I10 HYPERTENSION GOAL BP (BLOOD PRESSURE) < 140/90: ICD-10-CM

## 2019-08-14 DIAGNOSIS — I77.74 VERTEBRAL ARTERY DISSECTION (H): ICD-10-CM

## 2019-08-14 DIAGNOSIS — N18.30 TYPE 2 DIABETES MELLITUS WITH STAGE 3 CHRONIC KIDNEY DISEASE, WITH LONG-TERM CURRENT USE OF INSULIN (H): Primary | ICD-10-CM

## 2019-08-14 DIAGNOSIS — R26.89 BALANCE PROBLEMS: ICD-10-CM

## 2019-08-14 DIAGNOSIS — E11.22 TYPE 2 DIABETES MELLITUS WITH STAGE 3 CHRONIC KIDNEY DISEASE, WITH LONG-TERM CURRENT USE OF INSULIN (H): Primary | ICD-10-CM

## 2019-08-14 DIAGNOSIS — R41.3 MEMORY LOSS: ICD-10-CM

## 2019-08-14 DIAGNOSIS — Z79.4 TYPE 2 DIABETES MELLITUS WITH STAGE 3 CHRONIC KIDNEY DISEASE, WITH LONG-TERM CURRENT USE OF INSULIN (H): Primary | ICD-10-CM

## 2019-08-14 DIAGNOSIS — R05.9 COUGH: ICD-10-CM

## 2019-08-14 DIAGNOSIS — E78.5 HYPERLIPIDEMIA LDL GOAL <100: ICD-10-CM

## 2019-08-14 DIAGNOSIS — R42 LIGHTHEADEDNESS: ICD-10-CM

## 2019-08-14 PROCEDURE — 99207 C PAF COMPLETED  NO CHARGE: CPT | Performed by: INTERNAL MEDICINE

## 2019-08-14 PROCEDURE — 99214 OFFICE O/P EST MOD 30 MIN: CPT | Performed by: INTERNAL MEDICINE

## 2019-08-14 ASSESSMENT — MIFFLIN-ST. JEOR: SCORE: 1378.61

## 2019-08-14 NOTE — PROGRESS NOTES
.Subjective     Pete Sheldon is a 79 year old male who presents to clinic today for the following health issues:    HPI   Hypertension Follow-up      Do you check your blood pressure regularly outside of the clinic? No     Are you following a low salt diet? Yes    Are your blood pressures ever more than 140 on the top number (systolic) OR more   than 90 on the bottom number (diastolic), for example 140/90? No    How many servings of fruits and vegetables do you eat daily?  2-3    On average, how many sweetened beverages do you drink each day (soda, juice, sweet tea, etc)?   0    How many days per week do you miss taking your medication? 0    BP Readings from Last 3 Encounters:   08/14/19 112/56   06/06/19 130/64   06/05/19 122/68     Recalled that patient discontinued lisinopril at LOV 6/5/2019 because of a dry tickle cough. He reports that the cough has improved. He is no longer on any antihypertensives.     Diabetes    Lab Results   Component Value Date    A1C 8.9 06/06/2019    A1C 9.4 03/06/2019    A1C 9.1 10/29/2018    A1C 8.6 08/30/2018    A1C 8.4 03/07/2018     Patient checks glucose levels 4x a day. He is taking 6 units of Novolog with breakfast, 4 units with lunch, and 5 units with dinner PM. He also takes 32 units of Lantus once daily and metformin 500 mg once daily. He is following with Dr. Damian.     Hyperlipidemia    Recent Labs   Lab Test 11/27/18  0927 03/01/18  1706  09/25/15  0844 06/19/15  0815   CHOL 110 123   < > 134 135   HDL 26* 35*   < > 31* 36*   LDL 59 70   < > 80 83   TRIG 125 91   < > 117 78   CHOLHDLRATIO  --   --   --  4.3 3.8    < > = values in this interval not displayed.     LDL managed with atorvastatin 20 mg daily.     Hypothyroidism    Lab Results   Component Value Date    TSH 5.85 03/06/2019     Currently on levothyroxine 25 mcg daily.     Weakness  Wife reports that patient continues to have frequent falls. He has also been feeling light headed and imbalanced at times.  "Wife states that he complains of having frequent muscle aches. She does not think patient is active enough.     Patient has a hx of a CVA 1/2018 (has lingering right hand and right arm weakness) and was last seen by Dr. Sanchez of neurology on 7/31/2018, who recommended Aricept for memory but patient never started this medication. Wife does not recall ever being told this recommendation. Wife express more balance concerns than memory concerns.     Past/recent records reviewed and discussed for:  -Reviewed that according to Dr. Sanchez's note, he suggested a sleep evaluation for possible sleep apnea due to daytime drowsiness and snoring, but patient declined.     Reviewed and updated as needed this visit by Provider         Review of Systems   No dyspnea or cough. No chest discomfort, dizziness or palpitations. No diarrhea, abdominal pain or rectal bleeding.   Some degree of chronic weakness in the right arm and leg since thalamic stroke.   No acute problems with vision or speech, acute change in lateralizing weakness or paresthesias.    ROS: as above or negative for Respiratory, CV, GI, endocrine, neuro systems.    This document serves as a record of the services and decisions personally performed and made by Rom Helm MD. It was created on his behalf by Doris Barajas, a trained medical scribe. The creation of this document is based on the provider's statements to the medical scribe.  Doris Barajas August 14, 2019 2:13 PM         Objective    /56   Pulse 74   Temp 98.2  F (36.8  C) (Oral)   Resp 22   Ht 1.67 m (5' 5.75\")   Wt 73.5 kg (162 lb)   SpO2 94%   BMI 26.35 kg/m    Body mass index is 26.35 kg/m .     Physical Exam   GENERAL: healthy, alert and no distress  RESP: lungs clear to auscultation - no rales, rhonchi or wheezes  CV: regular rate and rhythm, normal S1 S2, no S3 or S4, no murmur, click or rub, no peripheral edema and peripheral pulses strong  MS: no gross musculoskeletal defects " "noted, no edema  SKIN: no suspicious lesions or rashes  NEURO: Normal strength and tone, mentation intact and speech normal  PSYCH: mentation appears normal, affect normal/bright    Diagnostic Test Results:  Labs reviewed in Epic        Assessment & Plan     (E11.22,  N18.3,  Z79.4) Type 2 diabetes mellitus with stage 3 chronic kidney disease, with long-term current use of insulin (H)  (primary encounter diagnosis)  Comment: Recent A1c improved but is still above target range. Continue current measures and follow up with Dr. Damian as recommended.     (E78.5) Hyperlipidemia LDL goal <100  Comment: Last LDL was within target range. Continue current meds    (I10) Hypertension goal BP (blood pressure) < 140/90  Comment: BP stable and cough improved in spite of discontinuation of lisinopril.     (R05) Cough  Comment: Improved with discontinuation of lisinopril    (R42) Lightheadedness  Comment: Discussed he consider discontinuing Tamsulosin and monitoring if light headedness improves.     (R26.89) Balance problems  Comment: Recommended patient follow up with Dr. Sanchez again   Plan: NEUROLOGY ADULT REFERRAL          (I77.74) Vertebral artery dissection (H)  Comment: stable  Plan: NEUROLOGY ADULT REFERRAL          (R41.3) Memory loss  Comment: Recommended patient follow up with Dr. Sanchez if interested in discussing medication options for memory  Plan: NEUROLOGY ADULT REFERRAL             BMI:   Estimated body mass index is 26.35 kg/m  as calculated from the following:    Height as of this encounter: 1.67 m (5' 5.75\").    Weight as of this encounter: 73.5 kg (162 lb).     FUTURE APPOINTMENTS:       - Follow-up visit in 3 months    Patient Instructions   Tamsulosin (Flomax) may cause side effects including lightheadedness.   It is okay to stop this without tapering if you would like to do so, to see if lightheadedness improved.  If your lightheadedness was no different, or if urine flow slowed too badly, you could " resume taking it.     Blood pressure still looks fine in spite of stopping the lisinopril. Okay to remain off of lisinopril.     Keep seeing Dr Damian about the diabetes.     You had seen the Neurology/stroke specialist, Dr Sanchez, in July of 2018. He had suggested that you return shortly after that visit. In his office note he said he was going to have you try taking a memory medication.     See me back in about three months, with fasting labs a few days in advance.     The information in this document, created by the medical scribe for me, accurately reflects the services I personally performed and the decisions made by me. I have reviewed and approved this document for accuracy prior to leaving the patient care area.  August 14, 2019 2:35 PM    Rom Helm MD,   [facility]

## 2019-08-14 NOTE — PATIENT INSTRUCTIONS
Tamsulosin (Flomax) may cause side effects including lightheadedness.   It is okay to stop this without tapering if you would like to do so, to see if lightheadedness improved.  If your lightheadedness was no different, or if urine flow slowed too badly, you could resume taking it.     Blood pressure still looks fine in spite of stopping the lisinopril. Okay to remain off of lisinopril.     Keep seeing Dr Damian about the diabetes.     You had seen the Neurology/stroke specialist, Dr Sanchez, in July of 2018. He had suggested that you return shortly after that visit. In his office note he said he was going to have you try taking a memory medication.     See me back in about three months, with fasting labs a few days in advance.

## 2019-08-14 NOTE — NURSING NOTE
"Chief Complaint   Patient presents with     RECHECK     BP and cough. pt states cough has not subsided completely since discontinuing the Lisinopril     initial /56   Pulse 74   Temp 98.2  F (36.8  C) (Oral)   Resp 22   Ht 1.67 m (5' 5.75\")   Wt 73.5 kg (162 lb)   SpO2 94%   BMI 26.35 kg/m   Estimated body mass index is 26.35 kg/m  as calculated from the following:    Height as of this encounter: 1.67 m (5' 5.75\").    Weight as of this encounter: 73.5 kg (162 lb)..  bp completed using cuff size regular  ALYSHA HARRISON LPN  "

## 2019-08-14 NOTE — LETTER
[place] [address]  [facility] [address]  [phone]        February 26, 2020    Pete Sheldon  [address] [facility] [facility] [address]              Dear Pete Sheldon    This is to remind you that your FASTING LABS are due.    You may call our office at [phone] to schedule an appointment.    Please disregard this notice if you have already had your labs drawn or made an appointment.        Sincerely,        Rom Helm MD

## 2019-08-20 ENCOUNTER — TRANSFERRED RECORDS (OUTPATIENT)
Dept: HEALTH INFORMATION MANAGEMENT | Facility: CLINIC | Age: 79
End: 2019-08-20

## 2019-09-04 ENCOUNTER — OFFICE VISIT (OUTPATIENT)
Dept: ENDOCRINOLOGY | Facility: CLINIC | Age: 79
End: 2019-09-04
Payer: COMMERCIAL

## 2019-09-04 VITALS
DIASTOLIC BLOOD PRESSURE: 70 MMHG | SYSTOLIC BLOOD PRESSURE: 110 MMHG | TEMPERATURE: 97.7 F | BODY MASS INDEX: 26.42 KG/M2 | OXYGEN SATURATION: 95 % | HEIGHT: 66 IN | WEIGHT: 164.4 LBS | HEART RATE: 66 BPM | RESPIRATION RATE: 20 BRPM

## 2019-09-04 DIAGNOSIS — E11.42 TYPE 2 DIABETES MELLITUS WITH DIABETIC POLYNEUROPATHY, WITH LONG-TERM CURRENT USE OF INSULIN (H): ICD-10-CM

## 2019-09-04 DIAGNOSIS — Z79.4 TYPE 2 DIABETES MELLITUS WITH DIABETIC POLYNEUROPATHY, WITH LONG-TERM CURRENT USE OF INSULIN (H): ICD-10-CM

## 2019-09-04 DIAGNOSIS — E11.42 TYPE 2 DIABETES MELLITUS WITH DIABETIC POLYNEUROPATHY, WITH LONG-TERM CURRENT USE OF INSULIN (H): Primary | ICD-10-CM

## 2019-09-04 DIAGNOSIS — E03.9 HYPOTHYROIDISM, UNSPECIFIED TYPE: ICD-10-CM

## 2019-09-04 DIAGNOSIS — Z79.4 TYPE 2 DIABETES MELLITUS WITH DIABETIC POLYNEUROPATHY, WITH LONG-TERM CURRENT USE OF INSULIN (H): Primary | ICD-10-CM

## 2019-09-04 DIAGNOSIS — E13.10 DIABETIC KETOACIDOSIS WITHOUT COMA ASSOCIATED WITH OTHER SPECIFIED DIABETES MELLITUS (H): ICD-10-CM

## 2019-09-04 LAB — HBA1C MFR BLD: 9.2 % (ref 0–5.6)

## 2019-09-04 PROCEDURE — 36415 COLL VENOUS BLD VENIPUNCTURE: CPT | Performed by: INTERNAL MEDICINE

## 2019-09-04 PROCEDURE — 99214 OFFICE O/P EST MOD 30 MIN: CPT | Performed by: INTERNAL MEDICINE

## 2019-09-04 PROCEDURE — 84443 ASSAY THYROID STIM HORMONE: CPT | Performed by: INTERNAL MEDICINE

## 2019-09-04 PROCEDURE — 84439 ASSAY OF FREE THYROXINE: CPT | Performed by: INTERNAL MEDICINE

## 2019-09-04 PROCEDURE — 83036 HEMOGLOBIN GLYCOSYLATED A1C: CPT | Performed by: INTERNAL MEDICINE

## 2019-09-04 ASSESSMENT — MIFFLIN-ST. JEOR: SCORE: 1389.49

## 2019-09-04 NOTE — LETTER
9/4/2019         RE: Pete Sheldon  [address] [place] [address]        Dear Colleague,    Thank you for referring your patient, Pete Sheldon, to the [facility]. Please see a copy of my visit note below.    ENDOCRINOLOGY CLINIC NOTE:  Name: Pete Sheldon  Seen for f/u of Diabetes.  He is accompanied by his wife.  HPI:  Pete Sheldon is a 79  year old male who presents for the evaluation/management of:  Of note he is having bedtime snack almost every days.  Typical bedtime snack is whole bagel/ PB sandwich.  Has variable blood sugar pattern. This has been noted consistently in past and with multiple dose adjustment.  I doubt if he has clear understanding of insulin dosing and the carbohydrates.  Also trouble with memory.  I discussed continuous glucose monitor with him in past but he does not want sensors.  Low C-peptide and neg FAVIOLA Ab.    1. Type 2 DM:  Orginally diagnosed in 1995.  Current Regimen: Metformin 500 mg/day, Lantus 31 Units/day in AM, Humalog 6/5/6 Units with breakfast/lunch/dinner respectively. In addition to that he is on correctional scale 1 unit- 25 >140 but he is not using it.  yes:     Diabetes Medication(s)     Biguanides       metFORMIN (GLUCOPHAGE) 500 MG tablet    Take 1 tablet (500 mg) by mouth daily (with breakfast)    Insulin       insulin aspart (NOVOLOG FLEXPEN) 100 UNIT/ML pen    INJECT 6 UNITS WITH BREAKFAST, 4 UNITS WITH LUNCH, 5 UNITS WITH DINNER, HOLD IF NOT EATING MEAL     insulin glargine (LANTUS SOLOSTAR PEN) 100 UNIT/ML pen    Inject 32 Units Subcutaneous daily          During hospital stay 1/12-1/27 (hospitalized at  of  due to left thalamic ischemic stroke) metformin dosage was reduced from 1,000 mg daily to 500 mg daily in the setting of CKD.    Does not feel comfortable with carbohydrate counting.    BS checks: Three times a day    Average Meter Download: 168 with SD 72  Variable BG.  Lowest was 49 and highest was  315.    Episodes of hypoglycemic episodes have improved.    Exercise: Minimal  Episodes of hypoglycemia (low blood sugar):  Yes. As noted above.  Fixed meal pattern: NO. Carb content varies. In general diet is high in carbs.  Patient counting carbs: No    DM Complications:   Nephropathy: Yes: Microalbuminuria present  Retinopathy: Yes: History of laser treatment in past  Neuropathy: Yes.  Microalbuminuria: Yes: Microalbuminuria present.  Not on ACE secondary to history of hyperkalemia.  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    2. Hypertension: Blood Pressure today:   BP Readings from Last 3 Encounters:   19 110/70   19 112/56   19 130/64     Blood pressure medications include hydrochlorothiazide 12.5 mg, amlodipine 5 mg.      3. Hyperlipidemia: Takes lovastatin 40 mg for lipid control.  Denies muscle aches or pains.        PMH/PSH:  Past Medical History:   Diagnosis Date     Calculus of ureter      Hypertension      Hypothyroidism      Microalbuminuria 2/15/2016     Mumps      Other and unspecified hyperlipidemia      Type 2 diabetes, HbA1C goal < 8% (H) 1995     Past Surgical History:   Procedure Laterality Date     C NONSPECIFIC PROCEDURE      Nasal septoplasty     HC COLONOSCOPY THRU STOMA, DIAGNOSTIC  2007    Normal     HC FLEX SIGMOIDOSCOPY W/WO MIGUEL SPEC BY BRUSH/WASH  1999    Normal to 60 cm     HC REPAIR INCISIONAL HERNIA,REDUCIBLE  1999    Hernia Repair, Incisional, Unilateral (right)     HC REPAIR INCISIONAL HERNIA,REDUCIBLE      Hernia Repair, Incisional, Unilateral (left, with mesh placement)     TESTICLE SURGERY       VASECTOMY       Family Hx:  Family History   Problem Relation Age of Onset     Cerebrovascular Disease Mother          age 95     Heart Disease Mother         age 89,  age  95     Cerebrovascular Disease Father          age 91, stroke two years previous     Cancer - colorectal Brother         Half-brother     Cancer Sister         Half-sister (?type)     Cancer Sister         Half-sister (?type)     Heart Disease Brother      Neurologic Disorder Daughter         Multiple Sclerosis     Psychotic Disorder Son         Schizophrenia       Social Hx:  Social History     Socioeconomic History     Marital status:      Spouse name: Not on file     Number of children: 2     Years of education: Not on file     Highest education level: Not on file   Occupational History     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[employer]     Employer: RETIRED   Social Needs     Financial resource strain: Not on file     Food insecurity:     Worry: Not on file     Inability: Not on file     Transportation needs:     Medical: Not on file     Non-medical: Not on file   Tobacco Use     Smoking status: Former Smoker     Last attempt to quit: 3/11/1953     Years since quittin.5     Smokeless tobacco: Never Used   Substance and Sexual Activity     Alcohol use: No     Drug use: No     Sexual activity: Yes     Partners: Female   Lifestyle     Physical activity:     Days per week: Not on file     Minutes per session: Not on file     Stress: Not on file   Relationships     Social connections:     Talks on phone: Not on file     Gets together: Not on file     Attends Buddhism service: Not on file     Active member of club or organization: Not on file     Attends meetings of clubs or organizations: Not on file     Relationship status: Not on file     Intimate partner violence:     Fear of current or ex partner: Not on file     Emotionally abused: Not on file     Physically abused: Not on file     Forced sexual activity: Not on file   Other Topics Concern     Parent/sibling w/ CABG, MI or angioplasty before 65F 55M? Not Asked   Social History Narrative    Retired  for [employer].  "   Two children, son in [place], daughter in [place] ([facility]).    Three grandchildren, one great-granddaughter.              MEDICATIONS:  has a current medication list which includes the following prescription(s): ace/arb/arni not prescribed, aspirin, atorvastatin, b-d u/f, blood glucose, blood glucose monitoring, finasteride, glucosamine hcl, insulin aspart, insulin glargine, insulin pen needle, insulin syringes (disposable), levothyroxine, blood glucose, metformin, multivitamin, glucose management, order for dme, and tamsulosin.    ROS     ROS: 10 point ROS neg other than the symptoms noted above in the HPI.    Physical Exam   VS: /70 (BP Location: Left arm, Patient Position: Chair, Cuff Size: Adult Regular)   Pulse 66   Temp 97.7  F (36.5  C) (Oral)   Resp 20   Ht 1.67 m (5' 5.75\")   Wt 74.6 kg (164 lb 6.4 oz)   SpO2 95%   BMI 26.74 kg/m     GENERAL: AXOX3, NAD, well dressed, answering questions appropriately, appears stated age.  NEUROLOGY: CN grossly intact, no tremors  MSK: grossly intact  Psych: normal mood      LABS:  Component      Latest Ref Rng 4/15/2016   C-Peptide      0.9 - 6.9 ng/mL <0.1 (L)   Glutamic Acid Decarboxylase Antibody       <5.0 . . .     A1c:  Lab Results   Component Value Date    A1C 9.2 09/04/2019    A1C 8.9 06/06/2019    A1C 9.4 03/06/2019    A1C 9.1 10/29/2018    A1C 8.6 08/30/2018       Creatinine:  Creatinine   Date Value Ref Range Status   06/06/2019 1.47 (H) 0.66 - 1.25 mg/dL Final     Urine Micro:  Lab Results   Component Value Date    UMALCR 170.01 08/30/2018        LFTs/Lipids:  Recent Labs   Lab Test 11/27/18  0927 03/01/18  1706  09/25/15  0844 06/19/15  0815   CHOL 110 123   < > 134 135   HDL 26* 35*   < > 31* 36*   LDL 59 70   < > 80 83   TRIG 125 91   < > 117 78   CHOLHDLRATIO  --   --   --  4.3 3.8    < > = values in this interval not displayed.     TFTs:  TSH   Date Value Ref Range Status   03/06/2019 5.85 (H) 0.40 - 4.00 mU/L Final     All " pertinent notes, labs, and images personally reviewed by me.     A/P  Mr.Victor ANURAG Sheldon is a 79 year old here for the evaluation/management of diabetes:    1. DM2 - (low C-peptide, negative FAVIOLA): Under Poor control.  A1c 9.2%.  Diabetes complicated by microalbuminuria, neuropathy and retinopathy.   Concerns for hypoglycemia unawareness. Variable BG. Brittle DM.  Multiple dose changes made in past but still not able to get stable blood sugar numbers.  Blood sugars are variable throughout the day.  Does not feel comfortable with carbohydrate counting.  Variable carb intake.  Does not want Dexcom. This has been discussed multiple times with pt in past.   H/o brittle DM. Lot of variability in BG.  In general hypoglycemic episodes have improved.  Metformin decreased to 500 mg in the setting of CKD.  Plan  In general diet is high in carbohydrates.  Continue Metformin 500 mg/day  Decrease Lantus to 30 units (from 30 units/day) in the setting of low BG.  Continue NovoLog at current dose:  take 6/4/5 Units with breakfast/lunch/dinner repectively.     Need to have consistent bedtime snack and consistent carbs with each meal.  Labs and follow-up in 3 months.      2. Hypertension - Under Fair control.  On hydrochlorothiazide 12.5 mg, amlodipine 5 mg.  Managed by PCP.    3. Hyperlipidemia - Under Good control.  On lovastatin 40 mg. LDL 91, HDL 34.  Managed by PCP.      5. Microalbuminuria:  Lab Results   Component Value Date    UMALCR 170.01 08/30/2018    Not on ACE 2/2 to h/o hyperkalemia    5. Hypothyroidism:  On levothyroxine 25 mcg/day  Started 9/2018 by Dr. Silva is  Clinically looks euthyroid  Labs pending  Dose change based on labs    4. Prevention  ASA-no. 2/2 to h/o nosebleeds  Smoking- No    Most Recent Immunizations   Administered Date(s) Administered     Influenza (H1N1) 12/28/2009     Influenza (High Dose) 3 valent vaccine 09/06/2018     Influenza (IIV3) PF 08/29/2012     Pneumococcal 23 valent 03/16/2012      TD (ADULT, 7+) 10/22/2003     TDAP Vaccine (Adacel) 01/31/2014     Zoster vaccine, live 10/15/2011       Recommend checking blood sugars before meals and at bedtime.    If Blood glucose are low more often-> 2-3 times/week- give us a call.  The patient is advised to Make better food choices: reduce carbs, Reduce portion size, weight loss and exercise 3-4 times a week.  Discussed hypoglycemia signs and symptoms as well as management in detail.    All questions were answered.  The patient indicates understanding of the above issues and agrees with the plan set forth.     More than 50% of face to face time spent with Mr. Sheldon on counseling / coordinating his care.      Follow-up:  Follow up 3 months    Lori Damian M.D  Endocrinology  [facility]    Disclaimer: This note consists of symbols derived from keyboarding, dictation and/or voice recognition software. As a result, there may be errors in the script that have gone undetected. Please consider this when interpreting information found in this chart.        Again, thank you for allowing me to participate in the care of your patient.        Sincerely,        Lori Damian MD

## 2019-09-04 NOTE — PATIENT INSTRUCTIONS
[facility] & [place] locations   Dr Damian, Endocrinology Department      [facility]   [address]  [place] [address]  Appointment [phone]  Fax: [phone]  [place]: Monday and Tuesday         [facility]   [address]  [place] [address]  Appointment [phone]  Fax: [phone]  [place]: Wednesday and Thursday            Please check the cost coverage and copay with insurance before recommended tests, services and medications (especially if new medications are prescribed).     If ordered, please get blood work done 1 week prior to your next appointment so they will be available to Dr. Damian at your visit.    To provide the best diabetic care, please bring your blood glucose meter to each and every visit with your Endocrinologist.  Your blood glucose meter/insulin pump will be downloaded at every appointment.    Please arrive 15 minutes before your scheduled appointment.  This will allow for your blood glucose meter/insulin pump to be downloaded.  If you are wearing DEXCOM please bring  or sharing code so that it can be downloaded.  If you are using freestyle po personal sensors please bring the reader.  If you are using TANDEM insulin pump please have your username and password to get info from [employer] website.    DECREASE LANTUS to 30 Units.  NOVOLOG- continue at current dose: 6/4/5 Units with breakfast/lunch/dinner repectively.  Labs in 3 months.  Please make a lab appointment for blood work and follow up clinic appointment in 1 week after that to discuss results.    Recommend checking blood sugars before meals and at bedtime.    If Blood glucose are low more often-> 2-3 times/week- give us a call.  The patient is advised to Make better food choices: reduce carbs, Reduce portion size, weight loss and exercise 3-4 times a week.  Discussed hypoglycemia signs and  symptoms as well as management in detail.

## 2019-09-04 NOTE — PROGRESS NOTES
ENDOCRINOLOGY CLINIC NOTE:  Name: Pete Sheldon  Seen for f/u of Diabetes.  He is accompanied by his wife.  HPI:  Pete Sheldon is a 79  year old male who presents for the evaluation/management of:  Of note he is having bedtime snack almost every days.  Typical bedtime snack is whole bagel/ PB sandwich.  Has variable blood sugar pattern. This has been noted consistently in past and with multiple dose adjustment.  I doubt if he has clear understanding of insulin dosing and the carbohydrates.  Also trouble with memory.  I discussed continuous glucose monitor with him in past but he does not want sensors.  Low C-peptide and neg FAVIOLA Ab.    1. Type 2 DM:  Orginally diagnosed in 1995.  Current Regimen: Metformin 500 mg/day, Lantus 31 Units/day in AM, Humalog 6/5/6 Units with breakfast/lunch/dinner respectively. In addition to that he is on correctional scale 1 unit- 25 >140 but he is not using it.  yes:     Diabetes Medication(s)     Biguanides       metFORMIN (GLUCOPHAGE) 500 MG tablet    Take 1 tablet (500 mg) by mouth daily (with breakfast)    Insulin       insulin aspart (NOVOLOG FLEXPEN) 100 UNIT/ML pen    INJECT 6 UNITS WITH BREAKFAST, 4 UNITS WITH LUNCH, 5 UNITS WITH DINNER, HOLD IF NOT EATING MEAL     insulin glargine (LANTUS SOLOSTAR PEN) 100 UNIT/ML pen    Inject 32 Units Subcutaneous daily          During hospital stay 1/12-1/27 (hospitalized at [facility] due to left thalamic ischemic stroke) metformin dosage was reduced from 1,000 mg daily to 500 mg daily in the setting of CKD.    Does not feel comfortable with carbohydrate counting.    BS checks: Three times a day    Average Meter Download: 168 with SD 72  Variable BG.  Lowest was 49 and highest was 315.    Episodes of hypoglycemic episodes have improved.    Exercise: Minimal  Episodes of hypoglycemia (low blood sugar):  Yes. As noted above.  Fixed meal pattern: NO. Carb content varies. In general diet is high in carbs.  Patient counting carbs: No    DM  Complications:   Nephropathy: Yes: Microalbuminuria present  Retinopathy: Yes: History of laser treatment in past  Neuropathy: Yes.  Microalbuminuria: Yes: Microalbuminuria present.  Not on ACE secondary to history of hyperkalemia.  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    2. Hypertension: Blood Pressure today:   BP Readings from Last 3 Encounters:   19 110/70   19 112/56   19 130/64     Blood pressure medications include hydrochlorothiazide 12.5 mg, amlodipine 5 mg.      3. Hyperlipidemia: Takes lovastatin 40 mg for lipid control.  Denies muscle aches or pains.        PMH/PSH:  Past Medical History:   Diagnosis Date     Calculus of ureter      Hypertension      Hypothyroidism      Microalbuminuria 2/15/2016     Mumps      Other and unspecified hyperlipidemia      Type 2 diabetes, HbA1C goal < 8% (H) 1995     Past Surgical History:   Procedure Laterality Date     C NONSPECIFIC PROCEDURE      Nasal septoplasty     HC COLONOSCOPY THRU STOMA, DIAGNOSTIC  2007    Normal     HC FLEX SIGMOIDOSCOPY W/WO MIGUEL SPEC BY BRUSH/WASH  1999    Normal to 60 cm     HC REPAIR INCISIONAL HERNIA,REDUCIBLE  1999    Hernia Repair, Incisional, Unilateral (right)     HC REPAIR INCISIONAL HERNIA,REDUCIBLE      Hernia Repair, Incisional, Unilateral (left, with mesh placement)     TESTICLE SURGERY       VASECTOMY       Family Hx:  Family History   Problem Relation Age of Onset     Cerebrovascular Disease Mother          age 95     Heart Disease Mother         age 89,  age 95     Cerebrovascular Disease Father          age 91, stroke two years previous     Cancer - colorectal Brother         Half-brother     Cancer Sister         Half-sister (?type)     Cancer Sister         Half-sister (?type)     Heart Disease Brother       Neurologic Disorder Daughter         Multiple Sclerosis     Psychotic Disorder Son         Schizophrenia       Social Hx:  Social History     Socioeconomic History     Marital status:      Spouse name: Not on file     Number of children: 2     Years of education: Not on file     Highest education level: Not on file   Occupational History     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[employer]     Employer: RETIRED   Social Needs     Financial resource strain: Not on file     Food insecurity:     Worry: Not on file     Inability: Not on file     Transportation needs:     Medical: Not on file     Non-medical: Not on file   Tobacco Use     Smoking status: Former Smoker     Last attempt to quit: 3/11/1953     Years since quittin.5     Smokeless tobacco: Never Used   Substance and Sexual Activity     Alcohol use: No     Drug use: No     Sexual activity: Yes     Partners: Female   Lifestyle     Physical activity:     Days per week: Not on file     Minutes per session: Not on file     Stress: Not on file   Relationships     Social connections:     Talks on phone: Not on file     Gets together: Not on file     Attends Nondenominational service: Not on file     Active member of club or organization: Not on file     Attends meetings of clubs or organizations: Not on file     Relationship status: Not on file     Intimate partner violence:     Fear of current or ex partner: Not on file     Emotionally abused: Not on file     Physically abused: Not on file     Forced sexual activity: Not on file   Other Topics Concern     Parent/sibling w/ CABG, MI or angioplasty before 65F 55M? Not Asked   Social History Narrative    Retired  for [employer].    Two children, son in [place], daughter in [place] ([facility]).    Three grandchildren, one great-granddaughter.              MEDICATIONS:  has a current medication list which includes the following prescription(s): ace/arb/arni not prescribed,  "aspirin, atorvastatin, b-d u/f, blood glucose, blood glucose monitoring, finasteride, glucosamine hcl, insulin aspart, insulin glargine, insulin pen needle, insulin syringes (disposable), levothyroxine, blood glucose, metformin, multivitamin, glucose management, order for dme, and tamsulosin.    ROS     ROS: 10 point ROS neg other than the symptoms noted above in the HPI.    Physical Exam   VS: /70 (BP Location: Left arm, Patient Position: Chair, Cuff Size: Adult Regular)   Pulse 66   Temp 97.7  F (36.5  C) (Oral)   Resp 20   Ht 1.67 m (5' 5.75\")   Wt 74.6 kg (164 lb 6.4 oz)   SpO2 95%   BMI 26.74 kg/m    GENERAL: AXOX3, NAD, well dressed, answering questions appropriately, appears stated age.  NEUROLOGY: CN grossly intact, no tremors  MSK: grossly intact  Psych: normal mood      LABS:  Component      Latest Ref Rng 4/15/2016   C-Peptide      0.9 - 6.9 ng/mL <0.1 (L)   Glutamic Acid Decarboxylase Antibody       <5.0 . . .     A1c:  Lab Results   Component Value Date    A1C 9.2 09/04/2019    A1C 8.9 06/06/2019    A1C 9.4 03/06/2019    A1C 9.1 10/29/2018    A1C 8.6 08/30/2018       Creatinine:  Creatinine   Date Value Ref Range Status   06/06/2019 1.47 (H) 0.66 - 1.25 mg/dL Final     Urine Micro:  Lab Results   Component Value Date    UMALCR 170.01 08/30/2018        LFTs/Lipids:  Recent Labs   Lab Test 11/27/18  0927 03/01/18  1706  09/25/15  0844 06/19/15  0815   CHOL 110 123   < > 134 135   HDL 26* 35*   < > 31* 36*   LDL 59 70   < > 80 83   TRIG 125 91   < > 117 78   CHOLHDLRATIO  --   --   --  4.3 3.8    < > = values in this interval not displayed.     TFTs:  TSH   Date Value Ref Range Status   03/06/2019 5.85 (H) 0.40 - 4.00 mU/L Final     All pertinent notes, labs, and images personally reviewed by me.     A/P  Mr.Victor ANURAG Sheldon is a 79 year old here for the evaluation/management of diabetes:    1. DM2 - (low C-peptide, negative FAVIOLA): Under Poor control.  A1c 9.2%.  Diabetes complicated by " microalbuminuria, neuropathy and retinopathy.   Concerns for hypoglycemia unawareness. Variable BG. Brittle DM.  Multiple dose changes made in past but still not able to get stable blood sugar numbers.  Blood sugars are variable throughout the day.  Does not feel comfortable with carbohydrate counting.  Variable carb intake.  Does not want Dexcom. This has been discussed multiple times with pt in past.   H/o brittle DM. Lot of variability in BG.  In general hypoglycemic episodes have improved.  Metformin decreased to 500 mg in the setting of CKD.  Plan  In general diet is high in carbohydrates.  Continue Metformin 500 mg/day  Decrease Lantus to 30 units (from 30 units/day) in the setting of low BG.  Continue NovoLog at current dose:  take 6/4/5 Units with breakfast/lunch/dinner repectively.     Need to have consistent bedtime snack and consistent carbs with each meal.  Labs and follow-up in 3 months.      2. Hypertension - Under Fair control.  On hydrochlorothiazide 12.5 mg, amlodipine 5 mg.  Managed by PCP.    3. Hyperlipidemia - Under Good control.  On lovastatin 40 mg. LDL 91, HDL 34.  Managed by PCP.      5. Microalbuminuria:  Lab Results   Component Value Date    UMALCR 170.01 08/30/2018    Not on ACE 2/2 to h/o hyperkalemia    5. Hypothyroidism:  On levothyroxine 25 mcg/day  Started 9/2018 by Dr. Silva is  Clinically looks euthyroid  Labs pending  Dose change based on labs    4. Prevention  ASA-no. 2/2 to h/o nosebleeds  Smoking- No    Most Recent Immunizations   Administered Date(s) Administered     Influenza (H1N1) 12/28/2009     Influenza (High Dose) 3 valent vaccine 09/06/2018     Influenza (IIV3) PF 08/29/2012     Pneumococcal 23 valent 03/16/2012     TD (ADULT, 7+) 10/22/2003     TDAP Vaccine (Adacel) 01/31/2014     Zoster vaccine, live 10/15/2011       Recommend checking blood sugars before meals and at bedtime.    If Blood glucose are low more often-> 2-3 times/week- give us a call.  The patient is  advised to Make better food choices: reduce carbs, Reduce portion size, weight loss and exercise 3-4 times a week.  Discussed hypoglycemia signs and symptoms as well as management in detail.    All questions were answered.  The patient indicates understanding of the above issues and agrees with the plan set forth.     More than 50% of face to face time spent with Mr. Sheldon on counseling / coordinating his care.      Follow-up:  Follow up 3 months    Lori Damian M.D  Endocrinology  [facility]/[place]    Disclaimer: This note consists of symbols derived from keyboarding, dictation and/or voice recognition software. As a result, there may be errors in the script that have gone undetected. Please consider this when interpreting information found in this chart.

## 2019-09-05 LAB
T4 FREE SERPL-MCNC: 1.09 NG/DL (ref 0.76–1.46)
TSH SERPL DL<=0.005 MIU/L-ACNC: 6.28 MU/L (ref 0.4–4)

## 2019-09-10 DIAGNOSIS — E11.42 TYPE 2 DIABETES MELLITUS WITH DIABETIC POLYNEUROPATHY, WITH LONG-TERM CURRENT USE OF INSULIN (H): ICD-10-CM

## 2019-09-10 DIAGNOSIS — Z79.4 TYPE 2 DIABETES MELLITUS WITH DIABETIC POLYNEUROPATHY, WITH LONG-TERM CURRENT USE OF INSULIN (H): ICD-10-CM

## 2019-09-11 NOTE — TELEPHONE ENCOUNTER
"Requested Prescriptions   Pending Prescriptions Disp Refills     blood glucose (ONETOUCH VERIO IQ) test strip [Pharmacy Med Name: ONETOUCH VERIO FADI] 400 strip 0     Sig: USE 1 STRIP TO CHECK GLUCOSE 4 TIMES DAILY OR  AS  DIRECTED  USING  ONE  TOUCH  VERIO  FLEX   Last Written Prescription Date:  06/14/2019  Last Fill Quantity: 400,  # refills: 0   Last office visit: 8/14/2019 with prescribing provider:     Future Office Visit:   Next 5 appointments (look out 90 days)    Dec 05, 2019  2:30 PM CST  Return Visit with Lori Damian MD  [facility] ([facility] [address] [facility] [address]  [facility] [address]  [phone]           Diabetic Supplies Protocol Passed - 9/10/2019  2:58 PM        Passed - Medication is active on med list        Passed - Patient is 18 years of age or older        Passed - Recent (6 mo) or future (30 days) visit within the authorizing provider's specialty     Patient had office visit in the last 6 months or has a visit in the next 30 days with authorizing provider.  See \"Patient Info\" tab in inbasket, or \"Choose Columns\" in Meds & Orders section of the refill encounter.            "

## 2019-10-09 ENCOUNTER — HOSPITAL ENCOUNTER (INPATIENT)
Facility: CLINIC | Age: 79
LOS: 4 days | Discharge: HOME | DRG: 694 | End: 2019-10-13
Attending: EMERGENCY MEDICINE | Admitting: INTERNAL MEDICINE
Payer: COMMERCIAL

## 2019-10-09 ENCOUNTER — APPOINTMENT (OUTPATIENT)
Dept: CT IMAGING | Facility: CLINIC | Age: 79
DRG: 694 | End: 2019-10-09
Attending: EMERGENCY MEDICINE
Payer: COMMERCIAL

## 2019-10-09 ENCOUNTER — NURSE TRIAGE (OUTPATIENT)
Dept: INTERNAL MEDICINE | Facility: CLINIC | Age: 79
End: 2019-10-09

## 2019-10-09 DIAGNOSIS — R30.0 DYSURIA: ICD-10-CM

## 2019-10-09 DIAGNOSIS — Z79.4 TYPE 2 DIABETES MELLITUS WITH DIABETIC POLYNEUROPATHY, WITH LONG-TERM CURRENT USE OF INSULIN (H): ICD-10-CM

## 2019-10-09 DIAGNOSIS — Z79.4 TYPE 2 DIABETES MELLITUS WITH HYPOGLYCEMIA WITHOUT COMA, WITH LONG-TERM CURRENT USE OF INSULIN (H): ICD-10-CM

## 2019-10-09 DIAGNOSIS — E13.10 DIABETIC KETOACIDOSIS WITHOUT COMA ASSOCIATED WITH OTHER SPECIFIED DIABETES MELLITUS (H): ICD-10-CM

## 2019-10-09 DIAGNOSIS — I10 HYPERTENSION GOAL BP (BLOOD PRESSURE) < 140/90: Primary | ICD-10-CM

## 2019-10-09 DIAGNOSIS — E11.649 TYPE 2 DIABETES MELLITUS WITH HYPOGLYCEMIA WITHOUT COMA, WITH LONG-TERM CURRENT USE OF INSULIN (H): ICD-10-CM

## 2019-10-09 DIAGNOSIS — E11.42 TYPE 2 DIABETES MELLITUS WITH DIABETIC POLYNEUROPATHY, WITH LONG-TERM CURRENT USE OF INSULIN (H): ICD-10-CM

## 2019-10-09 DIAGNOSIS — R10.13 ABDOMINAL PAIN, EPIGASTRIC: ICD-10-CM

## 2019-10-09 PROBLEM — E16.2 HYPOGLYCEMIA: Status: ACTIVE | Noted: 2019-10-09

## 2019-10-09 LAB
ALBUMIN SERPL-MCNC: 2.9 G/DL (ref 3.4–5)
ALBUMIN UR-MCNC: 100 MG/DL
ALP SERPL-CCNC: 90 U/L (ref 40–150)
ALT SERPL W P-5'-P-CCNC: 23 U/L (ref 0–70)
ANION GAP SERPL CALCULATED.3IONS-SCNC: 5 MMOL/L (ref 3–14)
APPEARANCE UR: CLEAR
AST SERPL W P-5'-P-CCNC: 13 U/L (ref 0–45)
BASOPHILS # BLD AUTO: 0.1 10E9/L (ref 0–0.2)
BASOPHILS NFR BLD AUTO: 0.7 %
BILIRUB SERPL-MCNC: 0.3 MG/DL (ref 0.2–1.3)
BILIRUB UR QL STRIP: NEGATIVE
BUN SERPL-MCNC: 25 MG/DL (ref 7–30)
CALCIUM SERPL-MCNC: 8.9 MG/DL (ref 8.5–10.1)
CHLORIDE SERPL-SCNC: 104 MMOL/L (ref 94–109)
CO2 SERPL-SCNC: 29 MMOL/L (ref 20–32)
COLOR UR AUTO: ABNORMAL
CREAT SERPL-MCNC: 1.46 MG/DL (ref 0.66–1.25)
DIFFERENTIAL METHOD BLD: NORMAL
EOSINOPHIL # BLD AUTO: 0.6 10E9/L (ref 0–0.7)
EOSINOPHIL NFR BLD AUTO: 7.5 %
ERYTHROCYTE [DISTWIDTH] IN BLOOD BY AUTOMATED COUNT: 12.3 % (ref 10–15)
GFR SERPL CREATININE-BSD FRML MDRD: 44 ML/MIN/{1.73_M2}
GLUCOSE BLDC GLUCOMTR-MCNC: 101 MG/DL (ref 70–99)
GLUCOSE BLDC GLUCOMTR-MCNC: 240 MG/DL (ref 70–99)
GLUCOSE BLDC GLUCOMTR-MCNC: 47 MG/DL (ref 70–99)
GLUCOSE BLDC GLUCOMTR-MCNC: 55 MG/DL (ref 70–99)
GLUCOSE SERPL-MCNC: 63 MG/DL (ref 70–99)
GLUCOSE UR STRIP-MCNC: NEGATIVE MG/DL
HCT VFR BLD AUTO: 44.3 % (ref 40–53)
HGB BLD-MCNC: 14 G/DL (ref 13.3–17.7)
HGB UR QL STRIP: NEGATIVE
IMM GRANULOCYTES # BLD: 0 10E9/L (ref 0–0.4)
IMM GRANULOCYTES NFR BLD: 0.5 %
KETONES BLD-SCNC: 0.2 MMOL/L (ref 0–0.6)
KETONES UR STRIP-MCNC: NEGATIVE MG/DL
LACTATE BLD-SCNC: 1 MMOL/L (ref 0.7–2)
LEUKOCYTE ESTERASE UR QL STRIP: NEGATIVE
LIPASE SERPL-CCNC: 57 U/L (ref 73–393)
LYMPHOCYTES # BLD AUTO: 2.1 10E9/L (ref 0.8–5.3)
LYMPHOCYTES NFR BLD AUTO: 25.9 %
MCH RBC QN AUTO: 29.6 PG (ref 26.5–33)
MCHC RBC AUTO-ENTMCNC: 31.6 G/DL (ref 31.5–36.5)
MCV RBC AUTO: 94 FL (ref 78–100)
MONOCYTES # BLD AUTO: 0.9 10E9/L (ref 0–1.3)
MONOCYTES NFR BLD AUTO: 10.9 %
NEUTROPHILS # BLD AUTO: 4.5 10E9/L (ref 1.6–8.3)
NEUTROPHILS NFR BLD AUTO: 54.5 %
NITRATE UR QL: NEGATIVE
NRBC # BLD AUTO: 0 10*3/UL
NRBC BLD AUTO-RTO: 0 /100
PH UR STRIP: 5.5 PH (ref 5–7)
PLATELET # BLD AUTO: 242 10E9/L (ref 150–450)
POTASSIUM SERPL-SCNC: 4.3 MMOL/L (ref 3.4–5.3)
PROT SERPL-MCNC: 7.5 G/DL (ref 6.8–8.8)
RBC # BLD AUTO: 4.73 10E12/L (ref 4.4–5.9)
RBC #/AREA URNS AUTO: 1 /HPF (ref 0–2)
SODIUM SERPL-SCNC: 138 MMOL/L (ref 133–144)
SOURCE: ABNORMAL
SP GR UR STRIP: 1.02 (ref 1–1.03)
SQUAMOUS #/AREA URNS AUTO: <1 /HPF (ref 0–1)
UROBILINOGEN UR STRIP-MCNC: NORMAL MG/DL (ref 0–2)
WBC # BLD AUTO: 8.3 10E9/L (ref 4–11)
WBC #/AREA URNS AUTO: 1 /HPF (ref 0–5)

## 2019-10-09 PROCEDURE — 85025 COMPLETE CBC W/AUTO DIFF WBC: CPT | Performed by: EMERGENCY MEDICINE

## 2019-10-09 PROCEDURE — 96365 THER/PROPH/DIAG IV INF INIT: CPT

## 2019-10-09 PROCEDURE — 80053 COMPREHEN METABOLIC PANEL: CPT | Performed by: EMERGENCY MEDICINE

## 2019-10-09 PROCEDURE — 87086 URINE CULTURE/COLONY COUNT: CPT | Performed by: EMERGENCY MEDICINE

## 2019-10-09 PROCEDURE — 25800025 ZZH RX 258: Performed by: EMERGENCY MEDICINE

## 2019-10-09 PROCEDURE — 96376 TX/PRO/DX INJ SAME DRUG ADON: CPT

## 2019-10-09 PROCEDURE — 81001 URINALYSIS AUTO W/SCOPE: CPT | Performed by: EMERGENCY MEDICINE

## 2019-10-09 PROCEDURE — 99222 1ST HOSP IP/OBS MODERATE 55: CPT | Mod: AI | Performed by: INTERNAL MEDICINE

## 2019-10-09 PROCEDURE — 74176 CT ABD & PELVIS W/O CONTRAST: CPT

## 2019-10-09 PROCEDURE — 25800030 ZZH RX IP 258 OP 636: Performed by: EMERGENCY MEDICINE

## 2019-10-09 PROCEDURE — 82010 KETONE BODYS QUAN: CPT | Performed by: EMERGENCY MEDICINE

## 2019-10-09 PROCEDURE — 83605 ASSAY OF LACTIC ACID: CPT | Performed by: EMERGENCY MEDICINE

## 2019-10-09 PROCEDURE — 99285 EMERGENCY DEPT VISIT HI MDM: CPT | Mod: 25

## 2019-10-09 PROCEDURE — 83690 ASSAY OF LIPASE: CPT | Performed by: EMERGENCY MEDICINE

## 2019-10-09 PROCEDURE — 12000000 ZZH R&B MED SURG/OB

## 2019-10-09 PROCEDURE — 00000146 ZZHCL STATISTIC GLUCOSE BY METER IP

## 2019-10-09 RX ORDER — NALOXONE HYDROCHLORIDE 0.4 MG/ML
.1-.4 INJECTION, SOLUTION INTRAMUSCULAR; INTRAVENOUS; SUBCUTANEOUS
Status: DISCONTINUED | OUTPATIENT
Start: 2019-10-09 | End: 2019-10-13 | Stop reason: HOSPADM

## 2019-10-09 RX ORDER — ONDANSETRON 4 MG/1
4 TABLET, ORALLY DISINTEGRATING ORAL EVERY 6 HOURS PRN
Status: DISCONTINUED | OUTPATIENT
Start: 2019-10-09 | End: 2019-10-13 | Stop reason: HOSPADM

## 2019-10-09 RX ORDER — DEXTROSE MONOHYDRATE 25 G/50ML
25-50 INJECTION, SOLUTION INTRAVENOUS
Status: DISCONTINUED | OUTPATIENT
Start: 2019-10-09 | End: 2019-10-13 | Stop reason: HOSPADM

## 2019-10-09 RX ORDER — ACETAMINOPHEN 325 MG/1
650 TABLET ORAL EVERY 4 HOURS PRN
Status: DISCONTINUED | OUTPATIENT
Start: 2019-10-09 | End: 2019-10-13 | Stop reason: HOSPADM

## 2019-10-09 RX ORDER — HYDROMORPHONE HYDROCHLORIDE 1 MG/ML
0.2 INJECTION, SOLUTION INTRAMUSCULAR; INTRAVENOUS; SUBCUTANEOUS EVERY 4 HOURS PRN
Status: DISCONTINUED | OUTPATIENT
Start: 2019-10-09 | End: 2019-10-10

## 2019-10-09 RX ORDER — TAMSULOSIN HYDROCHLORIDE 0.4 MG/1
0.4 CAPSULE ORAL DAILY
Status: DISCONTINUED | OUTPATIENT
Start: 2019-10-10 | End: 2019-10-13 | Stop reason: HOSPADM

## 2019-10-09 RX ORDER — LIDOCAINE 40 MG/G
CREAM TOPICAL
Status: DISCONTINUED | OUTPATIENT
Start: 2019-10-09 | End: 2019-10-13 | Stop reason: HOSPADM

## 2019-10-09 RX ORDER — FINASTERIDE 5 MG/1
5 TABLET, FILM COATED ORAL DAILY
Status: DISCONTINUED | OUTPATIENT
Start: 2019-10-10 | End: 2019-10-13 | Stop reason: HOSPADM

## 2019-10-09 RX ORDER — SODIUM CHLORIDE 9 MG/ML
INJECTION, SOLUTION INTRAVENOUS CONTINUOUS
Status: DISCONTINUED | OUTPATIENT
Start: 2019-10-09 | End: 2019-10-09

## 2019-10-09 RX ORDER — DEXTROSE MONOHYDRATE 50 MG/ML
INJECTION, SOLUTION INTRAVENOUS CONTINUOUS
Status: DISCONTINUED | OUTPATIENT
Start: 2019-10-09 | End: 2019-10-10

## 2019-10-09 RX ORDER — AMOXICILLIN 250 MG
2 CAPSULE ORAL 2 TIMES DAILY PRN
Status: DISCONTINUED | OUTPATIENT
Start: 2019-10-09 | End: 2019-10-13 | Stop reason: HOSPADM

## 2019-10-09 RX ORDER — ATORVASTATIN CALCIUM 20 MG/1
20 TABLET, FILM COATED ORAL DAILY
Status: DISCONTINUED | OUTPATIENT
Start: 2019-10-10 | End: 2019-10-13 | Stop reason: HOSPADM

## 2019-10-09 RX ORDER — ONDANSETRON 2 MG/ML
4 INJECTION INTRAMUSCULAR; INTRAVENOUS EVERY 6 HOURS PRN
Status: DISCONTINUED | OUTPATIENT
Start: 2019-10-09 | End: 2019-10-13 | Stop reason: HOSPADM

## 2019-10-09 RX ORDER — OXYCODONE HYDROCHLORIDE 5 MG/1
5 TABLET ORAL EVERY 6 HOURS PRN
Status: DISCONTINUED | OUTPATIENT
Start: 2019-10-09 | End: 2019-10-13 | Stop reason: HOSPADM

## 2019-10-09 RX ORDER — NICOTINE POLACRILEX 4 MG
15-30 LOZENGE BUCCAL
Status: DISCONTINUED | OUTPATIENT
Start: 2019-10-09 | End: 2019-10-13 | Stop reason: HOSPADM

## 2019-10-09 RX ORDER — DEXTROSE MONOHYDRATE 25 G/50ML
25 INJECTION, SOLUTION INTRAVENOUS ONCE
Status: COMPLETED | OUTPATIENT
Start: 2019-10-09 | End: 2019-10-09

## 2019-10-09 RX ORDER — LEVOTHYROXINE SODIUM 25 UG/1
25 TABLET ORAL DAILY
Status: DISCONTINUED | OUTPATIENT
Start: 2019-10-10 | End: 2019-10-13 | Stop reason: HOSPADM

## 2019-10-09 RX ORDER — MULTIVIT WITH MINERALS/LUTEIN
1000 TABLET ORAL 2 TIMES DAILY
COMMUNITY

## 2019-10-09 RX ORDER — AMOXICILLIN 250 MG
1 CAPSULE ORAL 2 TIMES DAILY PRN
Status: DISCONTINUED | OUTPATIENT
Start: 2019-10-09 | End: 2019-10-13 | Stop reason: HOSPADM

## 2019-10-09 RX ADMIN — DEXTROSE AND SODIUM CHLORIDE: 5; 900 INJECTION, SOLUTION INTRAVENOUS at 20:56

## 2019-10-09 RX ADMIN — DEXTROSE MONOHYDRATE 25 ML: 500 INJECTION PARENTERAL at 19:04

## 2019-10-09 ASSESSMENT — ENCOUNTER SYMPTOMS
HEMATURIA: 0
DIFFICULTY URINATING: 1
FREQUENCY: 1
ABDOMINAL PAIN: 1
NAUSEA: 0
VOMITING: 0
BACK PAIN: 0
FEVER: 0

## 2019-10-09 ASSESSMENT — MIFFLIN-ST. JEOR: SCORE: 1406.62

## 2019-10-09 NOTE — ED PROVIDER NOTES
History     Chief Complaint:  Abdominal Pain    HPI   Pete Sheldon is a 79 year old male, with a history of kidney stones s/p herniorrhaphy x3, who presents with his wife to the ED for evaluation of abdominal pain. The patient's wife reports he has been having urinary urgency and frequency over the past week. He has not been making it to the bathroom on time. This has progressively worsened. The patient reports he has difficulty urinating. He is only voiding a drop or two. He developed intermittent sharp stabbing abdominal pain around his periumbilical region this morning. The pain lasts for seconds at a time. The patient denies any falls, injuries/trauma, cold symptoms, fever, nausea, vomiting, back pain, hematuria, or foul urine odor. He denies history of gallstones.     Allergies:  Losartan: dizziness     Medications:    Aspirin 81mg  Atorvastatin  Proscar  Glucosamine  Novolog  Lantus   Levothyroxine   Metformin  Ocuvite    Past Medical History:    Kidney stones   Hypothyroidism  Diabetic polyneuropathy    Erectile dysfunction   Cranial nerve palsy  DKA  Stroke   Vertebral artery dissection   Mumps   Type 2 DM  HLD  HTN    Past Surgical History:    Nasal septoplasty  Herniorrhaphy x3  Testicle surgery  Vasectomy     Family History:    Cerebrovascular disease  , heart disease: mother  Cerebrovascular disease  , stroke: father  Heart disease: brother  MS: daughter  Schizophrenia: son     Social History:  Smoking status: Former smoker, Quit   Alcohol use: No  Presents to ED with wife    Marital Status:   [2]     Review of Systems   Constitutional: Negative for fever.   Gastrointestinal: Positive for abdominal pain. Negative for nausea and vomiting.   Genitourinary: Positive for difficulty urinating, frequency and urgency. Negative for hematuria.   Musculoskeletal: Negative for back pain.   All other systems reviewed and are negative.    Physical Exam   First Vitals:  BP: 171/103  (!)  Heart Rate: 71  Temp: 97.7  F (36.5  C)  Resp: 18  SpO2: 96 %    Physical Exam  General: The patient is alert, in no respiratory distress.    HENT: Mucous membranes moist.    Cardiovascular: Regular rate and rhythm. Good pulses in all four extremities. Normal capillary refill and skin turgor.     Respiratory: Lungs are clear. No nasal flaring. No retractions. No wheezing, no crackles.    Gastrointestinal: Abdomen soft. No guarding, no rebound. There's no visible umbilical hernia. He has mild tenderness distal to the umbilicus, and no palpable fascial defect.     Musculoskeletal: No gross deformity.     Skin: No rashes or petechiae.     Neurologic: The patient is alert. GCS 15. No testable cranial nerve deficit. Follows commands with clear and appropriate speech. Gives appropriate answers. Good strength in all extremities. No gross neurologic deficit. Gross sensation intact. Pupils are round and reactive. No meningismus.     Lymphatic: No cervical adenopathy. No lower extremity swelling.    Psychiatric: The patient is non-tearful.    Emergency Department Course     Imaging:  Radiographic findings were communicated with the patient and family who voiced understanding of the findings.    CT Abdomen Pelvis w/o Contrast (Stone Protocol)  IMPRESSION/CONCLUSION:   1.  No acute abnormality in the abdomen or pelvis. No significant change since prior exam.  As read by Radiology.     Laboratory:  Laboratory findings were communicated with the patient and family who voiced understanding of the findings.    Glucose meter #1 (1749): 55(L)  Glucose meter #2 (1849): 47(LL)  Glucose meter #3 (1933): 101(H)    Lactic acid (1800): 1.0  Ketone beta-hydroxybutyrate: 0.2    Lipase: 57(L)    CBC: o/w WNL (WBC 8.3, HGB 14.0, )  CMP: Glucose 63(L), GFR estimate 44(L), Albumin 2.9(L), Creatinine 1.46(H), o/w WNL     UA: Protein albumin 100, o/w Negative     Urine culture: In process     Interventions:  1904: Dextrose 50% 25mLs  IV  Dextrose 5% Infusion IV    Emergency Department Course:  Past medical records, nursing notes, and vitals reviewed.  1709: I performed an exam of the patient and obtained history, as documented above.    1740: IV inserted and blood drawn.    1749: Glucose level obtained.    1834: I rechecked the patient. Explained findings to patient and wife.    The patient was sent for an abdomen pelvis CT while in the emergency department, findings above.    1842: Patient provided a urine sample.     1849: Glucose level repeated. D50 ordered.     1903: I rechecked the patient. Explained findings to patient and wife.    1918: I rechecked the patient. Explained findings to patient and wife.    1933: Glucose level repeated.     1941: I rechecked the patient. Explained plan to patient and wife.    2021: I spoke to Dr. Herrera of the hospitalist service who accepts the patient for admission.     Findings and plan explained to the Patient and significant other who consents to admission. Discussed the patient with Dr. Herrera, who will admit the patient to a bed for further monitoring, evaluation, and treatment.    Impression & Plan      Medical Decision Making:  The patient has a history of diabetes and is on Lantus as well as metformin.  I did consider DKA as a potential cause of his abdominal pain as well as hernia bowel obstruction UTI kidney stone and diverticulitis.  The fact the patient was having problems urinating would indicate either a prostate problem, UTI or kidney stone.  No definite kidney stone was noted on the CT but they did see an area of calcification near the bladder and some mild dilatation which potentially could be a stone.  The patient does not show signs of a UTI there is some signs of constipation.  The patient unfortunately did have a low blood sugar here which did not respond to oral orange juice but he and even with a infusion of D50 and only went up to 100.  This point I started and normal saline plus  D5 infusion and admitted patient hospital.  He does not appear septic his white counts normal his abdominal exam is benign he was admitted in good condition but will require close trending of his blood sugar, reassessment of his abdominal pain.    Diagnosis:    ICD-10-CM   1. Abdominal pain, epigastric R10.13   2. Type 2 diabetes mellitus with hypoglycemia without coma, with long-term current use of insulin (H) E11.649    Z79.4   3. Dysuria R30.0     Disposition: Patient admitted to a bed by Dr. Javier Ruiz  10/9/2019   [facility] EMERGENCY DEPARTMENT    Scribe Disclosure:  IKristen, am serving as a scribe at 5:09 PM on 10/9/2019 to document services personally performed by Claudio Rincon MD based on my observations and the provider's statements to me.        Claudio Rincon MD  10/09/19 2750

## 2019-10-09 NOTE — ED TRIAGE NOTES
Patient presents with abdominal pain, periumbilical, since this morning. Patient also has been having frequent urination, and unable to get to the bathroom in time. No N/V/D. ABCDS intact, alert and oriented x 4.

## 2019-10-09 NOTE — TELEPHONE ENCOUNTER
Patient's wife calls, patient is next to her. Patient complained of stabbing abdominal pain this morning. Pain is intermittent, lasts for a few seconds, then goes away. Patient unsure how often the pain is happening. Only other symptom is that he is having sudden urges to urinate and can't get to the bathroom in time.     Patient has diabetes, his wife reports that his readings fluctuate, but there have been several mornings he has had lower glucose readings - today fasting glucose was 103 and then 87 before lunch. Yesterday readings: 180 fasting, 251 before lunch, 246 at dinner and 217 at bedtime.    Recommended appointment today, no clinic appointments available so recommended Urgent Care. Patient's wife states ER is closer to them so that is where she will take him.     Additional Information    Negative: Passed out (i.e., fainted, collapsed and was not responding)    Negative: Shock suspected (e.g., cold/pale/clammy skin, too weak to stand, low BP, rapid pulse)    Negative: Sounds like a life-threatening emergency to the triager    Negative: Chest pain    Negative: Pain is mainly in upper abdomen (if needed ask: 'is it mainly above the belly button?')    Negative: SEVERE abdominal pain (e.g., excruciating)    Negative: Vomiting red blood or black (coffee ground) material    Negative: Bloody, black, or tarry bowel movements    Negative: Unable to urinate (or only a few drops) and bladder feels very full    Negative: Pain in scrotum persists > 1 hour    Negative: Constant abdominal pain lasting > 2 hours    Negative: Vomiting bile (green color)    Negative: Patient sounds very sick or weak to the triager    Negative: Vomiting and abdomen looks much more swollen than usual    Negative: White of the eyes have turned yellow (i.e., jaundice)    Negative: Blood in urine (red, pink, or tea-colored)    Negative: Fever > 103 F (39.4 C)    Negative: Fever > 101 F (38.3 C) and over 60 years of age    Negative: Fever > 100.0  "F (37.8 C) and has diabetes mellitus or a weak immune system (e.g., HIV positive, cancer chemotherapy, organ transplant, splenectomy, chronic steroids)    Negative: Fever > 100.0 F (37.8 C) and bedridden (e.g., nursing home patient, stroke, chronic illness, recovering from surgery)    Age > 60 years    Negative: MILD pain that comes and goes (cramps) lasts > 24 hours    Answer Assessment - Initial Assessment Questions  1. LOCATION: \"Where does it hurt?\"       Around umbilicus  2. RADIATION: \"Does the pain shoot anywhere else?\" (e.g., chest, back)      no  3. ONSET: \"When did the pain begin?\" (Minutes, hours or days ago)       This morning when he woke up  4. SUDDEN: \"Gradual or sudden onset?\"      sudden  5. PATTERN \"Does the pain come and go, or is it constant?\"     - If constant: \"Is it getting better, staying the same, or worsening?\"       (Note: Constant means the pain never goes away completely; most serious pain is constant and it progresses)      - If intermittent: \"How long does it last?\" \"Do you have pain now?\"      (Note: Intermittent means the pain goes away completely between bouts)      Intermittent, only lasts for a few seconds. Unsure how often it's happening  6. SEVERITY: \"How bad is the pain?\"  (e.g., Scale 1-10; mild, moderate, or severe)     - MILD (1-3): doesn't interfere with normal activities, abdomen soft and not tender to touch      - MODERATE (4-7): interferes with normal activities or awakens from sleep, tender to touch      - SEVERE (8-10): excruciating pain, doubled over, unable to do any normal activities        8/10  7. RECURRENT SYMPTOM: \"Have you ever had this type of abdominal pain before?\" If so, ask: \"When was the last time?\" and \"What happened that time?\"       no  8. CAUSE: \"What do you think is causing the abdominal pain?\"      unsure  9. RELIEVING/AGGRAVATING FACTORS: \"What makes it better or worse?\" (e.g., movement, antacids, bowel movement)      no  10. OTHER SYMPTOMS: \"Has " "there been any vomiting, diarrhea, constipation, or urine problems?\"        Sudden urinary urges and incontinence. No pain or urinary odor.    Protocols used: ABDOMINAL PAIN - MALE-A-OH      "

## 2019-10-10 LAB
ANION GAP SERPL CALCULATED.3IONS-SCNC: 3 MMOL/L (ref 3–14)
BACTERIA SPEC CULT: NORMAL
BUN SERPL-MCNC: 22 MG/DL (ref 7–30)
CALCIUM SERPL-MCNC: 8.6 MG/DL (ref 8.5–10.1)
CHLORIDE SERPL-SCNC: 106 MMOL/L (ref 94–109)
CO2 SERPL-SCNC: 30 MMOL/L (ref 20–32)
CREAT SERPL-MCNC: 1.31 MG/DL (ref 0.66–1.25)
GFR SERPL CREATININE-BSD FRML MDRD: 51 ML/MIN/{1.73_M2}
GLUCOSE BLDC GLUCOMTR-MCNC: 109 MG/DL (ref 70–99)
GLUCOSE BLDC GLUCOMTR-MCNC: 119 MG/DL (ref 70–99)
GLUCOSE BLDC GLUCOMTR-MCNC: 230 MG/DL (ref 70–99)
GLUCOSE BLDC GLUCOMTR-MCNC: 280 MG/DL (ref 70–99)
GLUCOSE BLDC GLUCOMTR-MCNC: 323 MG/DL (ref 70–99)
GLUCOSE BLDC GLUCOMTR-MCNC: 528 MG/DL (ref 70–99)
GLUCOSE BLDC GLUCOMTR-MCNC: 537 MG/DL (ref 70–99)
GLUCOSE BLDC GLUCOMTR-MCNC: 96 MG/DL (ref 70–99)
GLUCOSE SERPL-MCNC: 92 MG/DL (ref 70–99)
Lab: NORMAL
POTASSIUM SERPL-SCNC: 4 MMOL/L (ref 3.4–5.3)
SODIUM SERPL-SCNC: 139 MMOL/L (ref 133–144)
SPECIMEN SOURCE: NORMAL

## 2019-10-10 PROCEDURE — 25000128 H RX IP 250 OP 636: Performed by: INTERNAL MEDICINE

## 2019-10-10 PROCEDURE — 80048 BASIC METABOLIC PNL TOTAL CA: CPT | Performed by: INTERNAL MEDICINE

## 2019-10-10 PROCEDURE — 99232 SBSQ HOSP IP/OBS MODERATE 35: CPT | Performed by: INTERNAL MEDICINE

## 2019-10-10 PROCEDURE — 99232 SBSQ HOSP IP/OBS MODERATE 35: CPT | Performed by: UROLOGY

## 2019-10-10 PROCEDURE — 36415 COLL VENOUS BLD VENIPUNCTURE: CPT | Performed by: INTERNAL MEDICINE

## 2019-10-10 PROCEDURE — 12000000 ZZH R&B MED SURG/OB

## 2019-10-10 PROCEDURE — 00000146 ZZHCL STATISTIC GLUCOSE BY METER IP

## 2019-10-10 PROCEDURE — 25000132 ZZH RX MED GY IP 250 OP 250 PS 637: Performed by: INTERNAL MEDICINE

## 2019-10-10 PROCEDURE — 25000131 ZZH RX MED GY IP 250 OP 636 PS 637: Performed by: INTERNAL MEDICINE

## 2019-10-10 RX ORDER — HYDRALAZINE HYDROCHLORIDE 20 MG/ML
10 INJECTION INTRAMUSCULAR; INTRAVENOUS EVERY 6 HOURS PRN
Status: DISCONTINUED | OUTPATIENT
Start: 2019-10-10 | End: 2019-10-13 | Stop reason: HOSPADM

## 2019-10-10 RX ORDER — HYDROMORPHONE HYDROCHLORIDE 1 MG/ML
0.2 INJECTION, SOLUTION INTRAMUSCULAR; INTRAVENOUS; SUBCUTANEOUS
Status: DISCONTINUED | OUTPATIENT
Start: 2019-10-10 | End: 2019-10-13 | Stop reason: HOSPADM

## 2019-10-10 RX ORDER — METOPROLOL TARTRATE 25 MG/1
25 TABLET, FILM COATED ORAL 2 TIMES DAILY
Status: DISCONTINUED | OUTPATIENT
Start: 2019-10-10 | End: 2019-10-13 | Stop reason: HOSPADM

## 2019-10-10 RX ADMIN — HYDROMORPHONE HYDROCHLORIDE 0.2 MG: 1 INJECTION, SOLUTION INTRAMUSCULAR; INTRAVENOUS; SUBCUTANEOUS at 01:16

## 2019-10-10 RX ADMIN — HYDROMORPHONE HYDROCHLORIDE 0.2 MG: 1 INJECTION, SOLUTION INTRAMUSCULAR; INTRAVENOUS; SUBCUTANEOUS at 09:10

## 2019-10-10 RX ADMIN — INSULIN ASPART 5 UNITS: 100 INJECTION, SOLUTION INTRAVENOUS; SUBCUTANEOUS at 22:20

## 2019-10-10 RX ADMIN — TAMSULOSIN HYDROCHLORIDE 0.4 MG: 0.4 CAPSULE ORAL at 09:10

## 2019-10-10 RX ADMIN — METOPROLOL TARTRATE 25 MG: 25 TABLET ORAL at 20:23

## 2019-10-10 RX ADMIN — ATORVASTATIN CALCIUM 20 MG: 20 TABLET, FILM COATED ORAL at 09:09

## 2019-10-10 RX ADMIN — HYDROMORPHONE HYDROCHLORIDE 0.2 MG: 1 INJECTION, SOLUTION INTRAMUSCULAR; INTRAVENOUS; SUBCUTANEOUS at 11:31

## 2019-10-10 RX ADMIN — ACETAMINOPHEN 650 MG: 325 TABLET, FILM COATED ORAL at 00:27

## 2019-10-10 RX ADMIN — METOPROLOL TARTRATE 25 MG: 25 TABLET ORAL at 09:09

## 2019-10-10 RX ADMIN — FINASTERIDE 5 MG: 5 TABLET, FILM COATED ORAL at 09:09

## 2019-10-10 RX ADMIN — HYDRALAZINE HYDROCHLORIDE 10 MG: 20 INJECTION INTRAMUSCULAR; INTRAVENOUS at 22:20

## 2019-10-10 RX ADMIN — INSULIN ASPART 2 UNITS: 100 INJECTION, SOLUTION INTRAVENOUS; SUBCUTANEOUS at 00:24

## 2019-10-10 RX ADMIN — LEVOTHYROXINE SODIUM 25 MCG: 25 TABLET ORAL at 09:09

## 2019-10-10 ASSESSMENT — ACTIVITIES OF DAILY LIVING (ADL)
ADLS_ACUITY_SCORE: 13
ADLS_ACUITY_SCORE: 12
ADLS_ACUITY_SCORE: 12
ADLS_ACUITY_SCORE: 13
ADLS_ACUITY_SCORE: 13
ADLS_ACUITY_SCORE: 12

## 2019-10-10 NOTE — PLAN OF CARE
A&Ox4, w/ some forgetfulness noted. /83 otherwise VSS on RA. Up w/ A1 and walker. Steady on feet.  and 119. Reported lower abdominal pain and was given tylenol x1 w/ no relief and then given dilaudid x1, which helped considerably. NPO @ midnight for urology consult today. Will continue to monitor.

## 2019-10-10 NOTE — PLAN OF CARE
Pt A/O x 4, but can be forgetful. Pt denies pain, headache, dizziness, n/v & SOB. Pt up Asst:1 w/walker. Lung sounds clear/diminished, RA. Blood glucose: 323. Whaley in-place for retention. Urology consulted, cystoscopy scheduled for tomorrow. NPO at midnight. Will continue with plan of care.    Pt pulled out peripheral IV in right arm. New IV placed in left arm.    Pt blood glucose 537, writer administered 5 units and paged MD.    /91 - administered Hydralazine and notified MD.    Blood glucose re-check level is 528. Will this patient need to be on an insulin drip? Please advise. Thanks.

## 2019-10-10 NOTE — PROGRESS NOTES
X-Cover    Pt admitted with hypoglycemia. Sugars now up to 280. Stop D5. Monitor sugars closely. If they stay high, then slowly add back his insulin. Currently on SSI

## 2019-10-10 NOTE — PROGRESS NOTES
[facility]  Hospitalist Progress Note  Bebeto Herrera MD, MD 10/10/2019  (Text Page)  Reason for Stay (Diagnosis): Hypoglycemia, flank and abdominal pain         Assessment and Plan:      Summary of Stay: Pete Sheldon is a 79 year old male with known history of hypertension, insulin requiring diabetes mellitus, dyslipidemia, prior kidney stones, dyslipidemia, hypothyroidism, prior CVA and currently living at home with his wife and was on his usual state of health until earlier this afternoon where he started to complain of abdominal pain mostly in the periumbilical area also radiation to the right flank area     1.  Abdominal pain, earlier flank pain  2.  Recurrent hypoglycemia  3.  History of insulin requiring diabetes mellitus  4.  Concerns for ureteral stones.  -CT scan done earlier the ED showed laminar calcification near the posterior wall near right UVJ. ?  Hydroureter/ureteral stone mentioned in report?  -Requested formal urology input  -Control pain  5.  CKD     Remain n.p.o. while awaiting for formal urology input  Continue to hold basal and prandial insulin while n.p.o.  Dextrose containing fluids stop earlier for hyperglycemia and no further recurrence of hypoglycemia  Likely will be needing adjustment of his diabetes regimen upon discharge as patient's wife also stating that he has some episodes of hypoglycemic symptoms at home prior to this hospitalization.  Continue with low-dose hydromorphone for pain control for now while n.p.o.    DVT Prophylaxis: Pneumatic Compression Devices  Code Status: Full Code  Discharge Dispo: home  Estimated Disch Date / # of Days until Disch: 1 day        Interval History (Subjective):      Continuing care today.  Seen and examined.  Chart reviewed.  Case discussed with nursing service.  Slept decent overnight.  Still with intermittent abdominal and flank pain.  No nausea or vomiting.  Hypoglycemia improving.  Mental state at baseline.  Afebrile.   "Stable hemodynamics.              Physical Exam:      Last Vital Signs:  BP (!) 146/74   Pulse 60   Temp 96.5  F (35.8  C) (Oral)   Resp 16   Ht 1.702 m (5' 7\")   Wt 74.3 kg (163 lb 12.8 oz)   SpO2 96%   BMI 25.65 kg/m      I/O last 3 completed shifts:  In: -   Out: 250 [Urine:250]  Wt Readings from Last 1 Encounters:   10/09/19 74.3 kg (163 lb 12.8 oz)     Vitals:    10/09/19 2216   Weight: 74.3 kg (163 lb 12.8 oz)       Constitutional: Awake, alert, cooperative, no apparent distress   Respiratory: Clear to auscultation bilaterally, no crackles or wheezing   Cardiovascular: Regular rate and rhythm, normal S1 and S2,    Abdomen: Normal bowel sounds, soft, non-distended, non-tender   Skin: No rashes, no cyanosis, dry to touch   Neuro: Alert and oriented x3, no weakness, spontaneous and coherent speech   Extremities: No edema, normal range of motion   Other(s): Euthymic mood, not agitated       All other systems: Negative          Medications:      All current medications were reviewed with changes reflected in problem list.         Data:      All new lab and imaging data was reviewed.   Labs:  Recent Labs   Lab 10/09/19  1842   CULT PENDING     Recent Labs   Lab 10/09/19  1740   WBC 8.3   HGB 14.0   HCT 44.3   MCV 94        Recent Labs   Lab 10/10/19  0720 10/09/19  1740    138   POTASSIUM 4.0 4.3   CHLORIDE 106 104   CO2 30 29   ANIONGAP 3 5   GLC 92 63*   BUN 22 25   CR 1.31* 1.46*   GFRESTIMATED 51* 44*   GFRESTBLACK 59* 51*   MANAV 8.6 8.9   PROTTOTAL  --  7.5   ALBUMIN  --  2.9*   BILITOTAL  --  0.3   ALKPHOS  --  90   AST  --  13   ALT  --  23     No results for input(s): SED, CRP in the last 168 hours.  Recent Labs   Lab 10/10/19  0720 10/10/19  0448 10/10/19  0203 10/09/19  2358 10/09/19  2233 10/09/19  1933  10/09/19  1740   GLC 92  --   --   --   --   --   --  63*   BGM  --  119* 230* 280* 240* 101*   < >  --     < > = values in this interval not displayed.     Recent Labs   Lab " 10/09/19  1740   LIPASE 57*     Recent Labs   Lab 10/10/19  0720 10/09/19  1740   BUN 22 25   CR 1.31* 1.46*     No results for input(s): TROPONIN, TROPI, TROPR in the last 168 hours.    Invalid input(s): TROP, TROPONINIES   Imaging:   Results for orders placed or performed during the hospital encounter of 10/09/19   CT Abdomen Pelvis without Contrast (stone protocol)    Addendum: 10/9/2019    ADDENDUM:  Voice recognition error in pelvis section of the findings. Findings should read as follows: NO HYDROURETER OR URETERAL STONE.         Narrative    EXAM: CT ABDOMEN PELVIS W/O CONTRAST  LOCATION: [facility]  DATE/TIME: 10/9/2019 6:07 PM    INDICATION: Dysuria, abdominal pain history of kidney stones  COMPARISON: 03/23/2018  TECHNIQUE: Helical thin-section CT scan of the abdomen and pelvis was performed without IV contrast. Multiplanar reformats were obtained. Dose reduction techniques were used.  CONTRAST: None.    FINDINGS:   LUNG BASES: Mild aortic valve calcification, incompletely imaged, correlate for possible valvular stenosis. Severe coronary arterial calcification. Calcified periaortic lymph nodes consistent with prior granulomatous disease.    ABDOMEN: There are couple of subtle punctate 1 mm stones in each kidney but no hydronephrosis. Small stable cyst in the upper pole of the right kidney, 1 cm. No proximal hydroureter. Suspected circumcaval right ureteral course which may be an incidental   anomaly as there is no evidence for obstruction.    No free air or free fluid. Liver, gallbladder, pancreas, spleen, adrenal glands appear normal. Large amount of stool in the colon. No evidence for bowel obstruction. Diffuse aortic calcification but no aneurysm.    PELVIS: Hydroureter or ureteral stone. Laminar calcification along the right posterior wall urinary bladder near the ureterovesical junction, slightly increased compared to prior exam, currently 1.6 cm transversely previously 1.0 cm. No free  air or free   fluid in the pelvis. No adenopathy. Small left femoral or inguinal hernia similar to prior exam containing a short segment of nonobstructed descending colon.    MUSCULOSKELETAL: Negative.      Impression    CONCLUSION:   1.  No acute abnormality in the abdomen or pelvis. No significant change since prior exam.

## 2019-10-10 NOTE — PHARMACY-ADMISSION MEDICATION HISTORY
Admission medication history interview status for this patient is complete. See Psychiatric admission navigator for allergy information, prior to admission medications and immunization status.     Medication history interview source(s):Family  Medication history resources (including written lists, pill bottles, clinic record):clinic record, pill bottles  Primary pharmacy:    Changes made to PTA medication list:  Added: none  Deleted: none  Changed: none    Actions taken by pharmacist (provider contacted, etc):None     Additional medication history information:None    Medication reconciliation/reorder completed by provider prior to medication history? No     Do you take OTC medications (eg tylenol, ibuprofen, fish oil, eye/ear drops, etc)? Yes     For patients on insulin therapy: Yes  Lantus/levemir/NPH/toujeo/tresiba/Mix 70/30 dose:  Yes Lantus, Novolog  Sliding scale Novolog: No  If Yes, do you have a baseline novolog pre-meal dose:  no  Patients eat three meals a day: Yes  How many episodes of hypoglycemia do you have per week: some lows in morning  How many missed doses do you have per week: no  How many times do you check your blood glucose per day:  4      Prior to Admission medications    Medication Sig Last Dose Taking? Auth Provider   aspirin 81 MG chewable tablet Take 4 tablets (324 mg) by mouth daily 10/9/2019 at Unknown time Yes Yue Acevedo DO   atorvastatin (LIPITOR) 20 MG tablet TAKE 1 TABLET BY MOUTH ONCE DAILY 10/8/2019 at Unknown time Yes Rom Helm MD   finasteride (PROSCAR) 5 MG tablet TAKE 1 TABLET BY MOUTH ONCE DAILY 10/8/2019 at Unknown time Yes Jasmyne Elliott MD   Glucosamine HCl 750 MG TABS Take 750 mg by mouth 2 times daily 10/9/2019 at am Yes Yue Acevedo DO   insulin aspart (NOVOLOG FLEXPEN) 100 UNIT/ML pen INJECT 6 UNITS WITH BREAKFAST, 4 UNITS WITH LUNCH, 5 UNITS WITH DINNER, HOLD IF NOT EATING MEAL 10/9/2019 at lunch Yes Nati  MD Lori   insulin glargine (LANTUS SOLOSTAR) 100 UNIT/ML pen Inject 30 Units Subcutaneous daily 10/9/2019 at Unknown time Yes Lori Damian MD   levothyroxine (SYNTHROID/LEVOTHROID) 25 MCG tablet Take 1 tablet (25 mcg) by mouth daily 10/8/2019 at Unknown time Yes Rom Helm MD   metFORMIN (GLUCOPHAGE) 500 MG tablet Take 1 tablet (500 mg) by mouth daily (with breakfast) 10/9/2019 at Unknown time Yes Rom Helm MD   multivitamin (OCUVITE) TABS Take 1 tablet by mouth daily FOCUS SELECT PREMIUM WITH LUTEIN per eye doctor  Reported on 5/19/2017 10/9/2019 at Unknown time Yes Reported, Patient   tamsulosin (FLOMAX) 0.4 MG capsule TAKE 1 CAPSULE BY MOUTH ONCE DAILY 10/9/2019 at Unknown time Yes Rom Helm MD   vitamin C (ASCORBIC ACID) 1000 MG TABS Take 1,000 mg by mouth 2 times daily 10/9/2019 at Unknown time Yes Unknown, Entered By History   ACE/ARB NOT PRESCRIBED, INTENTIONAL, by Other route continuous prn (Hyperkalemia) Reported on 5/19/2017   Rom Helm MD   B-D U/F 31G X 8 MM insulin pen needle USE  4 TIMES DAILY TO  INJECT  INSULIN   Rom Helm MD   blood glucose (ONETOUCH VERIO IQ) test strip USE 1 STRIP TO CHECK GLUCOSE 4 TIMES DAILY OR  AS  DIRECTED  USING  ONE  TOUCH  VERIO  FLEX   Rom Helm MD   blood glucose monitoring (ONE TOUCH ULTRASOFT) lancets Use to test blood sugar 4 times daily or as directed.   Rom Helm MD   insulin pen needle (B-D U/F) 31G X 8 MM B-D ULTRA FINE SHORT PEN NEEDLES, USE 4 TIMES A DAY TO INJECT INSULIN   Rom Helm MD   insulin syringes, disposable, U-100 0.3 ML MISC 1 each 2 times daily   Rom Helm MD   LIFESCAN FINEPOINT LANCETS MISC Use to test blood sugars 4 times daily or as directed.   Rom Helm MD   Nutritional Supplements (GLUCOSE MANAGEMENT) TABS 4 tabs po for low blood sugar below 70, may repeat q 10-15 minutes as needed   Yue Acevedo,    order for DME All DM testing supplies  "including test strips, lancets, solution, syringes, needles and/or pen needles for testing 4 times per day.    Brand \"Contour Next\"   Lori Damian MD           "

## 2019-10-10 NOTE — PLAN OF CARE
Pt A/O x 4, but can be forgetful. Pt denies pain, headache, dizziness, n/v & SOB. Pt up Asst:1 w/walker. Lung sounds clear/diminished, RA. Blood glucose: 240. Stopped IV fluids D5, waiting for Insulin pen. NPO at midnight. Urology consulted. Will continue with plan of care.

## 2019-10-10 NOTE — PROVIDER NOTIFICATION
MD notification: Have orders for cont. dextrose 5% infusion, BG now 280. Do you want to discontinue this order? Thank you!

## 2019-10-10 NOTE — PLAN OF CARE
Orientation:A&ox3. Forgetful at times.Disoriented to time  Vss afebrile.   02:ra 96%  LS:clear  GI:bs+. Started on regular diet for lunch.   : voids in small amounts. Will place brown catheter shortly per Dr Pina's request. Pt bladder scanned for 370cc. Pt unable to void after bladder scanner.   Skin:intact  Activity:up with assist of 1 walker  Pain: c/o pain in abd around belly button. Dilaudid 0.2mg ivp x2 with good relief. Pt stated he has had this pain before but not this bad.  Plan:Urology to see pt. Pain control. Blood sugars 92,96, 109.    1425: brown placed, 200 ml dark arely color.

## 2019-10-10 NOTE — ED NOTES
RECEIVING UNIT ED HANDOFF REVIEW    Above ED Nurse Handoff Report was reviewed: Yes  Reviewed by: Sarah Harris RN on October 9, 2019 at 10:03 PM       [facility]  ED Nurse Handoff Report    Pete Sheldon is a 79 year old male   ED Chief complaint: Abdominal Pain  . ED Diagnosis:   Final diagnoses:   Abdominal pain, epigastric   Type 2 diabetes mellitus with hypoglycemia without coma, with long-term current use of insulin (H)   Dysuria     Allergies:   Allergies   Allergen Reactions     Losartan Other (See Comments)     Dizziness       Code Status: Full Code  Activity level - Baseline/Home:  Assist X 1. Activity Level - Current:   Assist X 1. Lift room needed: No. Bariatric: No   Needed: No   Isolation: No. Infection: Not Applicable.     Vital Signs:   Vitals:    10/09/19 1915 10/09/19 1930 10/09/19 1945 10/09/19 2000   BP: (!) 168/94 (!) 166/95 (!) 173/87 (!) 161/93   Pulse: 64 70 68 63   Resp:       Temp:       TempSrc:       SpO2: 95% 95% 94% 95%       Cardiac Rhythm:  ,      Pain level: 0-10 Pain Scale: 9  Patient confused: No. Patient Falls Risk: Yes.   Elimination Status: Has voided   Patient Report - Initial Complaint: Abdominal pain. Focused Assessment:  Patient presents with abdominal pain, periumbilical, since this morning. Patient also has been having frequent urination, and unable to get to the bathroom in time. No N/V/D. ABCDS intact, alert and oriented x 4.   Tests Performed: labs, CT scan. Abnormal Results:   Labs Ordered and Resulted from Time of ED Arrival Up to the Time of Departure from the ED   COMPREHENSIVE METABOLIC PANEL - Abnormal; Notable for the following components:       Result Value    Glucose 63 (*)     Creatinine 1.46 (*)     GFR Estimate 44 (*)     GFR Estimate If Black 51 (*)     Albumin 2.9 (*)     All other components within normal limits   LIPASE - Abnormal; Notable for the following components:    Lipase 57 (*)     All other components within  normal limits   ROUTINE UA WITH MICROSCOPIC - Abnormal; Notable for the following components:    Protein Albumin Urine 100 (*)     All other components within normal limits   GLUCOSE BY METER - Abnormal; Notable for the following components:    Glucose 55 (*)     All other components within normal limits   GLUCOSE BY METER - Abnormal; Notable for the following components:    Glucose 47 (*)     All other components within normal limits   GLUCOSE BY METER - Abnormal; Notable for the following components:    Glucose 101 (*)     All other components within normal limits   CBC WITH PLATELETS DIFFERENTIAL   LACTIC ACID WHOLE BLOOD   GLUCOSE MONITOR NURSING POCT   KETONE BETA-HYDROXYBUTYRATE QUANTITATIVE   PERIPHERAL IV CATHETER   URINE CULTURE AEROBIC BACTERIAL     CT Abdomen Pelvis without Contrast (stone protocol)   Final Result   Addendum 1 of 1   ADDENDUM:   Voice recognition error in pelvis section of the findings. Findings should    read as follows: NO HYDROURETER OR URETERAL STONE.          Final        .   Treatments provided: d50 amp, fluids  Family Comments: wife present  OBS brochure/video discussed/provided to patient:  No  ED Medications:   Medications   sodium chloride 0.9% infusion (has no administration in time range)   dextrose 50 % injection 25 mL (25 mLs Intravenous Given 10/9/19 1904)     Drips infusing:  Yes  For the majority of the shift, the patient's behavior Green. Interventions performed were NA.     Severe Sepsis OR Septic Shock Diagnosis Present: No      ED Nurse Name/Phone Number: Elise Carter RN,   8:13 PM

## 2019-10-10 NOTE — ED NOTES
Assisted patient to void with urinal.  He continues to have intermittent periumbilical abdominal pain.

## 2019-10-10 NOTE — CONSULTS
Consult Date:  10/10/2019      HISTORY OF PRESENT ILLNESS:  The patient is a pleasant 79-year-old male who was admitted with abdominal discomfort and found on CT scan to have some stone material in the bladder, nothing in the ureter or kidney.  The patient was admitted for pain control and further evaluation.  Apparently he had a history of kidney stones in .  He saw Dr. Montenegro last year and had an office cystoscopy that showed a nonobstructing prostate and multiple small uric acid stones in the bladder.  These seem to be more numerous on today's exam compared to last March.  The patient's pain has been really periumbilical and lower middle abdomen.  He had a 370 mL bladder scan residual at noon today and a Whaley catheter was inserted for 200 mL, and his pain improved significantly.  His urine is clear.  Urinalysis on admission showed no pyuria or significant hematuria.  Urine pH was 5.5.  White blood cell count was normal on admission.  Electrolytes are normal and creatinine 1.46.      OTHER PAST MEDICAL HISTORY:  Notable for hypoglycemia, diabetes mellitus, hypothyroidism, hypertension, microalbuminuria, hyperlipidemia, former smoker.      PHYSICAL EXAMINATION:     GENERAL:  Alert, somewhat oriented.  Had to speak with his wife on telephone to get history.     ABDOMEN:  Lower abdomen is soft, no mass and nontender.      ASSESSMENT AND RECOMMENDATION:  Bladder stones, partial urinary retention.  We will have Dr. Montenegro see in the morning and schedule for repeat cystoscopy, irrigation of stones, and ruling out obstructing prostate versus atonic bladder from diabetes.         SHIRLEY REINOSO JR, MD             D: 10/10/2019   T: 10/10/2019   MT: WT      Name:     HORACIO HUYNH   MRN:      [id number]        Account:       [id number]   :      1940           Consult Date:  10/10/2019      Document: [id number]       cc: Rom Reinoso Jr, MD

## 2019-10-11 LAB
GLUCOSE BLDC GLUCOMTR-MCNC: 100 MG/DL (ref 70–99)
GLUCOSE BLDC GLUCOMTR-MCNC: 106 MG/DL (ref 70–99)
GLUCOSE BLDC GLUCOMTR-MCNC: 110 MG/DL (ref 70–99)
GLUCOSE BLDC GLUCOMTR-MCNC: 121 MG/DL (ref 70–99)
GLUCOSE BLDC GLUCOMTR-MCNC: 142 MG/DL (ref 70–99)
GLUCOSE BLDC GLUCOMTR-MCNC: 145 MG/DL (ref 70–99)
GLUCOSE BLDC GLUCOMTR-MCNC: 167 MG/DL (ref 70–99)
GLUCOSE BLDC GLUCOMTR-MCNC: 169 MG/DL (ref 70–99)
GLUCOSE BLDC GLUCOMTR-MCNC: 178 MG/DL (ref 70–99)
GLUCOSE BLDC GLUCOMTR-MCNC: 183 MG/DL (ref 70–99)
GLUCOSE BLDC GLUCOMTR-MCNC: 279 MG/DL (ref 70–99)
GLUCOSE BLDC GLUCOMTR-MCNC: 363 MG/DL (ref 70–99)
GLUCOSE BLDC GLUCOMTR-MCNC: 384 MG/DL (ref 70–99)
GLUCOSE BLDC GLUCOMTR-MCNC: 443 MG/DL (ref 70–99)
GLUCOSE BLDC GLUCOMTR-MCNC: 468 MG/DL (ref 70–99)
GLUCOSE BLDC GLUCOMTR-MCNC: 521 MG/DL (ref 70–99)
GLUCOSE BLDC GLUCOMTR-MCNC: 63 MG/DL (ref 70–99)
GLUCOSE BLDC GLUCOMTR-MCNC: 72 MG/DL (ref 70–99)

## 2019-10-11 PROCEDURE — 99231 SBSQ HOSP IP/OBS SF/LOW 25: CPT | Performed by: UROLOGY

## 2019-10-11 PROCEDURE — 25000125 ZZHC RX 250: Performed by: INTERNAL MEDICINE

## 2019-10-11 PROCEDURE — 99232 SBSQ HOSP IP/OBS MODERATE 35: CPT | Performed by: INTERNAL MEDICINE

## 2019-10-11 PROCEDURE — 25800030 ZZH RX IP 258 OP 636: Performed by: INTERNAL MEDICINE

## 2019-10-11 PROCEDURE — 00000146 ZZHCL STATISTIC GLUCOSE BY METER IP

## 2019-10-11 PROCEDURE — 25000131 ZZH RX MED GY IP 250 OP 636 PS 637: Performed by: INTERNAL MEDICINE

## 2019-10-11 PROCEDURE — 25000128 H RX IP 250 OP 636: Performed by: INTERNAL MEDICINE

## 2019-10-11 PROCEDURE — 12000047 ZZH R&B IMCU

## 2019-10-11 PROCEDURE — 25000132 ZZH RX MED GY IP 250 OP 250 PS 637: Performed by: INTERNAL MEDICINE

## 2019-10-11 RX ORDER — NICOTINE POLACRILEX 4 MG
15-30 LOZENGE BUCCAL
Status: DISCONTINUED | OUTPATIENT
Start: 2019-10-11 | End: 2019-10-11

## 2019-10-11 RX ORDER — SODIUM CHLORIDE 9 MG/ML
INJECTION, SOLUTION INTRAVENOUS CONTINUOUS
Status: DISCONTINUED | OUTPATIENT
Start: 2019-10-11 | End: 2019-10-11

## 2019-10-11 RX ORDER — LIDOCAINE 40 MG/G
CREAM TOPICAL
Status: DISCONTINUED | OUTPATIENT
Start: 2019-10-11 | End: 2019-10-11

## 2019-10-11 RX ORDER — DEXTROSE MONOHYDRATE 25 G/50ML
25-50 INJECTION, SOLUTION INTRAVENOUS
Status: DISCONTINUED | OUTPATIENT
Start: 2019-10-11 | End: 2019-10-11

## 2019-10-11 RX ADMIN — METOPROLOL TARTRATE 25 MG: 25 TABLET ORAL at 22:01

## 2019-10-11 RX ADMIN — SODIUM CHLORIDE: 9 INJECTION, SOLUTION INTRAVENOUS at 02:50

## 2019-10-11 RX ADMIN — Medication 5.5 UNITS/HR: at 21:50

## 2019-10-11 RX ADMIN — TAMSULOSIN HYDROCHLORIDE 0.4 MG: 0.4 CAPSULE ORAL at 08:42

## 2019-10-11 RX ADMIN — ATORVASTATIN CALCIUM 20 MG: 20 TABLET, FILM COATED ORAL at 08:42

## 2019-10-11 RX ADMIN — LEVOTHYROXINE SODIUM 25 MCG: 25 TABLET ORAL at 08:42

## 2019-10-11 RX ADMIN — METOPROLOL TARTRATE 25 MG: 25 TABLET ORAL at 08:43

## 2019-10-11 RX ADMIN — INSULIN GLARGINE 25 UNITS: 100 INJECTION, SOLUTION SUBCUTANEOUS at 00:54

## 2019-10-11 RX ADMIN — ONDANSETRON 4 MG: 2 INJECTION INTRAMUSCULAR; INTRAVENOUS at 06:01

## 2019-10-11 RX ADMIN — FINASTERIDE 5 MG: 5 TABLET, FILM COATED ORAL at 08:43

## 2019-10-11 RX ADMIN — Medication 5 UNITS/HR: at 02:51

## 2019-10-11 ASSESSMENT — MIFFLIN-ST. JEOR: SCORE: 1370.33

## 2019-10-11 ASSESSMENT — ACTIVITIES OF DAILY LIVING (ADL)
ADLS_ACUITY_SCORE: 13

## 2019-10-11 NOTE — PLAN OF CARE
Vss, no co pain/cp/sob.   Pt was on an insulin drip but stopped at 1412. , 121, 142, 178, 167, 145, 106, 63 (see note for details, stopped drip), recheck was 72, will check again at 1500. Lantus and sliding scale insulin added with cho counting. Whaley removed at 1200, pt is DTV, planned cysto was cancelled today, see urology note for details.  Pt was instructed on IS, tolerating well and getting up to 1500.  R hand numbness at baseline, alarms on for safety as pt is forgetful, up with Ax1+gb+walker to bathroom, +BM. Continue poc and close monitoring.

## 2019-10-11 NOTE — PROGRESS NOTES
XCOVER.  Glucose remained elevated after basal insulin, Lantus 25 units and Novolog 15 units x 1.   -  Start Insulin drip per protocol.

## 2019-10-11 NOTE — PROVIDER NOTIFICATION
Paged dr ribeiro: BG 63, stopped insulin drip, gave juice, lunch is on the way. will recheck BG.    Orders received.

## 2019-10-11 NOTE — PROGRESS NOTES
300 VT- BS was 528, received 15 units of novolog and 25 units of lantus. Recheck is 443. Any new orders? Thank you    Received orders to start insulin drip.

## 2019-10-11 NOTE — PROGRESS NOTES
[facility]  Hospitalist Progress Note  Bebeto Herrera MD, MD 10/11/2019  (Text Page)  Reason for Stay (Diagnosis): Earlier hypoglycemia, abdominal pain, bladder stones         Assessment and Plan:      Summary of Stay: Summary of Stay: Pete Sheldon is a 79 year old male with known history of hypertension, insulin requiring diabetes mellitus, dyslipidemia, prior kidney stones, dyslipidemia, hypothyroidism, prior CVA and currently living at home with his wife and was on his usual state of health until earlier this afternoon where he started to complain of abdominal pain mostly in the periumbilical area also radiation to the right flank area     1.  Abdominal pain, earlier flank pain  2.  Recurrent hypoglycemia  3.  History of insulin requiring diabetes mellitus  4.  Concerns for ureteral stones.  Bladder stones  -CT scan done earlier the ED showed laminar calcification near the posterior wall near right UVJ. ?  Hydroureter/ureteral stone mentioned in report?  -Requested formal urology input  -Control pain    -Appreciate urology evaluation, plans for TOV/?  Cystoscopy    5.  CKD    Overnight had some issues with severe hyperglycemia.  Currently on insulin IV infusion.  We will continue as such for now.  He is currently under n.p.o. status.  Once he is on a diet then resumption of his home regimen of basal and prandial insulin but likely will be needing adjustment as he also presented here with hypoglycemia.    DVT Prophylaxis: Pneumatic Compression Devices  Code Status: Full Code  Discharge Dispo: Hopeful for home discharge  Estimated Disch Date / # of Days until Disch: Anticipating in the next 24 hours        Interval History (Subjective):      Continuing care today.  Seen and examined.  Overnight had some issues with severe hyperglycemia leading to initiation of IV insulin infusion.  He tolerated his oral diet yesterday and denies any ongoing recurrence of abdominal pain.  No nausea or  "vomiting.  Mental state appears to be at baseline this morning.  He is interactive, conversant and following simple instructions.  Currently afebrile.  Stable hemodynamics.  Main concern is getting hungry as he is currently n.p.o.     # Pain Assessment:  Current Pain Score 10/11/2019   Patient currently in pain? denies   Pain score (0-10) -   Pain location -   Pain descriptors -   Pete gautam pain level was assessed and he currently denies pain.                  Physical Exam:      Last Vital Signs:  /53 (BP Location: Right arm)   Pulse 81   Temp 98  F (36.7  C) (Oral)   Resp 18   Ht 1.702 m (5' 7\")   Wt 70.7 kg (155 lb 12.8 oz)   SpO2 94%   BMI 24.40 kg/m      I/O last 3 completed shifts:  In: 490 [P.O.:490]  Out: 2150 [Urine:2150]  Wt Readings from Last 1 Encounters:   10/11/19 70.7 kg (155 lb 12.8 oz)     Vitals:    10/09/19 2216 10/11/19 0555   Weight: 74.3 kg (163 lb 12.8 oz) 70.7 kg (155 lb 12.8 oz)       Constitutional: Awake, alert, cooperative, no apparent distress   Respiratory: Clear to auscultation bilaterally, no crackles or wheezing   Cardiovascular: Regular rate and rhythm, normal S1 and S2   Abdomen: Normal bowel sounds, soft, non-distended, non-tender   Skin: No rashes, no cyanosis, dry to touch   Neuro: Alert and oriented x3, no weakness, spontaneous and coherent speech   Extremities: No edema, normal range of motion   Other(s): Euthymic mood, not agitated       All other systems: Negative          Medications:      All current medications were reviewed with changes reflected in problem list.         Data:      All new lab and imaging data was reviewed.   Labs:  Recent Labs   Lab 10/09/19  1842   CULT <10,000 colonies/mL  mixed urogenital yan  Susceptibility testing not routinely done       Recent Labs   Lab 10/09/19  1740   WBC 8.3   HGB 14.0   HCT 44.3   MCV 94        Recent Labs   Lab 10/10/19  0720 10/09/19  1740    138   POTASSIUM 4.0 4.3   CHLORIDE 106 104   CO2 30 29 "   ANIONGAP 3 5   GLC 92 63*   BUN 22 25   CR 1.31* 1.46*   GFRESTIMATED 51* 44*   GFRESTBLACK 59* 51*   MANAV 8.6 8.9   PROTTOTAL  --  7.5   ALBUMIN  --  2.9*   BILITOTAL  --  0.3   ALKPHOS  --  90   AST  --  13   ALT  --  23     No results for input(s): SED, CRP in the last 168 hours.  Recent Labs   Lab 10/11/19  1152 10/11/19  1110 10/11/19  1018 10/11/19  0900 10/11/19  0818  10/10/19  0720  10/09/19  1740   GLC  --   --   --   --   --   --  92  --  63*   * 167* 178* 142* 121*   < >  --    < >  --     < > = values in this interval not displayed.     No results for input(s): INR in the last 168 hours.  No results for input(s): TROPONIN, TROPI, TROPR in the last 168 hours.    Invalid input(s): TROP, TROPONINIES  Recent Labs   Lab 10/09/19  1842   COLOR Light Yellow   APPEARANCE Clear   URINEGLC Negative   URINEBILI Negative   URINEKETONE Negative   SG 1.019   UBLD Negative   URINEPH 5.5   PROTEIN 100*   NITRITE Negative   LEUKEST Negative   RBCU 1   WBCU 1      Imaging:   Results for orders placed or performed during the hospital encounter of 10/09/19   CT Abdomen Pelvis without Contrast (stone protocol)    Addendum: 10/9/2019    ADDENDUM:  Voice recognition error in pelvis section of the findings. Findings should read as follows: NO HYDROURETER OR URETERAL STONE.         Narrative    EXAM: CT ABDOMEN PELVIS W/O CONTRAST  LOCATION: [facility]  DATE/TIME: 10/9/2019 6:07 PM    INDICATION: Dysuria, abdominal pain history of kidney stones  COMPARISON: 03/23/2018  TECHNIQUE: Helical thin-section CT scan of the abdomen and pelvis was performed without IV contrast. Multiplanar reformats were obtained. Dose reduction techniques were used.  CONTRAST: None.    FINDINGS:   LUNG BASES: Mild aortic valve calcification, incompletely imaged, correlate for possible valvular stenosis. Severe coronary arterial calcification. Calcified periaortic lymph nodes consistent with prior granulomatous disease.    ABDOMEN:  There are couple of subtle punctate 1 mm stones in each kidney but no hydronephrosis. Small stable cyst in the upper pole of the right kidney, 1 cm. No proximal hydroureter. Suspected circumcaval right ureteral course which may be an incidental   anomaly as there is no evidence for obstruction.    No free air or free fluid. Liver, gallbladder, pancreas, spleen, adrenal glands appear normal. Large amount of stool in the colon. No evidence for bowel obstruction. Diffuse aortic calcification but no aneurysm.    PELVIS: Hydroureter or ureteral stone. Laminar calcification along the right posterior wall urinary bladder near the ureterovesical junction, slightly increased compared to prior exam, currently 1.6 cm transversely previously 1.0 cm. No free air or free   fluid in the pelvis. No adenopathy. Small left femoral or inguinal hernia similar to prior exam containing a short segment of nonobstructed descending colon.    MUSCULOSKELETAL: Negative.      Impression    CONCLUSION:   1.  No acute abnormality in the abdomen or pelvis. No significant change since prior exam.

## 2019-10-11 NOTE — PROGRESS NOTES
Patient with elevated blood glucose on previous shift, at 2216 =537, at 2997=274.  MD called back with med orders for 25 units of lantus and 15 units of novolog now.  He also ordered blood glucose checks every 2 hours and results are to be called to him.  Will follow orders and continue to monitor, treat, and update MD.

## 2019-10-11 NOTE — PLAN OF CARE
VS stable. No complaints of pain. Disoriented to time. Forgetful. Blood sugars elevated and started on insulin drip. Slept on and off throughout the night.

## 2019-10-11 NOTE — PROGRESS NOTES
[facility] Urology Progress Note          Assessment and Plan:   Active Problems:    Hypoglycemia    Assessment: Bladder stones, incomplete emptying    Plan: Reviewed pt with Dr. Montenegro. TOV today and he will see the patient later.       Jessica Carmichael PA-C  [facility]  [phone]               Interval History:    Whaley clear, sediment in tubing.               Review of Systems:   The 5 point Review of Systems is negative other than noted in the HPI             Medications:     Current Facility-Administered Medications Ordered in Epic   Medication Dose Route Frequency Last Rate Last Dose     acetaminophen (TYLENOL) tablet 650 mg  650 mg Oral Q4H PRN   650 mg at 10/10/19 0027     atorvastatin (LIPITOR) tablet 20 mg  20 mg Oral Daily   20 mg at 10/11/19 0842     dextrose 10 % 1,000 mL infusion   Intravenous Continuous PRN         glucose gel 15-30 g  15-30 g Oral Q15 Min PRN        Or     dextrose 50 % injection 25-50 mL  25-50 mL Intravenous Q15 Min PRN        Or     glucagon injection 1 mg  1 mg Subcutaneous Q15 Min PRN         finasteride (PROSCAR) tablet 5 mg  5 mg Oral Daily   5 mg at 10/11/19 0843     hydrALAZINE (APRESOLINE) injection 10 mg  10 mg Intravenous Q6H PRN   10 mg at 10/10/19 2220     HYDROmorphone (PF) (DILAUDID) injection 0.2 mg  0.2 mg Intravenous Q2H PRN   0.2 mg at 10/10/19 1131     insulin 1 unit/mL in saline (NovoLIN, HumuLIN Regular) drip - ADULT IV Infusion  0-24 Units/hr Intravenous Continuous 4 mL/hr at 10/11/19 1021 4 Units/hr at 10/11/19 1021     insulin glargine (LANTUS) injection 25 Units  25 Units Subcutaneous At Bedtime   25 Units at 10/11/19 0054     levothyroxine (SYNTHROID/LEVOTHROID) tablet 25 mcg  25 mcg Oral Daily   25 mcg at 10/11/19 0842     lidocaine (LMX4) cream   Topical Q1H PRN         lidocaine 1 % 0.1-1 mL  0.1-1 mL Other Q1H PRN         lidocaine 1 % 0.1-1 mL  0.1-1 mL Other Q1H PRN         melatonin tablet 1 mg  1 mg Oral At Bedtime PRN          metoprolol tartrate (LOPRESSOR) tablet 25 mg  25 mg Oral BID   25 mg at 10/11/19 0843     naloxone (NARCAN) injection 0.1-0.4 mg  0.1-0.4 mg Intravenous Q2 Min PRN         ondansetron (ZOFRAN-ODT) ODT tab 4 mg  4 mg Oral Q6H PRN        Or     ondansetron (ZOFRAN) injection 4 mg  4 mg Intravenous Q6H PRN   4 mg at 10/11/19 0601     oxyCODONE (ROXICODONE) tablet 5 mg  5 mg Oral Q6H PRN         senna-docusate (SENOKOT-S/PERICOLACE) 8.6-50 MG per tablet 1 tablet  1 tablet Oral BID PRN        Or     senna-docusate (SENOKOT-S/PERICOLACE) 8.6-50 MG per tablet 2 tablet  2 tablet Oral BID PRN         sodium chloride (PF) 0.9% PF flush 3 mL  3 mL Intracatheter q1 min prn         sodium chloride (PF) 0.9% PF flush 3 mL  3 mL Intracatheter Q8H   3 mL at 10/11/19 0025     sodium chloride 0.9% infusion   Intravenous Continuous 10 mL/hr at 10/11/19 0729       tamsulosin (FLOMAX) capsule 0.4 mg  0.4 mg Oral Daily   0.4 mg at 10/11/19 0842     No current [facility]-ordered outpatient medications on file.                  Physical Exam:   Vitals were reviewed  Patient Vitals for the past 8 hrs:   BP Temp Temp src Heart Rate Resp SpO2 Weight   10/11/19 1024 118/61 97.4  F (36.3  C) Oral 63 18 94 % --   10/11/19 0819 119/63 96.8  F (36  C) Oral 70 18 97 % --   10/11/19 0555 129/72 97.1  F (36.2  C) Oral 78 16 99 % 70.7 kg (155 lb 12.8 oz)   10/11/19 0431 125/58 97.9  F (36.6  C) Oral 76 16 96 % --     GEN: NAD, lying in bed  EYES: EOMI  MOUTH: MMM  NECK: Supple  RESP: Unlabored breathing  CARDIAC: No LE edema  SKIN: Warm  NEURO: AAO  : Urine clear, some sediment in tubing.           Data:   No results found for: NTBNPI, NTBNP  Lab Results   Component Value Date    WBC 8.3 10/09/2019    WBC 8.4 01/22/2018    WBC 8.6 01/20/2018    HGB 14.0 10/09/2019    HGB 13.6 01/22/2018    HGB 13.2 (L) 01/20/2018    HCT 44.3 10/09/2019    HCT 42.1 01/22/2018    HCT 40.3 01/20/2018    MCV 94 10/09/2019    MCV 95 01/22/2018    MCV 95 01/20/2018    PLT  242 10/09/2019     01/22/2018     01/20/2018     Lab Results   Component Value Date    INR 1.07 01/10/2018    INR 1.05 01/08/2018    INR 1.03 12/16/2008

## 2019-10-11 NOTE — PROGRESS NOTES
XCOVER.  Patient admitted with hypoglycemia, now his glucose is 528.  Noted he is only on sliding scale insulin,  -Start Lantus 25 units subcu night  -NovoLog 15 units x 1 subcu  -Check glucose every 2 hours  -Call MD with results  -If glucose not controlled, will consider starting him on insulin drip later.

## 2019-10-12 LAB
GLUCOSE BLDC GLUCOMTR-MCNC: 106 MG/DL (ref 70–99)
GLUCOSE BLDC GLUCOMTR-MCNC: 119 MG/DL (ref 70–99)
GLUCOSE BLDC GLUCOMTR-MCNC: 119 MG/DL (ref 70–99)
GLUCOSE BLDC GLUCOMTR-MCNC: 130 MG/DL (ref 70–99)
GLUCOSE BLDC GLUCOMTR-MCNC: 141 MG/DL (ref 70–99)
GLUCOSE BLDC GLUCOMTR-MCNC: 146 MG/DL (ref 70–99)
GLUCOSE BLDC GLUCOMTR-MCNC: 147 MG/DL (ref 70–99)
GLUCOSE BLDC GLUCOMTR-MCNC: 150 MG/DL (ref 70–99)
GLUCOSE BLDC GLUCOMTR-MCNC: 154 MG/DL (ref 70–99)
GLUCOSE BLDC GLUCOMTR-MCNC: 166 MG/DL (ref 70–99)
GLUCOSE BLDC GLUCOMTR-MCNC: 171 MG/DL (ref 70–99)
GLUCOSE BLDC GLUCOMTR-MCNC: 175 MG/DL (ref 70–99)
GLUCOSE BLDC GLUCOMTR-MCNC: 180 MG/DL (ref 70–99)
GLUCOSE BLDC GLUCOMTR-MCNC: 190 MG/DL (ref 70–99)
GLUCOSE BLDC GLUCOMTR-MCNC: 208 MG/DL (ref 70–99)
GLUCOSE BLDC GLUCOMTR-MCNC: 231 MG/DL (ref 70–99)
GLUCOSE BLDC GLUCOMTR-MCNC: 248 MG/DL (ref 70–99)
GLUCOSE BLDC GLUCOMTR-MCNC: 324 MG/DL (ref 70–99)
GLUCOSE BLDC GLUCOMTR-MCNC: 379 MG/DL (ref 70–99)
GLUCOSE BLDC GLUCOMTR-MCNC: 411 MG/DL (ref 70–99)
GLUCOSE BLDC GLUCOMTR-MCNC: 86 MG/DL (ref 70–99)

## 2019-10-12 PROCEDURE — 25000128 H RX IP 250 OP 636: Performed by: INTERNAL MEDICINE

## 2019-10-12 PROCEDURE — 25000131 ZZH RX MED GY IP 250 OP 636 PS 637: Performed by: INTERNAL MEDICINE

## 2019-10-12 PROCEDURE — 12000047 ZZH R&B IMCU

## 2019-10-12 PROCEDURE — 99232 SBSQ HOSP IP/OBS MODERATE 35: CPT | Performed by: INTERNAL MEDICINE

## 2019-10-12 PROCEDURE — 00000146 ZZHCL STATISTIC GLUCOSE BY METER IP

## 2019-10-12 PROCEDURE — 25800030 ZZH RX IP 258 OP 636: Performed by: INTERNAL MEDICINE

## 2019-10-12 PROCEDURE — 25000132 ZZH RX MED GY IP 250 OP 250 PS 637: Performed by: INTERNAL MEDICINE

## 2019-10-12 RX ORDER — NICOTINE POLACRILEX 4 MG
15-30 LOZENGE BUCCAL
Status: DISCONTINUED | OUTPATIENT
Start: 2019-10-12 | End: 2019-10-12

## 2019-10-12 RX ORDER — DEXTROSE MONOHYDRATE 25 G/50ML
25-50 INJECTION, SOLUTION INTRAVENOUS
Status: DISCONTINUED | OUTPATIENT
Start: 2019-10-12 | End: 2019-10-12

## 2019-10-12 RX ADMIN — LEVOTHYROXINE SODIUM 25 MCG: 25 TABLET ORAL at 08:09

## 2019-10-12 RX ADMIN — METOPROLOL TARTRATE 25 MG: 25 TABLET ORAL at 20:12

## 2019-10-12 RX ADMIN — Medication 3 UNITS/HR: at 05:52

## 2019-10-12 RX ADMIN — FINASTERIDE 5 MG: 5 TABLET, FILM COATED ORAL at 08:09

## 2019-10-12 RX ADMIN — INSULIN GLARGINE 12 UNITS: 100 INJECTION, SOLUTION SUBCUTANEOUS at 21:24

## 2019-10-12 RX ADMIN — HYDRALAZINE HYDROCHLORIDE 10 MG: 20 INJECTION INTRAMUSCULAR; INTRAVENOUS at 22:44

## 2019-10-12 RX ADMIN — TAMSULOSIN HYDROCHLORIDE 0.4 MG: 0.4 CAPSULE ORAL at 08:09

## 2019-10-12 RX ADMIN — INSULIN GLARGINE 10 UNITS: 100 INJECTION, SOLUTION SUBCUTANEOUS at 12:58

## 2019-10-12 RX ADMIN — INSULIN GLARGINE 4 UNITS: 100 INJECTION, SOLUTION SUBCUTANEOUS at 22:36

## 2019-10-12 RX ADMIN — METOPROLOL TARTRATE 25 MG: 25 TABLET ORAL at 08:09

## 2019-10-12 RX ADMIN — ATORVASTATIN CALCIUM 20 MG: 20 TABLET, FILM COATED ORAL at 08:09

## 2019-10-12 ASSESSMENT — ACTIVITIES OF DAILY LIVING (ADL)
ADLS_ACUITY_SCORE: 17
ADLS_ACUITY_SCORE: 19
ADLS_ACUITY_SCORE: 17

## 2019-10-12 NOTE — PROVIDER NOTIFICATION
Glucose >500. Received 15units Novolog for sliding scale insulin and Carb coverage. Request to re-order Insulin drip to resume.

## 2019-10-12 NOTE — PROVIDER NOTIFICATION
KIP paged dr Herrera: 0800 BG 86, stopped drip and pt is eating breakfast now, will recheck BG again at 0900.    Orders received.

## 2019-10-12 NOTE — PLAN OF CARE
Vss, no co pain/cp/sob.   Pt was started back on an insulin drip last night, BG this shift were: 86 (held for 1 hour), 141, 231, 208, 175, 146, 147.  Sliding scale insulin added with cho counting and lantus, drip was stopped at 1350.  Pt is using the IS with encouragement, R hand numbness at baseline, alarms on for safety as pt is forgetful, up with Ax1+gb+walker to bathroom, INC of urine, PVR check at 0700 was less than 200ml without any complaints this shift. Continue poc and close monitoring, IMC status, possible dc in the am if BG have been controlled.

## 2019-10-12 NOTE — PROVIDER NOTIFICATION
"Text page MD: \"Can we discontinue IMC orders? Pt BGL since 3pm are: 171, 190, and 248 mg/dL. Pt has been off insulin gtt.\"  "

## 2019-10-12 NOTE — PROGRESS NOTES
[facility]  Hospitalist Progress Note  Bebeto Herrera MD, MD 10/12/2019  (Text Page)  Reason for Stay (Diagnosis): Earlier hypoglycemia, abdominal pain, bladder stones         Assessment and Plan:      Summary of Stay: Summary of Stay: Pete Sheldon is a 79 year old male with known history of hypertension, insulin requiring diabetes mellitus, dyslipidemia, prior kidney stones, dyslipidemia, hypothyroidism, prior CVA and currently living at home with his wife and was on his usual state of health until earlier this afternoon where he started to complain of abdominal pain mostly in the periumbilical area also radiation to the right flank area     1.  Abdominal pain, earlier flank pain  2.  Recurrent hypoglycemia  3.  History of insulin requiring diabetes mellitus/very brittle diabetes  -Episodes of hypoglycemia and severe hyperglycemia  -Requiring infusion of IV insulin  -Change basal insulin dosing to twice daily.  Please administer Lantus as ordered and discontinue IV insulin if able to hours after administration of Lantus.  Continuation of prandial insulin coverage.  -Continue high intensity insulin sliding scale    4.  Concerns for ureteral stones.  Bladder stones  -CT scan done earlier the ED showed laminar calcification near the posterior wall near right UVJ. ?  Hydroureter/ureteral stone mentioned in report?  -Requested formal urology input  -Control pain    -Appreciate urology evaluation, no plans for any intervention for now.  Patient is not retaining urine with PVR less than 200.  -Prior cystoscopy last year showed normal findings    5.  CKD-stable creatinine    DVT Prophylaxis: Pneumatic Compression Devices  Code Status: Full Code  Discharge Dispo: Hopeful for home discharge  Estimated Disch Date / # of Days until Disch: Anticipating in the next 24 hours if no further issues with severe hyperglycemia or hypoglycemia        Interval History (Subjective):      Continuing care today.   "Seen and examined.  Overnight had some issues with severe hyperglycemia leading to initiation of IV insulin infusion.  He tolerated his oral diet yesterday and denies any ongoing recurrence of abdominal pain.  No nausea or vomiting.  Mental state appears to be at baseline this morning.  He is interactive, conversant and following simple instructions.  Currently afebrile.  Stable hemodynamics.  Main concern is getting hungry as he is currently n.p.o.     # Pain Assessment:  Current Pain Score 10/12/2019   Patient currently in pain? denies   Pain score (0-10) -   Pain location -   Pain descriptors -   Pete gautam pain level was assessed and he currently denies pain.                  Physical Exam:      Last Vital Signs:  /61 (BP Location: Right arm)   Pulse 81   Temp 98.6  F (37  C) (Oral)   Resp 18   Ht 1.702 m (5' 7\")   Wt 70.7 kg (155 lb 12.8 oz)   SpO2 93%   BMI 24.40 kg/m      I/O last 3 completed shifts:  In: 643.88 [P.O.:480; I.V.:163.88]  Out: 375 [Urine:375]  Wt Readings from Last 1 Encounters:   10/11/19 70.7 kg (155 lb 12.8 oz)     Vitals:    10/09/19 2216 10/11/19 0555   Weight: 74.3 kg (163 lb 12.8 oz) 70.7 kg (155 lb 12.8 oz)       Constitutional: Awake, alert, cooperative, no apparent distress   Respiratory: Clear to auscultation bilaterally, no crackles or wheezing   Cardiovascular: Regular rate and rhythm, normal S1 and S2   Abdomen: Normal bowel sounds, soft, non-distended, non-tender   Skin: No rashes, no cyanosis, dry to touch   Neuro: Alert and oriented x3, no weakness, spontaneous and coherent speech   Extremities: No edema, normal range of motion   Other(s): Euthymic mood, not agitated       All other systems: Negative          Medications:      All current medications were reviewed with changes reflected in problem list.         Data:      All new lab and imaging data was reviewed.   Labs:  Recent Labs   Lab 10/09/19  1842   CULT <10,000 colonies/mL  mixed urogenital " yan  Susceptibility testing not routinely done       Recent Labs   Lab 10/09/19  1740   WBC 8.3   HGB 14.0   HCT 44.3   MCV 94        Recent Labs   Lab 10/10/19  0720 10/09/19  1740    138   POTASSIUM 4.0 4.3   CHLORIDE 106 104   CO2 30 29   ANIONGAP 3 5   GLC 92 63*   BUN 22 25   CR 1.31* 1.46*   GFRESTIMATED 51* 44*   GFRESTBLACK 59* 51*   MANAV 8.6 8.9   PROTTOTAL  --  7.5   ALBUMIN  --  2.9*   BILITOTAL  --  0.3   ALKPHOS  --  90   AST  --  13   ALT  --  23     No results for input(s): SED, CRP in the last 168 hours.  Recent Labs   Lab 10/12/19  1155 10/12/19  1111 10/12/19  1018 10/12/19  0906 10/12/19  0759  10/10/19  0720  10/09/19  1740   GLC  --   --   --   --   --   --  92  --  63*   * 208* 231* 141* 86   < >  --    < >  --     < > = values in this interval not displayed.     No results for input(s): INR in the last 168 hours.  No results for input(s): TROPONIN, TROPI, TROPR in the last 168 hours.    Invalid input(s): TROP, TROPONINIES  Recent Labs   Lab 10/09/19  1842   COLOR Light Yellow   APPEARANCE Clear   URINEGLC Negative   URINEBILI Negative   URINEKETONE Negative   SG 1.019   UBLD Negative   URINEPH 5.5   PROTEIN 100*   NITRITE Negative   LEUKEST Negative   RBCU 1   WBCU 1      Imaging:   Results for orders placed or performed during the hospital encounter of 10/09/19   CT Abdomen Pelvis without Contrast (stone protocol)    Addendum: 10/9/2019    ADDENDUM:  Voice recognition error in pelvis section of the findings. Findings should read as follows: NO HYDROURETER OR URETERAL STONE.         Narrative    EXAM: CT ABDOMEN PELVIS W/O CONTRAST  LOCATION: [facility]  DATE/TIME: 10/9/2019 6:07 PM    INDICATION: Dysuria, abdominal pain history of kidney stones  COMPARISON: 03/23/2018  TECHNIQUE: Helical thin-section CT scan of the abdomen and pelvis was performed without IV contrast. Multiplanar reformats were obtained. Dose reduction techniques were used.  CONTRAST:  None.    FINDINGS:   LUNG BASES: Mild aortic valve calcification, incompletely imaged, correlate for possible valvular stenosis. Severe coronary arterial calcification. Calcified periaortic lymph nodes consistent with prior granulomatous disease.    ABDOMEN: There are couple of subtle punctate 1 mm stones in each kidney but no hydronephrosis. Small stable cyst in the upper pole of the right kidney, 1 cm. No proximal hydroureter. Suspected circumcaval right ureteral course which may be an incidental   anomaly as there is no evidence for obstruction.    No free air or free fluid. Liver, gallbladder, pancreas, spleen, adrenal glands appear normal. Large amount of stool in the colon. No evidence for bowel obstruction. Diffuse aortic calcification but no aneurysm.    PELVIS: Hydroureter or ureteral stone. Laminar calcification along the right posterior wall urinary bladder near the ureterovesical junction, slightly increased compared to prior exam, currently 1.6 cm transversely previously 1.0 cm. No free air or free   fluid in the pelvis. No adenopathy. Small left femoral or inguinal hernia similar to prior exam containing a short segment of nonobstructed descending colon.    MUSCULOSKELETAL: Negative.      Impression    CONCLUSION:   1.  No acute abnormality in the abdomen or pelvis. No significant change since prior exam.

## 2019-10-12 NOTE — PROGRESS NOTES
Notified by RN for blood sugar above 500.  This was after NovoLog 15 units.  He was transitioned from insulin drip to Lantus insulin and insulin sliding scale today.  Will restart on insulin drip.

## 2019-10-12 NOTE — PROGRESS NOTES
Restarted insulin drip due to . Voided X1 with incontinent. Mod carb diet. Up with assist of 1 on gait belt to bathroom. Continue POC,

## 2019-10-12 NOTE — PLAN OF CARE
Hx: Pt was admitted on 10/9 for abdominal pain and hypoglycemia. Since admission Pt became hyperglycemic requiring an insulin gtt. Pt is being treated for IDDM and kidney stones. PMH includes hypothyroidism, CVA Hx, CKD, HTN, dyslipidemia and previous kidney stones. Today Pt insulin gtt has been discontinued and Pt has transitioned to subcutaneous insulin.  Orientation: Disoriented to: time and situation. Ambulation: 1X c/ GB & walker.  V/S: SBP in 150 - 160's PRN hydralazine administered per Rx parameters. Minimal change in B/P 15 min post IV administration. Pt is on RA. Pain: Denies. BGL: Since 3pm: 171, 190, 248 mg/dL for hourly BGL checks. 411 & 379 mg/dL @ HS. Sliding scale and CHO coverage provided, per orders.  Tele/CV: Normal S1, S2 noted.  LS: Clear, equal bilaterally.  : Urinary incontinence.  GI: Active & audible X4, LKBM was 10/11.  IV: SL, flushes well.  Plan: Possible discharge tomorrow to home pending BGL control.  Other: Recommend to monitor Pts BGL frequently through the night to monitor Pts tolerance to the Rx.

## 2019-10-12 NOTE — PROVIDER NOTIFICATION
Spoke with dr ribeiro regarding stopping the insulin drip now (1 hour early) as the syringe is empty or if there was a need to open a new one for 1 only hour.  After discussion with him of what BG levels have been, insulin coverage given at those times and pt status, it was decided that it would be ok to stop the drip at this time.

## 2019-10-12 NOTE — PLAN OF CARE
Alert. Disoriented to time. Denies pain. On Insulin Gtt. Assist of 1 with gait belt. Incontinent of urine.

## 2019-10-13 VITALS
BODY MASS INDEX: 24.45 KG/M2 | HEART RATE: 66 BPM | SYSTOLIC BLOOD PRESSURE: 136 MMHG | DIASTOLIC BLOOD PRESSURE: 68 MMHG | OXYGEN SATURATION: 92 % | TEMPERATURE: 97.9 F | HEIGHT: 67 IN | RESPIRATION RATE: 18 BRPM | WEIGHT: 155.8 LBS

## 2019-10-13 LAB
GLUCOSE BLDC GLUCOMTR-MCNC: 207 MG/DL (ref 70–99)
GLUCOSE BLDC GLUCOMTR-MCNC: 210 MG/DL (ref 70–99)
GLUCOSE BLDC GLUCOMTR-MCNC: 229 MG/DL (ref 70–99)
GLUCOSE BLDC GLUCOMTR-MCNC: 288 MG/DL (ref 70–99)
GLUCOSE BLDC GLUCOMTR-MCNC: 299 MG/DL (ref 70–99)

## 2019-10-13 PROCEDURE — 99239 HOSP IP/OBS DSCHRG MGMT >30: CPT | Performed by: INTERNAL MEDICINE

## 2019-10-13 PROCEDURE — 25000132 ZZH RX MED GY IP 250 OP 250 PS 637: Performed by: INTERNAL MEDICINE

## 2019-10-13 PROCEDURE — 25000131 ZZH RX MED GY IP 250 OP 636 PS 637: Performed by: INTERNAL MEDICINE

## 2019-10-13 PROCEDURE — 00000146 ZZHCL STATISTIC GLUCOSE BY METER IP

## 2019-10-13 RX ORDER — METOPROLOL TARTRATE 25 MG/1
25 TABLET, FILM COATED ORAL 2 TIMES DAILY
Qty: 60 TABLET | Refills: 0 | Status: SHIPPED | OUTPATIENT
Start: 2019-10-13 | End: 2019-10-16

## 2019-10-13 RX ADMIN — INSULIN GLARGINE 18 UNITS: 100 INJECTION, SOLUTION SUBCUTANEOUS at 09:10

## 2019-10-13 RX ADMIN — METOPROLOL TARTRATE 25 MG: 25 TABLET ORAL at 08:22

## 2019-10-13 RX ADMIN — TAMSULOSIN HYDROCHLORIDE 0.4 MG: 0.4 CAPSULE ORAL at 08:22

## 2019-10-13 RX ADMIN — FINASTERIDE 5 MG: 5 TABLET, FILM COATED ORAL at 08:22

## 2019-10-13 RX ADMIN — ATORVASTATIN CALCIUM 20 MG: 20 TABLET, FILM COATED ORAL at 08:22

## 2019-10-13 RX ADMIN — LEVOTHYROXINE SODIUM 25 MCG: 25 TABLET ORAL at 08:22

## 2019-10-13 ASSESSMENT — ACTIVITIES OF DAILY LIVING (ADL)
ADLS_ACUITY_SCORE: 19

## 2019-10-13 NOTE — DISCHARGE SUMMARY
[facility]  Discharge Summary  Name: Pete Sheldon    MRN: [id number]  YOB: 1940    Age: 79 year old  Date of Discharge:  10/13/2019  Date of Admission: 10/9/2019  Primary Care Provider: Rom Helm  Discharge Physician:  Bebeto Herrera MD  Discharging Service:  Hospitalist      Discharge Diagnosis:  Abdominal/flank pain-resolved  Bladder stones-no signs of obstruction, voiding freely, minimal PVD  Recurrent hypoglycemia, complicated with hyperglycemia-likely with brittle diabetes  Insulin requiring uncontrolled diabetes mellitus  Prior CVA  Hypertension  Hypothyroidism     Other Diagnosis:  Past Medical History:   Diagnosis Date     Calculus of ureter 1980's     Hypertension      Hypothyroidism      Microalbuminuria 2/15/2016     Mumps      Other and unspecified hyperlipidemia      Type 2 diabetes, HbA1C goal < 8% (H) 7/1995          Discharge Disposition:  Discharged to home     Allergies:  Allergies   Allergen Reactions     Losartan Other (See Comments)     Dizziness        Discharge Medications:   Current Discharge Medication List      START taking these medications    Details   metoprolol tartrate (LOPRESSOR) 25 MG tablet Take 1 tablet (25 mg) by mouth 2 times daily  Qty: 60 tablet, Refills: 0    Associated Diagnoses: Hypertension goal BP (blood pressure) < 140/90         CONTINUE these medications which have CHANGED    Details   insulin glargine (LANTUS SOLOSTAR) 100 UNIT/ML pen Inject 18 Units Subcutaneous 2 times daily  Qty: 30 mL, Refills: 0    Comments: If Lantus is not covered by insurance, may substitute Basaglar at same dose and frequency.    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)      atorvastatin (LIPITOR) 20 MG tablet TAKE 1 TABLET BY MOUTH ONCE DAILY  Qty: 90 tablet, Refills: 3    Associated Diagnoses: 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)      finasteride (PROSCAR) 5 MG tablet TAKE 1 TABLET BY MOUTH ONCE DAILY  Qty: 90 tablet, Refills: 1    Associated Diagnoses: Urinary frequency      Glucosamine HCl 750 MG TABS Take 750 mg by mouth 2 times daily    Associated Diagnoses: Arthritis      insulin aspart (NOVOLOG FLEXPEN) 100 UNIT/ML pen INJECT 6 UNITS WITH BREAKFAST, 4 UNITS WITH LUNCH, 5 UNITS WITH DINNER, HOLD IF NOT EATING MEAL  Qty: 30 mL, Refills: 1    Associated Diagnoses: Type 2 diabetes mellitus with diabetic polyneuropathy, with long-term current use of insulin (H)      levothyroxine (SYNTHROID/LEVOTHROID) 25 MCG tablet Take 1 tablet (25 mcg) by mouth daily  Qty: 90 tablet, Refills: 0    Associated Diagnoses: Hypothyroidism, unspecified type      multivitamin (OCUVITE) TABS Take 1 tablet by mouth daily FOCUS SELECT PREMIUM WITH LUTEIN per eye doctor  Reported on 5/19/2017      tamsulosin (FLOMAX) 0.4 MG capsule TAKE 1 CAPSULE BY MOUTH ONCE DAILY  Qty: 90 capsule, Refills: 2    Associated Diagnoses: Hyperlipidemia LDL goal <100; Calculus of kidney      vitamin C (ASCORBIC ACID) 1000 MG TABS Take 1,000 mg by mouth 2 times daily      !! B-D U/F 31G X 8 MM insulin pen needle USE  4 TIMES DAILY TO  INJECT  INSULIN  Qty: 400 each, Refills: 3    Associated Diagnoses: Type 2 diabetes, HbA1C goal < 8% (H)      blood glucose (ONETOUCH VERIO IQ) test strip USE 1 STRIP TO CHECK GLUCOSE 4 TIMES DAILY OR  AS  DIRECTED  USING  ONE  TOUCH  VERIO  FLEX  Qty: 400 strip, Refills: 3    Associated Diagnoses: Type 2 diabetes mellitus with diabetic polyneuropathy, with long-term current use of insulin (H)      !! blood  "glucose monitoring (ONE TOUCH ULTRASOFT) lancets Use to test blood sugar 4 times daily or as directed.  Qty: 200 each, Refills: 6    Associated Diagnoses: Type 2 diabetes mellitus with diabetic polyneuropathy, with long-term current use of insulin (H)      !! insulin pen needle (B-D U/F) 31G X 8 MM B-D ULTRA FINE SHORT PEN NEEDLES, USE 4 TIMES A DAY TO INJECT INSULIN  Qty: 400 each, Refills: 0    Associated Diagnoses: Type 2 diabetes, HbA1C goal < 8% (H)      insulin syringes, disposable, U-100 0.3 ML MISC 1 each 2 times daily  Qty: 100 each, Refills: 6    Associated Diagnoses: Type II or unspecified type diabetes mellitus without mention of complication, not stated as uncontrolled      !! LIFESCAN FINEPOINT LANCETS MISC Use to test blood sugars 4 times daily or as directed.  Qty: 200 strip, Refills: 3    Associated Diagnoses: Type 2 diabetes, HbA1C goal < 8% (H)      Nutritional Supplements (GLUCOSE MANAGEMENT) TABS 4 tabs po for low blood sugar below 70, may repeat q 10-15 minutes as needed  Qty: 100 tablet, Refills: prn    Associated Diagnoses: Type 2 diabetes mellitus with diabetic polyneuropathy, with long-term current use of insulin (H)      order for DME All DM testing supplies including test strips, lancets, solution, syringes, needles and/or pen needles for testing 4 times per day.    Brand \"Contour Next\"  Qty: 3 Month, Refills: 1    Associated Diagnoses: Type 2 diabetes mellitus with diabetic polyneuropathy (H)       !! - Potential duplicate medications found. Please discuss with provider.      STOP taking these medications       ACE/ARB NOT PRESCRIBED, INTENTIONAL, Comments:   Reason for Stopping:         metFORMIN (GLUCOPHAGE) 500 MG tablet Comments:   Reason for Stopping:                Condition on Discharge:  Discharge condition: Stable   Discharge vitals: Blood pressure 127/62, pulse 66, temperature 98.3  F (36.8  C), temperature source Oral, resp. rate 18, height 1.702 m (5' 7\"), weight 70.7 kg (155 " lb 12.8 oz), SpO2 98 %.   Code status on discharge: Full Code     History of Present Illness:  See detailed admission note for full details.        Significant Physical Exam Findings Day of Discharge:  HEENT; Atraumatic, normocephalic, pinkish conjuctiva, pupils bilateral reactive   Skin: warm and moist, no rashes  Lungs: equal chest expansion, clear to auscultation, no wheezes, no stridor, no crackles,   Heart: normal rate, normal rhythm, no rubs or gallops.   Abdomen: normal bowel sounds, no tenderness, no peritoneal signs, no guarding  Extremities: no deformities, no edema   Neuro; follow commands, alert and oriented x3, spontaneous speech, coherent, moves all extremities spontaneously  Psych; no hallucination, euthymic mood, not agitated        Procedures other than Imaging:  none     Imaging:  Results for orders placed or performed during the hospital encounter of 10/09/19   CT Abdomen Pelvis without Contrast (stone protocol)    Addendum: 10/9/2019    ADDENDUM:  Voice recognition error in pelvis section of the findings. Findings should read as follows: NO HYDROURETER OR URETERAL STONE.         Narrative    EXAM: CT ABDOMEN PELVIS W/O CONTRAST  LOCATION: [facility]  DATE/TIME: 10/9/2019 6:07 PM    INDICATION: Dysuria, abdominal pain history of kidney stones  COMPARISON: 03/23/2018  TECHNIQUE: Helical thin-section CT scan of the abdomen and pelvis was performed without IV contrast. Multiplanar reformats were obtained. Dose reduction techniques were used.  CONTRAST: None.    FINDINGS:   LUNG BASES: Mild aortic valve calcification, incompletely imaged, correlate for possible valvular stenosis. Severe coronary arterial calcification. Calcified periaortic lymph nodes consistent with prior granulomatous disease.    ABDOMEN: There are couple of subtle punctate 1 mm stones in each kidney but no hydronephrosis. Small stable cyst in the upper pole of the right kidney, 1 cm. No proximal hydroureter. Suspected  circumcaval right ureteral course which may be an incidental   anomaly as there is no evidence for obstruction.    No free air or free fluid. Liver, gallbladder, pancreas, spleen, adrenal glands appear normal. Large amount of stool in the colon. No evidence for bowel obstruction. Diffuse aortic calcification but no aneurysm.    PELVIS: Hydroureter or ureteral stone. Laminar calcification along the right posterior wall urinary bladder near the ureterovesical junction, slightly increased compared to prior exam, currently 1.6 cm transversely previously 1.0 cm. No free air or free   fluid in the pelvis. No adenopathy. Small left femoral or inguinal hernia similar to prior exam containing a short segment of nonobstructed descending colon.    MUSCULOSKELETAL: Negative.      Impression    CONCLUSION:   1.  No acute abnormality in the abdomen or pelvis. No significant change since prior exam.            Consultations:  Consultation during this admission received from urology.     Recent Lab Results:  Recent Labs   Lab 10/09/19  1740   WBC 8.3   HGB 14.0   HCT 44.3   MCV 94        Recent Labs   Lab 10/09/19  1842   CULT <10,000 colonies/mL  mixed urogenital yan  Susceptibility testing not routinely done       Recent Labs   Lab 10/10/19  0720 10/09/19  1740    138   POTASSIUM 4.0 4.3   CHLORIDE 106 104   CO2 30 29   ANIONGAP 3 5   GLC 92 63*   BUN 22 25   CR 1.31* 1.46*   GFRESTIMATED 51* 44*   GFRESTBLACK 59* 51*   MANAV 8.6 8.9   PROTTOTAL  --  7.5   ALBUMIN  --  2.9*   BILITOTAL  --  0.3   ALKPHOS  --  90   AST  --  13   ALT  --  23     Recent Labs   Lab 10/13/19  0811 10/13/19  0430 10/13/19  0226 10/13/19  0006 10/12/19  2146  10/10/19  0720  10/09/19  1740   GLC  --   --   --   --   --   --  92  --  63*   * 207* 229* 288* 379*   < >  --    < >  --     < > = values in this interval not displayed.     Recent Labs   Lab 10/09/19  1740   LACT 1.0     No results for input(s): TROPONIN, TROPI, TROPR in the  last 168 hours.    Invalid input(s): TROP, TROPONINIES  Recent Labs   Lab 10/09/19  1842   COLOR Light Yellow   APPEARANCE Clear   URINEGLC Negative   URINEBILI Negative   URINEKETONE Negative   SG 1.019   UBLD Negative   URINEPH 5.5   PROTEIN 100*   NITRITE Negative   LEUKEST Negative   RBCU 1   WBCU 1          Pending Results:    Unresulted Labs Ordered in the Past 30 Days of this Admission     No orders found from 9/9/2019 to 10/10/2019.           Discharge Instructions and Follow-Up:   Discharge diet: Orders Placed This Encounter      Moderate Consistent CHO Diet      Diet     Discharge activity: Activity as tolerated   Discharge follow-up: 1 week with PCP, urology as scheduled   Outpatient therapy: None    Other instructions: None      Hospital Course:  Continuing care today for this very pleasant elderly  gentleman who initially presented here with abdominal pain/flank pain and found with recurrent hypoglycemia in the emergency room.  He is being managed with basal and prandial insulin together with metformin at home.  He has a prior history of urethral, kidney and bladder stones.  There was some concerns if this is the source of his abdominal and flank pain.  Seen and optimized from urology perspective and was not requiring surgical intervention as patient is voiding freely with minimal post void residuals he is abdominal and flank pain has subsequently improved and resolved.  He stated in the hospital as there is some issues with his brittle diabetes initially presented with hypoglycemia requiring dextrose containing IV fluid support and holding his insulin regimen as he was placed n.p.o. while awaiting for formal urology input.  Eventually placed on a diet and had some issues with severe hyperglycemia resulting in the need for insulin IV infusion.  This was subsequently tapered and resumed back on his basal and prandial insulin however had a recurrent event again necessitating resumption of IV  insulin infusion.  He is home regimen was modified to Lantus twice daily dosing at 18 units subcutaneously, provided with 1 unit per carbohydrate unit for prandial coverage and high intensity sliding scale here.  This was also continued upon discharge in terms of his basal coverage but he can resume his known prior regimen for AC meals prandial insulin.  His metformin remained on hold in the setting of his elevated creatinine levels which is still appears to be at baseline.  Added metoprolol during his hospitalization for optimization of his blood pressure levels.  This changes with medications and the need for follow-up care were discussed in detail with patient's wife over the phone this morning and with our patient as well.    Please see my prior progress notes as listed below for other details of his hospitalization.  Summary of Stay: Summary of Stay: Pete Sheldon is a 79 year old male with known history of hypertension, insulin requiring diabetes mellitus, dyslipidemia, prior kidney stones, dyslipidemia, hypothyroidism, prior CVA and currently living at home with his wife and was on his usual state of health until earlier this afternoon where he started to complain of abdominal pain mostly in the periumbilical area also radiation to the right flank area     1.  Abdominal pain, earlier flank pain  2.  Recurrent hypoglycemia  3.  History of insulin requiring diabetes mellitus/very brittle diabetes  -Episodes of hypoglycemia and severe hyperglycemia  -Requiring infusion of IV insulin  -Change basal insulin dosing to twice daily.  Please administer Lantus as ordered and discontinue IV insulin if able to hours after administration of Lantus.  Continuation of prandial insulin coverage.  -Continue high intensity insulin sliding scale     4.  Concerns for ureteral stones.  Bladder stones  -CT scan done earlier the ED showed laminar calcification near the posterior wall near right UVJ. ?  Hydroureter/ureteral stone  mentioned in report?  -Requested formal urology input  -Control pain     -Appreciate urology evaluation, no plans for any intervention for now.  Patient is not retaining urine with PVR less than 200.  -Prior cystoscopy last year showed normal findings     5.  CKD-stable creatinine     DVT Prophylaxis: Pneumatic Compression Devices  Code Status: Full Code  Discharge Dispo: Hopeful for home discharge  Estimated Disch Date / # of Days until Disch: Anticipating in the next 24 hours if no further issues with severe hyperglycemia or hypoglycemia        Total time spent in face to face contact with the patient and coordinating discharge was:  > 30 Minutes.

## 2019-10-13 NOTE — PROVIDER NOTIFICATION
"Text page MD: \"Pt BGL is 411mg/dL. 7 units insulin aspart given in addition to 12 units of Lantus.\"    RN: Rechecked BGL with a result of 379 mg/dL.    RN: VS at Pt baseline.    MD: 4 Units of subcutaneous Lantus added.  "

## 2019-10-13 NOTE — PROGRESS NOTES
Pt is in stable condition, vss, no co pain/cp/sob.  Pt dc home today. (Spouse and grandson were to arrive [phone] to pickup pt but the grandson's car will not start, unsure of new pickup time.) All dc education has been printed and placed on the front of the chart in regards to: diet, activity, safety, s/s to report, medications and rx (2 bags - one in the fridge and the other placed on top of the med cart), follow up appointments/care, checking and recording BG, urology, etc. Pt still needs to get dressed, pack up belongings and have PIV removed.  AVS should be reviewed with pt/family prior to discharge by evening shift RN.  Charge RN is aware of delayed discharge and MD is aware of BG levels in the 200s.

## 2019-10-13 NOTE — PLAN OF CARE
Orientation: Alert and oriented x3, disoriented to situation  VSS. 94% on RA.   Tele: N/A. HR 69.   LS: Clear, denies SOB/VALDEZ  GI: BS audible/active x4, tolerating PO, , 229 and 207 this shift. Passing gas. No BM this shift. Denies N/V.   : Adequate urine output. Yes  Skin: Intact  Activity: Ax1 w/walker. Pt slept comfortably throughout shift.   Pain: 0/10  Plan: Pain control, supportive cares, blood glucose monitoring, discharge TBD. Continue with current cares.

## 2019-10-13 NOTE — PLAN OF CARE
Pt is ready for discharge. Discharge instructions (both verbal and written) were given to Pt and Pt's wife (who manages all of the patients medications at home). Writer emphasized diabetes education. Writer educated caretaker and patient of managing S/S of hyper/hypoglycemia and the importance of blood sugar checks. Furthermore, writer educated Pt and caretaker of when to contact the provider based on S/S of the patient and keeping a record of all BGL readings to bring to follow-up visits. Writer stressed the importance of new medication changes (I.e: Lantus dosage change and frequency, as well as metoprolol medication). Pt and caretaker was instructed to check a blood pressure and pulse before medication administration for beta-blockers, side-effects, and when to contact a provider when symptomatic. Writer educated patient of reason for hospitalization and management of new diagnosis. Writer educated Pt and caretaker of follow-up appointments. Pt's PIV was removed, Pt was dressed, and all belongings were sent home with the patient. Pt discharge from the hospital at 1645.    Time spent with education process and Pt interaction was 45 minutes.

## 2019-10-14 ENCOUNTER — TELEPHONE (OUTPATIENT)
Dept: INTERNAL MEDICINE | Facility: CLINIC | Age: 79
End: 2019-10-14

## 2019-10-14 DIAGNOSIS — E11.22 TYPE 2 DIABETES MELLITUS WITH STAGE 3 CHRONIC KIDNEY DISEASE, WITH LONG-TERM CURRENT USE OF INSULIN (H): Primary | ICD-10-CM

## 2019-10-14 DIAGNOSIS — N18.30 TYPE 2 DIABETES MELLITUS WITH STAGE 3 CHRONIC KIDNEY DISEASE, WITH LONG-TERM CURRENT USE OF INSULIN (H): Primary | ICD-10-CM

## 2019-10-14 DIAGNOSIS — Z79.4 TYPE 2 DIABETES MELLITUS WITH STAGE 3 CHRONIC KIDNEY DISEASE, WITH LONG-TERM CURRENT USE OF INSULIN (H): Primary | ICD-10-CM

## 2019-10-14 NOTE — TELEPHONE ENCOUNTER
"Telephone encounter routed to primary care provider to complete the diab ed referral (per protocol stated below, needs diab ed referral d/t change in insulin regimen).  Then ok to close encounter.      Hospital/TCU/ED for chronic condition Discharge Protocol    \"Hi, my name is Alondra Guerra RN, a registered nurse, and I am calling from [facility].  I am calling to follow up and see how things are going for you after your recent emergency visit/hospital/TCU stay.\"    Tell me how you are doing now that you are home?\" doing ok per wife (consent to communicate in chart).      Discharge Instructions    \"Let's review your discharge instructions.  What is/are the follow-up recommendations?  Pt. Response: medication changes and f/u with primary care provider    \"Has an appointment with your primary care provider been scheduled?\"   Yes. (confirm)  Patient also has an appointment for a f/u with Dr. Damian in December 2019 regarding diabetes.    \"When you see the provider, I would recommend that you bring your medications with you.\"    Medications    \"Tell me what changed about your medicines when you discharged?\"    Changes to chronic meds?    0-1    \"What questions do you have about your medications?\"    None     New diagnoses of heart failure, COPD, diabetes, or MI?    No     On insulin: \"Did you start on insulin in the hospital or did you have your insulin dose changed?\"  Yes - Diabetes Education Referral needed (unless MTM Referral already ordered)  Per wife, Lantus insulin was changed from 30 unit(s) daily to 18 unit(s) BID.         Post Discharge Medication Reconciliation Status follow medication regimen as per discharge summary.    Was MTM referral placed (*Make sure to put transitions as reason for referral)?   No    Call Summary    \"What questions or concerns do you have about your recent visit and your follow-up care?\"     none    \"If you have questions or things don't continue to improve, we encourage " "you contact us through the main clinic number (give number).  Even if the clinic is not open, triage nurses are available 24/7 to help you.     We would like you to know that our clinic has extended hours (provide information).  We also have urgent care (provide details on closest location and hours/contact info)\"      \"Thank you for your time and take care!\"             "

## 2019-10-14 NOTE — TELEPHONE ENCOUNTER
IP F/U    Date: 10-13-19  Diagnosis: abdominal pain, epigastric, hypertension goal bp < 140/90  Is patient active in care coordination? No  Was patient in TCU? No    Next 5 appointments (look out 90 days)    Oct 16, 2019  2:20 PM CDT  [facility] [address]  [facility] [address]  [phone]   Dec 05, 2019  2:30 PM CST  Return Visit with L[facility]ian MD  [facility] [address]  [facility] [address]  [phone]           Normal rate, regular rhythm.  Heart sounds S1, S2.  No murmurs, rubs or gallops.
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
## 2019-10-16 ENCOUNTER — OFFICE VISIT (OUTPATIENT)
Dept: INTERNAL MEDICINE | Facility: CLINIC | Age: 79
End: 2019-10-16
Payer: COMMERCIAL

## 2019-10-16 VITALS
SYSTOLIC BLOOD PRESSURE: 138 MMHG | RESPIRATION RATE: 18 BRPM | DIASTOLIC BLOOD PRESSURE: 77 MMHG | TEMPERATURE: 98.2 F | BODY MASS INDEX: 25.98 KG/M2 | HEIGHT: 67 IN | OXYGEN SATURATION: 97 % | WEIGHT: 165.5 LBS | HEART RATE: 62 BPM

## 2019-10-16 DIAGNOSIS — Z79.4 TYPE 2 DIABETES MELLITUS WITH STAGE 3 CHRONIC KIDNEY DISEASE, WITH LONG-TERM CURRENT USE OF INSULIN (H): Primary | ICD-10-CM

## 2019-10-16 DIAGNOSIS — E78.5 HYPERLIPIDEMIA LDL GOAL <100: ICD-10-CM

## 2019-10-16 DIAGNOSIS — I10 HYPERTENSION GOAL BP (BLOOD PRESSURE) < 140/90: ICD-10-CM

## 2019-10-16 DIAGNOSIS — N21.0 BLADDER STONE: ICD-10-CM

## 2019-10-16 DIAGNOSIS — N20.0 CALCULUS OF KIDNEY: ICD-10-CM

## 2019-10-16 DIAGNOSIS — R10.84 ABDOMINAL PAIN, GENERALIZED: ICD-10-CM

## 2019-10-16 DIAGNOSIS — N18.30 TYPE 2 DIABETES MELLITUS WITH STAGE 3 CHRONIC KIDNEY DISEASE, WITH LONG-TERM CURRENT USE OF INSULIN (H): Primary | ICD-10-CM

## 2019-10-16 DIAGNOSIS — E11.22 TYPE 2 DIABETES MELLITUS WITH STAGE 3 CHRONIC KIDNEY DISEASE, WITH LONG-TERM CURRENT USE OF INSULIN (H): Primary | ICD-10-CM

## 2019-10-16 PROCEDURE — 99495 TRANSJ CARE MGMT MOD F2F 14D: CPT | Performed by: INTERNAL MEDICINE

## 2019-10-16 RX ORDER — METOPROLOL TARTRATE 25 MG/1
25 TABLET, FILM COATED ORAL 2 TIMES DAILY
Qty: 60 TABLET | Refills: 3 | Status: SHIPPED | OUTPATIENT
Start: 2019-10-16 | End: 2020-03-04

## 2019-10-16 ASSESSMENT — MIFFLIN-ST. JEOR: SCORE: 1414.33

## 2019-10-16 NOTE — PATIENT INSTRUCTIONS
Okay to stay on the metoprolol 25 mg twice a day that was started in the hospital.     Even though you have some high glucose readings over 300, I am concerned about raising the insulin too high, because you still get occasional low readings.   Therefore, let's reduce your Lantus from 18 units to 16 units twice a day.     See Dr Damian on 12/5/2019 (labs are on 11/27).     See Dr Helm in about three months.

## 2019-10-16 NOTE — PROGRESS NOTES
Subjective     Pete Sheldon is a 79 year old male who presents to clinic today for the following health issues:  Patient is here for a hospital follow up [facility] 10/09-10/13 hypertension, hypoglycemia.  [facility]       Hospital Follow-up Visit:    Hospital/Nursing Home/IP Rehab Facility: [facility]  Date of Admission: 10/09/2019  Date of Discharge: 10/13/2019  Reason(s) for Admission: hypertension, hypoglycemia            Problems taking medications regularly:  None       Medication changes since discharge: lopressor 25 mg 2 times daily       Problems adhering to non-medication therapy:  None    Summary of hospitalization:  [facility] discharge summary reviewed  Diagnostic Tests/Treatments reviewed.  Follow up needed: none  Other Healthcare Providers Involved in Patient s Care:         None  Update since discharge: stable.     Post Discharge Medication Reconciliation: discharge medications reconciled and changed, per note/orders (see AVS).  Plan of care communicated with patient     Coding guidelines for this visit:  Type of Medical   Decision Making Face-to-Face Visit       within 7 Days of discharge Face-to-Face Visit        within 14 days of discharge   Moderate Complexity [id number]   High Complexity 97029 96098            Patient was hospitalized from 10/9-10/13. He had presented to the ED for evaluation of flank pain. He has a hx of kidney stones. Abdominal CT scan revealed a bladder stone but surgical intervention was not deemed necessary as he was not retaining urine. He was recommended following up with urology in an out-patient setting. His abdominal and flank pain cleared, and there's been no reoccurrence.     During hospitalization patient had episodes of hypoglycemia and severe hyperglycemia. Upon hospital discharge, he was on 18 units of Lantus twice daily. His Novolog dosage remained the same at 6/4/5. Metformin was discontinued due to his creatinine levels.      Since  "hospital discharge, he's had 2 low glucose readings. Yesterday morning levels were 54 mg/dL, increased to 75 after juice. This morning glucose levels were 79 mg/dL. At lunch today levels were 236 mg/dL. Glucose levels have not bottomed out with administration of the extra insulin. He has not had any levels above 400, highest was 345 mg/dL.     Reviewed that patient was seen by Dr. Damian of endocrinology on 9/4/2019 and she decreased Lantus dosage from 32 units to 30 units daily. She made no changes in his metformin (500 mg) or Novolog dosages (6/4/5). Her note made mention that patient was not interested in Dexcom, they had difficulties with sliding scales in the past, and that he does not feel comfortable with counting carbohydrates.     Reviewed that Dr. Damian had recommended increasing Lantus by 1 unit if glucose levels were above 125 mg/dL.     Patient was also started on metoprolol due to hypertension while he was in the hospital. His dizziness has not worsened since starting the medication.      Reviewed and updated as needed this visit by Provider         Review of Systems   No dyspnea or cough. No chest discomfort, dizziness or palpitations. No diarrhea, abdominal pain or rectal bleeding.   No acute problems with vision or speech, lateralizing weakness or paresthesias.    ROS: as above or negative for Respiratory, CV, GI, endocrine, neuro systems.    This document serves as a record of the services and decisions personally performed and made by Rom Helm MD. It was created on his behalf by Doris Barajas, a trained medical scribe. The creation of this document is based on the provider's statements to the medical scribe.  Doris Barajas October 16, 2019 2:09 PM         Objective    /77 (BP Location: Left arm, Patient Position: Sitting, Cuff Size: Adult Small)   Pulse 62   Temp 98.2  F (36.8  C) (Oral)   Resp 18   Ht 1.702 m (5' 7\")   Wt 75.1 kg (165 lb 8 oz)   SpO2 97%   BMI 25.92 " "kg/m    Body mass index is 25.92 kg/m .     Physical Exam   GENERAL: healthy, alert and no distress  RESP: lungs clear to auscultation - no rales, rhonchi or wheezes  CV: regular rate and rhythm, normal S1 S2, no S3 or S4, no murmur, click or rub, no peripheral edema and peripheral pulses strong  MS: no gross musculoskeletal defects noted, no edema  SKIN: no suspicious lesions or rashes  NEURO: Normal strength and tone, mentation intact and speech normal  PSYCH: mentation appears normal, affect normal/bright    Diagnostic Test Results:  Labs reviewed in Epic        Assessment & Plan     (E11.22,  N18.3,  Z79.4) Type 2 diabetes mellitus with stage 3 chronic kidney disease, with long-term current use of insulin (H)  (primary encounter diagnosis)  Comment: Patient is having high glucose levels, but also sporadic levels as low in the 50-70's. Recommended decreasing Lantus to 16 units twice daily. Continue current Novolog dosages. Follow up with Dr. Damian as planned.   Plan: insulin glargine (LANTUS SOLOSTAR) 100 UNIT/ML         pen          (R10.84) Abdominal pain, generalized  Comment: No reoccurrence in pain    (I10) Hypertension goal BP (blood pressure) < 140/90  Comment: BP at target. Continue current meds.  Plan: metoprolol tartrate (LOPRESSOR) 25 MG tablet          (E78.5) Hyperlipidemia LDL goal <100  Comment: Last LDL was at target. Continue current meds.    (N21.0) Bladder stone  Comment: Follow up with urology as recommended  Plan: UROLOGY ADULT REFERRAL    (N20.0) Calculus of kidney  Comment: Follow up with urology as recommended  Plan: UROLOGY ADULT REFERRAL             BMI:   Estimated body mass index is 25.92 kg/m  as calculated from the following:    Height as of this encounter: 1.702 m (5' 7\").    Weight as of this encounter: 75.1 kg (165 lb 8 oz).       FUTURE APPOINTMENTS:       - Follow-up visit in 3 months    Patient Instructions   Okay to stay on the metoprolol 25 mg twice a day that was started " in the hospital.     Even though you have some high glucose readings over 300, I am concerned about raising the insulin too high, because you still get occasional low readings.   Therefore, let's reduce your Lantus from 18 units to 16 units twice a day.     See Dr Damian on 12/5/2019 (labs are on 11/27).     See Dr Helm in about three months.       The information in this document, created by the medical scribe for me, accurately reflects the services I personally performed and the decisions made by me. I have reviewed and approved this document for accuracy prior to leaving the patient care area.  October 16, 2019 2:42 PM    Rom Helm MD,   [facility]

## 2019-10-17 NOTE — TELEPHONE ENCOUNTER
Not sure that he needs a diabetes ed referral. Reviewed regimen with patient and wife yesterday 10/16/2019.

## 2019-11-01 ENCOUNTER — OFFICE VISIT (OUTPATIENT)
Dept: UROLOGY | Facility: CLINIC | Age: 79
End: 2019-11-01
Payer: COMMERCIAL

## 2019-11-01 VITALS
DIASTOLIC BLOOD PRESSURE: 68 MMHG | HEIGHT: 67 IN | SYSTOLIC BLOOD PRESSURE: 124 MMHG | BODY MASS INDEX: 25.9 KG/M2 | HEART RATE: 80 BPM | WEIGHT: 165 LBS

## 2019-11-01 DIAGNOSIS — N40.0 ENLARGED PROSTATE: Primary | ICD-10-CM

## 2019-11-01 LAB
ALBUMIN UR-MCNC: 100 MG/DL
APPEARANCE UR: CLEAR
BILIRUB UR QL STRIP: NEGATIVE
COLOR UR AUTO: YELLOW
GLUCOSE UR STRIP-MCNC: NEGATIVE MG/DL
HGB UR QL STRIP: NEGATIVE
KETONES UR STRIP-MCNC: NEGATIVE MG/DL
LEUKOCYTE ESTERASE UR QL STRIP: NEGATIVE
NITRATE UR QL: NEGATIVE
PH UR STRIP: 5.5 PH (ref 5–7)
PR INTERVAL - MUSE: 40
SOURCE: ABNORMAL
SP GR UR STRIP: 1.02 (ref 1–1.03)
UROBILINOGEN UR STRIP-ACNC: 0.2 EU/DL (ref 0.2–1)

## 2019-11-01 PROCEDURE — 51798 US URINE CAPACITY MEASURE: CPT | Performed by: UROLOGY

## 2019-11-01 PROCEDURE — 81003 URINALYSIS AUTO W/O SCOPE: CPT | Mod: QW | Performed by: UROLOGY

## 2019-11-01 PROCEDURE — 99213 OFFICE O/P EST LOW 20 MIN: CPT | Mod: 25 | Performed by: UROLOGY

## 2019-11-01 ASSESSMENT — PAIN SCALES - GENERAL: PAINLEVEL: NO PAIN (0)

## 2019-11-01 ASSESSMENT — MIFFLIN-ST. JEOR: SCORE: 1412.07

## 2019-11-01 NOTE — NURSING NOTE
Chief Complaint   Patient presents with     Clinic Care Coordination - Follow-up     Bladder Stone      Gloria Macias LPN

## 2019-11-01 NOTE — LETTER
2019       RE: Pete Sheldon  [address] [place] [address]     Dear Colleague,    Thank you for referring your patient, Pete Sheldon, to the [facility]. Please see a copy of my visit note below.    Office Visit Note  [facility]  [phone]    UROLOGIC DIAGNOSES:   Nocturia and urinary frequency, stone material in the  bladder     CURRENT INTERVENTIONS:   Flomax and finasteride    HISTORY:   Peet was hospitalized last month for nonspecific abdominal and periumbilical pain.  He had a catheter placed during hospitalization but only 200 mL of urine drained from the bladder.  His pain ultimately resolved on its own and his catheter was removed.  He is here today for urinalysis and bladder scan for follow-up.  He reports no further pain or problems since his hospitalization.  He has some occasional urinary urgency but otherwise no urinary complaints.      PAST MEDICAL HISTORY:   Past Medical History:   Diagnosis Date     Calculus of ureter      Hypertension      Hypothyroidism      Microalbuminuria 2/15/2016     Mumps      Other and unspecified hyperlipidemia      Type 2 diabetes, HbA1C goal < 8% (H) 1995       PAST SURGICAL HISTORY:   Past Surgical History:   Procedure Laterality Date     C NONSPECIFIC PROCEDURE      Nasal septoplasty     HC COLONOSCOPY THRU STOMA, DIAGNOSTIC  2007    Normal     HC FLEX SIGMOIDOSCOPY W/WO MIGUEL SPEC BY BRUSH/WASH  1999    Normal to 60 cm     HC REPAIR INCISIONAL HERNIA,REDUCIBLE  1999    Hernia Repair, Incisional, Unilateral (right)     HC REPAIR INCISIONAL HERNIA,REDUCIBLE      Hernia Repair, Incisional, Unilateral (left, with mesh placement)     TESTICLE SURGERY       VASECTOMY         FAMILY HISTORY:   Family History   Problem Relation Age of Onset     Cerebrovascular Disease Mother          age 95      Heart Disease Mother         age 89,  age 95     Cerebrovascular Disease Father          age 91, stroke two years previous     Cancer - colorectal Brother         Half-brother     Cancer Sister         Half-sister (?type)     Cancer Sister         Half-sister (?type)     Heart Disease Brother      Neurologic Disorder Daughter         Multiple Sclerosis     Psychotic Disorder Son         Schizophrenia       SOCIAL HISTORY:   Social History     Tobacco Use     Smoking status: Former Smoker     Packs/day: 0.00     Last attempt to quit: 3/11/1953     Years since quittin.6     Smokeless tobacco: Never Used   Substance Use Topics     Alcohol use: No       Current Outpatient Medications   Medication     aspirin 81 MG chewable tablet     atorvastatin (LIPITOR) 20 MG tablet     B-D U/F 31G X 8 MM insulin pen needle     blood glucose (ONETOUCH VERIO IQ) test strip     blood glucose monitoring (ONE TOUCH ULTRASOFT) lancets     finasteride (PROSCAR) 5 MG tablet     Glucosamine HCl 750 MG TABS     insulin aspart (NOVOLOG FLEXPEN) 100 UNIT/ML pen     insulin glargine (LANTUS SOLOSTAR) 100 UNIT/ML pen     insulin pen needle (B-D U/F) 31G X 8 MM     insulin syringes, disposable, U-100 0.3 ML MISC     levothyroxine (SYNTHROID/LEVOTHROID) 25 MCG tablet     theAudience[employer] FINEPOINT LANCETS MISC     metoprolol tartrate (LOPRESSOR) 25 MG tablet     multivitamin (OCUVITE) TABS     Nutritional Supplements (GLUCOSE MANAGEMENT) TABS     order for DME     tamsulosin (FLOMAX) 0.4 MG capsule     vitamin C (ASCORBIC ACID) 1000 MG TABS     No current facility-administered medications for this visit.          PHYSICAL EXAM:    There were no vitals taken for this visit.    Constitutional: Well developed. Conversant and in no acute distress  Eyes: Anicteric sclera, conjunctiva clear, normal extraocular movements  ENT: Normocephalic and atraumatic,   Skin: Warm and dry. No rashes or lesions  Cardiac: No peripheral edema  Back/Flank: Not  done  CNS/PNS: Normal musculature and movements, moves all extremities normally  Respiratory: Normal non-labored breathing  Abdomen: Soft nontender and nondistended  Peripheral Vascular: No peripheral edema  Mental Status/Psych: Alert and Oriented x 3. Normal mood and affect    Penis: Not done  Scrotal Skin: Not done  Testicles: Not done  Epididymis: Not done  Digital Rectal Exam:     Cystoscopy: Not done    Imaging: None    Urinalysis: UA RESULTS:  Recent Labs   Lab Test 10/09/19  1842 10/29/18  1433   COLOR Light Yellow Yellow   APPEARANCE Clear Clear   URINEGLC Negative 500*   URINEBILI Negative Negative   URINEKETONE Negative Negative   SG 1.019 1.015   UBLD Negative Negative   URINEPH 5.5 5.5   PROTEIN 100* 30*   UROBILINOGEN  --  0.2   NITRITE Negative Negative   LEUKEST Negative Negative   RBCU 1  --    WBCU 1  --        PSA:     Post Void Residual: 40mL    Other labs: None today      IMPRESSION:  Doing well    PLAN:  His urinalysis is normal.  He is emptying his bladder well.  He is doing well at this time.  He will follow-up with me as needed in the future.      Clovis Montenegro M.D.

## 2019-11-01 NOTE — PROGRESS NOTES
Office Visit Note  [facility]  [phone]    UROLOGIC DIAGNOSES:   Nocturia and urinary frequency, stone material in the  bladder     CURRENT INTERVENTIONS:   Flomax and finasteride    HISTORY:   Pete was hospitalized last month for nonspecific abdominal and periumbilical pain.  He had a catheter placed during hospitalization but only 200 mL of urine drained from the bladder.  His pain ultimately resolved on its own and his catheter was removed.  He is here today for urinalysis and bladder scan for follow-up.  He reports no further pain or problems since his hospitalization.  He has some occasional urinary urgency but otherwise no urinary complaints.      PAST MEDICAL HISTORY:   Past Medical History:   Diagnosis Date     Calculus of ureter      Hypertension      Hypothyroidism      Microalbuminuria 2/15/2016     Mumps      Other and unspecified hyperlipidemia      Type 2 diabetes, HbA1C goal < 8% (H) 1995       PAST SURGICAL HISTORY:   Past Surgical History:   Procedure Laterality Date     C NONSPECIFIC PROCEDURE      Nasal septoplasty     HC COLONOSCOPY THRU STOMA, DIAGNOSTIC  2007    Normal     HC FLEX SIGMOIDOSCOPY W/WO MIGUEL SPEC BY BRUSH/WASH  1999    Normal to 60 cm     HC REPAIR INCISIONAL HERNIA,REDUCIBLE  1999    Hernia Repair, Incisional, Unilateral (right)     HC REPAIR INCISIONAL HERNIA,REDUCIBLE      Hernia Repair, Incisional, Unilateral (left, with mesh placement)     TESTICLE SURGERY       VASECTOMY         FAMILY HISTORY:   Family History   Problem Relation Age of Onset     Cerebrovascular Disease Mother          age 95     Heart Disease Mother         age 89,  age 95     Cerebrovascular Disease Father          age 91, stroke two years previous     Cancer - colorectal Brother         Half-brother     Cancer Sister         Half-sister (?type)     Cancer Sister         Half-sister (?type)     Heart Disease Brother      Neurologic  Disorder Daughter         Multiple Sclerosis     Psychotic Disorder Son         Schizophrenia       SOCIAL HISTORY:   Social History     Tobacco Use     Smoking status: Former Smoker     Packs/day: 0.00     Last attempt to quit: 3/11/1953     Years since quittin.6     Smokeless tobacco: Never Used   Substance Use Topics     Alcohol use: No       Current Outpatient Medications   Medication     aspirin 81 MG chewable tablet     atorvastatin (LIPITOR) 20 MG tablet     B-D U/F 31G X 8 MM insulin pen needle     blood glucose (ONETOUCH VERIO IQ) test strip     blood glucose monitoring (ONE TOUCH ULTRASOFT) lancets     finasteride (PROSCAR) 5 MG tablet     Glucosamine HCl 750 MG TABS     insulin aspart (NOVOLOG FLEXPEN) 100 UNIT/ML pen     insulin glargine (LANTUS SOLOSTAR) 100 UNIT/ML pen     insulin pen needle (B-D U/F) 31G X 8 MM     insulin syringes, disposable, U-100 0.3 ML MISC     levothyroxine (SYNTHROID/LEVOTHROID) 25 MCG tablet     [employer] FINEPOINT LANCETS MISC     metoprolol tartrate (LOPRESSOR) 25 MG tablet     multivitamin (OCUVITE) TABS     Nutritional Supplements (GLUCOSE MANAGEMENT) TABS     order for DME     tamsulosin (FLOMAX) 0.4 MG capsule     vitamin C (ASCORBIC ACID) 1000 MG TABS     No current facility-administered medications for this visit.          PHYSICAL EXAM:    There were no vitals taken for this visit.    Constitutional: Well developed. Conversant and in no acute distress  Eyes: Anicteric sclera, conjunctiva clear, normal extraocular movements  ENT: Normocephalic and atraumatic,   Skin: Warm and dry. No rashes or lesions  Cardiac: No peripheral edema  Back/Flank: Not done  CNS/PNS: Normal musculature and movements, moves all extremities normally  Respiratory: Normal non-labored breathing  Abdomen: Soft nontender and nondistended  Peripheral Vascular: No peripheral edema  Mental Status/Psych: Alert and Oriented x 3. Normal mood and affect    Penis: Not done  Scrotal Skin: Not  done  Testicles: Not done  Epididymis: Not done  Digital Rectal Exam:     Cystoscopy: Not done    Imaging: None    Urinalysis: UA RESULTS:  Recent Labs   Lab Test 10/09/19  1842 10/29/18  1433   COLOR Light Yellow Yellow   APPEARANCE Clear Clear   URINEGLC Negative 500*   URINEBILI Negative Negative   URINEKETONE Negative Negative   SG 1.019 1.015   UBLD Negative Negative   URINEPH 5.5 5.5   PROTEIN 100* 30*   UROBILINOGEN  --  0.2   NITRITE Negative Negative   LEUKEST Negative Negative   RBCU 1  --    WBCU 1  --        PSA:     Post Void Residual: 40mL    Other labs: None today      IMPRESSION:  Doing well    PLAN:  His urinalysis is normal.  He is emptying his bladder well.  He is doing well at this time.  He will follow-up with me as needed in the future.      Clovis Montenegro M.D.

## 2019-11-26 DIAGNOSIS — E11.42 TYPE 2 DIABETES MELLITUS WITH DIABETIC POLYNEUROPATHY, WITH LONG-TERM CURRENT USE OF INSULIN (H): ICD-10-CM

## 2019-11-26 DIAGNOSIS — Z79.4 TYPE 2 DIABETES MELLITUS WITH DIABETIC POLYNEUROPATHY, WITH LONG-TERM CURRENT USE OF INSULIN (H): ICD-10-CM

## 2019-11-26 LAB — HBA1C MFR BLD: 9.6 % (ref 0–5.6)

## 2019-11-26 PROCEDURE — 83036 HEMOGLOBIN GLYCOSYLATED A1C: CPT | Performed by: INTERNAL MEDICINE

## 2019-11-26 PROCEDURE — 82565 ASSAY OF CREATININE: CPT | Performed by: INTERNAL MEDICINE

## 2019-11-26 PROCEDURE — 36415 COLL VENOUS BLD VENIPUNCTURE: CPT | Performed by: INTERNAL MEDICINE

## 2019-11-26 PROCEDURE — 82043 UR ALBUMIN QUANTITATIVE: CPT | Performed by: INTERNAL MEDICINE

## 2019-11-27 LAB
CREAT SERPL-MCNC: 1.53 MG/DL (ref 0.66–1.25)
CREAT UR-MCNC: 146 MG/DL
GFR SERPL CREATININE-BSD FRML MDRD: 42 ML/MIN/{1.73_M2}
MICROALBUMIN UR-MCNC: 610 MG/L
MICROALBUMIN/CREAT UR: 417.81 MG/G CR (ref 0–17)

## 2019-12-05 ENCOUNTER — OFFICE VISIT (OUTPATIENT)
Dept: ENDOCRINOLOGY | Facility: CLINIC | Age: 79
End: 2019-12-05
Payer: COMMERCIAL

## 2019-12-05 VITALS
TEMPERATURE: 97.8 F | HEART RATE: 65 BPM | DIASTOLIC BLOOD PRESSURE: 70 MMHG | BODY MASS INDEX: 25.6 KG/M2 | SYSTOLIC BLOOD PRESSURE: 130 MMHG | HEIGHT: 67 IN | OXYGEN SATURATION: 96 % | RESPIRATION RATE: 18 BRPM | WEIGHT: 163.1 LBS

## 2019-12-05 DIAGNOSIS — E11.42 TYPE 2 DIABETES MELLITUS WITH DIABETIC POLYNEUROPATHY, WITH LONG-TERM CURRENT USE OF INSULIN (H): Primary | ICD-10-CM

## 2019-12-05 DIAGNOSIS — Z79.4 TYPE 2 DIABETES MELLITUS WITH DIABETIC POLYNEUROPATHY, WITH LONG-TERM CURRENT USE OF INSULIN (H): Primary | ICD-10-CM

## 2019-12-05 DIAGNOSIS — N18.30 TYPE 2 DIABETES MELLITUS WITH STAGE 3 CHRONIC KIDNEY DISEASE, WITH LONG-TERM CURRENT USE OF INSULIN (H): ICD-10-CM

## 2019-12-05 DIAGNOSIS — Z79.4 TYPE 2 DIABETES MELLITUS WITH STAGE 3 CHRONIC KIDNEY DISEASE, WITH LONG-TERM CURRENT USE OF INSULIN (H): ICD-10-CM

## 2019-12-05 DIAGNOSIS — E11.22 TYPE 2 DIABETES MELLITUS WITH STAGE 3 CHRONIC KIDNEY DISEASE, WITH LONG-TERM CURRENT USE OF INSULIN (H): ICD-10-CM

## 2019-12-05 PROCEDURE — 99214 OFFICE O/P EST MOD 30 MIN: CPT | Performed by: INTERNAL MEDICINE

## 2019-12-05 ASSESSMENT — MIFFLIN-ST. JEOR: SCORE: 1403.45

## 2019-12-05 NOTE — PROGRESS NOTES
ENDOCRINOLOGY CLINIC NOTE:  Name: Pete Sheldon  Seen for f/u of Diabetes.  He is accompanied by his wife.  HPI:  Pete Sheldon is a 79  year old male who presents for the evaluation/management of:  Of note he is having bedtime snack almost every days.  Typical bedtime snack is toast and jam at night. Does not take insulin with snack.  Has variable blood sugar pattern. This has been noted consistently in past and with multiple dose adjustment.  Wife helps with insulin dosing. He injects himself.  I discussed continuous glucose monitor with him in past but he does not want sensors.  Low C-peptide and neg FAVIOLA Ab.  Metformin stopped in the setting of CKD.    He was admitted in hospital in October 2019 and at that time metformin was discontinued and Lantus was switched to twice daily dosing.    1. Type 2 DM:  Orginally diagnosed in 1995.  Current Regimen:  Lantus 16 units BID, Humalog 6/4/5 Units with breakfast/lunch/dinner respectively. In addition to that he is on correctional scale 1 unit- 25 >140 but he is not using it.  yes:     Diabetes Medication(s)     Insulin       insulin aspart (NOVOLOG FLEXPEN) 100 UNIT/ML pen    INJECT 6 UNITS WITH BREAKFAST, 4 UNITS WITH LUNCH, 5 UNITS WITH DINNER, HOLD IF NOT EATING MEAL     insulin glargine (LANTUS SOLOSTAR) 100 UNIT/ML pen    Inject 16 Units Subcutaneous 2 times daily          During hospital stay  10/2019 metfomrin was dscountineud and lantus does was switched to BID dosing.    Does not feel comfortable with carbohydrate counting.    BS checks: Three times a day    Average Meter Download: 175 with standard deviation 75.  Blood sugars are variable.  But mostly on the higher side in afternoon and before going to bed.  Few episodes of low blood sugar noted especially in the morning.  Variable BG.  Lowest was 50 and highest was 335.    Episodes of hypoglycemic episodes have improved.    Exercise: Minimal  Episodes of hypoglycemia (low blood sugar):  Yes. As noted  above.  Fixed meal pattern: NO. Carb content varies. In general diet is high in carbs.  Patient counting carbs: No    DM Complications:   Nephropathy: Yes: Microalbuminuria present  Retinopathy: Yes: History of laser treatment in past  Neuropathy: Yes.  Microalbuminuria: Yes: Microalbuminuria present.  Not on ACE secondary to history of hyperkalemia.  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    2. Hypertension: Blood Pressure today:   BP Readings from Last 3 Encounters:   19 (!) 150/76   19 124/68   10/16/19 138/77     Blood pressure medications include hydrochlorothiazide 12.5 mg, amlodipine 5 mg.      3. Hyperlipidemia: Takes lovastatin 40 mg for lipid control.  Denies muscle aches or pains.        PMH/PSH:  Past Medical History:   Diagnosis Date     Calculus of ureter      Hypertension      Hypothyroidism      Microalbuminuria 2/15/2016     Mumps      Other and unspecified hyperlipidemia      Type 2 diabetes, HbA1C goal < 8% (H) 1995     Past Surgical History:   Procedure Laterality Date     C NONSPECIFIC PROCEDURE      Nasal septoplasty     HC COLONOSCOPY THRU STOMA, DIAGNOSTIC  2007    Normal     HC FLEX SIGMOIDOSCOPY W/WO MIGUEL SPEC BY BRUSH/WASH  1999    Normal to 60 cm     HC REPAIR INCISIONAL HERNIA,REDUCIBLE  1999    Hernia Repair, Incisional, Unilateral (right)     HC REPAIR INCISIONAL HERNIA,REDUCIBLE      Hernia Repair, Incisional, Unilateral (left, with mesh placement)     TESTICLE SURGERY       VASECTOMY       Family Hx:  Family History   Problem Relation Age of Onset     Cerebrovascular Disease Mother          age 95     Heart Disease Mother         age 89,  age 95     Cerebrovascular Disease Father          age 91, stroke two years previous     Cancer - colorectal Brother          Half-brother     Cancer Sister         Half-sister (?type)     Cancer Sister         Half-sister (?type)     Heart Disease Brother      Neurologic Disorder Daughter         Multiple Sclerosis     Psychotic Disorder Son         Schizophrenia       Social Hx:  Social History     Socioeconomic History     Marital status:      Spouse name: Not on file     Number of children: 2     Years of education: Not on file     Highest education level: Not on file   Occupational History     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[employer]     Employer: RETIRED   Social Needs     Financial resource strain: Not on file     Food insecurity:     Worry: Not on file     Inability: Not on file     Transportation needs:     Medical: Not on file     Non-medical: Not on file   Tobacco Use     Smoking status: Former Smoker     Packs/day: 0.00     Last attempt to quit: 3/11/1953     Years since quittin.7     Smokeless tobacco: Never Used   Substance and Sexual Activity     Alcohol use: No     Drug use: No     Sexual activity: Yes     Partners: Female   Lifestyle     Physical activity:     Days per week: Not on file     Minutes per session: Not on file     Stress: Not on file   Relationships     Social connections:     Talks on phone: Not on file     Gets together: Not on file     Attends Faith service: Not on file     Active member of club or organization: Not on file     Attends meetings of clubs or organizations: Not on file     Relationship status: Not on file     Intimate partner violence:     Fear of current or ex partner: Not on file     Emotionally abused: Not on file     Physically abused: Not on file     Forced sexual activity: Not on file   Other Topics Concern     Parent/sibling w/ CABG, MI or angioplasty before 65F 55M? Not Asked   Social History Narrative    Retired  for [employer].    Two children, son in [place], daughter in [place] ([facility]).    Three grandchildren,  "one great-granddaughter.              MEDICATIONS:  has a current medication list which includes the following prescription(s): aspirin, atorvastatin, b-d u/f, blood glucose, blood glucose monitoring, finasteride, glucosamine hcl, insulin aspart, insulin glargine, insulin pen needle, insulin syringes (disposable), levothyroxine, blood glucose, metoprolol tartrate, multivitamin, glucose management, order for dme, tamsulosin, and vitamin c.    ROS     ROS: 10 point ROS neg other than the symptoms noted above in the HPI.    Physical Exam   VS: BP (!) 150/76 (BP Location: Right arm, Patient Position: Chair, Cuff Size: Adult Regular)   Pulse 65   Temp 97.8  F (36.6  C) (Oral)   Resp 18   Ht 1.702 m (5' 7\")   Wt 74 kg (163 lb 1.6 oz)   SpO2 96%   BMI 25.55 kg/m    GENERAL: AXOX3, NAD, well dressed, answering questions appropriately, appears stated age.  NEUROLOGY: CN grossly intact, no tremors  MSK: grossly intact  Psych: normal mood      LABS:  Component      Latest Ref Rng 4/15/2016   C-Peptide      0.9 - 6.9 ng/mL <0.1 (L)   Glutamic Acid Decarboxylase Antibody       <5.0 . . .     A1c:  Lab Results   Component Value Date    A1C 9.6 11/26/2019    A1C 9.2 09/04/2019    A1C 8.9 06/06/2019    A1C 9.4 03/06/2019    A1C 9.1 10/29/2018       Creatinine:  Creatinine   Date Value Ref Range Status   11/26/2019 1.53 (H) 0.66 - 1.25 mg/dL Final     Urine Micro:  Lab Results   Component Value Date    UMALCR 170.01 08/30/2018        LFTs/Lipids:  Recent Labs   Lab Test 11/27/18  0927 03/01/18  1706  09/25/15  0844 06/19/15  0815   CHOL 110 123   < > 134 135   HDL 26* 35*   < > 31* 36*   LDL 59 70   < > 80 83   TRIG 125 91   < > 117 78   CHOLHDLRATIO  --   --   --  4.3 3.8    < > = values in this interval not displayed.     TFTs:  TSH   Date Value Ref Range Status   09/04/2019 6.28 (H) 0.40 - 4.00 mU/L Final     All pertinent notes, labs, and images personally reviewed by me.     A/P  Mr.Victor OSBORN Monalisa is a 79 year old here " for the evaluation/management of diabetes:    1. DM2 - (low C-peptide, negative FAVIOLA): Under Poor control.  A1c 9.6%.  Diabetes complicated by microalbuminuria, neuropathy and retinopathy.   Concerns for hypoglycemia unawareness. Variable BG. Brittle DM.  Multiple dose changes made in past but still not able to get stable blood sugar numbers.  Blood sugars are variable throughout the day.  Does not feel comfortable with carbohydrate counting.  Variable carb intake.  Does not want Dexcom. This has been discussed multiple times with pt in past.   H/o brittle DM. Lot of variability in BG.  In general hypoglycemic episodes have improved.  Plan:  Lantus: Take 16 units in AM and 14 units at night.  Novolog: take 6/5/5 Units with breakfast/lunch/dinner repectively  Labs in 3 months  Please make a lab appointment for blood work and follow up clinic appointment in 1 week after that to discuss results.    2. Hypothyroidism:  On levothyroxine 25 mcg/day  Started in 9/2018 by Dr. Silva is  Clinically looks euthyroid  Plan:  continue levothyroxine at current dose.  Labs and follow-up in 3 months    3. Hypertension - Under Fair control.  On hydrochlorothiazide 12.5 mg, amlodipine 5 mg.  Managed by PCP.    4. Hyperlipidemia - Under Good control.  On lovastatin 40 mg. LDL 91, HDL 34.  Managed by PCP.      5. Microalbuminuria:  Lab Results   Component Value Date    UMALCR 170.01 08/30/2018    Not on ACE 2/2 to h/o hyperkalemia    6. Prevention  ASA- 81 mg/day.  h/o nosebleeds  Smoking- No    Most Recent Immunizations   Administered Date(s) Administered     Influenza (H1N1) 12/28/2009     Influenza (High Dose) 3 valent vaccine 09/06/2018     Influenza (IIV3) PF 08/29/2012     Pneumococcal 23 valent 03/16/2012     TD (ADULT, 7+) 10/22/2003     TDAP Vaccine (Adacel) 01/31/2014     Zoster vaccine, live 10/15/2011       Recommend checking blood sugars before meals and at bedtime.    If Blood glucose are low more often-> 2-3 times/week-  give us a call.  The patient is advised to Make better food choices: reduce carbs, Reduce portion size, weight loss and exercise 3-4 times a week.  Discussed hypoglycemia signs and symptoms as well as management in detail.    All questions were answered.  The patient indicates understanding of the above issues and agrees with the plan set forth.     More than 50% of face to face time spent with Mr. Sheldon on counseling / coordinating his care.      Follow-up:  Follow up 3 months    Lori Damian M.D  Endocrinology  [facility]/[place]    Disclaimer: This note consists of symbols derived from keyboarding, dictation and/or voice recognition software. As a result, there may be errors in the script that have gone undetected. Please consider this when interpreting information found in this chart.

## 2019-12-05 NOTE — LETTER
12/5/2019         RE: Pete Sheldon  [address] [address]        Dear Colleague,    Thank you for referring your patient, Pete Sheldon, to the [facility]. Please see a copy of my visit note below.    ENDOCRINOLOGY CLINIC NOTE:  Name: Pete Sheldon  Seen for f/u of Diabetes.  He is accompanied by his wife.  HPI:  Pete Sheldon is a 79  year old male who presents for the evaluation/management of:  Of note he is having bedtime snack almost every days.  Typical bedtime snack is toast and jam at night. Does not take insulin with snack.  Has variable blood sugar pattern. This has been noted consistently in past and with multiple dose adjustment.  Wife helps with insulin dosing. He injects himself.  I discussed continuous glucose monitor with him in past but he does not want sensors.  Low C-peptide and neg FAVIOLA Ab.  Metformin stopped in the setting of CKD.    He was admitted in hospital in October 2019 and at that time metformin was discontinued and Lantus was switched to twice daily dosing.    1. Type 2 DM:  Orginally diagnosed in 1995.  Current Regimen:  Lantus 16 units BID, Humalog 6/4/5 Units with breakfast/lunch/dinner respectively. In addition to that he is on correctional scale 1 unit- 25 >140 but he is not using it.  yes:     Diabetes Medication(s)     Insulin       insulin aspart (NOVOLOG FLEXPEN) 100 UNIT/ML pen    INJECT 6 UNITS WITH BREAKFAST, 4 UNITS WITH LUNCH, 5 UNITS WITH DINNER, HOLD IF NOT EATING MEAL     insulin glargine (LANTUS SOLOSTAR) 100 UNIT/ML pen    Inject 16 Units Subcutaneous 2 times daily          During hospital stay  10/2019 metfomrin was dscountineud and lantus does was switched to BID dosing.    Does not feel comfortable with carbohydrate counting.    BS checks: Three times a day    Average Meter Download: 175 with standard deviation 75.  Blood sugars are variable.  But mostly on the higher side in afternoon and before going to bed.   Few episodes of low blood sugar noted especially in the morning.  Variable BG.  Lowest was 50 and highest was 335.    Episodes of hypoglycemic episodes have improved.    Exercise: Minimal  Episodes of hypoglycemia (low blood sugar):  Yes. As noted above.  Fixed meal pattern: NO. Carb content varies. In general diet is high in carbs.  Patient counting carbs: No    DM Complications:   Nephropathy: Yes: Microalbuminuria present  Retinopathy: Yes: History of laser treatment in past  Neuropathy: Yes.  Microalbuminuria: Yes: Microalbuminuria present.  Not on ACE secondary to history of hyperkalemia.  Lab Results   Component Value Date    UMALCR 170.01 08/30/2018      CAD/PAD: No  Gastroparesis: No  Hypoglycemia unawareness: Yes: Previous notes mentioning history of hypoglycemia unawareness.    2. Hypertension: Blood Pressure today:   BP Readings from Last 3 Encounters:   12/05/19 (!) 150/76   11/01/19 124/68   10/16/19 138/77     Blood pressure medications include hydrochlorothiazide 12.5 mg, amlodipine 5 mg.      3. Hyperlipidemia: Takes lovastatin 40 mg for lipid control.  Denies muscle aches or pains.        PMH/PSH:  Past Medical History:   Diagnosis Date     Calculus of ureter 1980's     Hypertension      Hypothyroidism      Microalbuminuria 2/15/2016     Mumps      Other and unspecified hyperlipidemia      Type 2 diabetes, HbA1C goal < 8% (H) 7/1995     Past Surgical History:   Procedure Laterality Date     C NONSPECIFIC PROCEDURE  1980's    Nasal septoplasty     HC COLONOSCOPY THRU STOMA, DIAGNOSTIC  6/12/2007    Normal     HC FLEX SIGMOIDOSCOPY W/WO MIGUEL SPEC BY BRUSH/WASH  12/30/1999    Normal to 60 cm     HC REPAIR INCISIONAL HERNIA,REDUCIBLE  1/2001, 1999    Hernia Repair, Incisional, Unilateral (right)     HC REPAIR INCISIONAL HERNIA,REDUCIBLE  1996    Hernia Repair, Incisional, Unilateral (left, with mesh placement)     TESTICLE SURGERY       VASECTOMY       Family Hx:  Family History   Problem Relation Age  of Onset     Cerebrovascular Disease Mother          age 95     Heart Disease Mother         age 89,  age 95     Cerebrovascular Disease Father          age 91, stroke two years previous     Cancer - colorectal Brother         Half-brother     Cancer Sister         Half-sister (?type)     Cancer Sister         Half-sister (?type)     Heart Disease Brother      Neurologic Disorder Daughter         Multiple Sclerosis     Psychotic Disorder Son         Schizophrenia       Social Hx:  Social History     Socioeconomic History     Marital status:      Spouse name: Not on file     Number of children: 2     Years of education: Not on file     Highest education level: Not on file   Occupational History     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[employer]     Employer: RETIRED   Social Needs     Financial resource strain: Not on file     Food insecurity:     Worry: Not on file     Inability: Not on file     Transportation needs:     Medical: Not on file     Non-medical: Not on file   Tobacco Use     Smoking status: Former Smoker     Packs/day: 0.00     Last attempt to quit: 3/11/1953     Years since quittin.7     Smokeless tobacco: Never Used   Substance and Sexual Activity     Alcohol use: No     Drug use: No     Sexual activity: Yes     Partners: Female   Lifestyle     Physical activity:     Days per week: Not on file     Minutes per session: Not on file     Stress: Not on file   Relationships     Social connections:     Talks on phone: Not on file     Gets together: Not on file     Attends Jew service: Not on file     Active member of club or organization: Not on file     Attends meetings of clubs or organizations: Not on file     Relationship status: Not on file     Intimate partner violence:     Fear of current or ex partner: Not on file     Emotionally abused: Not on file     Physically abused: Not on file     Forced sexual activity: Not on file   Other Topics Concern      "Parent/sibling w/ CABG, MI or angioplasty before 65F 55M? Not Asked   Social History Narrative    Retired  for [employer].    Two children, son in [place], daughter in [place] ([facility]).    Three grandchildren, one great-granddaughter.              MEDICATIONS:  has a current medication list which includes the following prescription(s): aspirin, atorvastatin, b-d u/f, blood glucose, blood glucose monitoring, finasteride, glucosamine hcl, insulin aspart, insulin glargine, insulin pen needle, insulin syringes (disposable), levothyroxine, blood glucose, metoprolol tartrate, multivitamin, glucose management, order for dme, tamsulosin, and vitamin c.    ROS     ROS: 10 point ROS neg other than the symptoms noted above in the HPI.    Physical Exam   VS: BP (!) 150/76 (BP Location: Right arm, Patient Position: Chair, Cuff Size: Adult Regular)   Pulse 65   Temp 97.8  F (36.6  C) (Oral)   Resp 18   Ht 1.702 m (5' 7\")   Wt 74 kg (163 lb 1.6 oz)   SpO2 96%   BMI 25.55 kg/m     GENERAL: AXOX3, NAD, well dressed, answering questions appropriately, appears stated age.  NEUROLOGY: CN grossly intact, no tremors  MSK: grossly intact  Psych: normal mood      LABS:  Component      Latest Ref Rng 4/15/2016   C-Peptide      0.9 - 6.9 ng/mL <0.1 (L)   Glutamic Acid Decarboxylase Antibody       <5.0 . . .     A1c:  Lab Results   Component Value Date    A1C 9.6 11/26/2019    A1C 9.2 09/04/2019    A1C 8.9 06/06/2019    A1C 9.4 03/06/2019    A1C 9.1 10/29/2018       Creatinine:  Creatinine   Date Value Ref Range Status   11/26/2019 1.53 (H) 0.66 - 1.25 mg/dL Final     Urine Micro:  Lab Results   Component Value Date    UMALCR 170.01 08/30/2018        LFTs/Lipids:  Recent Labs   Lab Test 11/27/18  0927 03/01/18  1706  09/25/15  0844 06/19/15  0815   CHOL 110 123   < > 134 135   HDL 26* 35*   < > 31* 36*   LDL 59 70   < > 80 83   TRIG 125 91   < > 117 78   CHOLHDLRATIO  --   --   --  4.3 3.8    < > = " values in this interval not displayed.     TFTs:  TSH   Date Value Ref Range Status   09/04/2019 6.28 (H) 0.40 - 4.00 mU/L Final     All pertinent notes, labs, and images personally reviewed by me.     A/P  Mr.Victor ANURAG Sheldon is a 79 year old here for the evaluation/management of diabetes:    1. DM2 - (low C-peptide, negative FAVIOLA): Under Poor control.  A1c 9.6%.  Diabetes complicated by microalbuminuria, neuropathy and retinopathy.   Concerns for hypoglycemia unawareness. Variable BG. Brittle DM.  Multiple dose changes made in past but still not able to get stable blood sugar numbers.  Blood sugars are variable throughout the day.  Does not feel comfortable with carbohydrate counting.  Variable carb intake.  Does not want Dexcom. This has been discussed multiple times with pt in past.   H/o brittle DM. Lot of variability in BG.  In general hypoglycemic episodes have improved.  Plan:  Lantus: Take 16 units in AM and 14 units at night.  Novolog: take 6/5/5 Units with breakfast/lunch/dinner repectively  Labs in 3 months  Please make a lab appointment for blood work and follow up clinic appointment in 1 week after that to discuss results.    2. Hypothyroidism:  On levothyroxine 25 mcg/day  Started in 9/2018 by Dr. Silva is  Clinically looks euthyroid  Plan:  continue levothyroxine at current dose.  Labs and follow-up in 3 months    3. Hypertension - Under Fair control.  On hydrochlorothiazide 12.5 mg, amlodipine 5 mg.  Managed by PCP.    4. Hyperlipidemia - Under Good control.  On lovastatin 40 mg. LDL 91, HDL 34.  Managed by PCP.      5. Microalbuminuria:  Lab Results   Component Value Date    UMALCR 170.01 08/30/2018    Not on ACE 2/2 to h/o hyperkalemia    6. Prevention  ASA- 81 mg/day.  h/o nosebleeds  Smoking- No    Most Recent Immunizations   Administered Date(s) Administered     Influenza (H1N1) 12/28/2009     Influenza (High Dose) 3 valent vaccine 09/06/2018     Influenza (IIV3) PF 08/29/2012      Pneumococcal 23 valent 03/16/2012     TD (ADULT, 7+) 10/22/2003     TDAP Vaccine (Adacel) 01/31/2014     Zoster vaccine, live 10/15/2011       Recommend checking blood sugars before meals and at bedtime.    If Blood glucose are low more often-> 2-3 times/week- give us a call.  The patient is advised to Make better food choices: reduce carbs, Reduce portion size, weight loss and exercise 3-4 times a week.  Discussed hypoglycemia signs and symptoms as well as management in detail.    All questions were answered.  The patient indicates understanding of the above issues and agrees with the plan set forth.     More than 50% of face to face time spent with Mr. Sheldon on counseling / coordinating his care.      Follow-up:  Follow up 3 months    Lori Damian M.D  Endocrinology  [facility]/[place]    Disclaimer: This note consists of symbols derived from keyboarding, dictation and/or voice recognition software. As a result, there may be errors in the script that have gone undetected. Please consider this when interpreting information found in this chart.        Again, thank you for allowing me to participate in the care of your patient.        Sincerely,        Lori Damian MD

## 2019-12-05 NOTE — PATIENT INSTRUCTIONS
[facility] & [place] locations   Dr Damian, Endocrinology Department      [facility]   [address]  [place] [address]  Appointment [phone]  Fax: [phone]  [place]: Monday and Tuesday         [facility]   [address]  [place] [address]  Appointment [phone]  Fax: [phone]  [place]: Wednesday and Thursday            Please check the cost coverage and copay with insurance before recommended tests, services and medications (especially if new medications are prescribed).     If ordered, please get blood work done 1 week prior to your next appointment so they will be available to Dr. Damian at your visit.    To provide the best diabetic care, please bring your blood glucose meter to each and every visit with your  Endocrinologist. Your blood glucose meter/insulin pump will be downloaded at every appointment.  Please arrive 15 minutes before your scheduled appointment. This will allow for your blood glucose meter/insulin pump  to be downloaded.  If you are wearing DEXCOM please bring  or sharing code from the Dexcom Clarity Appt so that it can be downloaded.  If you are using freestyle po personal sensors please bring the reader.  If you are using TANDEM insulin pump please have your username and password to get info from Tandem website.    Lantus: Take 16 units in AM and 14 units at night.  Novolog: take 6/5/5 Units with breakfast/lunch/dinner repectively  continue levothyroxine at current dose.  Labs in 3 months  Please make a lab appointment for blood work and follow up clinic appointment in 1 week after that to discuss results.    Recommend checking blood sugars before meals and at bedtime.    If Blood glucose are low more often-> 2-3 times/week- give us a call.  The patient is advised to Make better food choices: reduce carbs, Reduce portion size, weight loss and  exercise 3-4 times a week.  Discussed hypoglycemia signs and symptoms as well as management in detail.

## 2019-12-05 NOTE — NURSING NOTE
ENDOCRINOLOGY INTAKE FORM    Patient Name:  Pete Sheldon  :  1940    Is patient Diabetic?   Yes: type 2  Does patient have non-diabetic or other endocrine issues?  Yes: hypoglycemia, hypothyroidism, hyperlipidemia, ED    Vitals: There were no vitals taken for this visit.  BMI= There is no height or weight on file to calculate BMI.    Flu vaccine:  Yes: 19  Pneumonia vaccine:  Yes: both    Smoking and Alcohol use:  Social History     Tobacco Use     Smoking status: Former Smoker     Packs/day: 0.00     Last attempt to quit: 3/11/1953     Years since quittin.7     Smokeless tobacco: Never Used   Substance Use Topics     Alcohol use: No     Drug use: No       Foot Exam: Yes: 19  Eye Exam:  Yes: 19  Dental Exam:  Yes: 19  Aspirin Use:  Yes: 81 mg    Lab Results   Component Value Date    A1C 9.6 2019    A1C 9.2 2019    A1C 8.9 2019    A1C 9.4 2019    A1C 9.1 10/29/2018       Lab Results   Component Value Date    MICROL 610 2019     No results found for: MICROALBUMIN    Lilia Pacheco CMA  [facility]  [address]

## 2019-12-15 DIAGNOSIS — R35.0 URINARY FREQUENCY: ICD-10-CM

## 2019-12-18 RX ORDER — FINASTERIDE 5 MG/1
TABLET, FILM COATED ORAL
Qty: 90 TABLET | Refills: 3 | Status: SHIPPED | OUTPATIENT
Start: 2019-12-18 | End: 2020-01-01

## 2019-12-20 DIAGNOSIS — I63.112 CEREBROVASCULAR ACCIDENT (CVA) DUE TO EMBOLISM OF LEFT VERTEBRAL ARTERY (H): ICD-10-CM

## 2019-12-20 RX ORDER — ATORVASTATIN CALCIUM 20 MG/1
TABLET, FILM COATED ORAL
Qty: 30 TABLET | Refills: 0 | Status: SHIPPED | OUTPATIENT
Start: 2019-12-20 | End: 2020-01-20

## 2019-12-20 NOTE — TELEPHONE ENCOUNTER
"Requested Prescriptions   Pending Prescriptions Disp Refills     atorvastatin (LIPITOR) 20 MG tablet [Pharmacy Med Name: Atorvastatin Calcium 20 MG Oral Tablet]  0     Sig: TAKE 1 TABLET BY MOUTH ONCE DAILY   Last Written Prescription Date:  12/13/2018  Last Fill Quantity: 90,  # refills: 03   Last office visit: 10/16/2019 with prescribing provider:     Future Office Visit:   Next 5 appointments (look out 90 days)    Cash 15, 2020  2:00 PM CST  SHORT with Rom Helm MD  [facility] ([facility]) [address]  [facility] [address]  [phone]   Mar 11, 2020  2:30 PM CDT  Return Visit with Lori Damian MD  [facility] ([facility]) [address]  [facility] [address]  [phone]           Statins Protocol Failed - 12/20/2019  8:55 AM        Failed - LDL on file in past 12 months     Recent Labs   Lab Test 11/27/18  0927   LDL 59             Passed - No abnormal creatine kinase in past 12 months     No lab results found.             Passed - Recent (12 mo) or future (30 days) visit within the authorizing provider's specialty     Patient has had an office visit with the authorizing provider or a provider within the authorizing providers department within the previous 12 mos or has a future within next 30 days. See \"Patient Info\" tab in inbasket, or \"Choose Columns\" in Meds & Orders section of the refill encounter.              Passed - Medication is active on med list        Passed - Patient is age 18 or older        "

## 2019-12-20 NOTE — TELEPHONE ENCOUNTER
Medication is being filled for 1 time refill only due to:  Patient needs labs LDL.     Next 5 appointments (look out 90 days)    Cash 15, 2020  2:00 PM CST  SHORT with Rom Helm MD  [facility] ([facility]) [address]  [facility] [id number] [phone]   Mar 11, 2020  2:30 PM CDT  Return Visit with Lori Damian MD  [facility] ([facility]) [address]  [facility] [address] [phone]

## 2020-01-01 ENCOUNTER — APPOINTMENT (OUTPATIENT)
Dept: PHYSICAL THERAPY | Facility: CLINIC | Age: 80
End: 2020-01-01
Attending: PHYSICIAN ASSISTANT
Payer: COMMERCIAL

## 2020-01-01 ENCOUNTER — MEDICAL CORRESPONDENCE (OUTPATIENT)
Dept: HEALTH INFORMATION MANAGEMENT | Facility: CLINIC | Age: 80
End: 2020-01-01

## 2020-01-01 ENCOUNTER — TELEPHONE (OUTPATIENT)
Dept: INTERNAL MEDICINE | Facility: CLINIC | Age: 80
End: 2020-01-01

## 2020-01-01 ENCOUNTER — TELEPHONE (OUTPATIENT)
Dept: ENDOCRINOLOGY | Facility: CLINIC | Age: 80
End: 2020-01-01

## 2020-01-01 ENCOUNTER — APPOINTMENT (OUTPATIENT)
Dept: CARDIOLOGY | Facility: CLINIC | Age: 80
End: 2020-01-01
Attending: NURSE PRACTITIONER
Payer: COMMERCIAL

## 2020-01-01 ENCOUNTER — TELEPHONE (OUTPATIENT)
Dept: CARE COORDINATION | Facility: CLINIC | Age: 80
End: 2020-01-01

## 2020-01-01 ENCOUNTER — APPOINTMENT (OUTPATIENT)
Dept: CT IMAGING | Facility: CLINIC | Age: 80
End: 2020-01-01
Attending: EMERGENCY MEDICINE
Payer: COMMERCIAL

## 2020-01-01 ENCOUNTER — VIRTUAL VISIT (OUTPATIENT)
Dept: ENDOCRINOLOGY | Facility: CLINIC | Age: 80
End: 2020-01-01
Payer: COMMERCIAL

## 2020-01-01 ENCOUNTER — VIRTUAL VISIT (OUTPATIENT)
Dept: PHARMACY | Facility: CLINIC | Age: 80
End: 2020-01-01
Attending: INTERNAL MEDICINE
Payer: COMMERCIAL

## 2020-01-01 ENCOUNTER — HOSPITAL ENCOUNTER (OUTPATIENT)
Dept: CARDIOLOGY | Facility: CLINIC | Age: 80
Discharge: HOME | End: 2020-12-29
Attending: PHYSICIAN ASSISTANT | Admitting: PHYSICIAN ASSISTANT
Payer: COMMERCIAL

## 2020-01-01 ENCOUNTER — HOSPITAL ENCOUNTER (OUTPATIENT)
Facility: CLINIC | Age: 80
Setting detail: OBSERVATION
Discharge: HOME | End: 2020-12-20
Attending: EMERGENCY MEDICINE | Admitting: INTERNAL MEDICINE
Payer: COMMERCIAL

## 2020-01-01 ENCOUNTER — OFFICE VISIT (OUTPATIENT)
Dept: URGENT CARE | Facility: URGENT CARE | Age: 80
End: 2020-01-01
Payer: COMMERCIAL

## 2020-01-01 ENCOUNTER — TELEPHONE (OUTPATIENT)
Dept: PHARMACY | Facility: CLINIC | Age: 80
End: 2020-01-01

## 2020-01-01 ENCOUNTER — ANCILLARY PROCEDURE (OUTPATIENT)
Dept: GENERAL RADIOLOGY | Facility: CLINIC | Age: 80
End: 2020-01-01
Attending: FAMILY MEDICINE
Payer: COMMERCIAL

## 2020-01-01 ENCOUNTER — APPOINTMENT (OUTPATIENT)
Dept: MRI IMAGING | Facility: CLINIC | Age: 80
End: 2020-01-01
Attending: NURSE PRACTITIONER
Payer: COMMERCIAL

## 2020-01-01 VITALS
SYSTOLIC BLOOD PRESSURE: 162 MMHG | DIASTOLIC BLOOD PRESSURE: 84 MMHG | OXYGEN SATURATION: 98 % | TEMPERATURE: 98.4 F | HEART RATE: 69 BPM

## 2020-01-01 VITALS
TEMPERATURE: 98.2 F | RESPIRATION RATE: 22 BRPM | BODY MASS INDEX: 22.01 KG/M2 | HEART RATE: 58 BPM | DIASTOLIC BLOOD PRESSURE: 85 MMHG | OXYGEN SATURATION: 94 % | SYSTOLIC BLOOD PRESSURE: 160 MMHG | WEIGHT: 140.5 LBS

## 2020-01-01 DIAGNOSIS — E11.42 TYPE 2 DIABETES MELLITUS WITH DIABETIC POLYNEUROPATHY, WITH LONG-TERM CURRENT USE OF INSULIN (H): Primary | ICD-10-CM

## 2020-01-01 DIAGNOSIS — Z79.4 TYPE 2 DIABETES MELLITUS WITH STAGE 3 CHRONIC KIDNEY DISEASE, WITH LONG-TERM CURRENT USE OF INSULIN (H): ICD-10-CM

## 2020-01-01 DIAGNOSIS — E11.22 TYPE 2 DIABETES MELLITUS WITH STAGE 3 CHRONIC KIDNEY DISEASE, WITH LONG-TERM CURRENT USE OF INSULIN (H): Primary | ICD-10-CM

## 2020-01-01 DIAGNOSIS — E11.42 TYPE 2 DIABETES MELLITUS WITH DIABETIC POLYNEUROPATHY, WITH LONG-TERM CURRENT USE OF INSULIN (H): ICD-10-CM

## 2020-01-01 DIAGNOSIS — Z79.4 TYPE 2 DIABETES MELLITUS WITH DIABETIC POLYNEUROPATHY, WITH LONG-TERM CURRENT USE OF INSULIN (H): Primary | ICD-10-CM

## 2020-01-01 DIAGNOSIS — E11.9 TYPE 2 DIABETES, HBA1C GOAL < 8% (H): ICD-10-CM

## 2020-01-01 DIAGNOSIS — I10 BENIGN ESSENTIAL HYPERTENSION: Primary | ICD-10-CM

## 2020-01-01 DIAGNOSIS — Z79.4 TYPE 2 DIABETES MELLITUS WITH DIABETIC POLYNEUROPATHY, WITH LONG-TERM CURRENT USE OF INSULIN (H): ICD-10-CM

## 2020-01-01 DIAGNOSIS — I63.112 CEREBROVASCULAR ACCIDENT (CVA) DUE TO EMBOLISM OF LEFT VERTEBRAL ARTERY (H): ICD-10-CM

## 2020-01-01 DIAGNOSIS — R35.0 URINARY FREQUENCY: ICD-10-CM

## 2020-01-01 DIAGNOSIS — Z92.89 HISTORY OF RECENT HOSPITALIZATION: Primary | ICD-10-CM

## 2020-01-01 DIAGNOSIS — N18.30 TYPE 2 DIABETES MELLITUS WITH STAGE 3 CHRONIC KIDNEY DISEASE, WITH LONG-TERM CURRENT USE OF INSULIN (H): Primary | ICD-10-CM

## 2020-01-01 DIAGNOSIS — E03.9 HYPOTHYROIDISM, UNSPECIFIED TYPE: ICD-10-CM

## 2020-01-01 DIAGNOSIS — I63.9 CEREBROVASCULAR ACCIDENT (CVA), UNSPECIFIED MECHANISM (H): ICD-10-CM

## 2020-01-01 DIAGNOSIS — R10.84 ABDOMINAL PAIN, GENERALIZED: Primary | ICD-10-CM

## 2020-01-01 DIAGNOSIS — E11.22 TYPE 2 DIABETES MELLITUS WITH STAGE 3 CHRONIC KIDNEY DISEASE, WITH LONG-TERM CURRENT USE OF INSULIN (H): ICD-10-CM

## 2020-01-01 DIAGNOSIS — I10 HYPERTENSION GOAL BP (BLOOD PRESSURE) < 140/90: ICD-10-CM

## 2020-01-01 DIAGNOSIS — N18.30 TYPE 2 DIABETES MELLITUS WITH STAGE 3 CHRONIC KIDNEY DISEASE, WITH LONG-TERM CURRENT USE OF INSULIN (H): ICD-10-CM

## 2020-01-01 DIAGNOSIS — Z79.4 TYPE 2 DIABETES MELLITUS WITH STAGE 3 CHRONIC KIDNEY DISEASE, WITH LONG-TERM CURRENT USE OF INSULIN (H): Primary | ICD-10-CM

## 2020-01-01 DIAGNOSIS — Z78.9 TAKES DIETARY SUPPLEMENTS: ICD-10-CM

## 2020-01-01 DIAGNOSIS — E13.10 DIABETIC KETOACIDOSIS WITHOUT COMA ASSOCIATED WITH OTHER SPECIFIED DIABETES MELLITUS (H): ICD-10-CM

## 2020-01-01 DIAGNOSIS — E78.5 HYPERLIPIDEMIA LDL GOAL <100: ICD-10-CM

## 2020-01-01 LAB
ABO + RH BLD: NORMAL
ABO + RH BLD: NORMAL
ALBUMIN SERPL-MCNC: 3.2 G/DL (ref 3.4–5)
ALBUMIN SERPL-MCNC: 3.4 G/DL (ref 3.4–5)
ALBUMIN UR-MCNC: 100 MG/DL
ALBUMIN UR-MCNC: 30 MG/DL
ALP SERPL-CCNC: 113 U/L (ref 40–150)
ALP SERPL-CCNC: 92 U/L (ref 40–150)
ALT SERPL W P-5'-P-CCNC: 25 U/L (ref 0–70)
ALT SERPL W P-5'-P-CCNC: 29 U/L (ref 0–70)
ANION GAP SERPL CALCULATED.3IONS-SCNC: 6 MMOL/L (ref 3–14)
ANION GAP SERPL CALCULATED.3IONS-SCNC: <1 MMOL/L (ref 3–14)
APPEARANCE UR: CLEAR
APPEARANCE UR: CLEAR
APTT PPP: 34 SEC (ref 22–37)
AST SERPL W P-5'-P-CCNC: 21 U/L (ref 0–45)
AST SERPL W P-5'-P-CCNC: 29 U/L (ref 0–45)
BASOPHILS # BLD AUTO: 0.1 10E9/L (ref 0–0.2)
BASOPHILS # BLD AUTO: 0.1 10E9/L (ref 0–0.2)
BASOPHILS NFR BLD AUTO: 0.6 %
BASOPHILS NFR BLD AUTO: 0.7 %
BILIRUB DIRECT SERPL-MCNC: 0.1 MG/DL (ref 0–0.2)
BILIRUB SERPL-MCNC: 0.7 MG/DL (ref 0.2–1.3)
BILIRUB SERPL-MCNC: 0.8 MG/DL (ref 0.2–1.3)
BILIRUB UR QL STRIP: NEGATIVE
BILIRUB UR QL STRIP: NEGATIVE
BLD GP AB SCN SERPL QL: NORMAL
BLOOD BANK CMNT PATIENT-IMP: NORMAL
BUN SERPL-MCNC: 19 MG/DL (ref 7–30)
BUN SERPL-MCNC: 25 MG/DL (ref 7–30)
CALCIUM SERPL-MCNC: 9 MG/DL (ref 8.5–10.1)
CALCIUM SERPL-MCNC: 9.2 MG/DL (ref 8.5–10.1)
CHLORIDE SERPL-SCNC: 103 MMOL/L (ref 94–109)
CHLORIDE SERPL-SCNC: 106 MMOL/L (ref 94–109)
CHOLEST SERPL-MCNC: 127 MG/DL
CO2 SERPL-SCNC: 31 MMOL/L (ref 20–32)
CO2 SERPL-SCNC: 31 MMOL/L (ref 20–32)
COLOR UR AUTO: ABNORMAL
COLOR UR AUTO: YELLOW
CREAT BLD-MCNC: 1.5 MG/DL (ref 0.66–1.25)
CREAT SERPL-MCNC: 1.43 MG/DL (ref 0.66–1.25)
CREAT SERPL-MCNC: 1.7 MG/DL (ref 0.66–1.25)
DIFFERENTIAL METHOD BLD: ABNORMAL
DIFFERENTIAL METHOD BLD: NORMAL
EOSINOPHIL # BLD AUTO: 0.7 10E9/L (ref 0–0.7)
EOSINOPHIL # BLD AUTO: 1.1 10E9/L (ref 0–0.7)
EOSINOPHIL NFR BLD AUTO: 13 %
EOSINOPHIL NFR BLD AUTO: 8.5 %
ERYTHROCYTE [DISTWIDTH] IN BLOOD BY AUTOMATED COUNT: 12.3 % (ref 10–15)
ERYTHROCYTE [DISTWIDTH] IN BLOOD BY AUTOMATED COUNT: 12.4 % (ref 10–15)
GFR SERPL CREATININE-BSD FRML MDRD: 36 ML/MIN/{1.73_M2}
GFR SERPL CREATININE-BSD FRML MDRD: 45 ML/MIN/{1.73_M2}
GFR SERPL CREATININE-BSD FRML MDRD: 45 ML/MIN/{1.73_M2}
GLUCOSE BLDC GLUCOMTR-MCNC: 163 MG/DL (ref 70–99)
GLUCOSE BLDC GLUCOMTR-MCNC: 201 MG/DL (ref 70–99)
GLUCOSE BLDC GLUCOMTR-MCNC: 218 MG/DL (ref 70–99)
GLUCOSE BLDC GLUCOMTR-MCNC: 238 MG/DL (ref 70–99)
GLUCOSE BLDC GLUCOMTR-MCNC: 244 MG/DL (ref 70–99)
GLUCOSE BLDC GLUCOMTR-MCNC: 267 MG/DL (ref 70–99)
GLUCOSE BLDC GLUCOMTR-MCNC: 293 MG/DL (ref 70–99)
GLUCOSE BLDC GLUCOMTR-MCNC: 298 MG/DL (ref 70–99)
GLUCOSE BLDC GLUCOMTR-MCNC: 358 MG/DL (ref 70–99)
GLUCOSE BLDC GLUCOMTR-MCNC: 375 MG/DL (ref 70–99)
GLUCOSE BLDC GLUCOMTR-MCNC: 455 MG/DL (ref 70–99)
GLUCOSE SERPL-MCNC: 225 MG/DL (ref 70–99)
GLUCOSE SERPL-MCNC: 83 MG/DL (ref 70–99)
GLUCOSE UR STRIP-MCNC: NEGATIVE MG/DL
GLUCOSE UR STRIP-MCNC: NEGATIVE MG/DL
HBA1C MFR BLD: 8.3 % (ref 0–5.6)
HBA1C MFR BLD: 8.7 % (ref 0–5.6)
HCT VFR BLD AUTO: 45.6 % (ref 40–53)
HCT VFR BLD AUTO: 48.5 % (ref 40–53)
HDLC SERPL-MCNC: 29 MG/DL
HGB BLD-MCNC: 15.2 G/DL (ref 13.3–17.7)
HGB BLD-MCNC: 15.9 G/DL (ref 13.3–17.7)
HGB UR QL STRIP: NEGATIVE
HGB UR QL STRIP: NEGATIVE
IMM GRANULOCYTES # BLD: 0 10E9/L (ref 0–0.4)
IMM GRANULOCYTES NFR BLD: 0.1 %
INR PPP: 1.14 (ref 0.86–1.14)
INTERPRETATION ECG - MUSE: NORMAL
KETONES UR STRIP-MCNC: NEGATIVE MG/DL
KETONES UR STRIP-MCNC: NEGATIVE MG/DL
LABORATORY COMMENT REPORT: NORMAL
LDLC SERPL CALC-MCNC: 74 MG/DL
LEUKOCYTE ESTERASE UR QL STRIP: NEGATIVE
LEUKOCYTE ESTERASE UR QL STRIP: NEGATIVE
LYMPHOCYTES # BLD AUTO: 1.9 10E9/L (ref 0.8–5.3)
LYMPHOCYTES # BLD AUTO: 2.6 10E9/L (ref 0.8–5.3)
LYMPHOCYTES NFR BLD AUTO: 22 %
LYMPHOCYTES NFR BLD AUTO: 30.3 %
MCH RBC QN AUTO: 30.5 PG (ref 26.5–33)
MCH RBC QN AUTO: 31 PG (ref 26.5–33)
MCHC RBC AUTO-ENTMCNC: 32.8 G/DL (ref 31.5–36.5)
MCHC RBC AUTO-ENTMCNC: 33.3 G/DL (ref 31.5–36.5)
MCV RBC AUTO: 91 FL (ref 78–100)
MCV RBC AUTO: 95 FL (ref 78–100)
MONOCYTES # BLD AUTO: 0.9 10E9/L (ref 0–1.3)
MONOCYTES # BLD AUTO: 0.9 10E9/L (ref 0–1.3)
MONOCYTES NFR BLD AUTO: 10.2 %
MONOCYTES NFR BLD AUTO: 10.8 %
MUCOUS THREADS #/AREA URNS LPF: PRESENT /LPF
NEUTROPHILS # BLD AUTO: 3.8 10E9/L (ref 1.6–8.3)
NEUTROPHILS # BLD AUTO: 5.1 10E9/L (ref 1.6–8.3)
NEUTROPHILS NFR BLD AUTO: 45.1 %
NEUTROPHILS NFR BLD AUTO: 58.7 %
NITRATE UR QL: NEGATIVE
NITRATE UR QL: NEGATIVE
NON-SQ EPI CELLS #/AREA URNS LPF: NORMAL /LPF
NONHDLC SERPL-MCNC: 98 MG/DL
NRBC # BLD AUTO: 0 10*3/UL
NRBC BLD AUTO-RTO: 0 /100
PH UR STRIP: 5.5 PH (ref 5–7)
PH UR STRIP: 5.5 PH (ref 5–7)
PLATELET # BLD AUTO: 225 10E9/L (ref 150–450)
PLATELET # BLD AUTO: 239 10E9/L (ref 150–450)
POTASSIUM SERPL-SCNC: 4.1 MMOL/L (ref 3.4–5.3)
POTASSIUM SERPL-SCNC: 5.2 MMOL/L (ref 3.4–5.3)
PROT SERPL-MCNC: 7.7 G/DL (ref 6.8–8.8)
PROT SERPL-MCNC: 8.2 G/DL (ref 6.8–8.8)
RBC # BLD AUTO: 4.99 10E12/L (ref 4.4–5.9)
RBC # BLD AUTO: 5.13 10E12/L (ref 4.4–5.9)
RBC #/AREA URNS AUTO: <1 /HPF (ref 0–2)
RBC #/AREA URNS AUTO: NORMAL /HPF
SARS-COV-2 RNA SPEC QL NAA+PROBE: NEGATIVE
SARS-COV-2 RNA SPEC QL NAA+PROBE: NORMAL
SODIUM SERPL-SCNC: 137 MMOL/L (ref 133–144)
SODIUM SERPL-SCNC: 140 MMOL/L (ref 133–144)
SOURCE: ABNORMAL
SOURCE: ABNORMAL
SP GR UR STRIP: 1.02 (ref 1–1.03)
SP GR UR STRIP: 1.03 (ref 1–1.03)
SPECIMEN EXP DATE BLD: NORMAL
SPECIMEN SOURCE: NORMAL
SPECIMEN SOURCE: NORMAL
T4 FREE SERPL-MCNC: 1.11 NG/DL (ref 0.76–1.46)
T4 FREE SERPL-MCNC: 1.11 NG/DL (ref 0.76–1.46)
TRIGL SERPL-MCNC: 118 MG/DL
TROPONIN I SERPL-MCNC: <0.015 UG/L (ref 0–0.04)
TSH SERPL DL<=0.005 MIU/L-ACNC: 3.28 MU/L (ref 0.4–4)
TSH SERPL DL<=0.005 MIU/L-ACNC: 6.18 MU/L (ref 0.4–4)
TSH SERPL DL<=0.005 MIU/L-ACNC: 8.2 MU/L (ref 0.4–4)
UROBILINOGEN UR STRIP-ACNC: 0.2 EU/DL (ref 0.2–1)
UROBILINOGEN UR STRIP-MCNC: NORMAL MG/DL (ref 0–2)
WBC # BLD AUTO: 8.5 10E9/L (ref 4–11)
WBC # BLD AUTO: 8.6 10E9/L (ref 4–11)
WBC #/AREA URNS AUTO: <1 /HPF (ref 0–5)
WBC #/AREA URNS AUTO: NORMAL /HPF

## 2020-01-01 PROCEDURE — G0378 HOSPITAL OBSERVATION PER HR: HCPCS

## 2020-01-01 PROCEDURE — 84439 ASSAY OF FREE THYROXINE: CPT | Performed by: INTERNAL MEDICINE

## 2020-01-01 PROCEDURE — 999N001017 HC STATISTIC GLUCOSE BY METER IP

## 2020-01-01 PROCEDURE — 84443 ASSAY THYROID STIM HORMONE: CPT | Performed by: EMERGENCY MEDICINE

## 2020-01-01 PROCEDURE — 93005 ELECTROCARDIOGRAM TRACING: CPT

## 2020-01-01 PROCEDURE — 85610 PROTHROMBIN TIME: CPT | Performed by: EMERGENCY MEDICINE

## 2020-01-01 PROCEDURE — 86901 BLOOD TYPING SEROLOGIC RH(D): CPT | Performed by: EMERGENCY MEDICINE

## 2020-01-01 PROCEDURE — 258N000003 HC RX IP 258 OP 636: Performed by: EMERGENCY MEDICINE

## 2020-01-01 PROCEDURE — A9585 GADOBUTROL INJECTION: HCPCS | Performed by: EMERGENCY MEDICINE

## 2020-01-01 PROCEDURE — 99207 PR CDG-CODE CATEGORY CHANGED: CPT | Performed by: PHYSICIAN ASSISTANT

## 2020-01-01 PROCEDURE — 85025 COMPLETE CBC W/AUTO DIFF WBC: CPT | Performed by: EMERGENCY MEDICINE

## 2020-01-01 PROCEDURE — 250N000013 HC RX MED GY IP 250 OP 250 PS 637: Performed by: EMERGENCY MEDICINE

## 2020-01-01 PROCEDURE — 93306 TTE W/DOPPLER COMPLETE: CPT

## 2020-01-01 PROCEDURE — 70553 MRI BRAIN STEM W/O & W/DYE: CPT

## 2020-01-01 PROCEDURE — 81001 URINALYSIS AUTO W/SCOPE: CPT | Performed by: EMERGENCY MEDICINE

## 2020-01-01 PROCEDURE — 999N000226 HC STATISTIC SLP IP EVAL DEFER: Performed by: SPEECH-LANGUAGE PATHOLOGIST

## 2020-01-01 PROCEDURE — 250N000012 HC RX MED GY IP 250 OP 636 PS 637: Performed by: PHYSICIAN ASSISTANT

## 2020-01-01 PROCEDURE — 99214 OFFICE O/P EST MOD 30 MIN: CPT | Performed by: FAMILY MEDICINE

## 2020-01-01 PROCEDURE — 80061 LIPID PANEL: CPT | Performed by: PHYSICIAN ASSISTANT

## 2020-01-01 PROCEDURE — 96372 THER/PROPH/DIAG INJ SC/IM: CPT | Performed by: PHYSICIAN ASSISTANT

## 2020-01-01 PROCEDURE — 99213 OFFICE O/P EST LOW 20 MIN: CPT | Mod: 95 | Performed by: INTERNAL MEDICINE

## 2020-01-01 PROCEDURE — 93306 TTE W/DOPPLER COMPLETE: CPT | Mod: 26 | Performed by: INTERNAL MEDICINE

## 2020-01-01 PROCEDURE — 70498 CT ANGIOGRAPHY NECK: CPT

## 2020-01-01 PROCEDURE — G0508 CRIT CARE TELEHEA CONSULT 60: HCPCS | Mod: GC | Performed by: PSYCHIATRY & NEUROLOGY

## 2020-01-01 PROCEDURE — 80076 HEPATIC FUNCTION PANEL: CPT | Performed by: EMERGENCY MEDICINE

## 2020-01-01 PROCEDURE — 93272 ECG/REVIEW INTERPRET ONLY: CPT | Performed by: INTERNAL MEDICINE

## 2020-01-01 PROCEDURE — 82565 ASSAY OF CREATININE: CPT | Mod: 91

## 2020-01-01 PROCEDURE — 74019 RADEX ABDOMEN 2 VIEWS: CPT

## 2020-01-01 PROCEDURE — 84443 ASSAY THYROID STIM HORMONE: CPT | Performed by: PHYSICIAN ASSISTANT

## 2020-01-01 PROCEDURE — 97116 GAIT TRAINING THERAPY: CPT | Mod: GP,59 | Performed by: PHYSICAL THERAPIST

## 2020-01-01 PROCEDURE — 80048 BASIC METABOLIC PNL TOTAL CA: CPT | Performed by: EMERGENCY MEDICINE

## 2020-01-01 PROCEDURE — 250N000013 HC RX MED GY IP 250 OP 250 PS 637: Performed by: PHYSICIAN ASSISTANT

## 2020-01-01 PROCEDURE — 80053 COMPREHEN METABOLIC PANEL: CPT | Performed by: FAMILY MEDICINE

## 2020-01-01 PROCEDURE — 84439 ASSAY OF FREE THYROXINE: CPT | Performed by: EMERGENCY MEDICINE

## 2020-01-01 PROCEDURE — 96372 THER/PROPH/DIAG INJ SC/IM: CPT | Mod: 59 | Performed by: PHYSICIAN ASSISTANT

## 2020-01-01 PROCEDURE — 85730 THROMBOPLASTIN TIME PARTIAL: CPT | Performed by: EMERGENCY MEDICINE

## 2020-01-01 PROCEDURE — 86850 RBC ANTIBODY SCREEN: CPT | Performed by: EMERGENCY MEDICINE

## 2020-01-01 PROCEDURE — 81001 URINALYSIS AUTO W/SCOPE: CPT | Performed by: FAMILY MEDICINE

## 2020-01-01 PROCEDURE — U0003 INFECTIOUS AGENT DETECTION BY NUCLEIC ACID (DNA OR RNA); SEVERE ACUTE RESPIRATORY SYNDROME CORONAVIRUS 2 (SARS-COV-2) (CORONAVIRUS DISEASE [COVID-19]), AMPLIFIED PROBE TECHNIQUE, MAKING USE OF HIGH THROUGHPUT TECHNOLOGIES AS DESCRIBED BY CMS-2020-01-R: HCPCS | Performed by: EMERGENCY MEDICINE

## 2020-01-01 PROCEDURE — 84443 ASSAY THYROID STIM HORMONE: CPT | Performed by: INTERNAL MEDICINE

## 2020-01-01 PROCEDURE — 83036 HEMOGLOBIN GLYCOSYLATED A1C: CPT | Performed by: EMERGENCY MEDICINE

## 2020-01-01 PROCEDURE — 0042T CT HEAD PERFUSION WITH CONTRAST: CPT | Mod: GZ

## 2020-01-01 PROCEDURE — 96361 HYDRATE IV INFUSION ADD-ON: CPT

## 2020-01-01 PROCEDURE — 99220 PR INITIAL OBSERVATION CARE,LEVEL III: CPT | Performed by: PHYSICIAN ASSISTANT

## 2020-01-01 PROCEDURE — 99607 MTMS BY PHARM ADDL 15 MIN: CPT | Mod: TEL | Performed by: PHARMACIST

## 2020-01-01 PROCEDURE — 93270 REMOTE 30 DAY ECG REV/REPORT: CPT

## 2020-01-01 PROCEDURE — 96360 HYDRATION IV INFUSION INIT: CPT | Mod: 59

## 2020-01-01 PROCEDURE — 85025 COMPLETE CBC W/AUTO DIFF WBC: CPT | Performed by: FAMILY MEDICINE

## 2020-01-01 PROCEDURE — 84484 ASSAY OF TROPONIN QUANT: CPT | Performed by: EMERGENCY MEDICINE

## 2020-01-01 PROCEDURE — 36415 COLL VENOUS BLD VENIPUNCTURE: CPT | Performed by: INTERNAL MEDICINE

## 2020-01-01 PROCEDURE — 97161 PT EVAL LOW COMPLEX 20 MIN: CPT | Mod: GP | Performed by: PHYSICAL THERAPIST

## 2020-01-01 PROCEDURE — 99291 CRITICAL CARE FIRST HOUR: CPT | Mod: 25

## 2020-01-01 PROCEDURE — 83036 HEMOGLOBIN GLYCOSYLATED A1C: CPT | Performed by: INTERNAL MEDICINE

## 2020-01-01 PROCEDURE — 250N000013 HC RX MED GY IP 250 OP 250 PS 637: Performed by: NURSE PRACTITIONER

## 2020-01-01 PROCEDURE — 99217 PR OBSERVATION CARE DISCHARGE: CPT | Performed by: PHYSICIAN ASSISTANT

## 2020-01-01 PROCEDURE — 86900 BLOOD TYPING SEROLOGIC ABO: CPT | Performed by: EMERGENCY MEDICINE

## 2020-01-01 PROCEDURE — 250N000011 HC RX IP 250 OP 636: Performed by: EMERGENCY MEDICINE

## 2020-01-01 PROCEDURE — 99605 MTMS BY PHARM NP 15 MIN: CPT | Mod: TEL | Performed by: PHARMACIST

## 2020-01-01 PROCEDURE — 99226 PR SUBSEQUENT OBSERVATION CARE,LEVEL III: CPT | Performed by: PHYSICIAN ASSISTANT

## 2020-01-01 PROCEDURE — 84484 ASSAY OF TROPONIN QUANT: CPT | Performed by: PHYSICIAN ASSISTANT

## 2020-01-01 PROCEDURE — C9803 HOPD COVID-19 SPEC COLLECT: HCPCS

## 2020-01-01 PROCEDURE — 36415 COLL VENOUS BLD VENIPUNCTURE: CPT | Performed by: FAMILY MEDICINE

## 2020-01-01 PROCEDURE — 96372 THER/PROPH/DIAG INJ SC/IM: CPT

## 2020-01-01 PROCEDURE — 255N000002 HC RX 255 OP 636: Performed by: EMERGENCY MEDICINE

## 2020-01-01 PROCEDURE — 97530 THERAPEUTIC ACTIVITIES: CPT | Mod: GP | Performed by: PHYSICAL THERAPIST

## 2020-01-01 PROCEDURE — 36415 COLL VENOUS BLD VENIPUNCTURE: CPT | Performed by: PHYSICIAN ASSISTANT

## 2020-01-01 PROCEDURE — 70450 CT HEAD/BRAIN W/O DYE: CPT | Mod: XS

## 2020-01-01 RX ORDER — IOPAMIDOL 755 MG/ML
500 INJECTION, SOLUTION INTRAVASCULAR ONCE
Status: COMPLETED | OUTPATIENT
Start: 2020-01-01 | End: 2020-01-01

## 2020-01-01 RX ORDER — ACETAMINOPHEN 325 MG/1
650 TABLET ORAL EVERY 4 HOURS PRN
Status: DISCONTINUED | OUTPATIENT
Start: 2020-01-01 | End: 2020-01-01 | Stop reason: HOSPADM

## 2020-01-01 RX ORDER — ASPIRIN 81 MG/1
81 TABLET, CHEWABLE ORAL DAILY
Qty: 19 TABLET | Refills: 0 | COMMUNITY
Start: 2020-01-01 | End: 2021-01-01

## 2020-01-01 RX ORDER — CLOPIDOGREL BISULFATE 75 MG/1
300 TABLET ORAL ONCE
Status: COMPLETED | OUTPATIENT
Start: 2020-01-01 | End: 2020-01-01

## 2020-01-01 RX ORDER — ASPIRIN 325 MG
325 TABLET, DELAYED RELEASE (ENTERIC COATED) ORAL DAILY
COMMUNITY
Start: 2021-01-01

## 2020-01-01 RX ORDER — PEN NEEDLE, DIABETIC 31 GX5/16"
NEEDLE, DISPOSABLE MISCELLANEOUS
Qty: 400 EACH | Refills: 0 | Status: SHIPPED | OUTPATIENT
Start: 2020-01-01 | End: 2020-01-01

## 2020-01-01 RX ORDER — ONDANSETRON 2 MG/ML
4 INJECTION INTRAMUSCULAR; INTRAVENOUS EVERY 6 HOURS PRN
Status: DISCONTINUED | OUTPATIENT
Start: 2020-01-01 | End: 2020-01-01 | Stop reason: HOSPADM

## 2020-01-01 RX ORDER — AMOXICILLIN 250 MG
1 CAPSULE ORAL 2 TIMES DAILY PRN
Status: DISCONTINUED | OUTPATIENT
Start: 2020-01-01 | End: 2020-01-01 | Stop reason: HOSPADM

## 2020-01-01 RX ORDER — GADOBUTROL 604.72 MG/ML
7.5 INJECTION INTRAVENOUS ONCE
Status: COMPLETED | OUTPATIENT
Start: 2020-01-01 | End: 2020-01-01

## 2020-01-01 RX ORDER — CLOPIDOGREL BISULFATE 75 MG/1
75 TABLET ORAL DAILY
Qty: 19 TABLET | Refills: 0 | Status: SHIPPED | OUTPATIENT
Start: 2020-01-01 | End: 2021-01-01

## 2020-01-01 RX ORDER — LANOLIN ALCOHOL/MO/W.PET/CERES
3 CREAM (GRAM) TOPICAL
Status: DISCONTINUED | OUTPATIENT
Start: 2020-01-01 | End: 2020-01-01 | Stop reason: HOSPADM

## 2020-01-01 RX ORDER — AMLODIPINE BESYLATE 5 MG/1
5 TABLET ORAL DAILY
Qty: 30 TABLET | Refills: 1 | Status: SHIPPED | OUTPATIENT
Start: 2020-01-01 | End: 2021-01-01

## 2020-01-01 RX ORDER — ATORVASTATIN CALCIUM 20 MG/1
20 TABLET, FILM COATED ORAL EVERY EVENING
Status: DISCONTINUED | OUTPATIENT
Start: 2020-01-01 | End: 2020-01-01 | Stop reason: HOSPADM

## 2020-01-01 RX ORDER — INSULIN ASPART 100 [IU]/ML
7 INJECTION, SOLUTION INTRAVENOUS; SUBCUTANEOUS
Refills: 0 | COMMUNITY
Start: 2020-01-01 | End: 2020-01-01

## 2020-01-01 RX ORDER — CLOPIDOGREL BISULFATE 75 MG/1
75 TABLET ORAL DAILY
Status: DISCONTINUED | OUTPATIENT
Start: 2020-01-01 | End: 2020-01-01 | Stop reason: HOSPADM

## 2020-01-01 RX ORDER — AMLODIPINE BESYLATE 5 MG/1
5 TABLET ORAL DAILY
Status: DISCONTINUED | OUTPATIENT
Start: 2020-01-01 | End: 2020-01-01 | Stop reason: HOSPADM

## 2020-01-01 RX ORDER — PROCHLORPERAZINE MALEATE 5 MG
5 TABLET ORAL EVERY 6 HOURS PRN
Status: DISCONTINUED | OUTPATIENT
Start: 2020-01-01 | End: 2020-01-01 | Stop reason: HOSPADM

## 2020-01-01 RX ORDER — ASPIRIN 81 MG/1
81 TABLET, CHEWABLE ORAL ONCE
Status: COMPLETED | OUTPATIENT
Start: 2020-01-01 | End: 2020-01-01

## 2020-01-01 RX ORDER — ACETAMINOPHEN 650 MG/1
650 SUPPOSITORY RECTAL EVERY 4 HOURS PRN
Status: DISCONTINUED | OUTPATIENT
Start: 2020-01-01 | End: 2020-01-01 | Stop reason: HOSPADM

## 2020-01-01 RX ORDER — ONDANSETRON 4 MG/1
4 TABLET, ORALLY DISINTEGRATING ORAL EVERY 6 HOURS PRN
Status: DISCONTINUED | OUTPATIENT
Start: 2020-01-01 | End: 2020-01-01 | Stop reason: HOSPADM

## 2020-01-01 RX ORDER — INSULIN ASPART 100 [IU]/ML
6 INJECTION, SOLUTION INTRAVENOUS; SUBCUTANEOUS
Status: ON HOLD | COMMUNITY
End: 2020-01-01

## 2020-01-01 RX ORDER — INSULIN ASPART 100 [IU]/ML
7 INJECTION, SOLUTION INTRAVENOUS; SUBCUTANEOUS
Status: ON HOLD | COMMUNITY
End: 2020-01-01

## 2020-01-01 RX ORDER — BISACODYL 10 MG
10 SUPPOSITORY, RECTAL RECTAL DAILY PRN
Status: DISCONTINUED | OUTPATIENT
Start: 2020-01-01 | End: 2020-01-01 | Stop reason: HOSPADM

## 2020-01-01 RX ORDER — AMOXICILLIN 250 MG
2 CAPSULE ORAL 2 TIMES DAILY PRN
Status: DISCONTINUED | OUTPATIENT
Start: 2020-01-01 | End: 2020-01-01 | Stop reason: HOSPADM

## 2020-01-01 RX ORDER — INSULIN ASPART 100 [IU]/ML
8 INJECTION, SOLUTION INTRAVENOUS; SUBCUTANEOUS
COMMUNITY
Start: 2020-01-01 | End: 2020-01-01

## 2020-01-01 RX ORDER — METOPROLOL TARTRATE 25 MG/1
25 TABLET, FILM COATED ORAL 2 TIMES DAILY
Status: DISCONTINUED | OUTPATIENT
Start: 2020-01-01 | End: 2020-01-01 | Stop reason: HOSPADM

## 2020-01-01 RX ORDER — ASPIRIN 81 MG/1
81 TABLET, CHEWABLE ORAL DAILY
Status: DISCONTINUED | OUTPATIENT
Start: 2020-01-01 | End: 2020-01-01 | Stop reason: HOSPADM

## 2020-01-01 RX ORDER — PROCHLORPERAZINE 25 MG
12.5 SUPPOSITORY, RECTAL RECTAL EVERY 12 HOURS PRN
Status: DISCONTINUED | OUTPATIENT
Start: 2020-01-01 | End: 2020-01-01 | Stop reason: HOSPADM

## 2020-01-01 RX ORDER — INSULIN ASPART 100 [IU]/ML
INJECTION, SOLUTION INTRAVENOUS; SUBCUTANEOUS
Qty: 15 ML | Refills: 1 | Status: SHIPPED | OUTPATIENT
Start: 2020-01-01 | End: 2020-01-01

## 2020-01-01 RX ORDER — FINASTERIDE 5 MG/1
TABLET, FILM COATED ORAL
Qty: 90 TABLET | Refills: 0 | Status: SHIPPED | OUTPATIENT
Start: 2020-01-01 | End: 2021-01-01

## 2020-01-01 RX ORDER — DEXTROSE MONOHYDRATE 25 G/50ML
25-50 INJECTION, SOLUTION INTRAVENOUS
Status: DISCONTINUED | OUTPATIENT
Start: 2020-01-01 | End: 2020-01-01 | Stop reason: HOSPADM

## 2020-01-01 RX ORDER — FINASTERIDE 5 MG/1
5 TABLET, FILM COATED ORAL DAILY
Status: DISCONTINUED | OUTPATIENT
Start: 2020-01-01 | End: 2020-01-01 | Stop reason: HOSPADM

## 2020-01-01 RX ORDER — NICOTINE POLACRILEX 4 MG
15-30 LOZENGE BUCCAL
Status: DISCONTINUED | OUTPATIENT
Start: 2020-01-01 | End: 2020-01-01 | Stop reason: HOSPADM

## 2020-01-01 RX ORDER — INSULIN ASPART 100 [IU]/ML
7 INJECTION, SOLUTION INTRAVENOUS; SUBCUTANEOUS
COMMUNITY
Start: 2020-01-01 | End: 2020-01-01

## 2020-01-01 RX ORDER — INSULIN ASPART 100 [IU]/ML
INJECTION, SOLUTION INTRAVENOUS; SUBCUTANEOUS
Qty: 15 ML | Refills: 1 | COMMUNITY
Start: 2020-01-01 | End: 2021-01-01

## 2020-01-01 RX ORDER — INSULIN ASPART 100 [IU]/ML
INJECTION, SOLUTION INTRAVENOUS; SUBCUTANEOUS
Qty: 30 ML | Refills: 1
Start: 2020-01-01 | End: 2020-01-01

## 2020-01-01 RX ORDER — ASPIRIN 81 MG/1
324 TABLET, CHEWABLE ORAL DAILY
Status: DISCONTINUED | OUTPATIENT
Start: 2020-01-01 | End: 2020-01-01

## 2020-01-01 RX ADMIN — METOPROLOL TARTRATE 25 MG: 25 TABLET, FILM COATED ORAL at 19:51

## 2020-01-01 RX ADMIN — INSULIN ASPART 2 UNITS: 100 INJECTION, SOLUTION INTRAVENOUS; SUBCUTANEOUS at 21:45

## 2020-01-01 RX ADMIN — INSULIN GLARGINE 16 UNITS: 100 INJECTION, SOLUTION SUBCUTANEOUS at 09:00

## 2020-01-01 RX ADMIN — CLOPIDOGREL BISULFATE 75 MG: 75 TABLET ORAL at 09:50

## 2020-01-01 RX ADMIN — SODIUM CHLORIDE 95 ML: 9 INJECTION, SOLUTION INTRAVENOUS at 11:23

## 2020-01-01 RX ADMIN — GADOBUTROL 7.5 ML: 604.72 INJECTION INTRAVENOUS at 14:23

## 2020-01-01 RX ADMIN — METOPROLOL TARTRATE 25 MG: 25 TABLET, FILM COATED ORAL at 21:33

## 2020-01-01 RX ADMIN — FINASTERIDE 5 MG: 5 TABLET, FILM COATED ORAL at 08:41

## 2020-01-01 RX ADMIN — METOPROLOL TARTRATE 25 MG: 25 TABLET, FILM COATED ORAL at 08:41

## 2020-01-01 RX ADMIN — INSULIN GLARGINE 12 UNITS: 100 INJECTION, SOLUTION SUBCUTANEOUS at 23:29

## 2020-01-01 RX ADMIN — CLOPIDOGREL BISULFATE 300 MG: 75 TABLET ORAL at 15:28

## 2020-01-01 RX ADMIN — CLOPIDOGREL BISULFATE 75 MG: 75 TABLET ORAL at 08:00

## 2020-01-01 RX ADMIN — FINASTERIDE 5 MG: 5 TABLET, FILM COATED ORAL at 08:00

## 2020-01-01 RX ADMIN — IOPAMIDOL 120 ML: 755 INJECTION, SOLUTION INTRAVENOUS at 11:23

## 2020-01-01 RX ADMIN — INSULIN GLARGINE 14 UNITS: 100 INJECTION, SOLUTION SUBCUTANEOUS at 17:26

## 2020-01-01 RX ADMIN — INSULIN GLARGINE 16 UNITS: 100 INJECTION, SOLUTION SUBCUTANEOUS at 09:07

## 2020-01-01 RX ADMIN — ASPIRIN 81 MG: 81 TABLET, CHEWABLE ORAL at 08:00

## 2020-01-01 RX ADMIN — ASPIRIN 81 MG CHEWABLE TABLET 81 MG: 81 TABLET CHEWABLE at 15:28

## 2020-01-01 RX ADMIN — INSULIN ASPART 1 UNITS: 100 INJECTION, SOLUTION INTRAVENOUS; SUBCUTANEOUS at 00:41

## 2020-01-01 RX ADMIN — METOPROLOL TARTRATE 25 MG: 25 TABLET, FILM COATED ORAL at 08:00

## 2020-01-01 RX ADMIN — AMLODIPINE BESYLATE 5 MG: 5 TABLET ORAL at 15:06

## 2020-01-01 RX ADMIN — ASPIRIN 81 MG: 81 TABLET, CHEWABLE ORAL at 09:50

## 2020-01-01 RX ADMIN — ATORVASTATIN CALCIUM 20 MG: 20 TABLET, FILM COATED ORAL at 21:25

## 2020-01-01 RX ADMIN — ATORVASTATIN CALCIUM 20 MG: 20 TABLET, FILM COATED ORAL at 19:51

## 2020-01-01 ASSESSMENT — ENCOUNTER SYMPTOMS
ABDOMINAL PAIN: 0
CONFUSION: 1
SPEECH DIFFICULTY: 1
HEADACHES: 0

## 2020-01-17 DIAGNOSIS — I63.112 CEREBROVASCULAR ACCIDENT (CVA) DUE TO EMBOLISM OF LEFT VERTEBRAL ARTERY (H): ICD-10-CM

## 2020-01-17 NOTE — TELEPHONE ENCOUNTER
"Requested Prescriptions   Pending Prescriptions Disp Refills     atorvastatin (LIPITOR) 20 MG tablet [Pharmacy Med Name: Atorvastatin Calcium 20 MG Oral Tablet]  0     Sig: TAKE 1 TABLET BY MOUTH ONCE DAILY   Last Written Prescription Date:  12/20/2019  Last Fill Quantity: 30,  # refills: 0   Last office visit: 10/16/2019 with prescribing provider:     Future Office Visit:   Next 5 appointments (look out 90 days)    Mar 04, 2020 10:20 AM CST  SHORT with Rom Helm MD  [facility]) [address]  [facility] [address]  [phone]   Mar 11, 2020  2:30 PM CDT  Return Visit with Lori Damian MD  [facility]) [address]  [facility] [address]  [phone]           Statins Protocol Failed - 1/17/2020  9:45 AM        Failed - LDL on file in past 12 months     Recent Labs   Lab Test 11/27/18  0927   LDL 59             Passed - No abnormal creatine kinase in past 12 months     No lab results found.             Passed - Recent (12 mo) or future (30 days) visit within the authorizing provider's specialty     Patient has had an office visit with the authorizing provider or a provider within the authorizing providers department within the previous 12 mos or has a future within next 30 days. See \"Patient Info\" tab in inbasket, or \"Choose Columns\" in Meds & Orders section of the refill encounter.              Passed - Medication is active on med list        Passed - Patient is age 18 or older        "

## 2020-01-20 RX ORDER — ATORVASTATIN CALCIUM 20 MG/1
TABLET, FILM COATED ORAL
Qty: 90 TABLET | Refills: 0 | Status: SHIPPED | OUTPATIENT
Start: 2020-01-20 | End: 2020-03-04

## 2020-01-21 DIAGNOSIS — I63.112 CEREBROVASCULAR ACCIDENT (CVA) DUE TO EMBOLISM OF LEFT VERTEBRAL ARTERY (H): ICD-10-CM

## 2020-01-22 NOTE — TELEPHONE ENCOUNTER
"Requested Prescriptions   Pending Prescriptions Disp Refills     atorvastatin (LIPITOR) 20 MG tablet [Pharmacy Med Name: Atorvastatin Calcium 20 MG Oral Tablet]  0     Sig: TAKE 1 TABLET BY MOUTH ONCE DAILY   Last Written Prescription Date:  01/20/2020  Last Fill Quantity: 90,  # refills: 0   Last office visit: 10/16/2019 with prescribing provider:     Future Office Visit:   Next 5 appointments (look out 90 days)    Mar 04, 2020 10:20 AM CST  SHORT with Rom Helm MD  [facility] [address]  [facility] [address]  [phone]   Mar 11, 2020  2:30 PM CDT  Return Visit with Lori Damian MD  [facility]) [address]  [facility] [address]  [phone]           Statins Protocol Failed - 1/21/2020  4:52 PM        Failed - LDL on file in past 12 months     Recent Labs   Lab Test 11/27/18  0927   LDL 59             Passed - No abnormal creatine kinase in past 12 months     No lab results found.             Passed - Recent (12 mo) or future (30 days) visit within the authorizing provider's specialty     Patient has had an office visit with the authorizing provider or a provider within the authorizing providers department within the previous 12 mos or has a future within next 30 days. See \"Patient Info\" tab in inbasket, or \"Choose Columns\" in Meds & Orders section of the refill encounter.              Passed - Medication is active on med list        Passed - Patient is age 18 or older        "

## 2020-01-23 RX ORDER — ATORVASTATIN CALCIUM 20 MG/1
TABLET, FILM COATED ORAL
Refills: 0
Start: 2020-01-23

## 2020-02-25 ENCOUNTER — TRANSFERRED RECORDS (OUTPATIENT)
Dept: HEALTH INFORMATION MANAGEMENT | Facility: CLINIC | Age: 80
End: 2020-02-25

## 2020-02-28 DIAGNOSIS — E11.42 TYPE 2 DIABETES MELLITUS WITH DIABETIC POLYNEUROPATHY, WITH LONG-TERM CURRENT USE OF INSULIN (H): ICD-10-CM

## 2020-02-28 DIAGNOSIS — E78.5 HYPERLIPIDEMIA LDL GOAL <100: ICD-10-CM

## 2020-02-28 DIAGNOSIS — Z79.4 TYPE 2 DIABETES MELLITUS WITH DIABETIC POLYNEUROPATHY, WITH LONG-TERM CURRENT USE OF INSULIN (H): ICD-10-CM

## 2020-02-28 LAB
ALBUMIN SERPL-MCNC: 3.1 G/DL (ref 3.4–5)
ALP SERPL-CCNC: 110 U/L (ref 40–150)
ALT SERPL W P-5'-P-CCNC: 31 U/L (ref 0–70)
ANION GAP SERPL CALCULATED.3IONS-SCNC: 5 MMOL/L (ref 3–14)
AST SERPL W P-5'-P-CCNC: 19 U/L (ref 0–45)
BILIRUB SERPL-MCNC: 0.4 MG/DL (ref 0.2–1.3)
BUN SERPL-MCNC: 22 MG/DL (ref 7–30)
CALCIUM SERPL-MCNC: 8.6 MG/DL (ref 8.5–10.1)
CHLORIDE SERPL-SCNC: 106 MMOL/L (ref 94–109)
CHOLEST SERPL-MCNC: 116 MG/DL
CO2 SERPL-SCNC: 25 MMOL/L (ref 20–32)
CREAT SERPL-MCNC: 1.45 MG/DL (ref 0.66–1.25)
GFR SERPL CREATININE-BSD FRML MDRD: 45 ML/MIN/{1.73_M2}
GLUCOSE SERPL-MCNC: 215 MG/DL (ref 70–99)
HBA1C MFR BLD: 9 % (ref 0–5.6)
HDLC SERPL-MCNC: 28 MG/DL
LDLC SERPL CALC-MCNC: 65 MG/DL
NONHDLC SERPL-MCNC: 88 MG/DL
POTASSIUM SERPL-SCNC: 4.8 MMOL/L (ref 3.4–5.3)
PROT SERPL-MCNC: 8.1 G/DL (ref 6.8–8.8)
SODIUM SERPL-SCNC: 136 MMOL/L (ref 133–144)
T4 FREE SERPL-MCNC: 1.09 NG/DL (ref 0.76–1.46)
TRIGL SERPL-MCNC: 113 MG/DL
TSH SERPL DL<=0.005 MIU/L-ACNC: 4.47 MU/L (ref 0.4–4)

## 2020-02-28 PROCEDURE — 80061 LIPID PANEL: CPT | Performed by: INTERNAL MEDICINE

## 2020-02-28 PROCEDURE — 80053 COMPREHEN METABOLIC PANEL: CPT | Performed by: INTERNAL MEDICINE

## 2020-02-28 PROCEDURE — 84443 ASSAY THYROID STIM HORMONE: CPT | Performed by: INTERNAL MEDICINE

## 2020-02-28 PROCEDURE — 84439 ASSAY OF FREE THYROXINE: CPT | Performed by: INTERNAL MEDICINE

## 2020-02-28 PROCEDURE — 36415 COLL VENOUS BLD VENIPUNCTURE: CPT | Performed by: INTERNAL MEDICINE

## 2020-02-28 PROCEDURE — 83036 HEMOGLOBIN GLYCOSYLATED A1C: CPT | Performed by: INTERNAL MEDICINE

## 2020-03-03 DIAGNOSIS — I10 HYPERTENSION GOAL BP (BLOOD PRESSURE) < 140/90: ICD-10-CM

## 2020-03-04 ENCOUNTER — OFFICE VISIT (OUTPATIENT)
Dept: INTERNAL MEDICINE | Facility: CLINIC | Age: 80
End: 2020-03-04
Payer: COMMERCIAL

## 2020-03-04 VITALS
WEIGHT: 161.4 LBS | HEART RATE: 59 BPM | DIASTOLIC BLOOD PRESSURE: 68 MMHG | HEIGHT: 67 IN | TEMPERATURE: 97.7 F | RESPIRATION RATE: 18 BRPM | SYSTOLIC BLOOD PRESSURE: 138 MMHG | OXYGEN SATURATION: 97 % | BODY MASS INDEX: 25.33 KG/M2

## 2020-03-04 DIAGNOSIS — I10 HYPERTENSION GOAL BP (BLOOD PRESSURE) < 140/90: ICD-10-CM

## 2020-03-04 DIAGNOSIS — I63.112 CEREBROVASCULAR ACCIDENT (CVA) DUE TO EMBOLISM OF LEFT VERTEBRAL ARTERY (H): ICD-10-CM

## 2020-03-04 DIAGNOSIS — E78.5 HYPERLIPIDEMIA LDL GOAL <100: ICD-10-CM

## 2020-03-04 DIAGNOSIS — E11.22 TYPE 2 DIABETES MELLITUS WITH STAGE 3 CHRONIC KIDNEY DISEASE, WITH LONG-TERM CURRENT USE OF INSULIN (H): Primary | ICD-10-CM

## 2020-03-04 DIAGNOSIS — E03.9 HYPOTHYROIDISM, UNSPECIFIED TYPE: ICD-10-CM

## 2020-03-04 DIAGNOSIS — N18.30 TYPE 2 DIABETES MELLITUS WITH STAGE 3 CHRONIC KIDNEY DISEASE, WITH LONG-TERM CURRENT USE OF INSULIN (H): Primary | ICD-10-CM

## 2020-03-04 DIAGNOSIS — Z79.4 TYPE 2 DIABETES MELLITUS WITH STAGE 3 CHRONIC KIDNEY DISEASE, WITH LONG-TERM CURRENT USE OF INSULIN (H): Primary | ICD-10-CM

## 2020-03-04 PROCEDURE — 99214 OFFICE O/P EST MOD 30 MIN: CPT | Performed by: INTERNAL MEDICINE

## 2020-03-04 RX ORDER — LEVOTHYROXINE SODIUM 25 UG/1
25 TABLET ORAL DAILY
Qty: 90 TABLET | Refills: 0 | Status: CANCELLED | OUTPATIENT
Start: 2020-03-04

## 2020-03-04 RX ORDER — METOPROLOL TARTRATE 25 MG/1
25 TABLET, FILM COATED ORAL 2 TIMES DAILY
Qty: 180 TABLET | Refills: 3 | Status: SHIPPED | OUTPATIENT
Start: 2020-03-04 | End: 2021-01-01

## 2020-03-04 RX ORDER — ATORVASTATIN CALCIUM 20 MG/1
20 TABLET, FILM COATED ORAL DAILY
Qty: 90 TABLET | Refills: 3 | Status: SHIPPED | OUTPATIENT
Start: 2020-03-04 | End: 2021-01-01

## 2020-03-04 ASSESSMENT — MIFFLIN-ST. JEOR: SCORE: 1395.74

## 2020-03-04 NOTE — PATIENT INSTRUCTIONS
In Dr Helm's opinion, it would be okay to come off of the thyroid pill. Ask Dr Damian whether she agrees when you see her.   Also, Dr Helm will leave it up to Dr Damian whether to make any diabetes changes.     Blood pressure and cholesterol look fine.     See either Dr Damian or Dr Helm again in three months. If Dr Damian wants to see you back in three months, then see Dr Helm in six months.

## 2020-03-04 NOTE — TELEPHONE ENCOUNTER
"Requested Prescriptions   Pending Prescriptions Disp Refills     metoprolol tartrate (LOPRESSOR) 25 MG tablet [Pharmacy Med Name: Metoprolol Tartrate 25 MG Oral Tablet]  0     Sig: TAKE 1 TABLET BY MOUTH TWICE DAILY   Last Written Prescription Date:  10/16/2019  Last Fill Quantity: 60,  # refills: 03   Last office visit: 10/16/2019 with prescribing provider:     Future Office Visit:   Next 5 appointments (look out 90 days)    Mar 04, 2020 10:20 AM CST  SHORT with Rom Helm MD  [facility] ([facility]) [address]  [facility] [address]  [phone]   Mar 11, 2020  2:30 PM CDT  Return Visit with Lori Damian MD  [facility] ([facility]) [address]  [facility] [address]  [phone]           Beta-Blockers Protocol Passed - 3/3/2020  5:53 PM        Passed - Blood pressure under 140/90 in past 12 months     BP Readings from Last 3 Encounters:   12/05/19 130/70   11/01/19 124/68   10/16/19 138/77                 Passed - Patient is age 6 or older        Passed - Recent (12 mo) or future (30 days) visit within the authorizing provider's specialty     Patient has had an office visit with the authorizing provider or a provider within the authorizing providers department within the previous 12 mos or has a future within next 30 days. See \"Patient Info\" tab in inbasket, or \"Choose Columns\" in Meds & Orders section of the refill encounter.              Passed - Medication is active on med list        "

## 2020-03-04 NOTE — NURSING NOTE
"Patient is being seen for a 3 month follow up ( Lipids, Diabetes and Thyroid).  BP (!) 0/0 (BP Location: Left arm, Patient Position: Sitting, Cuff Size: Adult Large)   Pulse 59   Temp 97.7  F (36.5  C) (Oral)   Resp 18   Ht 1.702 m (5' 7\")   Wt 73.2 kg (161 lb 6.4 oz)   SpO2 97%   BMI 25.28 kg/m      "

## 2020-03-04 NOTE — PROGRESS NOTES
Subjective     Pete Sheldon is a 79 year old male who presents to clinic today for the following health issues:  Patient is being seen for a 3 month follow up ( Lipids, Diabetes, hypertension, and Thyroid).    Patient present with wife.     HPI   Diabetes Follow-up    How often are you checking your blood sugar? Four or more times daily  Blood sugar testing frequency justification:  Uncontrolled diabetes  What time of day are you checking your blood sugars (select all that apply)?  Before meals  Have you had any blood sugars above 200?  Yes   Have you had any blood sugars below 70?  No    What symptoms do you notice when your blood sugar is low?  Shaky and Dizzy    What concerns do you have today about your diabetes? None     Do you have any of these symptoms? (Select all that apply)  No numbness or tingling in feet.  No redness, sores or blisters on feet.  No complaints of excessive thirst.  No reports of blurry vision.  No significant changes to weight.    Lab Results   Component Value Date    A1C 9.0 02/28/2020    A1C 9.6 11/26/2019    A1C 9.2 09/04/2019    A1C 8.9 06/06/2019    A1C 9.4 03/06/2019     A1c improved. Recall that patient was having very high and very low glucose levels. He has an appointment with Dr. Damian of endocrinology next week.     Hyperlipidemia Follow-Up      Are you regularly taking any medication or supplement to lower your cholesterol?   Yes- Lipitor    Are you having muscle aches or other side effects that you think could be caused by your cholesterol lowering medication?  No     Recent Labs   Lab Test 02/28/20  0902 11/27/18  0927  09/25/15  0844 06/19/15  0815   CHOL 116 110   < > 134 135   HDL 28* 26*   < > 31* 36*   LDL 65 59   < > 80 83   TRIG 113 125   < > 117 78   CHOLHDLRATIO  --   --   --  4.3 3.8    < > = values in this interval not displayed.     LDL managed with atorvastatin 20 mg daily.     Hypothyroidism Follow-up      Since last visit, patient describes the  following symptoms: Weight stable, no hair loss, no skin changes, no constipation, no loose stools    How many servings of fruits and vegetables do you eat daily?  2-3    On average, how many sweetened beverages do you drink each day (Examples: soda, juice, sweet tea, etc.  Do NOT count diet or artificially sweetened beverages)?   0    How many days per week do you exercise enough to make your heart beat faster? 3 or less    How many minutes a day do you exercise enough to make your heart beat faster? 9 or less    How many days per week do you miss taking your medication? 0    TSH   Date Value Ref Range Status   02/28/2020 4.47 (H) 0.40 - 4.00 mU/L Final   09/04/2019 6.28 (H) 0.40 - 4.00 mU/L Final   03/06/2019 5.85 (H) 0.40 - 4.00 mU/L Final   10/29/2018 4.08 (H) 0.40 - 4.00 mU/L Final   09/28/2018 5.66 (H) 0.40 - 4.00 mU/L Final     Wife reports that he's discontinued levothyroxine 25 mcg daily because it was upsetting his stomach.     Hypertension    BP Readings from Last 3 Encounters:   03/04/20 138/68   12/05/19 130/70   11/01/19 124/68     BP stable with metoprolol 25 mg twice daily. He's felt dizzy at times when standing, not with posture changes. Denies any falls.     Enlarged prostate   Taking Proscar. No longer needs to follow up with Dr. Montenegro of urology.     Past/recent records reviewed and discussed for:  -He takes aspirin 325 mg daily.       Reviewed and updated as needed this visit by Provider         Review of Systems   No dyspnea or cough. No chest discomfort, dizziness or palpitations. No diarrhea, abdominal pain or rectal bleeding.   No acute problems with vision or speech, lateralizing weakness or paresthesias.    ROS: as above or negative for Respiratory, CV, GI, endocrine, neuro systems.    This document serves as a record of the services and decisions personally performed and made by Rom Helm MD. It was created on his behalf by Doris Barajas, a trained medical scribe. The creation of  "this document is based on the provider's statements to the medical scribe.  Doris Barajas March 4, 2020 10:52 AM         Objective    /68   Pulse 59   Temp 97.7  F (36.5  C) (Oral)   Resp 18   Ht 1.702 m (5' 7\")   Wt 73.2 kg (161 lb 6.4 oz)   SpO2 97%   BMI 25.28 kg/m    Body mass index is 25.28 kg/m .     Physical Exam   GENERAL: healthy, alert and no distress  RESP: lungs clear to auscultation - no rales, rhonchi or wheezes  CV: regular rate and rhythm, normal S1 S2, no S3 or S4, no murmur, click or rub, no peripheral edema and peripheral pulses strong  MS: no gross musculoskeletal defects noted, no edema  SKIN: no suspicious lesions or rashes  NEURO: Normal strength and tone, mentation intact and speech normal  PSYCH: mentation appears normal, affect normal/bright    Diagnostic Test Results:  Labs reviewed in Epic        Assessment & Plan     (E11.22,  N18.3,  Z79.4) Type 2 diabetes mellitus with stage 3 chronic kidney disease, with long-term current use of insulin (H)  (primary encounter diagnosis)  Comment: A1c improved. Continue current measures and follow up with Dr. Damian next week as planned.   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 Patient discontinued levothyroxine due to side effects. Discussed that it is okay to remain off med, but recommend clearing this by Dr. Damian at visit next week   Plan:     (E78.5) Hyperlipidemia LDL goal <100  Comment: LDL at target. Continue current meds.  Plan: atorvastatin (LIPITOR) 20 MG tablet          (I10) Hypertension goal BP (blood pressure) < 140/90  Comment: BP at target. Continue current meds.  Plan: metoprolol tartrate (LOPRESSOR) 25 MG tablet          FUTURE APPOINTMENTS:       - Follow-up visit in 3-6 months    Patient " Instructions   In Dr Helm's opinion, it would be okay to come off of the thyroid pill. Ask Dr Damian whether she agrees when you see her.   Also, Dr Helm will leave it up to Dr Damian whether to make any diabetes changes.     Blood pressure and cholesterol look fine.     See either Dr Damian or Dr Helm again in three months. If Dr Damian wants to see you back in three months, then see Dr Helm in six months.       The information in this document, created by the medical scribe for me, accurately reflects the services I personally performed and the decisions made by me. I have reviewed and approved this document for accuracy prior to leaving the patient care area.  March 4, 2020 11:12 AM    Rom Helm MD,   [facility]

## 2020-03-05 RX ORDER — METOPROLOL TARTRATE 25 MG/1
TABLET, FILM COATED ORAL
Qty: 60 TABLET | Refills: 11 | OUTPATIENT
Start: 2020-03-05

## 2020-03-05 NOTE — TELEPHONE ENCOUNTER
"Requested Prescriptions   Pending Prescriptions Disp Refills     metoprolol tartrate (LOPRESSOR) 25 MG tablet [Pharmacy Med Name: Metoprolol Tartrate 25 MG Oral Tablet]  0     Sig: TAKE 1 TABLET BY MOUTH TWICE DAILY       Beta-Blockers Protocol Passed - 3/4/2020  8:32 AM        Passed - Blood pressure under 140/90 in past 12 months     BP Readings from Last 3 Encounters:   03/04/20 138/68   12/05/19 130/70   11/01/19 124/68                 Passed - Patient is age 6 or older        Passed - Recent (12 mo) or future (30 days) visit within the authorizing provider's specialty     Patient has had an office visit with the authorizing provider or a provider within the authorizing providers department within the previous 12 mos or has a future within next 30 days. See \"Patient Info\" tab in inbasket, or \"Choose Columns\" in Meds & Orders section of the refill encounter.              Passed - Medication is active on med list        Rx was sent yesterday at appointment.  Duplicate request.  Paty Brooks RN    "

## 2020-03-11 ENCOUNTER — OFFICE VISIT (OUTPATIENT)
Dept: ENDOCRINOLOGY | Facility: CLINIC | Age: 80
End: 2020-03-11
Payer: COMMERCIAL

## 2020-03-11 VITALS
HEIGHT: 67 IN | OXYGEN SATURATION: 94 % | WEIGHT: 161.6 LBS | SYSTOLIC BLOOD PRESSURE: 130 MMHG | HEART RATE: 71 BPM | DIASTOLIC BLOOD PRESSURE: 80 MMHG | TEMPERATURE: 97.9 F | RESPIRATION RATE: 16 BRPM | BODY MASS INDEX: 25.36 KG/M2

## 2020-03-11 DIAGNOSIS — N18.30 TYPE 2 DIABETES MELLITUS WITH STAGE 3 CHRONIC KIDNEY DISEASE, WITH LONG-TERM CURRENT USE OF INSULIN (H): ICD-10-CM

## 2020-03-11 DIAGNOSIS — E11.22 TYPE 2 DIABETES MELLITUS WITH STAGE 3 CHRONIC KIDNEY DISEASE, WITH LONG-TERM CURRENT USE OF INSULIN (H): ICD-10-CM

## 2020-03-11 DIAGNOSIS — Z79.4 TYPE 2 DIABETES MELLITUS WITH STAGE 3 CHRONIC KIDNEY DISEASE, WITH LONG-TERM CURRENT USE OF INSULIN (H): ICD-10-CM

## 2020-03-11 DIAGNOSIS — E11.42 TYPE 2 DIABETES MELLITUS WITH DIABETIC POLYNEUROPATHY, WITH LONG-TERM CURRENT USE OF INSULIN (H): Primary | ICD-10-CM

## 2020-03-11 DIAGNOSIS — Z79.4 TYPE 2 DIABETES MELLITUS WITH DIABETIC POLYNEUROPATHY, WITH LONG-TERM CURRENT USE OF INSULIN (H): Primary | ICD-10-CM

## 2020-03-11 PROCEDURE — 99207 C FOOT EXAM  NO CHARGE: CPT | Performed by: INTERNAL MEDICINE

## 2020-03-11 PROCEDURE — 99214 OFFICE O/P EST MOD 30 MIN: CPT | Performed by: INTERNAL MEDICINE

## 2020-03-11 RX ORDER — INSULIN ASPART 100 [IU]/ML
INJECTION, SOLUTION INTRAVENOUS; SUBCUTANEOUS
Qty: 30 ML | Refills: 1 | Status: SHIPPED | OUTPATIENT
Start: 2020-03-11 | End: 2020-01-01

## 2020-03-11 RX ORDER — BLOOD SUGAR DIAGNOSTIC
STRIP MISCELLANEOUS
Qty: 400 STRIP | Refills: 3 | Status: SHIPPED | OUTPATIENT
Start: 2020-03-11

## 2020-03-11 ASSESSMENT — MIFFLIN-ST. JEOR: SCORE: 1396.64

## 2020-03-11 NOTE — LETTER
3/11/2020         RE: Pete Sheldon  [id number] [place] [address]        Dear Colleague,    Thank you for referring your patient, Pete Sheldon, to the [facility]. Please see a copy of my visit note below.    ENDOCRINOLOGY CLINIC NOTE:  Name: Pete Sheldon  Seen for f/u of Diabetes.  He is accompanied by his wife.  HPI:  Pete Sheldon is a 79  year old male who presents for the evaluation/management of:  Of note he is having bedtime snack almost every days.  Typical bedtime snack is toast and jam at night.  He is afraid of overnight hypoglycemia and tends to eat before going to bed.  Does not take insulin with snack.  Has variable blood sugar pattern. This has been noted consistently in past and with multiple dose adjustment.  Wife helps with insulin dosing. He injects himself.  I discussed continuous glucose monitor with him in past but he does not want sensors.  Low C-peptide and neg FAVIOLA Ab.  Metformin stopped in the setting of CKD.    He was admitted in hospital in October 2019 and at that time metformin was discontinued and Lantus was switched to twice daily dosing.    1. Type 2 DM:  Orginally diagnosed in 1995.  Current Regimen:  Lantus 16 units in AM and 14 units PM, Humalog 6/5/5 Units with breakfast/lunch/dinner respectively. In addition to that he is on correctional scale 1 unit- 25 >140 but he is not using it.  yes:     Diabetes Medication(s)     Insulin       insulin aspart (NOVOLOG FLEXPEN) 100 UNIT/ML pen    INJECT 6 UNITS WITH BREAKFAST, 5 UNITS WITH LUNCH, 5 UNITS WITH DINNER, HOLD IF NOT EATING MEAL     insulin glargine (LANTUS SOLOSTAR) 100 UNIT/ML pen    Take 16 units in AM and 14 Units PM          During hospital stay  10/2019 metfomrin was dscountineud and lantus does was switched to BID dosing.    Does not feel comfortable with carbohydrate counting.    BS checks: Three times a day    Average Meter Download: 175 with standard deviation 75.  Blood  sugars are variable.  But mostly on the higher side in afternoon and before going to bed.  Few episodes of low blood sugar noted especially in the morning.  Variable BG.  Lowest was 50 and highest was 335.    Episodes of hypoglycemic episodes have improved.    Exercise: Minimal  Episodes of hypoglycemia (low blood sugar):  Yes. As noted above.  Fixed meal pattern: NO. Carb content varies. In general diet is high in carbs.  Patient counting carbs: No    DM Complications:   Nephropathy: Yes: Microalbuminuria present  Retinopathy: Yes: History of laser treatment in past  Neuropathy: Yes.  Microalbuminuria: Yes: Microalbuminuria present.  Not on ACE secondary to history of hyperkalemia.  Lab Results   Component Value Date    UMALCR 170.01 08/30/2018      CAD/PAD: No  Gastroparesis: No  Hypoglycemia unawareness: Yes: Previous notes mentioning history of hypoglycemia unawareness.    2. Hypertension: Blood Pressure today:   BP Readings from Last 3 Encounters:   03/11/20 130/80   03/04/20 138/68   12/05/19 130/70     Blood pressure medications include hydrochlorothiazide 12.5 mg, amlodipine 5 mg.      3. Hyperlipidemia: Takes lovastatin 40 mg for lipid control.  Denies muscle aches or pains.      4. Hypothyroidism:  History of subclinical hypothyroidism and was started on levothyroxine 25 mcg/day  Currently taking levothyroxine 25 mcg/day but he reports that he feels sick to stomach after taking levothyroxine he is wondering if he can be off of levothyroxine..      PMH/PSH:  Past Medical History:   Diagnosis Date     Calculus of ureter 1980's     Hypertension      Hypothyroidism      Microalbuminuria 2/15/2016     Mumps      Other and unspecified hyperlipidemia      Type 2 diabetes, HbA1C goal < 8% (H) 7/1995     Past Surgical History:   Procedure Laterality Date     C NONSPECIFIC PROCEDURE  1980's    Nasal septoplasty     HC COLONOSCOPY THRU STOMA, DIAGNOSTIC  6/12/2007    Normal     HC FLEX SIGMOIDOSCOPY W/WO MIGUEL SPEC BY  BRUSH/WASH  1999    Normal to 60 cm     HC REPAIR INCISIONAL HERNIA,REDUCIBLE  1999    Hernia Repair, Incisional, Unilateral (right)     HC REPAIR INCISIONAL HERNIA,REDUCIBLE      Hernia Repair, Incisional, Unilateral (left, with mesh placement)     TESTICLE SURGERY       VASECTOMY       Family Hx:  Family History   Problem Relation Age of Onset     Cerebrovascular Disease Mother          age 95     Heart Disease Mother         age 89,  age 95     Cerebrovascular Disease Father          age 91, stroke two years previous     Cancer - colorectal Brother         Half-brother     Cancer Sister         Half-sister (?type)     Cancer Sister         Half-sister (?type)     Heart Disease Brother      Neurologic Disorder Daughter         Multiple Sclerosis     Psychotic Disorder Son         Schizophrenia       Social Hx:  Social History     Socioeconomic History     Marital status:      Spouse name: Not on file     Number of children: 2     Years of education: Not on file     Highest education level: Not on file   Occupational History     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[employer]     Employer: RETIRED   Social Needs     Financial resource strain: Not on file     Food insecurity     Worry: Not on file     Inability: Not on file     Transportation needs     Medical: Not on file     Non-medical: Not on file   Tobacco Use     Smoking status: Former Smoker     Packs/day: 0.00     Last attempt to quit: 3/11/1953     Years since quittin.0     Smokeless tobacco: Never Used   Substance and Sexual Activity     Alcohol use: No     Drug use: No     Sexual activity: Yes     Partners: Female   Lifestyle     Physical activity     Days per week: Not on file     Minutes per session: Not on file     Stress: Not on file   Relationships     Social connections     Talks on phone: Not on file     Gets together: Not on file     Attends Congregation service: Not on file     Active member of  "club or organization: Not on file     Attends meetings of clubs or organizations: Not on file     Relationship status: Not on file     Intimate partner violence     Fear of current or ex partner: Not on file     Emotionally abused: Not on file     Physically abused: Not on file     Forced sexual activity: Not on file   Other Topics Concern     Parent/sibling w/ CABG, MI or angioplasty before 65F 55M? Not Asked   Social History Narrative    Retired  for [employer].    Two children, son in [place], daughter in [place] ([facility]).    Three grandchildren, one great-granddaughter.              MEDICATIONS:  has a current medication list which includes the following prescription(s): aspirin, atorvastatin, b-d u/f, blood glucose, blood glucose monitoring, finasteride, glucosamine hcl, insulin aspart, insulin glargine, insulin pen needle, insulin syringes (disposable), levothyroxine, blood glucose, metoprolol tartrate, multivitamin, glucose management, order for dme, tamsulosin, and vitamin c.    ROS     ROS: 10 point ROS neg other than the symptoms noted above in the HPI.    Physical Exam   VS: /80 (BP Location: Left arm, Patient Position: Chair, Cuff Size: Adult Regular)   Pulse 71   Temp 97.9  F (36.6  C) (Oral)   Resp 16   Ht 1.702 m (5' 7\")   Wt 73.3 kg (161 lb 9.6 oz)   SpO2 94%   BMI 25.31 kg/m    GENERAL: AXOX3, NAD, well dressed, answering questions appropriately, appears stated age.  NEUROLOGY: CN grossly intact, no tremors  MSK: grossly intact  DM FOOT EXAM: normal DP and PT pulses, no trophic changes or ulcerative lesions, normal sensory exam and normal monofilament exam.   Psych: normal mood      LABS:  Component      Latest Ref Rng 4/15/2016   C-Peptide      0.9 - 6.9 ng/mL <0.1 (L)   Glutamic Acid Decarboxylase Antibody       <5.0 . . .     A1c:  Lab Results   Component Value Date    A1C 9.0 02/28/2020    A1C 9.6 11/26/2019    A1C 9.2 09/04/2019    A1C 8.9 " 06/06/2019    A1C 9.4 03/06/2019     Creatinine:  Creatinine   Date Value Ref Range Status   02/28/2020 1.45 (H) 0.66 - 1.25 mg/dL Final     Urine Micro:  Lab Results   Component Value Date    UMALCR 417.81 11/26/2019        LFTs/Lipids:  Recent Labs   Lab Test 02/28/20  0902 11/27/18  0927  09/25/15  0844 06/19/15  0815   CHOL 116 110   < > 134 135   HDL 28* 26*   < > 31* 36*   LDL 65 59   < > 80 83   TRIG 113 125   < > 117 78   CHOLHDLRATIO  --   --   --  4.3 3.8    < > = values in this interval not displayed.       TFTs:  TSH   Date Value Ref Range Status   02/28/2020 4.47 (H) 0.40 - 4.00 mU/L Final     All pertinent notes, labs, and images personally reviewed by me.     A/P  Mr.Victor ANURAG Sheldon is a 79 year old here for the evaluation/management of diabetes:    1. DM2 - (low C-peptide, negative FAVIOLA): Under Poor control.  A1c 9.0%.  Diabetes complicated by microalbuminuria, neuropathy and retinopathy.   Concerns for hypoglycemia unawareness. Variable BG. Brittle DM.  Multiple dose changes made in past but still not able to get stable blood sugar numbers.  Blood sugars are variable throughout the day.  Does not feel comfortable with carbohydrate counting.  Variable carb intake.  Does not want Dexcom. This has been discussed multiple times with pt in past.   H/o brittle DM. Lot of variability in BG.  In general hypoglycemic episodes have improved.  Plan:  Lantus: Take 16 units in AM and 14 units at night.  Increase Novolog with breakfast to 7 units. Continue 5 units with lunch and dinner. Novolog: take 7/5/5 Units with breakfast/lunch/dinner repectively  Increase by 1 unit if eating out.  Increase by 2 units if having bigger portion and dessert.  Labs and follow up in 3 months.    2.  Subclinical hypothyroidism:  On levothyroxine 25 mcg/day  Started in 9/2018 by Dr. Silva for subclinical hypothyroidism.  He reports that after taking levothyroxine he feels  sick to his stomach.  Clinically looks  euthyroid  Plan:  TSH mildly elevated and given his age it is okay to have slightly higher TSH with normal free T4  Plan to  stop levothyroxine  Repeat labs in 3 months and follow-up after that    3. Hypertension - Under Fair control.  On hydrochlorothiazide 12.5 mg, amlodipine 5 mg.  Managed by PCP.    4. Hyperlipidemia - Under Good control.  On lovastatin 40 mg. LDL 91, HDL 34.  Managed by PCP.    5. Microalbuminuria:  Lab Results   Component Value Date    UMALCR 170.01 08/30/2018    Not on ACE 2/2 to h/o hyperkalemia    6. Prevention  ASA- 81 mg/day.  h/o nosebleeds  Smoking- No    Most Recent Immunizations   Administered Date(s) Administered     Influenza (H1N1) 12/28/2009     Influenza (High Dose) 3 valent vaccine 09/06/2018     Influenza (IIV3) PF 08/29/2012     Pneumococcal 23 valent 03/16/2012     TD (ADULT, 7+) 10/22/2003     TDAP Vaccine (Adacel) 01/31/2014     Zoster vaccine, live 10/15/2011       Recommend checking blood sugars before meals and at bedtime.    If Blood glucose are low more often-> 2-3 times/week- give us a call.  The patient is advised to Make better food choices: reduce carbs, Reduce portion size, weight loss and exercise 3-4 times a week.  Discussed hypoglycemia signs and symptoms as well as management in detail.    All questions were answered.  The patient indicates understanding of the above issues and agrees with the plan set forth.     Follow-up:  Follow up 3 months    Lori Damian M.D  Endocrinology  [facility]/[place]    Disclaimer: This note consists of symbols derived from keyboarding, dictation and/or voice recognition software. As a result, there may be errors in the script that have gone undetected. Please consider this when interpreting information found in this chart.    Addendum to above note and clinic visit:    Labs reviewed.    See result note/telephone encounter.            Again, thank you for allowing me to participate in the care of your patient.         Sincerely,        Lori Damian MD

## 2020-03-11 NOTE — PROGRESS NOTES
ENDOCRINOLOGY CLINIC NOTE:  Name: Pete Sheldon  Seen for f/u of Diabetes.  He is accompanied by his wife.  HPI:  Pete Sheldon is a 79  year old male who presents for the evaluation/management of:  Of note he is having bedtime snack almost every days.  Typical bedtime snack is toast and jam at night.  He is afraid of overnight hypoglycemia and tends to eat before going to bed.  Does not take insulin with snack.  Has variable blood sugar pattern. This has been noted consistently in past and with multiple dose adjustment.  Wife helps with insulin dosing. He injects himself.  I discussed continuous glucose monitor with him in past but he does not want sensors.  Low C-peptide and neg FAVIOLA Ab.  Metformin stopped in the setting of CKD.    He was admitted in hospital in October 2019 and at that time metformin was discontinued and Lantus was switched to twice daily dosing.    1. Type 2 DM:  Orginally diagnosed in 1995.  Current Regimen:  Lantus 16 units in AM and 14 units PM, Humalog 6/5/5 Units with breakfast/lunch/dinner respectively. In addition to that he is on correctional scale 1 unit- 25 >140 but he is not using it.  yes:     Diabetes Medication(s)     Insulin       insulin aspart (NOVOLOG FLEXPEN) 100 UNIT/ML pen    INJECT 6 UNITS WITH BREAKFAST, 5 UNITS WITH LUNCH, 5 UNITS WITH DINNER, HOLD IF NOT EATING MEAL     insulin glargine (LANTUS SOLOSTAR) 100 UNIT/ML pen    Take 16 units in AM and 14 Units PM          During hospital stay  10/2019 metfomrin was dscountineud and lantus does was switched to BID dosing.    Does not feel comfortable with carbohydrate counting.    BS checks: Three times a day    Average Meter Download: 175 with standard deviation 75.  Blood sugars are variable.  But mostly on the higher side in afternoon and before going to bed.  Few episodes of low blood sugar noted especially in the morning.  Variable BG.  Lowest was 50 and highest was 335.    Episodes of hypoglycemic episodes have  improved.    Exercise: Minimal  Episodes of hypoglycemia (low blood sugar):  Yes. As noted above.  Fixed meal pattern: NO. Carb content varies. In general diet is high in carbs.  Patient counting carbs: No    DM Complications:   Nephropathy: Yes: Microalbuminuria present  Retinopathy: Yes: History of laser treatment in past  Neuropathy: Yes.  Microalbuminuria: Yes: Microalbuminuria present.  Not on ACE secondary to history of hyperkalemia.  Lab Results   Component Value Date    UMALCR 170.01 08/30/2018      CAD/PAD: No  Gastroparesis: No  Hypoglycemia unawareness: Yes: Previous notes mentioning history of hypoglycemia unawareness.    2. Hypertension: Blood Pressure today:   BP Readings from Last 3 Encounters:   03/11/20 130/80   03/04/20 138/68   12/05/19 130/70     Blood pressure medications include hydrochlorothiazide 12.5 mg, amlodipine 5 mg.      3. Hyperlipidemia: Takes lovastatin 40 mg for lipid control.  Denies muscle aches or pains.      4. Hypothyroidism:  History of subclinical hypothyroidism and was started on levothyroxine 25 mcg/day  Currently taking levothyroxine 25 mcg/day but he reports that he feels sick to stomach after taking levothyroxine he is wondering if he can be off of levothyroxine..      PMH/PSH:  Past Medical History:   Diagnosis Date     Calculus of ureter 1980's     Hypertension      Hypothyroidism      Microalbuminuria 2/15/2016     Mumps      Other and unspecified hyperlipidemia      Type 2 diabetes, HbA1C goal < 8% (H) 7/1995     Past Surgical History:   Procedure Laterality Date     C NONSPECIFIC PROCEDURE  1980's    Nasal septoplasty     HC COLONOSCOPY THRU STOMA, DIAGNOSTIC  6/12/2007    Normal     HC FLEX SIGMOIDOSCOPY W/WO MIGUEL SPEC BY BRUSH/WASH  12/30/1999    Normal to 60 cm     HC REPAIR INCISIONAL HERNIA,REDUCIBLE  1/2001, 1999    Hernia Repair, Incisional, Unilateral (right)     HC REPAIR INCISIONAL HERNIA,REDUCIBLE  1996    Hernia Repair, Incisional, Unilateral (left, with  mesh placement)     TESTICLE SURGERY       VASECTOMY       Family Hx:  Family History   Problem Relation Age of Onset     Cerebrovascular Disease Mother          age 95     Heart Disease Mother         age 89,  age 95     Cerebrovascular Disease Father          age 91, stroke two years previous     Cancer - colorectal Brother         Half-brother     Cancer Sister         Half-sister (?type)     Cancer Sister         Half-sister (?type)     Heart Disease Brother      Neurologic Disorder Daughter         Multiple Sclerosis     Psychotic Disorder Son         Schizophrenia       Social Hx:  Social History     Socioeconomic History     Marital status:      Spouse name: Not on file     Number of children: 2     Years of education: Not on file     Highest education level: Not on file   Occupational History     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[employer]     Employer: RETIRED   Social Needs     Financial resource strain: Not on file     Food insecurity     Worry: Not on file     Inability: Not on file     Transportation needs     Medical: Not on file     Non-medical: Not on file   Tobacco Use     Smoking status: Former Smoker     Packs/day: 0.00     Last attempt to quit: 3/11/1953     Years since quittin.0     Smokeless tobacco: Never Used   Substance and Sexual Activity     Alcohol use: No     Drug use: No     Sexual activity: Yes     Partners: Female   Lifestyle     Physical activity     Days per week: Not on file     Minutes per session: Not on file     Stress: Not on file   Relationships     Social connections     Talks on phone: Not on file     Gets together: Not on file     Attends Orthodoxy service: Not on file     Active member of club or organization: Not on file     Attends meetings of clubs or organizations: Not on file     Relationship status: Not on file     Intimate partner violence     Fear of current or ex partner: Not on file     Emotionally abused: Not on file      "Physically abused: Not on file     Forced sexual activity: Not on file   Other Topics Concern     Parent/sibling w/ CABG, MI or angioplasty before 65F 55M? Not Asked   Social History Narrative    Retired  for [employer].    Two children, son in [place], daughter in [place] ([facility]).    Three grandchildren, one great-granddaughter.              MEDICATIONS:  has a current medication list which includes the following prescription(s): aspirin, atorvastatin, b-d u/f, blood glucose, blood glucose monitoring, finasteride, glucosamine hcl, insulin aspart, insulin glargine, insulin pen needle, insulin syringes (disposable), levothyroxine, blood glucose, metoprolol tartrate, multivitamin, glucose management, order for dme, tamsulosin, and vitamin c.    ROS     ROS: 10 point ROS neg other than the symptoms noted above in the HPI.    Physical Exam   VS: /80 (BP Location: Left arm, Patient Position: Chair, Cuff Size: Adult Regular)   Pulse 71   Temp 97.9  F (36.6  C) (Oral)   Resp 16   Ht 1.702 m (5' 7\")   Wt 73.3 kg (161 lb 9.6 oz)   SpO2 94%   BMI 25.31 kg/m    GENERAL: AXOX3, NAD, well dressed, answering questions appropriately, appears stated age.  NEUROLOGY: CN grossly intact, no tremors  MSK: grossly intact  DM FOOT EXAM: normal DP and PT pulses, no trophic changes or ulcerative lesions, normal sensory exam and normal monofilament exam.   Psych: normal mood      LABS:  Component      Latest Ref Rng 4/15/2016   C-Peptide      0.9 - 6.9 ng/mL <0.1 (L)   Glutamic Acid Decarboxylase Antibody       <5.0 . . .     A1c:  Lab Results   Component Value Date    A1C 9.0 02/28/2020    A1C 9.6 11/26/2019    A1C 9.2 09/04/2019    A1C 8.9 06/06/2019    A1C 9.4 03/06/2019     Creatinine:  Creatinine   Date Value Ref Range Status   02/28/2020 1.45 (H) 0.66 - 1.25 mg/dL Final     Urine Micro:  Lab Results   Component Value Date    UMALCR 417.81 11/26/2019        LFTs/Lipids:  Recent Labs   Lab " Test 02/28/20  0902 11/27/18  0927  09/25/15  0844 06/19/15  0815   CHOL 116 110   < > 134 135   HDL 28* 26*   < > 31* 36*   LDL 65 59   < > 80 83   TRIG 113 125   < > 117 78   CHOLHDLRATIO  --   --   --  4.3 3.8    < > = values in this interval not displayed.       TFTs:  TSH   Date Value Ref Range Status   02/28/2020 4.47 (H) 0.40 - 4.00 mU/L Final     All pertinent notes, labs, and images personally reviewed by me.     A/P  Mr.Victor ANURAG Sheldon is a 79 year old here for the evaluation/management of diabetes:    1. DM2 - (low C-peptide, negative FAVIOLA): Under Poor control.  A1c 9.0%.  Diabetes complicated by microalbuminuria, neuropathy and retinopathy.   Concerns for hypoglycemia unawareness. Variable BG. Brittle DM.  Multiple dose changes made in past but still not able to get stable blood sugar numbers.  Blood sugars are variable throughout the day.  Does not feel comfortable with carbohydrate counting.  Variable carb intake.  Does not want Dexcom. This has been discussed multiple times with pt in past.   H/o brittle DM. Lot of variability in BG.  In general hypoglycemic episodes have improved.  Plan:  Lantus: Take 16 units in AM and 14 units at night.  Increase Novolog with breakfast to 7 units. Continue 5 units with lunch and dinner. Novolog: take 7/5/5 Units with breakfast/lunch/dinner repectively  Increase by 1 unit if eating out.  Increase by 2 units if having bigger portion and dessert.  Labs and follow up in 3 months.    2.  Subclinical hypothyroidism:  On levothyroxine 25 mcg/day  Started in 9/2018 by Dr. Silva for subclinical hypothyroidism.  He reports that after taking levothyroxine he feels  sick to his stomach.  Clinically looks euthyroid  Plan:  TSH mildly elevated and given his age it is okay to have slightly higher TSH with normal free T4  Plan to  stop levothyroxine  Repeat labs in 3 months and follow-up after that    3. Hypertension - Under Fair control.  On hydrochlorothiazide 12.5 mg,  amlodipine 5 mg.  Managed by PCP.    4. Hyperlipidemia - Under Good control.  On lovastatin 40 mg. LDL 91, HDL 34.  Managed by PCP.    5. Microalbuminuria:  Lab Results   Component Value Date    UMALCR 170.01 08/30/2018    Not on ACE 2/2 to h/o hyperkalemia    6. Prevention  ASA- 81 mg/day.  h/o nosebleeds  Smoking- No    Most Recent Immunizations   Administered Date(s) Administered     Influenza (H1N1) 12/28/2009     Influenza (High Dose) 3 valent vaccine 09/06/2018     Influenza (IIV3) PF 08/29/2012     Pneumococcal 23 valent 03/16/2012     TD (ADULT, 7+) 10/22/2003     TDAP Vaccine (Adacel) 01/31/2014     Zoster vaccine, live 10/15/2011       Recommend checking blood sugars before meals and at bedtime.    If Blood glucose are low more often-> 2-3 times/week- give us a call.  The patient is advised to Make better food choices: reduce carbs, Reduce portion size, weight loss and exercise 3-4 times a week.  Discussed hypoglycemia signs and symptoms as well as management in detail.    All questions were answered.  The patient indicates understanding of the above issues and agrees with the plan set forth.     Follow-up:  Follow up 3 months    Lori Damian M.D  Endocrinology  [facility]/[place]    Disclaimer: This note consists of symbols derived from keyboarding, dictation and/or voice recognition software. As a result, there may be errors in the script that have gone undetected. Please consider this when interpreting information found in this chart.    Addendum to above note and clinic visit:    Labs reviewed.    See result note/telephone encounter.

## 2020-03-11 NOTE — NURSING NOTE
ENDOCRINOLOGY INTAKE FORM    Patient Name:  Pete Sheldon  :  1940    Is patient Diabetic?   Yes: type 2  Does patient have non-diabetic or other endocrine issues?  Yes: hypoglycemia, hypothyroidism, DKA, hyperlipidemia, ED    Vitals: There were no vitals taken for this visit.  BMI= There is no height or weight on file to calculate BMI.    Flu vaccine:  Yes: 19  Pneumonia vaccine:  Yes: both    Smoking and Alcohol use:  Social History     Tobacco Use     Smoking status: Former Smoker     Packs/day: 0.00     Last attempt to quit: 3/11/1953     Years since quittin.0     Smokeless tobacco: Never Used   Substance Use Topics     Alcohol use: No     Drug use: No       Foot Exam: No  Eye Exam:  Yes: 19  Dental Exam:  Yes:   Aspirin Use:  Yes: 81 mg    Lab Results   Component Value Date    A1C 9.0 2020    A1C 9.6 2019    A1C 9.2 2019    A1C 8.9 2019    A1C 9.4 2019       Lab Results   Component Value Date    MICROL 610 2019     No results found for: MICROALBUMIN    Lilia Pacheco CMA  [facility]  [address]

## 2020-03-11 NOTE — PATIENT INSTRUCTIONS
[facility] & [place] locations   Dr Damian, Endocrinology Department      [facility]   [address]  [place] [address]  Appointment [phone]  Fax: [phone]  [place]: Monday and Tuesday         [facility]   [address]  [place] [address]  Appointment [phone]  Fax: [phone]  [place]: Wednesday and Thursday            Please check the cost coverage and copay with insurance before recommended tests, services and medications (especially if new medications are prescribed).     If ordered, please get blood work done 1 week prior to your next appointment so they will be available to Dr. Damian at your visit.    To provide the best diabetic care, please bring your blood glucose meter to each and every visit with your  Endocrinologist. Your blood glucose meter/insulin pump will be downloaded at every appointment.  Please arrive 15 minutes before your scheduled appointment. This will allow for your blood glucose meter/insulin pump  to be downloaded.  If you are wearing DEXCOM please bring  or sharing code from the Dexcom Clarity Appt so that it can be downloaded.  If you are using freestyle po personal sensors please bring the reader.  If you are using TANDEM insulin pump please have your username and password to get info from Tandem website.    Continue lantus at current dose.  Increase Novolog with breakfast to 7 units. Continue 5 units with lunch and dinner.  Increase by 1 unit if eating out.  Increase by 2 units if having bigger portion and dessert.  Labs and follow up in 3 months.  Stop levothyroxine.    Recommend checking blood sugars before meals and at bedtime.    If Blood glucose are low more often-> 2-3 times/week- give us a call.  The patient is advised to Make better food choices: reduce carbs, Reduce portion size, weight loss and exercise 3-4 times a  week.  Discussed hypoglycemia signs and symptoms as well as management in detail.

## 2020-08-03 NOTE — LETTER
[facility]  [address], [address]  [place], [place]  [address]                                            TEL:[phone]  FAX:[phone]      Pete OSBORN Diyablue  [address] [place] [address]      August 5, 2020    Dear Pete       This letter is being sent on behalf of Dr. Damian. It is to remind you that your provider expected you to return for a follow up clinic visit. Please make a lab only appointment, and then follow up with a clinic visit one week afterwards to review those results. If this is not done, it may result in your provider not being able to refill your medications.     You may call our office at [phone] ([place]) or [phone] ([place]) to schedule an appointment. You may also see Martha Sánchez in [place] at [phone]    If you have already scheduled these appointments, please disregard this notice.      Sincerely,    [facility],  Dr. Lori Damian

## 2020-08-05 NOTE — PROGRESS NOTES
SUBJECTIVE  HPI:  Pete Sheldon is a 80 year old male who presents with the CC of abdominal pain.    Developed right lower abdominal pain X 2 days, more sharp and stabbing.  Still has appendix.  Having more trouble with urination and BM.  No fever.  Had small BM this morning.  Did not tell wife that was having pain until today.    Has history of kidney stone, feels different than when he has kidney stone.    Past Medical History:   Diagnosis Date     Calculus of ureter 1980's     Hypertension      Hypothyroidism      Microalbuminuria 2/15/2016     Mumps      Other and unspecified hyperlipidemia      Type 2 diabetes, HbA1C goal < 8% (H) 7/1995     Current Outpatient Medications   Medication Sig Dispense Refill     aspirin 81 MG chewable tablet Take 4 tablets (324 mg) by mouth daily 36 tablet 0     atorvastatin (LIPITOR) 20 MG tablet Take 1 tablet (20 mg) by mouth daily 90 tablet 3     finasteride (PROSCAR) 5 MG tablet TAKE 1 TABLET BY MOUTH ONCE DAILY 90 tablet 3     Glucosamine HCl 750 MG TABS Take 750 mg by mouth 2 times daily       insulin aspart (NOVOLOG FLEXPEN) 100 UNIT/ML pen INJECT 7 UNITS WITH BREAKFAST, 5 UNITS WITH LUNCH, 5 UNITS WITH DINNER, HOLD IF NOT EATING MEAL 30 mL 1     insulin glargine (LANTUS SOLOSTAR) 100 UNIT/ML pen INJECT 30 UNITS SUBCUTANEOUSLY ONCE DAILY 30 mL 0     metoprolol tartrate (LOPRESSOR) 25 MG tablet Take 1 tablet (25 mg) by mouth 2 times daily 180 tablet 3     multivitamin (OCUVITE) TABS Take 1 tablet by mouth daily FOCUS SELECT PREMIUM WITH LUTEIN per eye doctor  Reported on 5/19/2017       Nutritional Supplements (GLUCOSE MANAGEMENT) TABS 4 tabs po for low blood sugar below 70, may repeat q 10-15 minutes as needed 100 tablet prn     tamsulosin (FLOMAX) 0.4 MG capsule TAKE 1 CAPSULE BY MOUTH ONCE DAILY 90 capsule 2     vitamin C (ASCORBIC ACID) 1000 MG TABS Take 1,000 mg by mouth 2 times daily       B-D U/F 31G X 8 MM insulin pen needle USE  4 TIMES DAILY TO  INJECT  INSULIN  "400 each 0     blood glucose (ONETOUCH VERIO IQ) test strip USE 1 STRIP TO CHECK GLUCOSE 4 TIMES DAILY OR  AS  DIRECTED  USING  ONE  TOUCH  VERIO  FLEX 400 strip 3     blood glucose monitoring (ONE TOUCH ULTRASOFT) lancets Use to test blood sugar 4 times daily or as directed. 200 each 6     insulin pen needle (B-D U/F) 31G X 8 MM B-D ULTRA FINE SHORT PEN NEEDLES, USE 4 TIMES A DAY TO INJECT INSULIN 400 each 0     insulin syringes, disposable, U-100 0.3 ML MISC 1 each 2 times daily 100 each 6     LIFESCAN FINEPOINT LANCETS MISC Use to test blood sugars 4 times daily or as directed. 200 strip 3     order for DME All DM testing supplies including test strips, lancets, solution, syringes, needles and/or pen needles for testing 4 times per day.    Brand \"Contour Next\" 3 Month 1     Social History     Tobacco Use     Smoking status: Former Smoker     Packs/day: 0.00     Last attempt to quit: 3/11/1953     Years since quittin.4     Smokeless tobacco: Never Used   Substance Use Topics     Alcohol use: No       ROS:  Review of systems negative except as stated above.    OBJECTIVE:  BP (!) 162/84   Pulse 69   Temp 98.4  F (36.9  C) (Tympanic)   SpO2 98%   GENERAL APPEARANCE: healthy, alert and no distress  ABDOMEN:  soft, mild tender RLQ  PSYCH: mentation appears normal and inattentive, pleasant    XRAY - abdomen - moderate stool, no air-fluid level, no free air personally viewed by me    Results for orders placed or performed in visit on 20   CBC with platelets and differential     Status: None   Result Value Ref Range    WBC 8.6 4.0 - 11.0 10e9/L    RBC Count 4.99 4.4 - 5.9 10e12/L    Hemoglobin 15.2 13.3 - 17.7 g/dL    Hematocrit 45.6 40.0 - 53.0 %    MCV 91 78 - 100 fl    MCH 30.5 26.5 - 33.0 pg    MCHC 33.3 31.5 - 36.5 g/dL    RDW 12.4 10.0 - 15.0 %    Platelet Count 225 150 - 450 10e9/L    % Neutrophils 58.7 %    % Lymphocytes 22.0 %    % Monocytes 10.2 %    % Eosinophils 8.5 %    % Basophils 0.6 %    " Absolute Neutrophil 5.1 1.6 - 8.3 10e9/L    Absolute Lymphocytes 1.9 0.8 - 5.3 10e9/L    Absolute Monocytes 0.9 0.0 - 1.3 10e9/L    Absolute Eosinophils 0.7 0.0 - 0.7 10e9/L    Absolute Basophils 0.1 0.0 - 0.2 10e9/L    Diff Method Automated Method    Comprehensive metabolic panel     Status: Abnormal   Result Value Ref Range    Sodium 140 133 - 144 mmol/L    Potassium 5.2 3.4 - 5.3 mmol/L    Chloride 103 94 - 109 mmol/L    Carbon Dioxide 31 20 - 32 mmol/L    Anion Gap 6 3 - 14 mmol/L    Glucose 83 70 - 99 mg/dL    Urea Nitrogen 19 7 - 30 mg/dL    Creatinine 1.70 (H) 0.66 - 1.25 mg/dL    GFR Estimate 36 (L) >60 mL/min/[1.73_m2]    GFR Estimate If Black 41 (L) >60 mL/min/[1.73_m2]    Calcium 9.2 8.5 - 10.1 mg/dL    Bilirubin Total 0.8 0.2 - 1.3 mg/dL    Albumin 3.4 3.4 - 5.0 g/dL    Protein Total 7.7 6.8 - 8.8 g/dL    Alkaline Phosphatase 92 40 - 150 U/L    ALT 25 0 - 70 U/L    AST 29 0 - 45 U/L   *UA reflex to Microscopic and Culture ([place] and [facility] (except [place] and [place])     Status: Abnormal    Specimen: Midstream Urine   Result Value Ref Range    Color Urine Yellow     Appearance Urine Clear     Glucose Urine Negative NEG^Negative mg/dL    Bilirubin Urine Negative NEG^Negative    Ketones Urine Negative NEG^Negative mg/dL    Specific Gravity Urine 1.025 1.003 - 1.035    Blood Urine Negative NEG^Negative    pH Urine 5.5 5.0 - 7.0 pH    Protein Albumin Urine 100 (A) NEG^Negative mg/dL    Urobilinogen Urine 0.2 0.2 - 1.0 EU/dL    Nitrite Urine Negative NEG^Negative    Leukocyte Esterase Urine Negative NEG^Negative    Source Midstream Urine    Urine Microscopic     Status: None   Result Value Ref Range    WBC Urine 0 - 5 OTO5^0 - 5 /HPF    RBC Urine O - 2 OTO2^O - 2 /HPF    Squamous Epithelial /LPF Urine Few FEW^Few /LPF       ASSESSMENT/PLAN:  (R10.84) Abdominal pain, generalized  (primary encounter diagnosis)  Comment: right lower  Plan: CBC with platelets and differential,         Comprehensive  metabolic panel, *UA reflex to         Microscopic and Culture ([place] and [place]         Clinics (except [place] and [place]), XR         Abdomen 2 Views, Urine Microscopic            Patient appears well over and non-toxic appearing. Discussed labs obtained and possible etiology for abdominal pain, suspect that may be due to constipation/stool burden and encourage to drink plenty of fluids and obtain miralax to help with BM.  Reviewed that due to location that will not be able to exclude appendicitis and patient informed to go to ER if any acute worsening of symptoms.    Follow up with primary provider in 2 days for recheck.    Sharad Garcia MD  August 5, 2020 4:01 PM

## 2020-08-25 NOTE — LETTER
"  [facility]  [address] [place] [address]  [phone]    8/23/2017    Pete Sheldon  [address] [address]           Dear Mr. Sheldon,    The results of your lab tests are enclosed. Everything looks fine. Unless noted otherwise below, any results that are outside the \"normal\" range are within acceptable limits and are of no concern.    Hemoglobin A1C measures control of diabetes. Your Hemoglobin A1C is shown. The  target is under 8.0.    LDL= Bad Cholesterol-- the target is below 100.     HDL= Good Cholesterol-- although this is determined mostly by heredity, exercise and/or medications may sometimes raise this number.    Triglycerides are another type of fat in the blood, and can sometimes be lowered by reducing intake of sweets or excess carbohydrates, alcohol, and by weight reduction if needed.  Sometimes medications are also used.    AST and ALT are liver tests, as are the bilirubin (total and direct), albumin, total protein, and alkaline phosphatase. Yours are all normal.     Urea Nitrogen and Creatinine are kidney tests--yours are normal. GFR stands for Glomerular Filtration Rate, a more complicated estimate of kidney function.    Sodium, Potassium, Chloride, Carbon Dioxide, and Calcium are all normal salts in the bloodstream. Yours all look normal. Your glucose (blood sugar) also looks fine. (You can ignore the anion gap result).     If you have any further questions or problems, please contact our office.    Sincerely,    Rom Helm MD  Attachment: Lab results     "
2

## 2020-09-24 NOTE — LETTER
"    9/24/2020         RE: Pete Sheldon  [address] [place] [address]        Dear Colleague,    Thank you for referring your patient, Pete Sheldon, to the [facility]. Please see a copy of my visit note below.    This is a telephone encounter.  Declined video.  9/24/2020    Start time: 9:40    End time:10:01    More than 10 min spent reviewing chart, labs, results, imaging studies and in patient communication.    In the setting of COVID-19 outbreak patients visits are transitioned to phone visits/ virtual visits as per system and leadership guidelines.  I have personally reviewed data including notes, lab tests. I have reviewed and interpreted images personally.  This encounter is potentially equivalent to established 4 level (25012) clinic visit.    The patient has been notified of following:      \"This telephone visit will be conducted via a call between you and your physician/provider. We have found that certain health care needs can be provided without the need for a physical exam.  This service lets us provide the care you need with a short phone conversation.  If a prescription is necessary we can send it directly to your pharmacy.  If lab work is needed we can place an order for that and you can then stop by our lab to have the test done at a later time.     We will bill your insurance company for this service.  Please check with your medical insurance if this type of visit is covered. You may be responsible for the cost of this type of visit if insurance coverage is denied.  The typical cost is $30 (10min), $59 (11-20min) and $85 (21-30min).  Most often these visits are shorter than 10 minutes. With new updates with corona virus patient might be billed as clinic visit.     If during the course of the call the physician/provider feels a telephone visit is not appropriate, you will not be charged for this service.\"      Past medical history, social history, family history, " allergy and medications were reviewed and updated as appropriate.  Reviewed all pertinent labs, notes and imaging studies as appropriate.    Patient verbally consented to phone visit today: yes.    Disclaimer: This note consists of symbols derived from keyboarding, dictation and/or voice recognition software. As a result, there may be errors in the script that have gone undetected. Please consider this when interpreting information found in this chart.        ROS neg expect noted in notes.  Patient feels well at this time and denies any tachycardia, palpitations, heat intolerance, tremor, insomnia, diarrhea, or unexplained weight loss.  Patient also denies  cold intolerance, constipation, or unexplained weight gain.     ENDOCRINOLOGY CLINIC NOTE:  Name: Pete Sheldon  Seen for f/u of Diabetes.  He is accompanied by his wife.  HPI:  Pete Sheldon is a 80  year old male who presents for the evaluation/management of:  Of note he is having bedtime snack almost every days.  Typical bedtime snack is toast and jam at night.  He is afraid of overnight hypoglycemia and tends to eat before going to bed.  Does not take insulin with snack.  Has variable blood sugar pattern. This has been noted consistently in past and with multiple dose adjustment.  Wife helps with insulin dosing. He injects himself.  I discussed continuous glucose monitor with him in past but he does not want sensors.  Low C-peptide and neg FAVIOLA Ab.  Metformin stopped in the setting of CKD.    He was admitted in hospital in October 2019 and at that time metformin was discontinued and Lantus was switched to twice daily dosing.    1. Type 2 DM:  Orginally diagnosed in 1995.  Current Regimen:  Lantus 16 units in AM and 14 units PM, Novolog 7/5/5 Units with breakfast/lunch/dinner respectively. In addition to that he is on correctional scale 1 unit- 25 >140 but he is not using it.  yes:     Diabetes Medication(s)     Insulin       insulin aspart (NOVOLOG  FLEXPEN) 100 UNIT/ML pen    INJECT 7 UNITS WITH BREAKFAST, 5 UNITS WITH LUNCH, 5 UNITS WITH DINNER, HOLD IF NOT EATING MEAL     insulin glargine (LANTUS SOLOSTAR) 100 UNIT/ML pen    INJECT 30 UNITS SUBCUTANEOUSLY ONCE DAILY          During hospital stay  10/2019 metfomrin was discountineud and lantus does was switched to BID dosing.    Does not feel comfortable with carbohydrate counting.    BS checks: Three times a day    Average Meter Download: Blood glucose data reviewed personally. See nursing note from this encounter for details.  Multiple episodes of hypoglycemia especially in AM.    Exercise: Minimal  Episodes of hypoglycemia (low blood sugar):  Yes. As noted above.  Fixed meal pattern: NO. Carb content varies. In general diet is high in carbs.  Patient counting carbs: No    DM Complications:   Nephropathy: Yes: Microalbuminuria present  Retinopathy: Yes: History of laser treatment in past  Neuropathy: Yes.  Microalbuminuria: Yes: Microalbuminuria present.  Not on ACE secondary to history of hyperkalemia.  Lab Results   Component Value Date    UMALCR 170.01 08/30/2018      CAD/PAD: No  Gastroparesis: No  Hypoglycemia unawareness: Yes: Previous notes mentioning history of hypoglycemia unawareness.    2. Hypertension: Blood Pressure today:   BP Readings from Last 3 Encounters:   08/05/20 (!) 162/84   03/11/20 130/80   03/04/20 138/68     Blood pressure medications include hydrochlorothiazide 12.5 mg, amlodipine 5 mg.      3. Hyperlipidemia: Takes lovastatin 40 mg for lipid control.  Denies muscle aches or pains.      4. Hypothyroidism:  History of subclinical hypothyroidism and was started on levothyroxine 25 mcg/day  Currently taking levothyroxine 25 mcg/day but he reports that he feels sick to stomach after taking levothyroxine he is wondering if he can be off of levothyroxine.  Levothyroxine was stopped 3/2020.  Follow up labs 9/2020 as noted below.      PMH/PSH:  Past Medical History:   Diagnosis Date      Calculus of ureter      Hypertension      Hypothyroidism      Microalbuminuria 2/15/2016     Mumps      Other and unspecified hyperlipidemia      Type 2 diabetes, HbA1C goal < 8% (H) 1995     Past Surgical History:   Procedure Laterality Date     C NONSPECIFIC PROCEDURE      Nasal septoplasty     HC COLONOSCOPY THRU STOMA, DIAGNOSTIC  2007    Normal     HC FLEX SIGMOIDOSCOPY W/WO MIGUEL SPEC BY BRUSH/WASH  1999    Normal to 60 cm     HC REPAIR INCISIONAL HERNIA,REDUCIBLE  1999    Hernia Repair, Incisional, Unilateral (right)     HC REPAIR INCISIONAL HERNIA,REDUCIBLE      Hernia Repair, Incisional, Unilateral (left, with mesh placement)     TESTICLE SURGERY       VASECTOMY       Family Hx:  Family History   Problem Relation Age of Onset     Cerebrovascular Disease Mother          age 95     Heart Disease Mother         age 89,  age 95     Cerebrovascular Disease Father          age 91, stroke two years previous     Cancer - colorectal Brother         Half-brother     Cancer Sister         Half-sister (?type)     Cancer Sister         Half-sister (?type)     Heart Disease Brother      Neurologic Disorder Daughter         Multiple Sclerosis     Psychotic Disorder Son         Schizophrenia       Social Hx:  Social History     Socioeconomic History     Marital status:      Spouse name: Not on file     Number of children: 2     Years of education: Not on file     Highest education level: Not on file   Occupational History     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[employer]     Employer: RETIRED   Social Needs     Financial resource strain: Not on file     Food insecurity     Worry: Not on file     Inability: Not on file     Transportation needs     Medical: Not on file     Non-medical: Not on file   Tobacco Use     Smoking status: Former Smoker     Packs/day: 0.00     Last attempt to quit: 3/11/1953     Years since quittin.5     Smokeless tobacco:  Never Used   Substance and Sexual Activity     Alcohol use: No     Drug use: No     Sexual activity: Yes     Partners: Female   Lifestyle     Physical activity     Days per week: Not on file     Minutes per session: Not on file     Stress: Not on file   Relationships     Social connections     Talks on phone: Not on file     Gets together: Not on file     Attends Hoahaoism service: Not on file     Active member of club or organization: Not on file     Attends meetings of clubs or organizations: Not on file     Relationship status: Not on file     Intimate partner violence     Fear of current or ex partner: Not on file     Emotionally abused: Not on file     Physically abused: Not on file     Forced sexual activity: Not on file   Other Topics Concern     Parent/sibling w/ CABG, MI or angioplasty before 65F 55M? Not Asked   Social History Narrative    Retired  for [employer].    Two children, son in [place], daughter in [place] ([facility]).    Three grandchildren, one great-granddaughter.              MEDICATIONS:  has a current medication list which includes the following prescription(s): aspirin, atorvastatin, b-d u/f, onetouch verio iq, blood glucose monitoring, finasteride, glucosamine hcl, novolog flexpen, insulin glargine, insulin pen needle, insulin syringes (disposable), blood glucose, metoprolol tartrate, multivitamin, glucose management, order for dme, tamsulosin, and vitamin c.    ROS     ROS: 10 point ROS neg other than the symptoms noted above in the HPI.    Physical Exam   VS: There were no vitals taken for this visit.      LABS:  Component      Latest Ref Rng 4/15/2016   C-Peptide      0.9 - 6.9 ng/mL <0.1 (L)   Glutamic Acid Decarboxylase Antibody       <5.0 . . .     A1c:  Lab Results   Component Value Date    A1C 8.7 09/15/2020    A1C 9.0 02/28/2020    A1C 9.6 11/26/2019    A1C 9.2 09/04/2019    A1C 8.9 06/06/2019     Creatinine:  Creatinine   Date Value Ref Range  Status   08/05/2020 1.70 (H) 0.66 - 1.25 mg/dL Final     Comment:     This test is intended for monitoring Coumadin therapy.  Results are not   accurate in patients with prolonged INR due to factor deficiency.       Urine Micro:  Lab Results   Component Value Date    UMALCR 417.81 11/26/2019        LFTs/Lipids:  Recent Labs   Lab Test 02/28/20  0902 11/27/18  0927  09/25/15  0844 06/19/15  0815   CHOL 116 110   < > 134 135   HDL 28* 26*   < > 31* 36*   LDL 65 59   < > 80 83   TRIG 113 125   < > 117 78   CHOLHDLRATIO  --   --   --  4.3 3.8    < > = values in this interval not displayed.       TFTs:  ENDO THYROID LABS-[facility] Latest Ref Rng & Units 9/15/2020 2/28/2020   TSH 0.40 - 4.00 mU/L 8.20 (H) 4.47 (H)   T4 FREE 0.76 - 1.46 ng/dL 1.11 1.09     ENDO THYROID LABS-[facility] Latest Ref Rng & Units 9/4/2019 3/6/2019   TSH 0.40 - 4.00 mU/L 6.28 (H) 5.85 (H)   T4 FREE 0.76 - 1.46 ng/dL 1.09 1.14     All pertinent notes, labs, and images personally reviewed by me.     A/P  Mr.Victor ANURAG Sheldon is a 80 year old here for the evaluation/management of diabetes:    1. DM2 - (low C-peptide, negative FAVIOLA): Under Poor control.  A1c 8.7%.  Diabetes complicated by microalbuminuria, neuropathy and retinopathy.   Concerns for hypoglycemia unawareness. Variable BG. Brittle DM.  Multiple dose changes made in past but still not able to get stable blood sugar numbers.  Blood sugars are variable throughout the day.  Does not feel comfortable with carbohydrate counting.  Variable carb intake.  Does not want Dexcom. This has been discussed multiple times with pt in past.   H/o brittle DM. Lot of variability in BG.    Noted to have multiple low BG in AM.  Plan:  Novolog: INJECT 7 UNITS WITH BREAKFAST, 6 UNITS WITH LUNCH, 6 UNITS WITH DINNER, HOLD IF NOT EATING MEAL  Lantus: Take 16 units in AM and 12 Units PM  Send BG records in 3-4 weeks. ( you can call in the BG or mail BG records)  Labs and follow up 3 months  Please make a lab appointment for  blood work and follow up clinic appointment in 1 week after that to discuss results.    2.  Subclinical hypothyroidism:  Was on levothyroxine 25 mcg/day. It was stopped 3/2020 as he reported intolerance to it.  Started in 9/2018 by Dr. Silva for subclinical hypothyroidism.  He reports that after taking levothyroxine he feels  sick to his stomach.  Clinically looks euthyroid.  Noted 9/2020 labs.   Plan:  TSH mildly elevated and given his age it is okay to have slightly higher TSH with normal free T4  Repeat labs in 3 months and follow-up after that    3. Hypertension - On hydrochlorothiazide 12.5 mg, amlodipine 5 mg.  Managed by PCP.    4. Hyperlipidemia - Under Good control.  On lovastatin 40 mg. LDL 91, HDL 34.  Managed by PCP.    5. Microalbuminuria:  Lab Results   Component Value Date    UMALCR 170.01 08/30/2018    Not on ACE 2/2 to h/o hyperkalemia    6. Prevention  ASA- 81 mg/day.  h/o nosebleeds  Smoking- No    Most Recent Immunizations   Administered Date(s) Administered     Influenza (H1N1) 12/28/2009     Influenza (High Dose) 3 valent vaccine 09/06/2018     Influenza (IIV3) PF 08/29/2012     Pneumococcal 23 valent 03/16/2012     TD (ADULT, 7+) 10/22/2003     TDAP Vaccine (Adacel) 01/31/2014     Zoster vaccine, live 10/15/2011       Recommend checking blood sugars before meals and at bedtime.    If Blood glucose are low more often-> 2-3 times/week- give us a call.  The patient is advised to Make better food choices: reduce carbs, Reduce portion size, weight loss and exercise 3-4 times a week.  Discussed hypoglycemia signs and symptoms as well as management in detail.    All questions were answered.  The patient indicates understanding of the above issues and agrees with the plan set forth.     Follow-up:  Follow up 3 months    Lori Damian M.D  Endocrinology  [facility]    Disclaimer: This note consists of symbols derived from keyboarding, dictation and/or voice recognition  software. As a result, there may be errors in the script that have gone undetected. Please consider this when interpreting information found in this chart.    Addendum to above note and clinic visit:    Labs reviewed.    See result note/telephone encounter.            Again, thank you for allowing me to participate in the care of your patient.        Sincerely,        Lori Damian MD

## 2020-09-24 NOTE — PATIENT INSTRUCTIONS
[facility] & [place] locations   Dr Damian, Endocrinology Department      [facility]   [address]  [place] [address]  Appointment [phone]  Fax: [phone]  [place]: Monday and Tuesday         [facility]   [address]  [place] [address]  Appointment [phone]  Fax: [phone]  [place]: Wednesday and Thursday            Please check the cost coverage and copay with insurance before recommended tests, services and medications (especially if new medications are prescribed).     If ordered, please get blood work done 1 week prior to your next appointment so they will be available to Dr. Damian at your visit.    To provide the best diabetic care, please bring your blood glucose meter to each and every visit with your  Endocrinologist. Your blood glucose meter/insulin pump will be downloaded at every appointment.  Please arrive 15 minutes before your scheduled appointment. This will allow for your blood glucose meter/insulin pump  to be downloaded.  If you are wearing DEXCOM please bring  or sharing code from the Dexcom Clarity Appt so that it can be downloaded.  If you are using freestyle po personal sensors please bring the reader.  If you are using TANDEM insulin pump please have your username and password to get info from Tandem website.    Novolog: INJECT 7 UNITS WITH BREAKFAST, 6 UNITS WITH LUNCH, 6 UNITS WITH DINNER, HOLD IF NOT EATING MEAL  Lantus: Take 16 units in AM and 12 Units PM  Send BG records in 2-4 weeks. ( you can call in the BG or mail BG records)  Labs and follow up 3 months.  Please make a lab appointment for blood work and follow up clinic appointment in 1 week after that to discuss results.    Recommend checking blood sugars before meals and at bedtime.    If Blood glucose are low more often-> 2-3 times/week- give us a call.  The patient is advised  to Make better food choices: reduce carbs, Reduce portion size, weight loss and exercise 3-4 times a week.  Discussed hypoglycemia signs and symptoms as well as management in detail.

## 2020-09-24 NOTE — NURSING NOTE
09/23 0845 112   256p 197   613p 170   1036p 66  09/22 807a 57   903a 112   1224p 206   613p 170   951p 85  09/21 12 198   321p 292   632p 118   1042p 109  09/20 1157a 73   241p 196   611p 294   1008p 176  09/19 1122a 54   316p 206   637p 279   1019 137  09/18 715a 48   1017a 75   154p 146   623p 59   1036 97    Lilia Pacheco, [facility] Endocrinology  Ave/[place]

## 2020-09-24 NOTE — PROGRESS NOTES
"This is a telephone encounter.  Declined video.  9/24/2020    Start time: 9:40    End time:10:01    More than 10 min spent reviewing chart, labs, results, imaging studies and in patient communication.    In the setting of COVID-19 outbreak patients visits are transitioned to phone visits/ virtual visits as per system and leadership guidelines.  I have personally reviewed data including notes, lab tests. I have reviewed and interpreted images personally.  This encounter is potentially equivalent to established 4 level (22621) clinic visit.    The patient has been notified of following:      \"This telephone visit will be conducted via a call between you and your physician/provider. We have found that certain health care needs can be provided without the need for a physical exam.  This service lets us provide the care you need with a short phone conversation.  If a prescription is necessary we can send it directly to your pharmacy.  If lab work is needed we can place an order for that and you can then stop by our lab to have the test done at a later time.     We will bill your insurance company for this service.  Please check with your medical insurance if this type of visit is covered. You may be responsible for the cost of this type of visit if insurance coverage is denied.  The typical cost is $30 (10min), $59 (11-20min) and $85 (21-30min).  Most often these visits are shorter than 10 minutes. With new updates with corona virus patient might be billed as clinic visit.     If during the course of the call the physician/provider feels a telephone visit is not appropriate, you will not be charged for this service.\"      Past medical history, social history, family history, allergy and medications were reviewed and updated as appropriate.  Reviewed all pertinent labs, notes and imaging studies as appropriate.    Patient verbally consented to phone visit today: yes.    Disclaimer: This note consists of symbols derived " from keyboarding, dictation and/or voice recognition software. As a result, there may be errors in the script that have gone undetected. Please consider this when interpreting information found in this chart.        ROS neg expect noted in notes.  Patient feels well at this time and denies any tachycardia, palpitations, heat intolerance, tremor, insomnia, diarrhea, or unexplained weight loss.  Patient also denies  cold intolerance, constipation, or unexplained weight gain.     ENDOCRINOLOGY CLINIC NOTE:  Name: Pete Sheldon  Seen for f/u of Diabetes.  He is accompanied by his wife.  HPI:  Pete Sheldon is a 80  year old male who presents for the evaluation/management of:  Of note he is having bedtime snack almost every days.  Typical bedtime snack is toast and jam at night.  He is afraid of overnight hypoglycemia and tends to eat before going to bed.  Does not take insulin with snack.  Has variable blood sugar pattern. This has been noted consistently in past and with multiple dose adjustment.  Wife helps with insulin dosing. He injects himself.  I discussed continuous glucose monitor with him in past but he does not want sensors.  Low C-peptide and neg FAVIOLA Ab.  Metformin stopped in the setting of CKD.    He was admitted in hospital in October 2019 and at that time metformin was discontinued and Lantus was switched to twice daily dosing.    1. Type 2 DM:  Orginally diagnosed in 1995.  Current Regimen:  Lantus 16 units in AM and 14 units PM, Novolog 7/5/5 Units with breakfast/lunch/dinner respectively. In addition to that he is on correctional scale 1 unit- 25 >140 but he is not using it.  yes:     Diabetes Medication(s)     Insulin       insulin aspart (NOVOLOG FLEXPEN) 100 UNIT/ML pen    INJECT 7 UNITS WITH BREAKFAST, 5 UNITS WITH LUNCH, 5 UNITS WITH DINNER, HOLD IF NOT EATING MEAL     insulin glargine (LANTUS SOLOSTAR) 100 UNIT/ML pen    INJECT 30 UNITS SUBCUTANEOUSLY ONCE DAILY          During hospital stay   10/2019 metfomrin was discountineud and lantus does was switched to BID dosing.    Does not feel comfortable with carbohydrate counting.    BS checks: Three times a day    Average Meter Download: Blood glucose data reviewed personally. See nursing note from this encounter for details.  Multiple episodes of hypoglycemia especially in AM.    Exercise: Minimal  Episodes of hypoglycemia (low blood sugar):  Yes. As noted above.  Fixed meal pattern: NO. Carb content varies. In general diet is high in carbs.  Patient counting carbs: No    DM Complications:   Nephropathy: Yes: Microalbuminuria present  Retinopathy: Yes: History of laser treatment in past  Neuropathy: Yes.  Microalbuminuria: Yes: Microalbuminuria present.  Not on ACE secondary to history of hyperkalemia.  Lab Results   Component Value Date    UMALCR 170.01 08/30/2018      CAD/PAD: No  Gastroparesis: No  Hypoglycemia unawareness: Yes: Previous notes mentioning history of hypoglycemia unawareness.    2. Hypertension: Blood Pressure today:   BP Readings from Last 3 Encounters:   08/05/20 (!) 162/84   03/11/20 130/80   03/04/20 138/68     Blood pressure medications include hydrochlorothiazide 12.5 mg, amlodipine 5 mg.      3. Hyperlipidemia: Takes lovastatin 40 mg for lipid control.  Denies muscle aches or pains.      4. Hypothyroidism:  History of subclinical hypothyroidism and was started on levothyroxine 25 mcg/day  Currently taking levothyroxine 25 mcg/day but he reports that he feels sick to stomach after taking levothyroxine he is wondering if he can be off of levothyroxine.  Levothyroxine was stopped 3/2020.  Follow up labs 9/2020 as noted below.      PMH/PSH:  Past Medical History:   Diagnosis Date     Calculus of ureter 1980's     Hypertension      Hypothyroidism      Microalbuminuria 2/15/2016     Mumps      Other and unspecified hyperlipidemia      Type 2 diabetes, HbA1C goal < 8% (H) 7/1995     Past Surgical History:   Procedure Laterality Date      C NONSPECIFIC PROCEDURE      Nasal septoplasty     HC COLONOSCOPY THRU STOMA, DIAGNOSTIC  2007    Normal     HC FLEX SIGMOIDOSCOPY W/WO MIGUEL SPEC BY BRUSH/WASH  1999    Normal to 60 cm     HC REPAIR INCISIONAL HERNIA,REDUCIBLE  1999    Hernia Repair, Incisional, Unilateral (right)     HC REPAIR INCISIONAL HERNIA,REDUCIBLE      Hernia Repair, Incisional, Unilateral (left, with mesh placement)     TESTICLE SURGERY       VASECTOMY       Family Hx:  Family History   Problem Relation Age of Onset     Cerebrovascular Disease Mother          age 95     Heart Disease Mother         age 89,  age 95     Cerebrovascular Disease Father          age 91, stroke two years previous     Cancer - colorectal Brother         Half-brother     Cancer Sister         Half-sister (?type)     Cancer Sister         Half-sister (?type)     Heart Disease Brother      Neurologic Disorder Daughter         Multiple Sclerosis     Psychotic Disorder Son         Schizophrenia       Social Hx:  Social History     Socioeconomic History     Marital status:      Spouse name: Not on file     Number of children: 2     Years of education: Not on file     Highest education level: Not on file   Occupational History     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[employer]     Employer: RETIRED   Social Needs     Financial resource strain: Not on file     Food insecurity     Worry: Not on file     Inability: Not on file     Transportation needs     Medical: Not on file     Non-medical: Not on file   Tobacco Use     Smoking status: Former Smoker     Packs/day: 0.00     Last attempt to quit: 3/11/1953     Years since quittin.5     Smokeless tobacco: Never Used   Substance and Sexual Activity     Alcohol use: No     Drug use: No     Sexual activity: Yes     Partners: Female   Lifestyle     Physical activity     Days per week: Not on file     Minutes per session: Not on file     Stress: Not on file    Relationships     Social connections     Talks on phone: Not on file     Gets together: Not on file     Attends Caodaism service: Not on file     Active member of club or organization: Not on file     Attends meetings of clubs or organizations: Not on file     Relationship status: Not on file     Intimate partner violence     Fear of current or ex partner: Not on file     Emotionally abused: Not on file     Physically abused: Not on file     Forced sexual activity: Not on file   Other Topics Concern     Parent/sibling w/ CABG, MI or angioplasty before 65F 55M? Not Asked   Social History Narrative    Retired  for [employer].    Two children, son in [place], daughter in [place] ([facility]).    Three grandchildren, one great-granddaughter.              MEDICATIONS:  has a current medication list which includes the following prescription(s): aspirin, atorvastatin, b-d u/f, onetouch verio iq, blood glucose monitoring, finasteride, glucosamine hcl, novolog flexpen, insulin glargine, insulin pen needle, insulin syringes (disposable), blood glucose, metoprolol tartrate, multivitamin, glucose management, order for dme, tamsulosin, and vitamin c.    ROS     ROS: 10 point ROS neg other than the symptoms noted above in the HPI.    Physical Exam   VS: There were no vitals taken for this visit.      LABS:  Component      Latest Ref Rng 4/15/2016   C-Peptide      0.9 - 6.9 ng/mL <0.1 (L)   Glutamic Acid Decarboxylase Antibody       <5.0 . . .     A1c:  Lab Results   Component Value Date    A1C 8.7 09/15/2020    A1C 9.0 02/28/2020    A1C 9.6 11/26/2019    A1C 9.2 09/04/2019    A1C 8.9 06/06/2019     Creatinine:  Creatinine   Date Value Ref Range Status   08/05/2020 1.70 (H) 0.66 - 1.25 mg/dL Final     Comment:     This test is intended for monitoring Coumadin therapy.  Results are not   accurate in patients with prolonged INR due to factor deficiency.       Urine Micro:  Lab Results   Component  Value Date    UMALCR 417.81 11/26/2019        LFTs/Lipids:  Recent Labs   Lab Test 02/28/20  0902 11/27/18  0927  09/25/15  0844 06/19/15  0815   CHOL 116 110   < > 134 135   HDL 28* 26*   < > 31* 36*   LDL 65 59   < > 80 83   TRIG 113 125   < > 117 78   CHOLHDLRATIO  --   --   --  4.3 3.8    < > = values in this interval not displayed.       TFTs:  END[facility] THYROID LABS[facility] Latest Ref Rng & Units 9/15/2020 2/28/2020   TSH 0.40 - 4.00 mU/L 8.20 (H) 4.47 (H)   T4 FREE 0.76 - 1.46 ng/dL 1.11 1.09     ENDO THYROID LABS-[facility] Latest Ref Rng & Units 9/4/2019 3/6/2019   TSH 0.40 - 4.00 mU/L 6.28 (H) 5.85 (H)   T4 FREE 0.76 - 1.46 ng/dL 1.09 1.14     All pertinent notes, labs, and images personally reviewed by me.     A/P  Mr.Victor ANURAG Sheldon is a 80 year old here for the evaluation/management of diabetes:    1. DM2 - (low C-peptide, negative FAVIOLA): Under Poor control.  A1c 8.7%.  Diabetes complicated by microalbuminuria, neuropathy and retinopathy.   Concerns for hypoglycemia unawareness. Variable BG. Brittle DM.  Multiple dose changes made in past but still not able to get stable blood sugar numbers.  Blood sugars are variable throughout the day.  Does not feel comfortable with carbohydrate counting.  Variable carb intake.  Does not want Dexcom. This has been discussed multiple times with pt in past.   H/o brittle DM. Lot of variability in BG.    Noted to have multiple low BG in AM.  Plan:  Novolog: INJECT 7 UNITS WITH BREAKFAST, 6 UNITS WITH LUNCH, 6 UNITS WITH DINNER, HOLD IF NOT EATING MEAL  Lantus: Take 16 units in AM and 12 Units PM  Send BG records in 3-4 weeks. ( you can call in the BG or mail BG records)  Labs and follow up 3 months  Please make a lab appointment for blood work and follow up clinic appointment in 1 week after that to discuss results.    2.  Subclinical hypothyroidism:  Was on levothyroxine 25 mcg/day. It was stopped 3/2020 as he reported intolerance to it.  Started in 9/2018 by Dr. Silva for  subclinical hypothyroidism.  He reports that after taking levothyroxine he feels  sick to his stomach.  Clinically looks euthyroid.  Noted 9/2020 labs.   Plan:  TSH mildly elevated and given his age it is okay to have slightly higher TSH with normal free T4  Repeat labs in 3 months and follow-up after that    3. Hypertension - On hydrochlorothiazide 12.5 mg, amlodipine 5 mg.  Managed by PCP.    4. Hyperlipidemia - Under Good control.  On lovastatin 40 mg. LDL 91, HDL 34.  Managed by PCP.    5. Microalbuminuria:  Lab Results   Component Value Date    UMALCR 170.01 08/30/2018    Not on ACE 2/2 to h/o hyperkalemia    6. Prevention  ASA- 81 mg/day.  h/o nosebleeds  Smoking- No    Most Recent Immunizations   Administered Date(s) Administered     Influenza (H1N1) 12/28/2009     Influenza (High Dose) 3 valent vaccine 09/06/2018     Influenza (IIV3) PF 08/29/2012     Pneumococcal 23 valent 03/16/2012     TD (ADULT, 7+) 10/22/2003     TDAP Vaccine (Adacel) 01/31/2014     Zoster vaccine, live 10/15/2011       Recommend checking blood sugars before meals and at bedtime.    If Blood glucose are low more often-> 2-3 times/week- give us a call.  The patient is advised to Make better food choices: reduce carbs, Reduce portion size, weight loss and exercise 3-4 times a week.  Discussed hypoglycemia signs and symptoms as well as management in detail.    All questions were answered.  The patient indicates understanding of the above issues and agrees with the plan set forth.     Follow-up:  Follow up 3 months    Lori Damian M.D  Endocrinology  [facility]    Disclaimer: This note consists of symbols derived from keyboarding, dictation and/or voice recognition software. As a result, there may be errors in the script that have gone undetected. Please consider this when interpreting information found in this chart.    Addendum to above note and clinic visit:    Labs reviewed.    See result note/telephone  encounter.

## 2020-11-17 NOTE — TELEPHONE ENCOUNTER
Pending Prescriptions:                       Disp   Refills    insulin glargine (LANTUS SOLOSTAR) 100 UN*30 mL  0            Sig: INJECT 30 UNITS SUBCUTANEOUSLY ONCE DAILY      Prescription approved per G Refill Protocol.

## 2020-12-18 PROBLEM — I63.9 CEREBROVASCULAR ACCIDENT (CVA), UNSPECIFIED MECHANISM (H): Status: ACTIVE | Noted: 2020-01-01

## 2020-12-18 NOTE — ED NOTES
Bed: ED27  Expected date: 12/18/20  Expected time: 10:57 AM  Means of arrival: Ambulance  Comments:  [id number]

## 2020-12-18 NOTE — ED NOTES
[facility]  ED Nurse Handoff Report    Pete Sheldon is a 80 year old male   ED Chief complaint: Altered Mental Status  . ED Diagnosis:   Final diagnoses:   Cerebrovascular accident (CVA), unspecified mechanism (H)     Allergies:   Allergies   Allergen Reactions     Losartan Other (See Comments)     Dizziness       Code Status: Full Code  Activity level - Baseline/Home:  Independent. Activity Level - Current:   Assist X 1. Lift room needed: No. Bariatric: No   Needed: No   Isolation: No. Infection: Not Applicable.     Vital Signs:   Vitals:    12/18/20 1340 12/18/20 1345 12/18/20 1350 12/18/20 1355   BP:  (!) 157/82     Pulse: 66 67 67 78   Resp: 16 18 16 12   Temp:       TempSrc:       SpO2:       Weight:           Cardiac Rhythm:  ,      Pain level:    Patient confused: No. Patient Falls Risk: Yes.   Elimination Status: Has voided   Patient Report / Focused Assessment:    Pt arrives via EMS from home for altered mental status. Pt heard to fall out of bed 1 hour PTA. Pt having a hard time getting words out, hx of stroke 1 yr ago. Code stroke initiated.   Tests Performed / Abnormal Results:    MR Brain w/o & w Contrast   Preliminary Result   IMPRESSION:   1. Multiple punctate acute cortical/subcortical ischemic infarcts   involving the left insular region, occipital temporal   junction/posterolateral left temporal lobe, and left parietal lobe. No   acute hemorrhage or significant mass effect.   2. Mild-to-moderate diffuse ventriculomegaly, somewhat out of   proportion to the degree of generalized brain parenchymal volume   loss/sulcal prominence, with some degree of sulcal crowding at the   frontoparietal vertex. The findings are unchanged, and may be related   to ex vacuo phenomenon in the setting of atrophy/central white matter   volume loss, although a component of superimposed communicating/normal   pressure hydrocephalus cannot be completely excluded.   3. Unchanged chronic bilateral  basal ganglia and left thalamic lacunar   infarcts.   4. Multiple unchanged chronic cerebellar/vermian microhemorrhages.      CT Head Perfusion w Contrast   Preliminary Result   IMPRESSION:   1. There is prolonged contrast transit time to the posterior/inferior   left middle cerebral artery territory. No definite asymmetrically   reduced cerebral blood volume to suggest a definite core infarct in   that area at this time. The findings would suggest oligemic/ischemic   tissue with significant penumbra. CBF less than 30% volume = 0 mL.   Tmax greater than 6 seconds volume = 50 mL. Mismatch volume = 50 mL.      2. Prolonged transit time to the right posterior inferior cerebellar   artery territory which appears to have been present to some degree on   the prior cerebral perfusion scan and may be related in part to the   chronic right vertebral artery V4 segment occlusion.      CTA Head Neck with Contrast   Preliminary Result   IMPRESSION:   1. Short-segment occlusion of one of the M2 branches of the left   middle cerebral artery, concerning for acute thromboembolic disease.   2. 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   3. No other high-grade stenosis or large-vessel occlusion involving   the major craniocervical arterial vasculature.   4. Multiple pulmonary nodules, some of which are calcified. Calcified   mediastinal lymph nodes, consistent with old granulomatous disease.   Some of the pulmonary nodules measure up to 6-7 mm. Consider follow-up   chest CT if it would change the patient's clinical management.   5. Other chronic findings, as detailed.      The findings from a noncontrast head CT, CTA head and neck and CT head   perfusion were discussed with Dr. Mendosa by myself at approximately   11:48 AM on 12/18/2020.      CT Head w/o Contrast   Final Result   IMPRESSION:   1. No extended signs of acute infarct or intracranial hemorrhage.   ASPECT score = 10.   2. Brain atrophy and  presumed chronic small-vessel ischemic changes,   as described.   3. Chronic lacunar infarcts in the left basal ganglia and corona   radiata region.   4. Large nasal septal defect and paranasal sinus mucosal thickening,   as before.      DINORAH ESTRADA MD        Labs Ordered and Resulted from Time of ED Arrival Up to the Time of Departure from the ED   CBC WITH PLATELETS DIFFERENTIAL - Abnormal; Notable for the following components:       Result Value    Absolute Eosinophils 1.1 (*)     All other components within normal limits   BASIC METABOLIC PANEL - Abnormal; Notable for the following components:    Anion Gap <1 (*)     Glucose 225 (*)     Creatinine 1.43 (*)     GFR Estimate 45 (*)     GFR Estimate If Black 52 (*)     All other components within normal limits   ROUTINE UA WITH MICROSCOPIC - Abnormal; Notable for the following components:    Protein Albumin Urine 30 (*)     Mucous Urine Present (*)     All other components within normal limits   GLUCOSE BY METER - Abnormal; Notable for the following components:    Glucose 201 (*)     All other components within normal limits   CREATININE POCT - Abnormal; Notable for the following components:    Creatinine 1.5 (*)     GFR Estimate 45 (*)     GFR Estimate If Black 54 (*)     All other components within normal limits   TROPONIN I   GLUCOSE MONITOR NURSING POCT   INR   PARTIAL THROMBOPLASTIN TIME   COVID-19 VIRUS (CORONAVIRUS) BY PCR   CARDIAC CONTINUOUS MONITORING   ABO/RH TYPE AND SCREEN   Treatments provided: PIV, LABS, COVID, MRI, TELESTROKE, MONITORING, MEDS, URINAL, NS, CT SCAN, EKG  Family Comments: NOTIFIED  OBS brochure/video discussed/provided to patient:  YES  ED Medications:   Medications   0.9% sodium chloride BOLUS (95 mLs Intravenous New Bag 12/18/20 1123)   iopamidol (ISOVUE-370) solution 500 mL (120 mLs Intravenous Given 12/18/20 1123)   gadobutrol (GADAVIST) injection 7.5 mL (7.5 mLs Intravenous Given 12/18/20 1423)   clopidogrel (PLAVIX) tablet  300 mg (300 mg Oral Given 12/18/20 1528)   aspirin (ASA) chewable tablet 81 mg (81 mg Oral Given 12/18/20 1528)     Drips infusing:  No  For the majority of the shift, the patient's behavior Green. Interventions performed were none.    Sepsis treatment initiated: No     Patient tested for COVID 19 prior to admission: YES    ED Nurse Name/Phone Number: Toby Encarnacion RN, [phone]  3:29 PM  RECEIVING UNIT ED HANDOFF REVIEW    Above ED Nurse Handoff Report was reviewed: Yes  Reviewed by: Karen M. Lesch, RN on December 18, 2020 at 6:17 PM

## 2020-12-18 NOTE — CONSULTS
[facility]    Stroke Consult Note    Reason for Consult: Stroke Code    Chief Complaint: Altered Mental Status      HPI  Pete Sheldon is a 80 year old male with past medical history significant for DM2, HLD, CKD, and CVA who presented to [facility] for evaluation of weakness and speech difficulty. He reportedly fell on trying to get up from bed, so LKW is unclear. His wife notes that he also seemed confused.     Initial CTH was negative for acute pathology. CTA head/neck with short segment occlusion of the L M2 and chronic occlusion of the right V4. CTP with some prolonged transit time to the L MCA inferior division. A STAT MRI was attempted for consideration of treatment per [facility] protocol, however, it was unable to be completed for a couple hours. It ultimately demonstrated multiple punctate acute cortical/subcortical ischemic infarcts involving the left insular region, occipital temporal junction/posterolateral left temporal lobe, and left parietal lobe    TPA Treatment   Not given due to unclear or unfavorable risk-benefit profile for extended window thrombolysis beyond the conventional 4.5 hour time window.    Endovascular Treatment  Proximal vessel occlusion present, but endovascular treatment not initiated due to low NIHSS    Impression  Ischemic Stroke due to undetermined etiology     Recommendations  Acute Ischemic Stroke (without tPA) Recommendations  - Neurochecks Q 4 hours  - Permissive HTN; labetalol PRN for SBP > 220  - Daily aspirin 81 mg for secondary stroke prevention  - Plavix (clopidogrel) 300 mg PO loading dose x 1  - Plavix (clopidogrel) 75 mg PO Daily  - Statin: pending LDL  - MRI Stroke Protocol  - TTE with Bubble Study  - Telemetry, EKG  - Bedside Glucose Monitoring  - A1c, Lipid Panel, Troponin x 3  - PT/OT/SLP  - Stroke Class per [facility]  - Euthermia, Euglycemia    Patient Follow-up    - final recommendation pending work-up    Thank you for  "this consult. We will continue to follow.     TREVOR Paige, CNP  Neurology  To page me or covering stroke neurology team member, click here: AMCOM   Choose \"On Call\" tab at top, then search dropdown box for \"Neurology Adult\", select location, press Enter, then look for stroke/neuro ICU/telestroke.    ______________________________________________________    Past Medical History   Past Medical History:   Diagnosis Date     Calculus of ureter 1980's     Hypertension      Hypothyroidism      Microalbuminuria 2/15/2016     Mumps      Other and unspecified hyperlipidemia      Type 2 diabetes, HbA1C goal < 8% (H) 7/1995     Past Surgical History   Past Surgical History:   Procedure Laterality Date     HC COLONOSCOPY THRU STOMA, DIAGNOSTIC  6/12/2007    Normal     HC FLEX SIGMOIDOSCOPY W/WO MIGUEL SPEC BY BRUSH/WASH  12/30/1999    Normal to 60 cm     HC REPAIR INCISIONAL HERNIA,REDUCIBLE  1/2001, 1999    Hernia Repair, Incisional, Unilateral (right)     HC REPAIR INCISIONAL HERNIA,REDUCIBLE  1996    Hernia Repair, Incisional, Unilateral (left, with mesh placement)     TESTICLE SURGERY       VASECTOMY       ZZC NONSPECIFIC PROCEDURE  1980's    Nasal septoplasty     Medications   Home Meds  Prior to Admission medications    Medication Sig Start Date End Date Taking? Authorizing Provider   aspirin 81 MG chewable tablet Take 4 tablets (324 mg) by mouth daily 1/12/18   Yue Acevedo R,    atorvastatin (LIPITOR) 20 MG tablet Take 1 tablet (20 mg) by mouth daily 3/4/20   Rom Helm MD   B-D U/F 31G X 8 MM insulin pen needle USE 4 TIMES DAILY TO INJECT INSULIN. 9/15/20   Rom Helm MD   blood glucose (ONETOUCH VERIO IQ) test strip USE 1 STRIP TO CHECK GLUCOSE 4 TIMES DAILY OR  AS  DIRECTED  USING  ONE  TOUCH  VERIO  FLEX 3/11/20   Lori Damian MD   blood glucose monitoring (ONE TOUCH ULTRASOFT) lancets Use to test blood sugar 4 times daily or as directed. 10/18/18   Rom Helm MD   finasteride " "(PROSCAR) 5 MG tablet Take 1 tablet by mouth once daily 12/11/20   Clovis Montenegro MD   Glucosamine HCl 750 MG TABS Take 750 mg by mouth 2 times daily 1/11/18   Yue Acevedo DO   insulin aspart (NOVOLOG FLEXPEN) 100 UNIT/ML pen INJECT 7 UNITS WITH BREAKFAST, 6 UNITS WITH LUNCH, 6 UNITS WITH DINNER, HOLD IF NOT EATING MEAL 9/24/20   Lori Damian MD   insulin glargine (LANTUS SOLOSTAR) 100 UNIT/ML pen INJECT 30 UNITS SUBCUTANEOUSLY ONCE DAILY 11/17/20   Lori Damian MD   insulin pen needle (B-D U/F) 31G X 8 MM B-D ULTRA FINE SHORT PEN NEEDLES, USE 4 TIMES A DAY TO INJECT INSULIN 6/14/18   Rom Helm MD   insulin syringes, disposable, U-100 0.3 ML MISC 1 each 2 times daily 8/8/13   Rom Helm MD   LIFESCAN FINEPOINT LANCETS MISC Use to test blood sugars 4 times daily or as directed. 8/11/15   Rom Helm MD   metoprolol tartrate (LOPRESSOR) 25 MG tablet Take 1 tablet (25 mg) by mouth 2 times daily 3/4/20   Rom Helm MD   multivitamin (OCUVITE) TABS Take 1 tablet by mouth daily FOCUS SELECT PREMIUM WITH LUTEIN per eye doctor  Reported on 5/19/2017    Reported, Patient   Nutritional Supplements (GLUCOSE MANAGEMENT) TABS 4 tabs po for low blood sugar below 70, may repeat q 10-15 minutes as needed 1/11/18   Yue Acevedo DO   order for DME All DM testing supplies including test strips, lancets, solution, syringes, needles and/or pen needles for testing 4 times per day.    Brand \"Contour Next\" 2/15/16   Lori Damian MD   tamsulosin (FLOMAX) 0.4 MG capsule TAKE 1 CAPSULE BY MOUTH ONCE DAILY 5/1/19   Rom Helm MD   vitamin C (ASCORBIC ACID) 1000 MG TABS Take 1,000 mg by mouth 2 times daily    Unknown, Entered By History       Scheduled Meds      Infusion Meds      PRN Meds      Allergies   Allergies   Allergen Reactions     Losartan Other (See Comments)     Dizziness     Family History   Family History   Problem Relation Age of Onset     " Cerebrovascular Disease Mother          age 95     Heart Disease Mother         age 89,  age 95     Cerebrovascular Disease Father          age 91, stroke two years previous     Cancer - colorectal Brother         Half-brother     Cancer Sister         Half-sister (?type)     Cancer Sister         Half-sister (?type)     Heart Disease Brother      Neurologic Disorder Daughter         Multiple Sclerosis     Psychotic Disorder Son         Schizophrenia     Social History   Social History     Tobacco Use     Smoking status: Former Smoker     Packs/day: 0.00     Quit date: 3/11/1953     Years since quittin.8     Smokeless tobacco: Never Used   Substance Use Topics     Alcohol use: No     Drug use: No       Review of Systems   The 10 point Review of Systems is negative other than noted in the HPI or here.        PHYSICAL EXAMINATION  Temp:  [98.4  F (36.9  C)] 98.4  F (36.9  C)  Pulse:  [64-78] 68  Resp:  [7-19] 19  BP: (176-188)/() 179/102  SpO2:  [90 %-95 %] 95 %     Neurologic  Mental Status:  alert, oriented x 3, naming and repetition normal, follows commands slowly, speech largely intact with occasional loss of fluency  Cranial Nerves:  EOMI with normal smooth pursuit, facial sensation intact and symmetric (tested by nurse), facial movements symmetric, no dysarthria, shoulder shrug equal bilaterally, tongue protrusion midline, hard of hearing  Motor:  no abnormal movements, able to move all limbs antigravity spontaneously with no signs of hemiparesis observed, no pronator drift  Reflexes:  unable to test (telestroke)  Sensory:  no extinction on double simultaneous stimulation (assessed by nurse), reports decreased sensation to right leg  Coordination:  no obvious ataxia  Station/Gait:  unable to test due to telestroke      Dysphagia Screen  Per Nursing    Stroke Scales    NIHSS  Interval     Interval Comments     1a. Level of Consciousness 0-->Alert, keenly responsive   1b. LOC Questions  0-->Answers both questions correctly   1c. LOC Commands 0-->Performs both tasks correctly   2.   Best Gaze 0-->Normal   3.   Visual 0-->No visual loss   4.   Facial Palsy 0-->Normal symmetrical movements   5a. Motor Arm, Left 0-->No drift, limb holds 90 (or 45) degrees for full 10 secs   5b. Motor Arm, Right 0-->No drift, limb holds 90 (or 45) degrees for full 10 secs   6a. Motor Leg, Left 0-->No drift, leg holds 30 degree position for full 5 secs   6b. Motor Leg, right 0-->No drift, leg holds 30 degree position for full 5 secs   7.   Limb Ataxia 0-->Absent   8.   Sensory 1-->Mild-to-moderate sensory loss, patient feels pinprick is less sharp or is dull on the affected side, or there is a loss of superficial pain with pinprick, but patient is aware of being touched   9.   Best Language 1-->Mild-to-moderate aphasia, some obvious loss of fluency or facility of comprehension, without significant limitation on ideas expressed or form of expression. Reduction of speech and/or comprehension, however, makes conversation. . . (see row details)   10. Dysarthria 0-->Normal   11. Extinction and Inattention  0-->No abnormality   Total 2 (12/18/20 1149)       Imaging  I personally reviewed all imaging; relevant findings per HPI.     Lab Results Data   CBC  Recent Labs   Lab 12/18/20  1118   WBC 8.5   RBC 5.13   HGB 15.9   HCT 48.5        Basic Metabolic Panel    Recent Labs   Lab 12/18/20  1118      POTASSIUM 4.1   CHLORIDE 106   CO2 31   BUN 25   CR 1.43*   *   MANAV 9.0     Liver Panel  No results for input(s): PROTTOTAL, ALBUMIN, BILITOTAL, ALKPHOS, AST, ALT, BILIDIRECT in the last 168 hours.  INR    Recent Labs   Lab Test 12/18/20  1118 01/10/18  0926 01/08/18  1345   INR 1.14 1.07 1.05      Lipid Profile    Recent Labs   Lab Test 02/28/20  0902 11/27/18  0927 03/01/18  1706 09/25/15  0844 09/25/15  0844 06/19/15  0815 03/06/15  0839   CHOL 116 110 123   < > 134 135 131   HDL 28* 26* 35*   < > 31* 36* 41    LDL 65 59 70   < > 80 83 76   TRIG 113 125 91   < > 117 78 71   CHOLHDLRATIO  --   --   --   --  4.3 3.8 3.2    < > = values in this interval not displayed.     A1C    Recent Labs   Lab Test 09/15/20  1030 02/28/20  0902 11/26/19  1428   A1C 8.7* 9.0* 9.6*     Troponin I    Recent Labs   Lab 12/18/20  1118   TROPI <0.015          Stroke Code / Stroke Consult Data Data   Stroke Code Data  (for stroke code with tele)  Stroke code activated 12/18/20   1113   First stroke provider response 12/18/20   1115   Video start time 12/18/20   1251   Video end time 12/18/20   1110   Last known normal 12/17/20   2200   Time of discovery  (or onset of symptoms)  12/18/20   1000   Head CT read by me 12/18/20   1127   Was stroke code de-escalated?   12/18/20 1430  presence of contraindications for both intravenous and intra-arterial stroke treatments        Telestroke Service Details  Type of service telemedicine diagnostic assessment of acute neurological changes   Reason telemedicine is appropriate patient requires assessment with a specialist for diagnosis and treatment of neurological symptoms   Mode of transmission secure interactive audio and video communication per [employer]   Originating site (patient location) [facility]    Distant site (provider location) [facility]

## 2020-12-18 NOTE — ED PROVIDER NOTES
"History   Chief Complaint:  Altered Mental Status     The history is provided by the patient, the EMS personnel and a relative. The history is limited by the condition of the patient.      Pete Sheldon is a 80 year old male with history of CKD, stroke in 2018 with residual R. Sided weakness, type II diabetes, hyperlipidemia, hypertension, and hypothyroidism, who presents via EMS with a fall and altered mental status. Per EMS, the patient slid out of bed about an hour prior to arrival and was unable to get up. His wife then heard a thud, found him on the ground, confused, and called EMS. For EMS, the patient was able to answer their questions but was talking and responding slower than normal, which is new.     Here, the patient reports it feels like it is hard to talk. Patient denies headache, chest pain, or abdominal pain.    After talking to the patient's wife, she found the patient in a seated position on the floor after he slid out of bed and unable to get up, about 2-2.5 hours prior to arrival. She reports that he was found confused, but she was unable to describe that further.  She notes that the patient wears a hard pallet and often sounds aphasic if he does not have it in, which she states he does not. She also reports that he uses a cane to ambulate but he is \"stubborn\" and does not often use it.    Review of Systems   Unable to perform ROS: Mental status change   Cardiovascular: Negative for chest pain.   Gastrointestinal: Negative for abdominal pain.   Neurological: Positive for speech difficulty. Negative for headaches.   Psychiatric/Behavioral: Positive for confusion.       Allergies:  Losartan    Medications:  Aspirin 81 mg  Lipitor  Finasteride  Glucosamine  Insulin aspart  Insulin glargine  Metoprolol  Flomax    Past Medical History:    Hypertension  Hypothyroidism  Mumps  Hyperlipidemia  Type II diabetes   CKD  Vertebral artery dissection  Cerebrum stroke  DKA  Cranial nerve palsy  ED    Past " Surgical History:    Colonoscopy  Flexible sigmoidoscopy   Hernia repair x2  Vasectomy  Nasal septoplasty     Family History:    Cerebrovascular disease  Heart disease: Mother and Brother  Colorectal cancer  Schizophrenia  MS    Social History:  Smoking status: Former - quit date: 1953  Alcohol use: Negative  Drug use: Negative  Marital Status:   Presents to the ED via EMS  PCP: Rom Helm MD    Physical Exam     Patient Vitals for the past 24 hrs:   BP Temp Temp src Pulse Resp SpO2 Weight   12/18/20 1355 -- -- -- 78 12 -- --   12/18/20 1350 -- -- -- 67 16 -- --   12/18/20 1345 (!) 157/82 -- -- 67 18 -- --   12/18/20 1340 -- -- -- 66 16 -- --   12/18/20 1335 (!) 151/78 -- -- 65 18 -- --   12/18/20 1223 -- -- -- -- -- -- 75 kg (165 lb 5.5 oz)   12/18/20 1210 -- -- -- 68 19 -- --   12/18/20 1205 -- -- -- 71 12 95 % --   12/18/20 1200 (!) 179/102 98.4  F (36.9  C) Temporal 78 13 93 % --   12/18/20 1155 -- -- -- 78 13 93 % --   12/18/20 1150 -- -- -- 73 15 93 % --   12/18/20 1145 (!) 176/81 -- -- 67 (!) 7 90 % --   12/18/20 1120 -- -- -- 77 18 93 % --   12/18/20 1117 (!) 188/98 -- -- -- -- -- --   12/18/20 1115 -- -- -- 64 -- -- --   12/18/20 1110 (!) 188/98 -- -- -- -- -- --       Physical Exam  General: Well-nourished, nontoxic  Eyes: PERRL, EOMI, no nystagmus.  No scleral icterus or conjunctival injection.    ENT:  Moist mucus membranes, posterior oropharynx clear without erythema or exudates. TM normal bilaterally  Neck: Supple with full range of motion  Respiratory:  Lungs clear to auscultation bilaterally, no crackles/rubs/wheezes.  Good air movement  CV: Normal rate and rhythm, no murmurs/rubs/gallops  GI:  Abdomen soft and non-distended.  No tenderness, guarding or rebound  Skin: Warm, dry.  No rashes or petechiae  MSK: No peripheral edema or calf tenderness. No c/t/l bony tenderness  Neuro: Alert and oriented to person/place/time (tells me his birthday).  Mild aphasia, unknown if this is baseline.  No obvious facial droop. Tongue midline, normal strength at SCM/trapezius/BUE/BLE. Sensation intact x 4.   Psychiatric: Flat affect      Emergency Department Course     ECG:  Indication: AMS  Time: 1216  Vent. Rate 74 bpm. UT interval 176. QRS duration 156. QT/QTc 474/526. P-R-T axis 61 -25 116. Sinus rhythm with premature atrial complexes. New left bundle branch block. Abnormal ECG. Read time: 1216    There are no significant changes when compared to an EKG done on 01/14/2018    Imaging:  Radiology findings were communicated with the patient, family and Admitting MD who voiced understanding of the findings.    CT Head without contrast:   1. No extended signs of acute infarct or intracranial hemorrhage. ASPECT score = 10.  2. Brain atrophy and presumed chronic small-vessel ischemic changes, as described.  3. Chronic lacunar infarcts in the left basal ganglia and corona radiata region.  4. Large nasal septal defect and paranasal sinus mucosal thickening, as before. As per radiology.    CT Head Neck Angio with and without contrast:   1. Short-segment occlusion of one of the M2 branches of the left middle cerebral artery, concerning for acute thromboembolic disease.   2. 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  3. No other high-grade stenosis or large-vessel occlusion involving the major craniocervical arterial vasculature.  4. Multiple pulmonary nodules, some of which are calcified. Calcified mediastinal lymph nodes, consistent with old granulomatous disease. Some of the pulmonary nodules measure up to 6-7 mm. Consider follow-up chest CT if it would change the patient's clinical management.  5. Other chronic findings, as detailed. As per radiology.    CT Head Perfusion with contrast:   1. There is prolonged contrast transit time to the posterior/inferior left middle cerebral artery territory. No definite asymmetrically reduced cerebral blood volume to suggest a definite core infarct in that  area at this time. The findings would suggest oligemic/ischemic  tissue with significant penumbra. CBF less than 30% volume = 0 mL. Tmax greater than 6 seconds volume = 50 mL. Mismatch volume = 50 mL.     2. Prolonged transit time to the right posterior inferior cerebellar artery territory which appears to have been present to some degree on the prior cerebral perfusion scan and may be related in part to the chronic right vertebral artery V4 segment occlusion. As per radiology.     MR Brain w/o and w/ contrast:   1. Multiple punctate acute cortical/subcortical ischemic infarcts involving the left insular region, occipital temporal junction/posterolateral left temporal lobe, and left parietal lobe. No  acute hemorrhage or significant mass effect.   2. Mild-to-moderate diffuse ventriculomegaly, somewhat out of proportion to the degree of generalized brain parenchymal volume loss/sulcal prominence, with some degree of sulcal crowding at the frontoparietal vertex. The findings are unchanged, and may be related to ex vacuo phenomenon in the setting of atrophy/central white matter volume loss, although a component of superimposed communicating/normal pressure hydrocephalus cannot be completely excluded.  3. Unchanged chronic bilateral basal ganglia and left thalamic lacunar infarcts.  4. Multiple unchanged chronic cerebellar/vermian microhemorrhages. As per radiology    Laboratory:  Laboratory findings were communicated with the patient, family and Admitting MD who voiced understanding of the findings.    CBC: WBC: 8.5, HGB: 15.9, PLT: 239  BMP: Glucose: 225 (H), Anion Gap: <1 (L), GFR: 45 (L), Creatinine: 1.43 (H), o/w WNL     INR: 1.14  PTT: 34    Creatinine POCT: Creatinine: 1.5 (H), GFR: 45 (L)    ABO/Rh Type and Screen: O Negative     Troponin (1118): <0.015  Glucose by meter (1115): 201 (H)    UA: Protein Albumin: 30, Mucous: Present, o/w Negative    Asymptomatic COVID-19: Pending    Interventions:  1528 Plavix 300  PO  1528 Aspirin 81 mg PO    Emergency Department Course:    Reviewed:  1110 I reviewed the patient's nursing notes, vitals, past medical records, and Care Everywhere.     Assessments:  1112 Code stroke was called.      1154 I rechecked the patient and discussed the results of his workup thus far.     1500 I rechecked the patient and discussed the results of his workup thus far.     Consults:   1115 I consulted with Blaire Baker, stroke neurology, regarding the patient's history and presentation here in the emergency department, after a code stroke was called.    1117 I tried calling the patient's wife, Radha, but she did not answer    1117 I consulted with Ted, at [phone], the patient's grandson, regarding the patient's history and presentation here in the emergency department.    1124 I consulted with Radha, at [phone], the patient's wife, regarding the patient's history and presentation here in the emergency department.    1147 I consulted with Blaire Baker stroke neurology, regarding the patient's history and presentation here in the emergency department.    1150 I consulted with Dr. Castillo, radiology, regarding the patient's history and presentation here in the emergency department.    1213 I consulted with Yolis Mtz, stroke neurology [facility], regarding the patient's history and presentation here in the emergency department.    1440 I consulted with Dr. Kaur, stroke neurology, regarding the patient's history and presentation here in the emergency department.    1455  I consulted with Kala Macias PA-C, for Dr. Nuñez, of the hospitalist services. They are in agreement to accept the patient for admission.    Patient wife updated on findings and plan of care for admission.     Disposition:  The patient was admitted to the hospital under the care of Dr. Nuñez.     Impression & Plan     COVID-19  Pete Sheldon was evaluated during a global COVID-19 pandemic, which necessitated consideration that  the patient might be at risk for infection with the SARS-CoV-2 virus that causes COVID-19.   Applicable protocols for evaluation were followed during the patient's care.   COVID-19 was considered as part of the patient's evaluation. The plan for testing is:  a test was obtained during this visit.    [employer] Stroke Scale  Exam Interval: Baseline   Score    Level of consciousness: (0)   Alert, keenly responsive    LOC questions: (0)   Answers both questions correctly    LOC commands: (0)   Performs both tasks correctly    Best gaze: (0)   Normal    Visual: (0)   No visual loss    Facial palsy: (0)   Normal symmetrical movements    Motor arm (left): (0)   No drift    Motor arm (right): (0)   No drift    Motor leg (left): (0)   No drift    Motor leg (right): (0)   No drift    Limb ataxia: (0)   Absent    Sensory: (0)   Normal- no sensory loss    Best language: (1)   Mild to moderate aphasia    Dysarthria: (1)   Mild to moderate dysarthria    Extinction and inattention: (0)   No abnormality        Total Score:  2       Medical Decision Making:  Pete Sheldon is a 80 year old male who presents today for reported mental status change.  Code stroke called on arrival given noted mild aphasia, unknown baseline and within time window for potential TPA.  Stroke neurology noted abnormal CT imaging though recommended emergent MRI.  They recommended holding off on TPA given low NIH score and patient's overall baseline function on discussing with patient's wife.  Noted multiple punctate acute cortical/subcortical ischemic infarcts involving the left insular region, occipital temporal junction/posterolateral left temporal lobe, and left parietal lobe on MRI.  Patient given plavix load and ASA per stroke neurology recommendations.  Patient remained hemodynamically stable during his time in the ED.  He was accepted by hospitalist for admission.     Diagnosis:    ICD-10-CM    1. Cerebrovascular accident (CVA),  unspecified mechanism (H)  I63.9        Disposition:  Admitted to Dr. Nuñez.    Scribe Disclosure:  I, Tia Rice, am serving as a scribe on 12/18/2020 at 11:11 AM to personally document services performed by Renetta Mendosa DO based on my observations and the provider's statements to me.      Renetta Mendosa DO  12/18/20 1617

## 2020-12-18 NOTE — H&P
[facility]  History and Physical  Hospitalist       Date of Admission:  12/18/2020    Assessment & Plan   Pete Sheldon is a 80 year old male with DM2, HTN, hypothyroidism, dyslipidemia, CKD III, cognitive impairment, and CVA 1/2018 with residual right-sided weakness who presented to [facility] ED on 12/18/2020 via EMS after being too weak to get up and having confusion.  CMP notable for Cr 1.43 and glucose 225.  CBC within normal limits.  Trop <0.015.  TSH 6.18 with free T4 pending.  INR 1.14.  UA bland.  CT Head shows no acute process but does show brain atrophy, chronic small vessel ischemic changes, and chronic lacunar infarcts.  CTA Head/Neck showing short-segment occlusion of left MCA M2 branch, chronic occlusion of right vertebral aftery V4 segment, and no other acute findings.  CT Head Perfusion W showed some contrast delay in the left MCA and right posterior inferior cerebellar artery.  MRI Brain WWO revealed multiple acute punctate cortical/subcortical ischemic infarcts in the left insular region, occipital temporal junction/posterolateral left temporal tube, and left parietal lobe without acute hemorrhage or mass effect.       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  Also has known cognitive impairment / Alzheimer's dementia.  Worked with outpatient PT/OT after CVA in 1/2018 and was noted to have CPT of 4.58 indicating mild-to-moderate impairment.   - Stroke Neuro consult  - TSH and Hepatic panel added on  - Echo without bubble  - PT/OT/SLP/SW  - Tele  - Continue PTA   - Will likely need 30 day event monitor     CKD stage III  Baseline Cr 1.4-1.5.  Cr 1.43 on admission.  Monitor.     HTN  Continue metoprolol 25 BID.     DM2, suboptimal control  Follows with Pham (Pura).  HgbA1c 8.7 in Sept 2020.    - Continue Lantus 16u with breakfast, 12u with dinner  - Will defer carb  coverage for now.   - ISS     Dyslipidemia  FLP from 2/2020 showed , LDL 65, HDL 28, .    - Continue PTA atorvastatin 40.     COVID status:  Asymptomatic swab pending  DVT Prophylaxis: Pneumatic Compression Devices  Code Status: Full Code     Disposition: Expected discharge home tomorrow +/- outpatient services.      [facility]    PRIMARY CARE PROVIDER: Rom Helm MD    CHIEF COMPLAINT  Confusion, weakness    History is obtained from the patient, ED provider, and Epic    HISTORY OF PRESENT ILLNESS  Pete Sheldon is a 80 year old male with DM2, HTN, hypothyroidism, dyslipidemia, CKD III, cognitive impairment, and CVA 1/2018 with residual right-sided weakness who presented to [facility] ED on 12/18/2020 via EMS after being too weak to get up and having confusion.  Patient was apparently try to get out of bed when he slid down on the bed and ended up on the floor.  He was too weak to get off the floor.  His wife was unable to help him.  He sat on the floor for about 2 hours but was also noted to be somewhat confused.  EMS was called.  They were able to get patient up but it appeared that patient was having some difficulty speaking.  It was unclear if his speech impairment was due to a stroke or him not wearing his hard palate as wife reportedly states that patient sounds aphasic when not wearing it.  Because of his weakness and deficits, he was brought in for evaluation.    In the ER, /98, heart rate 64, RR 18, SPO2 93% at rest on room air, temperature 98.4.  CMP notable for Cr 1.43 and glucose 225.  CBC within normal limits.  Trop <0.015.  TSH 6.18 with free T4 pending.  INR 1.14.  UA bland.  CT Head shows no acute process but does show brain atrophy, chronic small vessel ischemic changes, and chronic lacunar infarcts.  CTA Head/Neck showing short-segment occlusion of left MCA M2 branch, chronic occlusion of right vertebral aftery V4 segment, and no other acute findings.  CT Head Perfusion  W showed some contrast delay in the left MCA and right posterior inferior cerebellar artery.  MRI Brain WW revealed multiple acute punctate cortical/subcortical ischemic infarcts in the left insular region, occipital temporal junction/posterolateral left temporal tube, and left parietal lobe without acute hemorrhage or mass effect.  Admission requested for acute CVA.    Seen on the floor at present time he is resting comfortably.  He believes his strength is at its baseline, with baseline right-sided weakness.  He denies any confusion or difficulty with speech.  He denies any new vision changes, numbness, tingling, or other new deficits.  He overall feels like he is back to his normal.  He denies any recent fever, chills, night sweats, chest pain, shortness of breath.      PAST MEDICAL HISTORY  1. 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  HgbA1c 8.7 in September 2020.  Follows with Dr. Damian.  2. Acute left thalamic CVA 1/2018 with residual right-sided weakness.  3. Cognitive impairment.  Diagnosed with Alzheimer's dementia by 2018 (Hakeem) and Aricept recommended but patient did not take it.    4. HTN  5. Dyslipidemia  6. Stage III CKD with baseline Cr 1.4-1.5.  7. Nephrolithiasis    PAST SURGICAL HISTORY  I have reviewed this patient's surgical history and updated it with pertinent information if needed.  Past Surgical History:   Procedure Laterality Date     HC COLONOSCOPY THRU STOMA, DIAGNOSTIC  6/12/2007    Normal     HC FLEX SIGMOIDOSCOPY W/WO MIGUEL SPEC BY BRUSH/WASH  12/30/1999    Normal to 60 cm     HC REPAIR INCISIONAL HERNIA,REDUCIBLE  1/2001, 1999    Hernia Repair, Incisional, Unilateral (right)     HC REPAIR INCISIONAL HERNIA,REDUCIBLE  1996    Hernia Repair, Incisional, Unilateral (left, with mesh placement)     TESTICLE SURGERY       VASECTOMY       ZZC NONSPECIFIC PROCEDURE  1980's    Nasal septoplasty       PRIOR TO ADMISSION MEDICATIONS  Prior to Admission  Medications   Prescriptions Last Dose Informant Patient Reported? Taking?   B-D U/F 31G X 8 MM insulin pen needle   No No   Sig: USE 4 TIMES DAILY TO INJECT INSULIN.   Glucosamine HCl 750 MG TABS 2020 at Unknown time  No Yes   Sig: Take 750 mg by mouth 2 times daily   LIFESCAN FINEPOINT LANCETS MISC   No No   Sig: Use to test blood sugars 4 times daily or as directed.   Nutritional Supplements (GLUCOSE MANAGEMENT) TABS   No No   Si tabs po for low blood sugar below 70, may repeat q 10-15 minutes as needed   aspirin 81 MG chewable tablet 2020 at am  No Yes   Sig: Take 4 tablets (324 mg) by mouth daily   atorvastatin (LIPITOR) 20 MG tablet 2020 at am   No Yes   Sig: Take 1 tablet (20 mg) by mouth daily   blood glucose (ONETOUCH VERIO IQ) test strip   No No   Sig: USE 1 STRIP TO CHECK GLUCOSE 4 TIMES DAILY OR  AS  DIRECTED  USING  ONE  TOUCH  VERIO  FLEX   blood glucose monitoring (ONE TOUCH ULTRASOFT) lancets   No No   Sig: Use to test blood sugar 4 times daily or as directed.   finasteride (PROSCAR) 5 MG tablet 2020 at am  No Yes   Sig: Take 1 tablet by mouth once daily   insulin aspart (NOVOLOG FLEXPEN) 100 UNIT/ML pen 2020 at Unknown time  Yes Yes   Sig: Inject 7 Units Subcutaneous daily (with breakfast)   insulin aspart (NOVOLOG FLEXPEN) 100 UNIT/ML pen 2020 at Unknown time  Yes Yes   Sig: Inject 6 Units Subcutaneous daily (with lunch)   insulin aspart (NOVOLOG FLEXPEN) 100 UNIT/ML pen 2020 at Unknown time  Yes Yes   Sig: Inject 6 Units Subcutaneous daily (with dinner)   insulin glargine (LANTUS PEN) 100 UNIT/ML pen 2020 at Unknown time  Yes Yes   Sig: Inject 16 Units Subcutaneous daily (with breakfast)   insulin glargine (LANTUS PEN) 100 UNIT/ML pen 2020 at Unknown time  Yes Yes   Sig: Inject 12 Units Subcutaneous daily (with dinner)   insulin pen needle (B-D U/F) 31G X 8 MM   No No   Sig: B-D ULTRA FINE SHORT PEN NEEDLES, USE 4 TIMES A DAY TO INJECT  "INSULIN   insulin syringes, disposable, U-100 0.3 ML MISC   No No   Si each 2 times daily   metoprolol tartrate (LOPRESSOR) 25 MG tablet 2020 at Unknown time  No Yes   Sig: Take 1 tablet (25 mg) by mouth 2 times daily   multivitamin (OCUVITE) TABS 2020 at am Spouse/Significant Other Yes Yes   Sig: Take 1 tablet by mouth daily FOCUS SELECT PREMIUM WITH LUTEIN per eye doctor  Reported on 2017   order for DME   No No   Sig: All DM testing supplies including test strips, lancets, solution, syringes, needles and/or pen needles for testing 4 times per day.    Brand \"Contour Next\"   vitamin C (ASCORBIC ACID) 1000 MG TABS 2020 at Unknown time  Yes Yes   Sig: Take 1,000 mg by mouth 2 times daily      Facility-Administered Medications: None       ALLERGIES  Allergies   Allergen Reactions     Losartan Other (See Comments)     Dizziness       SOCIAL HISTORY  Lives at home with wife.  Former tobacco user.  Does not drink alcohol.  Requires some assistance with ADLs.    FAMILY HISTORY  I have reviewed this patient's family history and updated it with pertinent information if needed.   Family History   Problem Relation Age of Onset     Cerebrovascular Disease Mother          age 95     Heart Disease Mother         age 89,  age 95     Cerebrovascular Disease Father          age 91, stroke two years previous     Cancer - colorectal Brother         Half-brother     Cancer Sister         Half-sister (?type)     Cancer Sister         Half-sister (?type)     Heart Disease Brother      Neurologic Disorder Daughter         Multiple Sclerosis     Psychotic Disorder Son         Schizophrenia       REVIEW OF SYSTEMS:  14 point comprehensive ROS undertaken with pertinent positives and negatives as above and otherwise unremarkable.     PHYSICAL EXAM  Temp: 98.4  F (36.9  C) Temp src: Temporal BP: (!) 181/84 Pulse: 85   Resp: 16 SpO2: 93 % O2 Device: None (Room air)    Vital Signs with Ranges  Temp:  [98.4 "  F (36.9  C)] 98.4  F (36.9  C)  Pulse:  [] 85  Resp:  [8-24] 16  BP: (144-188)/() 181/84  SpO2:  [90 %-98 %] 93 %  165 lbs 5.52 oz    GENERAL:  Pleasant, cooperative, alert. Well developed, well nourished.  HEENT: Normocephalic, atraumatic.  Extra occular mm intact.  Sclera clear. PERRL.  Mucous membranes moist.    PULMONARY: Clear to auscultation bilaterally   CARDIAC: Regular rate and rhythm.  No appreciated murmur.   ABDOMEN: Soft, nontender non distended.    MUSCULOSKELETAL:  Moving x 4 spontaneously with CMS intact x4.  Normal bulk and tone for age.  Mildly diminished  strength on right as compared to left.  Good movement in LE with baseline sensory deficits.    NEURO: Alert and oriented x3.  CN II-XII grossly intact and symmetric.  No ataxia.  Fine motor intact.  No tremor.  No obvious new focal deficits.  SKIN:  Warm, pink, dry.    DATA  Data reviewed today:  I personally reviewed no images or EKG's today.    Recent Labs   Lab 12/18/20 1911 12/18/20  1118   WBC  --  8.5   HGB  --  15.9   MCV  --  95   PLT  --  239   INR  --  1.14   NA  --  137   POTASSIUM  --  4.1   CHLORIDE  --  106   CO2  --  31   BUN  --  25   CR  --  1.43*   ANIONGAP  --  <1*   MANAV  --  9.0   GLC  --  225*   ALBUMIN  --  3.2*   PROTTOTAL  --  8.2   BILITOTAL  --  0.7   ALKPHOS  --  113   ALT  --  29   AST  --  21   TROPI <0.015 <0.015       Recent Results (from the past 24 hour(s))   CT Head w/o Contrast    Narrative    CT SCAN OF THE HEAD WITHOUT CONTRAST   12/18/2020 11:27 AM     HISTORY: Acute confusion. Fell out of bed.    TECHNIQUE:  Axial images of the head and coronal reformations without  IV contrast material. Radiation dose for this scan was reduced using  automated exposure control, adjustment of the mA and/or kV according  to patient size, or iterative reconstruction technique.    COMPARISON: CT head 1/16/2018.    FINDINGS: The ventricles are mildly prominent, likely due to central  white matter volume loss.  No definite findings of hydrocephalus. The  size and configuration of the ventricular system is not significantly  changed compared to the prior examination. Unchanged probable chronic  lacunar infarct in the left basal ganglia region. Probable chronic  infarct in the left corona radiata. Moderately extensive confluent and  patchy areas of hypoattenuation in the periventricular and deep  cerebral white matter, nonspecific, but most likely related to chronic  small-vessel ischemic changes. No definite findings of extended acute  ischemia/infarct. No intracranial hemorrhage or mass effect. Scattered  intracranial atherosclerotic calcifications primarily involving the  carotid siphons and vertebral arteries.    Stigmata of previous right scleral buckle procedure. Bilateral lens  replacements. Partially visualized large nasal septal defect. Moderate  mucosal thickening in the bilateral ethmoid air cells and right  maxillary sinus, and to a lesser degree in the other visualized  paranasal sinuses. The mastoid and middle ear cavities are clear. This  appears similar to prior.      Impression    IMPRESSION:  1. No extended signs of acute infarct or intracranial hemorrhage.  ASPECT score = 10.  2. Brain atrophy and presumed chronic small-vessel ischemic changes,  as described.  3. Chronic lacunar infarcts in the left basal ganglia and corona  radiata region.  4. Large nasal septal defect and paranasal sinus mucosal thickening,  as before.    DINORAH ESTRADA MD   CTA Head Neck with Contrast    Narrative    CT ANGIOGRAM OF THE HEAD AND NECK WITH CONTRAST  12/18/2020 11:33 AM     HISTORY: Acute confusion. Code stroke.     TECHNIQUE:  CT angiography with an injection of 70mL Isovue-370 IV  with scans through the head and neck. Images were transferred to a  separate 3-D workstation where multiplanar reformations and 3-D images  were created. Estimates of carotid stenoses are made relative to the  distal internal carotid artery  diameters except as noted. Radiation  dose for this scan was reduced using automated exposure control,  adjustment of the mA and/or kV according to patient size, or iterative  reconstruction technique.     COMPARISON: MR angiogram of the head dated 1/8/2018. CT angiogram head  and neck 1/8/2018.     CT HEAD FINDINGS: No contrast enhancing lesions. Cerebral blood flow  is grossly normal.     CT ANGIOGRAM HEAD FINDINGS:   There is abrupt termination of intraluminal contrast opacification  involving one of the M2 branches of the left middle cerebral artery  overlying the mid aspect of the left insular cortex (series 6 image  420). The area of occlusion appears to be relatively focal, measuring  approximately 3 mm in length on the coronal images (series 11 image  78). The more distal branches of the left middle cerebral arteries  appear to be patent.    The V4 segment of the right vertebral artery is occluded, as before.  As before, there is an extradural origin of the right posteroinferior  cerebellar artery arising from the V3 segment of the right vertebral  artery. There is mild atherosclerosis of the left vertebral artery  without significant stenosis. The basilar artery appears patent. Fetal  origin of the left posterior cerebral artery which demonstrates  mild/mild to moderate stenosis particularly involving the proximal P2  segment. Moderate multifocal stenoses of the right posterior cerebral  artery.    There is mild nonflow-limiting atherosclerosis involving the bilateral  carotid siphons. There is mild-to-moderate diffuse stenosis involving  the M1 segment of the left middle cerebral artery likely due to  atherosclerosis. No high-grade stenosis involving the major branches  of the anterior cerebral arteries or right middle cerebral artery.    CT ANGIOGRAM NECK FINDINGS:   Mixed atherosclerosis of the aortic arch without significant stenosis  of the origins of the great vessels. There is a  three-vessel  configuration of the aortic arch.     Right carotid artery: The right common and internal carotid arteries  are patent. Minimal atherosclerosis of the right carotid bifurcation  without significant stenosis.     Left carotid artery: The left common and internal carotid arteries are  patent. Mild atherosclerotic disease at the carotid bifurcation and  proximal internal carotid artery without significant stenosis by  NASCET criteria.     Vertebral arteries: Vertebral arteries are patent without evidence of  dissection. No significant stenosis.     Other findings: Large nasal septal defect. Bilateral lens replacements  and sequela of previous right scleral buckle procedure.  Moderate-to-severe mucosal thickening in the right maxillary sinus  status post previous right maxillary antrostomy.  Moderate/moderate-to-severe mucosal thickening in the bilateral  ethmoid air cells with lesser degrees of mucosal thickening in other  paranasal sinuses. Multiple calcified mediastinal lymph nodes are  partially visualized. Several scattered subcentimeter nodules are seen  in the upper lung zones bilaterally, some of which are calcified.  There is a noncalcified nodule that measures up to approximately 6-7  mm in the left upper lobe. There is mild groundglass opacity in the  upper lung zones which may represent subsegmental atelectasis or air  trapping, incompletely evaluated. 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 There is interbody  fusion of the C6 and C7 vertebral bodies. Multilevel degenerative disc  disease and uncovertebral and facet arthropathy of the cervical spine.      Impression    IMPRESSION:  1. Short-segment occlusion of one of the M2 branches of the left  middle cerebral artery, concerning for acute thromboembolic disease.  2. 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  3. No other high-grade stenosis or large-vessel occlusion  involving  the major craniocervical arterial vasculature. Multifocal presumed  intracranial atherosclerosis with associated mild/moderate stenoses,  as described.  4. Multiple pulmonary nodules, some of which are calcified. Calcified  mediastinal lymph nodes, consistent with old granulomatous disease.  Some of the pulmonary nodules measure up to 6-7 mm. Consider follow-up  chest CT if it would change the patient's clinical management.  5. Other chronic findings, as detailed.    The findings from a noncontrast head CT, CTA head and neck and CT head  perfusion were discussed with Dr. Mendosa by myself at approximately  11:48 AM on 12/18/2020.    DINORAH ESTRADA MD   CT Head Perfusion w Contrast    Narrative    CT BRAIN PERFUSION 12/18/2020 11:43 AM    HISTORY: Code stroke.    TECHNIQUE: Time sequential axial CT images of the head were acquired  during the administration of 50 mL Isovue-370 IV. Color perfusion maps  of the brain were created from this time sequential axial source data.      Radiation dose for this scan was reduced using automated exposure  control, adjustment of the mA and/or kV according to patient size, or  iterative reconstruction technique.    COMPARISON: CT angiogram head and neck of same day. CT cervical  perfusion 1/8/2018.      Impression    IMPRESSION:  1. There is prolonged contrast transit time to the posterior/inferior  left middle cerebral artery territory. No definite asymmetrically  reduced cerebral blood volume to suggest a definite core infarct in  that area at this time. The findings would suggest oligemic/ischemic  tissue with significant penumbra. CBF less than 30% volume = 0 mL.  Tmax greater than 6 seconds volume = 50 mL. Mismatch volume = 50 mL.    2. Prolonged transit time to the right posterior inferior cerebellar  artery territory which appears to have been present to some degree on  the prior cerebral perfusion scan and may be related in part to the  chronic right vertebral  artery V4 segment occlusion.    DINORAH ESTRADA MD   MR Brain w/o & w Contrast    Narrative    MRI BRAIN WITHOUT AND WITH CONTRAST  12/18/2020 2:32 PM     HISTORY:  Aphasia.     TECHNIQUE:  Multiplanar, multisequence MRI of the brain without and  with 7.5 mL Gadavist.     COMPARISON: CT head of same day. MRI head 1/10/2018.    FINDINGS:  There are a few punctate cortical/subcortical acute  infarcts involving the left insular region, left occipitotemporal  junction/posterolateral left temporal lobe, and left parietal lobe. No  acute hemorrhage or significant mass effect. Mild generalized brain  parenchymal volume loss. Moderate patchy and confluent T2/FLAIR  hyperintense signal in the periventricular and deep cerebral white  matter, nonspecific, but likely related to chronic small-vessel  ischemic change. Chronic bilateral basal ganglia and left thalamic and  left corona radiata lacunar infarcts. Punctate chronic infarcts in the  right cerebellar hemisphere.    Mild-to-moderate diffuse ventriculomegaly, somewhat out of proportion  to the degree of generalized brain parenchymal volume loss/sulcal  prominence. This appears stable compared to the prior examination.  There is some degree of relative crowding of the frontoparietal sulci  at the vertex. This may be related to atrophy/central white matter  volume loss and ex vacuo phenomenon, although a component of  superimposed communicating/normal pressure hydrocephalus cannot be  entirely excluded.    Punctate chronic microhemorrhages in the right cerebellar  hemisphere/vermis are unchanged. No new intracranial hemorrhage or  extra-axial fluid collection. Unchanged mildly prominent diffuse  bifrontal dural enhancement, likely benign.    Bilateral lens replacements. Stigmata of previous right scleral buckle  procedure. Moderate-to-severe mucosal thickening in right maxillary  sinus and bilateral ethmoid sinuses. Large nasal septal defect, as  before. The calvarium, skull  base and mid face are otherwise grossly  unremarkable. 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      Impression    IMPRESSION:  1. Multiple punctate acute cortical/subcortical ischemic infarcts  involving the left insular region, occipital temporal  junction/posterolateral left temporal lobe, and left parietal lobe. No  acute hemorrhage or significant mass effect.  2. Mild-to-moderate diffuse ventriculomegaly, somewhat out of  proportion to the degree of generalized brain parenchymal volume  loss/sulcal prominence, with some degree of sulcal crowding at the  frontoparietal vertex. The findings are unchanged, and may be related  to ex vacuo phenomenon in the setting of atrophy/central white matter  volume loss, although a component of superimposed communicating/normal  pressure hydrocephalus cannot be completely excluded.  3. Unchanged chronic bilateral basal ganglia and left thalamic lacunar  infarcts.  4. Multiple unchanged chronic cerebellar/vermian microhemorrhages.    DINORAH ESTRADA MD

## 2020-12-18 NOTE — ED TRIAGE NOTES
Pt arrives via EMS from home for altered mental status. Pt heard to fall out of bed 1 hour PTA. Pt having a hard time getting words out, hx of stroke 1 yr ago. Code stroke initiated.

## 2020-12-19 NOTE — PLAN OF CARE
PRIMARY DIAGNOSIS: CVA  OUTPATIENT/OBSERVATION GOALS TO BE MET BEFORE DISCHARGE:  1. Orthostatic performed: N/A     2. Diagnostic testing complete & at baseline neurologic testing: Yes     3. Cleared by consultants (if involved): No     4. Interpretation of cardiac rhythm per telemetry tech: SR HR in the 60's with PVC's     5. Tolerating adequate PO diet and medications: Yes     6. Return to near baseline physical activity or neurologic status: Yes     Discharge Planner Nurse   Safe discharge environment identified: Yes  Barriers to discharge: Yes, blood glucose control has not been achieved       Entered by: Paty Barragan RN on 12/19/2020     Neuros unchanged from admission. Right sided from previous CVA only. Denies pain/nausea. Up with SBA. Tele stroke consult complete awaiting final recommendations from Neurology. Patient on regular diet, tolerating very well.     Please review provider order for any additional goals. Nurse to notify provider when observation goals have been met and patient is ready for discharge.

## 2020-12-19 NOTE — CONSULTS
[facility]    Stroke Consult Note    Reason for Consult:  CVA    Chief Complaint: Altered Mental Status       HPI  Pete Sheldon is a 80 year old male with past medical history significant for DM2, HLD, CKD, and CVA who presented to [facility] for evaluation of weakness and speech difficulty 12/18. He reportedly fell on trying to get up from bed, so LKW is unclear. His wife notes that he also seemed confused.      Initial CTH was negative for acute pathology. CTA head/neck with short segment occlusion of the L M2 and chronic occlusion of the right V4. CTP with some prolonged transit time to the L MCA inferior division. A STAT MRI was attempted for consideration of treatment per [facility] protocol, however, it was unable to be completed for a couple hours. It ultimately demonstrated multiple punctate acute cortical/subcortical ischemic infarcts involving the left insular region, occipital temporal junction/posterolateral left temporal lobe, and left parietal lobe.    Stroke Evaluation summarized:  MRI/Head CT: MRI brain as above  Intracranial Vascular Imaging: CTA head with L M2 occlusion  Cervical Carotid and Vertebral Artery Vascular Imaging: CTA neck with chronic appearing right V4 occlusion  Echocardiogram: EF 55-60%, normal LA  EKG/Telemetry: Sinus with PACs  LDL: 74  A1c: 8.3  Troponin: not tested   Other testing: Not Applicable    Impression  Ischemic Stroke due to embolic stroke of undetermined source (ESUS) - Left M2 occlusion with resulting left MCA distribution infarcts    Recommendations  - DAPT with ASA 81 mg + Plavix 75 mg for 21 days, then  mg alone indefinitely   - LDL 74, continue PTA Lipitor 20 mg daily  - A1c 8.3, tighter long term glucose control needed to achieve goal <7.0  - Permissive HTN today. Would not acutely lower BP while inpatient given M2 occlusion. Long term goal BP <140/90 with tighter control associated with decreased overall CV risk, if tolerated  -  "Therapies, as needed  - Discharge with 30 day cardiac event monitor to evaluate for atrial fibrillation       Patient Follow-up    - in the next 1-2 week(s) with PCP  - Hospital follow up for stroke: in 12 weeks at [facility] ([phone]). Referral placed in discharge orders: note, referral states \"neurosurgery\" but it is for stroke clinic.    Thank you for this consult. No further stroke evaluation is recommended, so we will sign off. Please contact us with any additional questions.    TREVOR Paige, CNP  Neurology  To page me or covering stroke neurology team member, click here: [facility]COM   Choose \"On Call\" tab at top, then search dropdown box for \"Neurology Adult\", select location, press Enter, then look for stroke/neuro ICU/telestroke.    _____________________________________________________    Past Medical History   Past Medical History:   Diagnosis Date     Calculus of ureter 1980's     Hypertension      Hypothyroidism      Microalbuminuria 2/15/2016     Mumps      Other and unspecified hyperlipidemia      Type 2 diabetes, HbA1C goal < 8% (H) 7/1995     Past Surgical History   Past Surgical History:   Procedure Laterality Date     HC COLONOSCOPY THRU STOMA, DIAGNOSTIC  6/12/2007    Normal     HC FLEX SIGMOIDOSCOPY W/WO MIGUEL SPEC BY BRUSH/WASH  12/30/1999    Normal to 60 cm     HC REPAIR INCISIONAL HERNIA,REDUCIBLE  1/2001, 1999    Hernia Repair, Incisional, Unilateral (right)     HC REPAIR INCISIONAL HERNIA,REDUCIBLE  1996    Hernia Repair, Incisional, Unilateral (left, with mesh placement)     TESTICLE SURGERY       VASECTOMY       Z NONSPECIFIC PROCEDURE  1980's    Nasal septoplasty     Medications   Home Meds  Prior to Admission medications    Medication Sig Start Date End Date Taking? Authorizing Provider   aspirin 81 MG chewable tablet Take 4 tablets (324 mg) by mouth daily 1/12/18  Yes Yue Acevedo, DO   atorvastatin (LIPITOR) 20 MG tablet Take 1 tablet (20 mg) by mouth " daily 3/4/20  Yes Rom Helm MD   finasteride (PROSCAR) 5 MG tablet Take 1 tablet by mouth once daily 12/11/20  Yes Clovis Montenegro MD   Glucosamine HCl 750 MG TABS Take 750 mg by mouth 2 times daily 1/11/18  Yes Yue Acevedo DO   insulin aspart (NOVOLOG FLEXPEN) 100 UNIT/ML pen Inject 7 Units Subcutaneous daily (with breakfast)   Yes Unknown, Entered By History   insulin aspart (NOVOLOG FLEXPEN) 100 UNIT/ML pen Inject 6 Units Subcutaneous daily (with lunch)   Yes Unknown, Entered By History   insulin aspart (NOVOLOG FLEXPEN) 100 UNIT/ML pen Inject 6 Units Subcutaneous daily (with dinner)   Yes Unknown, Entered By History   insulin glargine (LANTUS PEN) 100 UNIT/ML pen Inject 16 Units Subcutaneous daily (with breakfast)   Yes Unknown, Entered By History   insulin glargine (LANTUS PEN) 100 UNIT/ML pen Inject 12 Units Subcutaneous daily (with dinner)   Yes Unknown, Entered By History   metoprolol tartrate (LOPRESSOR) 25 MG tablet Take 1 tablet (25 mg) by mouth 2 times daily 3/4/20  Yes Rom Helm MD   multivitamin (OCUVITE) TABS Take 1 tablet by mouth daily FOCUS SELECT PREMIUM WITH LUTEIN per eye doctor  Reported on 5/19/2017   Yes Reported, Patient   vitamin C (ASCORBIC ACID) 1000 MG TABS Take 1,000 mg by mouth 2 times daily   Yes Unknown, Entered By History   B-D U/F 31G X 8 MM insulin pen needle USE 4 TIMES DAILY TO INJECT INSULIN. 9/15/20   Rom Helm MD   blood glucose (ONETOUCH VERIO IQ) test strip USE 1 STRIP TO CHECK GLUCOSE 4 TIMES DAILY OR  AS  DIRECTED  USING  ONE  TOUCH  VERIO  FLEX 3/11/20   Lori Damian MD   blood glucose monitoring (ONE TOUCH ULTRASOFT) lancets Use to test blood sugar 4 times daily or as directed. 10/18/18   Rom Helm MD   insulin pen needle (B-D U/F) 31G X 8 MM B-D ULTRA FINE SHORT PEN NEEDLES, USE 4 TIMES A DAY TO INJECT INSULIN 6/14/18   Rom Helm MD   insulin syringes, disposable, U-100 0.3 ML MISC 1 each 2 times daily 8/8/13  "  Rom Helm MD   LIFESCAN FINEPOINT LANCETS MISC Use to test blood sugars 4 times daily or as directed. 8/11/15   Rom Helm MD   Nutritional Supplements (GLUCOSE MANAGEMENT) TABS 4 tabs po for low blood sugar below 70, may repeat q 10-15 minutes as needed 18   Yue Acevedo DO   order for DME All DM testing supplies including test strips, lancets, solution, syringes, needles and/or pen needles for testing 4 times per day.    Brand \"Contour Next\" 2/15/16   Lori Damian MD       Scheduled Meds    aspirin  81 mg Oral Daily     atorvastatin  20 mg Oral QPM     clopidogrel  75 mg Oral Daily     finasteride  5 mg Oral Daily     insulin aspart  1-7 Units Subcutaneous TID AC     insulin aspart  1-5 Units Subcutaneous At Bedtime     insulin glargine  12 Units Subcutaneous Daily with supper     insulin glargine  16 Units Subcutaneous QAM AC     metoprolol tartrate  25 mg Oral BID       Infusion Meds      PRN Meds  acetaminophen, acetaminophen, bisacodyl, glucose **OR** dextrose **OR** glucagon, magnesium hydroxide, melatonin, ondansetron **OR** ondansetron, prochlorperazine **OR** prochlorperazine **OR** prochlorperazine, senna-docusate **OR** senna-docusate    Allergies   Allergies   Allergen Reactions     Losartan Other (See Comments)     Dizziness     Family History   Family History   Problem Relation Age of Onset     Cerebrovascular Disease Mother          age 95     Heart Disease Mother         age 89,  age 95     Cerebrovascular Disease Father          age 91, stroke two years previous     Cancer - colorectal Brother         Half-brother     Cancer Sister         Half-sister (?type)     Cancer Sister         Half-sister (?type)     Heart Disease Brother      Neurologic Disorder Daughter         Multiple Sclerosis     Psychotic Disorder Son         Schizophrenia     Social History   Social History     Tobacco Use     Smoking status: Former Smoker     Packs/day: 0.00     Quit " date: 3/11/1953     Years since quittin.8     Smokeless tobacco: Never Used   Substance Use Topics     Alcohol use: No     Drug use: No       Review of Systems   The 10 point Review of Systems is negative other than noted in the HPI or here.        PHYSICAL EXAMINATION   Temp:  [98  F (36.7  C)-98.4  F (36.9  C)] 98  F (36.7  C)  Pulse:  [] 68  Resp:  [8-24] 16  BP: (144-188)/() 148/64  SpO2:  [90 %-98 %] 90 %    Neurologic  Mental Status:  alert, oriented x 3, follows commands, naming and repetition normal, speech clear, occasional loss of fluency but able to name objects and participate in conversation  Cranial Nerves:  visual fields intact (tested by nurse), EOMI with normal smooth pursuit, facial movements symmetric, hearing not formally tested but intact to conversation, no dysarthria, shoulder shrug equal bilaterally, tongue protrusion midline  Motor:  no abnormal movements, able to move all limbs antigravity spontaneously with no signs of hemiparesis observed, no pronator drift  Reflexes:  unable to test (telestroke)  Sensory:  no extinction on double simultaneous stimulation (assessed by nurse), subjective decreased sensation to right hemibody (baseline from prior stroke)  Coordination:  FTN without dysmetria  Station/Gait:  unable to test due to telestroke    Dysphagia Screen  Per Nursing    Stroke Scales    NIHSS  Interval     Interval Comments     1a. Level of Consciousness 0-->Alert, keenly responsive   1b. LOC Questions 0-->Answers both questions correctly   1c. LOC Commands 0-->Performs both tasks correctly   2.   Best Gaze 0-->Normal   3.   Visual 0-->No visual loss   4.   Facial Palsy 0-->Normal symmetrical movements   5a. Motor Arm, Left 0-->No drift, limb holds 90 (or 45) degrees for full 10 secs   5b. Motor Arm, Right 0-->No drift, limb holds 90 (or 45) degrees for full 10 secs   6a. Motor Leg, Left 0-->No drift, leg holds 30 degree position for full 5 secs   6b. Motor Leg, right  0-->No drift, leg holds 30 degree position for full 5 secs   7.   Limb Ataxia 0-->Absent   8.   Sensory 1-->Mild-to-moderate sensory loss, patient feels pinprick is less sharp or is dull on the affected side, or there is a loss of superficial pain with pinprick, but patient is aware of being touched   9.   Best Language 1-->Mild-to-moderate aphasia, some obvious loss of fluency or facility of comprehension, without significant limitation on ideas expressed or form of expression. Reduction of speech and/or comprehension, however, makes conversation. . . (see row details)   10. Dysarthria 0-->Normal   11. Extinction and Inattention  0-->No abnormality   Total 2 (12/19/20 1209)       Imaging  I personally reviewed all imaging; relevant findings per HPI.    Labs Data   CBC  Recent Labs   Lab 12/18/20  1118   WBC 8.5   RBC 5.13   HGB 15.9   HCT 48.5        Basic Metabolic Panel   Recent Labs   Lab 12/18/20  1118      POTASSIUM 4.1   CHLORIDE 106   CO2 31   BUN 25   CR 1.43*   *   MANAV 9.0     Liver Panel  Recent Labs   Lab 12/18/20  1118   PROTTOTAL 8.2   ALBUMIN 3.2*   BILITOTAL 0.7   ALKPHOS 113   AST 21   ALT 29     INR    Recent Labs   Lab Test 12/18/20  1118 01/10/18  0926 01/08/18  1345   INR 1.14 1.07 1.05      Lipid Profile    Recent Labs   Lab Test 12/19/20  0533 02/28/20  0902 11/27/18  0927 09/25/15  0844 09/25/15  0844 06/19/15  0815 03/06/15  0839   CHOL 127 116 110   < > 134 135 131   HDL 29* 28* 26*   < > 31* 36* 41   LDL 74 65 59   < > 80 83 76   TRIG 118 113 125   < > 117 78 71   CHOLHDLRATIO  --   --   --   --  4.3 3.8 3.2    < > = values in this interval not displayed.     A1C    Recent Labs   Lab Test 12/18/20  1118 09/15/20  1030 02/28/20  0902   A1C 8.3* 8.7* 9.0*     Troponin I    Recent Labs   Lab 12/19/20  0251 12/18/20  1911 12/18/20  1118   TROPI <0.015 <0.015 <0.015          Stroke Code / Stroke Consult Data Data Telestroke Service Details  (for non-emergent stroke consult  with tele)  Video start time 12/19/20   1034   Video end time 12/19/20   1049   Type of service telemedicine diagnostic assessment of acute neurological changes   Reason telemedicine is appropriate patient requires assessment with a specialist for diagnosis and treatment of neurological symptoms   Mode of transmission secure interactive audio and video communication per Mattie   Originating site (patient location) [facility]    Distant site (provider location) [facility]       I have personally spent a total of 30 minutes providing care and consulting with this patient's medical providers today, with more than 50% of this time spent in consultation, coordination of care, and discussion with the patient and/or family regarding diagnostic results, prognosis, symptom management, risks and benefits of management options, and development of plan of care.

## 2020-12-19 NOTE — PLAN OF CARE
PRIMARY DIAGNOSIS: GENERALIZED WEAKNESS    OUTPATIENT/OBSERVATION GOALS TO BE MET BEFORE DISCHARGE  1. Orthostatic performed: No    2. Tolerating PO medications: Yes, mixed in applesauce    3. Return to near baseline physical activity: No    4. Cleared for discharge by consultants (if involved): No    Blood pressure (!) 181/84, pulse 85, temperature 98.4  F (36.9  C), temperature source Temporal, resp. rate 16, weight 75 kg (165 lb 5.5 oz), SpO2 93 %.    BP elevated, will allow per order.  LS clear, adequate sats on room air.  Swallow study attempted with 3 oz. Of water; patient coughing after swallowing remaining amount.  Was able to give medications mixed in applesauce.  Remains NPO pending review by PA.  Patient denies pain.  Neuro checks q 4 hours. Hand  strong, Ankle strength strong.  Per patient; he has some right sided weakness from a previous CVA. And uses a cane for ambulation.    Face symetrical, no facial droop.  Speech clear.   Patient is alert, orientated x 4, pleasant, cooperative with cares.  Patient is an assist of one with gait belt assisting patient to the bathroom prn.  Plan:  Continue to provide supportive cares.  OT, SW, PT, speech and Neuro consults.      Discharge Planner Nurse   Safe discharge environment identified: Yes  Barriers to discharge: Yes, unless medically cleared       Entered by: Karen M. Lesch 12/18/2020 9:49 PM     Please review provider order for any additional goals.   Nurse to notify provider when observation goals have been met and patient is ready for discharge.

## 2020-12-19 NOTE — PLAN OF CARE
PRIMARY DIAGNOSIS: CVA  OUTPATIENT/OBSERVATION GOALS TO BE MET BEFORE DISCHARGE:  1. Orthostatic performed: N/A     2. Diagnostic testing complete & at baseline neurologic testing: No     3. Cleared by consultants (if involved): No     4. Interpretation of cardiac rhythm per telemetry tech: SR HR in the 60's with PVC's     5. Tolerating adequate PO diet and medications: Yes     6. Return to near baseline physical activity or neurologic status: Yes     Discharge Planner Nurse   Safe discharge environment identified: Yes  Barriers to discharge: Yes, blood glucose control has not been achieved       Entered by: Paty Barragan RN on 12/19/2020     Neuros unchanged from admission. Right sided from previous CVA only. Denies pain/nausea. Up with SBA. Tele stroke to consult later today. Patient on regular diet, tolerating very welll.     Please review provider order for any additional goals. Nurse to notify provider when observation goals have been met and patient is ready for discharge.

## 2020-12-19 NOTE — PHARMACY-ADMISSION MEDICATION HISTORY
Admission medication history interview status for this patient is complete. See [facility] admission navigator for allergy information, prior to admission medications and immunization status.     Medication history interview done via telephone during Covid-19 pandemic, indicate source(s): Patient and Family  Medication history resources (including written lists, pill bottles, clinic record):None  Pharmacy: [facility]    Changes made to PTA medication list:  Added: -  Deleted: tamsulosin  Changed: lantus,     Actions taken by pharmacist (provider contacted, etc):called spouse for mr     Additional medication history information:None    Medication reconciliation/reorder completed by provider prior to medication history?  no   (Y/N)     For patients on insulin therapy:   Do you use sliding scale insulin based on blood sugars? no  What is your pre-meal insulin coverage?  see above  Do you typically eat three meals a day? yes  How many times do you check your blood glucose per day?   How many episodes of hypoglycemia do you typically have per month?   Do you have a Continuous Glucose Monitor (CGM)?  no    Prior to Admission medications    Medication Sig Last Dose Taking? Auth Provider   aspirin 81 MG chewable tablet Take 4 tablets (324 mg) by mouth daily 12/17/2020 at am Yes Yue Acevedo DO   atorvastatin (LIPITOR) 20 MG tablet Take 1 tablet (20 mg) by mouth daily 12/17/2020 at am  Yes Rom Helm MD   finasteride (PROSCAR) 5 MG tablet Take 1 tablet by mouth once daily 12/17/2020 at am Yes Clovis Montenegro MD   Glucosamine HCl 750 MG TABS Take 750 mg by mouth 2 times daily 12/17/2020 at Unknown time Yes Yue Acevedo DO   insulin aspart (NOVOLOG FLEXPEN) 100 UNIT/ML pen Inject 7 Units Subcutaneous daily (with breakfast) 12/17/2020 at Unknown time Yes Unknown, Entered By History   insulin aspart (NOVOLOG FLEXPEN) 100 UNIT/ML pen Inject 6 Units Subcutaneous daily (with lunch) 12/17/2020 at Unknown time  Yes Unknown, Entered By History   insulin aspart (NOVOLOG FLEXPEN) 100 UNIT/ML pen Inject 6 Units Subcutaneous daily (with dinner) 12/17/2020 at Unknown time Yes Unknown, Entered By History   insulin glargine (LANTUS PEN) 100 UNIT/ML pen Inject 16 Units Subcutaneous daily (with breakfast) 12/17/2020 at Unknown time Yes Unknown, Entered By History   insulin glargine (LANTUS PEN) 100 UNIT/ML pen Inject 12 Units Subcutaneous daily (with dinner) 12/17/2020 at Unknown time Yes Unknown, Entered By History   metoprolol tartrate (LOPRESSOR) 25 MG tablet Take 1 tablet (25 mg) by mouth 2 times daily 12/17/2020 at Unknown time Yes Rom Helm MD   multivitamin (OCUVITE) TABS Take 1 tablet by mouth daily FOCUS SELECT PREMIUM WITH LUTEIN per eye doctor  Reported on 5/19/2017 12/17/2020 at am Yes Reported, Patient   vitamin C (ASCORBIC ACID) 1000 MG TABS Take 1,000 mg by mouth 2 times daily 12/17/2020 at Unknown time Yes Unknown, Entered By History   B-D U/F 31G X 8 MM insulin pen needle USE 4 TIMES DAILY TO INJECT INSULIN.   Rom Helm MD   blood glucose (ONETOUCH VERIO IQ) test strip USE 1 STRIP TO CHECK GLUCOSE 4 TIMES DAILY OR  AS  DIRECTED  USING  ONE  TOUCH  VERIO  FLEX   Lori Damian MD   blood glucose monitoring (ONE TOUCH ULTRASOFT) lancets Use to test blood sugar 4 times daily or as directed.   Rom Helm MD   insulin pen needle (B-D U/F) 31G X 8 MM B-D ULTRA FINE SHORT PEN NEEDLES, USE 4 TIMES A DAY TO INJECT INSULIN   Rom Helm MD   insulin syringes, disposable, U-100 0.3 ML MISC 1 each 2 times daily   Rom Helm MD   LIFESCAN FINEPOINT LANCETS MISC Use to test blood sugars 4 times daily or as directed.   Rom Helm MD   Nutritional Supplements (GLUCOSE MANAGEMENT) TABS 4 tabs po for low blood sugar below 70, may repeat q 10-15 minutes as needed   Yue Acevedo,    order for DME All DM testing supplies including test strips, lancets, solution, syringes, needles  "and/or pen needles for testing 4 times per day.    Brand \"Contour Next\"   Lori Damian MD       "

## 2020-12-19 NOTE — PROGRESS NOTES
[facility]    Hospitalist Progress Note  Name: Pete Sheldon    MRN: [id number]  Provider:  Mariella Malone PA-C  Date of Service: 12/19/2020    Assessment & Plan   Summary of Stay: Pete Sheldon is a 80 year old male with DM2, HTN, hypothyroidism, dyslipidemia, CKD III, cognitive impairment, and CVA 1/2018 with residual right-sided weakness who presented to [facility] ED on 12/18/2020 via EMS after being too weak to get up and having confusion.      Initial workup showed CMP notable for Cr 1.43 and glucose 225.  CBC within normal limits. Trop <0.015.  TSH 6.18 with free T4 pending.  INR 1.14.  UA bland.  CT Head shows no acute process but does show brain atrophy, chronic small vessel ischemic changes, and chronic lacunar infarcts.  CTA Head/Neck showing short-segment occlusion of left MCA M2 branch, chronic occlusion of right vertebral aftery V4 segment, and no other acute findings.  CT Head Perfusion W showed some contrast delay in the left MCA and right posterior inferior cerebellar artery.  MRI Brain WWO revealed multiple acute punctate cortical/subcortical ischemic infarcts in the left insular region, occipital temporal junction/posterolateral left temporal tube, and left parietal lobe without acute hemorrhage or mass effect.      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 Baseline cognitive impairment/Alzheimer's dementia. Presented after falling at home and too weak to get up along with confusion and responding slower than usual which has since resolved. No residual deficits.   - TSH elevated but free T4 WNL and LFTs WNL  - Stroke neurology consulted and saw patient on 12/19  - Echocardiogram on 12/19 showed EF of 55-60% with normal LA  - no significant arrhythmias on telemetry overnight  - BP initially elevated, improved currently, allow permissive hypertension today, long term goal BP of <140/90   - LDL of 74,  continue PTA Lipitor 20 mg daily  - HgbA1c of 8.3, blood glucose levels uncontrolled, refer to below   - DAPT with ASA 81 mg + Plavix 75 mg for 21 days then  mg   - return on 12/21 to have 30 day cardiac event monitor placed   - evaluated by PT with recommendation for use of walker and outpatient PT  - outpatient follow up with PCP in 1-2 weeks and stroke neurology in 12 weeks     #Uncontrolled DM2: follows with Dr. Neves of endocrinology with HgbA1c of 8.3. Hyperglycemic in the 300-400s today.   - Increase Lantus at dinner to 14 units   - Continue Lantus at breakfast at 16 units (pending blood glucose overnight, consider increasing)  - continue PTA Novolog of 7 units with breakfast, 6 units with lunch, and 6 units with dinner   - medium sliding scale insulin   - limit juice and high sugar drinks     #HTN: initially elevated, currently stable  - continue PTA Metoprolol    #CKD stage III: baseline creatinine of 1.4-1.5 with creatinine of 1.43 on admission     #Dyslipidemia: lipid panel on 12/19 showed total of 127, HDL of 29, LDL of 74, and triglycerides of 118  - continue PTA Atorvastatin 20 mg     COVID status: testing negative from 12/18  DVT Prophylaxis: Pneumatic Compression Devices  Code Status: Full Code  Disposition: Expected discharge tomorrow if blood glucose levels improve     Mariella Malone PA-C  Hospital Medicine    Interval History   Patient has no acute complaints today.  Patient reports residual right-sided weakness from his previous stroke at baseline.  Denies any changes in speech, vision, hearing, new numbness or tingling, chest pain, shortness of breath, nausea, or vomiting.     Contacted the patient's wife who requested her granddaughter be called and updated. Contacted Eva (granddaughter) with an update and all her questions answered.     -Data reviewed today: I reviewed all new labs and imaging reports over the last 24 hours.    Physical Exam   Temp: 96.5  F (35.8  C) Temp src: Oral  BP: 132/63 Pulse: 53   Resp: 18 SpO2: 95 % O2 Device: None (Room air)    Vitals:    12/18/20 1223 12/19/20 0637   Weight: 75 kg (165 lb 5.5 oz) 63.7 kg (140 lb 8 oz)     Vital Signs with Ranges  Temp:  [96.5  F (35.8  C)-98.3  F (36.8  C)] 96.5  F (35.8  C)  Pulse:  [] 53  Resp:  [8-21] 18  BP: (132-181)/() 132/63  SpO2:  [90 %-98 %] 95 %  I/O last 3 completed shifts:  In: -   Out: 1070 [Urine:1070]    GEN:  Alert, oriented x 3, appears comfortable, NAD.  HEENT:  Normocephalic/atraumatic, no scleral icterus, no nasal discharge, mouth moist.  CV:  Regular rate and rhythm, no murmur or JVD.  S1 + S2 noted, no S3 or S4.  LUNGS:  Clear to auscultation bilaterally without rales/rhonchi/wheezing/retractions.  Symmetric chest rise on inhalation noted.  ABD:  Active bowel sounds, soft, non-tender/non-distended.  No rebound/guarding/rigidity.  EXT:  No edema.  No cyanosis.  No acute joint synovitis noted.  SKIN:  Dry to touch, no exanthems noted in the visualized areas.  NEURO: CN II-XII grossly intact and symmetric. Fine motor intact. No tremor.  No obvious new focal deficits. Mildly diminished  strength on right as compared to left. Good movement in right LE with baseline sensory deficits.      Medications       aspirin  81 mg Oral Daily     atorvastatin  20 mg Oral QPM     clopidogrel  75 mg Oral Daily     finasteride  5 mg Oral Daily     [START ON 12/20/2020] insulin aspart  7 Units Subcutaneous Daily with breakfast     insulin aspart  6 Units Subcutaneous Daily with lunch     insulin aspart  6 Units Subcutaneous Daily with supper     insulin aspart  1-7 Units Subcutaneous TID AC     insulin aspart  1-5 Units Subcutaneous At Bedtime     insulin glargine  12 Units Subcutaneous Daily with supper     insulin glargine  16 Units Subcutaneous QAM AC     metoprolol tartrate  25 mg Oral BID     Data   Results for orders placed or performed during the hospital encounter of 12/18/20   CT Head Perfusion w Contrast      Status: None    Narrative    CT BRAIN PERFUSION 12/18/2020 11:43 AM    HISTORY: Code stroke.    TECHNIQUE: Time sequential axial CT images of the head were acquired  during the administration of 50 mL Isovue-370 IV. Color perfusion maps  of the brain were created from this time sequential axial source data.      Radiation dose for this scan was reduced using automated exposure  control, adjustment of the mA and/or kV according to patient size, or  iterative reconstruction technique.    COMPARISON: CT angiogram head and neck of same day. CT cervical  perfusion 1/8/2018.      Impression    IMPRESSION:  1. There is prolonged contrast transit time to the posterior/inferior  left middle cerebral artery territory. No definite asymmetrically  reduced cerebral blood volume to suggest a definite core infarct in  that area at this time. The findings would suggest oligemic/ischemic  tissue with significant penumbra. CBF less than 30% volume = 0 mL.  Tmax greater than 6 seconds volume = 50 mL. Mismatch volume = 50 mL.    2. Prolonged transit time to the right posterior inferior cerebellar  artery territory which appears to have been present to some degree on  the prior cerebral perfusion scan and may be related in part to the  chronic right vertebral artery V4 segment occlusion.    DINORAH ESTRADA MD   CT Head w/o Contrast     Status: None    Narrative    CT SCAN OF THE HEAD WITHOUT CONTRAST   12/18/2020 11:27 AM     HISTORY: Acute confusion. Fell out of bed.    TECHNIQUE:  Axial images of the head and coronal reformations without  IV contrast material. Radiation dose for this scan was reduced using  automated exposure control, adjustment of the mA and/or kV according  to patient size, or iterative reconstruction technique.    COMPARISON: CT head 1/16/2018.    FINDINGS: The ventricles are mildly prominent, likely due to central  white matter volume loss. No definite findings of hydrocephalus. The  size and configuration of the  ventricular system is not significantly  changed compared to the prior examination. Unchanged probable chronic  lacunar infarct in the left basal ganglia region. Probable chronic  infarct in the left corona radiata. Moderately extensive confluent and  patchy areas of hypoattenuation in the periventricular and deep  cerebral white matter, nonspecific, but most likely related to chronic  small-vessel ischemic changes. No definite findings of extended acute  ischemia/infarct. No intracranial hemorrhage or mass effect. Scattered  intracranial atherosclerotic calcifications primarily involving the  carotid siphons and vertebral arteries.    Stigmata of previous right scleral buckle procedure. Bilateral lens  replacements. Partially visualized large nasal septal defect. Moderate  mucosal thickening in the bilateral ethmoid air cells and right  maxillary sinus, and to a lesser degree in the other visualized  paranasal sinuses. The mastoid and middle ear cavities are clear. This  appears similar to prior.      Impression    IMPRESSION:  1. No extended signs of acute infarct or intracranial hemorrhage.  ASPECT score = 10.  2. Brain atrophy and presumed chronic small-vessel ischemic changes,  as described.  3. Chronic lacunar infarcts in the left basal ganglia and corona  radiata region.  4. Large nasal septal defect and paranasal sinus mucosal thickening,  as before.    DINORAH ESTRADA MD   CTA Head Neck with Contrast     Status: None    Narrative    CT ANGIOGRAM OF THE HEAD AND NECK WITH CONTRAST  12/18/2020 11:33 AM     HISTORY: Acute confusion. Code stroke.     TECHNIQUE:  CT angiography with an injection of 70mL Isovue-370 IV  with scans through the head and neck. Images were transferred to a  separate 3-D workstation where multiplanar reformations and 3-D images  were created. Estimates of carotid stenoses are made relative to the  distal internal carotid artery diameters except as noted. Radiation  dose for this scan  was reduced using automated exposure control,  adjustment of the mA and/or kV according to patient size, or iterative  reconstruction technique.     COMPARISON: MR angiogram of the head dated 1/8/2018. CT angiogram head  and neck 1/8/2018.     CT HEAD FINDINGS: No contrast enhancing lesions. Cerebral blood flow  is grossly normal.     CT ANGIOGRAM HEAD FINDINGS:   There is abrupt termination of intraluminal contrast opacification  involving one of the M2 branches of the left middle cerebral artery  overlying the mid aspect of the left insular cortex (series 6 image  420). The area of occlusion appears to be relatively focal, measuring  approximately 3 mm in length on the coronal images (series 11 image  78). The more distal branches of the left middle cerebral arteries  appear to be patent.    The V4 segment of the right vertebral artery is occluded, as before.  As before, there is an extradural origin of the right posteroinferior  cerebellar artery arising from the V3 segment of the right vertebral  artery. There is mild atherosclerosis of the left vertebral artery  without significant stenosis. The basilar artery appears patent. Fetal  origin of the left posterior cerebral artery which demonstrates  mild/mild to moderate stenosis particularly involving the proximal P2  segment. Moderate multifocal stenoses of the right posterior cerebral  artery.    There is mild nonflow-limiting atherosclerosis involving the bilateral  carotid siphons. There is mild-to-moderate diffuse stenosis involving  the M1 segment of the left middle cerebral artery likely due to  atherosclerosis. No high-grade stenosis involving the major branches  of the anterior cerebral arteries or right middle cerebral artery.    CT ANGIOGRAM NECK FINDINGS:   Mixed atherosclerosis of the aortic arch without significant stenosis  of the origins of the great vessels. There is a three-vessel  configuration of the aortic arch.     Right carotid artery: The  right common and internal carotid arteries  are patent. Minimal atherosclerosis of the right carotid bifurcation  without significant stenosis.     Left carotid artery: The left common and internal carotid arteries are  patent. Mild atherosclerotic disease at the carotid bifurcation and  proximal internal carotid artery without significant stenosis by  NASCET criteria.     Vertebral arteries: Vertebral arteries are patent without evidence of  dissection. No significant stenosis.     Other findings: Large nasal septal defect. Bilateral lens replacements  and sequela of previous right scleral buckle procedure.  Moderate-to-severe mucosal thickening in the right maxillary sinus  status post previous right maxillary antrostomy.  Moderate/moderate-to-severe mucosal thickening in the bilateral  ethmoid air cells with lesser degrees of mucosal thickening in other  paranasal sinuses. Multiple calcified mediastinal lymph nodes are  partially visualized. Several scattered subcentimeter nodules are seen  in the upper lung zones bilaterally, some of which are calcified.  There is a noncalcified nodule that measures up to approximately 6-7  mm in the left upper lobe. There is mild groundglass opacity in the  upper lung zones which may represent subsegmental atelectasis or air  trapping, incompletely evaluated. 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 There is interbody  fusion of the C6 and C7 vertebral bodies. Multilevel degenerative disc  disease and uncovertebral and facet arthropathy of the cervical spine.      Impression    IMPRESSION:  1. Short-segment occlusion of one of the M2 branches of the left  middle cerebral artery, concerning for acute thromboembolic disease.  2. 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  3. No other high-grade stenosis or large-vessel occlusion involving  the major craniocervical arterial vasculature. Multifocal  presumed  intracranial atherosclerosis with associated mild/moderate stenoses,  as described.  4. Multiple pulmonary nodules, some of which are calcified. Calcified  mediastinal lymph nodes, consistent with old granulomatous disease.  Some of the pulmonary nodules measure up to 6-7 mm. Consider follow-up  chest CT if it would change the patient's clinical management.  5. Other chronic findings, as detailed.    The findings from a noncontrast head CT, CTA head and neck and CT head  perfusion were discussed with Dr. Mendosa by myself at approximately  11:48 AM on 12/18/2020.    DINORAH ESTRADA MD   MR Brain w/o & w Contrast     Status: None    Narrative    MRI BRAIN WITHOUT AND WITH CONTRAST  12/18/2020 2:32 PM     HISTORY:  Aphasia.     TECHNIQUE:  Multiplanar, multisequence MRI of the brain without and  with 7.5 mL Gadavist.     COMPARISON: CT head of same day. MRI head 1/10/2018.    FINDINGS:  There are a few punctate cortical/subcortical acute  infarcts involving the left insular region, left occipitotemporal  junction/posterolateral left temporal lobe, and left parietal lobe. No  acute hemorrhage or significant mass effect. Mild generalized brain  parenchymal volume loss. Moderate patchy and confluent T2/FLAIR  hyperintense signal in the periventricular and deep cerebral white  matter, nonspecific, but likely related to chronic small-vessel  ischemic change. Chronic bilateral basal ganglia and left thalamic and  left corona radiata lacunar infarcts. Punctate chronic infarcts in the  right cerebellar hemisphere.    Mild-to-moderate diffuse ventriculomegaly, somewhat out of proportion  to the degree of generalized brain parenchymal volume loss/sulcal  prominence. This appears stable compared to the prior examination.  There is some degree of relative crowding of the frontoparietal sulci  at the vertex. This may be related to atrophy/central white matter  volume loss and ex vacuo phenomenon, although a component  of  superimposed communicating/normal pressure hydrocephalus cannot be  entirely excluded.    Punctate chronic microhemorrhages in the right cerebellar  hemisphere/vermis are unchanged. No new intracranial hemorrhage or  extra-axial fluid collection. Unchanged mildly prominent diffuse  bifrontal dural enhancement, likely benign.    Bilateral lens replacements. Stigmata of previous right scleral buckle  procedure. Moderate-to-severe mucosal thickening in right maxillary  sinus and bilateral ethmoid sinuses. Large nasal septal defect, as  before. The calvarium, skull base and mid face are otherwise grossly  unremarkable. 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      Impression    IMPRESSION:  1. Multiple punctate acute cortical/subcortical ischemic infarcts  involving the left insular region, occipital temporal  junction/posterolateral left temporal lobe, and left parietal lobe. No  acute hemorrhage or significant mass effect.  2. Mild-to-moderate diffuse ventriculomegaly, somewhat out of  proportion to the degree of generalized brain parenchymal volume  loss/sulcal prominence, with some degree of sulcal crowding at the  frontoparietal vertex. The findings are unchanged, and may be related  to ex vacuo phenomenon in the setting of atrophy/central white matter  volume loss, although a component of superimposed communicating/normal  pressure hydrocephalus cannot be completely excluded.  3. Unchanged chronic bilateral basal ganglia and left thalamic lacunar  infarcts.  4. Multiple unchanged chronic cerebellar/vermian microhemorrhages.    DINORAH ESTRADA MD   CBC with platelets differential     Status: Abnormal   Result Value Ref Range    WBC 8.5 4.0 - 11.0 10e9/L    RBC Count 5.13 4.4 - 5.9 10e12/L    Hemoglobin 15.9 13.3 - 17.7 g/dL    Hematocrit 48.5 40.0 - 53.0 %    MCV 95 78 - 100 fl    MCH 31.0 26.5 - 33.0 pg    MCHC 32.8 31.5 - 36.5 g/dL    RDW 12.3 10.0 - 15.0 %    Platelet Count  239 150 - 450 10e9/L    Diff Method Automated Method     % Neutrophils 45.1 %    % Lymphocytes 30.3 %    % Monocytes 10.8 %    % Eosinophils 13.0 %    % Basophils 0.7 %    % Immature Granulocytes 0.1 %    Nucleated RBCs 0 0 /100    Absolute Neutrophil 3.8 1.6 - 8.3 10e9/L    Absolute Lymphocytes 2.6 0.8 - 5.3 10e9/L    Absolute Monocytes 0.9 0.0 - 1.3 10e9/L    Absolute Eosinophils 1.1 (H) 0.0 - 0.7 10e9/L    Absolute Basophils 0.1 0.0 - 0.2 10e9/L    Abs Immature Granulocytes 0.0 0 - 0.4 10e9/L    Absolute Nucleated RBC 0.0    Basic metabolic panel     Status: Abnormal   Result Value Ref Range    Sodium 137 133 - 144 mmol/L    Potassium 4.1 3.4 - 5.3 mmol/L    Chloride 106 94 - 109 mmol/L    Carbon Dioxide 31 20 - 32 mmol/L    Anion Gap <1 (L) 3 - 14 mmol/L    Glucose 225 (H) 70 - 99 mg/dL    Urea Nitrogen 25 7 - 30 mg/dL    Creatinine 1.43 (H) 0.66 - 1.25 mg/dL    GFR Estimate 45 (L) >60 mL/min/[1.73_m2]    GFR Estimate If Black 52 (L) >60 mL/min/[1.73_m2]    Calcium 9.0 8.5 - 10.1 mg/dL   Troponin I     Status: None   Result Value Ref Range    Troponin I ES <0.015 0.000 - 0.045 ug/L   UA with Microscopic     Status: Abnormal   Result Value Ref Range    Color Urine Light Yellow     Appearance Urine Clear     Glucose Urine Negative NEG^Negative mg/dL    Bilirubin Urine Negative NEG^Negative    Ketones Urine Negative NEG^Negative mg/dL    Specific Gravity Urine 1.030 1.003 - 1.035    Blood Urine Negative NEG^Negative    pH Urine 5.5 5.0 - 7.0 pH    Protein Albumin Urine 30 (A) NEG^Negative mg/dL    Urobilinogen mg/dL Normal 0.0 - 2.0 mg/dL    Nitrite Urine Negative NEG^Negative    Leukocyte Esterase Urine Negative NEG^Negative    Source Midstream Urine     WBC Urine <1 0 - 5 /HPF    RBC Urine <1 0 - 2 /HPF    Mucous Urine Present (A) NEG^Negative /LPF   Glucose by meter     Status: Abnormal   Result Value Ref Range    Glucose 201 (H) 70 - 99 mg/dL   INR     Status: None   Result Value Ref Range    INR 1.14 0.86 -  1.14   Partial thromboplastin time     Status: None   Result Value Ref Range    PTT 34 22 - 37 sec   Creatinine POCT     Status: Abnormal   Result Value Ref Range    Creatinine 1.5 (H) 0.66 - 1.25 mg/dL    GFR Estimate 45 (L) >60 mL/min/[1.73_m2]    GFR Estimate If Black 54 (L) >60 mL/min/[1.73_m2]   Asymptomatic COVID-19 Virus (Coronavirus) by PCR     Status: None    Specimen: Nasopharyngeal   Result Value Ref Range    COVID-19 Virus PCR to [facility] - Source Nasopharyngeal     COVID-19 Virus PCR to [facility] - Result       Test received-See reflex to IDDL test SARS CoV2 (COVID-19) Virus RT-PCR   Hepatic panel     Status: Abnormal   Result Value Ref Range    Bilirubin Direct 0.1 0.0 - 0.2 mg/dL    Bilirubin Total 0.7 0.2 - 1.3 mg/dL    Albumin 3.2 (L) 3.4 - 5.0 g/dL    Protein Total 8.2 6.8 - 8.8 g/dL    Alkaline Phosphatase 113 40 - 150 U/L    ALT 29 0 - 70 U/L    AST 21 0 - 45 U/L   TSH with free T4 reflex     Status: Abnormal   Result Value Ref Range    TSH 6.18 (H) 0.40 - 4.00 mU/L   T4 free     Status: None   Result Value Ref Range    T4 Free 1.11 0.76 - 1.46 ng/dL   Troponin I     Status: None   Result Value Ref Range    Troponin I ES <0.015 0.000 - 0.045 ug/L   Glucose by meter     Status: Abnormal   Result Value Ref Range    Glucose 218 (H) 70 - 99 mg/dL   Hemoglobin A1c     Status: Abnormal   Result Value Ref Range    Hemoglobin A1C 8.3 (H) 0 - 5.6 %   TSH with free T4 reflex     Status: None   Result Value Ref Range    TSH 3.28 0.40 - 4.00 mU/L   Troponin I     Status: None   Result Value Ref Range    Troponin I ES <0.015 0.000 - 0.045 ug/L   Glucose by meter     Status: Abnormal   Result Value Ref Range    Glucose 238 (H) 70 - 99 mg/dL   SARS-CoV-2 COVID-19 Virus (Coronavirus) RT-PCR Nasopharyngeal     Status: None    Specimen: Nasopharyngeal   Result Value Ref Range    SARS-CoV-2 Virus Specimen Source Nasopharyngeal     SARS-CoV-2 PCR Result NEGATIVE     SARS-CoV-2 PCR Comment       Testing was performed  using the Aptima SARS-CoV-2 Assay on the [employer] Instrument System.   Additional information about this Emergency Use Authorization (EUA) assay can be found via   the Lab Guide.     Lipid panel reflex to direct LDL     Status: Abnormal   Result Value Ref Range    Cholesterol 127 <200 mg/dL    Triglycerides 118 <150 mg/dL    HDL Cholesterol 29 (L) >39 mg/dL    LDL Cholesterol Calculated 74 <100 mg/dL    Non HDL Cholesterol 98 <130 mg/dL   Glucose by meter     Status: Abnormal   Result Value Ref Range    Glucose 375 (H) 70 - 99 mg/dL   Glucose by meter     Status: Abnormal   Result Value Ref Range    Glucose 455 (H) 70 - 99 mg/dL   Glucose by meter     Status: Abnormal   Result Value Ref Range    Glucose 358 (H) 70 - 99 mg/dL   EKG 12-lead, tracing only     Status: None (Preliminary result)   Result Value Ref Range    Interpretation ECG Click View Image link to view waveform and result    Neurology IP Consult: Stroke (potential or actual); Patient to be seen: Routine - within 24 hours; CVA; Consultant may enter orders: Yes; Requesting provider? Hospitalist (if different from attending physician)     Status: None ()    Yolis Maharaj, CNP     12/19/2020 12:17 PM    [facility]    Stroke Consult Note    Reason for Consult:  CVA    Chief Complaint: Altered Mental Status       HPI  Pete Sheldon is a 80 year old male with past medical history   significant for DM2, HLD, CKD, and CVA who presented to [facility]   for evaluation of weakness and speech difficulty 12/18. He   reportedly fell on trying to get up from bed, so LKW is unclear.   His wife notes that he also seemed confused.      Initial CTH was negative for acute pathology. CTA head/neck with   short segment occlusion of the L M2 and chronic occlusion of the   right V4. CTP with some prolonged transit time to the L MCA   inferior division. A STAT MRI was attempted for consideration of   treatment per [facility] protocol, however,  "it was unable to be   completed for a couple hours. It ultimately demonstrated multiple   punctate acute cortical/subcortical ischemic infarcts involving   the left insular region, occipital temporal   junction/posterolateral left temporal lobe, and left parietal   lobe.    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 Would not acutely lower BP while   inpatient given M2 occlusion. Long term goal BP <140/90 with   tighter control associated with decreased overall CV risk, if   tolerated  - Therapies, as needed  - Discharge with 30 day cardiac event monitor to evaluate for   atrial fibrillation       Patient Follow-up    - in the next 1-2 week(s) with PCP  - Hospital follow up for stroke: in 12 weeks at [facility] ([phone]). Referral placed in discharge orders:   note, referral states \"neurosurgery\" but it is for stroke clinic.    Thank you for this consult. No further stroke evaluation is   recommended, so we will sign off. Please contact us with any   additional questions.    TREVOR Paige, CNP  Neurology  To page me or covering stroke neurology team member, click here:   [facility]   Choose \"On Call\" tab at top, then search dropdown box for   \"Neurology Adult\", " select location, press Enter, then look for   stroke/neuro ICU/telestroke.    _____________________________________________________    Past Medical History   Past Medical History:   Diagnosis Date     Calculus of ureter 1980's     Hypertension      Hypothyroidism      Microalbuminuria 2/15/2016     Mumps      Other and unspecified hyperlipidemia      Type 2 diabetes, HbA1C goal < 8% (H) 7/1995     Past Surgical History   Past Surgical History:   Procedure Laterality Date     HC COLONOSCOPY THRU STOMA, DIAGNOSTIC  6/12/2007    Normal     HC FLEX SIGMOIDOSCOPY W/WO MIGUEL SPEC BY BRUSH/WASH  12/30/1999    Normal to 60 cm     HC REPAIR INCISIONAL HERNIA,REDUCIBLE  1/2001, 1999    Hernia Repair, Incisional, Unilateral (right)     HC REPAIR INCISIONAL HERNIA,REDUCIBLE  1996    Hernia Repair, Incisional, Unilateral (left, with mesh   placement)     TESTICLE SURGERY       VASECTOMY       ZZC NONSPECIFIC PROCEDURE  1980's    Nasal septoplasty     Medications   Home Meds  Prior to Admission medications    Medication Sig Start Date End Date Taking? Authorizing Provider   aspirin 81 MG chewable tablet Take 4 tablets (324 mg) by mouth   daily 1/12/18  Yes Yue Acevedo DO   atorvastatin (LIPITOR) 20 MG tablet Take 1 tablet (20 mg) by   mouth daily 3/4/20  Yes Rom Helm MD   finasteride (PROSCAR) 5 MG tablet Take 1 tablet by mouth once   daily 12/11/20  Yes Clovis Montenegro MD   Glucosamine HCl 750 MG TABS Take 750 mg by mouth 2 times daily   1/11/18  Yes Yue Acevedo DO   insulin aspart (NOVOLOG FLEXPEN) 100 UNIT/ML pen Inject 7 Units   Subcutaneous daily (with breakfast)   Yes Unknown, Entered By   History   insulin aspart (NOVOLOG FLEXPEN) 100 UNIT/ML pen Inject 6 Units   Subcutaneous daily (with lunch)   Yes Unknown, Entered By History     insulin aspart (NOVOLOG FLEXPEN) 100 UNIT/ML pen Inject 6 Units   Subcutaneous daily (with dinner)   Yes Unknown, Entered By   History   insulin glargine (LANTUS  "PEN) 100 UNIT/ML pen Inject 16 Units   Subcutaneous daily (with breakfast)   Yes Unknown, Entered By   History   insulin glargine (LANTUS PEN) 100 UNIT/ML pen Inject 12 Units   Subcutaneous daily (with dinner)   Yes Unknown, Entered By   History   metoprolol tartrate (LOPRESSOR) 25 MG tablet Take 1 tablet (25   mg) by mouth 2 times daily 3/4/20  Yes Rom Helm MD   multivitamin (OCUVITE) TABS Take 1 tablet by mouth daily FOCUS   SELECT PREMIUM WITH LUTEIN per eye doctor  Reported on 5/19/2017   Yes Reported, Patient   vitamin C (ASCORBIC ACID) 1000 MG TABS Take 1,000 mg by mouth 2   times daily   Yes Unknown, Entered By History   B-D U/F 31G X 8 MM insulin pen needle USE 4 TIMES DAILY TO INJECT   INSULIN. 9/15/20   Rom Helm MD   blood glucose (ONETOUCH VERIO IQ) test strip USE 1 STRIP TO CHECK   GLUCOSE 4 TIMES DAILY OR  AS  DIRECTED  USING  ONE  TOUCH  VERIO    FLEX 3/11/20   Lori Damian MD   blood glucose monitoring (ONE TOUCH ULTRASOFT) lancets Use to   test blood sugar 4 times daily or as directed. 10/18/18   Rom Helm MD   insulin pen needle (B-D U/F) 31G X 8 MM B-D ULTRA FINE SHORT PEN   NEEDLES, USE 4 TIMES A DAY TO INJECT INSULIN 6/14/18   Rom Helm MD   insulin syringes, disposable, U-100 0.3 ML MISC 1 each 2 times   daily 8/8/13   Rom Helm MD   LIFESCAN FINEPOINT LANCETS MISC Use to test blood sugars 4 times   daily or as directed. 8/11/15   Rom Helm MD   Nutritional Supplements (GLUCOSE MANAGEMENT) TABS 4 tabs po for   low blood sugar below 70, may repeat q 10-15 minutes as needed   1/11/18   Yue Acevedo,    order for DME All DM testing supplies including test strips,   lancets, solution, syringes, needles and/or pen needles for   testing 4 times per day.    Brand \"Contour Next\" 2/15/16   Lori Damian MD       Scheduled Meds    aspirin  81 mg Oral Daily     atorvastatin  20 mg Oral QPM     clopidogrel  75 mg Oral Daily     " finasteride  5 mg Oral Daily     insulin aspart  1-7 Units Subcutaneous TID AC     insulin aspart  1-5 Units Subcutaneous At Bedtime     insulin glargine  12 Units Subcutaneous Daily with supper     insulin glargine  16 Units Subcutaneous QAM AC     metoprolol tartrate  25 mg Oral BID       Infusion Meds      PRN Meds  acetaminophen, acetaminophen, bisacodyl, glucose **OR** dextrose   **OR** glucagon, magnesium hydroxide, melatonin, ondansetron   **OR** ondansetron, prochlorperazine **OR** prochlorperazine   **OR** prochlorperazine, senna-docusate **OR** senna-docusate    Allergies   Allergies   Allergen Reactions     Losartan Other (See Comments)     Dizziness     Family History   Family History   Problem Relation Age of Onset     Cerebrovascular Disease Mother          age 95     Heart Disease Mother         age 89,  age 95     Cerebrovascular Disease Father          age 91, stroke two years previous     Cancer - colorectal Brother         Half-brother     Cancer Sister         Half-sister (?type)     Cancer Sister         Half-sister (?type)     Heart Disease Brother      Neurologic Disorder Daughter         Multiple Sclerosis     Psychotic Disorder Son         Schizophrenia     Social History   Social History     Tobacco Use     Smoking status: Former Smoker     Packs/day: 0.00     Quit date: 3/11/1953     Years since quittin.8     Smokeless tobacco: Never Used   Substance Use Topics     Alcohol use: No     Drug use: No       Review of Systems   The 10 point Review of Systems is negative other than noted in   the HPI or here.        PHYSICAL EXAMINATION   Temp:  [98  F (36.7  C)-98.4  F (36.9  C)] 98  F (36.7  C)  Pulse:  [] 68  Resp:  [8-24] 16  BP: (144-188)/() 148/64  SpO2:  [90 %-98 %] 90 %    Neurologic  Mental Status:  alert, oriented x 3, follows commands, naming and   repetition normal, speech clear, occasional loss of fluency but   able to name objects and participate in  conversation  Cranial Nerves:  visual fields intact (tested by nurse), EOMI   with normal smooth pursuit, facial movements symmetric, hearing   not formally tested but intact to conversation, no dysarthria,   shoulder shrug equal bilaterally, tongue protrusion midline  Motor:  no abnormal movements, able to move all limbs antigravity   spontaneously with no signs of hemiparesis observed, no pronator   drift  Reflexes:  unable to test (telestroke)  Sensory:  no extinction on double simultaneous stimulation   (assessed by nurse), subjective decreased sensation to right   hemibody (baseline from prior stroke)  Coordination:  FTN without dysmetria  Station/Gait:  unable to test due to telestroke    Dysphagia Screen  Per Nursing    Stroke Scales    NIHSS  Interval     Interval Comments     1a. Level of Consciousness 0-->Alert, keenly responsive   1b. LOC Questions 0-->Answers both questions correctly   1c. LOC Commands 0-->Performs both tasks correctly   2.   Best Gaze 0-->Normal   3.   Visual 0-->No visual loss   4.   Facial Palsy 0-->Normal symmetrical movements   5a. Motor Arm, Left 0-->No drift, limb holds 90 (or 45) degrees   for full 10 secs   5b. Motor Arm, Right 0-->No drift, limb holds 90 (or 45) degrees   for full 10 secs   6a. Motor Leg, Left 0-->No drift, leg holds 30 degree position   for full 5 secs   6b. Motor Leg, right 0-->No drift, leg holds 30 degree position   for full 5 secs   7.   Limb Ataxia 0-->Absent   8.   Sensory 1-->Mild-to-moderate sensory loss, patient feels   pinprick is less sharp or is dull on the affected side, or there   is a loss of superficial pain with pinprick, but patient is aware   of being touched   9.   Best Language 1-->Mild-to-moderate aphasia, some obvious   loss of fluency or facility of comprehension, without significant   limitation on ideas expressed or form of expression. Reduction of   speech and/or comprehension, however, makes conversation. . .   (see row details)    10. Dysarthria 0-->Normal   11. Extinction and Inattention  0-->No abnormality   Total 2 (12/19/20 1209)       Imaging  I personally reviewed all imaging; relevant findings per HPI.    Labs Data   CBC  Recent Labs   Lab 12/18/20  1118   WBC 8.5   RBC 5.13   HGB 15.9   HCT 48.5        Basic Metabolic Panel   Recent Labs   Lab 12/18/20  1118      POTASSIUM 4.1   CHLORIDE 106   CO2 31   BUN 25   CR 1.43*   *   MANAV 9.0     Liver Panel  Recent Labs   Lab 12/18/20  1118   PROTTOTAL 8.2   ALBUMIN 3.2*   BILITOTAL 0.7   ALKPHOS 113   AST 21   ALT 29     INR    Recent Labs   Lab Test 12/18/20  1118 01/10/18  0926 01/08/18  1345   INR 1.14 1.07 1.05      Lipid Profile    Recent Labs   Lab Test 12/19/20  0533 02/28/20  0902 11/27/18  0927 09/25/15  0844 09/25/15  0844 06/19/15  0815 03/06/15  0839   CHOL 127 116 110   < > 134 135 131   HDL 29* 28* 26*   < > 31* 36* 41   LDL 74 65 59   < > 80 83 76   TRIG 118 113 125   < > 117 78 71   CHOLHDLRATIO  --   --   --   --  4.3 3.8 3.2    < > = values in this interval not displayed.     A1C    Recent Labs   Lab Test 12/18/20  1118 09/15/20  1030 02/28/20  0902   A1C 8.3* 8.7* 9.0*     Troponin I    Recent Labs   Lab 12/19/20  0251 12/18/20  1911 12/18/20  1118   TROPI <0.015 <0.015 <0.015          Stroke Code / Stroke Consult Data Data Telestroke Service Details    (for non-emergent stroke consult with tele)  Video start time 12/19/20   1034   Video end time 12/19/20   1049   Type of service telemedicine diagnostic assessment of acute   neurological changes   Reason telemedicine is appropriate patient requires assessment   with a specialist for diagnosis and treatment of neurological   symptoms   Mode of transmission secure interactive audio and video   communication per Mattie   Originating site (patient location) [facility]    Distant site (provider location) [facility]       I have personally spent a total of 30  minutes providing care and   consulting with this patient's medical providers today, with more   than 50% of this time spent in consultation, coordination of   care, and discussion with the patient and/or family regarding   diagnostic results, prognosis, symptom management, risks and   benefits of management options, and development of plan of care.     Echocardiogram Complete     Status: None    Narrative    [id number]  [id number]  [id number]  [id number]^JM^NAIMA^[facility]  Echocardiography Laboratory  [address] [place] [address]        Name: HORACIO HUYNH  MRN: [id number]  : 1940  Study Date: 2020 09:54 AM  Age: 80 yrs  Gender: Male  Patient Location: [facility]  Reason For Study: CVA  Ordering Physician: NAIMA ONEAL  Referring Physician: Rom Helm MD  Performed By: Erlinda Tirado RDCS     BSA: 1.7 m2  Height: 67 in  Weight: 140 lb  HR: 65  BP: 170/87 mmHg  _____________________________________________________________________________  __        Procedure  Complete Portable Echo Adult.  _____________________________________________________________________________  __        Interpretation Summary     Left ventricular systolic function is normal.  The visual ejection fraction is estimated at 55-60%.  The study was technically adequate. Compared to the prior study dated ,  there have been no changes.  _____________________________________________________________________________  __        Left Ventricle  The left ventricle is normal in size. There is normal left ventricular wall  thickness. Left ventricular systolic function is normal. The visual ejection  fraction is estimated at 55-60%. Grade I or early diastolic dysfunction. No  regional wall motion abnormalities noted.     Right Ventricle  The right ventricle is normal size. The right ventricular systolic function is  normal.     Atria  Normal left atrial size. Right atrial size is normal.      Mitral Valve  There is mild mitral annular calcification. The mitral valve leaflets are  mildly thickened. There is mild (1+) mitral regurgitation.        Tricuspid Valve  There is trace tricuspid regurgitation. Right ventricular systolic pressure  could not be approximated due to inadequate tricuspid regurgitation. IVC  diameter <2.1 cm collapsing >50% with sniff suggests a normal RA pressure of 3  mmHg.     Aortic Valve  There is mild trileaflet aortic sclerosis. There is trace aortic  regurgitation. No aortic stenosis is present.     Pulmonic Valve  The pulmonic valve is not well visualized. There is trace pulmonic valvular  regurgitation.     Vessels  Moderate atherosclerotic plaque(s) in the ascending aorta.     Pericardium  There is no pericardial effusion.        Rhythm  Sinus rhythm was noted. The patient exhibited occasional PVCs.  _____________________________________________________________________________  __  MMode/2D Measurements & Calculations  IVSd: 0.85 cm     LVIDd: 4.4 cm  LVIDs: 2.6 cm  LVPWd: 0.82 cm  FS: 40.6 %  LV mass(C)d: 113.3 grams  LV mass(C)dI: 65.2 grams/m2  Ao root diam: 3.8 cm  LA dimension: 2.9 cm  asc Aorta Diam: 3.7 cm  LA/Ao: 0.76  LVOT diam: 2.2 cm  LVOT area: 3.8 cm2  LA Volume (BP): 31.0 ml  LA Volume Index (BP): 17.8 ml/m2  RWT: 0.37           Doppler Measurements & Calculations  MV E max manuel: 56.8 cm/sec  MV A max manuel: 104.1 cm/sec  MV E/A: 0.55  MV dec time: 0.34 sec  E/E' av.0  Lateral E/e': 11.6  Medial E/e': 12.4           _____________________________________________________________________________  __           Report approved by: Davin Hernandez 2020 10:47 AM      ABO/Rh type and screen     Status: None   Result Value Ref Range    ABO O     RH(D) Neg     Antibody Screen Neg     Test Valid Only At [facility]        Specimen Expires 2020

## 2020-12-19 NOTE — PLAN OF CARE
ROOM # 231    Living Situation (if not independent, order SW consult): Independent w/ wife  Facility name:  : [facility]- [phone]    Activity level at baseline: independent with cane   Activity level on admit: SBA w/ cane       Patient registered to observation; given Patient Bill of Rights; given the opportunity to ask questions about observation status and their plan of care.  Patient has been oriented to the observation room, bathroom and call light is in place.    Discussed discharge goals and expectations with patient/family.

## 2020-12-19 NOTE — PLAN OF CARE
PRIMARY DIAGNOSIS: CVA  OUTPATIENT/OBSERVATION GOALS TO BE MET BEFORE DISCHARGE:  1. Orthostatic performed: N/A     2. Diagnostic testing complete & at baseline neurologic testing: No     3. Cleared by consultants (if involved): No     4. Interpretation of cardiac rhythm per telemetry tech: SR     5. Tolerating adequate PO diet and medications: Yes     6. Return to near baseline physical activity or neurologic status: Yes     Discharge Planner Nurse   Safe discharge environment identified: Yes  Barriers to discharge: Yes-still needs echo bubble study done today.       Entered by: Blaire Cohen RN on 12/19/2020 at 5:30 AM     Neuros unchanged from admission. Slightly weaker  on right side, but pt states that is residual d/t a CVA a year ago. Denies pain/nausea. Up with A1.     Please review provider order for any additional goals.   Nurse to notify provider when observation goals have been met and patient is ready for discharge.

## 2020-12-19 NOTE — PLAN OF CARE
SLP: Orders received, chart reviewed, spoke with RN and with patient. Patient with history of CVA in 1/2018. At that time his swallowing was WNL and received therapy for cognitive linguistic deficits. Patient is eating a regular diet with no concerns per RN and patient. Patient does not note any changes with his language/cognition. He feels he is at baseline. SLP eval not indicated at this time as patient is not experiencing any new deficits. Will complete order.

## 2020-12-19 NOTE — PLAN OF CARE
PRIMARY DIAGNOSIS: CVA  OUTPATIENT/OBSERVATION GOALS TO BE MET BEFORE DISCHARGE:  1. Orthostatic performed: N/A    2. Diagnostic testing complete & at baseline neurologic testing: No    3. Cleared by consultants (if involved): No    4. Interpretation of cardiac rhythm per telemetry tech: SR    5. Tolerating adequate PO diet and medications: Yes    6. Return to near baseline physical activity or neurologic status: Yes    Discharge Planner Nurse   Safe discharge environment identified: Yes  Barriers to discharge: Yes-still needs echo bubble study done today.       Entered by: Blaire Cohen 12/19/2020 2:29 AM   Neuros unchanged from admission. Slightly weaker  on right side, but pt states that is baseline for him d/t a CVA a year ago. Denies pain/nausea.   Please review provider order for any additional goals.   Nurse to notify provider when observation goals have been met and patient is ready for discharge.

## 2020-12-19 NOTE — PLAN OF CARE
[facility]      OUTPATIENT PHYSICAL THERAPY EVALUATION  PLAN OF TREATMENT FOR OUTPATIENT REHABILITATION  (COMPLETE FOR INITIAL CLAIMS ONLY)  Patient's Last Name, First Name, M.I.  YOB: 1940  Pete Sheldon                        Provider's Name  [facility] Medical Record No.  [id number]                               Onset Date:  12/18/20   Start of Care Date:    12/19/2020     Type:     _X_PT   ___OT   ___SLP Medical Diagnosis:   CVA                        PT Diagnosis:  impaired functional mobility   Visits from SOC:  1   _________________________________________________________________________________  Plan of Treatment/Functional Goals    Planned Interventions: balance training, bed mobility training, gait training, patient/family education, stair training, strengthening, neuromuscular re-education, transfer training     Goals: See Physical Therapy Goals on Care Plan in [facility] electronic health record.    Therapy Frequency: Daily  Predicted Duration of Therapy Intervention: 2 days  _________________________________________________________________________________    I CERTIFY THE NEED FOR THESE SERVICES FURNISHED UNDER        THIS PLAN OF TREATMENT AND WHILE UNDER MY CARE     (Physician co-signature of this document indicates review and certification of the therapy plan).               ,      Referring Physician: Kala Macias PA            Initial Assessment        See Physical Therapy evaluation dated   in Epic electronic health record.

## 2020-12-20 NOTE — PLAN OF CARE
Patient's After Visit Summary was reviewed with patient.   Patient verbalized understanding of After Visit Summary, recommended follow up and was given an opportunity to ask questions.   Discharge medications sent home with patient/family: sent to pharmacy   Discharged with other: Granddaughter    OBSERVATION patient END time: 4:24 PM

## 2020-12-20 NOTE — PLAN OF CARE
PRIMARY DIAGNOSIS: CVA  OUTPATIENT/OBSERVATION GOALS TO BE MET BEFORE DISCHARGE:  1. Orthostatic performed: N/A     2. Diagnostic testing complete & at baseline neurologic testing: Yes     3. Cleared by consultants (if involved): No     4. Interpretation of cardiac rhythm per telemetry tech: SR HR in the 60's with PVC's     5. Tolerating adequate PO diet and medications: Yes     6. Return to near baseline physical activity or neurologic status: Yes     Discharge Planner Nurse   Safe discharge environment identified: Yes  Barriers to discharge: Yes, blood glucose control has not been achieved       Entered by: Paty Barragan RN on 12/19/2020     Neuros unchanged from admission. Right sided from previous CVA only. Denies pain/nausea. Up with SBA.  Patient on regular diet, tolerating very well.  Trying to achieve blood sugar control. Patient will likely discharge later today.     Recent Labs   Lab 12/20/20  0747 12/20/20  0204 12/19/20  2143 12/19/20  1702 12/19/20  1253 12/19/20  0824 12/18/20  1118 12/18/20  1118   GLC  --   --   --   --   --   --   --  225*   * 293* 267* 298* 358* 455*   < >  --     < > = values in this interval not displayed.       Please review provider order for any additional goals. Nurse to notify provider when observation goals have been met and patient is ready for discharge.

## 2020-12-20 NOTE — DISCHARGE INSTRUCTIONS
Your home care referral was sent to [facility]  If you haven't heard from them within the next 24-48 hours,  Please call them at [phone]

## 2020-12-20 NOTE — PLAN OF CARE
PRIMARY DIAGNOSIS: CVA  OUTPATIENT/OBSERVATION GOALS TO BE MET BEFORE DISCHARGE:  1. Orthostatic performed: N/A    2. Diagnostic testing complete & at baseline neurologic testing: Yes    3. Cleared by consultants (if involved): Yes    4. Interpretation of cardiac rhythm per telemetry tech: SR 60s    5. Tolerating adequate PO diet and medications: Yes    6. Return to near baseline physical activity or neurologic status: Yes    Discharge Planner Nurse   Safe discharge environment identified: Yes  Barriers to discharge: Yes, pending blood glucose control       Entered by: Bryan Fish 12/20/2020 1:54 AM    Vitals are Temp: 99.1  F (37.3  C) Temp src: Oral BP: (!) 141/70 Pulse: 65   Resp: 18 SpO2: 92 %.  Patient is A/Ox4. Up with SBA and cane. On a diabetic and low sodium diet. Denies pain. PIV SL. Denies dizziness, SOB, and nausea. Neuros back to baseline with known right sided weakness. Tele stroke completed. Will continue to monitor.        Please review provider order for any additional goals.   Nurse to notify provider when observation goals have been met and patient is ready for discharge.

## 2020-12-20 NOTE — DISCHARGE SUMMARY
[facility]    Discharge Summary  Hospitalist    Date of Admission:  12/18/2020  Date of Discharge:  12/20/2020  Provider:  Mariella Malone PA-C  Date of Service (when I last saw the patient): 12/20/20    Discharge Diagnoses   Acute CVA  Uncontrolled DM2  HTN    Other medical issues:  Past Medical History:   Diagnosis Date     Calculus of ureter 1980's     Hypertension      Hypothyroidism      Microalbuminuria 2/15/2016     Mumps      Other and unspecified hyperlipidemia      Type 2 diabetes, HbA1C goal < 8% (H) 7/1995     History of Present Illness   Pete Sheldon is a 80 year old male with DM2, HTN, hypothyroidism, dyslipidemia, CKD III, cognitive impairment, and CVA 1/2018 with residual right-sided weakness who presented to [facility] ED on 12/18/2020 via EMS after being too weak to get up and having confusion. Please see the admission history and physical for full details.    Hospital Course   Pete Sheldon was admitted on 12/18/2020.  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 Baseline cognitive impairment/Alzheimer's dementia. Presented after falling at home and too weak to get up along with confusion and responding slower than usual which has since resolved. No residual deficits.   - TSH elevated but free T4 WNL and LFTs WNL  - Stroke neurology consulted and saw patient on 12/19  - Echocardiogram on 12/19 showed EF of 55-60% with normal LA  - no significant arrhythmias on telemetry during stay  - BP initially elevated and has been labile during stay, allowed permissive hypertension 12/19, long term goal BP of <140/90, refer to below   - LDL of 74, continue PTA Lipitor 20 mg daily  - HgbA1c of 8.3, blood glucose levels elevated on 12/19, insulin adjusted as outlined below   - DAPT with ASA 81 mg + Plavix 75 mg for 21 days then  mg   -  return early next week to have 30 day cardiac event monitor placed   - evaluated by PT with recommendation for use of walker and outpatient PT  - outpatient follow up with PCP in 1-2 weeks and stroke neurology in 12 weeks      #Uncontrolled DM2: follows with Dr. Neves of endocrinology with HgbA1c of 8.3. Hyperglycemic in the 300-400s on 12/19 with improvement in blood glucose down to 163 prior to discharge  - increase Lantus at breakfast to 18 units and at dinner to 14 units   - increase PTA Novolog of 8 units with breakfast, 7 units with lunch, and 7 units with dinner   - limit juice and high sugar drinks   - f/u with PCP for monitoring at hospital follow up and endocrinology at next scheduled visit      #HTN: initially elevated then improved but increasing prior to discharge. Neurology recommended permissive hypertension initially with eventual goal BP of <140/90.   - continue PTA Metoprolol 25 mg BID  - start Amlodipine 5 mg (heart rate borderline and would likely not tolerate increasing beta blocker thus adding additional agent)   - keep daily log of BP to bring to follow up and discuss ongoing management with PCP     #CKD stage III: baseline creatinine of 1.4-1.5 with creatinine of 1.43 on admission      #Dyslipidemia: lipid panel on 12/19 showed total of 127, HDL of 29, LDL of 74, and triglycerides of 118  - continue PTA Atorvastatin 20 mg    Contacted and updated the patient's granddaughter Eva prior to discharge (she will update the patient's wife).      Pending Results   None    Code Status   Full Code       Primary Care Physician   Rom Helm MD    Exam:    BP (!) 160/85 (BP Location: Right arm)   Pulse 58   Temp 98.2  F (36.8  C) (Oral)   Resp 22   Wt 63.7 kg (140 lb 8 oz)   SpO2 94%   BMI 22.01 kg/m    GEN:  Alert, oriented x 3, appears comfortable, NAD.  HEENT:  Normocephalic/atraumatic, no scleral icterus, no nasal discharge, mouth moist.  CV:  Regular rate and rhythm, no murmur or  JVD.  S1 + S2 noted, no S3 or S4.  LUNGS:  Clear to auscultation bilaterally without rales/rhonchi/wheezing/retractions.  Symmetric chest rise on inhalation noted.  ABD:  Active bowel sounds, soft, non-tender/non-distended.  No rebound/guarding/rigidity.  EXT:  No edema.  No cyanosis.  No acute joint synovitis noted.  SKIN:  Dry to touch, no exanthems noted in the visualized areas.  NEURO: CN II-XII grossly intact and symmetric. Fine motor intact. No tremor.  No obvious new focal deficits. Mildly diminished  strength on right as compared to left. Good movement in right LE with baseline sensory deficits.      Discharge Disposition   Discharged to home    Consultations This Hospital Stay   NEUROLOGY IP CONSULT  PHYSICAL THERAPY ADULT IP CONSULT  OCCUPATIONAL THERAPY ADULT IP CONSULT  SPEECH LANGUAGE PATH ADULT IP CONSULT  SWALLOW EVAL SPEECH PATH AT BEDSIDE IP CONSULT  CARE MANAGEMENT / SOCIAL WORK IP CONSULT    Time Spent on this Encounter   I, Alise Malone PA-C, personally saw the patient today and spent greater than 30 minutes discharging this patient.    Discharge Orders      NEUROSURGERY REFERRAL      Home care nursing referral      Home Care PT Referral for Hospital Discharge      Reason for your hospital stay    You were admitted for concern for increased confusion and weakness after a fall at home. CT, CTA of your head and neck, and MRI were performed and showed occlusion of your left middle cerebral artery at the M2 branch on CTA and mulitiple acute ischemic infarcts in three regions of the brain consistent with a recent stroke on MRI. Due to signs of a new stroke you were seen by the stroke neurology team with completion of your stroke workup and recommendations given. Your heart was monitored during your stay and showed no irregular rhythm. You had an echocardiogram done that showed normal heart function.     The goal after a stroke is to prevent another stroke by controlling blood pressure,  treating high cholesterol and starting appropriate anticoagulation (blood thinner). Your cholesterol was checked and we recommend continue your current dose of Lipitor. Your blood pressure was intermittently elevated and we recommend continuing your current dose of Metoprolol for now with close monitoring with primary care for ongoing management. We have started you on Aspirin 81 mg and Plavix 75 mg for 21 days then Aspirin 325 mg after this indefinitely as a blood thinner. Your HgbA1c was checked and found to be 8.3 but due to elevated blood glucose levels in the 300-400s during your stay you stayed an additional night to adjust your insulin regimen with improvement in your blood glucose level. You were evaluated by physical therapy during your stay and recommended the use of walker as well home physical therapy.     We also recommend you be on a heart monitor to look for an irregular heart rhythm (atrial fibrillation), due to these not being placed on the weekend you will need to return next week to have this done. Your primary doctor will discuss these results with you later.    We recommend you follow up with your primary care provider in 1-2 weeks and stroke neurology in 12 weeks (referral sent at discharge).     Follow-up and recommended labs and tests     Follow up with primary care provider, Rom Helm MD, within 7 days for hospital follow-up and diabetes check.  No follow up labs or test are needed.     Activity    Your activity upon discharge: activity as tolerated and with use of a walker     Monitor and record    blood glucose 4 times a day, before meals and at bedtime  Keep log to bring to follow up with primary care provider and endocrinologist     MD face to face encounter    Documentation of Face to Face and Certification for Home Health Services    I certify that patient: Pete Sheldon is under my care and that I, or a nurse practitioner or physician's assistant working with me, had a  face-to-face encounter that meets the physician face-to-face encounter requirements with this patient on: 12/20/2020.    This encounter with the patient was in whole, or in part, for the following medical condition, which is the primary reason for home health care: CVA.    I certify that, based on my findings, the following services are medically necessary home health services: Nursing and Physical Therapy.    My clinical findings support the need for the above services because: Nurse is needed: To assess cognition and closely monitoring blood glucose levels with increasing insulin after changes in medications or other medical regimen.. and Physical Therapy Services are needed to assess and treat the following functional impairments: decreased mobility due to CVA.    Further, I certify that my clinical findings support that this patient is homebound (i.e. absences from home require considerable and taxing effort and are for medical reasons or Denominational services or infrequently or of short duration when for other reasons) because: Requires assistance of another person or specialized equipment to access medical services because patient: Is unable to operate assistive equipment on their own...    Based on the above findings. I certify that this patient is confined to the home and needs intermittent skilled nursing care, physical therapy and/or speech therapy.  The patient is under my care, and I have initiated the establishment of the plan of care.  This patient will be followed by a physician who will periodically review the plan of care.  Physician/Provider to provide follow up care: Rom Helm    Attending hospital physician (the Medicare certified [place] provider): Alise Malone PA-C  Physician Signature: See electronic signature associated with these discharge orders.  Date: 12/20/2020     Full Code     Cardiac Event Monitor Adult Pediatric    Cardiac event monitor for 30 days to assess for A-fib     Diet     Follow this diet upon discharge: Orders Placed This Encounter      Combination Diet [id number] Calories: Moderate Consistent CHO (4-6 CHO units/meal); 2 gm NA Diet  - limit juice and sugary drink intake     Discharge Medications   Current Discharge Medication List      START taking these medications    Details   amLODIPine (NORVASC) 5 MG tablet Take 1 tablet (5 mg) by mouth daily  Qty: 30 tablet, Refills: 1    Associated Diagnoses: Benign essential hypertension      aspirin (ASA) 325 MG EC tablet Take 1 tablet (325 mg) by mouth daily  Qty:      Associated Diagnoses: Cerebrovascular accident (CVA), unspecified mechanism (H)      clopidogrel (PLAVIX) 75 MG tablet Take 1 tablet (75 mg) by mouth daily for 19 days  Qty: 19 tablet, Refills: 0    Associated Diagnoses: Cerebrovascular accident (CVA), unspecified mechanism (H)         CONTINUE these medications which have CHANGED    Details   aspirin (ASA) 81 MG chewable tablet Take 1 tablet (81 mg) by mouth daily for 19 days  Qty: 19 tablet, Refills: 0    Associated Diagnoses: 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)      !! insulin aspart (NOVOLOG FLEXPEN) 100 UNIT/ML pen Inject 8 Units Subcutaneous daily (with breakfast)      !! insulin aspart (NOVOLOG FLEXPEN) 100 UNIT/ML pen Inject 7 Units Subcutaneous daily (with lunch)  Refills: 0      !! insulin aspart (NOVOLOG FLEXPEN) 100 UNIT/ML pen Inject 7 Units Subcutaneous daily (with dinner)      !! insulin glargine (LANTUS PEN) 100 UNIT/ML pen Inject 18 Units Subcutaneous every morning    Comments: If Lantus is not covered by insurance, may substitute Basaglar at same dose and frequency.        !! insulin glargine (LANTUS PEN) 100 UNIT/ML pen Inject 14 Units Subcutaneous daily (with dinner)    Comments: If Lantus is not covered by insurance, may substitute Basaglar at same dose and frequency.         !! - Potential duplicate medications found. Please discuss with provider.      CONTINUE  these medications which have NOT CHANGED    Details   atorvastatin (LIPITOR) 20 MG tablet Take 1 tablet (20 mg) by mouth daily  Qty: 90 tablet, Refills: 3    Associated Diagnoses: 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); Hyperlipidemia LDL goal <100      finasteride (PROSCAR) 5 MG tablet Take 1 tablet by mouth once daily  Qty: 90 tablet, Refills: 0    Comments: Last  Refill  Overdue to see MD  Associated Diagnoses: Urinary frequency      Glucosamine HCl 750 MG TABS Take 750 mg by mouth 2 times daily    Associated Diagnoses: Arthritis      metoprolol tartrate (LOPRESSOR) 25 MG tablet Take 1 tablet (25 mg) by mouth 2 times daily  Qty: 180 tablet, Refills: 3    Associated Diagnoses: Hypertension goal BP (blood pressure) < 140/90      multivitamin (OCUVITE) TABS Take 1 tablet by mouth daily FOCUS SELECT PREMIUM WITH LUTEIN per eye doctor  Reported on 5/19/2017      vitamin C (ASCORBIC ACID) 1000 MG TABS Take 1,000 mg by mouth 2 times daily      !! B-D U/F 31G X 8 MM insulin pen needle USE 4 TIMES DAILY TO INJECT INSULIN.  Qty: 400 each, Refills: 1    Associated Diagnoses: Type 2 diabetes, HbA1C goal < 8% (H)      blood glucose (ONETOUCH VERIO IQ) test strip USE 1 STRIP TO CHECK GLUCOSE 4 TIMES DAILY OR  AS  DIRECTED  USING  ONE  TOUCH  VERIO  FLEX  Qty: 400 strip, Refills: 3    Associated Diagnoses: Type 2 diabetes mellitus with diabetic polyneuropathy, with long-term current use of insulin (H)      !! blood glucose monitoring (ONE TOUCH ULTRASOFT) lancets Use to test blood sugar 4 times daily or as directed.  Qty: 200 each, Refills: 6    Associated Diagnoses: Type 2 diabetes mellitus with diabetic polyneuropathy, with long-term current use of insulin (H)      !! insulin pen needle (B-D U/F) 31G X 8 MM B-D ULTRA FINE SHORT PEN NEEDLES, USE 4 TIMES A DAY TO INJECT INSULIN  Qty: 400 each, Refills: 0    Associated Diagnoses: Type 2 diabetes, HbA1C goal < 8% (H)      insulin syringes,  "disposable, U-100 0.3 ML MISC 1 each 2 times daily  Qty: 100 each, Refills: 6    Associated Diagnoses: Type II or unspecified type diabetes mellitus without mention of complication, not stated as uncontrolled      !! LIFESCAN FINEPOINT LANCETS MISC Use to test blood sugars 4 times daily or as directed.  Qty: 200 strip, Refills: 3    Associated Diagnoses: Type 2 diabetes, HbA1C goal < 8% (H)      Nutritional Supplements (GLUCOSE MANAGEMENT) TABS 4 tabs po for low blood sugar below 70, may repeat q 10-15 minutes as needed  Qty: 100 tablet, Refills: prn    Associated Diagnoses: Type 2 diabetes mellitus with diabetic polyneuropathy, with long-term current use of insulin (H)      order for DME All DM testing supplies including test strips, lancets, solution, syringes, needles and/or pen needles for testing 4 times per day.    Brand \"Contour Next\"  Qty: 3 Month, Refills: 1    Associated Diagnoses: Type 2 diabetes mellitus with diabetic polyneuropathy (H)       !! - Potential duplicate medications found. Please discuss with provider.        Allergies   Allergies   Allergen Reactions     Losartan Other (See Comments)     Dizziness     Data   Results for orders placed or performed during the hospital encounter of 12/18/20   CT Head Perfusion w Contrast     Status: None    Narrative    CT BRAIN PERFUSION 12/18/2020 11:43 AM    HISTORY: Code stroke.    TECHNIQUE: Time sequential axial CT images of the head were acquired  during the administration of 50 mL Isovue-370 IV. Color perfusion maps  of the brain were created from this time sequential axial source data.      Radiation dose for this scan was reduced using automated exposure  control, adjustment of the mA and/or kV according to patient size, or  iterative reconstruction technique.    COMPARISON: CT angiogram head and neck of same day. CT cervical  perfusion 1/8/2018.      Impression    IMPRESSION:  1. There is prolonged contrast transit time to the " posterior/inferior  left middle cerebral artery territory. No definite asymmetrically  reduced cerebral blood volume to suggest a definite core infarct in  that area at this time. The findings would suggest oligemic/ischemic  tissue with significant penumbra. CBF less than 30% volume = 0 mL.  Tmax greater than 6 seconds volume = 50 mL. Mismatch volume = 50 mL.    2. Prolonged transit time to the right posterior inferior cerebellar  artery territory which appears to have been present to some degree on  the prior cerebral perfusion scan and may be related in part to the  chronic right vertebral artery V4 segment occlusion.    DINORAH ESTRADA MD   CT Head w/o Contrast     Status: None    Narrative    CT SCAN OF THE HEAD WITHOUT CONTRAST   12/18/2020 11:27 AM     HISTORY: Acute confusion. Fell out of bed.    TECHNIQUE:  Axial images of the head and coronal reformations without  IV contrast material. Radiation dose for this scan was reduced using  automated exposure control, adjustment of the mA and/or kV according  to patient size, or iterative reconstruction technique.    COMPARISON: CT head 1/16/2018.    FINDINGS: The ventricles are mildly prominent, likely due to central  white matter volume loss. No definite findings of hydrocephalus. The  size and configuration of the ventricular system is not significantly  changed compared to the prior examination. Unchanged probable chronic  lacunar infarct in the left basal ganglia region. Probable chronic  infarct in the left corona radiata. Moderately extensive confluent and  patchy areas of hypoattenuation in the periventricular and deep  cerebral white matter, nonspecific, but most likely related to chronic  small-vessel ischemic changes. No definite findings of extended acute  ischemia/infarct. No intracranial hemorrhage or mass effect. Scattered  intracranial atherosclerotic calcifications primarily involving the  carotid siphons and vertebral arteries.    Stigmata of  previous right scleral buckle procedure. Bilateral lens  replacements. Partially visualized large nasal septal defect. Moderate  mucosal thickening in the bilateral ethmoid air cells and right  maxillary sinus, and to a lesser degree in the other visualized  paranasal sinuses. The mastoid and middle ear cavities are clear. This  appears similar to prior.      Impression    IMPRESSION:  1. No extended signs of acute infarct or intracranial hemorrhage.  ASPECT score = 10.  2. Brain atrophy and presumed chronic small-vessel ischemic changes,  as described.  3. Chronic lacunar infarcts in the left basal ganglia and corona  radiata region.  4. Large nasal septal defect and paranasal sinus mucosal thickening,  as before.    DINORAH ESTRADA MD   CTA Head Neck with Contrast     Status: None    Narrative    CT ANGIOGRAM OF THE HEAD AND NECK WITH CONTRAST  12/18/2020 11:33 AM     HISTORY: Acute confusion. Code stroke.     TECHNIQUE:  CT angiography with an injection of 70mL Isovue-370 IV  with scans through the head and neck. Images were transferred to a  separate 3-D workstation where multiplanar reformations and 3-D images  were created. Estimates of carotid stenoses are made relative to the  distal internal carotid artery diameters except as noted. Radiation  dose for this scan was reduced using automated exposure control,  adjustment of the mA and/or kV according to patient size, or iterative  reconstruction technique.     COMPARISON: MR angiogram of the head dated 1/8/2018. CT angiogram head  and neck 1/8/2018.     CT HEAD FINDINGS: No contrast enhancing lesions. Cerebral blood flow  is grossly normal.     CT ANGIOGRAM HEAD FINDINGS:   There is abrupt termination of intraluminal contrast opacification  involving one of the M2 branches of the left middle cerebral artery  overlying the mid aspect of the left insular cortex (series 6 image  420). The area of occlusion appears to be relatively focal, measuring  approximately  3 mm in length on the coronal images (series 11 image  78). The more distal branches of the left middle cerebral arteries  appear to be patent.    The V4 segment of the right vertebral artery is occluded, as before.  As before, there is an extradural origin of the right posteroinferior  cerebellar artery arising from the V3 segment of the right vertebral  artery. There is mild atherosclerosis of the left vertebral artery  without significant stenosis. The basilar artery appears patent. Fetal  origin of the left posterior cerebral artery which demonstrates  mild/mild to moderate stenosis particularly involving the proximal P2  segment. Moderate multifocal stenoses of the right posterior cerebral  artery.    There is mild nonflow-limiting atherosclerosis involving the bilateral  carotid siphons. There is mild-to-moderate diffuse stenosis involving  the M1 segment of the left middle cerebral artery likely due to  atherosclerosis. No high-grade stenosis involving the major branches  of the anterior cerebral arteries or right middle cerebral artery.    CT ANGIOGRAM NECK FINDINGS:   Mixed atherosclerosis of the aortic arch without significant stenosis  of the origins of the great vessels. There is a three-vessel  configuration of the aortic arch.     Right carotid artery: The right common and internal carotid arteries  are patent. Minimal atherosclerosis of the right carotid bifurcation  without significant stenosis.     Left carotid artery: The left common and internal carotid arteries are  patent. Mild atherosclerotic disease at the carotid bifurcation and  proximal internal carotid artery without significant stenosis by  NASCET criteria.     Vertebral arteries: Vertebral arteries are patent without evidence of  dissection. No significant stenosis.     Other findings: Large nasal septal defect. Bilateral lens replacements  and sequela of previous right scleral buckle procedure.  Moderate-to-severe mucosal thickening in  the right maxillary sinus  status post previous right maxillary antrostomy.  Moderate/moderate-to-severe mucosal thickening in the bilateral  ethmoid air cells with lesser degrees of mucosal thickening in other  paranasal sinuses. Multiple calcified mediastinal lymph nodes are  partially visualized. Several scattered subcentimeter nodules are seen  in the upper lung zones bilaterally, some of which are calcified.  There is a noncalcified nodule that measures up to approximately 6-7  mm in the left upper lobe. There is mild groundglass opacity in the  upper lung zones which may represent subsegmental atelectasis or air  trapping, incompletely evaluated. 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 There is interbody  fusion of the C6 and C7 vertebral bodies. Multilevel degenerative disc  disease and uncovertebral and facet arthropathy of the cervical spine.      Impression    IMPRESSION:  1. Short-segment occlusion of one of the M2 branches of the left  middle cerebral artery, concerning for acute thromboembolic disease.  2. 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  3. No other high-grade stenosis or large-vessel occlusion involving  the major craniocervical arterial vasculature. Multifocal presumed  intracranial atherosclerosis with associated mild/moderate stenoses,  as described.  4. Multiple pulmonary nodules, some of which are calcified. Calcified  mediastinal lymph nodes, consistent with old granulomatous disease.  Some of the pulmonary nodules measure up to 6-7 mm. Consider follow-up  chest CT if it would change the patient's clinical management.  5. Other chronic findings, as detailed.    The findings from a noncontrast head CT, CTA head and neck and CT head  perfusion were discussed with Dr. Mendosa by myself at approximately  11:48 AM on 12/18/2020.    DINORAH ESTRADA MD   MR Brain w/o & w Contrast     Status: None    Narrative    MRI BRAIN  WITHOUT AND WITH CONTRAST  12/18/2020 2:32 PM     HISTORY:  Aphasia.     TECHNIQUE:  Multiplanar, multisequence MRI of the brain without and  with 7.5 mL Gadavist.     COMPARISON: CT head of same day. MRI head 1/10/2018.    FINDINGS:  There are a few punctate cortical/subcortical acute  infarcts involving the left insular region, left occipitotemporal  junction/posterolateral left temporal lobe, and left parietal lobe. No  acute hemorrhage or significant mass effect. Mild generalized brain  parenchymal volume loss. Moderate patchy and confluent T2/FLAIR  hyperintense signal in the periventricular and deep cerebral white  matter, nonspecific, but likely related to chronic small-vessel  ischemic change. Chronic bilateral basal ganglia and left thalamic and  left corona radiata lacunar infarcts. Punctate chronic infarcts in the  right cerebellar hemisphere.    Mild-to-moderate diffuse ventriculomegaly, somewhat out of proportion  to the degree of generalized brain parenchymal volume loss/sulcal  prominence. This appears stable compared to the prior examination.  There is some degree of relative crowding of the frontoparietal sulci  at the vertex. This may be related to atrophy/central white matter  volume loss and ex vacuo phenomenon, although a component of  superimposed communicating/normal pressure hydrocephalus cannot be  entirely excluded.    Punctate chronic microhemorrhages in the right cerebellar  hemisphere/vermis are unchanged. No new intracranial hemorrhage or  extra-axial fluid collection. Unchanged mildly prominent diffuse  bifrontal dural enhancement, likely benign.    Bilateral lens replacements. Stigmata of previous right scleral buckle  procedure. Moderate-to-severe mucosal thickening in right maxillary  sinus and bilateral ethmoid sinuses. Large nasal septal defect, as  before. The calvarium, skull base and mid face are otherwise grossly  unremarkable. 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      Impression    IMPRESSION:  1. Multiple punctate acute cortical/subcortical ischemic infarcts  involving the left insular region, occipital temporal  junction/posterolateral left temporal lobe, and left parietal lobe. No  acute hemorrhage or significant mass effect.  2. Mild-to-moderate diffuse ventriculomegaly, somewhat out of  proportion to the degree of generalized brain parenchymal volume  loss/sulcal prominence, with some degree of sulcal crowding at the  frontoparietal vertex. The findings are unchanged, and may be related  to ex vacuo phenomenon in the setting of atrophy/central white matter  volume loss, although a component of superimposed communicating/normal  pressure hydrocephalus cannot be completely excluded.  3. Unchanged chronic bilateral basal ganglia and left thalamic lacunar  infarcts.  4. Multiple unchanged chronic cerebellar/vermian microhemorrhages.    DINORAH ESTRADA MD   CBC with platelets differential     Status: Abnormal   Result Value Ref Range    WBC 8.5 4.0 - 11.0 10e9/L    RBC Count 5.13 4.4 - 5.9 10e12/L    Hemoglobin 15.9 13.3 - 17.7 g/dL    Hematocrit 48.5 40.0 - 53.0 %    MCV 95 78 - 100 fl    MCH 31.0 26.5 - 33.0 pg    MCHC 32.8 31.5 - 36.5 g/dL    RDW 12.3 10.0 - 15.0 %    Platelet Count 239 150 - 450 10e9/L    Diff Method Automated Method     % Neutrophils 45.1 %    % Lymphocytes 30.3 %    % Monocytes 10.8 %    % Eosinophils 13.0 %    % Basophils 0.7 %    % Immature Granulocytes 0.1 %    Nucleated RBCs 0 0 /100    Absolute Neutrophil 3.8 1.6 - 8.3 10e9/L    Absolute Lymphocytes 2.6 0.8 - 5.3 10e9/L    Absolute Monocytes 0.9 0.0 - 1.3 10e9/L    Absolute Eosinophils 1.1 (H) 0.0 - 0.7 10e9/L    Absolute Basophils 0.1 0.0 - 0.2 10e9/L    Abs Immature Granulocytes 0.0 0 - 0.4 10e9/L    Absolute Nucleated RBC 0.0    Basic metabolic panel     Status: Abnormal   Result Value Ref Range    Sodium 137 133 - 144 mmol/L    Potassium 4.1 3.4 - 5.3 mmol/L     Chloride 106 94 - 109 mmol/L    Carbon Dioxide 31 20 - 32 mmol/L    Anion Gap <1 (L) 3 - 14 mmol/L    Glucose 225 (H) 70 - 99 mg/dL    Urea Nitrogen 25 7 - 30 mg/dL    Creatinine 1.43 (H) 0.66 - 1.25 mg/dL    GFR Estimate 45 (L) >60 mL/min/[1.73_m2]    GFR Estimate If Black 52 (L) >60 mL/min/[1.73_m2]    Calcium 9.0 8.5 - 10.1 mg/dL   Troponin I     Status: None   Result Value Ref Range    Troponin I ES <0.015 0.000 - 0.045 ug/L   UA with Microscopic     Status: Abnormal   Result Value Ref Range    Color Urine Light Yellow     Appearance Urine Clear     Glucose Urine Negative NEG^Negative mg/dL    Bilirubin Urine Negative NEG^Negative    Ketones Urine Negative NEG^Negative mg/dL    Specific Gravity Urine 1.030 1.003 - 1.035    Blood Urine Negative NEG^Negative    pH Urine 5.5 5.0 - 7.0 pH    Protein Albumin Urine 30 (A) NEG^Negative mg/dL    Urobilinogen mg/dL Normal 0.0 - 2.0 mg/dL    Nitrite Urine Negative NEG^Negative    Leukocyte Esterase Urine Negative NEG^Negative    Source Midstream Urine     WBC Urine <1 0 - 5 /HPF    RBC Urine <1 0 - 2 /HPF    Mucous Urine Present (A) NEG^Negative /LPF   Glucose by meter     Status: Abnormal   Result Value Ref Range    Glucose 201 (H) 70 - 99 mg/dL   INR     Status: None   Result Value Ref Range    INR 1.14 0.86 - 1.14   Partial thromboplastin time     Status: None   Result Value Ref Range    PTT 34 22 - 37 sec   Creatinine POCT     Status: Abnormal   Result Value Ref Range    Creatinine 1.5 (H) 0.66 - 1.25 mg/dL    GFR Estimate 45 (L) >60 mL/min/[1.73_m2]    GFR Estimate If Black 54 (L) >60 mL/min/[1.73_m2]   Asymptomatic COVID-19 Virus (Coronavirus) by PCR     Status: None    Specimen: Nasopharyngeal   Result Value Ref Range    COVID-19 Virus PCR to [facility] - Source Nasopharyngeal     COVID-19 Virus PCR to [facility] - Result       Test received-See reflex to IDDL test SARS CoV2 (COVID-19) Virus RT-PCR   Hepatic panel     Status: Abnormal   Result Value Ref Range     Bilirubin Direct 0.1 0.0 - 0.2 mg/dL    Bilirubin Total 0.7 0.2 - 1.3 mg/dL    Albumin 3.2 (L) 3.4 - 5.0 g/dL    Protein Total 8.2 6.8 - 8.8 g/dL    Alkaline Phosphatase 113 40 - 150 U/L    ALT 29 0 - 70 U/L    AST 21 0 - 45 U/L   TSH with free T4 reflex     Status: Abnormal   Result Value Ref Range    TSH 6.18 (H) 0.40 - 4.00 mU/L   T4 free     Status: None   Result Value Ref Range    T4 Free 1.11 0.76 - 1.46 ng/dL   Troponin I     Status: None   Result Value Ref Range    Troponin I ES <0.015 0.000 - 0.045 ug/L   Glucose by meter     Status: Abnormal   Result Value Ref Range    Glucose 218 (H) 70 - 99 mg/dL   Hemoglobin A1c     Status: Abnormal   Result Value Ref Range    Hemoglobin A1C 8.3 (H) 0 - 5.6 %   TSH with free T4 reflex     Status: None   Result Value Ref Range    TSH 3.28 0.40 - 4.00 mU/L   Troponin I     Status: None   Result Value Ref Range    Troponin I ES <0.015 0.000 - 0.045 ug/L   Glucose by meter     Status: Abnormal   Result Value Ref Range    Glucose 238 (H) 70 - 99 mg/dL   SARS-CoV-2 COVID-19 Virus (Coronavirus) RT-PCR Nasopharyngeal     Status: None    Specimen: Nasopharyngeal   Result Value Ref Range    SARS-CoV-2 Virus Specimen Source Nasopharyngeal     SARS-CoV-2 PCR Result NEGATIVE     SARS-CoV-2 PCR Comment       Testing was performed using the BioIQima SARS-CoV-2 Assay on the [employer] Instrument System.   Additional information about this Emergency Use Authorization (EUA) assay can be found via   the Lab Guide.     Lipid panel reflex to direct LDL     Status: Abnormal   Result Value Ref Range    Cholesterol 127 <200 mg/dL    Triglycerides 118 <150 mg/dL    HDL Cholesterol 29 (L) >39 mg/dL    LDL Cholesterol Calculated 74 <100 mg/dL    Non HDL Cholesterol 98 <130 mg/dL   Glucose by meter     Status: Abnormal   Result Value Ref Range    Glucose 375 (H) 70 - 99 mg/dL   Glucose by meter     Status: Abnormal   Result Value Ref Range    Glucose 455 (H) 70 - 99 mg/dL   Glucose by meter     Status:  Abnormal   Result Value Ref Range    Glucose 358 (H) 70 - 99 mg/dL   Glucose by meter     Status: Abnormal   Result Value Ref Range    Glucose 298 (H) 70 - 99 mg/dL   Glucose by meter     Status: Abnormal   Result Value Ref Range    Glucose 267 (H) 70 - 99 mg/dL   Glucose by meter     Status: Abnormal   Result Value Ref Range    Glucose 293 (H) 70 - 99 mg/dL   Glucose by meter     Status: Abnormal   Result Value Ref Range    Glucose 244 (H) 70 - 99 mg/dL   Glucose by meter     Status: Abnormal   Result Value Ref Range    Glucose 163 (H) 70 - 99 mg/dL   EKG 12-lead, tracing only     Status: None (Preliminary result)   Result Value Ref Range    Interpretation ECG Click View Image link to view waveform and result    Neurology IP Consult: Stroke (potential or actual); Patient to be seen: Routine - within 24 hours; CVA; Consultant may enter orders: Yes; Requesting provider? Hospitalist (if different from attending physician)     Status: None ()    Yolis Maharaj CNP     12/19/2020 12:17 PM    [facility]    Stroke Consult Note    Reason for Consult:  CVA    Chief Complaint: Altered Mental Status       HPI  Pete Sheldon is a 80 year old male with past medical history   significant for DM2, HLD, CKD, and CVA who presented to [facility]   for evaluation of weakness and speech difficulty 12/18. He   reportedly fell on trying to get up from bed, so LKW is unclear.   His wife notes that he also seemed confused.      Initial CTH was negative for acute pathology. CTA head/neck with   short segment occlusion of the L M2 and chronic occlusion of the   right V4. CTP with some prolonged transit time to the L MCA   inferior division. A STAT MRI was attempted for consideration of   treatment per [facility] protocol, however, it was unable to be   completed for a couple hours. It ultimately demonstrated multiple   punctate acute cortical/subcortical ischemic infarcts involving   the left insular  "region, occipital temporal   junction/posterolateral left temporal lobe, and left parietal   lobe.    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 Would not acutely lower BP while   inpatient given M2 occlusion. Long term goal BP <140/90 with   tighter control associated with decreased overall CV risk, if   tolerated  - Therapies, as needed  - Discharge with 30 day cardiac event monitor to evaluate for   atrial fibrillation       Patient Follow-up    - in the next 1-2 week(s) with PCP  - Hospital follow up for stroke: in 12 weeks at [facility] ([phone]). Referral placed in discharge orders:   note, referral states \"neurosurgery\" but it is for stroke clinic.    Thank you for this consult. No further stroke evaluation is   recommended, so we will sign off. Please contact us with any   additional questions.    TREVOR Paige, CNP  Neurology  To page me or covering stroke neurology team member, click here:   [facility]   Choose \"On Call\" tab at top, then search dropdown box for   \"Neurology Adult\", select location, press Enter, then look for   stroke/neuro ICU/telestroke.    _____________________________________________________    Past Medical History   Past Medical " History:   Diagnosis Date     Calculus of ureter 1980's     Hypertension      Hypothyroidism      Microalbuminuria 2/15/2016     Mumps      Other and unspecified hyperlipidemia      Type 2 diabetes, HbA1C goal < 8% (H) 7/1995     Past Surgical History   Past Surgical History:   Procedure Laterality Date     HC COLONOSCOPY THRU STOMA, DIAGNOSTIC  6/12/2007    Normal     HC FLEX SIGMOIDOSCOPY W/WO MIGUEL SPEC BY BRUSH/WASH  12/30/1999    Normal to 60 cm     HC REPAIR INCISIONAL HERNIA,REDUCIBLE  1/2001, 1999    Hernia Repair, Incisional, Unilateral (right)     HC REPAIR INCISIONAL HERNIA,REDUCIBLE  1996    Hernia Repair, Incisional, Unilateral (left, with mesh   placement)     TESTICLE SURGERY       VASECTOMY       ZZC NONSPECIFIC PROCEDURE  1980's    Nasal septoplasty     Medications   Home Meds  Prior to Admission medications    Medication Sig Start Date End Date Taking? Authorizing Provider   aspirin 81 MG chewable tablet Take 4 tablets (324 mg) by mouth   daily 1/12/18  Yes Yue Acevedo DO   atorvastatin (LIPITOR) 20 MG tablet Take 1 tablet (20 mg) by   mouth daily 3/4/20  Yes Rom Helm MD   finasteride (PROSCAR) 5 MG tablet Take 1 tablet by mouth once   daily 12/11/20  Yes Clovis Montenegro MD   Glucosamine HCl 750 MG TABS Take 750 mg by mouth 2 times daily   1/11/18  Yes Yue Acevedo DO   insulin aspart (NOVOLOG FLEXPEN) 100 UNIT/ML pen Inject 7 Units   Subcutaneous daily (with breakfast)   Yes Unknown, Entered By   History   insulin aspart (NOVOLOG FLEXPEN) 100 UNIT/ML pen Inject 6 Units   Subcutaneous daily (with lunch)   Yes Unknown, Entered By History     insulin aspart (NOVOLOG FLEXPEN) 100 UNIT/ML pen Inject 6 Units   Subcutaneous daily (with dinner)   Yes Unknown, Entered By   History   insulin glargine (LANTUS PEN) 100 UNIT/ML pen Inject 16 Units   Subcutaneous daily (with breakfast)   Yes Unknown, Entered By   History   insulin glargine (LANTUS PEN) 100 UNIT/ML pen Inject 12  "Units   Subcutaneous daily (with dinner)   Yes Unknown, Entered By   History   metoprolol tartrate (LOPRESSOR) 25 MG tablet Take 1 tablet (25   mg) by mouth 2 times daily 3/4/20  Yes Rom Helm MD   multivitamin (OCUVITE) TABS Take 1 tablet by mouth daily FOCUS   SELECT PREMIUM WITH LUTEIN per eye doctor  Reported on 5/19/2017   Yes Reported, Patient   vitamin C (ASCORBIC ACID) 1000 MG TABS Take 1,000 mg by mouth 2   times daily   Yes Unknown, Entered By History   B-D U/F 31G X 8 MM insulin pen needle USE 4 TIMES DAILY TO INJECT   INSULIN. 9/15/20   Rom Helm MD   blood glucose (ONETOUCH VERIO IQ) test strip USE 1 STRIP TO CHECK   GLUCOSE 4 TIMES DAILY OR  AS  DIRECTED  USING  ONE  TOUCH  VERIO    FLEX 3/11/20   Lori Damian MD   blood glucose monitoring (ONE TOUCH ULTRASOFT) lancets Use to   test blood sugar 4 times daily or as directed. 10/18/18   Rom Helm MD   insulin pen needle (B-D U/F) 31G X 8 MM B-D ULTRA FINE SHORT PEN   NEEDLES, USE 4 TIMES A DAY TO INJECT INSULIN 6/14/18   Rom Helm MD   insulin syringes, disposable, U-100 0.3 ML MISC 1 each 2 times   daily 8/8/13   Rom Helm MD   LIFESCAN FINEPOINT LANCETS MISC Use to test blood sugars 4 times   daily or as directed. 8/11/15   Rom Helm MD   Nutritional Supplements (GLUCOSE MANAGEMENT) TABS 4 tabs po for   low blood sugar below 70, may repeat q 10-15 minutes as needed   1/11/18   Yue Acevedo, DO   order for DME All DM testing supplies including test strips,   lancets, solution, syringes, needles and/or pen needles for   testing 4 times per day.    Brand \"Contour Next\" 2/15/16   Lori Damian MD       Scheduled Meds    aspirin  81 mg Oral Daily     atorvastatin  20 mg Oral QPM     clopidogrel  75 mg Oral Daily     finasteride  5 mg Oral Daily     insulin aspart  1-7 Units Subcutaneous TID AC     insulin aspart  1-5 Units Subcutaneous At Bedtime     insulin glargine  12 Units " Subcutaneous Daily with supper     insulin glargine  16 Units Subcutaneous QAM AC     metoprolol tartrate  25 mg Oral BID       Infusion Meds      PRN Meds  acetaminophen, acetaminophen, bisacodyl, glucose **OR** dextrose   **OR** glucagon, magnesium hydroxide, melatonin, ondansetron   **OR** ondansetron, prochlorperazine **OR** prochlorperazine   **OR** prochlorperazine, senna-docusate **OR** senna-docusate    Allergies   Allergies   Allergen Reactions     Losartan Other (See Comments)     Dizziness     Family History   Family History   Problem Relation Age of Onset     Cerebrovascular Disease Mother          age 95     Heart Disease Mother         age 89,  age 95     Cerebrovascular Disease Father          age 91, stroke two years previous     Cancer - colorectal Brother         Half-brother     Cancer Sister         Half-sister (?type)     Cancer Sister         Half-sister (?type)     Heart Disease Brother      Neurologic Disorder Daughter         Multiple Sclerosis     Psychotic Disorder Son         Schizophrenia     Social History   Social History     Tobacco Use     Smoking status: Former Smoker     Packs/day: 0.00     Quit date: 3/11/1953     Years since quittin.8     Smokeless tobacco: Never Used   Substance Use Topics     Alcohol use: No     Drug use: No       Review of Systems   The 10 point Review of Systems is negative other than noted in   the HPI or here.        PHYSICAL EXAMINATION   Temp:  [98  F (36.7  C)-98.4  F (36.9  C)] 98  F (36.7  C)  Pulse:  [] 68  Resp:  [8-24] 16  BP: (144-188)/() 148/64  SpO2:  [90 %-98 %] 90 %    Neurologic  Mental Status:  alert, oriented x 3, follows commands, naming and   repetition normal, speech clear, occasional loss of fluency but   able to name objects and participate in conversation  Cranial Nerves:  visual fields intact (tested by nurse), EOMI   with normal smooth pursuit, facial movements symmetric, hearing   not formally tested but  intact to conversation, no dysarthria,   shoulder shrug equal bilaterally, tongue protrusion midline  Motor:  no abnormal movements, able to move all limbs antigravity   spontaneously with no signs of hemiparesis observed, no pronator   drift  Reflexes:  unable to test (telestroke)  Sensory:  no extinction on double simultaneous stimulation   (assessed by nurse), subjective decreased sensation to right   hemibody (baseline from prior stroke)  Coordination:  FTN without dysmetria  Station/Gait:  unable to test due to telestroke    Dysphagia Screen  Per Nursing    Stroke Scales    NIHSS  Interval     Interval Comments     1a. Level of Consciousness 0-->Alert, keenly responsive   1b. LOC Questions 0-->Answers both questions correctly   1c. LOC Commands 0-->Performs both tasks correctly   2.   Best Gaze 0-->Normal   3.   Visual 0-->No visual loss   4.   Facial Palsy 0-->Normal symmetrical movements   5a. Motor Arm, Left 0-->No drift, limb holds 90 (or 45) degrees   for full 10 secs   5b. Motor Arm, Right 0-->No drift, limb holds 90 (or 45) degrees   for full 10 secs   6a. Motor Leg, Left 0-->No drift, leg holds 30 degree position   for full 5 secs   6b. Motor Leg, right 0-->No drift, leg holds 30 degree position   for full 5 secs   7.   Limb Ataxia 0-->Absent   8.   Sensory 1-->Mild-to-moderate sensory loss, patient feels   pinprick is less sharp or is dull on the affected side, or there   is a loss of superficial pain with pinprick, but patient is aware   of being touched   9.   Best Language 1-->Mild-to-moderate aphasia, some obvious   loss of fluency or facility of comprehension, without significant   limitation on ideas expressed or form of expression. Reduction of   speech and/or comprehension, however, makes conversation. . .   (see row details)   10. Dysarthria 0-->Normal   11. Extinction and Inattention  0-->No abnormality   Total 2 (12/19/20 1200)       Imaging  I personally reviewed all imaging; relevant  findings per HPI.    Labs Data   CBC  Recent Labs   Lab 12/18/20  1118   WBC 8.5   RBC 5.13   HGB 15.9   HCT 48.5        Basic Metabolic Panel   Recent Labs   Lab 12/18/20  1118      POTASSIUM 4.1   CHLORIDE 106   CO2 31   BUN 25   CR 1.43*   *   MANAV 9.0     Liver Panel  Recent Labs   Lab 12/18/20  1118   PROTTOTAL 8.2   ALBUMIN 3.2*   BILITOTAL 0.7   ALKPHOS 113   AST 21   ALT 29     INR    Recent Labs   Lab Test 12/18/20  1118 01/10/18  0926 01/08/18  1345   INR 1.14 1.07 1.05      Lipid Profile    Recent Labs   Lab Test 12/19/20  0533 02/28/20  0902 11/27/18  0927 09/25/15  0844 09/25/15  0844 06/19/15  0815 03/06/15  0839   CHOL 127 116 110   < > 134 135 131   HDL 29* 28* 26*   < > 31* 36* 41   LDL 74 65 59   < > 80 83 76   TRIG 118 113 125   < > 117 78 71   CHOLHDLRATIO  --   --   --   --  4.3 3.8 3.2    < > = values in this interval not displayed.     A1C    Recent Labs   Lab Test 12/18/20  1118 09/15/20  1030 02/28/20  0902   A1C 8.3* 8.7* 9.0*     Troponin I    Recent Labs   Lab 12/19/20  0251 12/18/20  1911 12/18/20  1118   TROPI <0.015 <0.015 <0.015          Stroke Code / Stroke Consult Data Data Telestroke Service Details    (for non-emergent stroke consult with tele)  Video start time 12/19/20   1034   Video end time 12/19/20   1049   Type of service telemedicine diagnostic assessment of acute   neurological changes   Reason telemedicine is appropriate patient requires assessment   with a specialist for diagnosis and treatment of neurological   symptoms   Mode of transmission secure interactive audio and video   communication per Mattie   Originating site (patient location) [facility]    Distant site (provider location) [facility]       I have personally spent a total of 30 minutes providing care and   consulting with this patient's medical providers today, with more   than 50% of this time spent in consultation, coordination of   care, and  discussion with the patient and/or family regarding   diagnostic results, prognosis, symptom management, risks and   benefits of management options, and development of plan of care.     Care Management / Social Work IP Consult     Status: None ()    Tia Malik RN     12/20/2020  1:51 PM  Care Management Initial Consult    General Information  Assessment completed with: Spouse or significant other, Francaiva   Type of CM/SW Visit: Offer D/C Planning    Primary Care Provider verified and updated as needed: Yes   Readmission within the last 30 days: no previous admission in   last 30 days      Reason for Consult: care coordination/care conference,   discharge planning       Communication Assessment  Patient's communication style: spoken language (English or   Bilingual)    Hearing Difficulty or Deaf: no   Wear Glasses or Blind: yes    Cognitive  Cognitive/Neuro/Behavioral: WDL                      Living Environment:   People in home: spouse     Current living Arrangements: house      Able to return to prior arrangements: yes       Family/Social Support:  Care provided by: self  Provides care for: no one  Marital Status:   Wife          Description of Support System: Supportive, Involved    Support Assessment: Adequate family and caregiver support    Current Resources:   Community Resources: Home Care  Equipment currently used at home: cane, quad, shower chair,   raised toilet seat - pt does have access to 2 WW at home as well  Supplies currently used at home: None     Lifestyle & Psychosocial Needs:        Socioeconomic History     Marital status:      Spouse name: Not on file     Number of children: 2     Years of education: Not on file     Highest education level: Not on file   Occupational History     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[employer]     Employer: RETIRED     Tobacco Use     Smoking status: Former Smoker     Packs/day: 0.00     Quit date: 3/11/1953     Years  since quittin.8     Smokeless tobacco: Never Used   Substance and Sexual Activity     Alcohol use: No     Drug use: No     Sexual activity: Yes     Partners: Female             Additional Information:  PT evaluated and is recommending Home PT at discharge. Spoke with   pt and wife, they are agreeable to this, will plan to order home   RN and PT at discharge.     Pt/family was offered but declined the Medicare Compare list for   Home Care.  Discussed associated Medicare star ratings to assist   with choice for referrals/discharge planning Yes    Education was given to pt/family that star ratings are   updated/maintained by Medicare and can be reviewed by visiting   www.medicare.gov Yes    They elected [facility], e-mail referral sent and AVS   updated. They deny having any other needs at this time. Pt's   granddaughter is available to transport pt today.       Tia Patiño RN BSN   Inpatient Care Coordination  [facility]   Phone [phone]     Echocardiogram Complete     Status: None    Narrative    [id number]  [id number]  [id number]  [id number]^JM^NAIMA^[facility]  Echocardiography Laboratory  [address] [place] [address]        Name: HORACIO HUYNH  MRN: [id number]  : 1940  Study Date: 2020 09:54 AM  Age: 80 yrs  Gender: Male  Patient Location: [facility]  Reason For Study: CVA  Ordering Physician: NAIMA [facility]  Referring Physician: Rom Helm MD  Performed By: Erlinda Tirado RDCS     BSA: 1.7 m2  Height: 67 in  Weight: 140 lb  HR: 65  BP: 170/87 mmHg  _____________________________________________________________________________  __        Procedure  Complete Portable Echo Adult.  _____________________________________________________________________________  __        Interpretation Summary     Left ventricular systolic function is normal.  The visual ejection fraction is estimated at 55-60%.  The study was  technically adequate. Compared to the prior study dated 1/18,  there have been no changes.  _____________________________________________________________________________  __        Left Ventricle  The left ventricle is normal in size. There is normal left ventricular wall  thickness. Left ventricular systolic function is normal. The visual ejection  fraction is estimated at 55-60%. Grade I or early diastolic dysfunction. No  regional wall motion abnormalities noted.     Right Ventricle  The right ventricle is normal size. The right ventricular systolic function is  normal.     Atria  Normal left atrial size. Right atrial size is normal.     Mitral Valve  There is mild mitral annular calcification. The mitral valve leaflets are  mildly thickened. There is mild (1+) mitral regurgitation.        Tricuspid Valve  There is trace tricuspid regurgitation. Right ventricular systolic pressure  could not be approximated due to inadequate tricuspid regurgitation. IVC  diameter <2.1 cm collapsing >50% with sniff suggests a normal RA pressure of 3  mmHg.     Aortic Valve  There is mild trileaflet aortic sclerosis. There is trace aortic  regurgitation. No aortic stenosis is present.     Pulmonic Valve  The pulmonic valve is not well visualized. There is trace pulmonic valvular  regurgitation.     Vessels  Moderate atherosclerotic plaque(s) in the ascending aorta.     Pericardium  There is no pericardial effusion.        Rhythm  Sinus rhythm was noted. The patient exhibited occasional PVCs.  _____________________________________________________________________________  __  MMode/2D Measurements & Calculations  IVSd: 0.85 cm     LVIDd: 4.4 cm  LVIDs: 2.6 cm  LVPWd: 0.82 cm  FS: 40.6 %  LV mass(C)d: 113.3 grams  LV mass(C)dI: 65.2 grams/m2  Ao root diam: 3.8 cm  LA dimension: 2.9 cm  asc Aorta Diam: 3.7 cm  LA/Ao: 0.76  LVOT diam: 2.2 cm  LVOT area: 3.8 cm2  LA Volume (BP): 31.0 ml  LA Volume Index (BP): 17.8 ml/m2  RWT: 0.37            Doppler Measurements & Calculations  MV E max manuel: 56.8 cm/sec  MV A max manuel: 104.1 cm/sec  MV E/A: 0.55  MV dec time: 0.34 sec  E/E' av.0  Lateral E/e': 11.6  Medial E/e': 12.4           _____________________________________________________________________________  __           Report approved by: Davin Hernandez 2020 10:47 AM      ABO/Rh type and screen     Status: None   Result Value Ref Range    ABO O     RH(D) Neg     Antibody Screen Neg     Test Valid Only At [facility]        Specimen Expires 2020      Alise Malone PA-C

## 2020-12-20 NOTE — CONSULTS
Care Management Initial Consult    General Information  Assessment completed with: Spouse or significant other, Syliva   Type of CM/SW Visit: Offer D/C Planning    Primary Care Provider verified and updated as needed: Yes   Readmission within the last 30 days: no previous admission in last 30 days      Reason for Consult: care coordination/care conference, discharge planning       Communication Assessment  Patient's communication style: spoken language (English or Bilingual)    Hearing Difficulty or Deaf: no   Wear Glasses or Blind: yes    Cognitive  Cognitive/Neuro/Behavioral: WDL                      Living Environment:   People in home: spouse     Current living Arrangements: house      Able to return to prior arrangements: yes       Family/Social Support:  Care provided by: self  Provides care for: no one  Marital Status:   Wife          Description of Support System: Supportive, Involved    Support Assessment: Adequate family and caregiver support    Current Resources:   Community Resources: Home Care  Equipment currently used at home: cane, quad, shower chair, raised toilet seat - pt does have access to 2 WW at home as well  Supplies currently used at home: None     Lifestyle & Psychosocial Needs:        Socioeconomic History     Marital status:      Spouse name: Not on file     Number of children: 2     Years of education: Not on file     Highest education level: Not on file   Occupational History     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[employer]     Employer: RETIRED     Tobacco Use     Smoking status: Former Smoker     Packs/day: 0.00     Quit date: 3/11/1953     Years since quittin.8     Smokeless tobacco: Never Used   Substance and Sexual Activity     Alcohol use: No     Drug use: No     Sexual activity: Yes     Partners: Female             Additional Information:  PT evaluated and is recommending Home PT at discharge. Spoke with pt and wife, they are agreeable to this,  will plan to order home RN and PT at discharge.     Pt/family was offered but declined the Medicare Compare list for Home Care.  Discussed associated Medicare star ratings to assist with choice for referrals/discharge planning Yes    Education was given to pt/family that star ratings are updated/maintained by Medicare and can be reviewed by visiting www.medicare.gov Yes    They elected [facility], e-mail referral sent and AVS updated. They deny having any other needs at this time. Pt's granddaughter is available to transport pt today.       Tia Patiño RN BSN   Inpatient Care Coordination  [facility]   Phone [phone]

## 2020-12-20 NOTE — PLAN OF CARE
PRIMARY DIAGNOSIS: CVA  OUTPATIENT/OBSERVATION GOALS TO BE MET BEFORE DISCHARGE:  1. Orthostatic performed: N/A    2. Diagnostic testing complete & at baseline neurologic testing: Yes    3. Cleared by consultants (if involved): Yes    4. Interpretation of cardiac rhythm per telemetry tech: SR 60s    5. Tolerating adequate PO diet and medications: Yes    6. Return to near baseline physical activity or neurologic status: Yes    Discharge Planner Nurse   Safe discharge environment identified: Yes  Barriers to discharge: Yes, pending blood glucose control       Entered by: Bryan Fish 12/20/2020 2:00 AM    Vitals are Temp: 99.1  F (37.3  C) Temp src: Oral BP: (!) 141/70 Pulse: 65   Resp: 18 SpO2: 92 %.  Patient is A/Ox4. Up with SBA and cane. On a diabetic and low sodium diet. Denies pain. Denies dizziness, SOB, and nausea. Neuros back to baseline with known right sided weakness. Tele stroke completed.  at HS, covered with 2 units of insulin. Pt set of bed alarm and was found covered in blood from right AC PIV. PIV was pulled out. Bedding and gown changed. PIV in left hand SL. Will continue to monitor.        Please review provider order for any additional goals.   Nurse to notify provider when observation goals have been met and patient is ready for discharge.

## 2020-12-20 NOTE — PLAN OF CARE
Physical Therapy Discharge Summary    Reason for therapy discharge:    Discharged to home with home therapy.    Progress towards therapy goal(s). See goals on Care Plan in [facility] electronic health record for goal details.  Goals not met.  Barriers to achieving goals:   discharge on same date as initial evaluation.    Therapy recommendation(s):    Continued therapy is recommended.  Rationale/Recommendations:  assist from family and Home PT to maximize return to PLOF.

## 2020-12-20 NOTE — PLAN OF CARE
PRIMARY DIAGNOSIS: CVA  OUTPATIENT/OBSERVATION GOALS TO BE MET BEFORE DISCHARGE:  1. Orthostatic performed: N/A    2. Diagnostic testing complete & at baseline neurologic testing: Yes    3. Cleared by consultants (if involved): Yes    4. Interpretation of cardiac rhythm per telemetry tech: SR 60s    5. Tolerating adequate PO diet and medications: Yes    6. Return to near baseline physical activity or neurologic status: Yes    Discharge Planner Nurse   Safe discharge environment identified: Yes  Barriers to discharge: Yes, pending blood glucose control       Entered by: Bryan Fish 12/20/2020 6:32 AM    Vitals are Temp: 98.9  F (37.2  C) Temp src: Oral BP: (!) 145/78 Pulse: 63   Resp: 18 SpO2: 91 %.  Patient is A/Ox4. Up with SBA and cane. On a diabetic and low sodium diet. Denies pain. Denies dizziness, SOB, and nausea. Neuros back to baseline with known right sided weakness. Tele stroke completed.  with 2 AM recheck. PIV in left hand SL. Plan is for home with PT when blood glucose is controlled. Stable and resting in bed. Will continue to monitor and provide supportive cares.       Please review provider order for any additional goals.   Nurse to notify provider when observation goals have been met and patient is ready for discharge.

## 2020-12-21 NOTE — TELEPHONE ENCOUNTER
IP F/U    Date: 12-20-20  Diagnosis: CVA, unspecified mechanism, benign essential hypertension  Is patient active in care coordination? No  Was patient in TCU? No

## 2020-12-21 NOTE — TELEPHONE ENCOUNTER
His Dm is very difficult to control.  Brittle diabetes with multiple adjustment in past  Given please advanced age and multiple other medical comorbidities goal is to avoid hypoglycemia.  Blood sugar of 58 reported this morning.  If he is eating okay then I recommend to take NOVOLOG (new dose) as noted below.  Decrease dose of Lantus:     Currently taking:  Discharge summary states:  1.  Lantus 18 unit(s) at breakfast and 14 unit(s) at dinner.  2.  Novolog 8 unit(s) am, 7 unit(s) at lunch, and 7 unit(s) at dinner.     Decrease dose of Lantus.  With history of hypoglycemia new dosing recommendation:  1.  Lantus 16 unit(s) at breakfast and 10 unit(s) at dinner.  2.  Novolog 7 unit(s) am, 6 unit(s) at lunch, and 6 unit(s) at dinner.     Send BG data in 1 week.  Med list updated.    Recommend checking blood sugars before meals and at bedtime.    If Blood glucose are low more often-> 2-3 times/week- give us a call.    Please call patient with above information.    Lori Damian MD  Endocrinology   [facility]/[place]  December 21, 2020

## 2020-12-21 NOTE — TELEPHONE ENCOUNTER
Spoke with patient's wife.  Patient was discharged from the hospital 12-20-20 and insulin dosing was changed due to elevated blood sugar in hospital.    Discharge summary states:  1.  Lantus 18 unit(s) at breakfast and 14 unit(s) at dinner.  2.  Novolog 8 unit(s) am, 7 unit(s) at lunch, and 7 unit(s) at dinner.    Patient's blood sugar this am (9:49a) was 58--did not have any symptoms of hypoglycemia.  Patient has not had his morning Novolog yet.    Wife asking what is the next step?  Should he take his am dose of insulin?    Please advise, thanks.

## 2020-12-21 NOTE — TELEPHONE ENCOUNTER
"Hospital/TCU/ED for chronic condition Discharge Protocol    \"Hi, my name is Alondra Guerra RN, a registered nurse, and I am calling from [facility].  I am calling to follow up and see how things are going for you after your recent emergency visit/hospital/TCU stay.\"    Tell me how you are doing now that you are home?\" Per wife, states       Discharge Instructions    \"Let's review your discharge instructions.  What is/are the follow-up recommendations?  Pt. Response: follow up with primary care provider    \"Has an appointment with your primary care provider been scheduled?\"   Yes. (confirm)    \"When you see the provider, I would recommend that you bring your medications with you.\"    Medications    \"Tell me what changed about your medicines when you discharged?\"    Changes to chronic meds?    2 or more - [facility] MTM referral needed    \"What questions do you have about your medications?\"    Questions regarding insulin change upon discharge. - Questions cannot be easily answered - Epic MTM referral needed     New diagnoses of heart failure, COPD, diabetes, or MI?    No     On insulin: \"Did you start on insulin in the hospital or did you have your insulin dose changed?\"  No         Post Discharge Medication Reconciliation Status: discharge medications reconciled and changed, per note/orders per discharge summary.    Was MTM referral placed (*Make sure to put transitions as reason for referral)?   Yes - \"The Medication Therapy  will call you within the next few days to schedule an appointment with an MTM pharmacist to discuss your medicines and make sure they are working as well as they possibly can for you. This visit can be done in a [facility] clinic or on the phone and is at no charge to you.\"    Call Summary    \"What questions or concerns do you have about your recent visit and your follow-up care?\"     none    \"If you have questions or things don't continue to improve, we encourage you " "contact us through the main clinic number (give number).  Even if the clinic is not open, triage nurses are available 24/7 to help you.     We would like you to know that our clinic has extended hours (provide information).  We also have urgent care (provide details on closest location and hours/contact info)\"      \"Thank you for your time and take care!\"             "

## 2020-12-23 NOTE — TELEPHONE ENCOUNTER
Pamela Helm,  We have been out to see Mr. Sheldon for the initial homecare visit, and we need to request a second visit to go back and complete the comprehensive Medicare OASIS assessment.  Please respond to this message, and we can use as a verbal order.  They would like us back out either 12\27, 28.  1 Skilled Nurse visit over 5 days to complete the Comprehensive Assessment.    Thank you so much,  Emely Grace RN, BSN\  [facility]\6 [phone]

## 2020-12-27 NOTE — PROGRESS NOTES
MTM ENCOUNTER  SUBJECTIVE/OBJECTIVE:                           Pete Sheldon is a 80 year old male called for a transitions of care visit. He was discharged from [facility] on 12/20/20 for CVA and uncontrolled diabetes.  Spoke with his wife, Radha and Pete was in the room at the time of call.      Reason for visit: Recent hospitalization.    Allergies/ADRs: Reviewed in chart  Tobacco: He reports that he quit smoking about 67 years ago. He smoked 0.00 packs per day. He has never used smokeless tobacco.  Alcohol: not currently using  Caffeine: no caffeine  Activity: PT will start at home.    Medication Adherence/Access: no issues reported  Patient has assistance with medication administration: family member and follows chart.      CVA:  Current medications include: aspirin 81 mg daily (for 19 days then increase to 325 mg daily), clopidogrel 75 mg (for 19 days).  No bleeding at this time. No falls since being home.  Radha was aware of change in ASA dose and has the change written on her calendar.    Hypertension: Current medications include amlodipine 5 mg daily (new), metoprolol tartrate 25 mg twice daily .  Patient does not self-monitor blood pressure.  Patient reports no current medication side effects.  BP Readings from Last 3 Encounters:   12/20/20 (!) 160/85   08/05/20 (!) 162/84   03/11/20 130/80     Hyperlipidemia: Current therapy includes atorvastatin 20 mg daily.  Patient reports no significant myalgias or other side effects.    Recent Labs   Lab Test 12/19/20  0533 02/28/20  0902 09/25/15  0844 09/25/15  0844 06/19/15  0815   CHOL 127 116   < > 134 135   HDL 29* 28*   < > 31* 36*   LDL 74 65   < > 80 83   TRIG 118 113   < > 117 78   CHOLHDLRATIO  --   --   --  4.3 3.8    < > = values in this interval not displayed.     Type 2 Diabetes:  Currently taking Novolog 7 units with breakfast, 6 units with lunch, and 6 units with dinner, Lantus 16 units at breakfast and 10 units at dinner. Followed by   Nati and dose was reduced since being home.  Blood sugar monitorin time(s) daily. Ranges (patient reported):     Date FBG/ 2hours post Lunch/2hours post Dinner /2hours post Bedtime    180 323 180 71 (bun and 2 muffins for snack)    56/96 207 98 160    140 183 125 103    88 208 176 168    75 139 74 86 (3 muffins after)    109 123 51 186    225          Symptoms of low blood sugar? Noticed 56 on Wed morning (sweaty/clammy)  Eye exam: up to date  Foot exam: up to date  Aspirin: Taking 81mg daily and clopidogrel  Statin: Yes: atorvastatin 20 mg   ACEi/ARB: No. Intentionally not prescribed due to 2/2 history of hyperkalemia  Urine Albumin:   Lab Results   Component Value Date    UMALCR 417.81 (H) 2019      Lab Results   Component Value Date    A1C 8.3 2020    A1C 8.7 09/15/2020    A1C 9.0 2020    A1C 9.6 2019    A1C 9.2 2019     GFR Estimate   Date Value Ref Range Status   2020 45 (L) >60 mL/min/[1.73_m2] Final     Urinary Frequency:  Current medications include: finasteride 5 mg daily.  Recalls being seen for prostrate testing in 2018.  Feels he has to go but unable to go.  Home care nurse provided him with some tips and he plans to try them.  PSA   Date Value Ref Range Status   2013 1.23 0 - 4 ug/L Final       Supplements:  Current medications include: Glucoseamine BID Ocuvite daily, vitamin C 1000 mg twice daily, Glucosamine BID.  No concerns this time.      Today's Vitals: There were no vitals taken for this visit.      ASSESSMENT:                            Medication Adherence: No issues identified    CVA: Stable.    Hypertension: Blood pressure at discharge was elevated.  Tolerating new medication. Has not been able to check blood pressure at home.    Hyperlipidemia: Stable.    Type 2 Diabetes: A1c>8%.  Home readings variable with four readings <80 within the past week.  Will send updated numbers to Endocrinologist for  review.    Urinary Frequency: Improving.  He will try tips provided by home care.    Supplementes: Stable.    PLAN:                          Post Discharge Medication Reconciliation Status: discharge medications reconciled, continue medications without change.    1.  Will send home blood sugar readings to Dr. Damian for review, may benefit from decrease in insulin due to recurrent low readings - see phone encounter from 12/30.    I spent 30 minutes with this patient today. All changes were made via collaborative practice agreement with PCP. A copy of the visit note was provided to the patient's primary care provider.    Will follow up as needed.    The patient was mailed a summary of these recommendations.     Iris Gonzalez , Pharm D  [phone] (phone)  [phone] (pager)  Medication Therapy Management Pharmacist     Patient consented to a telehealth visit: yes  Telemedicine Visit Details  Type of service:  Telephone visit  Start Time: 130 pm  End Time: 200 pm  Originating Location (patient location): Home  Distant Location (provider location):  [facility]  Mode of Communication:  Telephone      Medication Therapy Recommendations  Type 2 diabetes mellitus with diabetic polyneuropathy, with long-term current use of insulin (H)    Current Medication: insulin glargine (LANTUS PEN) 100 UNIT/ML pen   Rationale: Dose too high - Dosage too high - Safety   Recommendation: Decrease Dose - Decrease lantus   Status: Contact Provider - Awaiting Response

## 2020-12-28 NOTE — PATIENT INSTRUCTIONS
Recommendations from today's MTM visit:                                                    MTM (medication therapy management) is a service provided by a clinical pharmacist designed to help you get the most of out of your medicines.   Today we reviewed what your medicines are for, how to know if they are working, that your medicines are safe and how to make your medicine regimen as easy as possible.      1.  I did send your recent blood sugar readings to Dr. Damian and will give you a call when I hear back from her about any recommendations with your insulin doses.    It was great to speak with you today.  I value your experience and would be very thankful for your time with providing feedback on our clinic survey. You may receive a survey via email or text message in the next few days.     Next MTM visit: as needed    To schedule another MTM appointment, please call the clinic directly or you may call the MTM scheduling line at [phone] or toll-free at [phone].     My Clinical Pharmacist's contact information:                                                      It was a pleasure talking with you today!  Please feel free to contact me with any questions or concerns you have.      Iris Gonzalez , Pharm D  [phone] (phone)  [phone] (pager)  Medication Therapy Management Pharmacist

## 2020-12-28 NOTE — LETTER
December 28, 2020      Pete Sheldon  [address] [place] [address]        Dear Pete,         It was so nice to speak with you on 12/28/20.  I hope I was able to give you some useful information during our Medication Therapy Management (MTM) visit. The purpose of this visit with a clinical pharmacist was to review the medicines you received when discharged from the hospital. We want to make sure that you know which medicines to take and what they are for. We also want to make sure all your medicines are working, safe, and as easy to take as possible.    Enclosed is a summary of the suggestions we talked about and any other thoughts I had. There is also a list of your medicines included. This information has also been shared with your primary care provider.    Feel free to call if you have any questions or concerns. By working together with you and your doctor, I hope to help you feel confident managing your medicines and improving your quality of life.       Best wishes,                   Iris Gonzalez [facility]

## 2020-12-28 NOTE — TELEPHONE ENCOUNTER
[facility] now requests orders and shares plan of care/discharge summaries for some patients through [email or web address].  Please REPLY TO THIS MESSAGE OR ROUTE BACK TO THE AUTHOR in order to give authorization for orders when needed.  This is considered a verbal order, you will still receive a faxed copy of orders for signature.  Thank you for your assistance in improving collaboration for our patients.    FYI Client has incorrectly taken his 81 mg strength aspirin. He was to take 1 tab daily for 19 days, while taking Plavix, then switch to 325 mg tab after discontinuing Plavix. His spouse incorrectly was giving him 4 tabs of 81 mg strength aspirin along with Plavix. This has now been corrected and he had no signs of bruising or bleeding noted during assessment.     ORDER    Requesting start of care orders.     Skilled Nursing 1 week x 1, 2 week x 1, 2 prn visits related to medication reconciliation, fall prevention, disease education, and general assessment.     PT to eval/treat for fall prevention and strengthening.   OT to eval/treat for home safety adaptive needs  ST to eval/treat for cognitive assessment, difficulty with word finding.     Thank you,   aKla Diaz, RN  [phone]

## 2020-12-30 NOTE — TELEPHONE ENCOUNTER
Called Radha to follow-up on recommendations for adjustments with Pete's insulin from Dr. Damian.

## 2020-12-30 NOTE — TELEPHONE ENCOUNTER
----- Message from Lori Damian MD sent at 2020  2:03 PM CST -----  Regarding: RE: Blood Sugar Review  Hi    This is a tough case.  As you know he has brittle diabetes and is at high risk of hypoglycemia.History of variable blood sugar and have tried different regimens in the past.The blood sugar data that you sent is also variable including fasting blood sugar.  He has habit of having some snacks at night though he does not do that every day.In the setting of fasting blood sugar of 56 noted on  I think it is reasonable to decrease his Lantus to 8 units at dinner (from 10 units).  His prelunch numbers are mostly high-so I think he should increase NovoLog to 8 units with breakfast (from 7 units).  Also discussed continuous glucose monitor with him but he was not ready for that idea.  Let me know if any questions.    Thank you for the note in seeing patient.    Lori Damian MD    ----- Message -----  From: Iris Gonzalez, [facility]  Sent: 2020   2:09 PM CST  To: Lori Damian MD  Subject: Blood Sugar Review                                 Hi Dr. Damian    I spoke with [facility] today to review Pete's medications.  Below is his insulin regimen as you recommended and his readings from the week.      Injecting Novolog 7 units with breakfast, 6 units with lunch, and 6 units with dinner, Lantus 16 units at breakfast and 10 units at dinner.   Blood sugar monitorin time(s) daily. Ranges (patient reported):      Date FBG/ 2hours post Lunch                Dinner                     Bedtime   180                        323                        180                        71 (bun and 2 muffins for snack)   56/96                        207                         98                        160   140                        183                         125                        103   88                        208                         176                         168  12/26 75                        139                           74                          86 (3 muffins after)  12/27 109                        123                           51                        186  12/28 225         I told Radha that I would get you his readings and follow-up with her on any changes you recommended.      Thanks for your thoughts.  Iris

## 2021-01-01 ENCOUNTER — IMMUNIZATION (OUTPATIENT)
Dept: NURSING | Facility: CLINIC | Age: 81
End: 2021-01-01
Attending: INTERNAL MEDICINE
Payer: COMMERCIAL

## 2021-01-01 ENCOUNTER — VIRTUAL VISIT (OUTPATIENT)
Dept: INTERNAL MEDICINE | Facility: CLINIC | Age: 81
End: 2021-01-01
Payer: COMMERCIAL

## 2021-01-01 ENCOUNTER — TELEPHONE (OUTPATIENT)
Dept: INTERNAL MEDICINE | Facility: CLINIC | Age: 81
End: 2021-01-01

## 2021-01-01 ENCOUNTER — TELEPHONE (OUTPATIENT)
Dept: ENDOCRINOLOGY | Facility: CLINIC | Age: 81
End: 2021-01-01

## 2021-01-01 ENCOUNTER — IMMUNIZATION (OUTPATIENT)
Dept: NURSING | Facility: CLINIC | Age: 81
End: 2021-01-01
Payer: COMMERCIAL

## 2021-01-01 ENCOUNTER — TELEPHONE (OUTPATIENT)
Dept: PHARMACY | Facility: CLINIC | Age: 81
End: 2021-01-01

## 2021-01-01 ENCOUNTER — VIRTUAL VISIT (OUTPATIENT)
Dept: NEUROLOGY | Facility: CLINIC | Age: 81
End: 2021-01-01
Attending: NURSE PRACTITIONER
Payer: COMMERCIAL

## 2021-01-01 ENCOUNTER — VIRTUAL VISIT (OUTPATIENT)
Dept: PHARMACY | Facility: CLINIC | Age: 81
End: 2021-01-01
Payer: COMMERCIAL

## 2021-01-01 ENCOUNTER — VIRTUAL VISIT (OUTPATIENT)
Dept: ENDOCRINOLOGY | Facility: CLINIC | Age: 81
End: 2021-01-01
Payer: COMMERCIAL

## 2021-01-01 DIAGNOSIS — I10 BENIGN ESSENTIAL HYPERTENSION: ICD-10-CM

## 2021-01-01 DIAGNOSIS — Z79.4 TYPE 2 DIABETES MELLITUS WITH DIABETIC POLYNEUROPATHY, WITH LONG-TERM CURRENT USE OF INSULIN (H): Primary | ICD-10-CM

## 2021-01-01 DIAGNOSIS — R53.1 WEAKNESS: ICD-10-CM

## 2021-01-01 DIAGNOSIS — Z79.4 TYPE 2 DIABETES MELLITUS WITH DIABETIC POLYNEUROPATHY, WITH LONG-TERM CURRENT USE OF INSULIN (H): ICD-10-CM

## 2021-01-01 DIAGNOSIS — R35.0 URINARY FREQUENCY: ICD-10-CM

## 2021-01-01 DIAGNOSIS — E11.42 TYPE 2 DIABETES MELLITUS WITH DIABETIC POLYNEUROPATHY, WITH LONG-TERM CURRENT USE OF INSULIN (H): Primary | ICD-10-CM

## 2021-01-01 DIAGNOSIS — I63.9 CEREBROVASCULAR ACCIDENT (CVA), UNSPECIFIED MECHANISM (H): Primary | ICD-10-CM

## 2021-01-01 DIAGNOSIS — I63.9 CEREBROVASCULAR ACCIDENT (CVA), UNSPECIFIED MECHANISM (H): ICD-10-CM

## 2021-01-01 DIAGNOSIS — E11.42 TYPE 2 DIABETES MELLITUS WITH DIABETIC POLYNEUROPATHY, WITH LONG-TERM CURRENT USE OF INSULIN (H): ICD-10-CM

## 2021-01-01 DIAGNOSIS — E11.9 TYPE 2 DIABETES, HBA1C GOAL < 8% (H): ICD-10-CM

## 2021-01-01 DIAGNOSIS — I10 HYPERTENSION GOAL BP (BLOOD PRESSURE) < 140/90: ICD-10-CM

## 2021-01-01 DIAGNOSIS — R41.0 ACUTE CONFUSION: ICD-10-CM

## 2021-01-01 DIAGNOSIS — Z86.73 HISTORY OF STROKE: Primary | ICD-10-CM

## 2021-01-01 DIAGNOSIS — I63.9 ACUTE CEREBRAL INFARCTION (H): Primary | ICD-10-CM

## 2021-01-01 DIAGNOSIS — Z53.9 DIAGNOSIS NOT YET DEFINED: Primary | ICD-10-CM

## 2021-01-01 PROCEDURE — 0012A PR COVID VAC MODERNA 100 MCG/0.5 ML IM: CPT

## 2021-01-01 PROCEDURE — 91301 PR COVID VAC MODERNA 100 MCG/0.5 ML IM: CPT

## 2021-01-01 PROCEDURE — 99207 PR MD CERTIFICATION HHA PATIENT: CPT | Performed by: INTERNAL MEDICINE

## 2021-01-01 PROCEDURE — 99606 MTMS BY PHARM EST 15 MIN: CPT | Mod: TEL | Performed by: PHARMACIST

## 2021-01-01 PROCEDURE — 99607 MTMS BY PHARM ADDL 15 MIN: CPT | Performed by: PHARMACIST

## 2021-01-01 PROCEDURE — 99441 PR PHYSICIAN TELEPHONE EVALUATION 5-10 MIN: CPT | Mod: 95 | Performed by: PSYCHIATRY & NEUROLOGY

## 2021-01-01 PROCEDURE — 99605 MTMS BY PHARM NP 15 MIN: CPT | Mod: TEL | Performed by: PHARMACIST

## 2021-01-01 PROCEDURE — 0011A PR COVID VAC MODERNA 100 MCG/0.5 ML IM: CPT

## 2021-01-01 PROCEDURE — 99441 PR PHYSICIAN TELEPHONE EVALUATION 5-10 MIN: CPT | Performed by: INTERNAL MEDICINE

## 2021-01-01 PROCEDURE — 99443 PR PHYSICIAN TELEPHONE EVALUATION 21-30 MIN: CPT | Mod: 95 | Performed by: INTERNAL MEDICINE

## 2021-01-01 RX ORDER — BLOOD SUGAR DIAGNOSTIC
STRIP MISCELLANEOUS
Qty: 400 STRIP | Refills: 3 | Status: SHIPPED | OUTPATIENT
Start: 2021-01-01 | End: 2021-01-01

## 2021-01-01 RX ORDER — INSULIN ASPART 100 [IU]/ML
INJECTION, SOLUTION INTRAVENOUS; SUBCUTANEOUS
Qty: 15 ML | Refills: 1 | Status: SHIPPED | OUTPATIENT
Start: 2021-01-01 | End: 2021-01-01

## 2021-01-01 RX ORDER — PEN NEEDLE, DIABETIC 31 GX5/16"
NEEDLE, DISPOSABLE MISCELLANEOUS
Qty: 400 EACH | Refills: 1 | Status: SHIPPED | OUTPATIENT
Start: 2021-01-01

## 2021-01-01 RX ORDER — INSULIN ASPART 100 [IU]/ML
INJECTION, SOLUTION INTRAVENOUS; SUBCUTANEOUS
Qty: 15 ML | Refills: 3 | Status: SHIPPED | OUTPATIENT
Start: 2021-01-01

## 2021-01-01 RX ORDER — GLUCOSAMINE HCL/CHONDROITIN SU 500-400 MG
CAPSULE ORAL
Qty: 400 EACH | Refills: 3 | Status: SHIPPED | OUTPATIENT
Start: 2021-01-01

## 2021-01-01 RX ORDER — AMLODIPINE BESYLATE 5 MG/1
5 TABLET ORAL DAILY
Qty: 30 TABLET | Refills: 1 | Status: SHIPPED | OUTPATIENT
Start: 2021-01-01 | End: 2021-01-01

## 2021-01-01 RX ORDER — METOPROLOL TARTRATE 25 MG/1
TABLET, FILM COATED ORAL
Qty: 180 TABLET | Refills: 0 | Status: SHIPPED | OUTPATIENT
Start: 2021-01-01

## 2021-01-01 RX ORDER — FINASTERIDE 5 MG/1
1 TABLET, FILM COATED ORAL DAILY
Qty: 90 TABLET | Refills: 1 | Status: SHIPPED | OUTPATIENT
Start: 2021-01-01

## 2021-01-04 NOTE — TELEPHONE ENCOUNTER
[facility] now requests orders and shares plan of care/discharge summaries for some patients through [employer].  Please REPLY TO THIS MESSAGE OR ROUTE BACK TO THE AUTHOR in order to give authorization for orders when needed.  This is considered a verbal order, you will still receive a faxed copy of orders for signature.  Thank you for your assistance in improving collaboration for our patients.    ORDER    OT 1w1, 2w2 for R hand HEP, ADL/IADL safety, cognitive strategies, caregiver education.    Holley Grullon, OTR/L  [phone]

## 2021-01-05 NOTE — TELEPHONE ENCOUNTER
----- Message from Lori Damian MD sent at 1/5/2021  2:29 PM CST -----  Regarding: RE: Blood Sugars  FBG still low. Noted 53 on 1/5.  Need to decrease Lantus at night to 6 units.    In terms of Novolog I think he needs to decrease dinner dose to 5 units ( noted 49 on 12/21 after dinner at bedtime)    Let me know if you have any questions.    Lori Damian MD  ----- Message -----  From: Iris Gonzalez, [facility]  Sent: 1/5/2021  11:47 AM CST  To: Lori Damian MD  Subject: Blood Sugars                                     Dr. Damian - here are Peet's blood sugars over the past week.  He continues to have asymptomatic lows even with the dose decrease last week.  Let me know what you recommend for changes.          Currently taking Novolog 8 units with breakfast, 6 units with lunch, and 6 units with dinner, Lantus 16 units at breakfast and 8 units at dinner (on 1/3 accidentally gave him 10 units of Lantus with dinner).   Radha dials doses of insulin in pen and Pete injects; she checks pen after dosing to make sure it is at 'zero'.  Doses Novolog before eating.                Fasting  Before Lunch     Before Dinner                                          Bedtime      1/5 53        1/4 79   205                  162                                                            240  1/3 91   169                  62(#10 units of Lantus by mistake) 63  1/2 309   345                  96                                                             77  1/1 205   166                  68                                                            288  12/31 159   268                  98                                                            49  12/30 100   174                  85                                                             91         Iris Gonzalez , Pharm D  [phone] (phone)  [phone] (pager)  Medication Therapy Management Pharmacist

## 2021-01-05 NOTE — PROGRESS NOTES
Medication Therapy Management (MTM) Encounter    ASSESSMENT/PLAN:                            Medication Adherence/Access: No issues identified    CVA:  Reviewed transition from clopidogrel to high-dose ASA today, she is comfortable with the change.    Hypertension: improved home readings.  Continue current regimen.    Type 2 Diabetes:  Will send home readings to Dr. Damian to review. Concerned with ongoing asymptomatic lows.      PLAN:   1.  Will have Dr. Damian review home blood sugars    Will follow up in 1 month or sooner if needed.    SUBJECTIVE/OBJECTIVE:                           Pete Sheldon is a 80 year old male called for a follow-up visit. He was referred to me from Dr. Helm.  Today's visit is a follow-up MTM visit from 12/28/20.  First visit in 2021. Spoke with Radha today.    Reason for visit: follow-up on blood sugars      Allergies/ADRs: Reviewed in chart  Tobacco: He reports that he quit smoking about 67 years ago. He smoked 0.00 packs per day. He has never used smokeless tobacco.    Alcohol: not currently using      Medication Adherence/Access: no issues reported    CVA:  Current medications include: aspirin 81 mg daily (for 19 days then increase to 325 mg daily), clopidogrel 75 mg (for 19 days).  No bleeding at this time. No falls since being home.  Radha was aware of change in ASA dose and has the change written on her calendar.    Hypertension: Current medications include amlodipine 5 mg daily (new), metoprolol tartrate 25 mg twice daily .  Patient does not self-monitor blood pressure.  Home care nurse does check at visits - unsure of readings but understand it has been 'okay'  Patient reports no current medication side effects.  BP Readings from Last 3 Encounters:   12/20/20 (!) 160/85   08/05/20 (!) 162/84   03/11/20 130/80     Type 2 Diabetes:  Currently taking Novolog 8 units with breakfast, 6 units with lunch, and 6 units with dinner, Lantus 16 units at breakfast and 8 units at  dinner (on 1/3 accidentally gave him 10 units of Lantus with dinner).   Radha loads doses of insulin in pen and Pete injects.  Doses Novolog before eating.  Followed by Dr Damian.  Blood sugar monitorin time(s) daily. Ranges (patient reported): CGM has been discussed but they are a bit hesitant.    Date FBG/ 2hours post Lunch/2hours post Dinner /2hours post Bedtime    53       79 205 162 240   1/3 91 169 62(*10 units of Lantus) 63    309 345 96 77    205 166 68 288    159 268 98 49    100 174 85 91     Eats consistently, good appetite.      Symptoms of low blood sugar? No symptoms when his reading was 53 this morning.  Last time he had symptoms was  when he had a low reading.  Eye exam: up to date  Foot exam: up to date  Aspirin: Taking 81mg daily and clopidogrel  Statin: Yes: atorvastatin 20 mg   ACEi/ARB: No. Intentionally not prescribed due to 2/2 history of hyperkalemia  Urine Albumin:   Lab Results   Component Value Date    UMALCR 417.81 (H) 2019      Lab Results   Component Value Date    A1C 8.3 2020    A1C 8.7 09/15/2020    A1C 9.0 2020    A1C 9.6 2019    A1C 9.2 2019     GFR Estimate   Date Value Ref Range Status   2020 45 (L) >60 mL/min/[1.73_m2] Final             I spent 20 minutes with this patient today. All changes were made via collaborative practice agreement with PCP. A copy of the visit note was provided to the patient's primary care provider.    The patient declined a summary of these recommendations.     Iris Gonzalez , Pharm D  [phone] (phone)  [phone] (pager)  Medication Therapy Management Pharmacist     Patient consented to a telehealth visit: yes  Telemedicine Visit Details  Type of service:  Telephone visit  Start Time: 1124 am  End Time: 11:44 AM  Originating Location (patient location): [place]  Distant Location (provider location):  [facility]  Mode of Communication:  Telephone      Medication Therapy  Recommendations  Type 2 diabetes mellitus with diabetic polyneuropathy, with long-term current use of insulin (H)    Current Medication: insulin glargine (LANTUS PEN) 100 UNIT/ML pen   Rationale: Dose too high - Dosage too high - Safety   Recommendation: Decrease Dose - Lantus reduce to 6 units at dinner and Novolog reduce to 5 units with lunch and dinner   Status: Contact Provider - Awaiting Response

## 2021-01-05 NOTE — LETTER
"        Date: 2021    Pete Sheldon  [address] [place] [place] [address]    Dear Mr. Sheldon,    Thank you for talking with me on 21 about your health and medications. Medicare s MTM (Medication Therapy Management) program helps you understand your medications and use them safely.      This letter includes an action plan (Medication Action Plan) and medication list (Personal Medication List). The action plan has steps you should take to help you get the best results from your medications. The medication list will help you keep track of your medications and how to use them the right way.       Have your action plan and medication list with you when you talk with your doctors, pharmacists, and other healthcare providers in your care team.     Ask your doctors, pharmacists, and other healthcare providers to update the action plan and medication list at every visit.     Take your medication list with you if you go to the hospital or emergency room.     Give a copy of the action plan and medication list to your family or caregivers.     If you want to talk about this letter or any of the papers with it, please call   [phone].We look forward to working with you, your doctors, and other healthcare providers to help you stay healthy through the [facility] MTM program.    Sincerely,  Iris Gonzalez [facility]    Enclosed: Medication Action Plan and Personal Medication List    MEDICATION ACTION PLAN FOR Pete Sheldon,  1940     This action plan will help you get the best results from your medications if you:   1. Read \"What we talked about.\"   2. Take the steps listed in the \"What I need to do\" boxes.   3. Fill in \"What I did and when I did it.\"   4. Fill in \"My follow-up plan\" and \"Questions I want to ask.\"     Have this action plan with you when you talk with your doctors, pharmacists, and other healthcare providers in your care team. Share this with your family or caregivers too.  DATE PREPARED: " 2021  What we talked about: Low blood sugars                                                 What I need to do: I will reach out to Dr. Damian with your recent readings and call you back with any recommended changes to your insulin       What I did and when I did it:                                              My follow-up plan:                 Questions I want to ask:              If you have any questions about your action plan, call Iris Gonzalez [facility], Phone: [phone] , Monday-Friday 8-4:30pm.           PERSONAL MEDICATION LIST FOR AMANDA Neri 1940     This medication list was made for you after we talked. We also used information from your doctor's chart.      Use blank rows to add new medications. Then fill in the dates you started using them.    Cross out medications when you no longer use them. Then write the date and why you stopped using them.    Ask your doctors, pharmacists, and other healthcare providers to update this list at every visit. Keep this list up-to-date with:       Prescription medications    Over the counter drugs     Herbals    Vitamins    Minerals      If you go to the hospital or emergency room, take this list with you. Share this with your family or caregivers too.     DATE PREPARED: 2021  Allergies or side effects: Losartan     Medication:  AMLODIPINE BESYLATE 5 MG PO TABS      How I use it:  Take 1 tablet (5 mg) by mouth daily      Why I use it: Benign essential hypertension    Prescriber:  Alise Malone PA-C      Date I started using it:       Date I stopped using it:         Why I stopped using it:            Medication:  ASPIRIN 325 MG PO TBEC      How I use it:  Take 1 tablet (325 mg) by mouth daily      Why I use it: Cerebrovascular accident (CVA), unspecified mechanism (H)    Prescriber:  Alise Malone PA-C      Date I started using it:       Date I stopped using it:         Why I stopped using it:            Medication:  ASPIRIN 81  MG PO CHEW      How I use it:  Take 1 tablet (81 mg) by mouth daily for 19 days      Why I use it: 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)    Prescriber:  Alise Malone PA-C      Date I started using it:       Date I stopped using it:         Why I stopped using it:            Medication:  ATORVASTATIN CALCIUM 20 MG PO TABS      How I use it:  Take 1 tablet (20 mg) by mouth daily      Why I use it: 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      Why I use it: Type 2 diabetes, HbA1C goal < 8% (H)    Prescriber:  Rom Helm MD      Date I started using it:       Date I stopped using it:         Why I stopped using it:            Medication:  CLOPIDOGREL BISULFATE 75 MG PO TABS      How I use it:  Take 1 tablet (75 mg) by mouth daily for 19 days      Why I use it: Cerebrovascular accident (CVA), unspecified mechanism (H)    Prescriber:  Alise Malone PA-C      Date I started using it:       Date I stopped using it:         Why I stopped using it:            Medication:  FINASTERIDE 5 MG PO TABS      How I use it:  Take 1 tablet by mouth once daily      Why I use it: Urinary frequency    Prescriber:  Clovis Montenegro MD      Date I started using it:       Date I stopped using it:         Why I stopped using it:            Medication:  GLUCOSAMINE  MG PO TABS      How I use it:  Take 750 mg by mouth 2 times daily      Why I use it: Arthritis    Prescriber:  Yue Acevedo DO      Date I started using it:       Date I stopped using it:         Why I stopped using it:            Medication:  GLUCOSE MANAGEMENT PO TABS      How I use it:  4 tabs po  for low blood sugar below 70, may repeat q 10-15 minutes as needed      Why I use it: Type 2 diabetes mellitus with diabetic polyneuropathy, with long-term current use of insulin (H)    Prescriber:  Yue Acevedo DO      Date I started using it:       Date I stopped using it:         Why I stopped using it:            Medication:  INSULIN GLARGINE  UNIT/ML SC SOPN      How I use it:  Lantus 16 unit(s) at breakfast and 8 unit(s) at dinner.      Why I use it: Type 2 diabetes mellitus with diabetic polyneuropathy, with long-term current use of insulin (H)    Prescriber:  Rom Helm MD      Date I started using it:       Date I stopped using it:         Why I stopped using it:            Medication:  INSULIN PEN NEEDLE 31G X 8 MM MISC      How I use it:  B-D ULTRA FINE SHORT PEN NEEDLES, USE 4 TIMES A DAY TO INJECT INSULIN      Why I use it: Type 2 diabetes, HbA1C goal < 8% (H)    Prescriber:  Rom Helm MD      Date I started using it:       Date I stopped using it:         Why I stopped using it:            Medication:  INSULIN SYRINGES (DISPOSABLE) U-100 0.3 ML MISC      How I use it:  1 each 2 times daily      Why I use it: Type II or unspecified type diabetes mellitus without mention of complication, not stated as uncontrolled    Prescriber:  Rom Helm MD      Date I started using it:       Date I stopped using it:         Why I stopped using it:            Medication:  METOPROLOL TARTRATE 25 MG PO TABS      How I use it:  Take 1 tablet (25 mg) by mouth 2 times daily      Why I use it: Hypertension goal BP (blood pressure) < 140/90    Prescriber:  Rom Helm MD      Date I started using it:       Date I stopped using it:         Why I stopped using it:            Medication:  NOVOLOG FLEXPEN 100 UNIT/ML SC SOPN      How I use it:  Take Novolog 8 unit(s) am, 6 unit(s) at lunch, and 6 unit(s) at dinner.      Why I use it: Type 2 diabetes mellitus with diabetic polyneuropathy, with  long-term current use of insulin (H)    Prescriber:  Rom Helm MD      Date I started using it:       Date I stopped using it:         Why I stopped using it:            Medication:  OCUVITE PO TABS      How I use it:  Take 1 tablet by mouth daily FOCUS SELECT PREMIUM WITH LUTEIN per eye doctor  Reported on 5/19/2017      Why I use it:  Eye Health    Prescriber:  Patient Reported      Date I started using it:       Date I stopped using it:         Why I stopped using it:            Medication:  ONETOUCH FINEPOINT LANCETS MISC      How I use it:  Use to test blood sugars 4 times daily or as directed.      Why I use it: Type 2 diabetes, HbA1C goal < 8% (H)    Prescriber:  Rom Helm MD      Date I started using it:       Date I stopped using it:         Why I stopped using it:            Medication:  ONETOUCH VERIO VI STRP      How I use it:  USE 1 STRIP TO CHECK GLUCOSE 4 TIMES DAILY OR  AS  DIRECTED  USING  ONE  TOUCH  VERIO  FLEX      Why I use it: Type 2 diabetes mellitus with diabetic polyneuropathy, with long-term current use of insulin (H)    Prescriber:  Lori Damian MD      Date I started using it:       Date I stopped using it:         Why I stopped using it:            Medication:  VITAMIN C 1000 MG PO TABS      How I use it:  Take 1,000 mg by mouth 2 times daily      Why I use it:  Diet Supplement    Prescriber:  Patient Reported      Date I started using it:       Date I stopped using it:         Why I stopped using it:            Medication:         How I use it:         Why I use it:      Prescriber:         Date I started using it:       Date I stopped using it:         Why I stopped using it:            Medication:         How I use it:         Why I use it:      Prescriber:         Date I started using it:       Date I stopped using it:         Why I stopped using it:            Medication:         How I use it:         Why I use it:      Prescriber:         Date I started using  it:       Date I stopped using it:         Why I stopped using it:              Other Information:     If you have any questions about your medication list, call Iris Gonzalez [facility], Phone: [phone] , Monday-Friday 8-4:30pm.    According to the Paperwork Reduction Act of 1995, no persons are required to respond to a collection of information unless it displays a valid OMB control number. The valid OMB number for this information collection is 0037-1701. The time required to complete this information collection is estimated to average 40 minutes per response, including the time to review instructions, searching existing data resources, gather the data needed, and complete and review the information collection. If you have any comments concerning the accuracy of the time estimate(s) or suggestions for improving this form, please write to: CMS, Attn: RODNEY Reports Clearance Officer, [address] [place] [phone].

## 2021-01-05 NOTE — TELEPHONE ENCOUNTER
Called Radha to review the recommendations from Dr. Damian.  She made notes while we were talking.

## 2021-01-07 NOTE — PROGRESS NOTES
"Telephone visit: 10 minute conversation.   1620---1630    Pete Sheldon is a 80 year old male who is being evaluated via a billable telephone visit.      What phone number would you like to be contacted at? [phone]  How would you like to obtain your AVS? MyChart     Assessment & Plan   (I63.9) Acute cerebral infarction (H)  (primary encounter diagnosis)  Comment: Stable. Continue therapy and current meds.   Plan: F/u with Neurology as planned.     (R53.1) Weakness  (R41.0) Acute confusion  Comment: Symptoms apparently related to acute stroke. Continue therapy and current meds.   6}     BMI:   Estimated body mass index is 22.01 kg/m  as calculated from the following:    Height as of 3/11/20: 1.702 m (5' 7\").    Weight as of 12/19/20: 63.7 kg (140 lb 8 oz).         There are no Patient Instructions on file for this visit.    No follow-ups on file.    Rom Helm MD, MD  [facility]     Pete Sheldon is a 80 year old who presents to clinic today for the following health issues  accompanied by his spouse:  Patient is being seen for an hospital follow up  HPI         Hospital Follow-up Visit:    Hospital/Nursing Home/ Rehab Facility: [facility]  Date of Admission: 12/18/2020  Date of Discharge: 12/20/2020   Reason(s) for Admission: Cerebrovascular accident (CVA), unspecified mechanism (H)        Was your hospitalization related to COVID-19? No   Problems taking medications regularly:  None  Medication changes since discharge:   START taking these medications     Details   amLODIPine (NORVASC) 5 MG tablet Take 1 tablet (5 mg) by mouth daily  Qty: 30 tablet, Refills: 1     Associated Diagnoses: Benign essential hypertension       aspirin (ASA) 325 MG EC tablet Take 1 tablet (325 mg) by mouth daily  Qty:       Associated Diagnoses: Cerebrovascular accident (CVA), unspecified mechanism (H)       clopidogrel (PLAVIX) 75 MG tablet Take 1 tablet (75 mg) by " mouth daily for 19 days  Qty: 19 tablet, Refills: 0     Associated Diagnoses: Cerebrovascular accident (CVA), unspecified mechanism (H)                CONTINUE these medications which have CHANGED     Details   aspirin (ASA) 81 MG chewable tablet Take 1 tablet (81 mg) by mouth daily for 19 days  Qty: 19 tablet, Refills: 0     Associated Diagnoses: 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)       !! insulin aspart (NOVOLOG FLEXPEN) 100 UNIT/ML pen Inject 8 Units Subcutaneous daily (with breakfast)       !! insulin aspart (NOVOLOG FLEXPEN) 100 UNIT/ML pen Inject 7 Units Subcutaneous daily (with lunch)  Refills: 0       !! insulin aspart (NOVOLOG FLEXPEN) 100 UNIT/ML pen Inject 7 Units Subcutaneous daily (with dinner)       !! insulin glargine (LANTUS PEN) 100 UNIT/ML pen Inject 18 Units Subcutaneous every morning     Comments: If Lantus is not covered by insurance, may substitute Basaglar at same dose and frequency.         !! insulin glargine (LANTUS PEN) 100 UNIT/ML pen Inject 14 Units Subcutaneous daily (with dinner)     Comments: If Lantus is not covered by insurance, may substitute Basaglar at same dose and frequency.          !! - Potential duplicate medications found. Please discuss with provider.       Problems adhering to non-medication therapy:  None    Summary of hospitalization:  [facility] hospital discharge summary reviewed  Diagnostic Tests/Treatments reviewed.  Follow up needed: F/u with Yolis Mtz CNP on 3/22/2021  Other Healthcare Providers Involved in Patient s Care:         Specialist appointment - Neurology  Update since discharge: stable. Post Discharge Medication Reconciliation: discharge medications reconciled and changed, per note/orders.  Plan of care communicated with patient and wife        We reviewed his recent hospitalization from 12/18-12/20. The patient had presented with increased confusion and weakness after a fall at home. Cerebral imaging showed  "\"acute ischemic infarcts in three regions of the brain\".    He reports feeling a bit better. No chest pain, dyspnea, palpitations or dizziness. No cough. No subsequent acute focal neurologic symptoms.     Past medical, family and social histories as well as medications reviewed and updated as needed.    Review of Systems   REVIEW OF SYSTEMS: The following systems have been completely reviewed and are negative except as noted above:   Constitutional, respiratory, cardiovascular, musculoskeletal, hematologic, and neurologic systems.        Objective       Vitals:  No vitals were obtained today due to virtual visit.    Physical Exam     PSYCH: Alert and oriented times 3; coherent speech, normal   rate and volume, able to articulate logical thoughts  RESP: No cough, no audible wheezing, able to talk in full sentences  Remainder of exam unable to be completed due to telephone visits    Admission on 12/18/2020, Discharged on 12/20/2020   Component Date Value Ref Range Status     WBC 12/18/2020 8.5  4.0 - 11.0 10e9/L Final     RBC Count 12/18/2020 5.13  4.4 - 5.9 10e12/L Final     Hemoglobin 12/18/2020 15.9  13.3 - 17.7 g/dL Final     Hematocrit 12/18/2020 48.5  40.0 - 53.0 % Final     MCV 12/18/2020 95  78 - 100 fl Final     MCH 12/18/2020 31.0  26.5 - 33.0 pg Final     MCHC 12/18/2020 32.8  31.5 - 36.5 g/dL Final     RDW 12/18/2020 12.3  10.0 - 15.0 % Final     Platelet Count 12/18/2020 239  150 - 450 10e9/L Final     Diff Method 12/18/2020 Automated Method   Final     % Neutrophils 12/18/2020 45.1  % Final     % Lymphocytes 12/18/2020 30.3  % Final     % Monocytes 12/18/2020 10.8  % Final     % Eosinophils 12/18/2020 13.0  % Final     % Basophils 12/18/2020 0.7  % Final     % Immature Granulocytes 12/18/2020 0.1  % Final     Nucleated RBCs 12/18/2020 0  0 /100 Final     Absolute Neutrophil 12/18/2020 3.8  1.6 - 8.3 10e9/L Final     Absolute Lymphocytes 12/18/2020 2.6  0.8 - 5.3 10e9/L Final     Absolute Monocytes " 12/18/2020 0.9  0.0 - 1.3 10e9/L Final     Absolute Eosinophils 12/18/2020 1.1* 0.0 - 0.7 10e9/L Final     Absolute Basophils 12/18/2020 0.1  0.0 - 0.2 10e9/L Final     Abs Immature Granulocytes 12/18/2020 0.0  0 - 0.4 10e9/L Final     Absolute Nucleated RBC 12/18/2020 0.0   Final     ABO 12/18/2020 O   Final     RH(D) 12/18/2020 Neg   Final     Antibody Screen 12/18/2020 Neg   Final     Test Valid Only At 12/18/2020 [facility]      Final     Specimen Expires 12/18/2020 12/21/2020   Final     Sodium 12/18/2020 137  133 - 144 mmol/L Final     Potassium 12/18/2020 4.1  3.4 - 5.3 mmol/L Final     Chloride 12/18/2020 106  94 - 109 mmol/L Final     Carbon Dioxide 12/18/2020 31  20 - 32 mmol/L Final     Anion Gap 12/18/2020 <1* 3 - 14 mmol/L Final     Glucose 12/18/2020 225* 70 - 99 mg/dL Final     Urea Nitrogen 12/18/2020 25  7 - 30 mg/dL Final     Creatinine 12/18/2020 1.43* 0.66 - 1.25 mg/dL Final     GFR Estimate 12/18/2020 45* >60 mL/min/[1.73_m2] Final    Comment: Non  GFR Calc  Starting 12/18/2018, serum creatinine based estimated GFR (eGFR) will be   calculated using the Chronic Kidney Disease Epidemiology Collaboration   (CKD-EPI) equation.       GFR Estimate If Black 12/18/2020 52* >60 mL/min/[1.73_m2] Final    Comment:  GFR Calc  Starting 12/18/2018, serum creatinine based estimated GFR (eGFR) will be   calculated using the Chronic Kidney Disease Epidemiology Collaboration   (CKD-EPI) equation.       Calcium 12/18/2020 9.0  8.5 - 10.1 mg/dL Final     Troponin I ES 12/18/2020 <0.015  0.000 - 0.045 ug/L Final    Comment: The 99th percentile for upper reference range is 0.045 ug/L.  Troponin values   in the range of 0.045 - 0.120 ug/L may be associated with risks of adverse   clinical events.       Interpretation ECG 12/18/2020 Click View Image link to view waveform and result   Final     Color Urine 12/18/2020 Light Yellow   Final     Appearance Urine 12/18/2020 Clear    Final     Glucose Urine 12/18/2020 Negative  NEG^Negative mg/dL Final     Bilirubin Urine 12/18/2020 Negative  NEG^Negative Final     Ketones Urine 12/18/2020 Negative  NEG^Negative mg/dL Final     Specific Gravity Urine 12/18/2020 1.030  1.003 - 1.035 Final     Blood Urine 12/18/2020 Negative  NEG^Negative Final     pH Urine 12/18/2020 5.5  5.0 - 7.0 pH Final     Protein Albumin Urine 12/18/2020 30* NEG^Negative mg/dL Final     Urobilinogen mg/dL 12/18/2020 Normal  0.0 - 2.0 mg/dL Final     Nitrite Urine 12/18/2020 Negative  NEG^Negative Final     Leukocyte Esterase Urine 12/18/2020 Negative  NEG^Negative Final     Source 12/18/2020 Midstream Urine   Final     WBC Urine 12/18/2020 <1  0 - 5 /HPF Final     RBC Urine 12/18/2020 <1  0 - 2 /HPF Final     Mucous Urine 12/18/2020 Present* NEG^Negative /LPF Final     Glucose 12/18/2020 201* 70 - 99 mg/dL Final    Dr/RN Notified     INR 12/18/2020 1.14  0.86 - 1.14 Final     PTT 12/18/2020 34  22 - 37 sec Final     Creatinine 12/18/2020 1.5* 0.66 - 1.25 mg/dL Final     GFR Estimate 12/18/2020 45* >60 mL/min/[1.73_m2] Final     GFR Estimate If Black 12/18/2020 54* >60 mL/min/[1.73_m2] Final     COVID-19 Virus PCR to [facility] - So* 12/18/2020 Nasopharyngeal   Final     COVID-19 Virus PCR to [facility] - Re* 12/18/2020 Test received-See reflex to IDDL test SARS CoV2 (COVID-19) Virus RT-PCR   Final     Bilirubin Direct 12/18/2020 0.1  0.0 - 0.2 mg/dL Final     Bilirubin Total 12/18/2020 0.7  0.2 - 1.3 mg/dL Final     Albumin 12/18/2020 3.2* 3.4 - 5.0 g/dL Final     Protein Total 12/18/2020 8.2  6.8 - 8.8 g/dL Final     Alkaline Phosphatase 12/18/2020 113  40 - 150 U/L Final     ALT 12/18/2020 29  0 - 70 U/L Final     AST 12/18/2020 21  0 - 45 U/L Final     TSH 12/18/2020 6.18* 0.40 - 4.00 mU/L Final     T4 Free 12/18/2020 1.11  0.76 - 1.46 ng/dL Final     Troponin I ES 12/18/2020 <0.015  0.000 - 0.045 ug/L Final    Comment: The 99th percentile for upper reference range is 0.045  ug/L.  Troponin values   in the range of 0.045 - 0.120 ug/L may be associated with risks of adverse   clinical events.       Glucose 12/18/2020 218* 70 - 99 mg/dL Final     Hemoglobin A1C 12/18/2020 8.3* 0 - 5.6 % Final    Comment: Normal <5.7% Prediabetes 5.7-6.4%  Diabetes 6.5% or higher - adopted from ADA   consensus guidelines.       TSH 12/18/2020 3.28  0.40 - 4.00 mU/L Final     Troponin I ES 12/19/2020 <0.015  0.000 - 0.045 ug/L Final    Comment: The 99th percentile for upper reference range is 0.045 ug/L.  Troponin values   in the range of 0.045 - 0.120 ug/L may be associated with risks of adverse   clinical events.       Glucose 12/18/2020 238* 70 - 99 mg/dL Final     SARS-CoV-2 Virus Specimen Source 12/18/2020 Nasopharyngeal   Final     SARS-CoV-2 PCR Result 12/18/2020 NEGATIVE   Final    SARS-CoV2 (COVID-19) RNA not detected, presumed negative.     SARS-CoV-2 PCR Comment 12/18/2020    Final                    Value:Testing was performed using the Aptima SARS-CoV-2 Assay on the [employer] Instrument System.   Additional information about this Emergency Use Authorization (EUA) assay can be found via   the Lab Guide.      Comment: This test should be ordered for the detection of SARS-CoV-2 in individuals who   meet SARS-CoV-2 clinical and/or epidemiological criteria. Test performance is   unknown in asymptomatic patients.  This test is for in vitro diagnostic use under the FDA EUA for laboratories   certified under CLIA to perform high complexity testing. This test has not   been FDA cleared or approved.  A negative result does not rule out the presence of PCR inhibitors in the   specimen or target RNA in concentration below the limit of detection for the   assay. The possibility of a false negative should be considered if the   patient's recent exposure or clinical presentation suggests COVID-19.  This test was validated by the [facility]   Diagnostic Laboratory. This laboratory is  certified under the Clinical   Laboratory Improvement Amendments of 1988 (CLIA-88) as qualified to perform   high complexity laboratory testing.       Cholesterol 12/19/2020 127  <200 mg/dL Final     Triglycerides 12/19/2020 118  <150 mg/dL Final     HDL Cholesterol 12/19/2020 29* >39 mg/dL Final     LDL Cholesterol Calculated 12/19/2020 74  <100 mg/dL Final    Desirable:       <100 mg/dl     Non HDL Cholesterol 12/19/2020 98  <130 mg/dL Final     Glucose 12/19/2020 375* 70 - 99 mg/dL Final     Glucose 12/19/2020 455* 70 - 99 mg/dL Final     Glucose 12/19/2020 358* 70 - 99 mg/dL Final     Glucose 12/19/2020 298* 70 - 99 mg/dL Final     Glucose 12/19/2020 267* 70 - 99 mg/dL Final     Glucose 12/20/2020 293* 70 - 99 mg/dL Final     Glucose 12/20/2020 244* 70 - 99 mg/dL Final     Glucose 12/20/2020 163* 70 - 99 mg/dL Final             Phone call duration: 10 minutes

## 2021-01-07 NOTE — TELEPHONE ENCOUNTER
Holley called to check on status of orders for OT, she is seeing patient tomorrow. Please respond today.

## 2021-02-04 NOTE — PROGRESS NOTES
Medication Therapy Management (MTM) Encounter    ASSESSMENT:                            Medication Adherence/Access: No issues identified    Diabetes: Recommend reducing Lantus to 5 units with dinner.  Will continue current Novolog regimen, may need to consider reducing lunch and dinner doses if he continues to have low readings but only one low over the past month at these times.    PLAN:                            1.  Lantus 16 units every morning and 5 units with dinner    Follow-up: 1 month    SUBJECTIVE/OBJECTIVE:                          Pete Sheldon is a 80 year old male called for a follow-up visit. He was referred to me from Dr. Helm.  Today's visit is a follow-up MTM visit from 21.  Bonilla, his wife joined the visit today.     Reason for visit: diabetes follow-up    Allergies/ADRs: Reviewed in chart  Tobacco: He reports that he quit smoking about 67 years ago. He smoked 0.00 packs per day. He has never used smokeless tobacco.  Alcohol: not currently using      Medication Adherence/Access: no issues reported    Type 2 Diabetes:  Currently taking Novolog 8 units with breakfast, 6 units with lunch, and 5 units with dinner, Lantus 16 units at breakfast and 6 units at dinner.   Radha loads doses of insulin in pen and Pete injects.  Doses Novolog before eating.  Followed by Dr Damian.  Blood sugar monitorin time(s) daily. Ranges (patient reported): CGM has been discussed but they are a bit hesitant.    Reports FASTING BLOOD GLUCOSE was 64 today.  Also had a FASTING BLOOD GLUCOSE of 58 on .  Patient does not feel the lows.    One low before dinner at 43 on  and one before bed at 56 on .    His readings can swing dramatically, the highest 344 before lunch on .    Eats consistently, good appetite.      Symptoms of low blood sugar? No symptoms when his reading was 53 this morning.  Last time he had symptoms was  when he had a low reading.  Eye exam: up to date  Foot exam: up  to date  Aspirin: Taking 81mg daily and clopidogrel  Statin: Yes: atorvastatin 20 mg   ACEi/ARB: No. Intentionally not prescribed due to 2/2 history of hyperkalemia  Urine Albumin:   Lab Results   Component Value Date    UMALCR 417.81 (H) 11/26/2019      Lab Results   Component Value Date    A1C 8.3 12/18/2020    A1C 8.7 09/15/2020    A1C 9.0 02/28/2020    A1C 9.6 11/26/2019    A1C 9.2 09/04/2019     GFR Estimate   Date Value Ref Range Status   12/18/2020 45 (L) >60 mL/min/[1.73_m2] Final       Today's Vitals: There were no vitals taken for this visit.  ----------------        I spent 15 minutes with this patient today. All changes were made via collaborative practice agreement with PCP. A copy of the visit note was provided to the patient's primary care provider.    The patient declined a summary of these recommendations.     Iris Gonzalez , Pharm D  [phone] (phone)  [phone] (pager)  Medication Therapy Management Pharmacist     Telemedicine Visit Details  Type of service:  Telephone visit  Start Time: 302pm  End Time: 3:17 PM  Originating Location (patient location): Home  Distant Location (provider location):  [facility] [place]      Medication Therapy Recommendations  Type 2 diabetes mellitus with diabetic polyneuropathy, with long-term current use of insulin (H)    Current Medication: insulin glargine (LANTUS PEN) 100 UNIT/ML pen   Rationale: Dose too high - Dosage too high - Safety   Recommendation: Decrease Dose - Lantus 16 units every morning and 5 units every evening with dinner   Status: Accepted per CPA

## 2021-02-10 NOTE — TELEPHONE ENCOUNTER
Patients spouse, Radha calls requesting phone call from PCP to receive Cardiac Event Monitor Results.

## 2021-02-12 NOTE — TELEPHONE ENCOUNTER
Pending Prescriptions:                       Disp   Refills    amLODIPine (NORVASC) 5 MG tablet           30 tab*1        Sig: Take 1 tablet (5 mg) by mouth daily    Routing refill request to provider for review/approval because:  Patient fails protocol    BP Readings from Last 3 Encounters:   12/20/20 (!) 160/85   08/05/20 (!) 162/84   03/11/20 130/80

## 2021-02-14 NOTE — TELEPHONE ENCOUNTER
RN: please advise patient or wife:    No atrial fibrillation during the monitoring--no need to add a blood thinner like warfarin.   Occasional early beats (PAC's or PVC's) and brief runs of ventricular tachycardia noted.   No need for additional tests or medications at this time.

## 2021-02-15 NOTE — TELEPHONE ENCOUNTER
Radha advised of results of heart monitor.   She asks about COVID-19 vaccine -they do not have Mychart. Informed her appointments are very limited, but provided phone number to call to check for Covid-19 vaccine appointments - [phone].     She asks if patient need to continue to take Amlodipine or not. Routed to Dr. Helm to review.

## 2021-02-17 NOTE — TELEPHONE ENCOUNTER
Called and spoke with wife (ok per ctc) and relayed below message.  She verbalized understanding.

## 2021-02-25 NOTE — TELEPHONE ENCOUNTER
Pending Prescriptions:                       Disp   Refills    B-D U/F 31G X 8 MM insulin pen needle [Ph*400 ea*1            Sig: USE 4 TIMES DAILY TO INJECT INSULIN    Prescription approved per [facility] Refill Protocol.

## 2021-02-25 NOTE — TELEPHONE ENCOUNTER
Wife calls stating she cannot get Pete's glucometer to work after changing out the battery. She states it is over 2 years old.     Faxed in new meter and supplies for her.     Pt has been working with Iris to adjust his insulin.     He sees Dr Damian.

## 2021-03-08 NOTE — PATIENT INSTRUCTIONS
Recommendations from today's MTM visit:                                                       1.  If fasting blood sugars <100, then reduce evening dose of Lantus to 3 units - patient wrote notes during visit today    It was great to speak with you today.  I value your experience and would be very thankful for your time with providing feedback on our clinic survey. You may receive a survey via email or text message in the next few days.     Next MTM visit: 1 month    To schedule another MTM appointment, please call the clinic directly or you may call the MTM scheduling line at [phone] or toll-free at [phone].     My Clinical Pharmacist's contact information:                                                      It was a pleasure talking with you today!  Please feel free to contact me with any questions or concerns you have.      Iris Gonzalez , Pharm D  [phone] (phone)  [phone] (pager)  Medication Therapy Management Pharmacist

## 2021-03-08 NOTE — PROGRESS NOTES
Medication Therapy Management (MTM) Encounter    ASSESSMENT:                            Medication Adherence/Access: No issues identified    Type 2 Diabetes:  A1c above goal <8%.  Home readings variable, with most recent high.  He was having lows <100, instructed Radha to reduce evening dose of Lantus to 3 units if his readings drop below 100 again.  Scheduled follow-up with Dr. Damian.    BPH: stable    CVA:  stable    PLAN:                            1.  If fasting blood sugars <100, then reduce evening dose of Lantus to 3 units - patient wrote notes during visit today    Follow-up:      SUBJECTIVE/OBJECTIVE:                          Pete Sheldon is a 80 year old male called for a follow-up visit. He was referred to me from Dr. Helm.  Today's visit is a follow-up MTM visit from 21.   Radha joined us today.    Reason for visit: follow-up on blood sugars.    Allergies/ADRs: Reviewed in chart  Tobacco: He reports that he quit smoking about 68 years ago. He smoked 0.00 packs per day. He has never used smokeless tobacco.  Alcohol: not currently using      Medication Adherence/Access: no issues reported    Type 2 Diabetes:  Currently taking Novolog 8 units with breakfast, 6 units with lunch, and 5 units with dinner, Lantus 16 units at breakfast and 5 units at dinner.   Radha loads doses of insulin in pen and Pete injects.  Doses Novolog before eating.  Followed by Dr Damian.  Blood sugar monitorin time(s) daily (got a new meter). Ranges (patient reported):    Before Breakfast  Lunch  Dinner  Bedtime  3/8 226   3/7 160   293  178  174  3/6   367   319   107   147  3/5  57   80  43*cookies  270  3/4    61    121   63   118  3/3    67    229   125   100  3/2 75    136   97   89  3/ 67    128   57   143         Eats consistently, good appetite.      Symptoms of low blood sugar?  Patient feeling well, no symptoms at this time.  Eye exam: up to date  Foot exam: up to date  Aspirin: Taking  81mg daily and clopidogrel  Statin: Yes: atorvastatin 20 mg   ACEi/ARB: No. Intentionally not prescribed due to 2/2 history of hyperkalemia  Urine Albumin:   Lab Results   Component Value Date    UMALCR 417.81 (H) 11/26/2019      Lab Results   Component Value Date    A1C 8.3 12/18/2020    A1C 8.7 09/15/2020    A1C 9.0 02/28/2020    A1C 9.6 11/26/2019    A1C 9.2 09/04/2019       GFR Estimate   Date Value Ref Range Status   12/18/2020 45 (L) >60 mL/min/[1.73_m2] Final       BPH:  Taking finasteride 5 mg daily.  This was started while inpatient and was helpful. No concerns with side effects.    Needing refills.    CVA:  Taking aspirin 325 mg daily.  Stopped clopidogrel 75 mg daily as recommended in January.  No concerns with bruising or bleeding.    Today's Vitals: There were no vitals taken for this visit.  ----------------      I spent 30 minutes with this patient today. All changes were made via collaborative practice agreement with PCP. A copy of the visit note was provided to the patient's primary care provider.    The patient declined a summary of these recommendations.     Iris Gonzalez , Gustavo D  [phone] (phone)  [phone] (pager)  Medication Therapy Management Pharmacist     Telemedicine Visit Details  Type of service:  Telephone visit  Start Time: 130 pm  End Time: 200 pm  Originating Location (patient location): [place]  Distant Location (provider location):  [facility]      Medication Therapy Recommendations  No medication therapy recommendations to display

## 2021-03-08 NOTE — TELEPHONE ENCOUNTER
Pending Prescriptions:                       Disp   Refills    metoprolol tartrate (LOPRESSOR) 25 MG tabl*180 ta*0        Sig: Take 1 tablet by mouth twice daily    Routing refill request to provider for review/approval because:  Blood pressures out of range      BP Readings from Last 3 Encounters:   12/20/20 (!) 160/85   08/05/20 (!) 162/84   03/11/20 130/80

## 2021-03-22 NOTE — PATIENT INSTRUCTIONS
AFTER VISIT SUMMARY (AVS):    At today's visit we thoroughly discussed current symptoms, stroke work-up results, secondary stroke prevention measures, and the plan.    Symptoms and signs of stroke were discussed.  You should call 911 with any SUDDEN onset of weakness, vision loss, speech problems, numbness, or severe headache of unknown cause.  Additional information was provided for home review.  For secondary stroke prevention, I would recommend to:  -Continue 325 mg of aspirin daily and Lipitor 20 mg at bedtime  -Long-term blood pressure goal is less than 130/85 (ideally, but less than 140/90 would be acceptable)  -Long-term LDL goal is less than 70  -Long-term goal of hemoglobin A1C is less than 7.0  -Low-salt low-fat diet  -Regular aerobic exercise 30 minutes 3-4 times per week    Next follow-up appointment is on as needed basis.    Please do not hesitate to call me with any questions or concerns.    Thanks.    Patient Education     For Caregivers: Preventing Another Stroke    Having had a stroke, your loved one is at higher risk of having another. Medicine, exercise, and diet are keys to reducing this risk. Your loved one needs to be active each day now. Walking is a good way to get daily exercise. You might also ask the healthcare provider to refer you to a dietitian. This specialist in nutrition can help you reduce many common risk factors for stroke. Quitting smoking is also key critical to reduce risk of stroke. Talk with your loved one about quitting smoking. Ask his or her healthcare provider for help.   Taking medicine  Your loved one may take more than 1 type of medicine. Each must be used as directed. If medicines include a blood thinner, your loved one may need regular blood tests.  Tips for safe medicine use    Make sure medicines are taken on schedule. If timing is vital, set an alarm.    Keep pill doses in a divided tray. A pill box can help.     Know which foods or liquids the person should avoid  while taking prescribed medicines.    Reducing the risk of stroke  Many factors that increase the risk of stroke can be reduced. Your loved one s healthcare provider and a dietitian can advise ways to:    Lower high blood pressure    Improve cholesterol    Control heart disease    Manage diabetes    Lose excess weight    Reduce risk of blood clots by taking blood thinners as advised by the healthcare provider    Stay active with aerobic and muscle-strengthening exercises  If your loved one suddenly has any of the problems below, call 911 right away for emergency medical help:    Numbness or weakness of the face, arms, or legs, especially on one side    Confusion or trouble speaking or understanding    Trouble seeing in one or both eyes    Trouble walking, dizziness, loss of balance or coordination    Severe headache with no known cause    Loss of consciousness or a seizure   F.A.S.T. is an easy way to remember the signs of a stroke. When you see these signs, you will know that you need to call 911 fast. F.A.S.T. stands for:    F is for face drooping - One side of the face is drooping or numb. When the person smiles, the smile is uneven.    A is for arm weakness - One arm is weak or numb. When the person lifts both arms at the same time, one arm may drift downward.    S is for speech difficulty -  You may notice slurred speech or trouble speaking. The person can't repeat a simple sentence correctly when asked.    T is for time to call 911 - If someone shows any of these symptoms, even if they go away, call 911 right away. Make note of the time the symptoms first appeared.  F[employer]oved last reviewed this educational content on 2/1/2018 2000-2020 [employer]. All rights reserved. This information is not intended as a substitute for professional medical care. Always follow your healthcare professional's instructions.      Patient Education     Symptoms of a Stroke     A sudden feeling of weakness on one  side of your body may be a sign that you are having a stroke.   During a stroke, blood stops flowing to part of the brain. This can damage areas in the brain that control the rest of the body. A stroke can happen to anyone at any age. Call 911 and get help right away if any of these symptoms come on suddenly, even if the symptoms don t last.  Know the symptoms of a stroke    Weakness. You may feel a sudden weakness, tingling, or a loss of feeling on one side of your face or body including your arm or leg.     Vision problems. You may have sudden double vision or trouble seeing in one or both eyes.    Speech problems. You may have sudden trouble talking, slurred speech, or problems understanding others.    Headache. You may have a sudden, severe headache.    Movement problems. You may have sudden trouble walking, dizziness, a feeling of spinning, a loss of balance, a feeling of falling, or blackouts.    Seizure. You may also have a seizure as the first symptom of a stroke.     When to call 911  Remember: If you have any of these symptoms, or if someone you are with has these symptoms, call 911 as soon as possible.  Never drive yourself or the victim. The ambulance can alert the hospital and start treatment.   F.A.S.T. is an easy way to remember the signs of a stroke. When you see these signs, you will know that you need to call 911 fast.   F.A.S.T. stands for:    F is for face drooping. One side of the face is drooping or numb. When the person smiles, the smile is uneven.    A is for arm weakness. One arm is weak or numb. When the person lifts both arms at the same time, one arm may drift downward.    S is for speech difficulty. You may notice slurred speech or difficulty speaking. The person can't repeat a simple sentence correctly when asked.    T is for time to dial 911. If someone shows any of these symptoms, even if they go away, call 911 right away. Make note of the time the symptoms first appeared.  [employer]  last reviewed this educational content on 3/1/2020    7637-8434 [employer]. All rights reserved. This information is not intended as a substitute for professional medical care. Always follow your healthcare professional's instructions.

## 2021-03-22 NOTE — LETTER
"    3/22/2021         RE: Pete Sheldon  [address] [address]        Dear Colleague,    Thank you for referring your patient, Pete Sheldon, to the [facility] [place]. Please see a copy of my visit note below.    TELEPHONE ENCOUNTER NOTE    DATE OF VISIT: 3/22/2021  CLINIC LOCATION: [facility]  MRN: [id number]  PATIENT NAME: Pete Sheldon  YOB: 1940  PCP: Rom Helm MD, MD    Pete Sheldon is a 80 year old male who is being evaluated via a billable telephone visit due to COVID-19 precautions.      The patient has been notified of following:     \"This telephone visit will be conducted via a call between you and your physician/provider. We have found that certain health care needs can be provided without the need for a physical exam.  This service lets us provide the care you need with a short phone conversation.  If a prescription is necessary we can send it directly to your pharmacy.  If lab work is needed we can place an order for that and you can then stop by our lab to have the test done at a later time.    If during the course of the call the physician/provider feels a telephone visit is not appropriate, you will not be charged for this service.\"     Pete Sheldon complains of    Chief Complaint   Patient presents with     Stroke     12 week follow-up stroke clinic per [facility] dx. Acute CVA     I have reviewed and updated the patient's Past Medical History, Social History, Family History and Medication List.    ALLERGIES  Losartan    The consent for this service was obtained by:    Alfred Mendez MA on 3/22/2021 at 8:44 AM    Additional provider notes:    REASON FOR VISIT:   Chief Complaint   Patient presents with     Stroke     12 week follow-up stroke clinic per [facility] dx. Acute CVA     SUBJECTIVE:                                                      HISTORY OF PRESENT ILLNESS: This telephone encounter " is for follow-up regarding recent stroke.  The patient is new to me, but was seen by inpatient stroke neurology team during his hospitalization at [facility].  His history is briefly summarized below.  Please refer to prior neurology notes for detailed information.    Per chart review, the patient with history of diabetes mellitus type 2, hypertension, hyperlipidemia, cognitive impairment, and stroke (January 2018) with residual right-sided weakness presented to the emergency department on 12/18/2020 with weakness, confusion and speech difficulty.    Initial head CT was negative.  CTA demonstrated acute left M2 occlusion and chronic right V4 occlusion.  Brain MRI showed multiple punctate acute cortical/subacute ischemic infarcts involving the left insular region, occipital temporal junction, posterolateral left temporal and left parietal lobe, as well as mild to moderate diffuse ventriculomegaly, unchanged chronic bilateral basal ganglia and left thalamic lacunar infarcts along with chronic cerebellar microhemorrhages.  Images were personally reviewed and independently interpreted.    Evaluated by inpatient stroke neurology team.  [place] not to be a candidate for TPA or thrombectomy.  Echocardiogram demonstrated normal ejection fraction without evidence of PFO or intracardiac thrombus.  LDL was 74, and hemoglobin A1C was 8.3.  Advised to continue prior to admission Lipitor 20 mg daily.  Also started on dual antiplatelet therapy for 21 days followed by 325 mg of aspirin indefinitely.  30-day cardiac event monitoring was advised on discharge.  No atrial fibrillation was detected during monitoring.    At the present time, the patient reports no additional new symptoms.  He denies interval development of new focal neurological symptoms.  His wife agrees.    On review of systems, patient endorses no additional active complaints.   ASSESSMENT AND PLAN:                                                     Assessment: 80-year-old male patient, who due to COVID-19 precautions is evaluated via a telephone visit for stroke follow-up.  We had a detailed discussion with the patient and his wife regarding his current symptoms, stroke work-up, secondary stroke prevention measures, and the plan.  I discussed with them that his stroke work-up is completed.  30-day event monitor was negative for atrial fibrillation.  No changes in plan based on results.  The rest of our discussion and recommendations are summarized below.    Diagnoses:    ICD-10-CM    1. History of stroke  Z86.73      Plan: At today's visit we thoroughly discussed current symptoms, stroke work-up results, secondary stroke prevention measures, and the plan.    Symptoms and signs of stroke were discussed.  The patient clearly understands to call 911 with any SUDDEN onset of weakness, vision loss, speech problems, numbness, or severe headache of unknown cause.  Additional information was provided for home review.  For secondary stroke prevention, I recommended to:  -Continue 325 mg of aspirin daily and Lipitor 20 mg at bedtime  -Long-term blood pressure goal is less than 130/85 (ideally, but less than 140/90 would be acceptable)  -Long-term LDL goal is less than 70  -Long-term goal of hemoglobin A1C is less than 7.0  -Low-salt low-fat diet  -Regular aerobic exercise 30 minutes 3-4 times per week    Next follow-up appointment is on as needed basis.    I have reviewed the note as documented above.  This accurately captures the substance of my conversation with the patient.    Phone call contact time: 8 minutes.  Total visit time was 25 minutes.    Iglesia Tate MD  [facility] Neurology.  (Chart documentation was completed in part with Dragon voice-recognition software. Even though reviewed, some grammatical, spelling, and word errors may remain.)        Again, thank you for allowing me to participate in the care of your patient.         Sincerely,        Iglesia Tate MD

## 2021-03-22 NOTE — PROGRESS NOTES
"TELEPHONE ENCOUNTER NOTE    DATE OF VISIT: 3/22/2021  CLINIC LOCATION: [facility]  MRN: [id number]  PATIENT NAME: Pete Sheldon  YOB: 1940  PCP: Rom Helm MD, MD    Pete Sheldon is a 80 year old male who is being evaluated via a billable telephone visit due to COVID-19 precautions.      The patient has been notified of following:     \"This telephone visit will be conducted via a call between you and your physician/provider. We have found that certain health care needs can be provided without the need for a physical exam.  This service lets us provide the care you need with a short phone conversation.  If a prescription is necessary we can send it directly to your pharmacy.  If lab work is needed we can place an order for that and you can then stop by our lab to have the test done at a later time.    If during the course of the call the physician/provider feels a telephone visit is not appropriate, you will not be charged for this service.\"     Pete Sheldon complains of    Chief Complaint   Patient presents with     Stroke     12 week follow-up stroke clinic per [facility] dx. Acute CVA     I have reviewed and updated the patient's Past Medical History, Social History, Family History and Medication List.    ALLERGIES  Losartan    The consent for this service was obtained by:    Alfred Mendez MA on 3/22/2021 at 8:44 AM    Additional provider notes:    REASON FOR VISIT:   Chief Complaint   Patient presents with     Stroke     12 week follow-up stroke clinic per [facility] dx. Acute CVA     SUBJECTIVE:                                                      HISTORY OF PRESENT ILLNESS: This telephone encounter is for follow-up regarding recent stroke.  The patient is new to me, but was seen by inpatient stroke neurology team during his hospitalization at [facility].  His history is briefly summarized below.  Please refer to prior neurology notes " for detailed information.    Per chart review, the patient with history of diabetes mellitus type 2, hypertension, hyperlipidemia, cognitive impairment, and stroke (January 2018) with residual right-sided weakness presented to the emergency department on 12/18/2020 with weakness, confusion and speech difficulty.    Initial head CT was negative.  CTA demonstrated acute left M2 occlusion and chronic right V4 occlusion.  Brain MRI showed multiple punctate acute cortical/subacute ischemic infarcts involving the left insular region, occipital temporal junction, posterolateral left temporal and left parietal lobe, as well as mild to moderate diffuse ventriculomegaly, unchanged chronic bilateral basal ganglia and left thalamic lacunar infarcts along with chronic cerebellar microhemorrhages.  Images were personally reviewed and independently interpreted.    Evaluated by inpatient stroke neurology team.  [place] not to be a candidate for TPA or thrombectomy.  Echocardiogram demonstrated normal ejection fraction without evidence of PFO or intracardiac thrombus.  LDL was 74, and hemoglobin A1C was 8.3.  Advised to continue prior to admission Lipitor 20 mg daily.  Also started on dual antiplatelet therapy for 21 days followed by 325 mg of aspirin indefinitely.  30-day cardiac event monitoring was advised on discharge.  No atrial fibrillation was detected during monitoring.    At the present time, the patient reports no additional new symptoms.  He denies interval development of new focal neurological symptoms.  His wife agrees.    On review of systems, patient endorses no additional active complaints.   ASSESSMENT AND PLAN:                                                    Assessment: 80-year-old male patient, who due to COVID-19 precautions is evaluated via a telephone visit for stroke follow-up.  We had a detailed discussion with the patient and his wife regarding his current symptoms, stroke work-up, secondary stroke  prevention measures, and the plan.  I discussed with them that his stroke work-up is completed.  30-day event monitor was negative for atrial fibrillation.  No changes in plan based on results.  The rest of our discussion and recommendations are summarized below.    Diagnoses:    ICD-10-CM    1. History of stroke  Z86.73      Plan: At today's visit we thoroughly discussed current symptoms, stroke work-up results, secondary stroke prevention measures, and the plan.    Symptoms and signs of stroke were discussed.  The patient clearly understands to call 911 with any SUDDEN onset of weakness, vision loss, speech problems, numbness, or severe headache of unknown cause.  Additional information was provided for home review.  For secondary stroke prevention, I recommended to:  -Continue 325 mg of aspirin daily and Lipitor 20 mg at bedtime  -Long-term blood pressure goal is less than 130/85 (ideally, but less than 140/90 would be acceptable)  -Long-term LDL goal is less than 70  -Long-term goal of hemoglobin A1C is less than 7.0  -Low-salt low-fat diet  -Regular aerobic exercise 30 minutes 3-4 times per week    Next follow-up appointment is on as needed basis.    I have reviewed the note as documented above.  This accurately captures the substance of my conversation with the patient.    Phone call contact time: 8 minutes.  Total visit time was 25 minutes.    Iglesia Tate MD  [facility] Neurology.  (Chart documentation was completed in part with Dragon voice-recognition software. Even though reviewed, some grammatical, spelling, and word errors may remain.)

## 2021-04-05 NOTE — PATIENT INSTRUCTIONS
Recommendations from today's MTM visit:                                                       Continue current regimen.  Follow-up with Dr. Damian as planned.    Next MTM visit: 3 months    It was great to speak with you today.  I value your experience and would be very thankful for your time with providing feedback on our clinic survey. You may receive a survey via email or text message in the next few days.     To schedule another MTM appointment, please call the clinic directly or you may call the MTM scheduling line at [phone] or toll-free at [phone].     My Clinical Pharmacist's contact information:                                                      Please feel free to contact me with any questions or concerns you have.      Iris Gonzalez , Pharm D  [phone] (phone)  [phone] (pager)  Medication Therapy Management Pharmacist

## 2021-04-05 NOTE — PROGRESS NOTES
Medication Therapy Management (MTM) Encounter    ASSESSMENT:                            Medication Adherence/Access: No issues identified    Type 2 Diabetes:  Stable.  Follow-up with Dr. Damian as planned.  Will encourage patient to come in for labs again when he has been vaccinated.    CVA: stable    PLAN:                            Continue current regimen.  Follow-up with Dr. Damian as planned.    Follow-up: 3 months.    SUBJECTIVE/OBJECTIVE:                          Pete Sheldon is a 80 year old male called for a follow-up visit. He was referred to me from Dr. Helm.  Today's visit is a follow-up MTM visit from 3/8/21.  Spoke with Radha today.  Patient was scheduled for in-person visit for labs; when I called to see if they were able to make it Radha says they are not able to come in due to transportation.    Reason for visit: diabetes    Allergies/ADRs: Reviewed in chart  Tobacco: He reports that he quit smoking about 68 years ago. He smoked 0.00 packs per day. He has never used smokeless tobacco.  Alcohol: not currently using      Medication Adherence/Access: no issues reported    Type 2 Diabetes:  Currently taking Novolog 8 units with breakfast, 6 units with lunch, and 5 units with dinner, Lantus 16 units at breakfast and 5 units at dinner.   Radha loads doses of insulin in pen and Pete injects.  Doses Novolog before eating.  Followed by Dr Damian; follow-up scheduled later this month.  Blood sugar monitorin time(s) daily (got a new meter). Ranges per meter, average 131.  No readings lower than 67.  Chace related to food - Radha is watching is diet closely.     Symptoms of low blood sugar? Patient feeling well, no symptoms at this time.  Eye exam: up to date  Foot exam: up to date  Aspirin: Taking 81mg daily and clopidogrel  Statin: Yes: atorvastatin 20 mg   ACEi/ARB: No. Intentionally not prescribed due to 2/2 history of hyperkalemia  Has not received COVID vaccine yet but plans  to.  Urine Albumin:   Lab Results   Component Value Date    UMALCR 417.81 (H) 11/26/2019      Lab Results   Component Value Date    A1C 8.3 12/18/2020    A1C 8.7 09/15/2020    A1C 9.0 02/28/2020    A1C 9.6 11/26/2019    A1C 9.2 09/04/2019       GFR Estimate   Date Value Ref Range Status   12/18/2020 45 (L) >60 mL/min/[1.73_m2] Final         CVA:  Taking aspirin 325 mg daily.  Stopped clopidogrel 75 mg daily as recommended in January.  No concerns with bruising or bleeding.  Recent follow-up with Neurology on 3/22.      Today's Vitals: There were no vitals taken for this visit.  ----------------      I spent 30 minutes with this patient today. All changes were made via collaborative practice agreement with PCP. A copy of the visit note was provided to the patient's primary care provider.    The patient declined a summary of these recommendations.     Iris Gonzalez , Pharm D  [phone] (phone)  [phone] (pager)  Medication Therapy Management Pharmacist     Telemedicine Visit Details  Type of service:  Telephone visit  Start Time: 140 pm  End Time: 2:10 PM  Originating Location (patient location): Home  Distant Location (provider location):  [facility]      Medication Therapy Recommendations  No medication therapy recommendations to display

## 2021-04-29 NOTE — NURSING NOTE
Checks at:     9:30 pm 6pm 2pm 9:30am    04/29    122   04/28 154 72 249 233  04/27 230 46 96 98  04/26 98 71 146 70  04/25 197 130 188 162  04/24 116 142 313 244  04/23 106 149 264 232    Lilia Pacheco [facility] Endocrinology [place]  [phone]

## 2021-04-29 NOTE — PATIENT INSTRUCTIONS
[facility]  Dr Damian, Endocrinology Department    [facility]   [address]  [place] [address]  Appointment [phone]  Fax: [phone]  [place]: Monday - Thursday      Please check the cost coverage and copay with insurance before recommended tests, services and medications (especially if new medications are prescribed).     If ordered, please get blood work done 1 week prior to your next appointment so they will be available to Dr. Damian at your visit.    To provide the best diabetic care, please bring your blood glucose meter to each and every visit with your  Endocrinologist. Your blood glucose meter/insulin pump will be downloaded at every appointment.  Please arrive 15 minutes before your scheduled appointment. This will allow for your blood glucose meter/insulin pump  to be downloaded.  If you are wearing DEXCOM please bring  or sharing code from the Dexcom Clarity Appt so that it can be downloaded.  If you are using freestyle po personal sensors please bring the reader.  If you are using TANDEM insulin pump please have your username and password to get info from Tandem website.    Continue Lantus 16 units in AM and 5 units PM  Take Novolog 7/6/5 Units with breakfast/lunch/dinner respectively. ( Breakfast dose DECREASED to 7 units). You take NOVOLOG when you eat.  Labs when able.  Send BG records in 3-4 weeks. ( you can call in the BG or mail BG records)  Follow up with endocrinology in 3 months or sooner if concerns.    Recommend checking blood sugars before meals and at bedtime.    If Blood glucose are low more often-> 2-3 times/week- give us a call.  The patient is advised to Make better food choices: reduce carbs, Reduce portion size, weight loss and exercise 3-4 times a week.  Discussed hypoglycemia signs and symptoms as well as management in detail.

## 2021-04-29 NOTE — PROGRESS NOTES
"This is a telephone encounter.  Declined video.  4/29/2021    Start time: 10:30    End time: 10:58    In the setting of COVID-19 outbreak patients visits are transitioned to phone visits/ virtual visits as per system and leadership guidelines.  I have personally reviewed data including notes, lab tests. I have reviewed and interpreted images personally.  This encounter is potentially equivalent to established 4 level (18035) clinic visit.    The patient has been notified of following:      \"This telephone visit will be conducted via a call between you and your physician/provider. We have found that certain health care needs can be provided without the need for a physical exam.  This service lets us provide the care you need with a short phone conversation.  If a prescription is necessary we can send it directly to your pharmacy.  If lab work is needed we can place an order for that and you can then stop by our lab to have the test done at a later time.     We will bill your insurance company for this service.  Please check with your medical insurance if this type of visit is covered. You may be responsible for the cost of this type of visit if insurance coverage is denied.  The typical cost is $30 (10min), $59 (11-20min) and $85 (21-30min).  Most often these visits are shorter than 10 minutes. With new updates with corona virus patient might be billed as clinic visit.     If during the course of the call the physician/provider feels a telephone visit is not appropriate, you will not be charged for this service.\"      Past medical history, social history, family history, allergy and medications were reviewed and updated as appropriate.  Reviewed all pertinent labs, notes and imaging studies as appropriate.    Patient verbally consented to phone visit today: yes.    Disclaimer: This note consists of symbols derived from keyboarding, dictation and/or voice recognition software. As a result, there may be errors in the " script that have gone undetected. Please consider this when interpreting information found in this chart.    ROS neg expect noted in notes.  Patient feels well at this time and denies any tachycardia, palpitations, heat intolerance, tremor, insomnia, diarrhea, or unexplained weight loss.  Patient also denies  cold intolerance, constipation, or unexplained weight gain.   ENDOCRINOLOGY CLINIC NOTE:  Name: Pete Sheldon  Seen for f/u of Diabetes.    HPI:  Pete Sheldon is a 80  year old male who presents for the evaluation/management of above.   has a past medical history of Calculus of ureter (1980's), Hypertension, Hypothyroidism, Microalbuminuria (2/15/2016), Mumps, Other and unspecified hyperlipidemia, and Type 2 diabetes, HbA1C goal < 8% (H) (7/1995).    Of note he is having bedtime snack almost every days. Typical bedtime snack is toast and jam at night.  He is afraid of overnight hypoglycemia and tends to eat before going to bed.  Does not take insulin with snack.  Has variable blood sugar pattern. This has been noted consistently in past and with multiple dose adjustment.  Wife helps with insulin dosing. He injects himself.  I discussed continuous glucose monitor with him in past but he does not want sensors.  Low C-peptide and neg FAVIOLA Ab.  Metformin stopped in the setting of CKD.    He was admitted in hospital in October 2019 and at that time metformin was discontinued and Lantus was switched to twice daily dosing.    Breakfast at  9:30 AM , lunch at 2:00 PM ,dinner at 6:00 PM, bedtime is 10:00 PM.    1. Type 2 DM:  Orginally diagnosed in 1995.  Current Regimen:  Lantus 16 units in AM and 5 units PM, Novolog 8/6/5 Units with breakfast/lunch/dinner respectively. In addition to that he is on correctional scale 1 unit- 25 >140 but he is not using it.  yes:     Diabetes Medication(s)     Insulin       insulin aspart (NOVOLOG FLEXPEN) 100 UNIT/ML pen    Take Novolog 8 unit(s) am, 6 unit(s) at lunch, and 5  unit(s) at dinner.     insulin glargine (LANTUS PEN) 100 UNIT/ML pen    Lantus 16 unit(s) at breakfast and 5 unit(s) at dinner.          During hospital stay  10/2019 metfomrin was discountineud and lantus does was switched to BID dosing.    Does not feel comfortable with carbohydrate counting.    BS checks: Three times a day    Average Meter Download: Blood glucose data reviewed personally. See nursing note from this encounter for details.  Multiple episodes of hypoglycemia especially in AM.    Exercise: Minimal  Episodes of hypoglycemia (low blood sugar):  Yes. As noted above.  Fixed meal pattern: NO. Carb content varies. In general diet is high in carbs.  Patient counting carbs: No    DM Complications:   Nephropathy: Yes: Microalbuminuria present  Retinopathy: Yes: History of laser treatment in past  Neuropathy: Yes.  Microalbuminuria: Yes: Microalbuminuria present.  Not on ACE secondary to history of hyperkalemia.  Lab Results   Component Value Date    UMALCR 170.01 08/30/2018      CAD/PAD: No  Gastroparesis: No  Hypoglycemia unawareness: Yes: Previous notes mentioning history of hypoglycemia unawareness.    2. Hypertension: Blood Pressure today:   BP Readings from Last 3 Encounters:   12/20/20 (!) 160/85   08/05/20 (!) 162/84   03/11/20 130/80     Blood pressure medications include hydrochlorothiazide 12.5 mg, amlodipine 5 mg.      3. Hyperlipidemia: Takes lovastatin 40 mg for lipid control.  Denies muscle aches or pains.      4. Hypothyroidism:  History of subclinical hypothyroidism and was started on levothyroxine 25 mcg/day  Currently taking levothyroxine 25 mcg/day but he reports that he feels sick to stomach after taking levothyroxine he is wondering if he can be off of levothyroxine.  Levothyroxine was stopped 3/2020.  Follow up labs 9/2020 as noted below.    PMH/PSH:  Past Medical History:   Diagnosis Date     Calculus of ureter 1980's     Hypertension      Hypothyroidism      Microalbuminuria 2/15/2016      Mumps      Other and unspecified hyperlipidemia      Type 2 diabetes, HbA1C goal < 8% (H) 1995     Past Surgical History:   Procedure Laterality Date     HC COLONOSCOPY THRU STOMA, DIAGNOSTIC  2007    Normal     HC FLEX SIGMOIDOSCOPY W/WO MIGUEL SPEC BY BRUSH/WASH  1999    Normal to 60 cm     HC REPAIR INCISIONAL HERNIA,REDUCIBLE  1999    Hernia Repair, Incisional, Unilateral (right)     HC REPAIR INCISIONAL HERNIA,REDUCIBLE      Hernia Repair, Incisional, Unilateral (left, with mesh placement)     TESTICLE SURGERY       VASECTOMY       ZZC NONSPECIFIC PROCEDURE      Nasal septoplasty     Family Hx:  Family History   Problem Relation Age of Onset     Cerebrovascular Disease Mother          age 95     Heart Disease Mother         age 89,  age 95     Cerebrovascular Disease Father          age 91, stroke two years previous     Cancer - colorectal Brother         Half-brother     Cancer Sister         Half-sister (?type)     Cancer Sister         Half-sister (?type)     Heart Disease Brother      Neurologic Disorder Daughter         Multiple Sclerosis     Psychotic Disorder Son         Schizophrenia       Social Hx:  Social History     Socioeconomic History     Marital status:      Spouse name: Not on file     Number of children: 2     Years of education: Not on file     Highest education level: Not on file   Occupational History     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[employer]     Employer: RETIRED   Social Needs     Financial resource strain: Not on file     Food insecurity     Worry: Not on file     Inability: Not on file     Transportation needs     Medical: Not on file     Non-medical: Not on file   Tobacco Use     Smoking status: Former Smoker     Packs/day: 0.00     Quit date: 3/11/1953     Years since quittin.1     Smokeless tobacco: Never Used   Substance and Sexual Activity     Alcohol use: No     Drug use: No     Sexual activity: Yes      Partners: Female   Lifestyle     Physical activity     Days per week: Not on file     Minutes per session: Not on file     Stress: Not on file   Relationships     Social connections     Talks on phone: Not on file     Gets together: Not on file     Attends [employer] service: Not on file     Active member of club or organization: Not on file     Attends meetings of clubs or organizations: Not on file     Relationship status: Not on file     Intimate partner violence     Fear of current or ex partner: Not on file     Emotionally abused: Not on file     Physically abused: Not on file     Forced sexual activity: Not on file   Other Topics Concern     Parent/sibling w/ CABG, MI or angioplasty before 65F 55M? Not Asked   Social History Narrative    Retired  for [employer].    Two children, son in [place], daughter in [place] ([facility]).    Three grandchildren, one great-granddaughter.              MEDICATIONS:  has a current medication list which includes the following prescription(s): alcohol swab, amlodipine, aspirin, atorvastatin, b-d u/f, onetouch verio iq, blood glucose calibration, blood glucose monitoring, finasteride, glucosamine hcl, novolog flexpen, insulin glargine, blood glucose, metoprolol tartrate, multivitamin, glucose management, and vitamin c.    ROS     ROS: 10 point ROS neg other than the symptoms noted above in the HPI.    Physical Exam   VS: There were no vitals taken for this visit.      LABS:  Component      Latest Ref Rng 4/15/2016   C-Peptide      0.9 - 6.9 ng/mL <0.1 (L)   Glutamic Acid Decarboxylase Antibody       <5.0 . . .     A1c:  Lab Results   Component Value Date    A1C 8.3 12/18/2020    A1C 8.7 09/15/2020    A1C 9.0 02/28/2020    A1C 9.6 11/26/2019    A1C 9.2 09/04/2019       Creatinine:  Creatinine   Date Value Ref Range Status   12/18/2020 1.43 (H) 0.66 - 1.25 mg/dL Final     Urine Micro:  Lab Results   Component Value Date    UMALCR 417.81 11/26/2019      LFTs/Lipids:  Recent Labs   Lab Test 02/28/20  0902 11/27/18  0927  09/25/15  0844 06/19/15  0815   CHOL 116 110   < > 134 135   HDL 28* 26*   < > 31* 36*   LDL 65 59   < > 80 83   TRIG 113 125   < > 117 78   CHOLHDLRATIO  --   --   --  4.3 3.8    < > = values in this interval not displayed.       TFTs:  ENDO THYROID [facility] Latest Ref Rng & Units 12/18/2020   TSH 0.40 - 4.00 mU/L 3.28     ENDO THYROID [facility] Latest Ref Rng & Units 9/15/2020 2/28/2020   TSH 0.40 - 4.00 mU/L 8.20 (H) 4.47 (H)   T4 FREE 0.76 - 1.46 ng/dL 1.11 1.09     ENDO THYROID LABS[facility] Latest Ref Rng & Units 9/4/2019 3/6/2019   TSH 0.40 - 4.00 mU/L 6.28 (H) 5.85 (H)   T4 FREE 0.76 - 1.46 ng/dL 1.09 1.14     All pertinent notes, labs, and images personally reviewed by me.     A/P  Mr.Victor ANURAG Sheldon is a 80 year old here for the evaluation/management of diabetes:    1. DM2 - (low C-peptide, negative FAVIOLA): Under Poor control.  A1c 8.7%.  Diabetes complicated by microalbuminuria, neuropathy and retinopathy.   Concerns for hypoglycemia unawareness. Variable BG. Brittle DM.  Multiple dose changes made in past but still not able to get stable blood sugar numbers.  Blood sugars are variable throughout the day.  Does not feel comfortable with carbohydrate counting.  Variable carb intake.  Does not want Dexcom. This has been discussed multiple times with pt in past.   H/o brittle DM. Lot of variability in BG.    Noted to have low BG in afternoon.  Plan:  Discussed diagnosis, pathophysiology, management and treatment options of condition with pt.  Continue Lantus 16 units in AM and 5 units PM  Take Novolog 7/6/5 Units with breakfast/lunch/dinner respectively. ( Breakfast dose DECREASED to 7 units). You take NOVOLOG when you eat.  Send BG records in 3-4 weeks. ( you can call in the BG or mail BG records).  Labs when able.  Follow up with endocrinology in 3 months or sooner if concerns.    2.  Subclinical hypothyroidism,resolved:  Was on levothyroxine  25 mcg/day. It was stopped 3/2020 as he reported intolerance to it.  Started in 9/2018 by Dr. Silva for subclinical hypothyroidism.  He reports that after taking levothyroxine he feels  sick to his stomach.   Not taking levothyroxine at this time.  Clinically looks euthyroid.  12/2020 labs in range..   Plan:  Plan to continue to monitor.    3. Hypertension - On hydrochlorothiazide 12.5 mg, amlodipine 5 mg.  Managed by PCP.    4. Hyperlipidemia - Under Good control.  On lovastatin 40 mg. LDL 91, HDL 34.  Managed by PCP.    5. Microalbuminuria:  Lab Results   Component Value Date    UMALCR 170.01 08/30/2018    Not on ACE 2/2 to h/o hyperkalemia    6. Prevention  ASA- 81 mg/day.  h/o nosebleeds  Smoking- No    Most Recent Immunizations   Administered Date(s) Administered     Influenza (H1N1) 12/28/2009     Influenza (High Dose) 3 valent vaccine 09/06/2018     Influenza (IIV3) PF 08/29/2012     Pneumococcal 23 valent 03/16/2012     TD (ADULT, 7+) 10/22/2003     TDAP Vaccine (Adacel) 01/31/2014     Zoster vaccine, live 10/15/2011       Recommend checking blood sugars before meals and at bedtime.    If Blood glucose are low more often-> 2-3 times/week- give us a call.  The patient is advised to Make better food choices: reduce carbs, Reduce portion size, weight loss and exercise 3-4 times a week.  Discussed hypoglycemia signs and symptoms as well as management in detail.    All questions were answered.  The patient indicates understanding of the above issues and agrees with the plan set forth.     Follow-up:  Follow up 3 months    Lori Damian M.D  Endocrinology  [facility]    Disclaimer: This note consists of symbols derived from keyboarding, dictation and/or voice recognition software. As a result, there may be errors in the script that have gone undetected. Please consider this when interpreting information found in this chart.    Addendum to above note and clinic visit:    Labs  reviewed.    See result note/telephone encounter.

## 2021-04-29 NOTE — LETTER
"    4/29/2021         RE: Pete Sheldon  [address] [place] [address]        Dear Colleague,    Thank you for referring your patient, Pete Sheldon, to the [facility]. Please see a copy of my visit note below.    This is a telephone encounter.  Declined video.  4/29/2021    Start time: 10:30    End time: 10:58    In the setting of COVID-19 outbreak patients visits are transitioned to phone visits/ virtual visits as per system and leadership guidelines.  I have personally reviewed data including notes, lab tests. I have reviewed and interpreted images personally.  This encounter is potentially equivalent to established 4 level (12679) clinic visit.    The patient has been notified of following:      \"This telephone visit will be conducted via a call between you and your physician/provider. We have found that certain health care needs can be provided without the need for a physical exam.  This service lets us provide the care you need with a short phone conversation.  If a prescription is necessary we can send it directly to your pharmacy.  If lab work is needed we can place an order for that and you can then stop by our lab to have the test done at a later time.     We will bill your insurance company for this service.  Please check with your medical insurance if this type of visit is covered. You may be responsible for the cost of this type of visit if insurance coverage is denied.  The typical cost is $30 (10min), $59 (11-20min) and $85 (21-30min).  Most often these visits are shorter than 10 minutes. With new updates with corona virus patient might be billed as clinic visit.     If during the course of the call the physician/provider feels a telephone visit is not appropriate, you will not be charged for this service.\"      Past medical history, social history, family history, allergy and medications were reviewed and updated as appropriate.  Reviewed all pertinent " labs, notes and imaging studies as appropriate.    Patient verbally consented to phone visit today: yes.    Disclaimer: This note consists of symbols derived from keyboarding, dictation and/or voice recognition software. As a result, there may be errors in the script that have gone undetected. Please consider this when interpreting information found in this chart.    ROS neg expect noted in notes.  Patient feels well at this time and denies any tachycardia, palpitations, heat intolerance, tremor, insomnia, diarrhea, or unexplained weight loss.  Patient also denies  cold intolerance, constipation, or unexplained weight gain.   ENDOCRINOLOGY CLINIC NOTE:  Name: Pete Sheldon  Seen for f/u of Diabetes.    HPI:  Pete Sheldon is a 80  year old male who presents for the evaluation/management of above.   has a past medical history of Calculus of ureter (1980's), Hypertension, Hypothyroidism, Microalbuminuria (2/15/2016), Mumps, Other and unspecified hyperlipidemia, and Type 2 diabetes, HbA1C goal < 8% (H) (7/1995).    Of note he is having bedtime snack almost every days. Typical bedtime snack is toast and jam at night.  He is afraid of overnight hypoglycemia and tends to eat before going to bed.  Does not take insulin with snack.  Has variable blood sugar pattern. This has been noted consistently in past and with multiple dose adjustment.  Wife helps with insulin dosing. He injects himself.  I discussed continuous glucose monitor with him in past but he does not want sensors.  Low C-peptide and neg FAVIOLA Ab.  Metformin stopped in the setting of CKD.    He was admitted in hospital in October 2019 and at that time metformin was discontinued and Lantus was switched to twice daily dosing.    Breakfast at  9:30 AM , lunch at 2:00 PM ,dinner at 6:00 PM, bedtime is 10:00 PM.    1. Type 2 DM:  Orginally diagnosed in 1995.  Current Regimen:  Lantus 16 units in AM and 5 units PM, Novolog 8/6/5 Units with breakfast/lunch/dinner  respectively. In addition to that he is on correctional scale 1 unit- 25 >140 but he is not using it.  yes:     Diabetes Medication(s)     Insulin       insulin aspart (NOVOLOG FLEXPEN) 100 UNIT/ML pen    Take Novolog 8 unit(s) am, 6 unit(s) at lunch, and 5 unit(s) at dinner.     insulin glargine (LANTUS PEN) 100 UNIT/ML pen    Lantus 16 unit(s) at breakfast and 5 unit(s) at dinner.          During hospital stay  10/2019 metfomrin was discountineud and lantus does was switched to BID dosing.    Does not feel comfortable with carbohydrate counting.    BS checks: Three times a day    Average Meter Download: Blood glucose data reviewed personally. See nursing note from this encounter for details.  Multiple episodes of hypoglycemia especially in AM.    Exercise: Minimal  Episodes of hypoglycemia (low blood sugar):  Yes. As noted above.  Fixed meal pattern: NO. Carb content varies. In general diet is high in carbs.  Patient counting carbs: No    DM Complications:   Nephropathy: Yes: Microalbuminuria present  Retinopathy: Yes: History of laser treatment in past  Neuropathy: Yes.  Microalbuminuria: Yes: Microalbuminuria present.  Not on ACE secondary to history of hyperkalemia.  Lab Results   Component Value Date    UMALCR 170.01 08/30/2018      CAD/PAD: No  Gastroparesis: No  Hypoglycemia unawareness: Yes: Previous notes mentioning history of hypoglycemia unawareness.    2. Hypertension: Blood Pressure today:   BP Readings from Last 3 Encounters:   12/20/20 (!) 160/85   08/05/20 (!) 162/84   03/11/20 130/80     Blood pressure medications include hydrochlorothiazide 12.5 mg, amlodipine 5 mg.      3. Hyperlipidemia: Takes lovastatin 40 mg for lipid control.  Denies muscle aches or pains.      4. Hypothyroidism:  History of subclinical hypothyroidism and was started on levothyroxine 25 mcg/day  Currently taking levothyroxine 25 mcg/day but he reports that he feels sick to stomach after taking levothyroxine he is wondering if  he can be off of levothyroxine.  Levothyroxine was stopped 3/2020.  Follow up labs 2020 as noted below.    PMH/PSH:  Past Medical History:   Diagnosis Date     Calculus of ureter      Hypertension      Hypothyroidism      Microalbuminuria 2/15/2016     Mumps      Other and unspecified hyperlipidemia      Type 2 diabetes, HbA1C goal < 8% (H) 1995     Past Surgical History:   Procedure Laterality Date     HC COLONOSCOPY THRU STOMA, DIAGNOSTIC  2007    Normal     HC FLEX SIGMOIDOSCOPY W/WO MIGUEL SPEC BY BRUSH/WASH  1999    Normal to 60 cm     HC REPAIR INCISIONAL HERNIA,REDUCIBLE  2001,     Hernia Repair, Incisional, Unilateral (right)     HC REPAIR INCISIONAL HERNIA,REDUCIBLE      Hernia Repair, Incisional, Unilateral (left, with mesh placement)     TESTICLE SURGERY       VASECTOMY       ZZC NONSPECIFIC PROCEDURE      Nasal septoplasty     Family Hx:  Family History   Problem Relation Age of Onset     Cerebrovascular Disease Mother          age 95     Heart Disease Mother         age 89,  age 95     Cerebrovascular Disease Father          age 91, stroke two years previous     Cancer - colorectal Brother         Half-brother     Cancer Sister         Half-sister (?type)     Cancer Sister         Half-sister (?type)     Heart Disease Brother      Neurologic Disorder Daughter         Multiple Sclerosis     Psychotic Disorder Son         Schizophrenia       Social Hx:  Social History     Socioeconomic History     Marital status:      Spouse name: Not on file     Number of children: 2     Years of education: Not on file     Highest education level: Not on file   Occupational History     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[employer]     Employer: RETIRED   Social Needs     Financial resource strain: Not on file     Food insecurity     Worry: Not on file     Inability: Not on file     Transportation needs     Medical: Not on file     Non-medical: Not on  file   Tobacco Use     Smoking status: Former Smoker     Packs/day: 0.00     Quit date: 3/11/1953     Years since quittin.1     Smokeless tobacco: Never Used   Substance and Sexual Activity     Alcohol use: No     Drug use: No     Sexual activity: Yes     Partners: Female   Lifestyle     Physical activity     Days per week: Not on file     Minutes per session: Not on file     Stress: Not on file   Relationships     Social connections     Talks on phone: Not on file     Gets together: Not on file     Attends Yarsani service: Not on file     Active member of club or organization: Not on file     Attends meetings of clubs or organizations: Not on file     Relationship status: Not on file     Intimate partner violence     Fear of current or ex partner: Not on file     Emotionally abused: Not on file     Physically abused: Not on file     Forced sexual activity: Not on file   Other Topics Concern     Parent/sibling w/ CABG, MI or angioplasty before 65F 55M? Not Asked   Social History Narrative    Retired  for [employer].    Two children, son in [place], daughter in [place] ([facility]).    Three grandchildren, one great-granddaughter.              MEDICATIONS:  has a current medication list which includes the following prescription(s): alcohol swab, amlodipine, aspirin, atorvastatin, b-d u/f, onetouch verio iq, blood glucose calibration, blood glucose monitoring, finasteride, glucosamine hcl, novolog flexpen, insulin glargine, blood glucose, metoprolol tartrate, multivitamin, glucose management, and vitamin c.    ROS     ROS: 10 point ROS neg other than the symptoms noted above in the HPI.    Physical Exam   VS: There were no vitals taken for this visit.      LABS:  Component      Latest Ref Rng 4/15/2016   C-Peptide      0.9 - 6.9 ng/mL <0.1 (L)   Glutamic Acid Decarboxylase Antibody       <5.0 . . .     A1c:  Lab Results   Component Value Date    A1C 8.3 2020    A1C 8.7  09/15/2020    A1C 9.0 02/28/2020    A1C 9.6 11/26/2019    A1C 9.2 09/04/2019       Creatinine:  Creatinine   Date Value Ref Range Status   12/18/2020 1.43 (H) 0.66 - 1.25 mg/dL Final     Urine Micro:  Lab Results   Component Value Date    UMALCR 417.81 11/26/2019     LFTs/Lipids:  Recent Labs   Lab Test 02/28/20  0902 11/27/18  0927  09/25/15  0844 06/19/15  0815   CHOL 116 110   < > 134 135   HDL 28* 26*   < > 31* 36*   LDL 65 59   < > 80 83   TRIG 113 125   < > 117 78   CHOLHDLRATIO  --   --   --  4.3 3.8    < > = values in this interval not displayed.       TFTs:  ENDO THYROID LABS-UMP Latest Ref Rng & Units 12/18/2020   TSH 0.40 - 4.00 mU/L 3.28     ENDO THYROID LABS-UMP Latest Ref Rng & Units 9/15/2020 2/28/2020   TSH 0.40 - 4.00 mU/L 8.20 (H) 4.47 (H)   T4 FREE 0.76 - 1.46 ng/dL 1.11 1.09     ENDO THYROID LABS-UMP Latest Ref Rng & Units 9/4/2019 3/6/2019   TSH 0.40 - 4.00 mU/L 6.28 (H) 5.85 (H)   T4 FREE 0.76 - 1.46 ng/dL 1.09 1.14     All pertinent notes, labs, and images personally reviewed by me.     A/P  Mr.Victor ANURAG Sheldon is a 80 year old here for the evaluation/management of diabetes:    1. DM2 - (low C-peptide, negative FAVIOLA): Under Poor control.  A1c 8.7%.  Diabetes complicated by microalbuminuria, neuropathy and retinopathy.   Concerns for hypoglycemia unawareness. Variable BG. Brittle DM.  Multiple dose changes made in past but still not able to get stable blood sugar numbers.  Blood sugars are variable throughout the day.  Does not feel comfortable with carbohydrate counting.  Variable carb intake.  Does not want Dexcom. This has been discussed multiple times with pt in past.   H/o brittle DM. Lot of variability in BG.    Noted to have low BG in afternoon.  Plan:  Discussed diagnosis, pathophysiology, management and treatment options of condition with pt.  Continue Lantus 16 units in AM and 5 units PM  Take Novolog 7/6/5 Units with breakfast/lunch/dinner respectively. ( Breakfast dose DECREASED to 7  units). You take NOVOLOG when you eat.  Send BG records in 3-4 weeks. ( you can call in the BG or mail BG records).  Labs when able.  Follow up with endocrinology in 3 months or sooner if concerns.    2.  Subclinical hypothyroidism,resolved:  Was on levothyroxine 25 mcg/day. It was stopped 3/2020 as he reported intolerance to it.  Started in 9/2018 by Dr. Silva for subclinical hypothyroidism.  He reports that after taking levothyroxine he feels  sick to his stomach.   Not taking levothyroxine at this time.  Clinically looks euthyroid.  12/2020 labs in range..   Plan:  Plan to continue to monitor.    3. Hypertension - On hydrochlorothiazide 12.5 mg, amlodipine 5 mg.  Managed by PCP.    4. Hyperlipidemia - Under Good control.  On lovastatin 40 mg. LDL 91, HDL 34.  Managed by PCP.    5. Microalbuminuria:  Lab Results   Component Value Date    UMALCR 170.01 08/30/2018    Not on ACE 2/2 to h/o hyperkalemia    6. Prevention  ASA- 81 mg/day.  h/o nosebleeds  Smoking- No    Most Recent Immunizations   Administered Date(s) Administered     Influenza (H1N1) 12/28/2009     Influenza (High Dose) 3 valent vaccine 09/06/2018     Influenza (IIV3) PF 08/29/2012     Pneumococcal 23 valent 03/16/2012     TD (ADULT, 7+) 10/22/2003     TDAP Vaccine (Adacel) 01/31/2014     Zoster vaccine, live 10/15/2011       Recommend checking blood sugars before meals and at bedtime.    If Blood glucose are low more often-> 2-3 times/week- give us a call.  The patient is advised to Make better food choices: reduce carbs, Reduce portion size, weight loss and exercise 3-4 times a week.  Discussed hypoglycemia signs and symptoms as well as management in detail.    All questions were answered.  The patient indicates understanding of the above issues and agrees with the plan set forth.     Follow-up:  Follow up 3 months    Lori Damian M.D  Endocrinology  [facility]    Disclaimer: This note consists of symbols derived from  keyboarding, dictation and/or voice recognition software. As a result, there may be errors in the script that have gone undetected. Please consider this when interpreting information found in this chart.    Addendum to above note and clinic visit:    Labs reviewed.    See result note/telephone encounter.              Again, thank you for allowing me to participate in the care of your patient.        Sincerely,        Lori Damian MD

## 2021-06-10 ENCOUNTER — TELEPHONE (OUTPATIENT)
Dept: INTERNAL MEDICINE | Facility: CLINIC | Age: 81
End: 2021-06-10

## 2021-06-10 NOTE — TELEPHONE ENCOUNTER
Maureen with [facility] Examiner's office calls. Patient was found  in his home on 6/3/21. Maureen requesting information regarding last office visit, medical history and medications. They completed autopsy and will be signing death certificate.     Update forwarded to provider as FYI.   Sympathy card placed on Dr. Helm's desk.

## 2021-06-14 NOTE — TELEPHONE ENCOUNTER
APPT INFO    Date /Time: 3/1/18 2:25PM   Reason for Appt: Hospital follow up for stroke   Ref Provider/Clinic: [facility]   Are there internal records? Yes/No?  IF YES, list clinic names: YES     ED 1/8/18, 1/9/18-1/12/18  Labs 2018  CT cervical 1/12/18  CT head 1/12/18  MR brain for stroke 1/9/18  MRA head 1/8/18, 4/17/14  MRI brain 1/8/18  CT head 1/8/18  CTA angio head neck 1/8/18  MR brain 4/17/14  MR orbit face neck 4/17/17   Are there outside records? Yes/No? NO   Patient Contact (Y/N) & Call Details: NO   Action: Reviewed records         Called her daughter and we discussed switching her from omeprazole to pantoprazole to see if that would control some of the burping.  There is no other symptoms that seem to be concerning.  I also advised against carbonated beverages.    She is concerned about the cost of her antianxiety medication.  She was prescribed lorazepam for her procedure that she has coming up.  I let her know that I am not sure that they will be any that will be better covered but she can certainly work with pharmacy and I be happy to provide something that has better coverage.

## 2022-03-17 NOTE — DISCHARGE INSTRUCTIONS
Please monitor and record BG levels so this can be provided to the dr at your follow up visit.   Dr. Shields

## 2022-10-18 NOTE — PLAN OF CARE
Last visit 5/4/2022  Next visit   12/13/2022    Last labs   GOT/AST (Units/L)   Date Value   08/31/2022 14     GPT/ALT (Units/L)   Date Value   08/31/2022 14     Creatinine (mg/dL)   Date Value   08/31/2022 0.87     PLT (K/mcL)   Date Value   08/31/2022 377       CE checked YES   Problem: Patient Care Overview  Goal: Plan of Care/Patient Progress Review  Pt A/O X 4, mild aphasia, slurred speech. Bed alarm on for safety, has been calling appropriately. VSS on room air. Sleep: awake to void, easily back to sleep. Physical assessment at baseline- right arm weakness with numbness in hand, left leg weakness, skin intact. Denies nausea, shortness of breath, and chest pain. No reports of any other pain overnight. Ambulates to bathroom to void, incontinent due to urgency, monitoring PVRs. Is on a regular diet, with thin liquids. Blood glucose at 0200 was 257. Continent of bowel, last BM 1/14, is passing gas. Pt up assist of 1 with walker and gait belt. Pt is able to make needs known and the call light is within the pt's reach. Continue to monitor.          @2340: Incontinent of urine in pad and voided in toilet.  mL.  @0000: Incontinent of urine and in pad voided in toilet.  mL.   @0220: Incontinent of urine in pad and voided in toilet.  mL.   @0350: Incontinent of urine in pad and voided in toilet.  mL.  @0600: Incontinent of urine in pad and voided in toilet. PVR 25 mL.     Vitals @0600: Temp: 98.1  F (36.7  C) Temp src: Oral BP: 136/68 Pulse: 88   Resp: 16 SpO2: 93 % O2 Device: None (Room air)

## 2022-11-17 NOTE — TELEPHONE ENCOUNTER
Pt's wife calls. Pt needs to see an Infectious Disease doctor, he has an appt in [place] and she asks if there is a doctor in [place] he could see instead. Informed her there is not a [facility]-associated ID doctor in [place]. We usually refer pt's to [facility] in [place]. She will keep appt in [place].    Attending with

## 2022-12-13 NOTE — ED NOTES
Bed: ED03  Expected date: 1/8/18  Expected time: 7:00 AM  Means of arrival:   Comments:  buster Segundo diab etic     13-Dec-2022 17:46

## 2024-04-10 NOTE — PROGRESS NOTES
"  [facility]   Acute Rehabilitation Unit  Daily progress note    interval history  Pete Sheldon was seen and examined at bedside. Doing well; more fatigued today due to therapies in the morning. Neck pain is better; didn't receive icy hot patch last night but got it this morning. No headache, no new weakness or paresthesia. Has blurry vision with far vision; stable and present before stroke.    Reports nocturia at home; no frequency or urgency prior to admission. No dysuria or hematuria; + urgency and urge incontinence. PVRs remain elevated at 200-300 ml range. On flomax (can potentially increase the dose but he has had orthostasis). Will start timed toileting and encourage double voiding. Consider to get another UA if becomes symptomatic.      BGs more stable. Appreciate endo team assistance. Cr wnl.     Ambulating with CGA and no assistive device; will schedule a care conference on Friday with possible dc home early next week.     medications  Scheduled meds    [START ON 1/18/2018] insulin glargine  40 Units Subcutaneous Q24H     menthol   Transdermal Q8H     insulin aspart  1-10 Units Subcutaneous TID AC     insulin aspart  1-7 Units Subcutaneous At Bedtime     insulin aspart   Subcutaneous QAM AC     insulin aspart   Subcutaneous Daily with lunch     insulin aspart   Subcutaneous Daily with supper     aspirin EC  325 mg Oral Daily     amLODIPine  10 mg Oral Daily     atorvastatin  20 mg Oral Daily     tamsulosin  0.4 mg Oral QPM     multivitamin  1 tablet Oral Daily       PRN meds:  acetaminophen, menthol **AND** menthol **AND** menthol, senna-docusate, bisacodyl, glucose **OR** dextrose **OR** glucagon      physical exam  /58 (BP Location: Right arm)  Pulse 75  Temp 97.5  F (36.4  C) (Oral)  Resp 16  Ht 1.702 m (5' 7\")  Wt 67.6 kg (149 lb)  SpO2 93%  BMI 23.34 kg/m2     Gen: NAD, sitting in chair   HEENT: tenderness to palpation at right > left posterior " Spoke with pt. Called to confirm upcoming appointment on 4/17 with Dr. Shepherd. Pt confirmed appointment.      neck - improved as compared to yesterday   Cardio: RRR  Pulm: clear breath sounds b/l   Abd: soft and non-tender   Ext: wwp, no edema in bilateral lower extremities, no tenderness at calves   Neuro/MSK: unchanged; intact coordination with FNF b/l   alert and oriented, CN grossly intact, EOMI, JOJO, mild right hemiparesis, sensation intact to LT    labs  Reviewed     assessment and plan    Pete Sheldon is a 77-year-old male who had an ischemic left thalamic stroke on 1/9 resulting in right hemiparesis, decreased balance, decreased visual acuity, and decreased independence with mobility and ADLs. Admission to acute inpatient rehab 1/12/18.     Rehabilitation - continue comprehensive acute inpatient rehabilitation program with multidisciplinary approach including therapies, rehab nursing, and physiatry following. See interval history for updates.       Medical Conditions  # Left thalamic ischemic stroke: deficits as above.   --continue , HTN, HLD and DM control for secondary stroke prevention  --completed stroke class/education on [employer]     # DM II: HgbA1c 7.9 1/9/18  Endo team following; appreciate assistance.   01/17/18  -Metformin 1000mg BID with bfast and supper<--discontinue for now- will restart at lower dose if creatinine continues to improve  - Decrease lantus 50 units to 40  every morning.( starting 1/18)  - Novolog prandial insulin   Breakfast: 1 unit per 5 grams CHO  Lunch: 1 unit per 10 grams of CHO  Dinner: 1 unit per 10 grams of CHO  D/c Snack.: 1 unit per 10 grams of CHO   - Novolog correction: high correction before meals and HS ( 1 unit per 20 > 140 before meals and > 200 HS)  -Monitor glucose before meals, HS and 0200    # UTI: > 100k Aerococcus urinae on 1/8. continue keflex, complete 7 day course 01/16/18.       # Acute transient SOB and tachycardia: one episode on 1/14. Hospitalist team was consulted. His EKG shows normal sinus rhythm w/ sinus arrhythmia and no ST-T changes. CXR  clear. + Orthostasis. Improved with IVF. Continue to monitor.     # Acute kidney injury: on 1/14. Most likely pre renal disease due to hypovolemia (poor po intake, HCTZ use). Other differential could be ATN. + orthostatic on 01/14. Creatinine improved with IV fluids.  --repeat BMP in am   --avoid Nephrotoxins, Renal dose medicatios  --strict I and O    # HTN: on amlodipine 10/day and HCTZ upon admission to ARU. HCTZ was held on 1/15 due to low BP and orthostasis.    # HLD: LDL 74 1/10; on lipitor.     # Headache and neck pain: started 1/15 and has been persistent since then. Improved with tylenol. 01/16/18 head and neck CT per hospitalist team recs; no acute pathologies. Most likely MSK related due to weakness and poor posture. Started icy hot patches to help with pain.       1. FEN: regular diet; high consistent CHO.   2. Bowel: on prn bowel meds. Continent.   3. Bladder: elevated PVRs since admission to ARU. UTI as above. 01/17/18 Reports nocturia at home; no frequency or urgency prior to admission. No dysuria or hematuria; + urgency and urge incontinence. PVRs remain elevated at 200-300 ml range. On flomax (can potentially increase the dose but he has had orthostasis). Will start timed toileting and encourage double voiding. Consider to get another UA if becomes symptomatic.    4. DVT Prophylaxis: mechanical   5. GI Prophylaxis: none; oral intake   6. Code: full   7. Disposition: home   8. ELOS:  7-10 days.          Jerri Hernandez MD  Physical Medicine & Rehabilitation    I spent a total of 30 minutes face-to-face or managing the care of Pete OSBORN Monalisa. Over 50% of my time on the unit was spent counseling the patient and coordinating care. See note for details.

## 2024-06-26 NOTE — TELEPHONE ENCOUNTER
Order placed for bladder US. Hospital should soon be contacting patient to schedule. Please advise pt.    How Severe Are They?: moderate Is This A New Presentation, Or A Follow-Up?: Skin Lesions

## 2025-01-15 NOTE — H&P
Call placed to lab to request lipase be added onto existing blood. Ok per lab.   [facility]  Hospitalist Admission Note  Name: Pete Sheldon    MRN: [id number]  YOB: 1940    Age: 79 year old  Date of admission: 10/9/2019  Primary care provider: Rom Helm            Assessment and Plan:   Pete Sheldon is a 79 year old male with known history of hypertension, insulin requiring diabetes mellitus, dyslipidemia, prior kidney stones, dyslipidemia, hypothyroidism, prior CVA and currently living at home with his wife and was on his usual state of health until earlier this afternoon where he started to complain of abdominal pain mostly in the periumbilical area also radiation to the right flank area    1.  Abdominal pain, earlier flank pain  2.  Recurrent hypoglycemia  3.  History of insulin requiring diabetes mellitus  4.  Concerns for ureteral stones.  -CT scan done earlier the ED showed laminar calcification near the posterior wall near right UVJ. ?  Hydroureter/ureteral stone mentioned in report?  -Requested formal urology input  -Control pain    Admit as inpatient.  Continue with dextrose containing fluids.  Hold patient basal and prandial home regimen insulin.  Continue with insulin sliding scale for tonight.  N.p.o. after midnight until seen by urology.  Resume home regimen of Flomax and finasteride  As needed antiemetics    Code status: Full code  Prophylaxis: Mechanical PCD's  Disposition: Hopeful for Home discharge          Chief Complaint:   Abdominal pain, flank pain       Source of Information:   Patient with fair reliability, wife at bedside with fair  Discussion with ED physician  Review of E chart records         History of Present Illness:   Pete Sheldon is a 79 year old male with known history of hypertension, insulin requiring diabetes mellitus, dyslipidemia, prior kidney stones, dyslipidemia, hypothyroidism, prior CVA and currently living at home with his wife and was on his usual state of health until earlier this afternoon where he  started to complain of abdominal pain mostly in the periumbilical area also radiation to the right flank area, he also mentioned about decreasing frequency of urination but denies any nausea, vomiting, fevers, chills, shortness of breath, coughing spells nor mental status changes.  He also mentioned that he has some intermittent bouts of low blood sugar most pronounced in the morning.  However he presented to the emergency room today due to abdominal pain and flank pain.  At the ED he was found with recurrent hypoglycemia in spite of being treated with glucose earlier.  Due to this he was referred to us for further management and care.  There are some concerns if he has some recurrent urinary, ureteral stone in the setting of prior history and presenting symptoms of flank pain and abdominal pain.    During time of my examination I found Pete laying comfortably in bed, trying to tolerate some of oral diet.  He is awake, alert, oriented but still complaining of some flank and periumbilical pain.            Past Medical History:     Past Medical History:   Diagnosis Date     Calculus of ureter 1980's     Hypertension      Hypothyroidism      Microalbuminuria 2/15/2016     Mumps      Other and unspecified hyperlipidemia      Type 2 diabetes, HbA1C goal < 8% (H) 7/1995             Past Surgical History:     Past Surgical History:   Procedure Laterality Date     C NONSPECIFIC PROCEDURE  1980's    Nasal septoplasty     HC COLONOSCOPY THRU STOMA, DIAGNOSTIC  6/12/2007    Normal     HC FLEX SIGMOIDOSCOPY W/WO MIGUEL SPEC BY BRUSH/WASH  12/30/1999    Normal to 60 cm     HC REPAIR INCISIONAL HERNIA,REDUCIBLE  1/2001, 1999    Hernia Repair, Incisional, Unilateral (right)     HC REPAIR INCISIONAL HERNIA,REDUCIBLE  1996    Hernia Repair, Incisional, Unilateral (left, with mesh placement)     TESTICLE SURGERY       VASECTOMY               Social History:     Social History     Tobacco Use     Smoking status: Former Smoker     Last  attempt to quit: 3/11/1953     Years since quittin.6     Smokeless tobacco: Never Used   Substance Use Topics     Alcohol use: No             Family History:   Family history was fully reviewed and non-contributory in this case.         Allergies:     Allergies   Allergen Reactions     Losartan Other (See Comments)     Dizziness             Medications:     Prior to Admission medications    Medication Sig Last Dose Taking? Auth Provider   aspirin 81 MG chewable tablet Take 4 tablets (324 mg) by mouth daily 10/9/2019 at Unknown time Yes Yue Acevedo DO   atorvastatin (LIPITOR) 20 MG tablet TAKE 1 TABLET BY MOUTH ONCE DAILY 10/8/2019 at Unknown time Yes Rom Helm MD   finasteride (PROSCAR) 5 MG tablet TAKE 1 TABLET BY MOUTH ONCE DAILY 10/8/2019 at Unknown time Yes Jasmyne Elliott MD   Glucosamine HCl 750 MG TABS Take 750 mg by mouth 2 times daily 10/9/2019 at am Yes Yue Acevedo DO   insulin aspart (NOVOLOG FLEXPEN) 100 UNIT/ML pen INJECT 6 UNITS WITH BREAKFAST, 4 UNITS WITH LUNCH, 5 UNITS WITH DINNER, HOLD IF NOT EATING MEAL 10/9/2019 at lunch Yes Lori Damian MD   insulin glargine (LANTUS SOLOSTAR) 100 UNIT/ML pen Inject 30 Units Subcutaneous daily 10/9/2019 at Unknown time Yes Lori Damian MD   levothyroxine (SYNTHROID/LEVOTHROID) 25 MCG tablet Take 1 tablet (25 mcg) by mouth daily 10/8/2019 at Unknown time Yes Rom Helm MD   metFORMIN (GLUCOPHAGE) 500 MG tablet Take 1 tablet (500 mg) by mouth daily (with breakfast) 10/9/2019 at Unknown time Yes Rom Helm MD   multivitamin (OCUVITE) TABS Take 1 tablet by mouth daily FOCUS SELECT PREMIUM WITH LUTEIN per eye doctor  Reported on 2017 10/9/2019 at Unknown time Yes Reported, Patient   tamsulosin (FLOMAX) 0.4 MG capsule TAKE 1 CAPSULE BY MOUTH ONCE DAILY 10/9/2019 at Unknown time Yes Rom Helm MD   vitamin C (ASCORBIC ACID) 1000 MG TABS Take 1,000 mg by mouth 2 times  "daily 10/9/2019 at Unknown time Yes Unknown, Entered By History   ACE/ARB NOT PRESCRIBED, INTENTIONAL, by Other route continuous prn (Hyperkalemia) Reported on 5/19/2017   Rom Helm MD   B-D U/F 31G X 8 MM insulin pen needle USE  4 TIMES DAILY TO  INJECT  INSULIN   Rom Helm MD   blood glucose (ONETOUCH VERIO IQ) test strip USE 1 STRIP TO CHECK GLUCOSE 4 TIMES DAILY OR  AS  DIRECTED  USING  ONE  TOUCH  VERIO  FLEX   Rom Helm MD   blood glucose monitoring (ONE TOUCH ULTRASOFT) lancets Use to test blood sugar 4 times daily or as directed.   Rom Helm MD   insulin pen needle (B-D U/F) 31G X 8 MM B-D ULTRA FINE SHORT PEN NEEDLES, USE 4 TIMES A DAY TO INJECT INSULIN   Rom Helm MD   insulin syringes, disposable, U-100 0.3 ML MISC 1 each 2 times daily   Rom Helm MD   LIFESCAN FINEPOINT LANCETS MISC Use to test blood sugars 4 times daily or as directed.   Rom Helm MD   Nutritional Supplements (GLUCOSE MANAGEMENT) TABS 4 tabs po for low blood sugar below 70, may repeat q 10-15 minutes as needed   Yue Acevedo DO   order for DME All DM testing supplies including test strips, lancets, solution, syringes, needles and/or pen needles for testing 4 times per day.    Brand \"Contour Next\"   Lori Damian MD             Review of Systems:   A Comprehensive greater than 10 system review of systems was carried out.  Pertinent positives and negatives are noted above.  Otherwise negative for contributory information.           Physical Exam:   Blood pressure (!) 171/78, pulse 75, temperature 97.7  F (36.5  C), temperature source Oral, resp. rate 18, SpO2 94 %.  Wt Readings from Last 1 Encounters:   09/04/19 74.6 kg (164 lb 6.4 oz)     Exam:  GENERAL: No apparent distress. Awake, alert, and fully oriented.  HEENT: Normocephalic, atraumatic. Extraocular movements intact.  CARDIOVASCULAR: Regular rate and rhythm, S1 and S2,  PULMONARY: Clear to auscultation, no " wheezes, crackles,   ABDOMINAL: Soft, non-tender, non-distended. Bowel sounds normoactive. No hepatosplenomegaly.  EXTREMITIES: No cyanosis or clubbing. No edema.  NEUROLOGICAL: CN 2-12 grossly intact, awake and alert x3, spontaneous and coherent speech. no focal neurological deficits.  DERMATOLOGICAL: No rash, ulcer, ecchymoses, jaundice.  Psych: not agitation, not combative, pleasant mood           Data:   EKG: No new EKG seen    Imaging:  Results for orders placed or performed during the hospital encounter of 10/09/19   CT Abdomen Pelvis without Contrast (stone protocol)    Addendum: 10/9/2019    ADDENDUM:  Voice recognition error in pelvis section of the findings. Findings should read as follows: NO HYDROURETER OR URETERAL STONE.         Narrative    EXAM: CT ABDOMEN PELVIS W/O CONTRAST  LOCATION: [facility]  DATE/TIME: 10/9/2019 6:07 PM    INDICATION: Dysuria, abdominal pain history of kidney stones  COMPARISON: 03/23/2018  TECHNIQUE: Helical thin-section CT scan of the abdomen and pelvis was performed without IV contrast. Multiplanar reformats were obtained. Dose reduction techniques were used.  CONTRAST: None.    FINDINGS:   LUNG BASES: Mild aortic valve calcification, incompletely imaged, correlate for possible valvular stenosis. Severe coronary arterial calcification. Calcified periaortic lymph nodes consistent with prior granulomatous disease.    ABDOMEN: There are couple of subtle punctate 1 mm stones in each kidney but no hydronephrosis. Small stable cyst in the upper pole of the right kidney, 1 cm. No proximal hydroureter. Suspected circumcaval right ureteral course which may be an incidental   anomaly as there is no evidence for obstruction.    No free air or free fluid. Liver, gallbladder, pancreas, spleen, adrenal glands appear normal. Large amount of stool in the colon. No evidence for bowel obstruction. Diffuse aortic calcification but no aneurysm.    PELVIS: Hydroureter or ureteral  stone. Laminar calcification along the right posterior wall urinary bladder near the ureterovesical junction, slightly increased compared to prior exam, currently 1.6 cm transversely previously 1.0 cm. No free air or free   fluid in the pelvis. No adenopathy. Small left femoral or inguinal hernia similar to prior exam containing a short segment of nonobstructed descending colon.    MUSCULOSKELETAL: Negative.      Impression    CONCLUSION:   1.  No acute abnormality in the abdomen or pelvis. No significant change since prior exam.           Labs:  No results for input(s): CULT in the last 168 hours.  No results for input(s): PH, PHARTERIAL, PO2, [id number], SAT, PCO2, HCO3, BASEEXCESS, GREGG, BEB in the last 168 hours.    Invalid input(s): [id number]  Recent Labs   Lab 10/09/19  1740   WBC 8.3   HGB 14.0   HCT 44.3   MCV 94        Recent Labs   Lab 10/09/19  1740      POTASSIUM 4.3   CHLORIDE 104   CO2 29   ANIONGAP 5   GLC 63*   BUN 25   CR 1.46*   GFRESTIMATED 44*   GFRESTBLACK 51*   MANAV 8.9   PROTTOTAL 7.5   ALBUMIN 2.9*   BILITOTAL 0.3   ALKPHOS 90   AST 13   ALT 23     No results for input(s): SED, CRP in the last 168 hours.  Recent Labs   Lab 10/09/19  1933 10/09/19  1849 10/09/19  1749 10/09/19  1740   GLC  --   --   --  63*   * 47* 55*  --      No results for input(s): INR in the last 168 hours.  Recent Labs   Lab 10/09/19  1740   LIPASE 57*     No results for input(s): TROPONIN, TROPI, TROPR in the last 168 hours.    Invalid input(s): TROP, TROPONINIES  Recent Labs   Lab 10/09/19  1842   COLOR Light Yellow   APPEARANCE Clear   URINEGLC Negative   URINEBILI Negative   URINEKETONE Negative   SG 1.019   UBLD Negative   URINEPH 5.5   PROTEIN 100*   NITRITE Negative   LEUKEST Negative   RBCU 1   WBCU 1